# Patient Record
Sex: FEMALE | Race: WHITE | NOT HISPANIC OR LATINO | Employment: FULL TIME | URBAN - METROPOLITAN AREA
[De-identification: names, ages, dates, MRNs, and addresses within clinical notes are randomized per-mention and may not be internally consistent; named-entity substitution may affect disease eponyms.]

---

## 2017-01-30 DIAGNOSIS — F32.A DEPRESSION, UNSPECIFIED DEPRESSION TYPE: ICD-10-CM

## 2017-01-31 RX ORDER — CITALOPRAM 40 MG/1
TABLET, FILM COATED ORAL
Qty: 90 TABLET | Refills: 0 | Status: SHIPPED | OUTPATIENT
Start: 2017-01-31 | End: 2017-03-02 | Stop reason: SDUPTHER

## 2017-02-13 ENCOUNTER — OFFICE VISIT (OUTPATIENT)
Dept: PAIN MEDICINE | Facility: CLINIC | Age: 57
End: 2017-02-13
Payer: COMMERCIAL

## 2017-02-13 VITALS
BODY MASS INDEX: 36.72 KG/M2 | SYSTOLIC BLOOD PRESSURE: 130 MMHG | DIASTOLIC BLOOD PRESSURE: 70 MMHG | TEMPERATURE: 99 F | HEART RATE: 72 BPM | WEIGHT: 220.69 LBS

## 2017-02-13 DIAGNOSIS — M48.061 LUMBAR STENOSIS: ICD-10-CM

## 2017-02-13 DIAGNOSIS — M54.16 LUMBAR RADICULOPATHY: Primary | ICD-10-CM

## 2017-02-13 DIAGNOSIS — M47.816 FACET HYPERTROPHY OF LUMBAR REGION: ICD-10-CM

## 2017-02-13 DIAGNOSIS — M51.36 DDD (DEGENERATIVE DISC DISEASE), LUMBAR: ICD-10-CM

## 2017-02-13 PROCEDURE — 99214 OFFICE O/P EST MOD 30 MIN: CPT | Mod: S$GLB,,, | Performed by: PHYSICIAN ASSISTANT

## 2017-02-13 PROCEDURE — 99999 PR PBB SHADOW E&M-EST. PATIENT-LVL III: CPT | Mod: PBBFAC,,, | Performed by: PHYSICIAN ASSISTANT

## 2017-02-13 RX ORDER — TIZANIDINE 2 MG/1
2 TABLET ORAL NIGHTLY
Qty: 90 TABLET | Refills: 0 | Status: SHIPPED | OUTPATIENT
Start: 2017-02-13 | End: 2017-03-09 | Stop reason: SDUPTHER

## 2017-02-13 RX ORDER — ALPRAZOLAM 0.5 MG/1
1 TABLET, ORALLY DISINTEGRATING ORAL ONCE AS NEEDED
Status: CANCELLED | OUTPATIENT
Start: 2017-03-10 | End: 2017-03-10

## 2017-02-13 RX ORDER — TRAMADOL HYDROCHLORIDE 50 MG/1
50 TABLET ORAL 2 TIMES DAILY PRN
Qty: 60 TABLET | Refills: 2 | Status: SHIPPED | OUTPATIENT
Start: 2017-02-13 | End: 2017-05-14

## 2017-02-13 NOTE — PROGRESS NOTES
CHIEF COMPLAINT/REASON FOR VISIT: low back pain    History of present illness: The patient is a 56 year old woman with a history of migraines, carpal tunnel syndrome and low back pain since her early 20s.  She returns in follow-up today with worsening low back and leg pain.  She states that about a month and half ago, her pain became worse.  It is located across low back radiating to the right greater than left buttocks, posterior thighs and calves.  The pain is worse at night, with standing and also with walking.  She is sitting and medications.  She complains of numbness in her lateral thighs.  She reports some weakness in her legs in the mornings.  She denies bladder or bowel incontinence.    Pain intervention history: She tried Neurontin for her leg/thigh pain with no success. She only takes Advil with about 30% relief. She is status post TFESI bilaterally to L3 on 12/6/2011 with no relief. Past history of status post L4/5 YARON on 5/25/11 with about 90% relief that lasted 3 weeks. She is status post 2 bilateral L3 transforaminal injections with 3 weeks relief each time. Her current pain medications include Celexa 40 mg once a day, Aleve 220 mg twice a day, Lyrica as previously stated, Zanaflex 5 mg at night. She uses her TENS at home, when she thinks about it.  She is status post L4/5 interlaminar epidural steroid injection on 1/8/14 with 90% relief.  She is status post L5/S1 interlaminar epidural steroid injection on 6/9/14 with moderate relief only on the right low back and right leg lasting 2 weeks.  She is status post bilateral L3/4 and L4/5 facet joint injections on 4/13/15 with initially 100% relief of her back pain and 80% relief of her left lateral hip and lateral thigh pain, now reporting at least 50% relief of both.  She is status post bilateral L2, 3 and 4 medial branch radiofrequency ablation on 5/20/15 with 60% relief.   She is status post bilateral L2, 3 and 4 medial branch radiofrequency ablation  on 6/20/16 with 50-70% relief.    ROS: She reports back pain only.  Balance of review of systems is negative.    Medical, surgical, family and social history reviewed elsewhere in record.     Medications/Allergies: See med card      Vitals:    02/13/17 1006   BP: 130/70   Pulse: 72   Temp: 98.8 °F (37.1 °C)   TempSrc: Oral   Weight: 100.1 kg (220 lb 10.9 oz)   PainSc:   8   PainLoc: Back        PHYSICAL EXAM:  Gen: A and O x3, pleasant, well-groomed  HEENT: PERRLA  CVS: Regular rate and rhythm, normal S1 and S2, no murmurs.  Resp: Clear to auscultation bilaterally, no wheezes or rales.  Musculoskeletal: Able to heel walk, toe walk. No antalgic gait.     Neuro:   Lower extremities: 5/5 strength bilaterally  Reflexes:  Patellar 2+, Achilles 2+.  Sensory:Intact and symmetrical to light touch and pinprick in L2-S1 dermatomes bilaterally.    Lumbar spine:  Lumbar spine: ROM is moderately limited with flexion, extension and oblique extension with increased pain in the right greater than left low back during flexion.  Azam's test negative.   Supine straight leg raise is negative.  Internal and external rotation of the hip causes no increased pain in either side.  Myofascial exam: Mild tenderness to palpation to the lower right lumbar paraspinous muscles.         IMAGING:   MRI L-SPINE 5/10/11   IMPRESSION: AT L3-4, THERE IS CIRCUMFERENTIAL DISC PROTRUSION FOCALLY MORE PROMINENT TO THE LEFT AS WELL AS A SMALL DISC HERNIATION PROTRUDING INFERIORLY BEHIND L4 TO THE LEFT OF MIDLINE. THIS PRODUCES SPINAL CANAL AND BILATERAL NEURAL FORAMINAL STENOSIS, LEFT GREATER THAN RIGHT. AT L4-5, THERE IS CIRCUMFERENTIAL DISC PROTRUSION FOCALLY MORE PROMINENT TO THE RIGHT WITH MILD SPINAL CANAL AND RIGHT NEURAL FORAMINAL STENOSIS. AT L1-2 AND L2-3 ALTHOUGH THERE ARE DISC PROTRUSIONS IDENTIFIED, SIGNIFICANT STENOSIS IS NOT SEEN.     MRI lumbar spine 7/10/14  L1-2:There is chronic relatively advanced disc degeneration with disc space  narrowing, disc dehydration, disc narrowing, endplate osteophytes, and degenerative vertebral endplate marrow change. There is a diffuse 2-mm posterior disc bulge with a superimposed4-mm right posterior disc extrusion causing mild right foraminal stenosis. There is no impingement of the right L1 nerve root and there has been no change.  L2-3: There is severe and chronic disc degeneration with severe disc space narrowing, disc dehydration, degenerative vertebral endplate marrow change, and vertebral endplate osteophytes. There is a diffuse 2-mm posterior disc bulge with a superimposed 4-mm right posterior disc extrusion causing mild right foraminal stenosis. There is no impingement of the right L2 nerve root and there has been no significant change.  L3-4: There is severe disc degeneration which has progressed since the prior study. There is severe disc space narrowing, disc dehydration, degenerative vertebral endplate marrow change and endplate osteophytes. There is also mild posterior subluxation of L3over a distance of 4 mm. There is a broad-based 5-mm posterior disc extrusion. There is degenerative facet arthrosis with ligamentum flavum thickening. The combination of facet arthrosis and disc extrusion results in relatively severe spinal canal stenosis with compression of the thecal sac to an AP diameter of 5 mm, moderate right foraminal stenosis, and severe left foraminal stenosis. The spinal stenosis has also progressed since the prior study.  L4-5:There is a diffuse posterior disc bulge with a superimposed 3-mm left posterior paracentral disc protrusion causing left paracentral thecal sac compression. There is moderate left and mild right foraminal stenosis and there has been no significant change. There is mild degenerative facet arthrosis with ligamentum flavum thickening and small joint effusions.  L5-S1:There is no significant compromise of the spinal canal or foramina and there has been no change.    X-ray  cervical spine 6/8/15  There is loss of normal cervical lordosis which may be positional or related muscular strain. Intervertebral disk height loss is noted at the C5-C6 C6-C7 levels and to a lesser degree C4-C5 and C7-T1 levels. Vertebral body alignment appears otherwise adequate. Vertebral body heights appear well-preserved. The atlas and odontoid appear in good relationship to each other. Osseous neuroforaminal narrowing is noted at the C3-C4 C4-C5 C5-C6 levels on the left and at the C4-C5 C5-C6 and C6-C7 levels on the right       ASSESSMENT:  The patient is a 56 year old woman with a history of migraines, carpal tunnel syndrome and low back pain since her early 20s who returns in follow up.   1. Lumbar radiculopathy     2. Lumbar stenosis     3. Facet hypertrophy of lumbar region     4. DDD (degenerative disc disease), lumbar         Plan:  1.  For her worsening low back and leg pain, we discussed that this is likely due to stenosis and I have scheduled her for an L5/S1 interlaminar epidural steroid injection.  She feels that this is a different type of pain than she has when she has relief with RFA.  If she does not have relief with the injection, we discussed updating his lumbar spine MRI.  2.  Dr. Abdalla provided prescriptions for tramadol 50 mg up to 2 times a day as needed for pain.  I have reviewed the [place] Board of Pharmacy website and there are no abberancies.  I refilled her tizanidine 2 mg nightly as needed and she continues to take Topamax and meloxicam.  We will likely check her CO2 at next visit.  3.  Follow-up in 4 weeks post procedure or sooner as needed.

## 2017-03-02 ENCOUNTER — OFFICE VISIT (OUTPATIENT)
Dept: INTERNAL MEDICINE | Facility: CLINIC | Age: 57
End: 2017-03-02
Payer: COMMERCIAL

## 2017-03-02 VITALS
SYSTOLIC BLOOD PRESSURE: 120 MMHG | BODY MASS INDEX: 36.58 KG/M2 | DIASTOLIC BLOOD PRESSURE: 68 MMHG | HEIGHT: 65 IN | WEIGHT: 219.56 LBS | RESPIRATION RATE: 16 BRPM | OXYGEN SATURATION: 97 % | HEART RATE: 72 BPM

## 2017-03-02 DIAGNOSIS — Z00.00 HEALTH CARE MAINTENANCE: ICD-10-CM

## 2017-03-02 DIAGNOSIS — J30.1 SEASONAL ALLERGIC RHINITIS DUE TO POLLEN: ICD-10-CM

## 2017-03-02 DIAGNOSIS — F32.A DEPRESSION, UNSPECIFIED DEPRESSION TYPE: ICD-10-CM

## 2017-03-02 DIAGNOSIS — G43.909 MIGRAINE WITHOUT STATUS MIGRAINOSUS, NOT INTRACTABLE, UNSPECIFIED MIGRAINE TYPE: ICD-10-CM

## 2017-03-02 DIAGNOSIS — K21.9 GASTROESOPHAGEAL REFLUX DISEASE, ESOPHAGITIS PRESENCE NOT SPECIFIED: Primary | ICD-10-CM

## 2017-03-02 PROCEDURE — 1160F RVW MEDS BY RX/DR IN RCRD: CPT | Mod: S$GLB,,, | Performed by: INTERNAL MEDICINE

## 2017-03-02 PROCEDURE — 99214 OFFICE O/P EST MOD 30 MIN: CPT | Mod: S$GLB,,, | Performed by: INTERNAL MEDICINE

## 2017-03-02 PROCEDURE — 99999 PR PBB SHADOW E&M-EST. PATIENT-LVL III: CPT | Mod: PBBFAC,,, | Performed by: INTERNAL MEDICINE

## 2017-03-02 RX ORDER — PANTOPRAZOLE SODIUM 40 MG/1
40 TABLET, DELAYED RELEASE ORAL DAILY
Qty: 90 TABLET | Refills: 1 | Status: SHIPPED | OUTPATIENT
Start: 2017-03-02 | End: 2017-12-26 | Stop reason: SDUPTHER

## 2017-03-02 RX ORDER — SUMATRIPTAN SUCCINATE 100 MG/1
TABLET ORAL
Qty: 9 TABLET | Refills: 0 | Status: SHIPPED | OUTPATIENT
Start: 2017-03-02 | End: 2017-10-25 | Stop reason: SDUPTHER

## 2017-03-02 RX ORDER — CITALOPRAM 40 MG/1
40 TABLET, FILM COATED ORAL DAILY
Qty: 90 TABLET | Refills: 1 | Status: SHIPPED | OUTPATIENT
Start: 2017-03-02 | End: 2018-01-29 | Stop reason: SDUPTHER

## 2017-03-02 RX ORDER — AZELASTINE 1 MG/ML
1 SPRAY, METERED NASAL 2 TIMES DAILY
Qty: 30 ML | Refills: 2 | Status: SHIPPED | OUTPATIENT
Start: 2017-03-02 | End: 2017-11-16

## 2017-03-02 NOTE — MR AVS SNAPSHOT
[facility]  [address] [facility] [address] [place] [address]  Phone: [phone]  Fax: [phone]                  Elizabeth Giordano   3/2/2017 6:40 PM   Office Visit    Description:  Female : 1960   Provider:  Yoselyn Sharif MD   Department:  [place] - Internal Medicine           Reason for Visit     Medication Refill           Diagnoses this Visit        Comments    Gastroesophageal reflux disease, esophagitis presence not specified    -  Primary     Depression, unspecified depression type         Migraine without status migrainosus, not intractable, unspecified migraine type         Seasonal allergic rhinitis due to pollen         Health care maintenance                To Do List           Future Appointments        Provider Department Dept Phone    3/2/2017 6:40 PM Yoselyn Sharif MD [facility] [phone]    3/3/2017 8:45 AM LAB, [place] [facility] [phone]    3/10/2017 2:30 PM [facility] [id number] [facility] [phone]    4/10/2017 9:00 AM JERED Cruz [facility] [facility] - [facility] [phone]    2017 5:00 PM [facility] [id number] [facility] [phone]      Your Future Surgeries/Procedures     Mar 10, 2017   Surgery with Oswald Abdalla MD   [facility] ([place])    [address] [facility] [place] [address] [phone]              Goals (5 Years of Data)     None       These Medications        Disp Refills Start End    azelastine (ASTELIN) 137 mcg (0.1 %) nasal spray 30 mL 2 3/2/2017     1 spray (137 mcg total) by Nasal route 2 (two) times daily. - Nasal    Pharmacy: [id number] - [place] [address] AT [facility] [address] Ph #: [phone]       citalopram (CELEXA) 40 MG tablet 90 tablet 1 3/2/2017     Take 1 tablet (40 mg total) by mouth once daily. - Oral    Pharmacy: [id number]  - [facility] [id number] [place] [address] AT [facility] [address] Ph #: [phone]       pantoprazole (PROTONIX) 40 MG tablet 90 tablet 1 3/2/2017     Take 1 tablet (40 mg total) by mouth once daily. - Oral    Pharmacy: [facility] [place] [id number] [place] [address] AT [facility] [address] Ph #: [phone]       sumatriptan (IMITREX) 100 MG tablet 9 tablet 0 3/2/2017     May take one tab, then followup with another tab in 2 hours if headache not resolved.    Pharmacy: [facility] [place] [address] AT [facility] [address] Ph #: [phone]         [facility] On Call     [facility] On Call Nurse Care Line - 24/7 Assistance  Registered nurses in the [facility] provide clinical advisement, health education, appointment booking, and other advisory services.  Call for this free service at [phone].             Medications           Message regarding Medications     Verify the changes and/or additions to your medication regime listed below are the same as discussed with your clinician today.  If any of these changes or additions are incorrect, please notify your healthcare provider.        START taking these NEW medications        Refills    sumatriptan (IMITREX) 100 MG tablet 0    Sig: May take one tab, then followup with another tab in 2 hours if headache not resolved.    Class: Normal      CHANGE how you are taking these medications     Start Taking Instead of    citalopram (CELEXA) 40 MG tablet citalopram (CELEXA) 40 MG tablet    Dosage:  Take 1 tablet (40 mg total) by mouth once daily. Dosage:  TAKE 1 TABLET BY MOUTH ONCE DAILY.    Reason for Change:  Reorder     pantoprazole (PROTONIX) 40 MG tablet pantoprazole (PROTONIX) 40 MG tablet    Dosage:  Take 1 tablet (40 mg total) by mouth once daily. Dosage:  TAKE 1 TABLET BY MOUTH EVERY DAY    Reason for Change:  Reorder       STOP taking these medications     zolmitriptan (ZOMIG) 5 MG  "tablet TAKE 1 TABLET BY MOUTH AS NEEDED           Verify that the below list of medications is an accurate representation of the medications you are currently taking.  If none reported, the list may be blank. If incorrect, please contact your healthcare provider. Carry this list with you in case of emergency.           Current Medications     azelastine (ASTELIN) 137 mcg (0.1 %) nasal spray 1 spray (137 mcg total) by Nasal route 2 (two) times daily.    citalopram (CELEXA) 40 MG tablet Take 1 tablet (40 mg total) by mouth once daily.    diclofenac sodium (VOLTAREN) 1 % Gel Apply 2 g topically 3 (three) times daily as needed.    diphenhydrAMINE (BENADRYL) 25 mg capsule Take 25 mg by mouth every 6 (six) hours as needed.    EVAMIST 1.53 mg/spray (1.7%) transdermal spray     meloxicam (MOBIC) 15 MG tablet Take 1 tablet (15 mg total) by mouth once daily.    pantoprazole (PROTONIX) 40 MG tablet Take 1 tablet (40 mg total) by mouth once daily.    tizanidine (ZANAFLEX) 2 MG tablet Take 1 tablet (2 mg total) by mouth every evening.    topiramate (TOPAMAX) 50 MG tablet Take 1 tablet (50 mg total) by mouth 2 (two) times daily.    tramadol (ULTRAM) 50 mg tablet Take 1 tablet (50 mg total) by mouth 2 (two) times daily as needed for Pain.    biotin 1 mg Cap Take by mouth.    sumatriptan (IMITREX) 100 MG tablet May take one tab, then followup with another tab in 2 hours if headache not resolved.           Clinical Reference Information           Your Vitals Were     BP Pulse Resp Height Weight SpO2    120/68 72 16 5' 5" (1.651 m) 99.6 kg (219 lb 9.3 oz) 97%    BMI                36.54 kg/m2          Blood Pressure          Most Recent Value    BP  120/68      Allergies as of 3/2/2017     Hydrocodone    Sulfa (Sulfonamide Antibiotics)    Oxycodone      Immunizations Administered on Date of Encounter - 3/2/2017     None      Orders Placed During Today's Visit     Future Labs/Procedures Expected by Expires    CBC auto differential  " 3/2/2017 5/31/2017    Comprehensive metabolic panel  3/2/2017 5/31/2017    TSH  3/2/2017 3/3/2018    Lipid panel  4/1/2017 5/1/2018    Mammo Digital Screening Bilateral With CAD  4/11/2017 5/2/2018      Language Assistance Services     ATTENTION: Language assistance services are available, free of charge. Please call [phone].      ATENCIÓN: Si habla español, tiene a schmitz disposición servicios gratuitos de asistencia lingüística. Llame al [phone].     CHÚ Ý: N?u b?n nói Ti?ng Vi?t, có các d?ch v? h? tr? ngôn ng? mi?n phí dành cho b?n. G?i s? [phone].         [facility] complies with applicable Federal civil rights laws and does not discriminate on the basis of race, color, national origin, age, disability, or sex.

## 2017-03-03 NOTE — PROGRESS NOTES
HISTORY OF PRESENT ILLNESS:  Pt. is a 56 y.o. female presents for monitoring of her use of celexa for depression, lumbar DJD, migraines, Gerd, has Factor V Leiden. Is no on coumadin.  She is due labs.  She is having problems with her seasonal allergies.  She has upcoming appt. With Dr. Darin Ware.      ROS:  GENERAL: No fever, chills, fatigability or weight loss.  SKIN: No rashes, itching or changes in color or texture of skin.  HEAD: No headaches or recent head trauma.  EARS: Denies ear pain, discharge or vertigo.  NOSE: No loss of smell, no epistaxis; positive postnasal drip.  MOUTH & THROAT: No hoarseness or change in voice. No excessive gum bleeding.  NODES: Denies swollen glands.  CHEST: Denies MEDELLIN, cyanosis, wheezing, cough and sputum production.  CARDIOVASCULAR: Denies chest pain, PND, orthopnea or reduced exercise tolerance.  ABDOMEN: Appetite fine. No weight loss. Denies constipation, diarrhea, abdominal pain, hematemesis or blood in stool.  URINARY: No flank pain, dysuria or hematuria.  PERIPHERAL VASCULAR: No claudication or cyanosis. No edema.  MUSCULOSKELETAL: No joint stiffness or swelling. Denies back pain.  NEUROLOGIC: Denies numbness    PE:   Vitals:   Vitals:    03/02/17 1815   BP: 120/68   Pulse: 72   Resp: 16       GENERAL: no acute distress, A&Ox3, comfortable.  Female with BMI of 36   HEENT: tympanic membranes clear, nasal mucosa pink, no pharyngeal erythema or exudate  NECK: supple, no cervical lymphadenopathy, no thyromegaly; no supraclavicular nodes;   CHEST:  clear to auscultation bilaterally, no crackles or wheeze; no increased work of breathing;  CARDIOVASCULAR: regular rate and rhythm, no rubs, murmurs or gallops.  ABDOMEN: normal bowel sounds, soft non-tender, non-distended; no palpable organomegaly;   EXT: no clubbing, cyanosis or edema.     ASSESSMENT/PLAN:    Gastroesophageal reflux disease, esophagitis presence not specified  -     CBC auto differential; Future; Expected date:  3/2/17  -     pantoprazole (PROTONIX) 40 MG tablet; Take 1 tablet (40 mg total) by mouth once daily.  Dispense: 90 tablet; Refill: 1    Depression, unspecified depression type  -     TSH; Future; Expected date: 3/2/17  -     citalopram (CELEXA) 40 MG tablet; Take 1 tablet (40 mg total) by mouth once daily.  Dispense: 90 tablet; Refill: 1    Migraine without status migrainosus, not intractable, unspecified migraine type  -     sumatriptan (IMITREX) 100 MG tablet; May take one tab, then followup with another tab in 2 hours if headache not resolved.  Dispense: 9 tablet; Refill: 0    Seasonal allergic rhinitis due to pollen  -     azelastine (ASTELIN) 137 mcg (0.1 %) nasal spray; 1 spray (137 mcg total) by Nasal route 2 (two) times daily.  Dispense: 30 mL; Refill: 2    Health care maintenance  -     Comprehensive metabolic panel; Future; Expected date: 3/2/17  -     Lipid panel; Future; Expected date: 4/1/17  -     Mammo Digital Screening Bilateral With CAD; Future; Expected date: 4/11/17      Call if condition changes or worsens.

## 2017-03-09 RX ORDER — TIZANIDINE 2 MG/1
TABLET ORAL
Qty: 90 TABLET | Refills: 0 | Status: SHIPPED | OUTPATIENT
Start: 2017-03-09 | End: 2017-04-10 | Stop reason: SDUPTHER

## 2017-03-10 ENCOUNTER — HOSPITAL ENCOUNTER (OUTPATIENT)
Facility: HOSPITAL | Age: 57
Discharge: HOME | End: 2017-03-10
Attending: ANESTHESIOLOGY | Admitting: ANESTHESIOLOGY
Payer: COMMERCIAL

## 2017-03-10 ENCOUNTER — SURGERY (OUTPATIENT)
Age: 57
End: 2017-03-10

## 2017-03-10 ENCOUNTER — HOSPITAL ENCOUNTER (OUTPATIENT)
Dept: RADIOLOGY | Facility: HOSPITAL | Age: 57
Discharge: HOME | End: 2017-03-10
Attending: ANESTHESIOLOGY | Admitting: ANESTHESIOLOGY
Payer: COMMERCIAL

## 2017-03-10 VITALS
TEMPERATURE: 98 F | WEIGHT: 220 LBS | BODY MASS INDEX: 36.65 KG/M2 | HEIGHT: 65 IN | HEART RATE: 66 BPM | OXYGEN SATURATION: 97 % | SYSTOLIC BLOOD PRESSURE: 134 MMHG | RESPIRATION RATE: 16 BRPM | DIASTOLIC BLOOD PRESSURE: 63 MMHG

## 2017-03-10 DIAGNOSIS — M54.16 LUMBAR RADICULOPATHY: ICD-10-CM

## 2017-03-10 DIAGNOSIS — M51.36 DDD (DEGENERATIVE DISC DISEASE), LUMBAR: ICD-10-CM

## 2017-03-10 PROCEDURE — 25000003 PHARM REV CODE 250: Mod: PO | Performed by: ANESTHESIOLOGY

## 2017-03-10 PROCEDURE — 62322 NJX INTERLAMINAR LMBR/SAC: CPT | Mod: PO | Performed by: ANESTHESIOLOGY

## 2017-03-10 PROCEDURE — 62323 NJX INTERLAMINAR LMBR/SAC: CPT | Mod: ,,, | Performed by: ANESTHESIOLOGY

## 2017-03-10 PROCEDURE — 63600175 PHARM REV CODE 636 W HCPCS: Mod: PO | Performed by: ANESTHESIOLOGY

## 2017-03-10 PROCEDURE — 62323 NJX INTERLAMINAR LMBR/SAC: CPT | Performed by: ANESTHESIOLOGY

## 2017-03-10 PROCEDURE — 25500020 PHARM REV CODE 255: Mod: PO | Performed by: ANESTHESIOLOGY

## 2017-03-10 RX ORDER — LIDOCAINE HYDROCHLORIDE 10 MG/ML
INJECTION INFILTRATION; PERINEURAL
Status: DISCONTINUED | OUTPATIENT
Start: 2017-03-10 | End: 2017-03-10 | Stop reason: HOSPADM

## 2017-03-10 RX ORDER — SODIUM CHLORIDE 9 MG/ML
INJECTION, SOLUTION INTRAMUSCULAR; INTRAVENOUS; SUBCUTANEOUS
Status: DISCONTINUED | OUTPATIENT
Start: 2017-03-10 | End: 2017-03-10 | Stop reason: HOSPADM

## 2017-03-10 RX ORDER — ALPRAZOLAM 0.5 MG/1
1 TABLET, ORALLY DISINTEGRATING ORAL ONCE AS NEEDED
Status: COMPLETED | OUTPATIENT
Start: 2017-03-10 | End: 2017-03-10

## 2017-03-10 RX ORDER — METHYLPREDNISOLONE ACETATE 80 MG/ML
INJECTION, SUSPENSION INTRA-ARTICULAR; INTRALESIONAL; INTRAMUSCULAR; SOFT TISSUE
Status: DISCONTINUED | OUTPATIENT
Start: 2017-03-10 | End: 2017-03-10 | Stop reason: HOSPADM

## 2017-03-10 RX ADMIN — ALPRAZOLAM 1 MG: 0.5 TABLET, ORALLY DISINTEGRATING ORAL at 01:03

## 2017-03-10 RX ADMIN — METHYLPREDNISOLONE ACETATE 80 MG: 80 INJECTION, SUSPENSION INTRA-ARTICULAR; INTRALESIONAL; INTRAMUSCULAR; SOFT TISSUE at 02:03

## 2017-03-10 RX ADMIN — IOHEXOL 3 ML: 300 INJECTION, SOLUTION INTRAVENOUS at 02:03

## 2017-03-10 RX ADMIN — SODIUM CHLORIDE 4 ML: 9 INJECTION INTRAMUSCULAR; INTRAVENOUS; SUBCUTANEOUS at 02:03

## 2017-03-10 RX ADMIN — LIDOCAINE HYDROCHLORIDE 10 ML: 10 INJECTION, SOLUTION INFILTRATION; PERINEURAL at 02:03

## 2017-03-10 NOTE — OP NOTE

## 2017-03-10 NOTE — IP AVS SNAPSHOT
[facility]  [address]  [place] [address]  Phone: [phone]           Patient Discharge Instructions     Our goal is to set you up for success. This packet includes information on your condition, medications, and your home care. It will help you to care for yourself so you don't get sicker and need to go back to the hospital.     Please ask your nurse if you have any questions.        There are many details to remember when preparing to leave the hospital. Here is what you will need to do:    1. Take your medicine. If you are prescribed medications, review your Medication List in the following pages. You may have new medications to  at the pharmacy and others that you'll need to stop taking. Review the instructions for how and when to take your medications. Talk with your doctor or nurses if you are unsure of what to do.     2. Go to your follow-up appointments. Specific follow-up information is listed in the following pages. Your may be contacted by a transition nurse or clinical provider about future appointments. Be sure we have all of the phone numbers to reach you, if needed. Please contact your provider's office if you are unable to make an appointment.     3. Watch for warning signs. Your doctor or nurse will give you detailed warning signs to watch for and when to call for assistance. These instructions may also include educational information about your condition. If you experience any of warning signs to your health, call your doctor.               [facility]  Unless otherwise directed by your provider, please contact [facility], our nurse care line that is available for 24/7 assistance.     [phone] (toll-free)    Registered nurses in the [facility] provide clinical advisement, health education, appointment booking, and other advisory services.                    ** Verify the list of medication(s) below is accurate and up  to date. Carry this with you in case of emergency. If your medications have changed, please notify your healthcare provider.             Medication List      ASK your doctor about these medications        Additional Info                      azelastine 137 mcg (0.1 %) nasal spray   Commonly known as:  ASTELIN   Quantity:  30 mL   Refills:  2   Dose:  1 spray    Instructions:  1 spray (137 mcg total) by Nasal route 2 (two) times daily.     Begin Date    AM    Noon    PM    Bedtime       biotin 1 mg Cap   Refills:  0    Instructions:  Take by mouth.     Begin Date    AM    Noon    PM    Bedtime       citalopram 40 MG tablet   Commonly known as:  CELEXA   Quantity:  90 tablet   Refills:  1   Dose:  40 mg    Instructions:  Take 1 tablet (40 mg total) by mouth once daily.     Begin Date    AM    Noon    PM    Bedtime       diclofenac sodium 1 % Gel   Commonly known as:  VOLTAREN   Quantity:  1 Tube   Refills:  2   Dose:  2 g    Instructions:  Apply 2 g topically 3 (three) times daily as needed.     Begin Date    AM    Noon    PM    Bedtime       diphenhydrAMINE 25 mg capsule   Commonly known as:  BENADRYL   Refills:  0   Dose:  25 mg   Indications:  Allergic Rhinitis    Instructions:  Take 25 mg by mouth every 6 (six) hours as needed.     Begin Date    AM    Noon    PM    Bedtime       EVAMIST 1.53 mg/spray (1.7%) transdermal spray   Refills:  0   Generic drug:  estradiol      Begin Date    AM    Noon    PM    Bedtime       meloxicam 15 MG tablet   Commonly known as:  MOBIC   Quantity:  30 tablet   Refills:  5   Dose:  15 mg    Instructions:  Take 1 tablet (15 mg total) by mouth once daily.     Begin Date    AM    Noon    PM    Bedtime       pantoprazole 40 MG tablet   Commonly known as:  PROTONIX   Quantity:  90 tablet   Refills:  1   Dose:  40 mg    Instructions:  Take 1 tablet (40 mg total) by mouth once daily.     Begin Date    AM    Noon    PM    Bedtime       sumatriptan 100 MG tablet   Commonly known as:  IMITREX    Quantity:  9 tablet   Refills:  0    Instructions:  May take one tab, then followup with another tab in 2 hours if headache not resolved.     Begin Date    AM    Noon    PM    Bedtime       tizanidine 2 MG tablet   Commonly known as:  ZANAFLEX   Quantity:  90 tablet   Refills:  0    Instructions:  TAKE 1 TABLET(2 MG) BY MOUTH EVERY EVENING     Begin Date    AM    Noon    PM    Bedtime       topiramate 50 MG tablet   Commonly known as:  TOPAMAX   Quantity:  60 tablet   Refills:  5   Dose:  50 mg    Instructions:  Take 1 tablet (50 mg total) by mouth 2 (two) times daily.     Begin Date    AM    Noon    PM    Bedtime       tramadol 50 mg tablet   Commonly known as:  ULTRAM   Quantity:  60 tablet   Refills:  2   Dose:  50 mg    Instructions:  Take 1 tablet (50 mg total) by mouth 2 (two) times daily as needed for Pain.     Begin Date    AM    Noon    PM    Bedtime                  Please bring to all follow up appointments:    1. A copy of your discharge instructions.  2. All medicines you are currently taking in their original bottles.  3. Identification and insurance card.    Please arrive 15 minutes ahead of scheduled appointment time.    Please call 24 hours in advance if you must reschedule your appointment and/or time.        Your Scheduled Appointments     Mar 10, 2017  2:30 PM CST   Fl For Pain Management with [facility] [email or web address]   [facility]    [address] [place] [address]   [phone]            Apr 10, 2017  9:00 AM CDT   Established Patient Visit with JER[facility] Cruz - Pain Management ([place])    [address] [facility] [address]   [phone]            Apr 18, 2017  5:00 PM CDT   Mammo Screening with [facility] [email or web address]   [facility] [facility]    [address] [place] [address]   [phone]                  Discharge Instructions       Home care instructions  Apply ice pack to the injection site for 20  "minutes periods for the first 24 hrs for soreness/discomfort at injection site     DO NOT USE HEAT FOR 24 HOURS    Keep site clean and dry for 24 hours, remove bandaid when desired    Do not drive until tomorrow    Take care when walking after a lumbar injection    Avoid strenuous activities for 2 days    Make take 2 weeks to feel the full effects     Resume home medication as prescribed today    Resume Aspirin, Plavix, or Coumadin the day after the procedure unless otherwise instructed.    SEE IMMEDIATE MEDICAL HELP FOR:  Severe increase in your usual pain or appearance of new pain  Prolonged or increasing weakness or numbness in the legs or arms  Drainage, redness, active bleeding, or increased swelling at the injection site  Temperature over 100.0 degrees F.  Headache that increases when your head is upright and decreases when you lie flat    CALL 911 OR GO DIRECTLY TO EMERGENCY DEPARTMENT FOR:  Shortness of breath, chest pain, or problems breathing      Admission Information     Date & Time Provider Department CSN    3/10/2017 12:52 PM Oswald Abdalla MD [facility] [id number]      Care Providers     Provider Role Specialty Primary office phone    Oswald Abdalla MD Attending Provider Pain Medicine [phone]    Oswald Abdalla MD Surgeon  Pain Medicine [phone]      Your Vitals Were     BP Pulse Temp Resp Height Weight    151/69 63 98.1 °F (36.7 °C) (Temporal) 16 5' 4.5" (1.638 m) 99.8 kg (220 lb)    SpO2 BMI             96% 37.18 kg/m2         Recent Lab Values     No lab values to display.      Allergies as of 3/10/2017        Reactions    Hydrocodone Hives, Nausea Only        Sulfa (Sulfonamide Antibiotics) Hives, Rash        Oxycodone Itching, Nausea And Vomiting      Advance Directives     An advance directive is a document which, in the event you are no longer able to make decisions for yourself, tells your healthcare team what kind of treatment you do or do not want to " receive, or who you would like to make those decisions for you.  If you do not currently have an advance directive, [facility] encourages you to create one.  For more information call:  [phone]), [phone] ([phone]),  or log on to [email or web address].        Language Assistance Services     ATTENTION: Language assistance services are available, free of charge. Please call [phone].      ATENCIÓN: Si habla español, tiene a schmitz disposición servicios gratuitos de asistencia lingüística. Llame al [phone].     CHÚ Ý: N?u b?n nói Ti?ng Vi?t, có các d?ch v? h? tr? ngôn ng? mi?n phí dành cho b?n. G?i s? [phone].         [facility] complies with applicable Federal civil rights laws and does not discriminate on the basis of race, color, national origin, age, disability, or sex.

## 2017-03-10 NOTE — PLAN OF CARE
Problem: Patient Care Overview  Goal: Plan of Care Review  Vss, kelvin po fluids, adequate pain control, ambulates easily.  States ready to go home.  Discharged from facility with family.

## 2017-03-10 NOTE — DISCHARGE INSTRUCTIONS
Home care instructions  Apply ice pack to the injection site for 20 minutes periods for the first 24 hrs for soreness/discomfort at injection site     DO NOT USE HEAT FOR 24 HOURS    Keep site clean and dry for 24 hours, remove bandaid when desired    Do not drive until tomorrow    Take care when walking after a lumbar injection    Avoid strenuous activities for 2 days    Make take 2 weeks to feel the full effects     Resume home medication as prescribed today    Resume Aspirin, Plavix, or Coumadin the day after the procedure unless otherwise instructed.    SEE IMMEDIATE MEDICAL HELP FOR:  Severe increase in your usual pain or appearance of new pain  Prolonged or increasing weakness or numbness in the legs or arms  Drainage, redness, active bleeding, or increased swelling at the injection site  Temperature over 100.0 degrees F.  Headache that increases when your head is upright and decreases when you lie flat    CALL 911 OR GO DIRECTLY TO EMERGENCY DEPARTMENT FOR:  Shortness of breath, chest pain, or problems breathing

## 2017-03-22 ENCOUNTER — TELEPHONE (OUTPATIENT)
Dept: FAMILY MEDICINE | Facility: CLINIC | Age: 57
End: 2017-03-22

## 2017-03-22 ENCOUNTER — OFFICE VISIT (OUTPATIENT)
Dept: PRIMARY CARE CLINIC | Facility: CLINIC | Age: 57
End: 2017-03-22
Payer: COMMERCIAL

## 2017-03-22 VITALS
DIASTOLIC BLOOD PRESSURE: 68 MMHG | HEIGHT: 65 IN | OXYGEN SATURATION: 96 % | BODY MASS INDEX: 35.85 KG/M2 | WEIGHT: 215.19 LBS | TEMPERATURE: 99 F | HEART RATE: 67 BPM | SYSTOLIC BLOOD PRESSURE: 124 MMHG

## 2017-03-22 DIAGNOSIS — R11.0 NAUSEA: ICD-10-CM

## 2017-03-22 DIAGNOSIS — G43.009 MIGRAINE WITHOUT AURA AND WITHOUT STATUS MIGRAINOSUS, NOT INTRACTABLE: Primary | ICD-10-CM

## 2017-03-22 PROCEDURE — 96372 THER/PROPH/DIAG INJ SC/IM: CPT | Mod: S$GLB,,, | Performed by: NURSE PRACTITIONER

## 2017-03-22 PROCEDURE — 99999 PR PBB SHADOW E&M-EST. PATIENT-LVL IV: CPT | Mod: PBBFAC,,, | Performed by: NURSE PRACTITIONER

## 2017-03-22 PROCEDURE — 1160F RVW MEDS BY RX/DR IN RCRD: CPT | Mod: S$GLB,,, | Performed by: NURSE PRACTITIONER

## 2017-03-22 PROCEDURE — 99213 OFFICE O/P EST LOW 20 MIN: CPT | Mod: 25,S$GLB,, | Performed by: NURSE PRACTITIONER

## 2017-03-22 RX ORDER — KETOROLAC TROMETHAMINE 30 MG/ML
60 INJECTION, SOLUTION INTRAMUSCULAR; INTRAVENOUS
Status: COMPLETED | OUTPATIENT
Start: 2017-03-22 | End: 2017-03-22

## 2017-03-22 RX ORDER — PROMETHAZINE HYDROCHLORIDE 25 MG/1
25 TABLET ORAL EVERY 4 HOURS
Qty: 20 TABLET | Refills: 0 | Status: ON HOLD | OUTPATIENT
Start: 2017-03-22 | End: 2017-07-17 | Stop reason: CLARIF

## 2017-03-22 RX ADMIN — KETOROLAC TROMETHAMINE 60 MG: 30 INJECTION, SOLUTION INTRAMUSCULAR; INTRAVENOUS at 04:03

## 2017-03-22 NOTE — Clinical Note
Yoselyn Sharif MD,  I saw your patient today in the [facility].  If you have any questions, please do not hesitate to contact me.  Thank you!  MATTIE Baker

## 2017-03-22 NOTE — TELEPHONE ENCOUNTER
----- Message from Sammi Bailon sent at 3/22/2017 11:41 AM CDT -----  Contact: self   [phone]  Patient has a migraine, asking if she can be seen. Patient was offered an appointment to see Np.Patient want to wait for an answer from the nurse. Thanks!

## 2017-03-22 NOTE — PATIENT INSTRUCTIONS
Toradol injection given today.    My advice:  Go home and take the promethazine.                        Put ice packs on                      Tramadol, too.    If recurs tomorrow:  Zomig or Imitrex                                   Repeat in 2 hrs if needed.                                   Stay on ice.    GYN follow up next week.    May consider FMLA.    If you have any questions, please call.  You can reach us at [phone] Monday through Friday (except holidays) 12 nooon to 6 p.m.    Thank you for using the [facility]!    JEFFREY Baker, CNP, FNP-BC  [facility]  [facility]

## 2017-03-22 NOTE — MR AVS SNAPSHOT
[place] [facility]  [address]  [facility] [address]  Phone: [phone]                  Elizabeth Giordano   3/22/2017 3:30 PM   Office Visit    Description:  Female : 1960   Provider:  Cristine Garcia NP   Department:  [place] [facility]           Reason for Visit     Migraine           Diagnoses this Visit        Comments    Migraine without aura and without status migrainosus, not intractable    -  Primary     Nausea                To Do List           Future Appointments        Provider Department Dept Phone    4/10/2017 9:00 AM JERED Cruz [place] - Pain Management [phone]    2017 5:00 PM [facility] [id number] [facility] [phone]      Goals (5 Years of Data)     None       These Medications        Disp Refills Start End    promethazine (PHENERGAN) 25 MG tablet 20 tablet 0 3/22/2017     Take 1 tablet (25 mg total) by mouth every 4 (four) hours. - Oral    Pharmacy: [facility] [address] [place] [address] [address] AT [facility] [address] Ph #: [phone]         [facility]     [facility] Nurse Care Line -  Assistance  Registered nurses in the [facility] provide clinical advisement, health education, appointment booking, and other advisory services.  Call for this free service at [phone].             Medications           Message regarding Medications     Verify the changes and/or additions to your medication regime listed below are the same as discussed with your clinician today.  If any of these changes or additions are incorrect, please notify your healthcare provider.        START taking these NEW medications        Refills    promethazine (PHENERGAN) 25 MG tablet 0    Sig: Take 1 tablet (25 mg total) by mouth every 4 (four) hours.    Class: Normal    Route: Oral      These medications were administered today        Dose Freq    ketorolac injection 60 mg 60  "mg Clinic/HOD 1 time    Sig: Inject 2 mLs (60 mg total) into the muscle one time.    Class: Normal    Route: Intramuscular      STOP taking these medications     biotin 1 mg Cap Take by mouth.    EVAMIST 1.53 mg/spray (1.7%) transdermal spray            Verify that the below list of medications is an accurate representation of the medications you are currently taking.  If none reported, the list may be blank. If incorrect, please contact your healthcare provider. Carry this list with you in case of emergency.           Current Medications     azelastine (ASTELIN) 137 mcg (0.1 %) nasal spray 1 spray (137 mcg total) by Nasal route 2 (two) times daily.    citalopram (CELEXA) 40 MG tablet Take 1 tablet (40 mg total) by mouth once daily.    diclofenac sodium (VOLTAREN) 1 % Gel Apply 2 g topically 3 (three) times daily as needed.    diphenhydrAMINE (BENADRYL) 25 mg capsule Take 25 mg by mouth every 6 (six) hours as needed.    meloxicam (MOBIC) 15 MG tablet Take 1 tablet (15 mg total) by mouth once daily.    pantoprazole (PROTONIX) 40 MG tablet Take 1 tablet (40 mg total) by mouth once daily.    promethazine (PHENERGAN) 25 MG tablet Take 1 tablet (25 mg total) by mouth every 4 (four) hours.    sumatriptan (IMITREX) 100 MG tablet May take one tab, then followup with another tab in 2 hours if headache not resolved.    tizanidine (ZANAFLEX) 2 MG tablet TAKE 1 TABLET(2 MG) BY MOUTH EVERY EVENING    topiramate (TOPAMAX) 50 MG tablet Take 1 tablet (50 mg total) by mouth 2 (two) times daily.    tramadol (ULTRAM) 50 mg tablet Take 1 tablet (50 mg total) by mouth 2 (two) times daily as needed for Pain.           Clinical Reference Information           Your Vitals Were     BP Pulse Temp Height Weight SpO2    124/68 (BP Location: Right arm, Patient Position: Sitting, BP Method: Manual) 67 98.5 °F (36.9 °C) (Oral) 5' 4.5" (1.638 m) 97.6 kg (215 lb 2.7 oz) 96%    BMI                36.36 kg/m2          Blood Pressure          Most " Recent Value    BP  124/68      Allergies as of 3/22/2017     Hydrocodone    Sulfa (Sulfonamide Antibiotics)    Oxycodone      Immunizations Administered on Date of Encounter - 3/22/2017     None      Instructions    Toradol injection given today.    My advice:  Go home and take the promethazine.                        Put ice packs on                      Tramadol, too.    If recurs tomorrow:  Zomig or Imitrex                                   Repeat in 2 hrs if needed.                                   Stay on ice.    GYN follow up next week.    May consider FMLA.    If you have any questions, please call.  You can reach us at [phone] Monday through Friday (except holidays) 12 nooon to 6 p.m.    Thank you for using the [facility]!    Alayna Jose, APRN, CNP, FNP-BC  [facility]  [facility]           Language Assistance Services     ATTENTION: Language assistance services are available, free of charge. Please call [phone].      ATENCIÓN: Si habla español, tiene a schmitz disposición servicios gratuitos de asistencia lingüística. Llame al [phone].     CHÚ Ý: N?u b?n nói Ti?ng Vi?t, có các d?ch v? h? tr? ngôn ng? mi?n phí dành cho b?n. G?i s? [phone].         [facility] complies with applicable Federal civil rights laws and does not discriminate on the basis of race, color, national origin, age, disability, or sex.

## 2017-03-22 NOTE — PROGRESS NOTES
"Elizabeth Giordano is a 56 y.o. female patient of Dr. Yoselyn Sharif MD who presents to the clinic today for   Chief Complaint   Patient presents with    Migraine     since Thursday   .    HPI    Patient, who is not known to me, reports she's had a headache since 3/16/17.  Took  Zomig--relief.  -Imitrex.  Monday--headache, took Excedrin migraine, which didn't help.  Yesterday-Zomig.  Today took Imitrex.  Daily headache for 7 days.  Can't take the Zomig or Imitrex for headaches when she goes to work.       + migraine, no aura.  Pain is in the eye.    These symptoms began 7 days ago and status is recurrent daily..     Pt denies the following symptoms:  n/v    Aggravating factors include nothing precipitates.  Triggers are usually "smell", strong odors .    Relieving factors include Imitrex and Zomig but it recurred .    OTC Medications tried are Excedrin Migraine.    Prescription medications taken for symptoms are Zomig and Imitrex..  + h/o migraine.    Pertinent medical history:  Hasn't had a string of headaches like this for > 2 yrs.  Was easier to deal with the headaches when she was working at home.  Takes Topamax and Zanaflex for headaches and celexa, as well.  These are helping.  Triptans work for headache .  No other medication has worked for that.    Stopped taking hormones about a month ago.  Has f/u appt 3/30/17.  Is now getting hot flashes again.  Menopause at age 40.    ROS    Constitutional:  No fever, no unusual fatigue.    Head:   + headache, some dizziness.  no report of head injury.  Ears:   No drainage from the ear(s).  No difficulty hearing.  Eyes:  No change in vision.  No lesions or d/c present.  Nose:   No runny nose or bleeding from the nose.  Throat:  No ST pain, exudate, difficulty swallowing.    Heart/Lung:  No new sxs    GI: No stomach ache, very mild nausea, no vomiting.    Urinary:  No changes.    MS:  No new bone, joint or muscle problems.    NEURO:  See Chief " Complaint.    Skin:  No rashes, itching..      PAST MEDICAL HISTORY:  Past Medical History:   Diagnosis Date    Allergy     Arthritis of spine 2011    Chronic low back pain     DDD (degenerative disc disease), lumbar     Diverticulitis     Diverticulosis     DJD (degenerative joint disease)     Encounter for blood transfusion     Factor V Leiden     Former smoker     GERD (gastroesophageal reflux disease)     Hiatal hernia     Migraines     PONV (postoperative nausea and vomiting)     geta       PAST SURGICAL HISTORY:  Past Surgical History:   Procedure Laterality Date    APPENDECTOMY      APPENDECTOMY  1981    CARPAL TUNNEL RELEASE  2011    right    COLONOSCOPY  2014    reduntant colon, otherwise normal findings repeat in 8-10 years for surveillance    Epidural Steroid Injection      Pain Management    ESOPHAGOGASTRODUODENOSCOPY      Facet injection      Pain Management    HYSTERECTOMY      ovaries removed    KIDNEY SURGERY Right 1967    to correct urinary reflux into kidney    OVARIAN CYST REMOVAL Right 1981    SHOULDER SURGERY  2010    rotator cuff, right       SOCIAL HISTORY:  Social History     Social History    Marital status:      Spouse name: N/A    Number of children: 1    Years of education: N/A     Occupational History     [employer]     Social History Main Topics    Smoking status: Former Smoker     Packs/day: 1.00     Years: 5.00     Start date: 6/4/1992     Quit date: 1/5/1997    Smokeless tobacco: Never Used    Alcohol use No      Comment: occasionally    Drug use: No    Sexual activity: Yes     Partners: Male     Other Topics Concern    Not on file     Social History Narrative    ** Merged History Encounter **            FAMILY HISTORY:  Family History   Problem Relation Age of Onset    Heart attack Mother     Heart disease Mother     COPD Mother     Hypertension Mother     Hepatitis Sister     COPD Father     No Known Problems Daughter      Allergic rhinitis Neg Hx     Angioedema Neg Hx     Asthma Neg Hx     Atopy Neg Hx     Eczema Neg Hx     Immunodeficiency Neg Hx     Urticaria Neg Hx     Colon cancer Neg Hx     Colon polyps Neg Hx        ALLERGIES AND MEDICATIONS: updated and reviewed.  Review of patient's allergies indicates:   Allergen Reactions    Hydrocodone Hives and Nausea Only           Sulfa (sulfonamide antibiotics) Hives and Rash           Oxycodone Itching and Nausea And Vomiting     Current Outpatient Prescriptions   Medication Sig Dispense Refill    azelastine (ASTELIN) 137 mcg (0.1 %) nasal spray 1 spray (137 mcg total) by Nasal route 2 (two) times daily. 30 mL 2    citalopram (CELEXA) 40 MG tablet Take 1 tablet (40 mg total) by mouth once daily. 90 tablet 1    diclofenac sodium (VOLTAREN) 1 % Gel Apply 2 g topically 3 (three) times daily as needed. 1 Tube 2    diphenhydrAMINE (BENADRYL) 25 mg capsule Take 25 mg by mouth every 6 (six) hours as needed.      meloxicam (MOBIC) 15 MG tablet Take 1 tablet (15 mg total) by mouth once daily. 30 tablet 5    pantoprazole (PROTONIX) 40 MG tablet Take 1 tablet (40 mg total) by mouth once daily. 90 tablet 1    sumatriptan (IMITREX) 100 MG tablet May take one tab, then followup with another tab in 2 hours if headache not resolved. 9 tablet 0    tizanidine (ZANAFLEX) 2 MG tablet TAKE 1 TABLET(2 MG) BY MOUTH EVERY EVENING 90 tablet 0    topiramate (TOPAMAX) 50 MG tablet Take 1 tablet (50 mg total) by mouth 2 (two) times daily. 60 tablet 5    tramadol (ULTRAM) 50 mg tablet Take 1 tablet (50 mg total) by mouth 2 (two) times daily as needed for Pain. 60 tablet 2     No current facility-administered medications for this visit.          PHYSICAL EXAM    Alert, coop 56 y.o. female patient in no acute distress.    Vitals:    03/22/17 1538   BP: 124/68   Pulse: 67   Temp: 98.5 °F (36.9 °C)     VS reviewed.  VSS.  CC, nursing note, medications & PMH all reviewed today.    Head:   Normocephalic, atraumatic.    EENT:  Ext nose/ears normal.               Eye lids normal, no discharge present.  PERRLA bilat.               No d/c from nares.    Resp:  Respirations even, unlabored.   Lungs CTA bilat.     Heart:  RRR, no MRG.  CV:  Cap RF brisk, post tib pulses 2+ bilat, radial pulses 3+ bilat.      MS:  Good ROM of extremities bilat, symmetrical movement.          Abduction and adduction of the UEs is 5/5, strength symmetrical .          Flexion and extension of the LEs is 5/5, strength symmetrical .    NEURO:  Alert and oriented x 4.  Responds appropriately during interaction.                  Gait steady, balance good.   Moves all extremities evenly, symmetrically.                  CN II-XII intact bilat.  No focal findings.                  Sensation to light touch intact over all extremities .                  Able to do point-to-point.    Skin:  Warm, dry, color good.    Psych:  Responds appropriately throughout the visit.               Relaxed.  Well-groomed    Migraine without aura and without status migrainosus, not intractable  -     ketorolac injection 60 mg; Inject 2 mLs (60 mg total) into the muscle one time.    Nausea  -     promethazine (PHENERGAN) 25 MG tablet; Take 1 tablet (25 mg total) by mouth every 4 (four) hours.  Dispense: 20 tablet; Refill: 0      Pt today presents with 7 days of migraine in a row.  Her pain is consistent with her usual migraines.  She has photophobia and nausea.When she takes the triptan, it resolves; but it comes back the next day.  She stopped her hormones about a month ago and is having a lot of hot flashes..    This is a new problem to me.  No work up is planned.          Pt advised to perform comfort measures recommended on patient instruction sheet .    If not better in 2 days, the patient is advised to f/u with PCP.  If worse or concerns, the patient is advised to call us.  Explained exam findings, diagnosis and treatment plan to patient.  Questions  answered and patient states understanding.

## 2017-04-10 ENCOUNTER — TELEPHONE (OUTPATIENT)
Dept: PAIN MEDICINE | Facility: CLINIC | Age: 57
End: 2017-04-10

## 2017-04-10 ENCOUNTER — OFFICE VISIT (OUTPATIENT)
Dept: PAIN MEDICINE | Facility: CLINIC | Age: 57
End: 2017-04-10
Payer: COMMERCIAL

## 2017-04-10 VITALS
SYSTOLIC BLOOD PRESSURE: 120 MMHG | TEMPERATURE: 99 F | WEIGHT: 219.38 LBS | BODY MASS INDEX: 36.5 KG/M2 | HEART RATE: 74 BPM | DIASTOLIC BLOOD PRESSURE: 70 MMHG | RESPIRATION RATE: 18 BRPM

## 2017-04-10 DIAGNOSIS — G43.909 MIGRAINE SYNDROME: ICD-10-CM

## 2017-04-10 DIAGNOSIS — M51.36 DDD (DEGENERATIVE DISC DISEASE), LUMBAR: ICD-10-CM

## 2017-04-10 DIAGNOSIS — M54.16 LUMBAR RADICULOPATHY: ICD-10-CM

## 2017-04-10 DIAGNOSIS — M48.061 LUMBAR STENOSIS: Primary | ICD-10-CM

## 2017-04-10 DIAGNOSIS — M47.816 FACET HYPERTROPHY OF LUMBAR REGION: ICD-10-CM

## 2017-04-10 PROCEDURE — 99213 OFFICE O/P EST LOW 20 MIN: CPT | Mod: S$GLB,,, | Performed by: PHYSICIAN ASSISTANT

## 2017-04-10 PROCEDURE — 1160F RVW MEDS BY RX/DR IN RCRD: CPT | Mod: S$GLB,,, | Performed by: PHYSICIAN ASSISTANT

## 2017-04-10 PROCEDURE — 99999 PR PBB SHADOW E&M-EST. PATIENT-LVL III: CPT | Mod: PBBFAC,,, | Performed by: PHYSICIAN ASSISTANT

## 2017-04-10 RX ORDER — TIZANIDINE 2 MG/1
TABLET ORAL
Qty: 90 TABLET | Refills: 0 | Status: SHIPPED | OUTPATIENT
Start: 2017-04-10 | End: 2017-07-24 | Stop reason: SDUPTHER

## 2017-04-10 RX ORDER — TOPIRAMATE 50 MG/1
50 TABLET, FILM COATED ORAL 2 TIMES DAILY
Qty: 60 TABLET | Refills: 5 | Status: SHIPPED | OUTPATIENT
Start: 2017-04-10 | End: 2017-05-26 | Stop reason: SDUPTHER

## 2017-04-10 RX ORDER — MELOXICAM 15 MG/1
15 TABLET ORAL DAILY
Qty: 30 TABLET | Refills: 5 | Status: SHIPPED | OUTPATIENT
Start: 2017-04-10 | End: 2017-11-28 | Stop reason: SDUPTHER

## 2017-04-11 NOTE — TELEPHONE ENCOUNTER
Spoke with patient. Appointment scheduled with Dr. Fernandez. Pt verbalized an understanding.     Directions given.

## 2017-04-11 NOTE — PROGRESS NOTES
CHIEF COMPLAINT/REASON FOR VISIT: low back pain    History of present illness: The patient is a 56 year old woman with a history of migraines, carpal tunnel syndrome and low back pain since her early 20s.  He is status post L5/S1 interlaminar epidural steroid injection on 3/10/17 with 50% relief.  She complains of bilateral low back pain radiating into her legs.  This stays mainly in her thighs at this point.  She states that  this is typically worse with walking and improved with sitting.  She has intermittent numbness in her lateral thighs if she lays flat on her back.  She denies weakness, bladder or bowel incontinence.  She complains of headaches and would like to see a neurologist.    Pain intervention history: She tried Neurontin for her leg/thigh pain with no success. She only takes Advil with about 30% relief. She is status post TFESI bilaterally to L3 on 12/6/2011 with no relief. Past history of status post L4/5 YARON on 5/25/11 with about 90% relief that lasted 3 weeks. She is status post 2 bilateral L3 transforaminal injections with 3 weeks relief each time. Her current pain medications include Celexa 40 mg once a day, Aleve 220 mg twice a day, Lyrica as previously stated, Zanaflex 5 mg at night. She uses her TENS at home, when she thinks about it.  She is status post L4/5 interlaminar epidural steroid injection on 1/8/14 with 90% relief.  She is status post L5/S1 interlaminar epidural steroid injection on 6/9/14 with moderate relief only on the right low back and right leg lasting 2 weeks.  She is status post bilateral L3/4 and L4/5 facet joint injections on 4/13/15 with initially 100% relief of her back pain and 80% relief of her left lateral hip and lateral thigh pain, now reporting at least 50% relief of both.  She is status post bilateral L2, 3 and 4 medial branch radiofrequency ablation on 5/20/15 with 60% relief.   She is status post bilateral L2, 3 and 4 medial branch radiofrequency ablation on  6/20/16 with 50-70% relief.  He is status post L5/S1 interlaminar epidural steroid injection on 3/10/17 with 50% relief.     ROS: She reports back pain only.  Balance of review of systems is negative.    Medical, surgical, family and social history reviewed elsewhere in record.     Medications/Allergies: See med card      Vitals:    04/10/17 0859   BP: 120/70   Pulse: 74   Resp: 18   Temp: 98.5 °F (36.9 °C)   TempSrc: Oral   Weight: 99.5 kg (219 lb 5.7 oz)   PainSc:   4   PainLoc: Back        PHYSICAL EXAM:  Gen: A and O x3, pleasant, well-groomed  HEENT: PERRLA  CVS: Regular rate and rhythm, normal S1 and S2, no murmurs.  Resp: Clear to auscultation bilaterally, no wheezes or rales.  Musculoskeletal: Able to heel walk, toe walk. No antalgic gait.     Neuro:   Lower extremities: 5/5 strength bilaterally  Reflexes:  Patellar 2+, Achilles 2+.  Sensory:Intact and symmetrical to light touch and pinprick in L2-S1 dermatomes bilaterally.    Lumbar spine:  Lumbar spine: ROM is moderately limited with flexion, extension and oblique extension with mild increased low back pain during flexion.  Azam's test negative.   Supine straight leg raise is negative bilaterally.  Internal and external rotation of the hip causes no increased pain in either side.  Myofascial exam: No tenderness to palpation to the lumbar paraspinous muscles.         IMAGING:   MRI L-SPINE 5/10/11   IMPRESSION: AT L3-4, THERE IS CIRCUMFERENTIAL DISC PROTRUSION FOCALLY MORE PROMINENT TO THE LEFT AS WELL AS A SMALL DISC HERNIATION PROTRUDING INFERIORLY BEHIND L4 TO THE LEFT OF MIDLINE. THIS PRODUCES SPINAL CANAL AND BILATERAL NEURAL FORAMINAL STENOSIS, LEFT GREATER THAN RIGHT. AT L4-5, THERE IS CIRCUMFERENTIAL DISC PROTRUSION FOCALLY MORE PROMINENT TO THE RIGHT WITH MILD SPINAL CANAL AND RIGHT NEURAL FORAMINAL STENOSIS. AT L1-2 AND L2-3 ALTHOUGH THERE ARE DISC PROTRUSIONS IDENTIFIED, SIGNIFICANT STENOSIS IS NOT SEEN.     MRI lumbar spine  7/10/14  L1-2:There is chronic relatively advanced disc degeneration with disc space narrowing, disc dehydration, disc narrowing, endplate osteophytes, and degenerative vertebral endplate marrow change. There is a diffuse 2-mm posterior disc bulge with a superimposed4-mm right posterior disc extrusion causing mild right foraminal stenosis. There is no impingement of the right L1 nerve root and there has been no change.  L2-3: There is severe and chronic disc degeneration with severe disc space narrowing, disc dehydration, degenerative vertebral endplate marrow change, and vertebral endplate osteophytes. There is a diffuse 2-mm posterior disc bulge with a superimposed 4-mm right posterior disc extrusion causing mild right foraminal stenosis. There is no impingement of the right L2 nerve root and there has been no significant change.  L3-4: There is severe disc degeneration which has progressed since the prior study. There is severe disc space narrowing, disc dehydration, degenerative vertebral endplate marrow change and endplate osteophytes. There is also mild posterior subluxation of L3over a distance of 4 mm. There is a broad-based 5-mm posterior disc extrusion. There is degenerative facet arthrosis with ligamentum flavum thickening. The combination of facet arthrosis and disc extrusion results in relatively severe spinal canal stenosis with compression of the thecal sac to an AP diameter of 5 mm, moderate right foraminal stenosis, and severe left foraminal stenosis. The spinal stenosis has also progressed since the prior study.  L4-5:There is a diffuse posterior disc bulge with a superimposed 3-mm left posterior paracentral disc protrusion causing left paracentral thecal sac compression. There is moderate left and mild right foraminal stenosis and there has been no significant change. There is mild degenerative facet arthrosis with ligamentum flavum thickening and small joint effusions.  L5-S1:There is no  significant compromise of the spinal canal or foramina and there has been no change.    X-ray cervical spine 6/8/15  There is loss of normal cervical lordosis which may be positional or related muscular strain. Intervertebral disk height loss is noted at the C5-C6 C6-C7 levels and to a lesser degree C4-C5 and C7-T1 levels. Vertebral body alignment appears otherwise adequate. Vertebral body heights appear well-preserved. The atlas and odontoid appear in good relationship to each other. Osseous neuroforaminal narrowing is noted at the C3-C4 C4-C5 C5-C6 levels on the left and at the C4-C5 C5-C6 and C6-C7 levels on the right       ASSESSMENT:  The patient is a 56 year old woman with a history of migraines, carpal tunnel syndrome and low back pain since her early 20s who returns in follow up.   1. Lumbar stenosis     2. Lumbar radiculopathy     3. Facet hypertrophy of lumbar region     4. DDD (degenerative disc disease), lumbar     5. Migraine syndrome  Ambulatory consult to Neurology       Plan:  1.  She did well following the L5/S1 interlaminar YARON.  This can be repeated in the future if necessary.  We also discussed repeating her RFA if necessary.  She feels that she can distinguish between the 2 types of pain.  2.  She will continue to take tizanidine, Topamax, tramadol and meloxicam.  Her CO2 level one week ago was within normal limits.  3.  I placed a referral to Dr. Fernandez for headaches.  4.  Follow-up in 3 months or sooner as needed.

## 2017-04-18 ENCOUNTER — HOSPITAL ENCOUNTER (OUTPATIENT)
Dept: RADIOLOGY | Facility: HOSPITAL | Age: 57
Discharge: HOME | End: 2017-04-18
Attending: INTERNAL MEDICINE
Payer: COMMERCIAL

## 2017-04-18 DIAGNOSIS — Z00.00 HEALTH CARE MAINTENANCE: ICD-10-CM

## 2017-04-18 DIAGNOSIS — Z12.31 VISIT FOR SCREENING MAMMOGRAM: ICD-10-CM

## 2017-04-18 PROCEDURE — 77063 BREAST TOMOSYNTHESIS BI: CPT | Mod: 26,,, | Performed by: RADIOLOGY

## 2017-04-18 PROCEDURE — 77067 SCR MAMMO BI INCL CAD: CPT | Mod: 26,,, | Performed by: RADIOLOGY

## 2017-04-18 PROCEDURE — 77067 SCR MAMMO BI INCL CAD: CPT | Mod: TC

## 2017-05-29 RX ORDER — TOPIRAMATE 50 MG/1
TABLET, FILM COATED ORAL
Qty: 60 TABLET | Refills: 0 | Status: SHIPPED | OUTPATIENT
Start: 2017-05-29 | End: 2017-06-26 | Stop reason: SDUPTHER

## 2017-06-06 ENCOUNTER — PATIENT MESSAGE (OUTPATIENT)
Dept: PAIN MEDICINE | Facility: CLINIC | Age: 57
End: 2017-06-06

## 2017-06-06 DIAGNOSIS — M47.816 FACET HYPERTROPHY OF LUMBAR REGION: Primary | ICD-10-CM

## 2017-06-06 RX ORDER — MIDAZOLAM HYDROCHLORIDE 5 MG/ML
4 INJECTION INTRAMUSCULAR; INTRAVENOUS ONCE
Status: CANCELLED | OUTPATIENT
Start: 2017-07-17

## 2017-06-06 RX ORDER — SODIUM CHLORIDE, SODIUM LACTATE, POTASSIUM CHLORIDE, CALCIUM CHLORIDE 600; 310; 30; 20 MG/100ML; MG/100ML; MG/100ML; MG/100ML
INJECTION, SOLUTION INTRAVENOUS CONTINUOUS
Status: CANCELLED | OUTPATIENT
Start: 2017-07-17

## 2017-06-26 RX ORDER — TOPIRAMATE 50 MG/1
TABLET, FILM COATED ORAL
Qty: 60 TABLET | Refills: 0 | Status: SHIPPED | OUTPATIENT
Start: 2017-06-26 | End: 2017-07-24 | Stop reason: SDUPTHER

## 2017-06-27 RX ORDER — TRAMADOL HYDROCHLORIDE 50 MG/1
TABLET ORAL
Qty: 60 TABLET | Refills: 2 | Status: SHIPPED | OUTPATIENT
Start: 2017-06-27 | End: 2017-09-30 | Stop reason: SDUPTHER

## 2017-07-17 ENCOUNTER — HOSPITAL ENCOUNTER (OUTPATIENT)
Dept: RADIOLOGY | Facility: HOSPITAL | Age: 57
Discharge: HOME | End: 2017-07-17
Attending: ANESTHESIOLOGY | Admitting: ANESTHESIOLOGY
Payer: COMMERCIAL

## 2017-07-17 ENCOUNTER — HOSPITAL ENCOUNTER (OUTPATIENT)
Facility: HOSPITAL | Age: 57
Discharge: HOME | End: 2017-07-17
Attending: ANESTHESIOLOGY | Admitting: ANESTHESIOLOGY
Payer: COMMERCIAL

## 2017-07-17 ENCOUNTER — SURGERY (OUTPATIENT)
Age: 57
End: 2017-07-17

## 2017-07-17 VITALS
BODY MASS INDEX: 35.65 KG/M2 | TEMPERATURE: 98 F | DIASTOLIC BLOOD PRESSURE: 60 MMHG | SYSTOLIC BLOOD PRESSURE: 124 MMHG | WEIGHT: 214 LBS | HEIGHT: 65 IN | RESPIRATION RATE: 18 BRPM | OXYGEN SATURATION: 100 % | HEART RATE: 63 BPM

## 2017-07-17 DIAGNOSIS — M51.36 DDD (DEGENERATIVE DISC DISEASE), LUMBAR: ICD-10-CM

## 2017-07-17 DIAGNOSIS — M47.816 FACET HYPERTROPHY OF LUMBAR REGION: Primary | ICD-10-CM

## 2017-07-17 PROCEDURE — 64635 DESTROY LUMB/SAC FACET JNT: CPT | Mod: 50,PO | Performed by: ANESTHESIOLOGY

## 2017-07-17 PROCEDURE — 64636 DESTROY L/S FACET JNT ADDL: CPT | Mod: 50,PO | Performed by: ANESTHESIOLOGY

## 2017-07-17 PROCEDURE — 25000003 PHARM REV CODE 250: Mod: PO | Performed by: ANESTHESIOLOGY

## 2017-07-17 PROCEDURE — 76000 FLUOROSCOPY <1 HR PHYS/QHP: CPT | Mod: TC,PO

## 2017-07-17 PROCEDURE — 64635 DESTROY LUMB/SAC FACET JNT: CPT | Mod: 50,,, | Performed by: ANESTHESIOLOGY

## 2017-07-17 PROCEDURE — 63600175 PHARM REV CODE 636 W HCPCS: Mod: PO | Performed by: ANESTHESIOLOGY

## 2017-07-17 PROCEDURE — 64636 DESTROY L/S FACET JNT ADDL: CPT | Mod: 50,,, | Performed by: ANESTHESIOLOGY

## 2017-07-17 PROCEDURE — 99152 MOD SED SAME PHYS/QHP 5/>YRS: CPT | Mod: ,,, | Performed by: ANESTHESIOLOGY

## 2017-07-17 RX ORDER — FENTANYL CITRATE 50 UG/ML
INJECTION, SOLUTION INTRAMUSCULAR; INTRAVENOUS
Status: DISCONTINUED | OUTPATIENT
Start: 2017-07-17 | End: 2017-07-17 | Stop reason: HOSPADM

## 2017-07-17 RX ORDER — LIDOCAINE HYDROCHLORIDE 10 MG/ML
INJECTION, SOLUTION EPIDURAL; INFILTRATION; INTRACAUDAL; PERINEURAL
Status: DISCONTINUED | OUTPATIENT
Start: 2017-07-17 | End: 2017-07-17 | Stop reason: HOSPADM

## 2017-07-17 RX ORDER — METHYLPREDNISOLONE ACETATE 40 MG/ML
INJECTION, SUSPENSION INTRA-ARTICULAR; INTRALESIONAL; INTRAMUSCULAR; SOFT TISSUE
Status: DISCONTINUED | OUTPATIENT
Start: 2017-07-17 | End: 2017-07-17 | Stop reason: HOSPADM

## 2017-07-17 RX ORDER — SODIUM CHLORIDE 9 MG/ML
INJECTION, SOLUTION INTRAMUSCULAR; INTRAVENOUS; SUBCUTANEOUS
Status: DISCONTINUED | OUTPATIENT
Start: 2017-07-17 | End: 2017-07-17 | Stop reason: HOSPADM

## 2017-07-17 RX ORDER — MIDAZOLAM HYDROCHLORIDE 5 MG/ML
4 INJECTION INTRAMUSCULAR; INTRAVENOUS ONCE
Status: COMPLETED | OUTPATIENT
Start: 2017-07-17 | End: 2017-07-17

## 2017-07-17 RX ORDER — LIDOCAINE HYDROCHLORIDE 20 MG/ML
INJECTION, SOLUTION EPIDURAL; INFILTRATION; INTRACAUDAL; PERINEURAL
Status: DISCONTINUED | OUTPATIENT
Start: 2017-07-17 | End: 2017-07-17 | Stop reason: HOSPADM

## 2017-07-17 RX ORDER — LIDOCAINE HYDROCHLORIDE 10 MG/ML
1 INJECTION INFILTRATION; PERINEURAL ONCE
Status: COMPLETED | OUTPATIENT
Start: 2017-07-17 | End: 2017-07-17

## 2017-07-17 RX ORDER — SODIUM CHLORIDE, SODIUM LACTATE, POTASSIUM CHLORIDE, CALCIUM CHLORIDE 600; 310; 30; 20 MG/100ML; MG/100ML; MG/100ML; MG/100ML
INJECTION, SOLUTION INTRAVENOUS CONTINUOUS
Status: DISCONTINUED | OUTPATIENT
Start: 2017-07-17 | End: 2017-07-17 | Stop reason: HOSPADM

## 2017-07-17 RX ADMIN — FENTANYL CITRATE 25 MCG: 50 INJECTION, SOLUTION INTRAMUSCULAR; INTRAVENOUS at 02:07

## 2017-07-17 RX ADMIN — LIDOCAINE HYDROCHLORIDE 10 ML: 10 INJECTION, SOLUTION EPIDURAL; INFILTRATION; INTRACAUDAL; PERINEURAL at 02:07

## 2017-07-17 RX ADMIN — METHYLPREDNISOLONE ACETATE 40 MG: 40 INJECTION, SUSPENSION INTRA-ARTICULAR; INTRALESIONAL; INTRAMUSCULAR; SOFT TISSUE at 02:07

## 2017-07-17 RX ADMIN — MIDAZOLAM HYDROCHLORIDE 4 MG: 5 INJECTION, SOLUTION INTRAMUSCULAR; INTRAVENOUS at 02:07

## 2017-07-17 RX ADMIN — LIDOCAINE HYDROCHLORIDE 4 ML: 20 INJECTION, SOLUTION EPIDURAL; INFILTRATION; INTRACAUDAL; PERINEURAL at 02:07

## 2017-07-17 RX ADMIN — SODIUM CHLORIDE, SODIUM LACTATE, POTASSIUM CHLORIDE, AND CALCIUM CHLORIDE: .6; .31; .03; .02 INJECTION, SOLUTION INTRAVENOUS at 02:07

## 2017-07-17 RX ADMIN — LIDOCAINE HYDROCHLORIDE: 10 INJECTION, SOLUTION EPIDURAL; INFILTRATION; INTRACAUDAL; PERINEURAL at 01:07

## 2017-07-17 RX ADMIN — SODIUM CHLORIDE 3 ML: 9 INJECTION INTRAMUSCULAR; INTRAVENOUS; SUBCUTANEOUS at 02:07

## 2017-07-17 NOTE — H&P
CC: Back pain    HPI: The patient is a 55yo woman with a history of lumbar facet arthropathy here for bilateral L2,3,4 RFA. There are no major changes in history and physical from 4/10/17.    Past Medical History:   Diagnosis Date    Allergy     Arthritis of spine 2011    Chronic low back pain     DDD (degenerative disc disease), lumbar     Diverticulitis     Diverticulosis     DJD (degenerative joint disease)     Encounter for blood transfusion     Factor V Leiden     Former smoker     GERD (gastroesophageal reflux disease)     Hiatal hernia     Migraines     PONV (postoperative nausea and vomiting)     geta       Past Surgical History:   Procedure Laterality Date    APPENDECTOMY      APPENDECTOMY  1981    CARPAL TUNNEL RELEASE  2011    right    COLONOSCOPY  2014    reduntant colon, otherwise normal findings repeat in 8-10 years for surveillance    Epidural Steroid Injection      Pain Management    ESOPHAGOGASTRODUODENOSCOPY      Facet injection      Pain Management    HYSTERECTOMY      ovaries removed    KIDNEY SURGERY Right 1967    to correct urinary reflux into kidney    OVARIAN CYST REMOVAL Right 1981    SHOULDER SURGERY  2010    rotator cuff, right       Family History   Problem Relation Age of Onset    Heart attack Mother     Heart disease Mother     COPD Mother     Hypertension Mother     Hepatitis Sister     COPD Father     No Known Problems Daughter     Allergic rhinitis Neg Hx     Angioedema Neg Hx     Asthma Neg Hx     Atopy Neg Hx     Eczema Neg Hx     Immunodeficiency Neg Hx     Urticaria Neg Hx     Colon cancer Neg Hx     Colon polyps Neg Hx        Social History     Social History    Marital status:      Spouse name: N/A    Number of children: 1    Years of education: N/A     Occupational History     [employer]     Social History Main Topics    Smoking status: Former Smoker     Packs/day: 1.00     Years: 5.00     Start date: 6/4/1992      "Quit date: 1/5/1997    Smokeless tobacco: Never Used    Alcohol use No      Comment: occasionally    Drug use: No    Sexual activity: Yes     Partners: Male     Other Topics Concern    None     Social History Narrative    ** Merged History Encounter **            No current facility-administered medications for this encounter.        Review of patient's allergies indicates:   Allergen Reactions    Hydrocodone Hives and Nausea Only           Sulfa (sulfonamide antibiotics) Hives and Rash           Oxycodone Itching and Nausea And Vomiting       Vitals:    07/17/17 1337 07/17/17 1346   BP:  (!) 142/76   Pulse:  63   Resp:  18   Temp:  98.1 °F (36.7 °C)   TempSrc:  Skin   SpO2:  98%   Weight: 97.1 kg (214 lb)    Height: 5' 5" (1.651 m)        REVIEW OF SYSTEMS:     GENERAL: No weight loss, malaise or fevers.  HEENT:  No recent changes in vision or hearing  NECK: Negative for lumps, no difficulty with swallowing.  RESPIRATORY: Negative for cough, wheezing or shortness of breath, patient denies any recent URI.  CARDIOVASCULAR: Negative for chest pain, leg swelling or palpitations.  GI: Negative for abdominal discomfort, blood in stools or black stools or change in bowel habits.  MUSCULOSKELETAL: See HPI.  SKIN: Negative for lesions, rash, and itching.  PSYCH: No suicidal or homicidal ideations, no current mood disturbances.  HEMATOLOGY/LYMPHOLOGY: Negative for prolonged bleeding, bruising easily or swollen nodes. Patient is not currently taking any anti-coagulants  ENDO: No history of diabetes or thyroid dysfunction  NEURO: No history of syncope, paralysis, seizures or tremors.All other reviewed and negative other than HPI.    Physical exam:  Gen: A and O x3, pleasant, well-groomed  Skin: No rashes or obvious lesions  HEENT: PERRLA, no obvious deformities on ears or in canals. No thyroid masses, trachea midline, no palpable lymph nodes in neck, axilla.  CVS: Regular rate and rhythm, normal S1 and S2, no " murmurs.  Resp: Clear to auscultation bilaterally.  Abdomen: Soft, NT/ND, normal bowel sounds present.  Musculoskeletal/Neuro: Moving all extremities    Assessment:  Facet hypertrophy of lumbar region  -     Case Request Operating Room: RADIOFREQUENCY THERMOCOAGULATION (RFTC)-NERVE-MEDIAN BRANCH-LUMBAR L2,3,4  -     Activity as tolerated; Standing  -     Place in Outpatient; Standing  -     Diet NPO; Standing  -     lactated ringers infusion; Inject into the vein continuous.  -     midazolam (PF) injection 4 mg; Inject 0.8 mLs (4 mg total) into the vein once.  -     Notify physician ; Standing  -     Notify physician ; Standing  -     Notify physician (specify); Standing  -     Place 18-22 gauage peripheral IV ; Standing  -     Verify informed consent; Standing  -     Vital signs; Standing    Other orders  -     Low Risk of VTE; Standing  -     lidocaine HCL 10 mg/ml (1%) injection 1 mL; 1 mL by Other route once.

## 2017-07-17 NOTE — OP NOTE
PROCEDURE DATE: 7/17/2017    PROCEDURE:  Radiofrequency ablation of the bilateral L2,3,4 medial branch nerves on the bilateral-side utilizing fluoroscopy    DIAGNOSIS:  Lumbar facet arthopathy    Post op Diagnosis: Same    PHYSICIAN: Oswald Abdalla MD    MEDICATIONS INJECTED:  From a mixture of 4ml of 2% lidocaine and 40mg of methylprednisone, 1ml of this solution was injected at each level.    LOCAL ANESTHETIC USED: Lidocaine 1%, 4 ml given at each site.    SEDATION MEDICATIONS: 4mg versed, 25mcg fentanyl    ESTIMATED BLOOD LOSS:  none    COMPLICATIONS:  none    TECHNIQUE:  A time out was taken to identify patient and procedure side prior to starting the procedure. Laying in a prone position, the patient was prepped and draped in the usual sterile fashion using ChloraPrep and sterile towels.  The levels were determined under fluoroscopic guidance and then marked.  Local anesthetic was given by raising a wheal at the skin over each site and then infiltrated approximately 2cm deeper.  A 20-gauge  100 mm Microstim RF needle was introduced to the anatomic location of the right and then left L2,3,4 medial branch nerves.  Motor stimulation up to 2 Volts at each level confirmed no motor nerve involvement.  Impedance was less than 800 ohms at each level. The above noted medication was then injected slowly.  Ablation was performed per level utilizing Shaniqua radiofrequency generator 80°C for 90 seconds. The patient tolerated the procedure well.     The patient was monitored after the procedure.  Patient was given post procedure and discharge instructions to follow at home.  The patient was discharged in a stable condition

## 2017-07-17 NOTE — DISCHARGE SUMMARY
[facility]  Discharge Note  Short Stay    Admit Date: 7/17/2017    Discharge Date: 7/17/2017    Attending Physician: Oswald Abdalla MD     Discharge Provider: Oswald Abdalla    Diagnoses:  Active Hospital Problems    Diagnosis  POA    *Facet hypertrophy of lumbar region [M47.896]  Yes      Resolved Hospital Problems    Diagnosis Date Resolved POA   No resolved problems to display.       Discharged Condition: good    Final Diagnoses: Facet hypertrophy of lumbar region [M47.896]    Disposition: Home or Self Care    Hospital Course: no complications, uneventful    Outcome of Hospitalization, Treatment, Procedure, or Surgery:  Patient was admitted for outpatient procedure. The patient underwent procedure without complications and are discharged home    Follow up/Patient Instructions:  Follow up as scheduled/Patient has received instructions and follow up date    Medications:  Continue previous medications      Discharge Procedure Orders  Diet general     Activity as tolerated     Call MD for:  temperature >100.4     Call MD for:  severe uncontrolled pain     Call MD for:  redness, tenderness, or signs of infection (pain, swelling, redness, odor or green/yellow discharge around incision site)     Call MD for:  severe persistent headache     No dressing needed           Discharge Procedure Orders (must include Diet, Follow-up, Activity):    Discharge Procedure Orders (must include Diet, Follow-up, Activity)  Diet general     Activity as tolerated     Call MD for:  temperature >100.4     Call MD for:  severe uncontrolled pain     Call MD for:  redness, tenderness, or signs of infection (pain, swelling, redness, odor or green/yellow discharge around incision site)     Call MD for:  severe persistent headache     No dressing needed

## 2017-07-20 ENCOUNTER — TELEPHONE (OUTPATIENT)
Dept: NEUROLOGY | Facility: CLINIC | Age: 57
End: 2017-07-20

## 2017-07-20 NOTE — TELEPHONE ENCOUNTER
The pt reports she is unable to access [facility]. Will you please call the pt and help with resetting the password? Thank you.

## 2017-07-31 RX ORDER — TOPIRAMATE 50 MG/1
TABLET, FILM COATED ORAL
Qty: 60 TABLET | Refills: 0 | Status: SHIPPED | OUTPATIENT
Start: 2017-07-31 | End: 2017-08-09 | Stop reason: SDUPTHER

## 2017-07-31 RX ORDER — TIZANIDINE 2 MG/1
TABLET ORAL
Qty: 90 TABLET | Refills: 0 | Status: SHIPPED | OUTPATIENT
Start: 2017-07-31 | End: 2017-11-28 | Stop reason: SDUPTHER

## 2017-08-09 ENCOUNTER — OFFICE VISIT (OUTPATIENT)
Dept: NEUROLOGY | Facility: CLINIC | Age: 57
End: 2017-08-09
Payer: COMMERCIAL

## 2017-08-09 VITALS
BODY MASS INDEX: 37.06 KG/M2 | HEART RATE: 78 BPM | WEIGHT: 222.44 LBS | RESPIRATION RATE: 18 BRPM | DIASTOLIC BLOOD PRESSURE: 71 MMHG | HEIGHT: 65 IN | SYSTOLIC BLOOD PRESSURE: 110 MMHG

## 2017-08-09 DIAGNOSIS — G44.51 HEMICRANIA CONTINUA: ICD-10-CM

## 2017-08-09 DIAGNOSIS — G43.719 CHRONIC MIGRAINE WITHOUT AURA, WITH INTRACTABLE MIGRAINE, SO STATED, WITHOUT MENTION OF STATUS MIGRAINOSUS: Primary | ICD-10-CM

## 2017-08-09 PROCEDURE — 3008F BODY MASS INDEX DOCD: CPT | Mod: S$GLB,,, | Performed by: PSYCHIATRY & NEUROLOGY

## 2017-08-09 PROCEDURE — 99999 PR PBB SHADOW E&M-EST. PATIENT-LVL III: CPT | Mod: PBBFAC,,, | Performed by: PSYCHIATRY & NEUROLOGY

## 2017-08-09 PROCEDURE — 99204 OFFICE O/P NEW MOD 45 MIN: CPT | Mod: S$GLB,,, | Performed by: PSYCHIATRY & NEUROLOGY

## 2017-08-09 RX ORDER — NYSTATIN 100000 U/G
CREAM TOPICAL
COMMUNITY
Start: 2017-06-12 | End: 2017-11-16

## 2017-08-09 RX ORDER — TOPIRAMATE 100 MG/1
100 TABLET, FILM COATED ORAL 2 TIMES DAILY
Qty: 60 TABLET | Refills: 3 | Status: SHIPPED | OUTPATIENT
Start: 2017-08-09 | End: 2017-10-25 | Stop reason: SDUPTHER

## 2017-08-09 RX ORDER — ESTRADIOL 1.53 MG/1
SPRAY TRANSDERMAL
COMMUNITY
Start: 2017-07-17 | End: 2018-10-23

## 2017-08-09 RX ORDER — NARATRIPTAN 2.5 MG/1
TABLET ORAL
Qty: 10 TABLET | Refills: 3 | Status: SHIPPED | OUTPATIENT
Start: 2017-08-09 | End: 2018-03-28 | Stop reason: SDUPTHER

## 2017-08-09 NOTE — LETTER
August 9, 2017      Oswald Abdalla MD  [address] [facility] [address] [place] [address]           [facility]  [address] [facility] [address] [place] [address]  Phone: [phone]  Fax: [phone]          Patient: Elizabeth Giordano   MR Number: [id number]   YOB: 1960   Date of Visit: 8/9/2017       Dear Dr. Oswald Abdalla:    Thank you for referring Elizabeth Giordano to me for evaluation. Attached you will find relevant portions of my assessment and plan of care.    If you have questions, please do not hesitate to call me. I look forward to following Elizabeth Giordano along with you.    Sincerely,    Neva Cortes MD    Enclosure  CC:  No Recipients    If you would like to receive this communication electronically, please contact [email or web address] or [phone] to request more information on [employer] Link access.    For providers and/or their staff who would like to refer a patient to [facility], please contact us through our one-stop-shop provider referral line, [facility], at [phone].    If you feel you have received this communication in error or would no longer like to receive these types of communications, please e-mail [email or web address]

## 2017-08-09 NOTE — PATIENT INSTRUCTIONS
WORKUP:  -- none     PREVENTION (use daily regardless of headache):  -- raiseing topiramate to 100mg twice a day, continue this for 6 week trial, if not helping by itself, next step will be to change celexa to another antidepressant like Effexor     ACUTE TREATMENT:  -- at onset of big flare, start naratriptan 1 tab twice a day for 5 days. If flare only lasts 3 days, take only 3 days.  -- NO IMITREX IN SAME DAY AS NARATRIPTAN. Must be 24 hours apart.

## 2017-08-09 NOTE — ASSESSMENT & PLAN NOTE
headcahes are side locked and have one autonomic feature (tearing) but no others. Consider HC as diagnosis if these headaches become refractory to the above measures. Discussed indomethacin trial at a later date. Defer for now.

## 2017-08-09 NOTE — ASSESSMENT & PLAN NOTE
Migraines of many years, triggered by menopause. Normal neuro exam. Some initial response to topiramate but currently not maxed out at dosing so will trial doubling dose for 2 months. If this alone is insufficient to control headaches then will plan to change Citalopram to Effexor or TCA for norepinephrine reuptake which is needed for pain control. We discussed that Celexa does not have the properties needed to be a migraine or pain medication. She does not take it for any reason other than migraines.     Since migraine flares occur every other month for 5 days at a time, respond well to triptans, will prophylactically start naratriptan BID bridge during this time for less headache recurrence.

## 2017-08-09 NOTE — PROGRESS NOTES
Date of service: 8/9/2017  Referring provider: Dr. Oswald Abdalla    Subjective:      Chief complaint: Headache and Migraine       Patient ID: Elizabeth Giordano is a 56 y.o. lady with carpal tunnel syndrome, low back pain secondary to lumbar spondylosis and DDD, lumbar radiculopathy followed by Dr. Abdalla, factor V leiden mutation, GERD and migraines here for an evaluation of migraines. She is a new patient to me.     History of Present Illness    Headache History:  Age at onset and course over time: migraines began in her 30s, she reports going through menopause at age 40 (unclear why) and she thinks this may have started it. One day just became more sensitive to sensory stimuli especially smells. Had severe migraines at least one time per week. Started Celexa for this purpose which helped her sensitivity to smells. Overall thinks her headache number has stayed the same over the years but pain has become more severe and she is yet again more sensitive to smells, thinks medications aren't working as well.   Location: right side behind eye (baseline headache and migraine)   Quality: throbbing   Severity: current 2/10, at worst 10/10   Duration: exacerbations last 5-6 days   Frequency: baseline headache about 12-15 days per month; every other month will have a flare lasting 5-6 days   Alleviated by: sleep, ice, medication   Exacerbated by: light, noise, smells, coughing   Associated with: photo/noise, phobia, osmophobia. No aura. + right eye tearing. No ptosis, no redness. No eye irritation. No ear fullness.   Sleep habits:  Caffeine intake: 1     She is on daily HRT for menopause.     Current acute treatment:  -- meloxicam 15mg for  Arthritis in right thumb daily   -- sumatriptan 100mg at onset of headache, gets recurrent headache next day, (zomig works better but not covered by her insurance)  -- tizanidine 2mg nightly - for migraines, daily     Current prevention:  -- topiramate 50mg BID - 2 years;  helped more in the beginning. Never tried going higher. Tingling in toes.   (celexa 40mg)     Previously tried/failed acute treatment:  -- hydrocodone: allergy  -- oxycodone: allergy   -- zomig   -- relpax   -- naproxen  -- tramadol   -- fiorinal with codeine   -- Goody's   -- Excedrin   -- tylenol  -- ASA    Previously tried/failed preventative treatment:  -- amitriptyline (for back pain, but migraines were no better)  -- gabapentin - for back pain, didn't help   -- lyrica - memory problems     Hx multiple lumbar ESIs and LMB RFAs for low back pain/radiculopathy       Review of patient's allergies indicates:   Allergen Reactions    Hydrocodone Hives and Nausea Only           Sulfa (sulfonamide antibiotics) Hives and Rash           Oxycodone Itching and Nausea And Vomiting     Current Outpatient Prescriptions   Medication Sig Dispense Refill    azelastine (ASTELIN) 137 mcg (0.1 %) nasal spray 1 spray (137 mcg total) by Nasal route 2 (two) times daily. 30 mL 2    citalopram (CELEXA) 40 MG tablet Take 1 tablet (40 mg total) by mouth once daily. 90 tablet 1    diphenhydrAMINE (BENADRYL) 25 mg capsule Take 25 mg by mouth every 6 (six) hours as needed.      EVAMIST 1.53 mg/spray (1.7%) transdermal spray       meloxicam (MOBIC) 15 MG tablet Take 1 tablet (15 mg total) by mouth once daily. 30 tablet 5    nystatin (MYCOSTATIN) cream       pantoprazole (PROTONIX) 40 MG tablet Take 1 tablet (40 mg total) by mouth once daily. 90 tablet 1    sumatriptan (IMITREX) 100 MG tablet May take one tab, then followup with another tab in 2 hours if headache not resolved. 9 tablet 0    tizanidine (ZANAFLEX) 2 MG tablet TAKE ONE TABLET BY MOUTH IN THE EVENING 90 tablet 0    topiramate (TOPAMAX) 100 MG tablet Take 1 tablet (100 mg total) by mouth 2 (two) times daily. 60 tablet 3    naratriptan (AMERGE) 2.5 MG tablet 2.5 mg PO BID x 5 days at onset of migraine flare 10 tablet 3     No current facility-administered medications  for this visit.        Past Medical History  Past Medical History:   Diagnosis Date    Allergy     Arthritis of spine 2011    Chronic low back pain     DDD (degenerative disc disease), lumbar     Diverticulitis     Diverticulosis     DJD (degenerative joint disease)     Encounter for blood transfusion     Factor V Leiden     Former smoker     GERD (gastroesophageal reflux disease)     Hiatal hernia     Migraines     PONV (postoperative nausea and vomiting)     geta       Past Surgical History  Past Surgical History:   Procedure Laterality Date    APPENDECTOMY      APPENDECTOMY  1981    CARPAL TUNNEL RELEASE  2011    right    COLONOSCOPY  2014    reduntant colon, otherwise normal findings repeat in 8-10 years for surveillance    Epidural Steroid Injection      Pain Management    ESOPHAGOGASTRODUODENOSCOPY      Facet injection      Pain Management    HYSTERECTOMY      ovaries removed    KIDNEY SURGERY Right 1967    to correct urinary reflux into kidney    OVARIAN CYST REMOVAL Right 1981    SHOULDER SURGERY  2010    rotator cuff, right       Family History  Family History   Problem Relation Age of Onset    Heart attack Mother     Heart disease Mother     COPD Mother     Hypertension Mother     Hepatitis Sister     COPD Father     No Known Problems Daughter     Allergic rhinitis Neg Hx     Angioedema Neg Hx     Asthma Neg Hx     Atopy Neg Hx     Eczema Neg Hx     Immunodeficiency Neg Hx     Urticaria Neg Hx     Colon cancer Neg Hx     Colon polyps Neg Hx        Social History  Social History     Social History    Marital status:      Spouse name: N/A    Number of children: 1    Years of education: N/A     Occupational History     [employer]     Social History Main Topics    Smoking status: Former Smoker     Packs/day: 1.00     Years: 5.00     Start date: 6/4/1992     Quit date: 1/5/1997    Smokeless tobacco: Never Used    Alcohol use No      Comment:  occasionally    Drug use: No    Sexual activity: Yes     Partners: Male     Other Topics Concern    Not on file     Social History Narrative    ** Merged History Encounter **             Review of Systems  Constitutional: no fever, no chills  Eyes: no change to vision, no redness, no tearing  Ears, nose, mouth, throat: no hearing loss, no tinnitus, no rhinorrhea, no difficulty swallowing   Cardiovascular: no palpitations  Respiratory: no shortness of breath  Gastrointestinal: no diarrhea, no constipation  Musculoskeletal: + joint pain  Skin: no rashes  Neurologic: + numbness, + weakness, no change to speech, loss of coordination   Endocrine: no heat or cold intolerance     Objective:        Vitals:    08/09/17 0808   BP: 110/71   Pulse: 78   Resp: 18     Body mass index is 37.02 kg/m².    Constitutional: appears in no acute distress, well-developed, well-nourished     Eyes: normal conjunctiva, PERRLA     Ears, nose, mouth, throat: external appearance of ears and nose normal, hearing intact to finger rub     Cardiovascular: regular rate and rhythm, no murmurs appreciated, no carotid bruits     Respiratory: unlabored respirations, breath sounds normal bilaterally    Gastrointestinal: no abdominal masses, no tenderness, no visible hernia    Musculoskeletal: normal tone in all four extremities. No atrophy. No abnormal movements. Strength in all muscles groups of the upper and lower extremities is 5/5. Normal gait and station. Digits and nails normal.      Spine: cervical spine with normal ROM, no muscle spasm, no tenderness, no facet loading     Psychiatric: normal judgment and insight. Oriented to person, place, and time.     Neurologic:   Cortical functions: recent and remote memory intact, normal attention span and concentration, speech fluent, adequate fund of knowledge   Cranial nerves: visual fields full, PERRLA, EOMI, facial sensation intact in V1-V3, symmetric facial strength, hearing intact to finger rub,  palate elevates symmetrically, shoulder shrug 5/5, tongue protrudes midline   Reflexes: 2+ in the upper and lower extremities, ]no Rangel  Sensation: intact to light touch and temperature   Coordination: normal finger to noise    Data Review:     No results found for this or any previous visit.    Lab Results   Component Value Date     04/02/2017    K 3.9 04/02/2017     04/02/2017    CO2 25 04/02/2017    BUN 22 (H) 04/02/2017    CREATININE 1.26 04/02/2017    GLU 93 04/02/2017    AST 26 04/02/2017    ALT 36 04/02/2017    ALBUMIN 4.3 04/02/2017    PROT 7.0 04/02/2017    BILITOT 0.6 04/02/2017    CHOL 211 (H) 03/10/2017    HDL 51 03/10/2017    LDLCALC 137.0 03/10/2017    TRIG 115 03/10/2017       Lab Results   Component Value Date    WBC 6.69 04/02/2017    HGB 14.8 04/02/2017    HCT 42.3 04/02/2017    MCV 93 04/02/2017     04/02/2017       Lab Results   Component Value Date    TSH 0.567 03/10/2017       Assessment & Plan:       Problem List Items Addressed This Visit     Chronic migraine without aura, with intractable migraine, so stated, without mention of status migrainosus - Primary    Current Assessment & Plan     Migraines of many years, triggered by menopause. Normal neuro exam. Some initial response to topiramate but currently not maxed out at dosing so will trial doubling dose for 2 months. If this alone is insufficient to control headaches then will plan to change Citalopram to Effexor or TCA for norepinephrine reuptake which is needed for pain control. We discussed that Celexa does not have the properties needed to be a migraine or pain medication. She does not take it for any reason other than migraines.     Since migraine flares occur every other month for 5 days at a time, respond well to triptans, will prophylactically start naratriptan BID bridge during this time for less headache recurrence.          Relevant Medications    naratriptan (AMERGE) 2.5 MG tablet    Hemicrania continua     Current Assessment & Plan     headcahes are side locked and have one autonomic feature (tearing) but no others. Consider HC as diagnosis if these headaches become refractory to the above measures. Discussed indomethacin trial at a later date. Defer for now.            Other Visit Diagnoses    None.             WORKUP:  -- none     PREVENTION (use daily regardless of headache):  -- raiseing topiramate to 100mg twice a day, continue this for 6 week trial, if not helping by itself, next step will be to change celexa to another antidepressant like Effexor     ACUTE TREATMENT:  -- at onset of big flare, start naratriptan 1 tab twice a day for 5 days. If flare only lasts 3 days, take only 3 days.  -- NO IMITREX IN SAME DAY AS NARATRIPTAN. Must be 24 hours apart.       Return in about 2 months (around 10/9/2017).    Neva Cortes MD

## 2017-08-17 ENCOUNTER — OFFICE VISIT (OUTPATIENT)
Dept: INTERNAL MEDICINE | Facility: CLINIC | Age: 57
End: 2017-08-17
Payer: COMMERCIAL

## 2017-08-17 VITALS
HEART RATE: 90 BPM | DIASTOLIC BLOOD PRESSURE: 70 MMHG | SYSTOLIC BLOOD PRESSURE: 116 MMHG | WEIGHT: 223.13 LBS | RESPIRATION RATE: 18 BRPM | OXYGEN SATURATION: 97 % | BODY MASS INDEX: 37.13 KG/M2

## 2017-08-17 DIAGNOSIS — L98.9 SKIN LESION: ICD-10-CM

## 2017-08-17 DIAGNOSIS — F32.A DEPRESSION, UNSPECIFIED DEPRESSION TYPE: ICD-10-CM

## 2017-08-17 DIAGNOSIS — Z00.00 HEALTH CARE MAINTENANCE: ICD-10-CM

## 2017-08-17 DIAGNOSIS — K21.9 GASTROESOPHAGEAL REFLUX DISEASE, ESOPHAGITIS PRESENCE NOT SPECIFIED: ICD-10-CM

## 2017-08-17 DIAGNOSIS — G43.009 NONINTRACTABLE MIGRAINE, UNSPECIFIED MIGRAINE TYPE: ICD-10-CM

## 2017-08-17 DIAGNOSIS — M51.36 DDD (DEGENERATIVE DISC DISEASE), LUMBAR: ICD-10-CM

## 2017-08-17 PROCEDURE — 99214 OFFICE O/P EST MOD 30 MIN: CPT | Mod: S$GLB,,, | Performed by: INTERNAL MEDICINE

## 2017-08-17 PROCEDURE — 3008F BODY MASS INDEX DOCD: CPT | Mod: S$GLB,,, | Performed by: INTERNAL MEDICINE

## 2017-08-17 PROCEDURE — 87070 CULTURE OTHR SPECIMN AEROBIC: CPT

## 2017-08-17 PROCEDURE — 99999 PR PBB SHADOW E&M-EST. PATIENT-LVL III: CPT | Mod: PBBFAC,,, | Performed by: INTERNAL MEDICINE

## 2017-08-17 RX ORDER — MUPIROCIN 20 MG/G
OINTMENT TOPICAL 3 TIMES DAILY
Qty: 22 G | Refills: 0 | Status: SHIPPED | OUTPATIENT
Start: 2017-08-17 | End: 2017-08-27

## 2017-08-17 RX ORDER — CEFDINIR 300 MG/1
600 CAPSULE ORAL DAILY
Qty: 14 CAPSULE | Refills: 0 | Status: SHIPPED | OUTPATIENT
Start: 2017-08-17 | End: 2017-08-22

## 2017-08-19 LAB — BACTERIA SPEC AEROBE CULT: NORMAL

## 2017-08-21 ENCOUNTER — PATIENT MESSAGE (OUTPATIENT)
Dept: INTERNAL MEDICINE | Facility: CLINIC | Age: 57
End: 2017-08-21

## 2017-08-22 RX ORDER — DOXYCYCLINE 100 MG/1
100 CAPSULE ORAL 2 TIMES DAILY
Qty: 14 CAPSULE | Refills: 0 | Status: SHIPPED | OUTPATIENT
Start: 2017-08-22 | End: 2017-08-29

## 2017-08-23 ENCOUNTER — OFFICE VISIT (OUTPATIENT)
Dept: PAIN MEDICINE | Facility: CLINIC | Age: 57
End: 2017-08-23
Payer: COMMERCIAL

## 2017-08-23 VITALS
BODY MASS INDEX: 36.98 KG/M2 | RESPIRATION RATE: 18 BRPM | TEMPERATURE: 98 F | WEIGHT: 222.25 LBS | HEART RATE: 82 BPM | SYSTOLIC BLOOD PRESSURE: 130 MMHG | DIASTOLIC BLOOD PRESSURE: 70 MMHG

## 2017-08-23 DIAGNOSIS — M48.061 LUMBAR STENOSIS: ICD-10-CM

## 2017-08-23 DIAGNOSIS — M47.816 FACET HYPERTROPHY OF LUMBAR REGION: Primary | ICD-10-CM

## 2017-08-23 DIAGNOSIS — M51.36 DDD (DEGENERATIVE DISC DISEASE), LUMBAR: ICD-10-CM

## 2017-08-23 PROCEDURE — 99213 OFFICE O/P EST LOW 20 MIN: CPT | Mod: S$GLB,,, | Performed by: PHYSICIAN ASSISTANT

## 2017-08-23 PROCEDURE — 3008F BODY MASS INDEX DOCD: CPT | Mod: S$GLB,,, | Performed by: PHYSICIAN ASSISTANT

## 2017-08-23 PROCEDURE — 99999 PR PBB SHADOW E&M-EST. PATIENT-LVL III: CPT | Mod: PBBFAC,,, | Performed by: PHYSICIAN ASSISTANT

## 2017-08-23 NOTE — PROGRESS NOTES
CHIEF COMPLAINT/REASON FOR VISIT: low back pain    History of present illness: The patient is a 56 year old woman with a history of migraines, carpal tunnel syndrome and low back pain since her early 20s.  She is status post bilateral L2, 3, 4 medial branch radiofrequency ablation on 7/17/17 with about 75% relief.  She complains of low back pain without radiation to her legs.  She feels that this is currently tolerable with her medication.  She is also seeing one of our headache specialist and reports some improvement in her headaches.  She denies weakness, numbness, bladder or bowel incontinence.    Pain intervention history: She tried Neurontin for her leg/thigh pain with no success. She only takes Advil with about 30% relief. She is status post TFESI bilaterally to L3 on 12/6/2011 with no relief. Past history of status post L4/5 YARON on 5/25/11 with about 90% relief that lasted 3 weeks. She is status post 2 bilateral L3 transforaminal injections with 3 weeks relief each time. Her current pain medications include Celexa 40 mg once a day, Aleve 220 mg twice a day, Lyrica as previously stated, Zanaflex 5 mg at night. She uses her TENS at home, when she thinks about it.  She is status post L4/5 interlaminar epidural steroid injection on 1/8/14 with 90% relief.  She is status post L5/S1 interlaminar epidural steroid injection on 6/9/14 with moderate relief only on the right low back and right leg lasting 2 weeks.  She is status post bilateral L3/4 and L4/5 facet joint injections on 4/13/15 with initially 100% relief of her back pain and 80% relief of her left lateral hip and lateral thigh pain, now reporting at least 50% relief of both.  She is status post bilateral L2, 3 and 4 medial branch radiofrequency ablation on 5/20/15 with 60% relief.   She is status post bilateral L2, 3 and 4 medial branch radiofrequency ablation on 6/20/16 with 50-70% relief.  She is status post L5/S1 interlaminar epidural steroid injection  on 3/10/17 with 50% relief.  She is status post bilateral L2, 3, 4 medial branch radiofrequency ablation on 7/17/17 with about 75% relief.    ROS: She reports back pain only.  Balance of review of systems is negative.    Medical, surgical, family and social history reviewed elsewhere in record.     Medications/Allergies: See med card      Vitals:    08/23/17 0902   BP: 130/70   Pulse: 82   Resp: 18   Temp: 98.4 °F (36.9 °C)   TempSrc: Oral   Weight: 100.8 kg (222 lb 3.6 oz)   PainSc:   4   PainLoc: Back        PHYSICAL EXAM:  Gen: A and O x3, pleasant, well-groomed  HEENT: PERRLA  CVS: Regular rate and rhythm, normal S1 and S2, no murmurs.  Resp: Clear to auscultation bilaterally, no wheezes or rales.  Musculoskeletal: Able to heel walk, toe walk. No antalgic gait.     Neuro:   Lower extremities: 5/5 strength bilaterally  Reflexes:  Patellar 2+, Achilles 2+.  Sensory:Intact and symmetrical to light touch and pinprick in L2-S1 dermatomes bilaterally.    Lumbar spine:  Lumbar spine: ROM is moderately limited with flexion, extension and oblique extension with mild increased low back pain during flexion.  Azam's test negative.   Supine straight leg raise is negative bilaterally.  Internal and external rotation of the hip causes no increased pain in either side.  Myofascial exam: No tenderness to palpation to the lumbar paraspinous muscles.         IMAGING:   MRI L-SPINE 5/10/11   IMPRESSION: AT L3-4, THERE IS CIRCUMFERENTIAL DISC PROTRUSION FOCALLY MORE PROMINENT TO THE LEFT AS WELL AS A SMALL DISC HERNIATION PROTRUDING INFERIORLY BEHIND L4 TO THE LEFT OF MIDLINE. THIS PRODUCES SPINAL CANAL AND BILATERAL NEURAL FORAMINAL STENOSIS, LEFT GREATER THAN RIGHT. AT L4-5, THERE IS CIRCUMFERENTIAL DISC PROTRUSION FOCALLY MORE PROMINENT TO THE RIGHT WITH MILD SPINAL CANAL AND RIGHT NEURAL FORAMINAL STENOSIS. AT L1-2 AND L2-3 ALTHOUGH THERE ARE DISC PROTRUSIONS IDENTIFIED, SIGNIFICANT STENOSIS IS NOT SEEN.     MRI lumbar spine  7/10/14  L1-2:There is chronic relatively advanced disc degeneration with disc space narrowing, disc dehydration, disc narrowing, endplate osteophytes, and degenerative vertebral endplate marrow change. There is a diffuse 2-mm posterior disc bulge with a superimposed4-mm right posterior disc extrusion causing mild right foraminal stenosis. There is no impingement of the right L1 nerve root and there has been no change.  L2-3: There is severe and chronic disc degeneration with severe disc space narrowing, disc dehydration, degenerative vertebral endplate marrow change, and vertebral endplate osteophytes. There is a diffuse 2-mm posterior disc bulge with a superimposed 4-mm right posterior disc extrusion causing mild right foraminal stenosis. There is no impingement of the right L2 nerve root and there has been no significant change.  L3-4: There is severe disc degeneration which has progressed since the prior study. There is severe disc space narrowing, disc dehydration, degenerative vertebral endplate marrow change and endplate osteophytes. There is also mild posterior subluxation of L3over a distance of 4 mm. There is a broad-based 5-mm posterior disc extrusion. There is degenerative facet arthrosis with ligamentum flavum thickening. The combination of facet arthrosis and disc extrusion results in relatively severe spinal canal stenosis with compression of the thecal sac to an AP diameter of 5 mm, moderate right foraminal stenosis, and severe left foraminal stenosis. The spinal stenosis has also progressed since the prior study.  L4-5:There is a diffuse posterior disc bulge with a superimposed 3-mm left posterior paracentral disc protrusion causing left paracentral thecal sac compression. There is moderate left and mild right foraminal stenosis and there has been no significant change. There is mild degenerative facet arthrosis with ligamentum flavum thickening and small joint effusions.  L5-S1:There is no  significant compromise of the spinal canal or foramina and there has been no change.    X-ray cervical spine 6/8/15  There is loss of normal cervical lordosis which may be positional or related muscular strain. Intervertebral disk height loss is noted at the C5-C6 C6-C7 levels and to a lesser degree C4-C5 and C7-T1 levels. Vertebral body alignment appears otherwise adequate. Vertebral body heights appear well-preserved. The atlas and odontoid appear in good relationship to each other. Osseous neuroforaminal narrowing is noted at the C3-C4 C4-C5 C5-C6 levels on the left and at the C4-C5 C5-C6 and C6-C7 levels on the right       ASSESSMENT:  The patient is a 56 year old woman with a history of migraines, carpal tunnel syndrome and low back pain since her early 20s who returns in follow up.   1. Facet hypertrophy of lumbar region     2. Lumbar stenosis     3. DDD (degenerative disc disease), lumbar         Plan:  1.  The patient did very well following the bilateral L2, 3 and 4 medial branch RFA.  This can be repeated in the future if necessary.  2.  If she has some increased back pain in the near future, we would repeat an L5/S1 interlaminar YARON.  3.  She continues tramadol, meloxicam, tizanidine and Topamax.  She states she currently does not need refills.  We will check her CO2 level at her next visit.  4.  Follow-up in 3 months or sooner as needed.    Greater than 50% of this 15 minute visit was spent on counseling the patient.

## 2017-09-06 ENCOUNTER — OFFICE VISIT (OUTPATIENT)
Dept: PRIMARY CARE CLINIC | Facility: CLINIC | Age: 57
End: 2017-09-06
Payer: COMMERCIAL

## 2017-09-06 VITALS
SYSTOLIC BLOOD PRESSURE: 142 MMHG | DIASTOLIC BLOOD PRESSURE: 80 MMHG | OXYGEN SATURATION: 97 % | BODY MASS INDEX: 37.18 KG/M2 | WEIGHT: 223.13 LBS | TEMPERATURE: 98 F | HEIGHT: 65 IN | HEART RATE: 75 BPM | RESPIRATION RATE: 16 BRPM

## 2017-09-06 DIAGNOSIS — T78.40XA RASH DUE TO ALLERGY: Primary | ICD-10-CM

## 2017-09-06 DIAGNOSIS — R21 RASH DUE TO ALLERGY: Primary | ICD-10-CM

## 2017-09-06 PROCEDURE — 96372 THER/PROPH/DIAG INJ SC/IM: CPT | Mod: S$GLB,,, | Performed by: NURSE PRACTITIONER

## 2017-09-06 PROCEDURE — 99999 PR PBB SHADOW E&M-EST. PATIENT-LVL IV: CPT | Mod: PBBFAC,,, | Performed by: NURSE PRACTITIONER

## 2017-09-06 PROCEDURE — 99213 OFFICE O/P EST LOW 20 MIN: CPT | Mod: 25,S$GLB,, | Performed by: NURSE PRACTITIONER

## 2017-09-06 PROCEDURE — 3008F BODY MASS INDEX DOCD: CPT | Mod: S$GLB,,, | Performed by: NURSE PRACTITIONER

## 2017-09-06 RX ORDER — PREDNISONE 10 MG/1
TABLET ORAL
Qty: 12 TABLET | Refills: 0 | Status: SHIPPED | OUTPATIENT
Start: 2017-09-06 | End: 2017-11-16

## 2017-09-06 RX ORDER — TRAMADOL HYDROCHLORIDE 50 MG/1
TABLET ORAL
COMMUNITY
Start: 2017-08-24 | End: 2017-11-28

## 2017-09-06 NOTE — PROGRESS NOTES
"Elizabeth Giordano is a 56 y.o. female patient of Yoselyn Sharif MD who presents to the clinic today for   Chief Complaint   Patient presents with    Rash     L arm- finished doxycycline 1 1/2 weeks ago   .    HPI    Pt, who is  known to me, reports a new problem to me:  rash on the left arm, very itchy.  2 weeks ago treated for impetigo.  Rx for doxycycline given.  Started getting "little bumps" scatterd on legs and abd, single lesions, while on it, toward the end of the treatment.  Then got ones on the left arm that spread.  In the past 2 days she's gotten worse rash despite completing the AB.    These symptoms began 1.5 weeks  ago and status is worse over the past 2 days.     Pt denies the following symptoms:  New soaps, lotions, clothing, sheets    Aggravating factors include nothing.  Thinks r/t doxycycline. .    Relieving factors include nothing .    OTC Medications tried are bacitracin, calamine, hydrocortisone.    Prescription medications taken for symptoms are nothing for the rash.    Pertinent medical history:  Recent treatment with doxycycline for impetigo.  Normally not a person who gets a lot of rashes.  Has not been outside among plants or trees.    ROS    Constitutional:  No fever, no fatigue.    Skin:  See chief complaint/HPI.    HEENT:  No complaints.    Heart/Lungs:  No complaints    GI/:  No sxs.    MS:  No new problems in bones, joints or muscles.      PAST MEDICAL HISTORY:  Past Medical History:   Diagnosis Date    Allergy     Arthritis of spine 2011    Chronic low back pain     DDD (degenerative disc disease), lumbar     Diverticulitis     Diverticulosis     DJD (degenerative joint disease)     Encounter for blood transfusion     Factor V Leiden     Former smoker     GERD (gastroesophageal reflux disease)     Hiatal hernia     Migraines     PONV (postoperative nausea and vomiting)     geta       PAST SURGICAL HISTORY:  Past Surgical History:   Procedure Laterality " Date    APPENDECTOMY      APPENDECTOMY  1981    CARPAL TUNNEL RELEASE  2011    right    COLONOSCOPY  2014    reduntant colon, otherwise normal findings repeat in 8-10 years for surveillance    Epidural Steroid Injection      Pain Management    ESOPHAGOGASTRODUODENOSCOPY      Facet injection      Pain Management    HYSTERECTOMY      ovaries removed    KIDNEY SURGERY Right 1967    to correct urinary reflux into kidney    OVARIAN CYST REMOVAL Right 1981    SHOULDER SURGERY  2010    rotator cuff, right       SOCIAL HISTORY:  Social History     Social History    Marital status:      Spouse name: N/A    Number of children: 1    Years of education: N/A     Occupational History     [employer]     Social History Main Topics    Smoking status: Former Smoker     Packs/day: 1.00     Years: 5.00     Start date: 6/4/1992     Quit date: 1/5/1997    Smokeless tobacco: Never Used    Alcohol use No      Comment: occasionally    Drug use: No    Sexual activity: Yes     Partners: Male     Other Topics Concern    Not on file     Social History Narrative    ** Merged History Encounter **            FAMILY HISTORY:  Family History   Problem Relation Age of Onset    Heart attack Mother     Heart disease Mother     COPD Mother     Hypertension Mother     Hepatitis Sister     COPD Father     No Known Problems Daughter     Allergic rhinitis Neg Hx     Angioedema Neg Hx     Asthma Neg Hx     Atopy Neg Hx     Eczema Neg Hx     Immunodeficiency Neg Hx     Urticaria Neg Hx     Colon cancer Neg Hx     Colon polyps Neg Hx        ALLERGIES AND MEDICATIONS: updated and reviewed.  Review of patient's allergies indicates:   Allergen Reactions    Hydrocodone Hives and Nausea Only           Sulfa (sulfonamide antibiotics) Hives and Rash           Oxycodone Itching and Nausea And Vomiting     Current Outpatient Prescriptions   Medication Sig Dispense Refill    citalopram (CELEXA) 40 MG tablet Take  1 tablet (40 mg total) by mouth once daily. 90 tablet 1    diphenhydrAMINE (BENADRYL) 25 mg capsule Take 25 mg by mouth every 6 (six) hours as needed.      EVAMIST 1.53 mg/spray (1.7%) transdermal spray       meloxicam (MOBIC) 15 MG tablet Take 1 tablet (15 mg total) by mouth once daily. 30 tablet 5    naratriptan (AMERGE) 2.5 MG tablet 2.5 mg PO BID x 5 days at onset of migraine flare 10 tablet 3    nystatin (MYCOSTATIN) cream       pantoprazole (PROTONIX) 40 MG tablet Take 1 tablet (40 mg total) by mouth once daily. 90 tablet 1    sumatriptan (IMITREX) 100 MG tablet May take one tab, then followup with another tab in 2 hours if headache not resolved. 9 tablet 0    tizanidine (ZANAFLEX) 2 MG tablet TAKE ONE TABLET BY MOUTH IN THE EVENING 90 tablet 0    topiramate (TOPAMAX) 100 MG tablet Take 1 tablet (100 mg total) by mouth 2 (two) times daily. 60 tablet 3    tramadol (ULTRAM) 50 mg tablet       azelastine (ASTELIN) 137 mcg (0.1 %) nasal spray 1 spray (137 mcg total) by Nasal route 2 (two) times daily. 30 mL 2     No current facility-administered medications for this visit.          PHYSICAL EXAM    Alert, coop 56 y.o. female patient in no acute distress, is not ill-appearing.    Vitals:    09/06/17 1449   BP: (!) 142/80   Pulse: 75   Resp: 16   Temp: 98 °F (36.7 °C)     VS reviewed.  VS stable (SBP mildly elevated).  CC, nursing note, medications & PMH all reviewed today.    Head:  Normocephalic, atraumatic.    EENT:  Ext nose/ears normal.               Eyes with normal lids, not injected.     Resp:  Respirations even, unlabored    Heart:  Regular rate.  CV:  Cap RF brisk    MS:  Ambulates normally.     NEURO:  Alert and oriented x 4.  Responds appropriately during interaction.    Skin:  Warm, dry, color good.            A/an left arm with some densely distributed small wheals prox forearm with scattered wheals lower on the forearm, pruritic.           Several scabbed, single, papular lesions noted on  left knee, lower abd.            No pustules or vesicles noted.    Psych:  Responds appropriately throughout the visit.               Relaxed.  Well-groomed    Rash due to allergy  Comments:  was on doxycycline  Orders:  -     methylPREDNISolone sod suc(PF) injection 125 mg; Inject 125 mg into the muscle one time.  -     predniSONE (DELTASONE) 10 MG tablet; 3 daily for 2 days, 2 daily for 2 days then 1 daily for 2 days  Dispense: 12 tablet; Refill: 0      Pt today presents with very itchy rash that has not resolved since stopping the doxycycline, which was the only new exposure she had had that could explain the rash.  However, she has not had a generalized rash, no wheezing or dyspnea..    This is a new problem to me.  No work up is planned.        Pt advised to perform comfort measures recommended on patient instruction sheet .    If not better in 2 days, the patient is advised to call us.  If worse or concerns, the patient is advised to call us.  Explained exam findings, diagnosis and treatment plan to patient.  Questions answered and patient states understanding.

## 2017-09-06 NOTE — PATIENT INSTRUCTIONS
Your symptoms and exam today are consistent with itchy rash, probably from doxycycline allergy.    Topical hydrocortisone ointment 4x daily    Zyrtec mornings and benadryl at night for itching per instructions.    Steroid injection today, start prednisone tabs tomorrow.    Cool compresses through out the day until improved.    If you are not better in 2-3 days, if worse or you have concerns or questions, please do not hesitate to call.  You can reach us at [phone] Monday through Friday (except holidays) 12 nooon to 6 p.m.    Thank you for using the [facility]!    JEFFRYE Baker, CNP, FNP-BC  [facility]  [facility]

## 2017-09-06 NOTE — Clinical Note
Yoselyn Sharif MD,  I saw your patient today in the [facility].  If you have any questions, please do not hesitate to contact me.  Thank you!  MATTIE Baker

## 2017-09-18 ENCOUNTER — TELEPHONE (OUTPATIENT)
Dept: NEUROLOGY | Facility: CLINIC | Age: 57
End: 2017-09-18

## 2017-09-18 NOTE — TELEPHONE ENCOUNTER
Tried to call the patient. No answer. Left message for patient to call back about in regards to her appointment.

## 2017-09-27 ENCOUNTER — OFFICE VISIT (OUTPATIENT)
Dept: PODIATRY | Facility: CLINIC | Age: 57
End: 2017-09-27
Payer: COMMERCIAL

## 2017-09-27 VITALS — WEIGHT: 223.13 LBS | BODY MASS INDEX: 37.18 KG/M2 | HEIGHT: 65 IN

## 2017-09-27 DIAGNOSIS — D36.10 NEUROMA: Primary | ICD-10-CM

## 2017-09-27 PROCEDURE — 99999 PR PBB SHADOW E&M-EST. PATIENT-LVL III: CPT | Mod: PBBFAC,,,

## 2017-09-27 PROCEDURE — 64455 NJX AA&/STRD PLTR COM DG NRV: CPT | Mod: RT,S$GLB,,

## 2017-09-27 PROCEDURE — 3008F BODY MASS INDEX DOCD: CPT | Mod: S$GLB,,,

## 2017-09-27 PROCEDURE — 99213 OFFICE O/P EST LOW 20 MIN: CPT | Mod: 25,S$GLB,,

## 2017-09-27 RX ORDER — PANTOPRAZOLE SODIUM 40 MG/1
TABLET, DELAYED RELEASE ORAL
Qty: 90 TABLET | Refills: 0 | Status: SHIPPED | OUTPATIENT
Start: 2017-09-27 | End: 2017-11-16 | Stop reason: SDUPTHER

## 2017-09-27 RX ORDER — DEXAMETHASONE SODIUM PHOSPHATE 4 MG/ML
4 INJECTION, SOLUTION INTRA-ARTICULAR; INTRALESIONAL; INTRAMUSCULAR; INTRAVENOUS; SOFT TISSUE
Status: COMPLETED | OUTPATIENT
Start: 2017-09-27 | End: 2017-09-27

## 2017-09-27 RX ADMIN — DEXAMETHASONE SODIUM PHOSPHATE 4 MG: 4 INJECTION, SOLUTION INTRA-ARTICULAR; INTRALESIONAL; INTRAMUSCULAR; INTRAVENOUS; SOFT TISSUE at 08:09

## 2017-09-27 NOTE — PROGRESS NOTES
Chief Complaint   Patient presents with    Foot Pain     Rt foot pain and swelling        History of present illness: Patient returns for f/u right third IS neuroma.  She states the injection do help last encounter but relapsed when she wore high heels.    Review of Systems:  Vascular : negative rest pain, claudication, bruising  Respiratory: negative for cough or shortness of breath  Musculoskeletal: + for pain in right third interspace   Skin: negative for rashes, open wounds and lesions  Neurological: negative for burning, tingling and numbness  All others negative.    Past Medical History, Family History, Social History Reviewed.    Constitutional:  Patient is oriented to person, place, and time. Vital signs are normal.  Appears well-developed and well-nourished.      Vascular:  Dorsalis pedis pulses are 2+ on the right side, and 2+ on the left side.   Posterior tibial pulses are 2+ on the right side, and 2+ on the left side.   + digital hair growth, capillary fill time to all toes <3 seconds, no swelling    Skin/Dermatological:  Skin is warm, shiny and atrophic.  No cyanosis or clubbing. No rashes noted.  No open wounds.       Musculoskeletal:      Normal range of motion of bilateral pedal joints and bilateral ankle with knee flexed and extended, no deformity, tenderness or crepitus. No assymmetries noted. Muscle strength to tibialis anterior, extensor hallucis longus, extensor digitorum longus, peroneal muscles, flexor hallucis/digotorum longus, posterior tibial and gastrosoleal complex is 5/5.  Positive Yojana sign right third interspace with splaying of the digits.    Neurological:  No deficits to sharp/dull, light touch or vibratory sensation.   Normal strength lower extremity muscles, normal tone without assymmetry   Reflex Scores:       Patellar reflexes are 2+ on the right side and 2+ on the left side.       Achilles reflexes are 2+ on the right side and 2+ on the left side.         Assessment/Plan:    #1Right third interspace neuroma: With patient permission, 12 mg of Kenalog, 4 mg of dexamethasone and 2 cc of 50-50 mix 0.5 %Marcaine plain and 1% Lidocaine plain was injected to the aforementioned interspace.  Patient tolerated well.  We discussed wide shoes and patient was dispensed  metatarsal pads. Appt made with Dr. Al regarding possibility of removing neuroma.

## 2017-10-01 RX ORDER — TRAMADOL HYDROCHLORIDE 50 MG/1
TABLET ORAL
Qty: 60 TABLET | Refills: 2 | Status: SHIPPED | OUTPATIENT
Start: 2017-10-01 | End: 2017-11-28 | Stop reason: SDUPTHER

## 2017-10-10 ENCOUNTER — OFFICE VISIT (OUTPATIENT)
Dept: PODIATRY | Facility: CLINIC | Age: 57
End: 2017-10-10
Payer: COMMERCIAL

## 2017-10-10 VITALS — BODY MASS INDEX: 36.8 KG/M2 | WEIGHT: 220.88 LBS | HEIGHT: 65 IN

## 2017-10-10 DIAGNOSIS — M79.671 FOOT PAIN, RIGHT: Primary | ICD-10-CM

## 2017-10-10 DIAGNOSIS — M21.6X9 EQUINUS DEFORMITY OF FOOT: ICD-10-CM

## 2017-10-10 DIAGNOSIS — M77.41 METATARSALGIA OF RIGHT FOOT: ICD-10-CM

## 2017-10-10 DIAGNOSIS — D36.10 NEUROMA: ICD-10-CM

## 2017-10-10 PROCEDURE — 99213 OFFICE O/P EST LOW 20 MIN: CPT | Mod: S$GLB,,, | Performed by: PODIATRIST

## 2017-10-10 PROCEDURE — 99999 PR PBB SHADOW E&M-EST. PATIENT-LVL III: CPT | Mod: PBBFAC,,, | Performed by: PODIATRIST

## 2017-10-10 NOTE — PROGRESS NOTES
"Subjective:      Patient ID: Elizabeth Giordano is a 56 y.o. female.    Chief Complaint: Foot Pain (right discuss surgery)  Patient presents to clinic for a follow up for Rt. Forefoot pain.  Relates being treated by Dr. Headley for a neuroma involving the Rt. 3rd webspace.  States that symptoms consist of burning pain to the "inside" of toes 3-4 of the involved foot.  Symptoms are usually exacerbated with weight bearing only, specifically in high heels.  Symptoms are usually alleviated with rest.  Has received a series of steroid injections (x3) in the past with improvement in symptoms.  Relates minimal pain from the extremity with today's exam.  Inquires as to potential surgical options.  Denies any additional pedal complaints.      Past Medical History:   Diagnosis Date    Allergy     Arthritis of spine 2011    Chronic low back pain     DDD (degenerative disc disease), lumbar     Diverticulitis     Diverticulosis     DJD (degenerative joint disease)     Encounter for blood transfusion     Factor V Leiden     Former smoker     GERD (gastroesophageal reflux disease)     Hiatal hernia     Migraines     PONV (postoperative nausea and vomiting)     geta       Past Surgical History:   Procedure Laterality Date    APPENDECTOMY      APPENDECTOMY  1981    CARPAL TUNNEL RELEASE  2011    right    COLONOSCOPY  2014    reduntant colon, otherwise normal findings repeat in 8-10 years for surveillance    Epidural Steroid Injection      Pain Management    ESOPHAGOGASTRODUODENOSCOPY      Facet injection      Pain Management    HYSTERECTOMY      ovaries removed    KIDNEY SURGERY Right 1967    to correct urinary reflux into kidney    OVARIAN CYST REMOVAL Right 1981    SHOULDER SURGERY  2010    rotator cuff, right       Family History   Problem Relation Age of Onset    Heart attack Mother     Heart disease Mother     COPD Mother     Hypertension Mother     Hepatitis Sister     COPD Father     No " Known Problems Daughter     Allergic rhinitis Neg Hx     Angioedema Neg Hx     Asthma Neg Hx     Atopy Neg Hx     Eczema Neg Hx     Immunodeficiency Neg Hx     Urticaria Neg Hx     Colon cancer Neg Hx     Colon polyps Neg Hx        Social History     Social History    Marital status:      Spouse name: N/A    Number of children: 1    Years of education: N/A     Occupational History     [employer]     Social History Main Topics    Smoking status: Former Smoker     Packs/day: 1.00     Years: 5.00     Start date: 6/4/1992     Quit date: 1/5/1997    Smokeless tobacco: Never Used    Alcohol use No      Comment: occasionally    Drug use: No    Sexual activity: Yes     Partners: Male     Other Topics Concern    None     Social History Narrative    ** Merged History Encounter **            Current Outpatient Prescriptions   Medication Sig Dispense Refill    citalopram (CELEXA) 40 MG tablet Take 1 tablet (40 mg total) by mouth once daily. 90 tablet 1    diphenhydrAMINE (BENADRYL) 25 mg capsule Take 25 mg by mouth every 6 (six) hours as needed.      EVAMIST 1.53 mg/spray (1.7%) transdermal spray       meloxicam (MOBIC) 15 MG tablet Take 1 tablet (15 mg total) by mouth once daily. 30 tablet 5    nystatin (MYCOSTATIN) cream       pantoprazole (PROTONIX) 40 MG tablet Take 1 tablet (40 mg total) by mouth once daily. 90 tablet 1    sumatriptan (IMITREX) 100 MG tablet May take one tab, then followup with another tab in 2 hours if headache not resolved. 9 tablet 0    tizanidine (ZANAFLEX) 2 MG tablet TAKE ONE TABLET BY MOUTH IN THE EVENING 90 tablet 0    topiramate (TOPAMAX) 100 MG tablet Take 1 tablet (100 mg total) by mouth 2 (two) times daily. 60 tablet 3    tramadol (ULTRAM) 50 mg tablet       azelastine (ASTELIN) 137 mcg (0.1 %) nasal spray 1 spray (137 mcg total) by Nasal route 2 (two) times daily. 30 mL 2    naratriptan (AMERGE) 2.5 MG tablet 2.5 mg PO BID x 5 days at onset of  migraine flare 10 tablet 3    pantoprazole (PROTONIX) 40 MG tablet TAKE 1 TABLET BY MOUTH EVERY DAY 90 tablet 0    predniSONE (DELTASONE) 10 MG tablet 3 daily for 2 days, 2 daily for 2 days then 1 daily for 2 days 12 tablet 0    tramadol (ULTRAM) 50 mg tablet TAKE ONE TABLET BY MOUTH TWICE DAILY AS NEEDED FOR PAIN 60 tablet 2     No current facility-administered medications for this visit.        Review of patient's allergies indicates:   Allergen Reactions    Hydrocodone Hives and Nausea Only           Sulfa (sulfonamide antibiotics) Hives and Rash           Doxycycline Rash     Itchy rash and some scattered itchy lesions    Oxycodone Itching and Nausea And Vomiting         Review of Systems   Constitution: Negative for chills and fever.   Cardiovascular: Negative for claudication and leg swelling.   Skin: Negative for color change and nail changes.   Musculoskeletal: Positive for joint pain. Negative for myalgias.        Arthralgia of Rt. Foot.   Neurological: Positive for paresthesias.   Psychiatric/Behavioral: Negative for altered mental status.           Objective:      Physical Exam   Constitutional: She is oriented to person, place, and time. She appears well-developed and well-nourished. No distress.   Cardiovascular:   Pulses:       Dorsalis pedis pulses are 2+ on the right side, and 2+ on the left side.        Posterior tibial pulses are 2+ on the right side, and 2+ on the left side.   CFT <3 seconds bilateral.  Pedal hair growth present bilateral.   No varicosities noted bilateral.  No bilateral lower extremity edema.   Musculoskeletal: She exhibits tenderness. She exhibits no edema.   Muscle strength 5/5 in all muscle groups bilateral.  No tenderness nor crepitation with ROM of foot/ankle joints bilateral.  Bilateral pes cavus foot type.  Bilateral gastrocnemius equinus.  Pain with palpation of the Rt. Sub 2nd and 3rd metatarsal heads and with palpation of the Rt. 3rd intermetatarsal space.   (-)  Yojana sign on exam.   Neurological: She is alert and oriented to person, place, and time. She has normal strength. No sensory deficit.   Light touch intact bilateral.    (-) tinel sign bilateral.    Skin: Skin is warm, dry and intact. Capillary refill takes less than 2 seconds. No abrasion, no bruising, no burn, no laceration, no lesion, no petechiae and no rash noted. She is not diaphoretic. No erythema. No pallor.   Pedal skin has normal turgor, temperature, and texture bilateral.  Toenails x 10 appear normotrophic. Examination of the skin reveals no evidence of significant maceration, rashes, open lesions, suspicious appearing nevi or other concerning lesions.              Assessment:       Encounter Diagnoses   Name Primary?    Foot pain, right Yes    Equinus deformity of foot     Metatarsalgia of right foot     Neuroma          Plan:       Elizabeth was seen today for foot pain.    Diagnoses and all orders for this visit:    Foot pain, right    Equinus deformity of foot    Metatarsalgia of right foot    Neuroma      I counseled the patient on her conditions, their implications and medical management.    With today's exam, patient appears to be more symptomatic with palpation to the met heads vs the intermetatarsal space.    Will treat for both a neuroma and metatarsalgia by utilizing metatarsal pads to offload the forefoot and with beginning a consistent stretching routine to address the equinus as well.    Discussed avoidance of heels, flats, and any other unsupportive shoe gear, as this will further exacerbate her symptoms.      Briefly discussed surgical removal of Rt. Foot neuroma should conservative measures fail.    If symptoms remain unimproved in 1 month, patient to call and notify staff.  I will then order an MRI for surgical planning for neuroma excision.      RTC prn.    Patricio Al DPM

## 2017-10-25 ENCOUNTER — OFFICE VISIT (OUTPATIENT)
Dept: NEUROLOGY | Facility: CLINIC | Age: 57
End: 2017-10-25
Payer: COMMERCIAL

## 2017-10-25 VITALS
WEIGHT: 221.81 LBS | RESPIRATION RATE: 20 BRPM | BODY MASS INDEX: 36.96 KG/M2 | HEART RATE: 74 BPM | DIASTOLIC BLOOD PRESSURE: 78 MMHG | HEIGHT: 65 IN | SYSTOLIC BLOOD PRESSURE: 133 MMHG

## 2017-10-25 DIAGNOSIS — G43.909 MIGRAINE WITHOUT STATUS MIGRAINOSUS, NOT INTRACTABLE, UNSPECIFIED MIGRAINE TYPE: ICD-10-CM

## 2017-10-25 PROCEDURE — 99213 OFFICE O/P EST LOW 20 MIN: CPT | Mod: S$GLB,,, | Performed by: PSYCHIATRY & NEUROLOGY

## 2017-10-25 PROCEDURE — 99999 PR PBB SHADOW E&M-EST. PATIENT-LVL III: CPT | Mod: PBBFAC,,, | Performed by: PSYCHIATRY & NEUROLOGY

## 2017-10-25 RX ORDER — TOPIRAMATE 100 MG/1
100 TABLET, FILM COATED ORAL 2 TIMES DAILY
Qty: 60 TABLET | Refills: 3 | Status: SHIPPED | OUTPATIENT
Start: 2017-10-25 | End: 2018-01-30 | Stop reason: ALTCHOICE

## 2017-10-25 RX ORDER — SUMATRIPTAN SUCCINATE 100 MG/1
TABLET ORAL
Qty: 9 TABLET | Refills: 3 | Status: SHIPPED | OUTPATIENT
Start: 2017-10-25 | End: 2018-06-17 | Stop reason: SDUPTHER

## 2017-10-25 NOTE — PROGRESS NOTES
Date of service: 10/25/2017  Referring provider: No ref. provider found    Subjective:      Chief complaint: Follow-up (Migraine)       Patient ID: Elizabeth Giordano is a 56 y.o. lady with carpal tunnel syndrome, low back pain secondary to lumbar spondylosis and DDD, lumbar radiculopathy followed by Dr. Abdalla, factor V leiden mutation, GERD and migraines here for follow up of migraines.   History of Present Illness    INTERVAL HISTORY    Last seen 2 months ago at which point I raised topiramate and started naratriptan bridges for long migraines. Today she reports she is about the same. However, last week she had a good week and had no headaches.The headaches have the same characteristics are before. Her current pain score is 5. Her range is 0-10. She can not predict when she will have severe migraines so naratriptan bridge was taken at terminal end (day 4 of flare), not at the start, and did not have the intended effect. She has tingling in all toes (topamax) but worse on right and gets injections in her foot for this.    HEADACHE HISTORY - 8/9/17   Age at onset and course over time: migraines began in her 30s, she reports going through menopause at age 40 (unclear why) and she thinks this may have started it. One day just became more sensitive to sensory stimuli especially smells. Had severe migraines at least one time per week. Started Celexa for this purpose which helped her sensitivity to smells. Overall thinks her headache number has stayed the same over the years but pain has become more severe and she is yet again more sensitive to smells, thinks medications aren't working as well.   Location: right side behind eye (baseline headache and migraine)   Quality: throbbing   Severity: current 2/10, at worst 10/10   Duration: exacerbations last 5-6 days   Frequency: baseline headache about 12-15 days per month; every other month will have a flare lasting 5-6 days   Alleviated by: sleep, ice, medication    Exacerbated by: light, noise, smells, coughing   Associated with: photo/noise, phobia, osmophobia. No aura. + right eye tearing. No ptosis, no redness. No eye irritation. No ear fullness.   Sleep habits:  Caffeine intake: 1     She is on daily HRT for menopause.     Current acute treatment - migraines treated 4 times per month   -- meloxicam 15mg for  Arthritis in right thumb daily   -- sumatriptan 100mg at onset of headache, gets recurrent headache next day, (zomig works better but not covered by her insurance)  -- amerge - works OK but sumatriptan is cheaper   -- tizanidine 2mg nightly - for migraines, daily     Current prevention:  -- topiramate 100mg TID Tingling in toes.   (celexa 40mg)     Previously tried/failed acute treatment:  -- hydrocodone: allergy  -- oxycodone: allergy   -- zomig   -- relpax   -- naproxen  -- tramadol   -- fiorinal with codeine   -- Goody's   -- Excedrin   -- tylenol  -- ASA    Previously tried/failed preventative treatment:  -- amitriptyline (for back pain, but migraines were no better)  -- gabapentin - for back pain, didn't help   -- lyrica - memory problems     Hx multiple lumbar ESIs and LMB RFAs for low back pain/radiculopathy       Review of patient's allergies indicates:   Allergen Reactions    Hydrocodone Hives and Nausea Only           Sulfa (sulfonamide antibiotics) Hives and Rash           Oxycodone Itching and Nausea And Vomiting     Current Outpatient Prescriptions   Medication Sig Dispense Refill    azelastine (ASTELIN) 137 mcg (0.1 %) nasal spray 1 spray (137 mcg total) by Nasal route 2 (two) times daily. 30 mL 2    citalopram (CELEXA) 40 MG tablet Take 1 tablet (40 mg total) by mouth once daily. 90 tablet 1    diphenhydrAMINE (BENADRYL) 25 mg capsule Take 25 mg by mouth every 6 (six) hours as needed.      EVAMIST 1.53 mg/spray (1.7%) transdermal spray       meloxicam (MOBIC) 15 MG tablet Take 1 tablet (15 mg total) by mouth once daily. 30 tablet 5    nystatin  (MYCOSTATIN) cream       pantoprazole (PROTONIX) 40 MG tablet Take 1 tablet (40 mg total) by mouth once daily. 90 tablet 1    pantoprazole (PROTONIX) 40 MG tablet TAKE 1 TABLET BY MOUTH EVERY DAY 90 tablet 0    predniSONE (DELTASONE) 10 MG tablet 3 daily for 2 days, 2 daily for 2 days then 1 daily for 2 days 12 tablet 0    sumatriptan (IMITREX) 100 MG tablet May take one tab, then followup with another tab in 2 hours if headache not resolved. 9 tablet 3    tizanidine (ZANAFLEX) 2 MG tablet TAKE ONE TABLET BY MOUTH IN THE EVENING 90 tablet 0    topiramate (TOPAMAX) 100 MG tablet Take 1 tablet (100 mg total) by mouth 2 (two) times daily. 60 tablet 3    tramadol (ULTRAM) 50 mg tablet       tramadol (ULTRAM) 50 mg tablet TAKE ONE TABLET BY MOUTH TWICE DAILY AS NEEDED FOR PAIN 60 tablet 2    naratriptan (AMERGE) 2.5 MG tablet 2.5 mg PO BID x 5 days at onset of migraine flare 10 tablet 3     No current facility-administered medications for this visit.        Past Medical History  Past Medical History:   Diagnosis Date    Allergy     Arthritis of spine 2011    Chronic low back pain     DDD (degenerative disc disease), lumbar     Diverticulitis     Diverticulosis     DJD (degenerative joint disease)     Encounter for blood transfusion     Factor V Leiden     Former smoker     GERD (gastroesophageal reflux disease)     Hiatal hernia     Migraines     PONV (postoperative nausea and vomiting)     geta       Past Surgical History  Past Surgical History:   Procedure Laterality Date    APPENDECTOMY      APPENDECTOMY  1981    CARPAL TUNNEL RELEASE  2011    right    COLONOSCOPY  2014    reduntant colon, otherwise normal findings repeat in 8-10 years for surveillance    Epidural Steroid Injection      Pain Management    ESOPHAGOGASTRODUODENOSCOPY      Facet injection      Pain Management    HYSTERECTOMY      ovaries removed    KIDNEY SURGERY Right 1967    to correct urinary reflux into kidney     OVARIAN CYST REMOVAL Right 1981    SHOULDER SURGERY  2010    rotator cuff, right       Family History  Family History   Problem Relation Age of Onset    Heart attack Mother     Heart disease Mother     COPD Mother     Hypertension Mother     Hepatitis Sister     COPD Father     No Known Problems Daughter     Allergic rhinitis Neg Hx     Angioedema Neg Hx     Asthma Neg Hx     Atopy Neg Hx     Eczema Neg Hx     Immunodeficiency Neg Hx     Urticaria Neg Hx     Colon cancer Neg Hx     Colon polyps Neg Hx        Social History  Social History     Social History    Marital status:      Spouse name: N/A    Number of children: 1    Years of education: N/A     Occupational History     [employer]     Social History Main Topics    Smoking status: Former Smoker     Packs/day: 1.00     Years: 5.00     Start date: 6/4/1992     Quit date: 1/5/1997    Smokeless tobacco: Never Used    Alcohol use No      Comment: occasionally    Drug use: No    Sexual activity: Yes     Partners: Male     Other Topics Concern    Not on file     Social History Narrative    ** Merged History Encounter **             Review of Systems  Constitutional: no fever, no chills  Eyes: no change to vision, no redness, no tearing  Ears, nose, mouth, throat: no hearing loss, no tinnitus, no rhinorrhea, no difficulty swallowing   Cardiovascular: no palpitations  Respiratory: no shortness of breath  Gastrointestinal: no diarrhea, no constipation  Musculoskeletal: + joint pain  Skin: no rashes  Neurologic: + numbness, + weakness, no change to speech, loss of coordination   Endocrine: no heat or cold intolerance     Objective:        Vitals:    10/25/17 1135   BP: 133/78   Pulse: 74   Resp: 20     Body mass index is 36.91 kg/m².    General: Well developed, well nourished.  No acute distress.  HEENT: Atraumatic, normocephalic.  Neck: Trachea midline  Cardiovascular: Vitals reviewed. Normal peripheral perfusion.    Pulmonary: No increased work of breathing.  Abdomen/GI: No guarding  Musculoskeletal: No obvious joint deformities, moves all extremities symmetrically and well.     Neurological exam:  Mental status: Awake and alert. Oriented to situation.  Speech fluent and appropriate. Recent and remote memory appear to be intact.  Fund of knowledge normal.  Cranial nerves: Pupils equal round, extraocular movements intact, facial strength intact bilaterally, hearing grossly intact bilaterally.  Gait: Normal       Data Review:     No results found for this or any previous visit.    Lab Results   Component Value Date     04/02/2017    K 3.9 04/02/2017     04/02/2017    CO2 25 04/02/2017    BUN 22 (H) 04/02/2017    CREATININE 1.26 04/02/2017    GLU 93 04/02/2017    AST 26 04/02/2017    ALT 36 04/02/2017    ALBUMIN 4.3 04/02/2017    PROT 7.0 04/02/2017    BILITOT 0.6 04/02/2017    CHOL 211 (H) 03/10/2017    HDL 51 03/10/2017    LDLCALC 137.0 03/10/2017    TRIG 115 03/10/2017       Lab Results   Component Value Date    WBC 6.69 04/02/2017    HGB 14.8 04/02/2017    HCT 42.3 04/02/2017    MCV 93 04/02/2017     04/02/2017       Lab Results   Component Value Date    TSH 0.567 03/10/2017       Assessment & Plan:       Problem List Items Addressed This Visit        Neuro    Nonintractable migraine    Relevant Medications    sumatriptan (IMITREX) 100 MG tablet    topiramate (TOPAMAX) 100 MG tablet              I think Ms. Giordano actually is starting to see a response from the higher topiramate so prior to changing the regimen as below, I would like her to give it 1 month month and call me. If no change, then will likely stop topiramate and start effexor after also weaning celexa. She can not predict when her regular headache is going to be a 5-6 day long headache until she is almost through with it, thus, we can not use the naratriptan bridge as intended. She can go back to just imitrex as that is cheaper.      WORKUP:  -- none     PREVENTION (use daily regardless of headache):  -- continue topiramate to 100mg twice a day, if not helping after 1 month month, next step will be to change celexa to another antidepressant like Effexor   -- call me in 4 weeks and update me on your headaches, please keep diary until then     ACUTE TREATMENT:  -- continue using Imitrex as needed, may repeat once in 2 hours - no more than 10 days per month       Return in about 3 months (around 1/25/2018).    Neva Cortes MD

## 2017-10-25 NOTE — PATIENT INSTRUCTIONS
WORKUP:  -- none     PREVENTION (use daily regardless of headache):  -- continue topiramate to 100mg twice a day, if not helping after 1 month month, next step will be to change celexa to another antidepressant like Effexor   -- call me in 4 weeks and update me on your headaches, please keep diary until then     ACUTE TREATMENT:  -- continue using Imitrex as needed, may repeat once in 2 hours - no more than 10 days per month

## 2017-11-06 DIAGNOSIS — M25.519 SHOULDER PAIN, UNSPECIFIED CHRONICITY, UNSPECIFIED LATERALITY: Primary | ICD-10-CM

## 2017-11-07 ENCOUNTER — HOSPITAL ENCOUNTER (OUTPATIENT)
Dept: RADIOLOGY | Facility: HOSPITAL | Age: 57
Discharge: HOME | End: 2017-11-07
Attending: ORTHOPAEDIC SURGERY
Payer: COMMERCIAL

## 2017-11-07 ENCOUNTER — OFFICE VISIT (OUTPATIENT)
Dept: ORTHOPEDICS | Facility: CLINIC | Age: 57
End: 2017-11-07
Payer: COMMERCIAL

## 2017-11-07 VITALS
WEIGHT: 221.81 LBS | SYSTOLIC BLOOD PRESSURE: 124 MMHG | HEIGHT: 65 IN | DIASTOLIC BLOOD PRESSURE: 81 MMHG | HEART RATE: 71 BPM | BODY MASS INDEX: 36.96 KG/M2

## 2017-11-07 DIAGNOSIS — M75.52 SUBACROMIAL BURSITIS OF LEFT SHOULDER JOINT: Primary | ICD-10-CM

## 2017-11-07 DIAGNOSIS — M75.42 SUBACROMIAL IMPINGEMENT OF LEFT SHOULDER: ICD-10-CM

## 2017-11-07 DIAGNOSIS — M25.512 CHRONIC LEFT SHOULDER PAIN: ICD-10-CM

## 2017-11-07 DIAGNOSIS — M25.519 SHOULDER PAIN, UNSPECIFIED CHRONICITY, UNSPECIFIED LATERALITY: ICD-10-CM

## 2017-11-07 DIAGNOSIS — G89.29 CHRONIC LEFT SHOULDER PAIN: ICD-10-CM

## 2017-11-07 PROCEDURE — 73030 X-RAY EXAM OF SHOULDER: CPT | Mod: 26,LT,, | Performed by: RADIOLOGY

## 2017-11-07 PROCEDURE — 20610 DRAIN/INJ JOINT/BURSA W/O US: CPT | Mod: LT,S$GLB,, | Performed by: ORTHOPAEDIC SURGERY

## 2017-11-07 PROCEDURE — 99999 PR PBB SHADOW E&M-EST. PATIENT-LVL III: CPT | Mod: PBBFAC,,, | Performed by: ORTHOPAEDIC SURGERY

## 2017-11-07 PROCEDURE — 73030 X-RAY EXAM OF SHOULDER: CPT | Mod: TC,PO,LT

## 2017-11-07 PROCEDURE — 99213 OFFICE O/P EST LOW 20 MIN: CPT | Mod: 25,S$GLB,, | Performed by: ORTHOPAEDIC SURGERY

## 2017-11-07 RX ADMIN — TRIAMCINOLONE ACETONIDE 80 MG: 40 INJECTION, SUSPENSION INTRA-ARTICULAR; INTRAMUSCULAR at 04:11

## 2017-11-07 NOTE — PROGRESS NOTES
Subjective:          Chief Complaint: Elizabeth Giordano is a 57 y.o. female who had concerns including Pain of the Left Shoulder.    Mrs. Giordano has been having left shoulder pain for some time now. She has pain with reaching overhead, behind her back and with lying on the left shoulder. She does not have any necessary weakness on exam.    Pain: 8/10          Past Medical History:   Diagnosis Date    Allergy     Arthritis of spine 2011    Chronic low back pain     DDD (degenerative disc disease), lumbar     Diverticulitis     Diverticulosis     DJD (degenerative joint disease)     Encounter for blood transfusion     Factor V Leiden     Former smoker     GERD (gastroesophageal reflux disease)     Hiatal hernia     Migraines     PONV (postoperative nausea and vomiting)     geta       Past Surgical History:   Procedure Laterality Date    APPENDECTOMY      APPENDECTOMY  1981    CARPAL TUNNEL RELEASE  2011    right    COLONOSCOPY  2014    reduntant colon, otherwise normal findings repeat in 8-10 years for surveillance    Epidural Steroid Injection      Pain Management    ESOPHAGOGASTRODUODENOSCOPY      Facet injection      Pain Management    HYSTERECTOMY      ovaries removed    KIDNEY SURGERY Right 1967    to correct urinary reflux into kidney    OVARIAN CYST REMOVAL Right 1981    SHOULDER SURGERY  2010    rotator cuff, right       Family History   Problem Relation Age of Onset    Heart attack Mother     Heart disease Mother     COPD Mother     Hypertension Mother     Hepatitis Sister     COPD Father     No Known Problems Daughter     Allergic rhinitis Neg Hx     Angioedema Neg Hx     Asthma Neg Hx     Atopy Neg Hx     Eczema Neg Hx     Immunodeficiency Neg Hx     Urticaria Neg Hx     Colon cancer Neg Hx     Colon polyps Neg Hx          Current Outpatient Prescriptions:     azelastine (ASTELIN) 137 mcg (0.1 %) nasal spray, 1 spray (137 mcg total) by Nasal route 2 (two)  times daily., Disp: 30 mL, Rfl: 2    citalopram (CELEXA) 40 MG tablet, Take 1 tablet (40 mg total) by mouth once daily., Disp: 90 tablet, Rfl: 1    diphenhydrAMINE (BENADRYL) 25 mg capsule, Take 25 mg by mouth every 6 (six) hours as needed., Disp: , Rfl:     EVAMIST 1.53 mg/spray (1.7%) transdermal spray, , Disp: , Rfl:     meloxicam (MOBIC) 15 MG tablet, Take 1 tablet (15 mg total) by mouth once daily., Disp: 30 tablet, Rfl: 5    nystatin (MYCOSTATIN) cream, , Disp: , Rfl:     pantoprazole (PROTONIX) 40 MG tablet, Take 1 tablet (40 mg total) by mouth once daily., Disp: 90 tablet, Rfl: 1    pantoprazole (PROTONIX) 40 MG tablet, TAKE 1 TABLET BY MOUTH EVERY DAY, Disp: 90 tablet, Rfl: 0    predniSONE (DELTASONE) 10 MG tablet, 3 daily for 2 days, 2 daily for 2 days then 1 daily for 2 days, Disp: 12 tablet, Rfl: 0    sumatriptan (IMITREX) 100 MG tablet, May take one tab, then followup with another tab in 2 hours if headache not resolved., Disp: 9 tablet, Rfl: 3    tizanidine (ZANAFLEX) 2 MG tablet, TAKE ONE TABLET BY MOUTH IN THE EVENING, Disp: 90 tablet, Rfl: 0    topiramate (TOPAMAX) 100 MG tablet, Take 1 tablet (100 mg total) by mouth 2 (two) times daily., Disp: 60 tablet, Rfl: 3    tramadol (ULTRAM) 50 mg tablet, , Disp: , Rfl:     naratriptan (AMERGE) 2.5 MG tablet, 2.5 mg PO BID x 5 days at onset of migraine flare, Disp: 10 tablet, Rfl: 3    Review of patient's allergies indicates:   Allergen Reactions    Hydrocodone Hives and Nausea Only           Sulfa (sulfonamide antibiotics) Hives and Rash           Doxycycline Rash     Itchy rash and some scattered itchy lesions    Oxycodone Itching and Nausea And Vomiting       Vitals:    11/07/17 1342   BP: 124/81   Pulse: 71       Review of Systems   Constitution: Negative for chills and fever.   Musculoskeletal: Positive for joint pain and myalgias.   All other systems reviewed and are negative.                  Objective:        General: Elizabeth is  well-developed, well-nourished, appears stated age, in no acute distress, alert and oriented to time, place and person.     General    Vitals reviewed.  Constitutional: She is oriented to person, place, and time. She appears well-developed and well-nourished. No distress.   HENT:   Head: Normocephalic and atraumatic.   Nose: Nose normal.   Eyes: Pupils are equal, round, and reactive to light.   Cardiovascular: Normal rate.    Pulmonary/Chest: Effort normal.   Neurological: She is alert and oriented to person, place, and time.   Psychiatric: She has a normal mood and affect. Her behavior is normal. Judgment and thought content normal.         Right Shoulder Exam   Right shoulder exam is normal.    Range of Motion   External Rotation 0 degrees:  60 normal   External Rotation 90 degrees: 90 normal  Internal Rotation 0 degrees:  T10 normal   Internal Rotation 90 degrees:  40 normal     Other   Sensation: normal    Left Shoulder Exam     Inspection/Observation   Swelling: absent  Bruising: absent  Scars: absent  Deformity: absent  Scapular Winging: absent  Scapular Dyskinesia: positive  Atrophy: absent    Tenderness   The patient is tender to palpation of the biceps tendon and supraspinatus.    Range of Motion   Active Abduction: 170   Passive Abduction: 170   Forward Flexion: 180   Forward Elevation: 180  External Rotation 0 degrees:  50 abnormal   External Rotation 90 degrees: 70 abnormal  Internal Rotation 0 degrees:  T11 abnormal   Internal Rotation 90 degrees:  30 abnormal     Tests & Signs   Cross Arm: negative  Drop Arm: negative  Hawkin's test: positive  Impingement: positive  Rotator Cuff Painful Arc/Range: moderate  Belly Press: negative  Active Compression test (Telfair's Sign): negative  Yergasons's Test: negative  Speed's Test: negative    Other   Sensation: normal       Muscle Strength   Right Upper Extremity   Shoulder Abduction: 5/5   Shoulder Internal Rotation: 5/5   Shoulder External Rotation: 5/5    Supraspinatus: 5/5/5   Subscapularis: 5/5/5   Biceps: 5/5/5   Left Upper Extremity  Shoulder Abduction: 5/5   Shoulder Internal Rotation: 5/5   Shoulder External Rotation: 5/5   Supraspinatus: 5/5/5   Subscapularis: 5/5/5   Biceps: 5/5/5     Vascular Exam     Right Pulses      Radial:                    2+      Left Pulses      Radial:                    2+      Capillary Refill  Right Hand: normal capillary refill  Left Hand: normal capillary refill        Current and previous radiographic studies and results were reviewed with the patient:   4 views left shoulder demonstrate mild acromioclavicular degenerative hypertrophy.      Assessment:       Encounter Diagnoses   Name Primary?    Chronic left shoulder pain Yes    Subacromial bursitis of left shoulder joint     Subacromial impingement of left shoulder           Plan:         Left shoulder injection  Continue with Meloxicam  F/U in 6 weeks or sooner if needed

## 2017-11-08 RX ORDER — TRIAMCINOLONE ACETONIDE 40 MG/ML
80 INJECTION, SUSPENSION INTRA-ARTICULAR; INTRAMUSCULAR
Status: DISCONTINUED | OUTPATIENT
Start: 2017-11-07 | End: 2017-11-08 | Stop reason: HOSPADM

## 2017-11-08 NOTE — PROCEDURES
Large Joint Aspiration/Injection  Date/Time: 11/7/2017 4:47 PM  Performed by: PREET TAPIA  Authorized by: PREET TAPIA     Consent Done?:  Yes (Verbal)  Indications:  Pain  Procedure site marked: Yes    Timeout: Prior to procedure the correct patient, procedure, and site was verified      Location:  Shoulder  Site:  L subacromial bursa  Prep: Patient was prepped and draped in usual sterile fashion    Ultrasonic Guidance for needle placement: No  Needle size:  22 G  Approach:  Posterior  Medications:  80 mg triamcinolone acetonide 40 mg/mL  Patient tolerance:  Patient tolerated the procedure well with no immediate complications

## 2017-11-16 ENCOUNTER — OFFICE VISIT (OUTPATIENT)
Dept: FAMILY MEDICINE | Facility: CLINIC | Age: 57
End: 2017-11-16
Payer: COMMERCIAL

## 2017-11-16 VITALS
SYSTOLIC BLOOD PRESSURE: 136 MMHG | HEART RATE: 73 BPM | BODY MASS INDEX: 36.14 KG/M2 | OXYGEN SATURATION: 98 % | HEIGHT: 65 IN | WEIGHT: 216.94 LBS | RESPIRATION RATE: 16 BRPM | TEMPERATURE: 99 F | DIASTOLIC BLOOD PRESSURE: 86 MMHG

## 2017-11-16 DIAGNOSIS — R05.9 COUGH: Primary | ICD-10-CM

## 2017-11-16 DIAGNOSIS — Z91.09 ENVIRONMENTAL ALLERGIES: ICD-10-CM

## 2017-11-16 DIAGNOSIS — R09.82 PND (POST-NASAL DRIP): ICD-10-CM

## 2017-11-16 PROCEDURE — 96372 THER/PROPH/DIAG INJ SC/IM: CPT | Mod: S$GLB,,, | Performed by: FAMILY MEDICINE

## 2017-11-16 PROCEDURE — 99999 PR PBB SHADOW E&M-EST. PATIENT-LVL V: CPT | Mod: PBBFAC,,, | Performed by: NURSE PRACTITIONER

## 2017-11-16 PROCEDURE — 99213 OFFICE O/P EST LOW 20 MIN: CPT | Mod: 25,S$GLB,, | Performed by: NURSE PRACTITIONER

## 2017-11-16 RX ORDER — BETAMETHASONE SODIUM PHOSPHATE AND BETAMETHASONE ACETATE 3; 3 MG/ML; MG/ML
6 INJECTION, SUSPENSION INTRA-ARTICULAR; INTRALESIONAL; INTRAMUSCULAR; SOFT TISSUE
Status: COMPLETED | OUTPATIENT
Start: 2017-11-16 | End: 2017-11-16

## 2017-11-16 RX ORDER — AZELASTINE 1 MG/ML
1 SPRAY, METERED NASAL 2 TIMES DAILY
Qty: 30 ML | Refills: 3 | Status: SHIPPED | OUTPATIENT
Start: 2017-11-16 | End: 2019-03-29 | Stop reason: SDUPTHER

## 2017-11-16 RX ORDER — PROMETHAZINE HYDROCHLORIDE AND CODEINE PHOSPHATE 6.25; 1 MG/5ML; MG/5ML
5 SOLUTION ORAL EVERY 4 HOURS PRN
Qty: 180 ML | Refills: 0 | Status: SHIPPED | OUTPATIENT
Start: 2017-11-16 | End: 2017-11-26

## 2017-11-16 RX ORDER — BENZONATATE 200 MG/1
200 CAPSULE ORAL 3 TIMES DAILY PRN
Qty: 30 CAPSULE | Refills: 0 | Status: SHIPPED | OUTPATIENT
Start: 2017-11-16 | End: 2017-11-26

## 2017-11-16 RX ADMIN — BETAMETHASONE SODIUM PHOSPHATE AND BETAMETHASONE ACETATE 6 MG: 3; 3 INJECTION, SUSPENSION INTRA-ARTICULAR; INTRALESIONAL; INTRAMUSCULAR; SOFT TISSUE at 11:11

## 2017-11-16 NOTE — PROGRESS NOTES
Subjective:       Patient ID: Elizabeth Giordano is a 57 y.o. female.    Chief Complaint: Cough; Nasal Congestion; Ear Fullness (bilateral); and Sore Throat    Cough   This is a new problem. The current episode started in the past 7 days. The problem has been gradually worsening. Associated symptoms include nasal congestion, postnasal drip and a sore throat. Pertinent negatives include no chest pain, chills, ear congestion, ear pain, fever, headaches, heartburn, hemoptysis, myalgias, rash, rhinorrhea, shortness of breath, sweats, weight loss or wheezing. Treatments tried: sudafed, benadryl  The treatment provided no relief. Her past medical history is significant for environmental allergies. There is no history of asthma, bronchiectasis, bronchitis, COPD, emphysema or pneumonia.   Ear Fullness    Associated symptoms include coughing and a sore throat. Pertinent negatives include no abdominal pain, diarrhea, ear discharge, headaches, neck pain, rash, rhinorrhea or vomiting.   Sore Throat    This is a new problem. The current episode started in the past 7 days. The problem has been gradually worsening. Neither side of throat is experiencing more pain than the other. The pain is at a severity of 4/10. The pain is mild. Associated symptoms include congestion, coughing, a hoarse voice, a plugged ear sensation and trouble swallowing. Pertinent negatives include no abdominal pain, diarrhea, drooling, ear discharge, ear pain, headaches, neck pain, shortness of breath, stridor, swollen glands or vomiting. She has had no exposure to strep or mono. She has tried acetaminophen for the symptoms. The treatment provided no relief.     Vitals:    11/16/17 1058   BP: 136/86   Pulse: 73   Resp: 16   Temp: 98.6 °F (37 °C)     Review of Systems   Constitutional: Negative.  Negative for chills, diaphoresis, fatigue, fever and weight loss.   HENT: Positive for congestion, hoarse voice, postnasal drip, sore throat and trouble  swallowing. Negative for drooling, ear discharge, ear pain and rhinorrhea.    Eyes: Negative.    Respiratory: Positive for cough. Negative for hemoptysis, shortness of breath, wheezing and stridor.    Cardiovascular: Negative.  Negative for chest pain.   Gastrointestinal: Negative.  Negative for abdominal pain, diarrhea, heartburn, nausea and vomiting.   Endocrine: Negative.    Genitourinary: Negative.  Negative for dysuria and hematuria.   Musculoskeletal: Negative.  Negative for myalgias and neck pain.   Skin: Negative for color change and rash.   Allergic/Immunologic: Positive for environmental allergies.   Neurological: Negative.  Negative for speech difficulty, numbness and headaches.   Hematological: Negative.    Psychiatric/Behavioral: Negative.        Past Medical History:   Diagnosis Date    Allergy     Arthritis of spine 2011    Chronic low back pain     DDD (degenerative disc disease), lumbar     Diverticulitis     Diverticulosis     DJD (degenerative joint disease)     Encounter for blood transfusion     Factor V Leiden     Former smoker     GERD (gastroesophageal reflux disease)     Hiatal hernia     Migraines     PONV (postoperative nausea and vomiting)     geta     Objective:      Physical Exam   Constitutional: She is oriented to person, place, and time. She appears well-developed and well-nourished.   HENT:   Head: Normocephalic and atraumatic.   Right Ear: Hearing and ear canal normal. A middle ear effusion is present.   Left Ear: Hearing and ear canal normal. A middle ear effusion is present.   Nose: Mucosal edema and rhinorrhea present. Right sinus exhibits no maxillary sinus tenderness and no frontal sinus tenderness. Left sinus exhibits no maxillary sinus tenderness and no frontal sinus tenderness.   Mouth/Throat: Uvula is midline. Posterior oropharyngeal erythema present.   Eyes: Conjunctivae and EOM are normal. Pupils are equal, round, and reactive to light.   Neck: Neck supple.    Cardiovascular: Normal rate, regular rhythm, normal heart sounds and intact distal pulses.  Exam reveals no friction rub.    No murmur heard.  Pulmonary/Chest: Effort normal and breath sounds normal. No respiratory distress. She has no wheezes.   Abdominal: Soft. Bowel sounds are normal.   Musculoskeletal: Normal range of motion.   Neurological: She is alert and oriented to person, place, and time.   Skin: Skin is warm and dry.   Psychiatric: She has a normal mood and affect. Her behavior is normal.   Nursing note and vitals reviewed.      Assessment:       1. Cough    2. PND (post-nasal drip)    3. Environmental allergies        Plan:       Cough  -     benzonatate (TESSALON) 200 MG capsule; Take 1 capsule (200 mg total) by mouth 3 (three) times daily as needed.  Dispense: 30 capsule; Refill: 0  -     promethazine-codeine 6.25-10 mg/5 ml (PHENERGAN WITH CODEINE) 6.25-10 mg/5 mL syrup; Take 5 mLs by mouth every 4 (four) hours as needed for Cough.  Dispense: 180 mL; Refill: 0    PND (post-nasal drip)    Environmental allergies  -     betamethasone acetate-betamethasone sodium phosphate injection 6 mg; Inject 1 mL (6 mg total) into the muscle one time.  -     azelastine (ASTELIN) 137 mcg (0.1 %) nasal spray; 1 spray (137 mcg total) by Nasal route 2 (two) times daily.  Dispense: 30 mL; Refill: 3    Discussed add allergy meds   Call if not better

## 2017-11-28 ENCOUNTER — OFFICE VISIT (OUTPATIENT)
Dept: PAIN MEDICINE | Facility: CLINIC | Age: 57
End: 2017-11-28
Payer: COMMERCIAL

## 2017-11-28 ENCOUNTER — LAB VISIT (OUTPATIENT)
Dept: LAB | Facility: HOSPITAL | Age: 57
End: 2017-11-28
Attending: PHYSICIAN ASSISTANT
Payer: COMMERCIAL

## 2017-11-28 VITALS
BODY MASS INDEX: 36.43 KG/M2 | SYSTOLIC BLOOD PRESSURE: 130 MMHG | RESPIRATION RATE: 20 BRPM | HEART RATE: 74 BPM | WEIGHT: 218.94 LBS | DIASTOLIC BLOOD PRESSURE: 70 MMHG | TEMPERATURE: 98 F

## 2017-11-28 DIAGNOSIS — Z00.00 ROUTINE ADULT HEALTH MAINTENANCE: ICD-10-CM

## 2017-11-28 DIAGNOSIS — M47.816 LUMBAR SPONDYLOSIS: Primary | ICD-10-CM

## 2017-11-28 DIAGNOSIS — M51.36 DDD (DEGENERATIVE DISC DISEASE), LUMBAR: ICD-10-CM

## 2017-11-28 DIAGNOSIS — M48.061 SPINAL STENOSIS OF LUMBAR REGION WITHOUT NEUROGENIC CLAUDICATION: ICD-10-CM

## 2017-11-28 LAB
ANION GAP SERPL CALC-SCNC: 6 MMOL/L
BUN SERPL-MCNC: 30 MG/DL
CALCIUM SERPL-MCNC: 9.2 MG/DL
CHLORIDE SERPL-SCNC: 111 MMOL/L
CO2 SERPL-SCNC: 25 MMOL/L
CREAT SERPL-MCNC: 1.1 MG/DL
EST. GFR  (AFRICAN AMERICAN): >60 ML/MIN/1.73 M^2
EST. GFR  (NON AFRICAN AMERICAN): 56.3 ML/MIN/1.73 M^2
GLUCOSE SERPL-MCNC: 81 MG/DL
POTASSIUM SERPL-SCNC: 4.1 MMOL/L
SODIUM SERPL-SCNC: 142 MMOL/L

## 2017-11-28 PROCEDURE — 99999 PR PBB SHADOW E&M-EST. PATIENT-LVL III: CPT | Mod: PBBFAC,,, | Performed by: PHYSICIAN ASSISTANT

## 2017-11-28 PROCEDURE — 36415 COLL VENOUS BLD VENIPUNCTURE: CPT | Mod: PO

## 2017-11-28 PROCEDURE — 99213 OFFICE O/P EST LOW 20 MIN: CPT | Mod: S$GLB,,, | Performed by: PHYSICIAN ASSISTANT

## 2017-11-28 PROCEDURE — 80048 BASIC METABOLIC PNL TOTAL CA: CPT

## 2017-11-28 RX ORDER — TIZANIDINE 2 MG/1
2 TABLET ORAL NIGHTLY
Qty: 90 TABLET | Refills: 3 | Status: SHIPPED | OUTPATIENT
Start: 2017-11-28 | End: 2018-11-29 | Stop reason: SDUPTHER

## 2017-11-28 RX ORDER — MELOXICAM 15 MG/1
15 TABLET ORAL DAILY
Qty: 90 TABLET | Refills: 3 | Status: SHIPPED | OUTPATIENT
Start: 2017-11-28 | End: 2018-02-26

## 2017-11-28 RX ORDER — TRAMADOL HYDROCHLORIDE 50 MG/1
50 TABLET ORAL 2 TIMES DAILY PRN
Qty: 60 TABLET | Refills: 2 | Status: SHIPPED | OUTPATIENT
Start: 2017-11-28 | End: 2018-02-19 | Stop reason: SDUPTHER

## 2017-11-29 ENCOUNTER — TELEPHONE (OUTPATIENT)
Dept: PAIN MEDICINE | Facility: CLINIC | Age: 57
End: 2017-11-29

## 2017-11-29 NOTE — PROGRESS NOTES
CHIEF COMPLAINT/REASON FOR VISIT: low back pain    History of present illness: The patient is a 57 year old woman with a history of migraines, carpal tunnel syndrome and low back pain since her early 20s. She returns in follow-up today with back pain.  She has mild low back pain and some increased right buttock pain from sleeping in her camper.  Otherwise, she has been doing well since the RFA earlier this year.  She denies any radiation to her legs.  She recently had her Topamax increased by neurology for her headaches.  She denies weakness, numbness, bladder or bowel incontinence.    Pain intervention history: She tried Neurontin for her leg/thigh pain with no success. She only takes Advil with about 30% relief. She is status post TFESI bilaterally to L3 on 12/6/2011 with no relief. Past history of status post L4/5 YARON on 5/25/11 with about 90% relief that lasted 3 weeks. She is status post 2 bilateral L3 transforaminal injections with 3 weeks relief each time. Her current pain medications include Celexa 40 mg once a day, Aleve 220 mg twice a day, Lyrica as previously stated, Zanaflex 5 mg at night. She uses her TENS at home, when she thinks about it.  She is status post L4/5 interlaminar epidural steroid injection on 1/8/14 with 90% relief.  She is status post L5/S1 interlaminar epidural steroid injection on 6/9/14 with moderate relief only on the right low back and right leg lasting 2 weeks.  She is status post bilateral L3/4 and L4/5 facet joint injections on 4/13/15 with initially 100% relief of her back pain and 80% relief of her left lateral hip and lateral thigh pain, now reporting at least 50% relief of both.  She is status post bilateral L2, 3 and 4 medial branch radiofrequency ablation on 5/20/15 with 60% relief.   She is status post bilateral L2, 3 and 4 medial branch radiofrequency ablation on 6/20/16 with 50-70% relief.  She is status post L5/S1 interlaminar epidural steroid injection on 3/10/17 with  50% relief.  She is status post bilateral L2, 3, 4 medial branch radiofrequency ablation on 7/17/17 with about 75% relief.    ROS: She reports back pain only.  Balance of review of systems is negative.    Medical, surgical, family and social history reviewed elsewhere in record.     Medications/Allergies: See med card      Vitals:    11/28/17 0914   BP: 130/70   Pulse: 74   Resp: 20   Temp: 98.2 °F (36.8 °C)   TempSrc: Oral   Weight: 99.3 kg (218 lb 14.7 oz)   PainSc:   4   PainLoc: Back        PHYSICAL EXAM:  Gen: A and O x3, pleasant, well-groomed  HEENT: PERRLA  CVS: Regular rate and rhythm, normal S1 and S2, no murmurs.  Resp: Clear to auscultation bilaterally, no wheezes or rales.  Musculoskeletal: Able to heel walk, toe walk. No antalgic gait.     Neuro:   Lower extremities: 5/5 strength bilaterally  Reflexes:  Patellar 2+, Achilles 2+.  Sensory:Intact and symmetrical to light touch and pinprick in L2-S1 dermatomes bilaterally.    Lumbar spine:  Lumbar spine: ROM is moderately limited with flexion, extension and oblique extension with mild increased right buttock pain during flexion.  Azam's test negative.   Supine straight leg raise is negative bilaterally.  Internal and external rotation of the hip causes no increased pain in either side.  Myofascial exam: No tenderness to palpation to the lumbar paraspinous muscles.         IMAGING:   MRI L-SPINE 5/10/11   IMPRESSION: AT L3-4, THERE IS CIRCUMFERENTIAL DISC PROTRUSION FOCALLY MORE PROMINENT TO THE LEFT AS WELL AS A SMALL DISC HERNIATION PROTRUDING INFERIORLY BEHIND L4 TO THE LEFT OF MIDLINE. THIS PRODUCES SPINAL CANAL AND BILATERAL NEURAL FORAMINAL STENOSIS, LEFT GREATER THAN RIGHT. AT L4-5, THERE IS CIRCUMFERENTIAL DISC PROTRUSION FOCALLY MORE PROMINENT TO THE RIGHT WITH MILD SPINAL CANAL AND RIGHT NEURAL FORAMINAL STENOSIS. AT L1-2 AND L2-3 ALTHOUGH THERE ARE DISC PROTRUSIONS IDENTIFIED, SIGNIFICANT STENOSIS IS NOT SEEN.     MRI lumbar spine  7/10/14  L1-2:There is chronic relatively advanced disc degeneration with disc space narrowing, disc dehydration, disc narrowing, endplate osteophytes, and degenerative vertebral endplate marrow change. There is a diffuse 2-mm posterior disc bulge with a superimposed4-mm right posterior disc extrusion causing mild right foraminal stenosis. There is no impingement of the right L1 nerve root and there has been no change.  L2-3: There is severe and chronic disc degeneration with severe disc space narrowing, disc dehydration, degenerative vertebral endplate marrow change, and vertebral endplate osteophytes. There is a diffuse 2-mm posterior disc bulge with a superimposed 4-mm right posterior disc extrusion causing mild right foraminal stenosis. There is no impingement of the right L2 nerve root and there has been no significant change.  L3-4: There is severe disc degeneration which has progressed since the prior study. There is severe disc space narrowing, disc dehydration, degenerative vertebral endplate marrow change and endplate osteophytes. There is also mild posterior subluxation of L3over a distance of 4 mm. There is a broad-based 5-mm posterior disc extrusion. There is degenerative facet arthrosis with ligamentum flavum thickening. The combination of facet arthrosis and disc extrusion results in relatively severe spinal canal stenosis with compression of the thecal sac to an AP diameter of 5 mm, moderate right foraminal stenosis, and severe left foraminal stenosis. The spinal stenosis has also progressed since the prior study.  L4-5:There is a diffuse posterior disc bulge with a superimposed 3-mm left posterior paracentral disc protrusion causing left paracentral thecal sac compression. There is moderate left and mild right foraminal stenosis and there has been no significant change. There is mild degenerative facet arthrosis with ligamentum flavum thickening and small joint effusions.  L5-S1:There is no  significant compromise of the spinal canal or foramina and there has been no change.    X-ray cervical spine 6/8/15  There is loss of normal cervical lordosis which may be positional or related muscular strain. Intervertebral disk height loss is noted at the C5-C6 C6-C7 levels and to a lesser degree C4-C5 and C7-T1 levels. Vertebral body alignment appears otherwise adequate. Vertebral body heights appear well-preserved. The atlas and odontoid appear in good relationship to each other. Osseous neuroforaminal narrowing is noted at the C3-C4 C4-C5 C5-C6 levels on the left and at the C4-C5 C5-C6 and C6-C7 levels on the right       ASSESSMENT:  The patient is a 57 year old woman with a history of migraines, carpal tunnel syndrome and low back pain since her early 20s who returns in follow up.   1. Lumbar spondylosis     2. Spinal stenosis of lumbar region without neurogenic claudication     3. DDD (degenerative disc disease), lumbar     4. Routine adult health maintenance  Basic metabolic panel       Plan:  1.  Overall the patient is doing well.  Dr. Abdalla provided prescriptions for tramadol and she will continue to take meloxicam, tizanidine and Topamax.  I have ordered a BMP to check her CO2 level since she is on Topamax.  We will call her with these results.  2.  Follow-up in 3 months or sooner as needed.    Greater than 50% of this 15 minute visit was spent on counseling the patient.

## 2017-11-29 NOTE — TELEPHONE ENCOUNTER
Please let the patient know that I reviewed her lab results and her CO2 level was within normal limits.

## 2017-12-11 RX ORDER — MELOXICAM 15 MG/1
TABLET ORAL
Qty: 90 TABLET | Refills: 0 | Status: SHIPPED | OUTPATIENT
Start: 2017-12-11 | End: 2018-02-26

## 2017-12-11 RX ORDER — MELOXICAM 15 MG/1
TABLET ORAL
Qty: 30 TABLET | Refills: 0 | Status: SHIPPED | OUTPATIENT
Start: 2017-12-11 | End: 2017-12-11 | Stop reason: SDUPTHER

## 2017-12-26 RX ORDER — PANTOPRAZOLE SODIUM 40 MG/1
TABLET, DELAYED RELEASE ORAL
Qty: 90 TABLET | Refills: 0 | Status: SHIPPED | OUTPATIENT
Start: 2017-12-26 | End: 2018-03-27 | Stop reason: SDUPTHER

## 2018-01-27 DIAGNOSIS — F32.A DEPRESSION, UNSPECIFIED DEPRESSION TYPE: ICD-10-CM

## 2018-01-29 DIAGNOSIS — F32.A DEPRESSION, UNSPECIFIED DEPRESSION TYPE: ICD-10-CM

## 2018-01-29 RX ORDER — CITALOPRAM 40 MG/1
TABLET, FILM COATED ORAL
Qty: 30 TABLET | Refills: 0 | Status: SHIPPED | OUTPATIENT
Start: 2018-01-29 | End: 2018-02-22 | Stop reason: SDUPTHER

## 2018-01-30 ENCOUNTER — OFFICE VISIT (OUTPATIENT)
Dept: NEUROLOGY | Facility: CLINIC | Age: 58
End: 2018-01-30
Payer: COMMERCIAL

## 2018-01-30 ENCOUNTER — TELEPHONE (OUTPATIENT)
Dept: FAMILY MEDICINE | Facility: CLINIC | Age: 58
End: 2018-01-30

## 2018-01-30 VITALS
SYSTOLIC BLOOD PRESSURE: 126 MMHG | DIASTOLIC BLOOD PRESSURE: 73 MMHG | HEIGHT: 65 IN | WEIGHT: 221.56 LBS | RESPIRATION RATE: 16 BRPM | HEART RATE: 71 BPM | BODY MASS INDEX: 36.91 KG/M2

## 2018-01-30 DIAGNOSIS — G43.719 INTRACTABLE CHRONIC MIGRAINE WITHOUT AURA AND WITHOUT STATUS MIGRAINOSUS: Primary | ICD-10-CM

## 2018-01-30 PROBLEM — G44.51 HEMICRANIA CONTINUA: Status: RESOLVED | Noted: 2017-08-09 | Resolved: 2018-01-30

## 2018-01-30 PROCEDURE — 99999 PR PBB SHADOW E&M-EST. PATIENT-LVL III: CPT | Mod: PBBFAC,,, | Performed by: PSYCHIATRY & NEUROLOGY

## 2018-01-30 PROCEDURE — 99214 OFFICE O/P EST MOD 30 MIN: CPT | Mod: S$GLB,,, | Performed by: PSYCHIATRY & NEUROLOGY

## 2018-01-30 RX ORDER — ZONISAMIDE 100 MG/1
300 CAPSULE ORAL NIGHTLY
Qty: 90 CAPSULE | Refills: 11 | Status: ON HOLD | OUTPATIENT
Start: 2018-01-30 | End: 2018-07-17 | Stop reason: CLARIF

## 2018-01-30 NOTE — PROGRESS NOTES
Date of service: 1/30/2018  Referring provider: No ref. provider found    Subjective:      Chief complaint: Migraine       Patient ID: Elizabeth Giordano is a 57 y.o. lady with carpal tunnel syndrome, low back pain secondary to lumbar spondylosis and DDD, lumbar radiculopathy followed by Dr. Abdalla, factor V leiden mutation, GERD and migraines here for follow up of migraines.     History of Present Illness    INTERVAL HISTORY - 1/30/18     Last visit 3 months ago we planned on continuing topiramate titration and sumatriptan for acute.    Today she reports the current regimen seems to be working - her regular baseline headache went down from 12-15 per month to about half that, and had one long flare since last seen - used naratriptan once came back, used sumatriptan once, came back . Her current pain is 0, with a range of 0 to 10. The pain is unchanged in location.    She has been having significant memory problems since being on topiramate.     INTERVAL HISTORY - 10/25/17     Last seen 2 months ago at which point I raised topiramate and started naratriptan bridges for long migraines. Today she reports she is about the same. However, last week she had a good week and had no headaches.The headaches have the same characteristics are before. Her current pain score is 5. Her range is 0-10. She can not predict when she will have severe migraines so naratriptan bridge was taken at terminal end (day 4 of flare), not at the start, and did not have the intended effect. She has tingling in all toes (topamax) but worse on right and gets injections in her foot for this.    HEADACHE HISTORY - 8/9/17   Age at onset and course over time: migraines began in her 30s, she reports going through menopause at age 40 (unclear why) and she thinks this may have started it. One day just became more sensitive to sensory stimuli especially smells. Had severe migraines at least one time per week. Started Celexa for this purpose which  helped her sensitivity to smells. Overall thinks her headache number has stayed the same over the years but pain has become more severe and she is yet again more sensitive to smells, thinks medications aren't working as well.   Location: right side behind eye (baseline headache and migraine)   Quality: throbbing   Severity: current 2/10, at worst 10/10   Duration: exacerbations last 5-6 days   Frequency: baseline headache about 12-15 days per month; every other month will have a flare lasting 5-6 days   Alleviated by: sleep, ice, medication   Exacerbated by: light, noise, smells, coughing   Associated with: photo/noise, phobia, osmophobia. No aura. + right eye tearing. No ptosis, no redness. No eye irritation. No ear fullness.   Sleep habits:  Caffeine intake: 1     She is on daily HRT for menopause.     Current acute treatment - migraines treated 4 times per month   -- meloxicam 15mg for  Arthritis in right thumb daily   -- sumatriptan 100mg at onset of headache, gets recurrent headache next day, (zomig works better but not covered by her insurance)  -- amerge - works OK but sumatriptan is cheaper   -- tizanidine 2mg nightly - for migraines, daily     Current prevention:  -- topiramate 100mg BID Tingling in toes - helping with migraines but having significant memory problems   (celexa 40mg)     Previously tried/failed acute treatment:  -- hydrocodone: allergy  -- oxycodone: allergy   -- zomig   -- relpax   -- naproxen  -- tramadol   -- fiorinal with codeine   -- Goody's   -- Excedrin   -- tylenol  -- ASA    Previously tried/failed preventative treatment:  -- amitriptyline (for back pain, but migraines were no better)  -- gabapentin - for back pain, didn't help   -- lyrica - memory problems     Hx multiple lumbar ESIs and LMB RFAs for low back pain/radiculopathy       Review of patient's allergies indicates:   Allergen Reactions    Hydrocodone Hives and Nausea Only           Sulfa (sulfonamide antibiotics) Hives  and Rash           Oxycodone Itching and Nausea And Vomiting     Current Outpatient Prescriptions   Medication Sig Dispense Refill    azelastine (ASTELIN) 137 mcg (0.1 %) nasal spray 1 spray (137 mcg total) by Nasal route 2 (two) times daily. 30 mL 3    citalopram (CELEXA) 40 MG tablet TAKE 1 TABLET BY MOUTH EVERY DAY 30 tablet 0    diphenhydrAMINE (BENADRYL) 25 mg capsule Take 25 mg by mouth every 6 (six) hours as needed.      EVAMIST 1.53 mg/spray (1.7%) transdermal spray       meloxicam (MOBIC) 15 MG tablet Take 1 tablet (15 mg total) by mouth once daily. 90 tablet 3    meloxicam (MOBIC) 15 MG tablet TAKE 1 TABLET(15 MG) BY MOUTH EVERY DAY 90 tablet 0    pantoprazole (PROTONIX) 40 MG tablet TAKE 1 TABLET BY MOUTH EVERY DAY 90 tablet 0    sumatriptan (IMITREX) 100 MG tablet May take one tab, then followup with another tab in 2 hours if headache not resolved. 9 tablet 3    tiZANidine (ZANAFLEX) 2 MG tablet Take 1 tablet (2 mg total) by mouth every evening. 90 tablet 3    traMADol (ULTRAM) 50 mg tablet Take 1 tablet (50 mg total) by mouth 2 (two) times daily as needed. for pain 60 tablet 2    naratriptan (AMERGE) 2.5 MG tablet 2.5 mg PO BID x 5 days at onset of migraine flare 10 tablet 3    zonisamide (ZONEGRAN) 100 MG Cap Take 3 capsules (300 mg total) by mouth every evening. 90 capsule 11     No current facility-administered medications for this visit.        Past Medical History  Past Medical History:   Diagnosis Date    Allergy     Arthritis of spine 2011    Chronic low back pain     DDD (degenerative disc disease), lumbar     Diverticulitis     Diverticulosis     DJD (degenerative joint disease)     Encounter for blood transfusion     Factor V Leiden     Former smoker     GERD (gastroesophageal reflux disease)     Hiatal hernia     Migraines     PONV (postoperative nausea and vomiting)     geta       Past Surgical History  Past Surgical History:   Procedure Laterality Date     APPENDECTOMY      APPENDECTOMY  1981    CARPAL TUNNEL RELEASE  2011    right    COLONOSCOPY  2014    reduntant colon, otherwise normal findings repeat in 8-10 years for surveillance    Epidural Steroid Injection      Pain Management    ESOPHAGOGASTRODUODENOSCOPY      Facet injection      Pain Management    HYSTERECTOMY      ovaries removed    KIDNEY SURGERY Right 1967    to correct urinary reflux into kidney    OVARIAN CYST REMOVAL Right 1981    SHOULDER SURGERY  2010    rotator cuff, right       Family History  Family History   Problem Relation Age of Onset    Heart attack Mother     Heart disease Mother     COPD Mother     Hypertension Mother     Hepatitis Sister     COPD Father     No Known Problems Daughter     Allergic rhinitis Neg Hx     Angioedema Neg Hx     Asthma Neg Hx     Atopy Neg Hx     Eczema Neg Hx     Immunodeficiency Neg Hx     Urticaria Neg Hx     Colon cancer Neg Hx     Colon polyps Neg Hx        Social History  Social History     Social History    Marital status:      Spouse name: N/A    Number of children: 1    Years of education: N/A     Occupational History     [employer]     Social History Main Topics    Smoking status: Former Smoker     Packs/day: 1.00     Years: 5.00     Start date: 6/4/1992     Quit date: 1/5/1997    Smokeless tobacco: Never Used    Alcohol use No      Comment: occasionally    Drug use: No    Sexual activity: Yes     Partners: Male     Other Topics Concern    Not on file     Social History Narrative    ** Merged History Encounter **             Review of Systems  Constitutional: no fever, no chills  Eyes: no change to vision, no redness, no tearing  Ears, nose, mouth, throat: no hearing loss, no tinnitus, no rhinorrhea, no difficulty swallowing   Cardiovascular: no palpitations  Respiratory: no shortness of breath  Gastrointestinal: no diarrhea, no constipation  Musculoskeletal: + joint pain  Skin: no  rashes  Neurologic: + numbness, + weakness, no change to speech, loss of coordination   Endocrine: no heat or cold intolerance     Objective:        Vitals:    01/30/18 1108   BP: 126/73   Pulse: 71   Resp: 16     Body mass index is 36.87 kg/m².    General: Well developed, well nourished.  No acute distress.  HEENT: Atraumatic, normocephalic.  Neck: Trachea midline  Cardiovascular: Vitals reviewed. Normal peripheral perfusion.   Pulmonary: No increased work of breathing.  Abdomen/GI: No guarding  Musculoskeletal: No obvious joint deformities, moves all extremities symmetrically and well.     Neurological exam:  Mental status: Awake and alert. Oriented to situation.  Speech fluent and appropriate. Recent and remote memory appear to be intact.  Fund of knowledge normal.  Cranial nerves: Pupils equal round, extraocular movements intact, facial strength intact bilaterally, hearing grossly intact bilaterally.  Gait: Normal       Data Review:     No results found for this or any previous visit.    Lab Results   Component Value Date     11/28/2017    K 4.1 11/28/2017     (H) 11/28/2017    CO2 25 11/28/2017    BUN 30 (H) 11/28/2017    CREATININE 1.1 11/28/2017    GLU 81 11/28/2017    AST 26 04/02/2017    ALT 36 04/02/2017    ALBUMIN 4.3 04/02/2017    PROT 7.0 04/02/2017    BILITOT 0.6 04/02/2017    CHOL 211 (H) 03/10/2017    HDL 51 03/10/2017    LDLCALC 137.0 03/10/2017    TRIG 115 03/10/2017       Lab Results   Component Value Date    WBC 6.69 04/02/2017    HGB 14.8 04/02/2017    HCT 42.3 04/02/2017    MCV 93 04/02/2017     04/02/2017       Lab Results   Component Value Date    TSH 0.567 03/10/2017       Assessment & Plan:       Problem List Items Addressed This Visit        Neuro    Intractable chronic migraine without aura - Primary    Overview     Migraines of many years, triggered by menopause. Normal neuro exam. Excellent response to full dose topiramate but not tolerating due to memory impairment.  Will change to zonisamde to hopefully capture therapeutic benefit while minimizing side effects.     If this alone is insufficient to control headaches then will plan to change Citalopram to Effexor or TCA for norepinephrine reuptake which is needed for pain control. We discussed that Celexa does not have the properties needed to be a migraine or pain medication. She does not take it for any reason other than migraines.      Since migraine flares occur every other month for 5 days at a time, respond well to triptans, will prophylactically start naratriptan BID bridge during this time for less headache recurrence - we will give this another try.          Relevant Medications    zonisamide (ZONEGRAN) 100 MG Cap                WORKUP:  -- none     PREVENTION (use daily regardless of headache):  -- STOP topiramate  -- START zonisamide 100mg at night x 7 days, then 200mg at night x 7 days, then 300mg at night   -- keep headache diary     ACUTE TREATMENT:  -- may continue to use imitrex for shorter headaches  -- for those long flares, as soon as you know what is happening, take the amerge (naratriptan) twice per day x 4 days whether you have pain or not to try to get ahead of it   -- can't use imitrex and naratriptan same day       Follow-up in about 3 months (around 4/30/2018).    Neva Cortes MD

## 2018-01-30 NOTE — TELEPHONE ENCOUNTER
----- Message from Osvaldo Young sent at 1/30/2018  1:07 PM CST -----  Contact: patient  Place call to pod,Patient returning call.please call back at [phone].thanks,

## 2018-01-30 NOTE — PATIENT INSTRUCTIONS
WORKUP:  -- none     PREVENTION (use daily regardless of headache):  -- STOP topiramate  -- START zonisamide 100mg at night x 7 days, then 200mg at night x 7 days, then 300mg at night   -- keep headache diary     ACUTE TREATMENT:  -- may continue to use imitrex for shorter headaches  -- for those long flares, as soon as you know what is happening, take the amerge (naratriptan) twice per day x 4-5 days whether you have pain or not to try to get ahead of it   -- can't use imitrex and naratriptan same day

## 2018-01-30 NOTE — TELEPHONE ENCOUNTER
Spoke to pt and notified her that you could not give 90 day supply. Pt verbalized understanding. Pt states she should be fine until upcoming appt.

## 2018-01-30 NOTE — TELEPHONE ENCOUNTER
----- Message from Osvaldo Young sent at 1/30/2018  1:07 PM CST -----  Contact: patient  Place call to pod,Patient returning call.please call back at [phone].thanks,

## 2018-01-31 RX ORDER — CITALOPRAM 40 MG/1
TABLET, FILM COATED ORAL
Qty: 90 TABLET | Refills: 0 | OUTPATIENT
Start: 2018-01-31

## 2018-02-15 ENCOUNTER — PATIENT MESSAGE (OUTPATIENT)
Dept: NEUROLOGY | Facility: CLINIC | Age: 58
End: 2018-02-15

## 2018-02-19 RX ORDER — TRAMADOL HYDROCHLORIDE 50 MG/1
TABLET ORAL
Qty: 60 TABLET | Refills: 2 | Status: SHIPPED | OUTPATIENT
Start: 2018-02-26 | End: 2018-05-17 | Stop reason: SDUPTHER

## 2018-02-22 ENCOUNTER — OFFICE VISIT (OUTPATIENT)
Dept: INTERNAL MEDICINE | Facility: CLINIC | Age: 58
End: 2018-02-22
Payer: COMMERCIAL

## 2018-02-22 ENCOUNTER — TELEPHONE (OUTPATIENT)
Dept: INTERNAL MEDICINE | Facility: CLINIC | Age: 58
End: 2018-02-22

## 2018-02-22 VITALS
WEIGHT: 226.19 LBS | RESPIRATION RATE: 18 BRPM | BODY MASS INDEX: 37.69 KG/M2 | DIASTOLIC BLOOD PRESSURE: 70 MMHG | OXYGEN SATURATION: 98 % | TEMPERATURE: 98 F | SYSTOLIC BLOOD PRESSURE: 124 MMHG | HEART RATE: 75 BPM | HEIGHT: 65 IN

## 2018-02-22 DIAGNOSIS — Z00.00 HEALTH CARE MAINTENANCE: ICD-10-CM

## 2018-02-22 DIAGNOSIS — G43.719 INTRACTABLE CHRONIC MIGRAINE WITHOUT AURA AND WITHOUT STATUS MIGRAINOSUS: ICD-10-CM

## 2018-02-22 DIAGNOSIS — F32.A DEPRESSION, UNSPECIFIED DEPRESSION TYPE: ICD-10-CM

## 2018-02-22 DIAGNOSIS — K21.9 GASTROESOPHAGEAL REFLUX DISEASE, ESOPHAGITIS PRESENCE NOT SPECIFIED: ICD-10-CM

## 2018-02-22 PROCEDURE — 90471 IMMUNIZATION ADMIN: CPT | Mod: S$GLB,,, | Performed by: INTERNAL MEDICINE

## 2018-02-22 PROCEDURE — 99214 OFFICE O/P EST MOD 30 MIN: CPT | Mod: 25,S$GLB,, | Performed by: INTERNAL MEDICINE

## 2018-02-22 PROCEDURE — 90686 IIV4 VACC NO PRSV 0.5 ML IM: CPT | Mod: S$GLB,,, | Performed by: INTERNAL MEDICINE

## 2018-02-22 PROCEDURE — 3008F BODY MASS INDEX DOCD: CPT | Mod: S$GLB,,, | Performed by: INTERNAL MEDICINE

## 2018-02-22 PROCEDURE — 99999 PR PBB SHADOW E&M-EST. PATIENT-LVL III: CPT | Mod: PBBFAC,,, | Performed by: INTERNAL MEDICINE

## 2018-02-22 RX ORDER — CITALOPRAM 40 MG/1
40 TABLET, FILM COATED ORAL DAILY
Qty: 30 TABLET | Refills: 0 | Status: SHIPPED | OUTPATIENT
Start: 2018-02-22 | End: 2018-02-23 | Stop reason: ALTCHOICE

## 2018-02-22 NOTE — PROGRESS NOTES
HISTORY OF PRESENT ILLNESS:  Pt. is a 57 y.o. female presents for monitoring of her use of celexa for depression/states it is going well, lumbar DJD/seeing pain mgt here, migraines/seeing Dr. Cortes, Gerd/taking pantoprazole, some breakthrough, has Factor V Leiden. Is not on coumadin.  Mammogram done here, will be seeing her GYN, Dr. Ware 3/18.  She states she is breaking out in a pruritic rash possibly from zonegran/she will let Dr. Cortes know.    Health Maintenance Topics with due status: Not Due       Topic Last Completion Date    TETANUS VACCINE 08/01/2013    Colonoscopy 01/16/2014    Lipid Panel 03/10/2017    Mammogram 04/20/2017     Health Maintenance Due   Topic Date Due    Influenza Vaccine  08/01/2017/today;       Lab Results   Component Value Date    WBC 6.69 04/02/2017    HGB 14.8 04/02/2017    HCT 42.3 04/02/2017     04/02/2017    CHOL 211 (H) 03/10/2017    TRIG 115 03/10/2017    HDL 51 03/10/2017    LDLCALC 137.0 03/10/2017    ALT 36 04/02/2017    AST 26 04/02/2017     11/28/2017    K 4.1 11/28/2017     (H) 11/28/2017    CREATININE 1.1 11/28/2017    BUN 30 (H) 11/28/2017    CO2 25 11/28/2017    ALBUMIN 4.3 04/02/2017    TSH 0.567 03/10/2017       Past Medical History:   Diagnosis Date    Allergy     Arthritis of spine 2011    Chronic low back pain     DDD (degenerative disc disease), lumbar     Diverticulitis     Diverticulosis     DJD (degenerative joint disease)     Encounter for blood transfusion     Factor V Leiden     Former smoker     GERD (gastroesophageal reflux disease)     Hiatal hernia     Migraines     PONV (postoperative nausea and vomiting)     geta       Past Surgical History:   Procedure Laterality Date    APPENDECTOMY      APPENDECTOMY  1981    CARPAL TUNNEL RELEASE  2011    right    COLONOSCOPY  2014    reduntant colon, otherwise normal findings repeat in 8-10 years for surveillance    Epidural Steroid Injection      Pain Management     ESOPHAGOGASTRODUODENOSCOPY      Facet injection      Pain Management    HYSTERECTOMY      ovaries removed    KIDNEY SURGERY Right 1967    to correct urinary reflux into kidney    OVARIAN CYST REMOVAL Right 1981    SHOULDER SURGERY  2010    rotator cuff, right       Social History     Social History    Marital status:      Spouse name: N/A    Number of children: 1    Years of education: N/A     Occupational History     [employer]     Social History Main Topics    Smoking status: Former Smoker     Packs/day: 1.00     Years: 5.00     Start date: 6/4/1992     Quit date: 1/5/1997    Smokeless tobacco: Never Used    Alcohol use No      Comment: occasionally    Drug use: No    Sexual activity: Yes     Partners: Male     Other Topics Concern    None     Social History Narrative    ** Merged History Encounter **            ROS:  GENERAL: No fever, chills, positive fatigability; no weight loss/gained 10 lbs in the last year;  SKIN: No rashes, itching or changes in color or texture of skin.  HEAD: Positive headaches; no recent head trauma.  EARS: Denies ear pain, discharge or vertigo.  NOSE: No loss of smell, no epistaxis or postnasal drip.  MOUTH & THROAT: No hoarseness or change in voice. No excessive gum bleeding.  NODES: Denies swollen glands.  CHEST: Positive MEDELLIN/states due to weight, no cyanosis, wheezing, cough and sputum production.  CARDIOVASCULAR: Denies chest pain, PND, orthopnea or reduced exercise tolerance.  ABDOMEN: Appetite fine. No weight loss. Denies constipation, diarrhea, abdominal pain, hematemesis or blood in stool.  URINARY: No flank pain, dysuria or hematuria.  PERIPHERAL VASCULAR: No claudication or cyanosis. No edema.  MUSCULOSKELETAL: No joint stiffness or swelling. Positive back pain/sees Dr. Abdalla;  NEUROLOGIC: Positive numbness in legs due to back    PE:   Vitals:   Vitals:    02/22/18 1636   BP: 124/70   Pulse: 75   Resp: 18   Temp: 98.1 °F (36.7 °C)      GENERAL: no acute distress, A&Ox3, comfortable.  Female with BMI of 37   HEENT: tympanic membranes clear, nasal mucosa pink, no pharyngeal erythema or exudate  NECK: supple, no cervical lymphadenopathy, no thyromegaly; no supraclavicular nodes;   CHEST:  clear to auscultation bilaterally, no crackles or wheeze; no increased work of breathing;  CARDIOVASCULAR: regular rate and rhythm, no rubs, murmurs or gallops.  ABDOMEN: normal bowel sounds, soft non-tender, non-distended; no palpable organomegaly;   EXT: no clubbing, cyanosis or edema.     ASSESSMENT/PLAN:    Migraines: have messaged Dr. Cortes concerning the rash, possibly due to zonegran;    Depression, unspecified depression type  -     TSH; Future; Expected date: 02/22/2018    Gastroesophageal reflux disease, esophagitis presence not specified  -     CBC auto differential; Future; Expected date: 02/22/2018    Health care maintenance  -     Comprehensive metabolic panel; Future; Expected date: 02/22/2018  -     Lipid panel; Future; Expected date: 02/22/2018        Medication List with Changes/Refills   Current Medications    AZELASTINE (ASTELIN) 137 MCG (0.1 %) NASAL SPRAY    1 spray (137 mcg total) by Nasal route 2 (two) times daily.    CITALOPRAM (CELEXA) 40 MG TABLET    TAKE 1 TABLET BY MOUTH EVERY DAY    DIPHENHYDRAMINE (BENADRYL) 25 MG CAPSULE    Take 25 mg by mouth every 6 (six) hours as needed.    EVAMIST 1.53 MG/SPRAY (1.7%) TRANSDERMAL SPRAY        MELOXICAM (MOBIC) 15 MG TABLET    Take 1 tablet (15 mg total) by mouth once daily.    MELOXICAM (MOBIC) 15 MG TABLET    TAKE 1 TABLET(15 MG) BY MOUTH EVERY DAY    NARATRIPTAN (AMERGE) 2.5 MG TABLET    2.5 mg PO BID x 5 days at onset of migraine flare    PANTOPRAZOLE (PROTONIX) 40 MG TABLET    TAKE 1 TABLET BY MOUTH EVERY DAY    SUMATRIPTAN (IMITREX) 100 MG TABLET    May take one tab, then followup with another tab in 2 hours if headache not resolved.    TIZANIDINE (ZANAFLEX) 2 MG TABLET    Take 1 tablet  (2 mg total) by mouth every evening.    TRAMADOL (ULTRAM) 50 MG TABLET    TAKE 1 TABLET BY MOUTH TWICE DAILY AS NEEDED FOR PAIN    ZONISAMIDE (ZONEGRAN) 100 MG CAP    Take 3 capsules (300 mg total) by mouth every evening.     Call if condition changes or worsens.

## 2018-02-23 RX ORDER — VENLAFAXINE HYDROCHLORIDE 150 MG/1
150 CAPSULE, EXTENDED RELEASE ORAL DAILY
Qty: 30 CAPSULE | Refills: 3 | Status: SHIPPED | OUTPATIENT
Start: 2018-02-23 | End: 2018-07-17 | Stop reason: DRUGHIGH

## 2018-02-23 RX ORDER — VENLAFAXINE HYDROCHLORIDE 37.5 MG/1
CAPSULE, EXTENDED RELEASE ORAL
Qty: 21 CAPSULE | Refills: 0 | Status: ON HOLD | OUTPATIENT
Start: 2018-02-23 | End: 2018-07-17 | Stop reason: CLARIF

## 2018-02-23 NOTE — TELEPHONE ENCOUNTER
"Called and spoke with patient. Informed her per Dr. Cortes, " Due to rash, we will need her to stop zonegran. In place of this we will either need to change her Celexa to another antidepressant for migraines, or add a blood pressure medicine". Patient stated she does not want to add a blood pressure medicine and wants to change the Celexa. Please advise.         If we need to call the patient back. Patient requested to call her back on her work phone number: [phone]  "

## 2018-02-23 NOTE — TELEPHONE ENCOUNTER
"Called and spoke with patient. Informed patient that Dr. Cortes called in Venlafaxine for her to take. Informed her per Dr. Cortes, "Week 1: venlafaxine XR 37.5mg in Am, continue celexa. Week 2: stop celexa, increase venlafaxine to two 37.5mg tablets in AM . Week 3: change to venlafaxine 150mg tablet in Am. Would be normal for her to feel nauseous in the beginning of this, this will go away after a couple weeks. Takes 8 full weeks to trial this medication to know if it is working". Patient verbalized understanding.  "

## 2018-02-26 ENCOUNTER — OFFICE VISIT (OUTPATIENT)
Dept: PAIN MEDICINE | Facility: CLINIC | Age: 58
End: 2018-02-26
Payer: COMMERCIAL

## 2018-02-26 ENCOUNTER — LAB VISIT (OUTPATIENT)
Dept: LAB | Facility: HOSPITAL | Age: 58
End: 2018-02-26
Attending: INTERNAL MEDICINE
Payer: COMMERCIAL

## 2018-02-26 VITALS
SYSTOLIC BLOOD PRESSURE: 130 MMHG | DIASTOLIC BLOOD PRESSURE: 70 MMHG | WEIGHT: 223.44 LBS | RESPIRATION RATE: 18 BRPM | BODY MASS INDEX: 37.18 KG/M2 | HEART RATE: 74 BPM | TEMPERATURE: 99 F

## 2018-02-26 DIAGNOSIS — F32.A DEPRESSION, UNSPECIFIED DEPRESSION TYPE: ICD-10-CM

## 2018-02-26 DIAGNOSIS — Z00.00 HEALTH CARE MAINTENANCE: ICD-10-CM

## 2018-02-26 DIAGNOSIS — M48.061 SPINAL STENOSIS OF LUMBAR REGION WITHOUT NEUROGENIC CLAUDICATION: ICD-10-CM

## 2018-02-26 DIAGNOSIS — M51.36 DDD (DEGENERATIVE DISC DISEASE), LUMBAR: ICD-10-CM

## 2018-02-26 DIAGNOSIS — M54.16 LUMBAR RADICULOPATHY: Primary | ICD-10-CM

## 2018-02-26 DIAGNOSIS — M47.816 LUMBAR SPONDYLOSIS: ICD-10-CM

## 2018-02-26 DIAGNOSIS — K21.9 GASTROESOPHAGEAL REFLUX DISEASE, ESOPHAGITIS PRESENCE NOT SPECIFIED: ICD-10-CM

## 2018-02-26 LAB
ALBUMIN SERPL BCP-MCNC: 3.7 G/DL
ALP SERPL-CCNC: 58 U/L
ALT SERPL W/O P-5'-P-CCNC: 20 U/L
ANION GAP SERPL CALC-SCNC: 9 MMOL/L
AST SERPL-CCNC: 24 U/L
BASOPHILS # BLD AUTO: 0.05 K/UL
BASOPHILS NFR BLD: 1.2 %
BILIRUB SERPL-MCNC: 0.6 MG/DL
BUN SERPL-MCNC: 17 MG/DL
CALCIUM SERPL-MCNC: 9.3 MG/DL
CHLORIDE SERPL-SCNC: 108 MMOL/L
CHOLEST SERPL-MCNC: 207 MG/DL
CHOLEST/HDLC SERPL: 3.7 {RATIO}
CO2 SERPL-SCNC: 26 MMOL/L
CREAT SERPL-MCNC: 1 MG/DL
DIFFERENTIAL METHOD: ABNORMAL
EOSINOPHIL # BLD AUTO: 0.1 K/UL
EOSINOPHIL NFR BLD: 2.8 %
ERYTHROCYTE [DISTWIDTH] IN BLOOD BY AUTOMATED COUNT: 12.1 %
EST. GFR  (AFRICAN AMERICAN): >60 ML/MIN/1.73 M^2
EST. GFR  (NON AFRICAN AMERICAN): >60 ML/MIN/1.73 M^2
GLUCOSE SERPL-MCNC: 94 MG/DL
HCT VFR BLD AUTO: 40.7 %
HDLC SERPL-MCNC: 56 MG/DL
HDLC SERPL: 27.1 %
HGB BLD-MCNC: 13.9 G/DL
IMM GRANULOCYTES # BLD AUTO: 0.01 K/UL
IMM GRANULOCYTES NFR BLD AUTO: 0.2 %
LDLC SERPL CALC-MCNC: 133.2 MG/DL
LYMPHOCYTES # BLD AUTO: 1.6 K/UL
LYMPHOCYTES NFR BLD: 37.6 %
MCH RBC QN AUTO: 32 PG
MCHC RBC AUTO-ENTMCNC: 34.2 G/DL
MCV RBC AUTO: 94 FL
MONOCYTES # BLD AUTO: 0.5 K/UL
MONOCYTES NFR BLD: 12.4 %
NEUTROPHILS # BLD AUTO: 2 K/UL
NEUTROPHILS NFR BLD: 45.8 %
NONHDLC SERPL-MCNC: 151 MG/DL
NRBC BLD-RTO: 0 /100 WBC
PLATELET # BLD AUTO: 184 K/UL
PMV BLD AUTO: 9.9 FL
POTASSIUM SERPL-SCNC: 4.1 MMOL/L
PROT SERPL-MCNC: 6.5 G/DL
RBC # BLD AUTO: 4.34 M/UL
SODIUM SERPL-SCNC: 143 MMOL/L
TRIGL SERPL-MCNC: 89 MG/DL
TSH SERPL DL<=0.005 MIU/L-ACNC: 0.83 UIU/ML
WBC # BLD AUTO: 4.28 K/UL

## 2018-02-26 PROCEDURE — 84443 ASSAY THYROID STIM HORMONE: CPT

## 2018-02-26 PROCEDURE — 3008F BODY MASS INDEX DOCD: CPT | Mod: S$GLB,,, | Performed by: PHYSICIAN ASSISTANT

## 2018-02-26 PROCEDURE — 85025 COMPLETE CBC W/AUTO DIFF WBC: CPT

## 2018-02-26 PROCEDURE — 36415 COLL VENOUS BLD VENIPUNCTURE: CPT | Mod: PO

## 2018-02-26 PROCEDURE — 80053 COMPREHEN METABOLIC PANEL: CPT

## 2018-02-26 PROCEDURE — 99999 PR PBB SHADOW E&M-EST. PATIENT-LVL IV: CPT | Mod: PBBFAC,,, | Performed by: PHYSICIAN ASSISTANT

## 2018-02-26 PROCEDURE — 80061 LIPID PANEL: CPT

## 2018-02-26 PROCEDURE — 99213 OFFICE O/P EST LOW 20 MIN: CPT | Mod: S$GLB,,, | Performed by: PHYSICIAN ASSISTANT

## 2018-02-26 RX ORDER — IBUPROFEN 800 MG/1
800 TABLET ORAL 2 TIMES DAILY PRN
Qty: 60 TABLET | Refills: 2 | Status: SHIPPED | OUTPATIENT
Start: 2018-02-26 | End: 2018-05-16 | Stop reason: SDUPTHER

## 2018-02-26 RX ORDER — ALPRAZOLAM 0.5 MG/1
1 TABLET, ORALLY DISINTEGRATING ORAL ONCE AS NEEDED
Status: CANCELLED | OUTPATIENT
Start: 2018-03-20 | End: 2018-03-20

## 2018-02-26 NOTE — PROGRESS NOTES
CHIEF COMPLAINT/REASON FOR VISIT: low back pain    History of present illness: The patient is a 57 year old woman with a history of migraines, carpal tunnel syndrome and low back pain since her early 20s. She returns in follow-up today with increasing low back pain over the past several weeks.  She complains of bilateral low back pain radiating to the bilateral hips and right anterior and lateral thigh.  The pain is worse with sitting and sleeping, improved with movement and medication.  She stopped taking Topamax due to memory problems.  She reports that she took some ibuprofen instead of meloxicam and feels that this helped her more.  She continues to take tramadol with relief.  She is not doing any exercising and has not been in physical therapy for a long time.  She complains of intermittent numbness in her lateral thighs.  She denies weakness, bladder or bowel incontinence.    Pain intervention history: She tried Neurontin for her leg/thigh pain with no success. She only takes Advil with about 30% relief. She is status post TFESI bilaterally to L3 on 12/6/2011 with no relief. Past history of status post L4/5 YARON on 5/25/11 with about 90% relief that lasted 3 weeks. She is status post 2 bilateral L3 transforaminal injections with 3 weeks relief each time. Her current pain medications include Celexa 40 mg once a day, Aleve 220 mg twice a day, Lyrica as previously stated, Zanaflex 5 mg at night. She uses her TENS at home, when she thinks about it.  She is status post L4/5 interlaminar epidural steroid injection on 1/8/14 with 90% relief.  She is status post L5/S1 interlaminar epidural steroid injection on 6/9/14 with moderate relief only on the right low back and right leg lasting 2 weeks.  She is status post bilateral L3/4 and L4/5 facet joint injections on 4/13/15 with initially 100% relief of her back pain and 80% relief of her left lateral hip and lateral thigh pain, now reporting at least 50% relief of both.   She is status post bilateral L2, 3 and 4 medial branch radiofrequency ablation on 5/20/15 with 60% relief.   She is status post bilateral L2, 3 and 4 medial branch radiofrequency ablation on 6/20/16 with 50-70% relief.  She is status post L5/S1 interlaminar epidural steroid injection on 3/10/17 with 50% relief.  She is status post bilateral L2, 3, 4 medial branch radiofrequency ablation on 7/17/17 with about 75% relief.    ROS: She reports back pain only.  Balance of review of systems is negative.    Medical, surgical, family and social history reviewed elsewhere in record.     Medications/Allergies: See med card      Vitals:    02/26/18 0908   BP: 130/70   Pulse: 74   Resp: 18   Temp: 98.6 °F (37 °C)   TempSrc: Oral   Weight: 101.4 kg (223 lb 7 oz)   PainSc:   4   PainLoc: Back        PHYSICAL EXAM:  Gen: A and O x3, pleasant, well-groomed  HEENT: PERRLA  CVS: Regular rate and rhythm, normal S1 and S2, no murmurs.  Resp: Clear to auscultation bilaterally, no wheezes or rales.  Musculoskeletal: Able to heel walk, toe walk. No antalgic gait.     Neuro:   Lower extremities: 5/5 strength bilaterally  Reflexes:  Patellar 2+, Achilles 2+.  Sensory:Intact and symmetrical to light touch and pinprick in L2-S1 dermatomes bilaterally.    Lumbar spine:  Lumbar spine: ROM is moderately limited with flexion, extension and oblique extension with pain radiating to the buttocks and posterior thighs during extension.  Azam's test negative.   Supine straight leg raise is negative bilaterally.  Internal and external rotation of the hip causes no increased pain in either side.  Myofascial exam: Mild tenderness to palpation to the lumbar paraspinous muscles.         IMAGING:   MRI L-SPINE 5/10/11   IMPRESSION: AT L3-4, THERE IS CIRCUMFERENTIAL DISC PROTRUSION FOCALLY MORE PROMINENT TO THE LEFT AS WELL AS A SMALL DISC HERNIATION PROTRUDING INFERIORLY BEHIND L4 TO THE LEFT OF MIDLINE. THIS PRODUCES SPINAL CANAL AND BILATERAL NEURAL  FORAMINAL STENOSIS, LEFT GREATER THAN RIGHT. AT L4-5, THERE IS CIRCUMFERENTIAL DISC PROTRUSION FOCALLY MORE PROMINENT TO THE RIGHT WITH MILD SPINAL CANAL AND RIGHT NEURAL FORAMINAL STENOSIS. AT L1-2 AND L2-3 ALTHOUGH THERE ARE DISC PROTRUSIONS IDENTIFIED, SIGNIFICANT STENOSIS IS NOT SEEN.     MRI lumbar spine 7/10/14  L1-2:There is chronic relatively advanced disc degeneration with disc space narrowing, disc dehydration, disc narrowing, endplate osteophytes, and degenerative vertebral endplate marrow change. There is a diffuse 2-mm posterior disc bulge with a superimposed4-mm right posterior disc extrusion causing mild right foraminal stenosis. There is no impingement of the right L1 nerve root and there has been no change.  L2-3: There is severe and chronic disc degeneration with severe disc space narrowing, disc dehydration, degenerative vertebral endplate marrow change, and vertebral endplate osteophytes. There is a diffuse 2-mm posterior disc bulge with a superimposed 4-mm right posterior disc extrusion causing mild right foraminal stenosis. There is no impingement of the right L2 nerve root and there has been no significant change.  L3-4: There is severe disc degeneration which has progressed since the prior study. There is severe disc space narrowing, disc dehydration, degenerative vertebral endplate marrow change and endplate osteophytes. There is also mild posterior subluxation of L3over a distance of 4 mm. There is a broad-based 5-mm posterior disc extrusion. There is degenerative facet arthrosis with ligamentum flavum thickening. The combination of facet arthrosis and disc extrusion results in relatively severe spinal canal stenosis with compression of the thecal sac to an AP diameter of 5 mm, moderate right foraminal stenosis, and severe left foraminal stenosis. The spinal stenosis has also progressed since the prior study.  L4-5:There is a diffuse posterior disc bulge with a superimposed 3-mm left  posterior paracentral disc protrusion causing left paracentral thecal sac compression. There is moderate left and mild right foraminal stenosis and there has been no significant change. There is mild degenerative facet arthrosis with ligamentum flavum thickening and small joint effusions.  L5-S1:There is no significant compromise of the spinal canal or foramina and there has been no change.    X-ray cervical spine 6/8/15  There is loss of normal cervical lordosis which may be positional or related muscular strain. Intervertebral disk height loss is noted at the C5-C6 C6-C7 levels and to a lesser degree C4-C5 and C7-T1 levels. Vertebral body alignment appears otherwise adequate. Vertebral body heights appear well-preserved. The atlas and odontoid appear in good relationship to each other. Osseous neuroforaminal narrowing is noted at the C3-C4 C4-C5 C5-C6 levels on the left and at the C4-C5 C5-C6 and C6-C7 levels on the right       ASSESSMENT:  The patient is a 57 year old woman with a history of migraines, carpal tunnel syndrome and low back pain since her early 20s who returns in follow up.   1. Lumbar radiculopathy     2. Spinal stenosis of lumbar region without neurogenic claudication  Ambulatory Referral to Physical/Occupational Therapy   3. Lumbar spondylosis  Ambulatory Referral to Physical/Occupational Therapy   4. DDD (degenerative disc disease), lumbar  Ambulatory Referral to Physical/Occupational Therapy       Plan:  1.  I will schedule patient for an L5/S1 interlaminar YARON.  She has done well with this in the past.  2.  I completed orders for physical therapy at [facility].  3.  If she is not having relief with conservative treatment, I will update her lumbar spine MRI and likely have her see neurosurgery for further evaluation.  4.  Dr. Abdalla recently refilled her tramadol and I have discontinued meloxicam and provided a prescription for ibuprofen.  I discussed the risks of this medication with her.  5.   Follow-up in 4 weeks post procedure or sooner as needed.

## 2018-02-27 ENCOUNTER — PATIENT MESSAGE (OUTPATIENT)
Dept: NEUROLOGY | Facility: CLINIC | Age: 58
End: 2018-02-27

## 2018-02-27 RX ORDER — VENLAFAXINE HYDROCHLORIDE 37.5 MG/1
CAPSULE, EXTENDED RELEASE ORAL
Qty: 21 CAPSULE | Refills: 0 | Status: SHIPPED | OUTPATIENT
Start: 2018-02-27 | End: 2018-03-02 | Stop reason: SDUPTHER

## 2018-03-02 ENCOUNTER — PATIENT MESSAGE (OUTPATIENT)
Dept: NEUROLOGY | Facility: CLINIC | Age: 58
End: 2018-03-02

## 2018-03-02 RX ORDER — VENLAFAXINE HYDROCHLORIDE 37.5 MG/1
CAPSULE, EXTENDED RELEASE ORAL
Qty: 90 CAPSULE | Refills: 0 | OUTPATIENT
Start: 2018-03-02

## 2018-03-02 RX ORDER — VENLAFAXINE HYDROCHLORIDE 37.5 MG/1
CAPSULE, EXTENDED RELEASE ORAL
Qty: 30 CAPSULE | Refills: 0 | Status: ON HOLD | OUTPATIENT
Start: 2018-03-02 | End: 2018-07-17 | Stop reason: CLARIF

## 2018-03-19 DIAGNOSIS — M51.36 DDD (DEGENERATIVE DISC DISEASE), LUMBAR: Primary | ICD-10-CM

## 2018-03-20 ENCOUNTER — TELEPHONE (OUTPATIENT)
Dept: SURGERY | Facility: HOSPITAL | Age: 58
End: 2018-03-20

## 2018-03-20 ENCOUNTER — HOSPITAL ENCOUNTER (OUTPATIENT)
Dept: RADIOLOGY | Facility: HOSPITAL | Age: 58
Discharge: HOME | End: 2018-03-20
Attending: ANESTHESIOLOGY | Admitting: ANESTHESIOLOGY
Payer: COMMERCIAL

## 2018-03-20 DIAGNOSIS — M51.36 DDD (DEGENERATIVE DISC DISEASE), LUMBAR: ICD-10-CM

## 2018-03-20 NOTE — TELEPHONE ENCOUNTER
Patient called this morning to say she wasn't feeling well and running low grade fever. She was instructed to call the office when she was feeling better to reschedule procedure.

## 2018-03-27 RX ORDER — PANTOPRAZOLE SODIUM 40 MG/1
TABLET, DELAYED RELEASE ORAL
Qty: 90 TABLET | Refills: 0 | Status: SHIPPED | OUTPATIENT
Start: 2018-03-27 | End: 2018-06-27 | Stop reason: SDUPTHER

## 2018-03-28 ENCOUNTER — TELEPHONE (OUTPATIENT)
Dept: NEUROLOGY | Facility: CLINIC | Age: 58
End: 2018-03-28

## 2018-03-28 DIAGNOSIS — G43.711 INTRACTABLE CHRONIC MIGRAINE WITHOUT AURA AND WITH STATUS MIGRAINOSUS: ICD-10-CM

## 2018-03-28 RX ORDER — NARATRIPTAN 2.5 MG/1
TABLET ORAL
Qty: 10 TABLET | Refills: 11 | Status: SHIPPED | OUTPATIENT
Start: 2018-03-28 | End: 2019-06-21 | Stop reason: SDUPTHER

## 2018-03-28 NOTE — TELEPHONE ENCOUNTER
----- Message from Luis Felipe Haynes sent at 3/28/2018 11:21 AM CDT -----  Contact: Ana Perez called regarding a refill request for rx naratriptan (AMERGE) 2.5 MG tablet (). Please call back at [phone]    [facility] [address] [place] [address] [id number] [place] [address] AT [facility] [address]  [address] [place] [id number]  Phone: [phone] Fax: [phone]

## 2018-04-11 ENCOUNTER — PATIENT MESSAGE (OUTPATIENT)
Dept: INTERNAL MEDICINE | Facility: CLINIC | Age: 58
End: 2018-04-11

## 2018-05-08 ENCOUNTER — OFFICE VISIT (OUTPATIENT)
Dept: NEUROLOGY | Facility: CLINIC | Age: 58
End: 2018-05-08
Payer: COMMERCIAL

## 2018-05-08 VITALS
BODY MASS INDEX: 37.98 KG/M2 | RESPIRATION RATE: 18 BRPM | SYSTOLIC BLOOD PRESSURE: 145 MMHG | DIASTOLIC BLOOD PRESSURE: 83 MMHG | HEART RATE: 98 BPM | WEIGHT: 227.94 LBS | HEIGHT: 65 IN

## 2018-05-08 DIAGNOSIS — G43.719 INTRACTABLE CHRONIC MIGRAINE WITHOUT AURA AND WITHOUT STATUS MIGRAINOSUS: Primary | ICD-10-CM

## 2018-05-08 PROCEDURE — 99214 OFFICE O/P EST MOD 30 MIN: CPT | Mod: S$GLB,,, | Performed by: PSYCHIATRY & NEUROLOGY

## 2018-05-08 PROCEDURE — 99999 PR PBB SHADOW E&M-EST. PATIENT-LVL IV: CPT | Mod: PBBFAC,,, | Performed by: PSYCHIATRY & NEUROLOGY

## 2018-05-08 PROCEDURE — 3008F BODY MASS INDEX DOCD: CPT | Mod: CPTII,S$GLB,, | Performed by: PSYCHIATRY & NEUROLOGY

## 2018-05-08 NOTE — PATIENT INSTRUCTIONS
WORKUP:  -- none     PREVENTION (use daily regardless of headache):  -- continue Effexor XR 150mg daily for 4 more weeks, if truly no effect positively then we will quickly wean off   -- seek authorization to begin Botox for chronic migraine - return in ~2 weeks to have that done   -- keep headache diary     ACUTE TREATMENT:  -- may continue to use imitrex for shorter headaches  -- for those long flares, as soon as you know what is happening, take the amerge (naratriptan) twice per day x 4 days whether you have pain or not to try to get ahead of it   -- can't use imitrex and naratriptan same day

## 2018-05-08 NOTE — PROGRESS NOTES
Date of service: 5/8/2018  Referring provider: No ref. provider found    Subjective:      Chief complaint: Migraine       Patient ID: Elizabeth Giordano is a 57 y.o. lady with carpal tunnel syndrome, low back pain secondary to lumbar spondylosis and DDD, lumbar radiculopathy followed by Dr. Abdalla, factor V leiden mutation, GERD and migraines here for follow up of migraines.     History of Present Illness    INTERVAL HISTORY - 5/8/18     At last visit we covered topiramate, which was working to reduce headaches by 50%, to zonisamide due to significant memory problems.The memory problems DID improve. The zonisamide caused itchiness so 6 weeks ago we converted to Effexor. She is now having MORE headaches - 3-4 per week. The as needed medications are working well.     INTERVAL HISTORY - 1/30/18     Last visit 3 months ago we planned on continuing topiramate titration and sumatriptan for acute.    Today she reports the current regimen seems to be working - her regular baseline headache went down from 12-15 per month to about half that, and had one long flare since last seen - used naratriptan once came back, used sumatriptan once, came back . Her current pain is 0, with a range of 0 to 10. The pain is unchanged in location.    She has been having significant memory problems since being on topiramate.     INTERVAL HISTORY - 10/25/17     Last seen 2 months ago at which point I raised topiramate and started naratriptan bridges for long migraines. Today she reports she is about the same. However, last week she had a good week and had no headaches.The headaches have the same characteristics are before. Her current pain score is 5. Her range is 0-10. She can not predict when she will have severe migraines so naratriptan bridge was taken at terminal end (day 4 of flare), not at the start, and did not have the intended effect. She has tingling in all toes (topamax) but worse on right and gets injections in her foot for  this.    HEADACHE HISTORY - 8/9/17   Age at onset and course over time: migraines began in her 30s, she reports going through menopause at age 40 (unclear why) and she thinks this may have started it. One day just became more sensitive to sensory stimuli especially smells. Had severe migraines at least one time per week. Started Celexa for this purpose which helped her sensitivity to smells. Overall thinks her headache number has stayed the same over the years but pain has become more severe and she is yet again more sensitive to smells, thinks medications aren't working as well.   Location: right side behind eye (baseline headache and migraine)   Quality: throbbing   Severity: current 2/10, at worst 10/10   Duration: exacerbations last 5-6 days   Frequency: baseline headache about 12-15 days per month; every other month will have a flare lasting 5-6 days   Alleviated by: sleep, ice, medication   Exacerbated by: light, noise, smells, coughing   Associated with: photo/noise, phobia, osmophobia. No aura. + right eye tearing. No ptosis, no redness. No eye irritation. No ear fullness.   Sleep habits:  Caffeine intake: 1     She is on daily HRT for menopause.     Current acute treatment - migraines treated 4 times per month   -- meloxicam 15mg for  Arthritis in right thumb daily   -- sumatriptan 100mg at onset of headache, gets recurrent headache next day, (zomig works better but not covered by her insurance)  -- amerge - works OK but sumatriptan is cheaper   -- tizanidine 2mg nightly - for migraines, daily   -- tramadol 50mg     Current prevention:  -- Effexor XR 150mg in the morning - started 2/27/18 - headaches are worse     Previously tried/failed acute treatment:  -- hydrocodone: allergy  -- oxycodone: allergy   -- zomig   -- relpax   -- naproxen  -- tramadol   -- fiorinal with codeine   -- Goody's   -- Excedrin   -- tylenol  -- ASA    Previously tried/failed preventative treatment:  -- amitriptyline (for back pain,  but migraines were no better)  -- gabapentin - for back pain, didn't help   -- lyrica - memory problems   -- topiramate 100mg BID Tingling in toes - helping with migraines but having significant memory problems   -- zonisamide - itchy   (celexa 40mg)     Hx multiple lumbar ESIs and LMB RFAs for low back pain/radiculopathy       Review of patient's allergies indicates:   Allergen Reactions    Hydrocodone Hives and Nausea Only           Sulfa (sulfonamide antibiotics) Hives and Rash           Oxycodone Itching and Nausea And Vomiting     Current Outpatient Prescriptions   Medication Sig Dispense Refill    azelastine (ASTELIN) 137 mcg (0.1 %) nasal spray 1 spray (137 mcg total) by Nasal route 2 (two) times daily. 30 mL 3    diphenhydrAMINE (BENADRYL) 25 mg capsule Take 25 mg by mouth every 6 (six) hours as needed.      EVAMIST 1.53 mg/spray (1.7%) transdermal spray       ibuprofen (ADVIL,MOTRIN) 800 MG tablet Take 1 tablet (800 mg total) by mouth 2 (two) times daily as needed for Pain. 60 tablet 2    naratriptan (AMERGE) 2.5 MG tablet 2.5 mg PO BID x 5 days at onset of migraine flare 10 tablet 11    pantoprazole (PROTONIX) 40 MG tablet TAKE 1 TABLET BY MOUTH EVERY DAY 90 tablet 0    sumatriptan (IMITREX) 100 MG tablet May take one tab, then followup with another tab in 2 hours if headache not resolved. 9 tablet 3    tiZANidine (ZANAFLEX) 2 MG tablet Take 1 tablet (2 mg total) by mouth every evening. 90 tablet 3    traMADol (ULTRAM) 50 mg tablet TAKE 1 TABLET BY MOUTH TWICE DAILY AS NEEDED FOR PAIN 60 tablet 2    venlafaxine (EFFEXOR-XR) 150 MG Cp24 Take 1 capsule (150 mg total) by mouth once daily. 30 capsule 3    venlafaxine (EFFEXOR-XR) 37.5 MG 24 hr capsule 1 tab PO in AM x 1 week, then 2 tab PO in AM x 1 week 21 capsule 0    venlafaxine (EFFEXOR-XR) 37.5 MG 24 hr capsule 1 tab PO daily x 30 days then change to 150mg strength 30 capsule 0    zonisamide (ZONEGRAN) 100 MG Cap Take 3 capsules (300 mg  total) by mouth every evening. 90 capsule 11     No current facility-administered medications for this visit.        Past Medical History  Past Medical History:   Diagnosis Date    Allergy     Arthritis of spine 2011    Chronic low back pain     DDD (degenerative disc disease), lumbar     Diverticulitis     Diverticulosis     DJD (degenerative joint disease)     Encounter for blood transfusion     Factor V Leiden     Former smoker     GERD (gastroesophageal reflux disease)     Hiatal hernia     Migraines     PONV (postoperative nausea and vomiting)     geta       Past Surgical History  Past Surgical History:   Procedure Laterality Date    APPENDECTOMY      APPENDECTOMY  1981    CARPAL TUNNEL RELEASE  2011    right    COLONOSCOPY  2014    reduntant colon, otherwise normal findings repeat in 8-10 years for surveillance    Epidural Steroid Injection      Pain Management    ESOPHAGOGASTRODUODENOSCOPY      Facet injection      Pain Management    HYSTERECTOMY      ovaries removed    KIDNEY SURGERY Right 1967    to correct urinary reflux into kidney    OVARIAN CYST REMOVAL Right 1981    SHOULDER SURGERY  2010    rotator cuff, right       Family History  Family History   Problem Relation Age of Onset    Heart attack Mother     Heart disease Mother     COPD Mother     Hypertension Mother     Hepatitis Sister     COPD Father     No Known Problems Daughter     Allergic rhinitis Neg Hx     Angioedema Neg Hx     Asthma Neg Hx     Atopy Neg Hx     Eczema Neg Hx     Immunodeficiency Neg Hx     Urticaria Neg Hx     Colon cancer Neg Hx     Colon polyps Neg Hx        Social History  Social History     Social History    Marital status:      Spouse name: N/A    Number of children: 1    Years of education: N/A     Occupational History     [employer]     Social History Main Topics    Smoking status: Former Smoker     Packs/day: 1.00     Years: 5.00     Start date: 6/4/1992      Quit date: 1/5/1997    Smokeless tobacco: Never Used    Alcohol use No      Comment: occasionally    Drug use: No    Sexual activity: Yes     Partners: Male     Other Topics Concern    Not on file     Social History Narrative    ** Merged History Encounter **             Review of Systems  Constitutional: no fever, no chills  Eyes: no change to vision, no redness, no tearing  Ears, nose, mouth, throat: no hearing loss, no tinnitus, no rhinorrhea, no difficulty swallowing   Cardiovascular: no palpitations  Respiratory: no shortness of breath  Gastrointestinal: no diarrhea, no constipation  Musculoskeletal: + joint pain  Skin: no rashes  Neurologic: + numbness, + weakness, no change to speech, loss of coordination   Endocrine: no heat or cold intolerance     Objective:        Vitals:    05/08/18 1453   BP: (!) 145/83   Pulse: 98   Resp: 18     Body mass index is 37.93 kg/m².    General: Well developed, well nourished.  No acute distress.  HEENT: Atraumatic, normocephalic.  Neck: Trachea midline  Cardiovascular: Vitals reviewed. Normal peripheral perfusion.   Pulmonary: No increased work of breathing.  Abdomen/GI: No guarding  Musculoskeletal: No obvious joint deformities, moves all extremities symmetrically and well.     Neurological exam:  Mental status: Awake and alert. Oriented to situation.  Speech fluent and appropriate. Recent and remote memory appear to be intact.  Fund of knowledge normal.  Cranial nerves: Pupils equal round, extraocular movements intact, facial strength intact bilaterally, hearing grossly intact bilaterally.  Gait: Normal       Data Review:     No results found for this or any previous visit.    Lab Results   Component Value Date     02/26/2018    K 4.1 02/26/2018     02/26/2018    CO2 26 02/26/2018    BUN 17 02/26/2018    CREATININE 1.0 02/26/2018    GLU 94 02/26/2018    AST 24 02/26/2018    ALT 20 02/26/2018    ALBUMIN 3.7 02/26/2018    PROT 6.5 02/26/2018    BILITOT  0.6 02/26/2018    CHOL 207 (H) 02/26/2018    HDL 56 02/26/2018    LDLCALC 133.2 02/26/2018    TRIG 89 02/26/2018       Lab Results   Component Value Date    WBC 4.28 02/26/2018    HGB 13.9 02/26/2018    HCT 40.7 02/26/2018    MCV 94 02/26/2018     02/26/2018       Lab Results   Component Value Date    TSH 0.827 02/26/2018       Assessment & Plan:       Problem List Items Addressed This Visit        Neuro    Intractable chronic migraine without aura - Primary    Overview     Migraines of many years, triggered by menopause. Normal neuro exam. Excellent response to full dose topiramate but not tolerating due to memory impairment. Converted over to zonisamide to improve tolerance but developed rash to this. Therefore, converted citalpram to Effexor but as of 6 weeks on full dose headaches are only worse. Will give Effexor another 4 week trial and prepare to begin Botox.     The patient has chronic migraines (G43.719) and suffers from headaches more than 15 days a month lasting more than 4 hours a day with no relief of symptoms despite trying multiple medications including but not limited to anti-epileptics (topiramate, gabapentin, lyrica, zonisamide), and antidepressants (amitriptyline, venflaxine). Botox treatment was approved for chronic migraines in October 2010. The patient will be an ideal candidate for Botox. We are planning for 3 treatments 3 months apart and aiming for at least 50% improvement in the symptoms. If we see no improvement after 3 treatments, we will discontinue the injections.           Relevant Orders    Prior Authorization Order                WORKUP:  -- none     PREVENTION (use daily regardless of headache):  -- continue Effexor XR 150mg daily for 4 more weeks, if truly no effect positively then we will quickly wean off   -- seek authorization to begin Botox for chronic migraine - return in ~2 weeks to have that done   -- keep headache diary     ACUTE TREATMENT:  -- may continue to use  imitrex for shorter headaches  -- for those long flares, as soon as you know what is happening, take the amerge (naratriptan) twice per day x 4 days whether you have pain or not to try to get ahead of it   -- can't use imitrex and naratriptan same day       Follow-up in about 2 weeks (around 5/22/2018), or Botox #1 .    Neva Cortes MD

## 2018-05-16 RX ORDER — IBUPROFEN 800 MG/1
TABLET ORAL
Qty: 60 TABLET | Refills: 0 | Status: SHIPPED | OUTPATIENT
Start: 2018-05-16 | End: 2018-06-12 | Stop reason: SDUPTHER

## 2018-05-18 RX ORDER — TRAMADOL HYDROCHLORIDE 50 MG/1
TABLET ORAL
Qty: 60 TABLET | Refills: 0 | Status: SHIPPED | OUTPATIENT
Start: 2018-05-18 | End: 2018-06-04 | Stop reason: SDUPTHER

## 2018-05-24 ENCOUNTER — PROCEDURE VISIT (OUTPATIENT)
Dept: NEUROLOGY | Facility: CLINIC | Age: 58
End: 2018-05-24
Payer: COMMERCIAL

## 2018-05-24 VITALS
DIASTOLIC BLOOD PRESSURE: 87 MMHG | SYSTOLIC BLOOD PRESSURE: 154 MMHG | HEIGHT: 65 IN | BODY MASS INDEX: 38.38 KG/M2 | RESPIRATION RATE: 16 BRPM | WEIGHT: 230.38 LBS

## 2018-05-24 DIAGNOSIS — G43.719 INTRACTABLE CHRONIC MIGRAINE WITHOUT AURA AND WITHOUT STATUS MIGRAINOSUS: Primary | ICD-10-CM

## 2018-05-24 PROCEDURE — 64615 CHEMODENERV MUSC MIGRAINE: CPT | Mod: S$GLB,,, | Performed by: PSYCHIATRY & NEUROLOGY

## 2018-05-24 NOTE — PROCEDURES
Procedures     PROCEDURE PERFORMED: Botulinum toxin injection (92914)    CLINICAL INDICATION: G43.719    A time out was conducted just before the start of the procedure to verify the correct patient and procedure, procedure location, and all relevant critical information.     Conventional methods of treatment such as multiple medications, both on and   off label have been tried including:    anti-epileptics (topiramate, gabapentin, lyrica, zonisamide), and antidepressants (amitriptyline, venflaxine)    The patient has been unresponsive and refractory.The patient meets criteria for chronic headaches according to the ICHD-II, the patient has more than 15 headaches a month which last for more than 4 hours a day.    This is the first Botox injections and I am aiming for at least 50%  improvement in the patient's symptoms. Frequency of treatment is every 3 months unless no response to the treatments, at which time we will discontinue the injections.     DESCRIPTION OF PROCEDURE: After obtaining informed consent and under   aseptic technique, a total of 155 units of botulinum toxin type A were   injected in the following muscles: Procerus 5 units,  5 units   bilaterally, frontalis 20 units, temporalis 20 units bilaterally,   occipitalis 15 units, upper cervical paraspinals 10 units bilaterally and trapezius 15 units bilaterally. The patient was given a total of 155 units in 31 sites.The patient tolerated the procedure well. There were no complications. The patient was given a prescription for repeat treatment in 3 months.     Unavoidable waste 45 units    Neva Cortes MD

## 2018-06-04 ENCOUNTER — OFFICE VISIT (OUTPATIENT)
Dept: PAIN MEDICINE | Facility: CLINIC | Age: 58
End: 2018-06-04
Payer: COMMERCIAL

## 2018-06-04 VITALS
BODY MASS INDEX: 37.88 KG/M2 | OXYGEN SATURATION: 96 % | RESPIRATION RATE: 20 BRPM | TEMPERATURE: 98 F | DIASTOLIC BLOOD PRESSURE: 87 MMHG | WEIGHT: 227.63 LBS | SYSTOLIC BLOOD PRESSURE: 152 MMHG | HEART RATE: 90 BPM

## 2018-06-04 DIAGNOSIS — M48.061 SPINAL STENOSIS OF LUMBAR REGION WITHOUT NEUROGENIC CLAUDICATION: Primary | ICD-10-CM

## 2018-06-04 DIAGNOSIS — M51.36 DDD (DEGENERATIVE DISC DISEASE), LUMBAR: ICD-10-CM

## 2018-06-04 DIAGNOSIS — M47.816 LUMBAR SPONDYLOSIS: ICD-10-CM

## 2018-06-04 PROCEDURE — 3008F BODY MASS INDEX DOCD: CPT | Mod: CPTII,S$GLB,, | Performed by: PHYSICIAN ASSISTANT

## 2018-06-04 PROCEDURE — 99999 PR PBB SHADOW E&M-EST. PATIENT-LVL V: CPT | Mod: PBBFAC,,, | Performed by: PHYSICIAN ASSISTANT

## 2018-06-04 PROCEDURE — 99213 OFFICE O/P EST LOW 20 MIN: CPT | Mod: S$GLB,,, | Performed by: PHYSICIAN ASSISTANT

## 2018-06-04 RX ORDER — TRAMADOL HYDROCHLORIDE 50 MG/1
50 TABLET ORAL 2 TIMES DAILY PRN
Qty: 60 TABLET | Refills: 2 | Status: SHIPPED | OUTPATIENT
Start: 2018-06-17 | End: 2018-09-08 | Stop reason: SDUPTHER

## 2018-06-04 RX ORDER — SODIUM CHLORIDE, SODIUM LACTATE, POTASSIUM CHLORIDE, CALCIUM CHLORIDE 600; 310; 30; 20 MG/100ML; MG/100ML; MG/100ML; MG/100ML
INJECTION, SOLUTION INTRAVENOUS CONTINUOUS
Status: CANCELLED | OUTPATIENT
Start: 2018-07-17

## 2018-06-04 NOTE — PROGRESS NOTES
CHIEF COMPLAINT/REASON FOR VISIT: low back pain    History of present illness: The patient is a 57 year old woman with a history of migraines, carpal tunnel syndrome and low back pain since her early 20s. She returns in follow-up today with worsening low back pain.  She describes aching bilateral low back pain radiating to the lateral hips.  This is much worse at night.  She has improvement with NSAIDs, tramadol and tizanidine.  She canceled her injection because she was feeling ill the morning of and she also did not go to physical therapy.  She reports numbness in her lateral thighs and intermittent weakness in her legs.  She denies bladder or bowel incontinence.    Pain intervention history: She tried Neurontin for her leg/thigh pain with no success. She only takes Advil with about 30% relief. She is status post TFESI bilaterally to L3 on 12/6/2011 with no relief. Past history of status post L4/5 YARON on 5/25/11 with about 90% relief that lasted 3 weeks. She is status post 2 bilateral L3 transforaminal injections with 3 weeks relief each time. Her current pain medications include Celexa 40 mg once a day, Aleve 220 mg twice a day, Lyrica as previously stated, Zanaflex 5 mg at night. She uses her TENS at home, when she thinks about it.  She is status post L4/5 interlaminar epidural steroid injection on 1/8/14 with 90% relief.  She is status post L5/S1 interlaminar epidural steroid injection on 6/9/14 with moderate relief only on the right low back and right leg lasting 2 weeks.  She is status post bilateral L3/4 and L4/5 facet joint injections on 4/13/15 with initially 100% relief of her back pain and 80% relief of her left lateral hip and lateral thigh pain, now reporting at least 50% relief of both.  She is status post bilateral L2, 3 and 4 medial branch radiofrequency ablation on 5/20/15 with 60% relief.   She is status post bilateral L2, 3 and 4 medial branch radiofrequency ablation on 6/20/16 with 50-70%  relief.  She is status post L5/S1 interlaminar epidural steroid injection on 3/10/17 with 50% relief.  She is status post bilateral L2, 3, 4 medial branch radiofrequency ablation on 7/17/17 with about 75% relief.    ROS: She reports back pain only.  Balance of review of systems is negative.    Medical, surgical, family and social history reviewed elsewhere in record.     Medications/Allergies: See med card      Vitals:    06/04/18 0831   BP: (!) 152/87   Pulse: 90   Resp: 20   Temp: 97.6 °F (36.4 °C)   TempSrc: Oral   SpO2: 96%   Weight: 103.3 kg (227 lb 10 oz)   PainSc:   2   PainLoc: Back        PHYSICAL EXAM:  Gen: A and O x3, pleasant, well-groomed  HEENT: PERRLA  CVS: Regular rate and rhythm, normal S1 and S2, no murmurs.  Resp: Clear to auscultation bilaterally, no wheezes or rales.  Musculoskeletal: Able to heel walk, toe walk. No antalgic gait.     Neuro:   Lower extremities: 5/5 strength bilaterally  Reflexes:  Patellar 2+, Achilles 2+.  Sensory:Intact and symmetrical to light touch and pinprick in L2-S1 dermatomes bilaterally.    Lumbar spine:  Lumbar spine: ROM is moderately limited with flexion, extension and oblique extension with increased low back pain during extension and relief of her pain during flexion.  Azam's test negative.   Supine straight leg raise is negative bilaterally.  Internal and external rotation of the hip causes no increased pain in either side.  Myofascial exam: Mild tenderness to palpation to the lumbar paraspinous muscles.         IMAGING:   MRI L-SPINE 5/10/11   IMPRESSION: AT L3-4, THERE IS CIRCUMFERENTIAL DISC PROTRUSION FOCALLY MORE PROMINENT TO THE LEFT AS WELL AS A SMALL DISC HERNIATION PROTRUDING INFERIORLY BEHIND L4 TO THE LEFT OF MIDLINE. THIS PRODUCES SPINAL CANAL AND BILATERAL NEURAL FORAMINAL STENOSIS, LEFT GREATER THAN RIGHT. AT L4-5, THERE IS CIRCUMFERENTIAL DISC PROTRUSION FOCALLY MORE PROMINENT TO THE RIGHT WITH MILD SPINAL CANAL AND RIGHT NEURAL FORAMINAL  STENOSIS. AT L1-2 AND L2-3 ALTHOUGH THERE ARE DISC PROTRUSIONS IDENTIFIED, SIGNIFICANT STENOSIS IS NOT SEEN.     MRI lumbar spine 7/10/14  L1-2:There is chronic relatively advanced disc degeneration with disc space narrowing, disc dehydration, disc narrowing, endplate osteophytes, and degenerative vertebral endplate marrow change. There is a diffuse 2-mm posterior disc bulge with a superimposed4-mm right posterior disc extrusion causing mild right foraminal stenosis. There is no impingement of the right L1 nerve root and there has been no change.  L2-3: There is severe and chronic disc degeneration with severe disc space narrowing, disc dehydration, degenerative vertebral endplate marrow change, and vertebral endplate osteophytes. There is a diffuse 2-mm posterior disc bulge with a superimposed 4-mm right posterior disc extrusion causing mild right foraminal stenosis. There is no impingement of the right L2 nerve root and there has been no significant change.  L3-4: There is severe disc degeneration which has progressed since the prior study. There is severe disc space narrowing, disc dehydration, degenerative vertebral endplate marrow change and endplate osteophytes. There is also mild posterior subluxation of L3over a distance of 4 mm. There is a broad-based 5-mm posterior disc extrusion. There is degenerative facet arthrosis with ligamentum flavum thickening. The combination of facet arthrosis and disc extrusion results in relatively severe spinal canal stenosis with compression of the thecal sac to an AP diameter of 5 mm, moderate right foraminal stenosis, and severe left foraminal stenosis. The spinal stenosis has also progressed since the prior study.  L4-5:There is a diffuse posterior disc bulge with a superimposed 3-mm left posterior paracentral disc protrusion causing left paracentral thecal sac compression. There is moderate left and mild right foraminal stenosis and there has been no significant change.  There is mild degenerative facet arthrosis with ligamentum flavum thickening and small joint effusions.  L5-S1:There is no significant compromise of the spinal canal or foramina and there has been no change.    X-ray cervical spine 6/8/15  There is loss of normal cervical lordosis which may be positional or related muscular strain. Intervertebral disk height loss is noted at the C5-C6 C6-C7 levels and to a lesser degree C4-C5 and C7-T1 levels. Vertebral body alignment appears otherwise adequate. Vertebral body heights appear well-preserved. The atlas and odontoid appear in good relationship to each other. Osseous neuroforaminal narrowing is noted at the C3-C4 C4-C5 C5-C6 levels on the left and at the C4-C5 C5-C6 and C6-C7 levels on the right       ASSESSMENT:  The patient is a 57 year old woman with a history of migraines, carpal tunnel syndrome and low back pain since her early 20s who returns in follow up.   1. Spinal stenosis of lumbar region without neurogenic claudication  Ambulatory Referral to Physical/Occupational Therapy   2. Lumbar spondylosis  Ambulatory Referral to Physical/Occupational Therapy   3. DDD (degenerative disc disease), lumbar  Ambulatory Referral to Physical/Occupational Therapy       Plan:  1.  I will set the patient up to repeat bilateral L2, 3, 4 medial branch radio frequency ablation.  She has done well with this in the past.  2.  I completed orders for physical therapy at [facility].  3.  Dr. Abdalla provided prescriptions for tramadol 50 mg twice a day as needed for pain.  She continues to take tizanidine nightly.  She has been taking meloxicam on some days and ibuprofen on other days.  We discussed the risks of these medications and not to mix the 2 NSAIDs.  4.  Follow-up in 4 weeks post procedure or sooner as needed.

## 2018-06-17 DIAGNOSIS — G43.909 MIGRAINE WITHOUT STATUS MIGRAINOSUS, NOT INTRACTABLE, UNSPECIFIED MIGRAINE TYPE: ICD-10-CM

## 2018-06-18 RX ORDER — SUMATRIPTAN SUCCINATE 100 MG/1
TABLET ORAL
Qty: 9 TABLET | Refills: 11 | Status: SHIPPED | OUTPATIENT
Start: 2018-06-18 | End: 2020-03-04

## 2018-06-19 RX ORDER — IBUPROFEN 800 MG/1
TABLET ORAL
Qty: 60 TABLET | Refills: 0 | Status: ON HOLD | OUTPATIENT
Start: 2018-06-19 | End: 2018-07-17 | Stop reason: SDUPTHER

## 2018-06-27 RX ORDER — PANTOPRAZOLE SODIUM 40 MG/1
40 TABLET, DELAYED RELEASE ORAL DAILY
Qty: 90 TABLET | Refills: 1 | Status: SHIPPED | OUTPATIENT
Start: 2018-06-27 | End: 2018-08-21 | Stop reason: SDUPTHER

## 2018-07-02 ENCOUNTER — OFFICE VISIT (OUTPATIENT)
Dept: ORTHOPEDICS | Facility: CLINIC | Age: 58
End: 2018-07-02
Payer: COMMERCIAL

## 2018-07-02 VITALS — HEIGHT: 65 IN | BODY MASS INDEX: 37.82 KG/M2 | WEIGHT: 227 LBS

## 2018-07-02 DIAGNOSIS — M75.52 SUBACROMIAL BURSITIS OF LEFT SHOULDER JOINT: Primary | ICD-10-CM

## 2018-07-02 PROCEDURE — 20605 DRAIN/INJ JOINT/BURSA W/O US: CPT | Mod: LT,S$GLB,, | Performed by: NURSE PRACTITIONER

## 2018-07-02 PROCEDURE — 99213 OFFICE O/P EST LOW 20 MIN: CPT | Mod: 25,S$GLB,, | Performed by: NURSE PRACTITIONER

## 2018-07-02 PROCEDURE — 99999 PR PBB SHADOW E&M-EST. PATIENT-LVL II: CPT | Mod: PBBFAC,,, | Performed by: NURSE PRACTITIONER

## 2018-07-02 RX ORDER — TRIAMCINOLONE ACETONIDE 40 MG/ML
40 INJECTION, SUSPENSION INTRA-ARTICULAR; INTRAMUSCULAR
Status: DISCONTINUED | OUTPATIENT
Start: 2018-07-02 | End: 2018-07-02 | Stop reason: HOSPADM

## 2018-07-02 RX ADMIN — TRIAMCINOLONE ACETONIDE 40 MG: 40 INJECTION, SUSPENSION INTRA-ARTICULAR; INTRAMUSCULAR at 01:07

## 2018-07-02 NOTE — PROGRESS NOTES
DATE: 7/2/2018  PATIENT: Elizabeth Giordano  REFERRING MD:   CHIEF COMPLAINT:   Chief Complaint   Patient presents with    Left Shoulder - Pain       HISTORY:  Elizabeth Giordano is a 57 y.o. female  who presents for initial evaluation of her left shoulder pain, she is right hand dominant.  She is a new patient to me but has been treated in the past by Dr Skaggs.  She is diagnosed with right shoulder subacromial impingement and bursitis.  She reports she last had an injection in November and the relief lasted about 4-5 months.  She presents today complaining of pain 8/10 and is requesting a repeat injection.  She complains the pain is worse when she is sleeping.  She takes meloxicam for her arthritic hands but has no relief in her shoulder.    PAST MEDICAL/SURGICAL HISTORY:  Past Medical History:   Diagnosis Date    Allergy     Arthritis of spine 2011    Chronic low back pain     DDD (degenerative disc disease), lumbar     Diverticulitis     Diverticulosis     DJD (degenerative joint disease)     Encounter for blood transfusion     Factor V Leiden     Former smoker     GERD (gastroesophageal reflux disease)     Hiatal hernia     Migraines     PONV (postoperative nausea and vomiting)     geta     Past Surgical History:   Procedure Laterality Date    APPENDECTOMY      APPENDECTOMY  1981    CARPAL TUNNEL RELEASE  2011    right    COLONOSCOPY  2014    reduntant colon, otherwise normal findings repeat in 8-10 years for surveillance    Epidural Steroid Injection      Pain Management    ESOPHAGOGASTRODUODENOSCOPY      Facet injection      Pain Management    HYSTERECTOMY      ovaries removed    KIDNEY SURGERY Right 1967    to correct urinary reflux into kidney    OVARIAN CYST REMOVAL Right 1981    SHOULDER SURGERY  2010    rotator cuff, right       Current Medications:   Current Outpatient Prescriptions:     azelastine (ASTELIN) 137 mcg (0.1 %) nasal spray, 1 spray (137 mcg total)  by Nasal route 2 (two) times daily., Disp: 30 mL, Rfl: 3    diphenhydrAMINE (BENADRYL) 25 mg capsule, Take 25 mg by mouth every 6 (six) hours as needed., Disp: , Rfl:     EVAMIST 1.53 mg/spray (1.7%) transdermal spray, , Disp: , Rfl:     ibuprofen (ADVIL,MOTRIN) 800 MG tablet, TAKE 1 TABLET(800 MG) BY MOUTH TWICE DAILY AS NEEDED FOR PAIN, Disp: 60 tablet, Rfl: 0    naratriptan (AMERGE) 2.5 MG tablet, 2.5 mg PO BID x 5 days at onset of migraine flare, Disp: 10 tablet, Rfl: 11    pantoprazole (PROTONIX) 40 MG tablet, Take 1 tablet (40 mg total) by mouth once daily., Disp: 90 tablet, Rfl: 1    sumatriptan (IMITREX) 100 MG tablet, MAY TAKE 1 TABLET THEN FOLLOWUP WITH ANOTHER TABLET IN 2 HOURS IF HEADACHE NOT RESOLVED, Disp: 9 tablet, Rfl: 11    tiZANidine (ZANAFLEX) 2 MG tablet, Take 1 tablet (2 mg total) by mouth every evening., Disp: 90 tablet, Rfl: 3    traMADol (ULTRAM) 50 mg tablet, Take 1 tablet (50 mg total) by mouth 2 (two) times daily as needed., Disp: 60 tablet, Rfl: 2    venlafaxine (EFFEXOR-XR) 150 MG Cp24, Take 1 capsule (150 mg total) by mouth once daily., Disp: 30 capsule, Rfl: 3    venlafaxine (EFFEXOR-XR) 37.5 MG 24 hr capsule, 1 tab PO in AM x 1 week, then 2 tab PO in AM x 1 week, Disp: 21 capsule, Rfl: 0    venlafaxine (EFFEXOR-XR) 37.5 MG 24 hr capsule, 1 tab PO daily x 30 days then change to 150mg strength, Disp: 30 capsule, Rfl: 0    zonisamide (ZONEGRAN) 100 MG Cap, Take 3 capsules (300 mg total) by mouth every evening., Disp: 90 capsule, Rfl: 11    Current Facility-Administered Medications:     onabotulinumtoxina injection 200 Units, 200 Units, Intramuscular, Q90 Days, Neva Cortes MD, 200 Units at 05/24/18 1539    Family History: family history was reviewed and is noncontributory  Social History:   Social History     Social History    Marital status:      Spouse name: N/A    Number of children: 1    Years of education: N/A     Occupational History     [employer]     Social History Main Topics    Smoking status: Former Smoker     Packs/day: 1.00     Years: 5.00     Start date: 6/4/1992     Quit date: 1/5/1997    Smokeless tobacco: Never Used    Alcohol use No      Comment: occasionally    Drug use: No    Sexual activity: Yes     Partners: Male     Other Topics Concern    Not on file     Social History Narrative    ** Merged History Encounter **            ROS:  Constitution: Negative for chills, fever, and sweats. Negative for unexplained weight loss.  HENT: Negative for headaches and blurry vision.   Cardiovascular: Negative for chest pain, irregular heartbeat, leg swelling and palpitations.   Respiratory: Negative for cough and shortness of breath.   Gastrointestinal: Negative for abdominal pain, heartburn, nausea and vomiting.   Genitourinary: Negative for bladder incontinence and dysuria.   Musculoskeletal: Negative for systemic arthritis, joint swelling, muscle weakness and myalgias.   Neurological: Negative for numbness.   Psychiatric/Behavioral: Negative for depression.   Endocrine: Negative for polyuria.   Hematologic/Lymphatic: Negative for bleeding disorders.  Skin: Negative for poor wound healing.       PHYSICAL EXAM:  Ht 5' 5" (1.651 m)   Wt 103 kg (227 lb)   BMI 37.77 kg/m²   Elizabeth Giordano is a well developed, well nourished female in no acute distress. Physical examination of the left shoulder evaluated the following:    Inspection, palpation and ROM of the cervical spine  Disc compression testing bilaterally  Inspection for swelling, ecchymosis, erythema, deformity and atrophy  Tenderness to palpation of the soft tissue and bony structures  Active and passive range of motion  Sensation of the shoulder and upper extremity  Motor strength in the deltoid, supraspinatus, internal rotators and external rotators  Impingement, apprehension, relocation and Speed's tests  Upper extremity vascular exam (skin temp,color, capillary " refill)  Inspection for pseudomotor signs    Remarkable findings included:  No edema or deformity  nontender to palpation of bony prominences  ROM full with pain  Strength 5/5 with resistance forward flexion and external/internal rotation  Sensation intact  Skin warm, dry, intact    IMAGING:   X-ray obtained Left shoulder performed 11/07/17 personally reviewed with patient. Radiologist report as follows:    4 views left shoulder demonstrate mild acromioclavicular degenerative hypertrophy.    ASSESSMENT:   Left shoulder pain, subacromial impingement, acromioclavicular osteoarthritis    PLAN:  The nature of the diagnosis, using models and diagrams when appropriate, was explained to the patient in detail.  Treatment option discussed included non-operative measures of rest, modification of activities, application of ice, over the counter pain/antiinflammatory relief, physical therapy, cortisone injection, or medrol dose pack.  More aggressive treatment options include MRI and arthroscopy.  All questions answered and the patient wishes to proceed with a cortisone injection today (see procedure documentation) as she is going camping this week and would like relief of pain.  She will follow up as needed.

## 2018-07-03 NOTE — PROCEDURES
Intermediate Joint Aspiration/Injection  Date/Time: 7/2/2018 1:30 PM  Performed by: NAT KELLY  Authorized by: NAT KELLY     Consent Done?:  Yes (Verbal)  Indications:  Pain  Timeout: Prior to procedure the correct patient, procedure, and site was verified      Location:  Shoulder  Site:  L acromioclavicular  Prep: Patient was prepped and draped in usual sterile fashion    Needle size:  25 G  Approach:  Posterior  Medications:  40 mg triamcinolone acetonide 40 mg/mL  Patient tolerance:  Patient tolerated the procedure well with no immediate complications

## 2018-07-13 DIAGNOSIS — M51.36 DDD (DEGENERATIVE DISC DISEASE), LUMBAR: Primary | ICD-10-CM

## 2018-07-17 ENCOUNTER — HOSPITAL ENCOUNTER (OUTPATIENT)
Facility: HOSPITAL | Age: 58
Discharge: HOME | End: 2018-07-17
Attending: ANESTHESIOLOGY | Admitting: ANESTHESIOLOGY
Payer: COMMERCIAL

## 2018-07-17 ENCOUNTER — HOSPITAL ENCOUNTER (OUTPATIENT)
Dept: RADIOLOGY | Facility: HOSPITAL | Age: 58
Discharge: HOME | End: 2018-07-17
Attending: ANESTHESIOLOGY | Admitting: ANESTHESIOLOGY
Payer: COMMERCIAL

## 2018-07-17 ENCOUNTER — OFFICE VISIT (OUTPATIENT)
Dept: NEUROLOGY | Facility: CLINIC | Age: 58
End: 2018-07-17
Payer: COMMERCIAL

## 2018-07-17 ENCOUNTER — SURGERY (OUTPATIENT)
Age: 58
End: 2018-07-17

## 2018-07-17 VITALS — BODY MASS INDEX: 38.86 KG/M2 | WEIGHT: 227.63 LBS | RESPIRATION RATE: 16 BRPM | HEIGHT: 64 IN

## 2018-07-17 VITALS
HEIGHT: 64 IN | RESPIRATION RATE: 18 BRPM | SYSTOLIC BLOOD PRESSURE: 148 MMHG | WEIGHT: 225 LBS | TEMPERATURE: 98 F | OXYGEN SATURATION: 99 % | DIASTOLIC BLOOD PRESSURE: 74 MMHG | HEART RATE: 73 BPM | BODY MASS INDEX: 38.41 KG/M2

## 2018-07-17 DIAGNOSIS — M48.061 SPINAL STENOSIS OF LUMBAR REGION WITHOUT NEUROGENIC CLAUDICATION: ICD-10-CM

## 2018-07-17 DIAGNOSIS — M47.816 LUMBAR SPONDYLOSIS: Primary | ICD-10-CM

## 2018-07-17 DIAGNOSIS — G43.719 INTRACTABLE CHRONIC MIGRAINE WITHOUT AURA AND WITHOUT STATUS MIGRAINOSUS: Primary | ICD-10-CM

## 2018-07-17 DIAGNOSIS — M51.36 DDD (DEGENERATIVE DISC DISEASE), LUMBAR: ICD-10-CM

## 2018-07-17 PROCEDURE — 76000 FLUOROSCOPY <1 HR PHYS/QHP: CPT | Mod: TC,PO

## 2018-07-17 PROCEDURE — 99213 OFFICE O/P EST LOW 20 MIN: CPT | Mod: S$GLB,,, | Performed by: PSYCHIATRY & NEUROLOGY

## 2018-07-17 PROCEDURE — 64635 DESTROY LUMB/SAC FACET JNT: CPT | Mod: 50,PO | Performed by: ANESTHESIOLOGY

## 2018-07-17 PROCEDURE — 64636 DESTROY L/S FACET JNT ADDL: CPT | Mod: 50,,, | Performed by: ANESTHESIOLOGY

## 2018-07-17 PROCEDURE — 64635 DESTROY LUMB/SAC FACET JNT: CPT | Mod: 50,,, | Performed by: ANESTHESIOLOGY

## 2018-07-17 PROCEDURE — 25000003 PHARM REV CODE 250: Mod: PO | Performed by: ANESTHESIOLOGY

## 2018-07-17 PROCEDURE — 99999 PR PBB SHADOW E&M-EST. PATIENT-LVL III: CPT | Mod: PBBFAC,,, | Performed by: PSYCHIATRY & NEUROLOGY

## 2018-07-17 PROCEDURE — 3008F BODY MASS INDEX DOCD: CPT | Mod: CPTII,S$GLB,, | Performed by: PSYCHIATRY & NEUROLOGY

## 2018-07-17 PROCEDURE — 63600175 PHARM REV CODE 636 W HCPCS: Mod: PO | Performed by: ANESTHESIOLOGY

## 2018-07-17 PROCEDURE — 64636 DESTROY L/S FACET JNT ADDL: CPT | Mod: 50,PO | Performed by: ANESTHESIOLOGY

## 2018-07-17 PROCEDURE — A4216 STERILE WATER/SALINE, 10 ML: HCPCS | Mod: PO | Performed by: ANESTHESIOLOGY

## 2018-07-17 PROCEDURE — 99152 MOD SED SAME PHYS/QHP 5/>YRS: CPT | Mod: ,,, | Performed by: ANESTHESIOLOGY

## 2018-07-17 RX ORDER — SODIUM CHLORIDE 9 MG/ML
INJECTION, SOLUTION INTRAMUSCULAR; INTRAVENOUS; SUBCUTANEOUS
Status: DISCONTINUED | OUTPATIENT
Start: 2018-07-17 | End: 2018-07-17 | Stop reason: HOSPADM

## 2018-07-17 RX ORDER — MIDAZOLAM HYDROCHLORIDE 1 MG/ML
INJECTION INTRAMUSCULAR; INTRAVENOUS
Status: DISCONTINUED | OUTPATIENT
Start: 2018-07-17 | End: 2018-07-17 | Stop reason: HOSPADM

## 2018-07-17 RX ORDER — LIDOCAINE HYDROCHLORIDE 10 MG/ML
1 INJECTION, SOLUTION EPIDURAL; INFILTRATION; INTRACAUDAL; PERINEURAL ONCE
Status: COMPLETED | OUTPATIENT
Start: 2018-07-17 | End: 2018-07-17

## 2018-07-17 RX ORDER — SODIUM CHLORIDE, SODIUM LACTATE, POTASSIUM CHLORIDE, CALCIUM CHLORIDE 600; 310; 30; 20 MG/100ML; MG/100ML; MG/100ML; MG/100ML
INJECTION, SOLUTION INTRAVENOUS CONTINUOUS
Status: DISCONTINUED | OUTPATIENT
Start: 2018-07-17 | End: 2018-07-17 | Stop reason: HOSPADM

## 2018-07-17 RX ORDER — FENTANYL CITRATE 50 UG/ML
INJECTION, SOLUTION INTRAMUSCULAR; INTRAVENOUS
Status: DISCONTINUED | OUTPATIENT
Start: 2018-07-17 | End: 2018-07-17 | Stop reason: HOSPADM

## 2018-07-17 RX ORDER — IBUPROFEN 800 MG/1
TABLET ORAL
Qty: 60 TABLET | Refills: 0 | Status: SHIPPED | OUTPATIENT
Start: 2018-07-17 | End: 2018-08-26 | Stop reason: SDUPTHER

## 2018-07-17 RX ORDER — VENLAFAXINE HYDROCHLORIDE 37.5 MG/1
CAPSULE, EXTENDED RELEASE ORAL
Qty: 84 CAPSULE | Refills: 0 | Status: SHIPPED | OUTPATIENT
Start: 2018-07-17 | End: 2018-07-23 | Stop reason: SDUPTHER

## 2018-07-17 RX ORDER — METHYLPREDNISOLONE ACETATE 40 MG/ML
INJECTION, SUSPENSION INTRA-ARTICULAR; INTRALESIONAL; INTRAMUSCULAR; SOFT TISSUE
Status: DISCONTINUED | OUTPATIENT
Start: 2018-07-17 | End: 2018-07-17 | Stop reason: HOSPADM

## 2018-07-17 RX ORDER — LIDOCAINE HYDROCHLORIDE 20 MG/ML
INJECTION, SOLUTION EPIDURAL; INFILTRATION; INTRACAUDAL; PERINEURAL
Status: DISCONTINUED | OUTPATIENT
Start: 2018-07-17 | End: 2018-07-17 | Stop reason: HOSPADM

## 2018-07-17 RX ORDER — LIDOCAINE HYDROCHLORIDE 10 MG/ML
INJECTION, SOLUTION EPIDURAL; INFILTRATION; INTRACAUDAL; PERINEURAL
Status: DISCONTINUED | OUTPATIENT
Start: 2018-07-17 | End: 2018-07-17 | Stop reason: HOSPADM

## 2018-07-17 RX ADMIN — SODIUM CHLORIDE, SODIUM LACTATE, POTASSIUM CHLORIDE, AND CALCIUM CHLORIDE: .6; .31; .03; .02 INJECTION, SOLUTION INTRAVENOUS at 11:07

## 2018-07-17 RX ADMIN — SODIUM CHLORIDE 3 ML: 9 INJECTION INTRAMUSCULAR; INTRAVENOUS; SUBCUTANEOUS at 12:07

## 2018-07-17 RX ADMIN — MIDAZOLAM HYDROCHLORIDE 2 MG: 1 INJECTION, SOLUTION INTRAMUSCULAR; INTRAVENOUS at 12:07

## 2018-07-17 RX ADMIN — LIDOCAINE HYDROCHLORIDE 10 ML: 10 INJECTION, SOLUTION EPIDURAL; INFILTRATION; INTRACAUDAL; PERINEURAL at 12:07

## 2018-07-17 RX ADMIN — METHYLPREDNISOLONE ACETATE 40 MG: 40 INJECTION, SUSPENSION INTRA-ARTICULAR; INTRALESIONAL; INTRAMUSCULAR; SOFT TISSUE at 12:07

## 2018-07-17 RX ADMIN — LIDOCAINE HYDROCHLORIDE: 10 INJECTION, SOLUTION EPIDURAL; INFILTRATION; INTRACAUDAL; PERINEURAL at 11:07

## 2018-07-17 RX ADMIN — FENTANYL CITRATE 25 MCG: 50 INJECTION, SOLUTION INTRAMUSCULAR; INTRAVENOUS at 12:07

## 2018-07-17 RX ADMIN — LIDOCAINE HYDROCHLORIDE 4 ML: 20 INJECTION, SOLUTION EPIDURAL; INFILTRATION; INTRACAUDAL; PERINEURAL at 12:07

## 2018-07-17 NOTE — PROGRESS NOTES
Date of service: 7/17/2018  Referring provider: No ref. provider found    Subjective:      Chief complaint: Migraine       Patient ID: Elizabeth Giordano is a 57 y.o. lady with carpal tunnel syndrome, low back pain secondary to lumbar spondylosis and DDD, lumbar radiculopathy followed by Dr. Abdalla, factor V leiden mutation, GERD and migraines here for follow up of migraines.     History of Present Illness    INTERVAL HISTORY - 7/17/18     She is status post first botox session 5/24/18. Today she reports she is a little better. She had no migraines for the first 1.5 weeks and now has returned to 3 headache days per week. Her current headaches are right side of head and eye. Her current pain is 0 with a range of 0 to 10. She remains on Effexor and naratriptan. The effexor still has not been helpful.     INTERVAL HISTORY - 5/8/18     At last visit we covered topiramate, which was working to reduce headaches by 50%, to zonisamide due to significant memory problems.The memory problems DID improve. The zonisamide caused itchiness so 6 weeks ago we converted to Effexor. She is now having MORE headaches - 3-4 per week. The as needed medications are working well.     INTERVAL HISTORY - 1/30/18     Last visit 3 months ago we planned on continuing topiramate titration and sumatriptan for acute.    Today she reports the current regimen seems to be working - her regular baseline headache went down from 12-15 per month to about half that, and had one long flare since last seen - used naratriptan once came back, used sumatriptan once, came back . Her current pain is 0, with a range of 0 to 10. The pain is unchanged in location.    She has been having significant memory problems since being on topiramate.     INTERVAL HISTORY - 10/25/17     Last seen 2 months ago at which point I raised topiramate and started naratriptan bridges for long migraines. Today she reports she is about the same. However, last week she had a good  week and had no headaches.The headaches have the same characteristics are before. Her current pain score is 5. Her range is 0-10. She can not predict when she will have severe migraines so naratriptan bridge was taken at terminal end (day 4 of flare), not at the start, and did not have the intended effect. She has tingling in all toes (topamax) but worse on right and gets injections in her foot for this.    HEADACHE HISTORY - 8/9/17   Age at onset and course over time: migraines began in her 30s, she reports going through menopause at age 40 (unclear why) and she thinks this may have started it. One day just became more sensitive to sensory stimuli especially smells. Had severe migraines at least one time per week. Started Celexa for this purpose which helped her sensitivity to smells. Overall thinks her headache number has stayed the same over the years but pain has become more severe and she is yet again more sensitive to smells, thinks medications aren't working as well.   Location: right side behind eye (baseline headache and migraine)   Quality: throbbing   Severity: current 2/10, at worst 10/10   Duration: exacerbations last 5-6 days   Frequency: baseline headache about 12-15 days per month; every other month will have a flare lasting 5-6 days   Alleviated by: sleep, ice, medication   Exacerbated by: light, noise, smells, coughing   Associated with: photo/noise, phobia, osmophobia. No aura. + right eye tearing. No ptosis, no redness. No eye irritation. No ear fullness.   Sleep habits:  Caffeine intake: 1     She is on daily HRT for menopause.     Current acute treatment - migraines treated 4 times per month   -- meloxicam 15mg for  Arthritis in right thumb daily   -- sumatriptan 100mg at onset of headache, gets recurrent headache next day, (zomig works better but not covered by her insurance)  -- amerge - works OK but sumatriptan is cheaper   -- tizanidine 2mg nightly - for migraines, daily   -- tramadol 50mg      Current prevention:  -- Effexor XR 150mg in the morning - started 2/27/18 - headaches are worse     Previously tried/failed acute treatment:  -- hydrocodone: allergy  -- oxycodone: allergy   -- zomig   -- relpax   -- naproxen  -- tramadol   -- fiorinal with codeine   -- Goody's   -- Excedrin   -- tylenol  -- ASA    Previously tried/failed preventative treatment:  -- amitriptyline (for back pain, but migraines were no better)  -- gabapentin - for back pain, didn't help   -- lyrica - memory problems   -- topiramate 100mg BID Tingling in toes - helping with migraines but having significant memory problems   -- zonisamide - itchy   (celexa 40mg)     Hx multiple lumbar ESIs and LMB RFAs for low back pain/radiculopathy       Review of patient's allergies indicates:   Allergen Reactions    Hydrocodone Hives and Nausea Only           Sulfa (sulfonamide antibiotics) Hives and Rash           Oxycodone Itching and Nausea And Vomiting     Current Outpatient Prescriptions   Medication Sig Dispense Refill    azelastine (ASTELIN) 137 mcg (0.1 %) nasal spray 1 spray (137 mcg total) by Nasal route 2 (two) times daily. 30 mL 3    diphenhydrAMINE (BENADRYL) 25 mg capsule Take 25 mg by mouth every 6 (six) hours as needed.      EVAMIST 1.53 mg/spray (1.7%) transdermal spray       ibuprofen (ADVIL,MOTRIN) 800 MG tablet TAKE 1 TABLET(800 MG) BY MOUTH TWICE DAILY AS NEEDED FOR PAIN 60 tablet 0    naratriptan (AMERGE) 2.5 MG tablet 2.5 mg PO BID x 5 days at onset of migraine flare 10 tablet 11    pantoprazole (PROTONIX) 40 MG tablet Take 1 tablet (40 mg total) by mouth once daily. 90 tablet 1    sumatriptan (IMITREX) 100 MG tablet MAY TAKE 1 TABLET THEN FOLLOWUP WITH ANOTHER TABLET IN 2 HOURS IF HEADACHE NOT RESOLVED 9 tablet 11    tiZANidine (ZANAFLEX) 2 MG tablet Take 1 tablet (2 mg total) by mouth every evening. 90 tablet 3    traMADol (ULTRAM) 50 mg tablet Take 1 tablet (50 mg total) by mouth 2 (two) times daily as  needed. 60 tablet 2    venlafaxine (EFFEXOR-XR) 37.5 MG 24 hr capsule 3 caps PO in AM x 2 weeks, then 2 caps PO in AM x 2 weeks, then 1 cap PO x 2 weeks then stop. GENERIC. 84 capsule 0     No current facility-administered medications for this visit.        Past Medical History  Past Medical History:   Diagnosis Date    Allergy     Arthritis of spine 2011    Chronic low back pain     DDD (degenerative disc disease), lumbar     Diverticulitis     Diverticulosis     DJD (degenerative joint disease)     Encounter for blood transfusion     Factor V Leiden     GERD (gastroesophageal reflux disease)     Hiatal hernia     Migraines     PONV (postoperative nausea and vomiting)     geta       Past Surgical History  Past Surgical History:   Procedure Laterality Date    APPENDECTOMY  1981    CARPAL TUNNEL RELEASE  2011    right    COLONOSCOPY  2014    reduntant colon, otherwise normal findings repeat in 8-10 years for surveillance    Epidural Steroid Injection      Pain Management    ESOPHAGOGASTRODUODENOSCOPY      Facet injection      Pain Management    HYSTERECTOMY      ovaries removed    KIDNEY SURGERY Right 1967    to correct urinary reflux into kidney    OVARIAN CYST REMOVAL Right 1981    SHOULDER SURGERY  2010    rotator cuff, right       Family History  Family History   Problem Relation Age of Onset    Heart attack Mother     Heart disease Mother     COPD Mother     Hypertension Mother     Hepatitis Sister     COPD Father     No Known Problems Daughter     Allergic rhinitis Neg Hx     Angioedema Neg Hx     Asthma Neg Hx     Atopy Neg Hx     Eczema Neg Hx     Immunodeficiency Neg Hx     Urticaria Neg Hx     Colon cancer Neg Hx     Colon polyps Neg Hx        Social History  Social History     Social History    Marital status:      Spouse name: N/A    Number of children: 1    Years of education: N/A     Occupational History     [employer]     Social History Main  Topics    Smoking status: Former Smoker     Packs/day: 1.00     Years: 5.00     Start date: 6/4/1992     Quit date: 1/5/1997    Smokeless tobacco: Never Used    Alcohol use No      Comment: occasionally    Drug use: No    Sexual activity: Yes     Partners: Male     Other Topics Concern    Not on file     Social History Narrative    ** Merged History Encounter **             Review of Systems  14-point review of systems as follows:   No check yoel indicates NEGATIVE response   Constitutional: [] weight loss, [] change to appetite   Eyes: [] change in vision, [] double vision   Ears, nose, mouth, throat: [] frequent nose bleeds, [] ringing in the ears   Respiratory: [] cough, [] wheezing   Cardiovascular: [] chest pain, [] palpitations   Gastrointestinal: [] jaundice, [] nausea/vomiting   Genitourinary: [] incontinence, [] burning with urination   Hematologic/lymphatic: [] easy bruising/bleeding, [x] night sweats   Neurological: [] numbness, [] weakness   Endocrine: [] fatigue, [] heat/cold intolerance   Allergy/Immunologic: [] fevers, [] chills   Musculoskeletal: [] muscle pain, [x] joint pain   Psychiatric: [] thoughts of harming self/others, [] depression   Integumentary: [] rashes, [] sores that do not heal       Objective:        Vitals:    07/17/18 1551   Resp: 16     Body mass index is 39.07 kg/m².    General: Well developed, well nourished.  No acute distress.  HEENT: Atraumatic, normocephalic.  Neck: Trachea midline  Cardiovascular: Vitals reviewed. Normal peripheral perfusion.   Pulmonary: No increased work of breathing.  Abdomen/GI: No guarding  Musculoskeletal: No obvious joint deformities, moves all extremities symmetrically and well.     Neurological exam:  Mental status: Awake and alert. Oriented to situation.  Speech fluent and appropriate. Recent and remote memory appear to be intact.  Fund of knowledge normal.  Cranial nerves: Pupils equal round, extraocular movements intact, facial strength  intact bilaterally, hearing grossly intact bilaterally.  Gait: Normal       Data Review:     No results found for this or any previous visit.    Lab Results   Component Value Date     02/26/2018    K 4.1 02/26/2018     02/26/2018    CO2 26 02/26/2018    BUN 17 02/26/2018    CREATININE 1.0 02/26/2018    GLU 94 02/26/2018    AST 24 02/26/2018    ALT 20 02/26/2018    ALBUMIN 3.7 02/26/2018    PROT 6.5 02/26/2018    BILITOT 0.6 02/26/2018    CHOL 207 (H) 02/26/2018    HDL 56 02/26/2018    LDLCALC 133.2 02/26/2018    TRIG 89 02/26/2018       Lab Results   Component Value Date    WBC 4.28 02/26/2018    HGB 13.9 02/26/2018    HCT 40.7 02/26/2018    MCV 94 02/26/2018     02/26/2018       Lab Results   Component Value Date    TSH 0.827 02/26/2018       Assessment & Plan:       Problem List Items Addressed This Visit        Neuro    Intractable chronic migraine without aura - Primary    Overview     Migraines of many years, triggered by menopause. Normal neuro exam. Excellent response to full dose topiramate but not tolerating due to memory impairment. Converted over to zonisamide to improve tolerance but developed rash to this. Therefore, converted citalpram to Effexor but as of 6 weeks on full dose headaches are only worse. Will give Effexor another 4 week trial and prepare to begin Botox.     The patient has chronic migraines (G43.719) and suffers from headaches more than 15 days a month lasting more than 4 hours a day with no relief of symptoms despite trying multiple medications including but not limited to anti-epileptics (topiramate, gabapentin, lyrica, zonisamide), and antidepressants (amitriptyline, venflaxine). Botox treatment was approved for chronic migraines in October 2010. The patient will be an ideal candidate for Botox. We are planning for 3 treatments 3 months apart and aiming for at least 50% improvement in the symptoms. If we see no improvement after 3 treatments, we will discontinue the  injections.                       WORKUP:  -- none     PREVENTION (use daily regardless of headache):  -- WEAN Effexor XR 150mg daily to three 37.5mg tablets x 2 weeks, then 2 tablets x 2 weeks, then 1 tablet x 2 weeks then stop   -- continue with Botox #2   -- continue keep headache diary     ACUTE TREATMENT:  -- may continue to use imitrex for shorter headaches  -- for those long flares, as soon as you know what is happening, take the amerge (naratriptan) twice per day x 4 days whether you have pain or not to try to get ahead of it   -- can't use imitrex and naratriptan same day       No Follow-up on file.    Neva Cortes MD

## 2018-07-17 NOTE — H&P
CC: Back pain    HPI: The patient is a 56yo woman with a history of lumbar spondylosis here for L2,3,4 RFA. There are no major changes in history and physical from 6/4/18.    Past Medical History:   Diagnosis Date    Allergy     Arthritis of spine 2011    Chronic low back pain     DDD (degenerative disc disease), lumbar     Diverticulitis     Diverticulosis     DJD (degenerative joint disease)     Encounter for blood transfusion     Factor V Leiden     GERD (gastroesophageal reflux disease)     Hiatal hernia     Migraines     PONV (postoperative nausea and vomiting)     geta       Past Surgical History:   Procedure Laterality Date    APPENDECTOMY  1981    CARPAL TUNNEL RELEASE  2011    right    COLONOSCOPY  2014    reduntant colon, otherwise normal findings repeat in 8-10 years for surveillance    Epidural Steroid Injection      Pain Management    ESOPHAGOGASTRODUODENOSCOPY      Facet injection      Pain Management    HYSTERECTOMY      ovaries removed    KIDNEY SURGERY Right 1967    to correct urinary reflux into kidney    OVARIAN CYST REMOVAL Right 1981    SHOULDER SURGERY  2010    rotator cuff, right       Family History   Problem Relation Age of Onset    Heart attack Mother     Heart disease Mother     COPD Mother     Hypertension Mother     Hepatitis Sister     COPD Father     No Known Problems Daughter     Allergic rhinitis Neg Hx     Angioedema Neg Hx     Asthma Neg Hx     Atopy Neg Hx     Eczema Neg Hx     Immunodeficiency Neg Hx     Urticaria Neg Hx     Colon cancer Neg Hx     Colon polyps Neg Hx        Social History     Social History    Marital status:      Spouse name: N/A    Number of children: 1    Years of education: N/A     Occupational History     [employer]     Social History Main Topics    Smoking status: Former Smoker     Packs/day: 1.00     Years: 5.00     Start date: 6/4/1992     Quit date: 1/5/1997    Smokeless tobacco: Never Used  "   Alcohol use No      Comment: occasionally    Drug use: No    Sexual activity: Yes     Partners: Male     Other Topics Concern    None     Social History Narrative    ** Merged History Encounter **            Current Facility-Administered Medications   Medication Dose Route Frequency Provider Last Rate Last Dose    lactated ringers infusion   Intravenous Continuous Oswald Abdalla MD 25 mL/hr at 07/17/18 1146         Review of patient's allergies indicates:   Allergen Reactions    Hydrocodone Hives and Nausea Only           Sulfa (sulfonamide antibiotics) Hives and Rash           Doxycycline Rash     Itchy rash and some scattered itchy lesions    Oxycodone Itching and Nausea And Vomiting       Vitals:    07/17/18 1139   BP: (!) 144/74   Pulse: 80   Resp: 18   Temp: 97.7 °F (36.5 °C)   TempSrc: Skin   SpO2: 99%   Weight: 102.1 kg (225 lb)   Height: 5' 4" (1.626 m)       REVIEW OF SYSTEMS:     GENERAL: No weight loss, malaise or fevers.  HEENT:  No recent changes in vision or hearing  NECK: Negative for lumps, no difficulty with swallowing.  RESPIRATORY: Negative for cough, wheezing or shortness of breath, patient denies any recent URI.  CARDIOVASCULAR: Negative for chest pain, leg swelling or palpitations.  GI: Negative for abdominal discomfort, blood in stools or black stools or change in bowel habits.  MUSCULOSKELETAL: See HPI.  SKIN: Negative for lesions, rash, and itching.  PSYCH: No suicidal or homicidal ideations, no current mood disturbances.  HEMATOLOGY/LYMPHOLOGY: Negative for prolonged bleeding, bruising easily or swollen nodes. Patient is not currently taking any anti-coagulants  ENDO: No history of diabetes or thyroid dysfunction  NEURO: No history of syncope, paralysis, seizures or tremors.All other reviewed and negative other than HPI.    Physical exam:  Gen: A and O x3, pleasant, well-groomed  Skin: No rashes or obvious lesions  HEENT: PERRLA, no obvious deformities on ears or in canals. " No thyroid masses, trachea midline, no palpable lymph nodes in neck, axilla.  CVS: Regular rate and rhythm, normal S1 and S2, no murmurs.  Resp: Clear to auscultation bilaterally.  Abdomen: Soft, NT/ND, normal bowel sounds present.  Musculoskeletal/Neuro: Moving all extremities    Assessment:  Lumbar spondylosis  -     Case Request Operating Room: RADIOFREQUENCY ABLATION, NERVE, MEDIAL BRANCH, LUMBAR, L2,3,4  -     Activity as tolerated; Standing  -     Place in Outpatient; Standing  -     Diet NPO; Standing  -     lactated ringers infusion; Inject into the vein continuous.  -     Notify physician ; Standing  -     Notify physician ; Standing  -     Notify physician (specify); Standing  -     Place 18-22 gauage peripheral IV ; Standing  -     Verify informed consent; Standing  -     Vital signs; Standing    Spinal stenosis of lumbar region without neurogenic claudication    Other orders  -     lidocaine (PF) 10 mg/ml (1%) injection 10 mg; 1 mL (10 mg total) by Other route once.

## 2018-07-17 NOTE — PATIENT INSTRUCTIONS
WORKUP:  -- none     PREVENTION (use daily regardless of headache):  -- WEAN Effexor XR 150mg daily to three 37.5mg tablets x 2 weeks, then 2 tablets x 2 weeks, then 1 tablet x 2 weeks then stop   -- continue with Botox #2   -- continue keep headache diary     ACUTE TREATMENT:  -- may continue to use imitrex for shorter headaches  -- for those long flares, as soon as you know what is happening, take the amerge (naratriptan) twice per day x 4 days whether you have pain or not to try to get ahead of it   -- can't use imitrex and naratriptan same day

## 2018-07-17 NOTE — DISCHARGE INSTRUCTIONS
Home care instructions  Apply ice pack to the injection site for 20 minutes periods for the first 24 hrs for soreness/discomfort at injection site DO NOT USE HEAT FOR 24 HOURS  Keep site clean and dry for 24 hours, remove bandaid when desired  Do not drive until tomorrow  Take care when walking after a lumbar injection  Avoid strenuous activities for 2 days  Make take 2 weeks to feel the full effects   Resume home medication as prescribed today  Resume Aspirin, Plavix, or Coumadin the day after the procedure unless otherwise instructed.    SEE IMMEDIATE MEDICAL HELP FOR:  Severe increase in your usual pain or appearance of new pain  Prolonged or increasing weakness or numbness in the legs or arms  Drainage, redness, active bleeding, or increased swelling at the injection site  Temperature over 100.0 degrees F.  Headache that increases when your head is upright and decreases when you lie flat    CALL 911 OR GO DIRECTLY TO EMERGENCY DEPARTMENT FOR:  Shortness of breath, chest pain, or problems breathing      Recovery After Procedural Sedation (Adult)  You have been given medicine by vein to make you sleep during your surgery. This may have included both a pain medicine and sleeping medicine. Most of the effects have worn off. But you may still have some drowsiness for the next 6 to 8 hours.  Home care  Follow these guidelines when you get home:  · For the next 8 hours, you should be watched by a responsible adult. This person should make sure your condition is not getting worse.  · Don't drink any alcohol for the next 24 hours.  · Don't drive, operate dangerous machinery, or make important business or personal decisions during the next 24 hours.  Note: Your healthcare provider may tell you not to take any medicine by mouth for pain or sleep in the next 4 hours. These medicines may react with the medicines you were given in the hospital. This could cause a much stronger response than usual.  Follow-up care  Follow up  with your healthcare provider if you are not alert and back to your usual level of activity within 12 hours.  When to seek medical advice  Call your healthcare provider right away if any of these occur:  · Drowsiness gets worse  · Weakness or dizziness gets worse  · Repeated vomiting  · You can't be awakened   Date Last Reviewed: 10/18/2016  © 0088-2901 [employer] [address] [place] [address], [place] [address]. All rights reserved. This information is not intended as a substitute for professional medical care. Always follow your healthcare professional's instructions.

## 2018-07-23 DIAGNOSIS — G43.719 INTRACTABLE CHRONIC MIGRAINE WITHOUT AURA AND WITHOUT STATUS MIGRAINOSUS: Primary | ICD-10-CM

## 2018-07-23 RX ORDER — VENLAFAXINE HYDROCHLORIDE 37.5 MG/1
37.5 CAPSULE, EXTENDED RELEASE ORAL DAILY
Qty: 90 CAPSULE | Refills: 0 | Status: SHIPPED | OUTPATIENT
Start: 2018-07-23 | End: 2018-08-16 | Stop reason: ALTCHOICE

## 2018-07-23 RX ORDER — VENLAFAXINE HYDROCHLORIDE 75 MG/1
CAPSULE, EXTENDED RELEASE ORAL
Qty: 180 CAPSULE | Refills: 0 | Status: SHIPPED | OUTPATIENT
Start: 2018-07-23 | End: 2018-08-16 | Stop reason: ALTCHOICE

## 2018-07-23 RX ORDER — VENLAFAXINE HYDROCHLORIDE 37.5 MG/1
37.5 CAPSULE, EXTENDED RELEASE ORAL DAILY
Qty: 30 CAPSULE | Refills: 0 | OUTPATIENT
Start: 2018-07-23

## 2018-07-23 NOTE — TELEPHONE ENCOUNTER
----- Message from Elaina Napier sent at 7/23/2018  1:52 PM CDT -----  Contact: Self  Patient needs to speak to the nurse about medication venlafaxine (EFFEXOR-XR) 37.5 MG 24 hr capsule  Patient states the insurance will only pay for 30 for 30 days     Please resend to [facility] [address] [place] [address] [id number] [place] [address] AT [facility] [address]  [id number] [place] [id number]  Phone: [phone] Fax: [phone]

## 2018-07-23 NOTE — TELEPHONE ENCOUNTER
Called and spoke with pharmacist, Kaylee. Rx changed per Dr. Cortes.     Called and spoke with patient. Informed her to take it as Dr. Cortes said and not what is on the bottle. Patient verbalized understanding.

## 2018-08-16 ENCOUNTER — PROCEDURE VISIT (OUTPATIENT)
Dept: NEUROLOGY | Facility: CLINIC | Age: 58
End: 2018-08-16
Payer: COMMERCIAL

## 2018-08-16 ENCOUNTER — OFFICE VISIT (OUTPATIENT)
Dept: PAIN MEDICINE | Facility: CLINIC | Age: 58
End: 2018-08-16
Payer: COMMERCIAL

## 2018-08-16 VITALS
DIASTOLIC BLOOD PRESSURE: 72 MMHG | OXYGEN SATURATION: 98 % | HEART RATE: 83 BPM | BODY MASS INDEX: 39.17 KG/M2 | HEART RATE: 91 BPM | TEMPERATURE: 98 F | SYSTOLIC BLOOD PRESSURE: 139 MMHG | WEIGHT: 228.19 LBS | WEIGHT: 228 LBS | HEIGHT: 64 IN | RESPIRATION RATE: 18 BRPM | DIASTOLIC BLOOD PRESSURE: 84 MMHG | RESPIRATION RATE: 16 BRPM | BODY MASS INDEX: 38.93 KG/M2 | SYSTOLIC BLOOD PRESSURE: 135 MMHG

## 2018-08-16 DIAGNOSIS — M50.30 DDD (DEGENERATIVE DISC DISEASE), CERVICAL: ICD-10-CM

## 2018-08-16 DIAGNOSIS — M54.12 CERVICAL RADICULOPATHY: ICD-10-CM

## 2018-08-16 DIAGNOSIS — M51.36 DDD (DEGENERATIVE DISC DISEASE), LUMBAR: ICD-10-CM

## 2018-08-16 DIAGNOSIS — M54.16 LUMBAR RADICULOPATHY: ICD-10-CM

## 2018-08-16 DIAGNOSIS — M48.061 SPINAL STENOSIS OF LUMBAR REGION WITHOUT NEUROGENIC CLAUDICATION: Primary | ICD-10-CM

## 2018-08-16 DIAGNOSIS — M47.816 LUMBAR SPONDYLOSIS: ICD-10-CM

## 2018-08-16 DIAGNOSIS — G43.719 INTRACTABLE CHRONIC MIGRAINE WITHOUT AURA AND WITHOUT STATUS MIGRAINOSUS: Primary | ICD-10-CM

## 2018-08-16 PROCEDURE — 99214 OFFICE O/P EST MOD 30 MIN: CPT | Mod: S$GLB,,, | Performed by: PHYSICIAN ASSISTANT

## 2018-08-16 PROCEDURE — 99999 PR PBB SHADOW E&M-EST. PATIENT-LVL IV: CPT | Mod: PBBFAC,,, | Performed by: PHYSICIAN ASSISTANT

## 2018-08-16 PROCEDURE — 3008F BODY MASS INDEX DOCD: CPT | Mod: CPTII,S$GLB,, | Performed by: PHYSICIAN ASSISTANT

## 2018-08-16 PROCEDURE — 64615 CHEMODENERV MUSC MIGRAINE: CPT | Mod: S$GLB,,, | Performed by: PSYCHIATRY & NEUROLOGY

## 2018-08-16 NOTE — PROCEDURES
Procedures     PROCEDURE PERFORMED: Botulinum toxin injection (08914)    CLINICAL INDICATION: G43.719    A time out was conducted just before the start of the procedure to verify the correct patient and procedure, procedure location, and all relevant critical information.     Conventional methods of treatment such as multiple medications, both on and   off label have been tried including:    anti-epileptics (topiramate, gabapentin, lyrica, zonisamide), and antidepressants (amitriptyline, venflaxine)    The patient has been unresponsive and refractory.The patient meets criteria for chronic headaches according to the ICHD-II, the patient has more than 15 headaches a month which last for more than 4 hours a day.    This is the 2nd Botox injections and I am aiming for at least 50%  improvement in the patient's symptoms.    Previous injection was 5/24/18 and resulted in a mild improvement.     Frequency of treatment is every 3 months unless no response to the treatments, at which time we will discontinue the injections.     DESCRIPTION OF PROCEDURE: After obtaining informed consent and under   aseptic technique, a total of 155 units of botulinum toxin type A were   injected in the following muscles: Procerus 5 units,  5 units   bilaterally, frontalis 20 units, temporalis 20 units bilaterally,   occipitalis 15 units, upper cervical paraspinals 10 units bilaterally and trapezius 15 units bilaterally. The patient was given a total of 155 units in 31 sites.The patient tolerated the procedure well. There were no complications. The patient was given a prescription for repeat treatment in 3 months.     Unavoidable waste 45 units    Neva Cortes MD
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
## 2018-08-21 ENCOUNTER — LAB VISIT (OUTPATIENT)
Dept: LAB | Facility: HOSPITAL | Age: 58
End: 2018-08-21
Attending: FAMILY MEDICINE
Payer: COMMERCIAL

## 2018-08-21 ENCOUNTER — OFFICE VISIT (OUTPATIENT)
Dept: FAMILY MEDICINE | Facility: CLINIC | Age: 58
End: 2018-08-21
Payer: COMMERCIAL

## 2018-08-21 VITALS
BODY MASS INDEX: 38.99 KG/M2 | HEART RATE: 72 BPM | TEMPERATURE: 99 F | SYSTOLIC BLOOD PRESSURE: 130 MMHG | DIASTOLIC BLOOD PRESSURE: 84 MMHG | HEIGHT: 64 IN | WEIGHT: 228.38 LBS

## 2018-08-21 DIAGNOSIS — K21.9 GASTROESOPHAGEAL REFLUX DISEASE WITHOUT ESOPHAGITIS: ICD-10-CM

## 2018-08-21 DIAGNOSIS — M51.37 DEGENERATION OF LUMBAR OR LUMBOSACRAL INTERVERTEBRAL DISC: ICD-10-CM

## 2018-08-21 DIAGNOSIS — R63.5 WEIGHT GAIN, ABNORMAL: Primary | ICD-10-CM

## 2018-08-21 DIAGNOSIS — R63.5 WEIGHT GAIN, ABNORMAL: ICD-10-CM

## 2018-08-21 LAB
ALBUMIN SERPL BCP-MCNC: 4 G/DL
ALP SERPL-CCNC: 66 U/L
ALT SERPL W/O P-5'-P-CCNC: 32 U/L
ANION GAP SERPL CALC-SCNC: 6 MMOL/L
AST SERPL-CCNC: 25 U/L
BASOPHILS # BLD AUTO: 0 K/UL
BASOPHILS NFR BLD: 0 %
BILIRUB SERPL-MCNC: 0.3 MG/DL
BUN SERPL-MCNC: 26 MG/DL
CALCIUM SERPL-MCNC: 9.7 MG/DL
CHLORIDE SERPL-SCNC: 104 MMOL/L
CO2 SERPL-SCNC: 31 MMOL/L
CREAT SERPL-MCNC: 1 MG/DL
DIFFERENTIAL METHOD: ABNORMAL
EOSINOPHIL # BLD AUTO: 0 K/UL
EOSINOPHIL NFR BLD: 0.2 %
ERYTHROCYTE [DISTWIDTH] IN BLOOD BY AUTOMATED COUNT: 12.3 %
EST. GFR  (AFRICAN AMERICAN): >60 ML/MIN/1.73 M^2
EST. GFR  (NON AFRICAN AMERICAN): >60 ML/MIN/1.73 M^2
GLUCOSE SERPL-MCNC: 87 MG/DL
HCT VFR BLD AUTO: 42.1 %
HGB BLD-MCNC: 14.1 G/DL
IMM GRANULOCYTES # BLD AUTO: 0.01 K/UL
IMM GRANULOCYTES NFR BLD AUTO: 0.2 %
LYMPHOCYTES # BLD AUTO: 2 K/UL
LYMPHOCYTES NFR BLD: 33 %
MCH RBC QN AUTO: 31.6 PG
MCHC RBC AUTO-ENTMCNC: 33.5 G/DL
MCV RBC AUTO: 94 FL
MONOCYTES # BLD AUTO: 0.7 K/UL
MONOCYTES NFR BLD: 11.4 %
NEUTROPHILS # BLD AUTO: 3.4 K/UL
NEUTROPHILS NFR BLD: 55.2 %
NRBC BLD-RTO: 0 /100 WBC
PLATELET # BLD AUTO: 200 K/UL
PMV BLD AUTO: 9.8 FL
POTASSIUM SERPL-SCNC: 4.2 MMOL/L
PROT SERPL-MCNC: 7 G/DL
RBC # BLD AUTO: 4.46 M/UL
SODIUM SERPL-SCNC: 141 MMOL/L
TSH SERPL DL<=0.005 MIU/L-ACNC: 0.59 UIU/ML
WBC # BLD AUTO: 6.12 K/UL

## 2018-08-21 PROCEDURE — 3008F BODY MASS INDEX DOCD: CPT | Mod: CPTII,S$GLB,, | Performed by: FAMILY MEDICINE

## 2018-08-21 PROCEDURE — 84443 ASSAY THYROID STIM HORMONE: CPT

## 2018-08-21 PROCEDURE — 99214 OFFICE O/P EST MOD 30 MIN: CPT | Mod: S$GLB,,, | Performed by: FAMILY MEDICINE

## 2018-08-21 PROCEDURE — 99999 PR PBB SHADOW E&M-EST. PATIENT-LVL III: CPT | Mod: PBBFAC,,, | Performed by: FAMILY MEDICINE

## 2018-08-21 PROCEDURE — 80053 COMPREHEN METABOLIC PANEL: CPT

## 2018-08-21 PROCEDURE — 85025 COMPLETE CBC W/AUTO DIFF WBC: CPT

## 2018-08-21 PROCEDURE — 36415 COLL VENOUS BLD VENIPUNCTURE: CPT | Mod: PO

## 2018-08-21 RX ORDER — PANTOPRAZOLE SODIUM 40 MG/1
40 TABLET, DELAYED RELEASE ORAL DAILY
Qty: 90 TABLET | Refills: 1 | Status: SHIPPED | OUTPATIENT
Start: 2018-08-21 | End: 2019-06-09 | Stop reason: SDUPTHER

## 2018-08-21 NOTE — PROGRESS NOTES
"Subjective:       Patient ID: Elizabeth Giordano is a 57 y.o. female.    Chief Complaint: Follow-up    This pt is new to me.  Pt is here for followup of chronic medical issues.  Pt is followed for migraines, chronic neck/back pain (sees Dr. Abdalla), GERD.  Pt is concerned because her sweat smells like ammonia.  This has occurred daily x 4 months. She has not decreased the carbs in her diet not increased her protein.  Her BM's have not changed.      Review of Systems   Constitutional: Positive for unexpected weight change (has gained 15 pounds in the last few weeks). Negative for activity change.   HENT: Negative for hearing loss, rhinorrhea and trouble swallowing.    Eyes: Negative for discharge and visual disturbance.   Respiratory: Negative for chest tightness and wheezing.    Cardiovascular: Negative for chest pain, palpitations and leg swelling.   Gastrointestinal: Positive for constipation (occasionally). Negative for blood in stool, diarrhea and vomiting.   Endocrine: Negative for polydipsia and polyuria.   Genitourinary: Negative for difficulty urinating, dysuria, hematuria and menstrual problem.   Musculoskeletal: Positive for arthralgias and neck pain. Negative for joint swelling.   Neurological: Positive for headaches. Negative for weakness.   Psychiatric/Behavioral: Negative for confusion, dysphoric mood and sleep disturbance.       Objective:       Vitals:    08/21/18 1618 08/21/18 1651   BP: (!) 140/80 130/84   BP Location: Right arm    Patient Position: Sitting    BP Method: Medium (Manual)    Pulse: 72    Temp: 98.6 °F (37 °C)    TempSrc: Oral    Weight: 103.6 kg (228 lb 6.3 oz)    Height: 5' 4" (1.626 m)      Physical Exam   Constitutional: She is oriented to person, place, and time. She appears well-developed and well-nourished.   Pt is obese   HENT:   Head: Normocephalic.   Eyes: Conjunctivae and EOM are normal. Pupils are equal, round, and reactive to light.   Neck: Normal range of " motion. Neck supple. No thyromegaly present.   Cardiovascular: Normal rate, regular rhythm and normal heart sounds.   Pulmonary/Chest: Effort normal and breath sounds normal.   Abdominal: Soft. Bowel sounds are normal. There is no tenderness.   Musculoskeletal: Normal range of motion. She exhibits no tenderness or deformity.   Lymphadenopathy:     She has no cervical adenopathy.   Neurological: She is alert and oriented to person, place, and time. She displays normal reflexes. No cranial nerve deficit. She exhibits normal muscle tone. Coordination normal.   Skin: Skin is warm and dry.   Psychiatric: She has a normal mood and affect. Her behavior is normal.       Assessment:       1. Weight gain, abnormal    2. Gastroesophageal reflux disease without esophagitis    3. Degeneration of lumbar or lumbosacral intervertebral disc        Plan:       Elizabeth was seen today for follow-up.    Diagnoses and all orders for this visit:    Weight gain, abnormal  -     Comprehensive metabolic panel; Future  -     TSH; Future  -     CBC auto differential; Future    Gastroesophageal reflux disease without esophagitis  -     pantoprazole (PROTONIX) 40 MG tablet; Take 1 tablet (40 mg total) by mouth once daily.    Degeneration of lumbar or lumbosacral intervertebral disc      During this visit, I reviewed the pt's history, medications, allergies, and problem list.

## 2018-08-27 RX ORDER — IBUPROFEN 800 MG/1
TABLET ORAL
Qty: 60 TABLET | Refills: 0 | Status: SHIPPED | OUTPATIENT
Start: 2018-08-27 | End: 2019-01-24

## 2018-09-10 RX ORDER — TRAMADOL HYDROCHLORIDE 50 MG/1
TABLET ORAL
Qty: 60 TABLET | Refills: 2 | Status: SHIPPED | OUTPATIENT
Start: 2018-09-10 | End: 2018-11-29 | Stop reason: SDUPTHER

## 2018-09-14 ENCOUNTER — PATIENT MESSAGE (OUTPATIENT)
Dept: NEUROLOGY | Facility: CLINIC | Age: 58
End: 2018-09-14

## 2018-09-14 RX ORDER — METOCLOPRAMIDE 10 MG/1
10 TABLET ORAL EVERY 6 HOURS PRN
Qty: 60 TABLET | Refills: 11 | Status: SHIPPED | OUTPATIENT
Start: 2018-09-14 | End: 2018-10-23

## 2018-09-14 RX ORDER — ONDANSETRON 4 MG/1
4 TABLET, FILM COATED ORAL EVERY 6 HOURS PRN
Qty: 30 TABLET | Refills: 11 | Status: SHIPPED | OUTPATIENT
Start: 2018-09-14 | End: 2020-07-09

## 2018-09-21 ENCOUNTER — PATIENT MESSAGE (OUTPATIENT)
Dept: PAIN MEDICINE | Facility: CLINIC | Age: 58
End: 2018-09-21

## 2018-09-21 DIAGNOSIS — M54.12 CERVICAL RADICULOPATHY: Primary | ICD-10-CM

## 2018-09-21 RX ORDER — SODIUM CHLORIDE, SODIUM LACTATE, POTASSIUM CHLORIDE, CALCIUM CHLORIDE 600; 310; 30; 20 MG/100ML; MG/100ML; MG/100ML; MG/100ML
INJECTION, SOLUTION INTRAVENOUS CONTINUOUS
Status: CANCELLED | OUTPATIENT
Start: 2018-09-25

## 2018-09-22 ENCOUNTER — OFFICE VISIT (OUTPATIENT)
Dept: URGENT CARE | Facility: CLINIC | Age: 58
End: 2018-09-22
Payer: COMMERCIAL

## 2018-09-22 ENCOUNTER — NURSE TRIAGE (OUTPATIENT)
Dept: ADMINISTRATIVE | Facility: CLINIC | Age: 58
End: 2018-09-22

## 2018-09-22 VITALS
WEIGHT: 228 LBS | TEMPERATURE: 97 F | HEART RATE: 88 BPM | BODY MASS INDEX: 38.93 KG/M2 | DIASTOLIC BLOOD PRESSURE: 97 MMHG | OXYGEN SATURATION: 99 % | SYSTOLIC BLOOD PRESSURE: 152 MMHG | RESPIRATION RATE: 18 BRPM | HEIGHT: 64 IN

## 2018-09-22 DIAGNOSIS — K59.00 CONSTIPATION, UNSPECIFIED CONSTIPATION TYPE: Primary | ICD-10-CM

## 2018-09-22 PROCEDURE — 3008F BODY MASS INDEX DOCD: CPT | Mod: CPTII,S$GLB,, | Performed by: PHYSICIAN ASSISTANT

## 2018-09-22 PROCEDURE — 99214 OFFICE O/P EST MOD 30 MIN: CPT | Mod: S$GLB,,, | Performed by: PHYSICIAN ASSISTANT

## 2018-09-22 NOTE — TELEPHONE ENCOUNTER
"    Reason for Disposition   Abdomen is more swollen than usual    Answer Assessment - Initial Assessment Questions  1. STOOL PATTERN OR FREQUENCY: "How often do you pass bowel movements (BMs)?"  (Normal range: tid to q 3 days)  "When was the last BM passed?"        Every other day usually.  Unsure of last bm  2. STRAINING: "Do you have to strain to have a BM?"       no  3. RECTAL PAIN: "Does your rectum hurt when the stool comes out?" If so, ask: "Do you have hemorrhoids? How bad is the pain?"  (Scale 1-10; or mild, moderate, severe)      No, no, no pain  4. STOOL COMPOSITION: "Are the stools hard?"       Hard and dry, small amount passed  5. BLOOD ON STOOLS: "Has there been any blood on the toilet tissue or on the surface of the BM?" If so, ask: "When was the last time?"       no  6. CHRONIC CONSTIPATION: "Is this a new problem for you?"  If no, ask: How long have you had this problem?" (days, weeks, months)       She used to have a problem, but within the last year she has been pretty regular.  7. CHANGES IN DIET: "Have there been any recent changes in your diet?"       no  8. MEDICATIONS: "Have you been taking any new medications?"      Weaned off of effexor.  started metoclopramide and zofran.  9. LAXATIVES: "Have you been using any laxatives or enemas?"  If yes, ask "What, how often, and when was the last time?"      Yes, dulcolax suppository on Thursday night, and 1/2 bottle of mag citrate last night.  Only produced a small amount of stool with each  10. CAUSE: "What do you think is causing the constipation?"         unsure  11. OTHER SYMPTOMS: "Do you have any other symptoms?" (e.g., abdominal pain, fever, vomiting)        Vomiting, abdominal pain  12. PREGNANCY: "Is there any chance you are pregnant?" "When was your last menstrual period?"        No, menopausal    Protocols used: [facility]      "

## 2018-09-22 NOTE — PROGRESS NOTES
"Subjective:       Patient ID: Elizabeth Giordano is a 57 y.o. female.    Vitals:  height is 5' 4" (1.626 m) and weight is 103.4 kg (228 lb). Her oral temperature is 97 °F (36.1 °C). Her blood pressure is 152/97 (abnormal) and her pulse is 88. Her respiration is 18 and oxygen saturation is 99%.     Chief Complaint: Abdominal Pain    Pt states this past Thursday nite had stomach pains and used a enema. Next day passing gas and also states drank Magn citrate, today had first BM, states "hard balls came out". Pt says stomach pains still there. Today around 10am did another enema      Abdominal Pain   This is a new problem. The current episode started in the past 7 days. The onset quality is gradual. The problem occurs constantly. The problem has been gradually worsening. The pain is located in the epigastric region. The pain is at a severity of 6/10. The pain is mild. The quality of the pain is sharp. The abdominal pain does not radiate. Pertinent negatives include no constipation, diarrhea, dysuria, fever, hematochezia, melena, nausea or vomiting. Nothing aggravates the pain. Relieved by: heating pad. The treatment provided no relief.     Review of Systems   Constitution: Negative for chills and fever.   Cardiovascular: Negative for chest pain.   Respiratory: Negative for shortness of breath.    Musculoskeletal: Negative for back pain.   Gastrointestinal: Positive for abdominal pain. Negative for constipation, diarrhea, hematochezia, melena, nausea and vomiting.   Genitourinary: Negative for dysuria.       Objective:      Physical Exam   Constitutional: She is oriented to person, place, and time. She appears well-developed and well-nourished.   HENT:   Head: Normocephalic and atraumatic.   Right Ear: External ear normal.   Left Ear: External ear normal.   Nose: Nose normal.   Mouth/Throat: Mucous membranes are normal.   Eyes: Conjunctivae and lids are normal.   Neck: Trachea normal and full passive range of " motion without pain. Neck supple.   Cardiovascular: Normal rate, regular rhythm and normal heart sounds.   Pulmonary/Chest: Effort normal and breath sounds normal. No respiratory distress.   Abdominal: Soft. Normal appearance and bowel sounds are normal. She exhibits no distension, no abdominal bruit, no pulsatile midline mass and no mass. There is no tenderness.   Musculoskeletal: Normal range of motion. She exhibits no edema.   Neurological: She is alert and oriented to person, place, and time. She has normal strength.   Skin: Skin is warm, dry and intact. She is not diaphoretic. No pallor.   Psychiatric: She has a normal mood and affect. Her speech is normal and behavior is normal. Judgment and thought content normal. Cognition and memory are normal.   Nursing note and vitals reviewed.      Assessment:       1. Constipation, unspecified constipation type        Plan:         Constipation, unspecified constipation type  -     X-Ray Abdomen AP 1 View; Future; Expected date: 09/22/2018  As interpreted by myself:  Moderate amount of stool in the colon.  No dilation of bowel noted.     Patient was concerned that she might have a possible bowel obstruction and wanted imaging.  She was also concerned that her medications may have caused the constipation.  I told her that both Zofran and Reglan had been know to slow transit the colon.  Patient was a sedentary lifestyle.  I encouraged regular exercise.  She does have 4 dogs.  I explained that this was a good way for her to get her exercise if she took them on walks 3 times a day.  We discussed using MiraLax and Dulcolax regularly over the next week.  If her symptoms do not improve in 1 week she should follow up with GI physician.  If symptoms worsen she will go to the ER.

## 2018-09-24 ENCOUNTER — TELEPHONE (OUTPATIENT)
Dept: ADMINISTRATIVE | Facility: OTHER | Age: 58
End: 2018-09-24

## 2018-09-24 ENCOUNTER — TELEPHONE (OUTPATIENT)
Dept: PAIN MEDICINE | Facility: CLINIC | Age: 58
End: 2018-09-24

## 2018-09-24 DIAGNOSIS — M50.30 DDD (DEGENERATIVE DISC DISEASE), CERVICAL: Primary | ICD-10-CM

## 2018-09-24 NOTE — TELEPHONE ENCOUNTER
----- Message from Concha Herrera sent at 9/24/2018  3:03 PM CDT -----  Contact: Patient   Patient is calling to find out her instructions for tomorrow, like when she needs to stop eating and also the time that she needs to arrive.  Call Back#[phone]  Thanks

## 2018-09-24 NOTE — TELEPHONE ENCOUNTER
----- Message from Ursula Helms sent at 9/24/2018  4:48 PM CDT -----  Contact: self  Patient need to speak nurse regarding appointment for tomorrow 9/25     Patient need time       Please call to advice [phone] (home) [phone] (work)    Call placed to POD

## 2018-09-24 NOTE — TELEPHONE ENCOUNTER
Left message for patient explaining that she would receive a call from pre-op to explain what she needs to do tomorrow.

## 2018-09-25 ENCOUNTER — TELEPHONE (OUTPATIENT)
Dept: URGENT CARE | Facility: CLINIC | Age: 58
End: 2018-09-25

## 2018-09-25 ENCOUNTER — HOSPITAL ENCOUNTER (OUTPATIENT)
Dept: RADIOLOGY | Facility: HOSPITAL | Age: 58
Discharge: HOME | End: 2018-09-25
Attending: ANESTHESIOLOGY | Admitting: ANESTHESIOLOGY
Payer: COMMERCIAL

## 2018-09-25 ENCOUNTER — HOSPITAL ENCOUNTER (OUTPATIENT)
Facility: HOSPITAL | Age: 58
Discharge: HOME | End: 2018-09-25
Attending: ANESTHESIOLOGY | Admitting: ANESTHESIOLOGY
Payer: COMMERCIAL

## 2018-09-25 VITALS
TEMPERATURE: 98 F | DIASTOLIC BLOOD PRESSURE: 73 MMHG | HEART RATE: 74 BPM | OXYGEN SATURATION: 98 % | SYSTOLIC BLOOD PRESSURE: 154 MMHG | RESPIRATION RATE: 16 BRPM

## 2018-09-25 DIAGNOSIS — M54.12 CERVICAL RADICULOPATHY: Primary | ICD-10-CM

## 2018-09-25 DIAGNOSIS — M50.30 DDD (DEGENERATIVE DISC DISEASE), CERVICAL: ICD-10-CM

## 2018-09-25 PROCEDURE — 63600175 PHARM REV CODE 636 W HCPCS: Mod: PO | Performed by: ANESTHESIOLOGY

## 2018-09-25 PROCEDURE — 62321 NJX INTERLAMINAR CRV/THRC: CPT | Mod: PO | Performed by: ANESTHESIOLOGY

## 2018-09-25 PROCEDURE — 76000 FLUOROSCOPY <1 HR PHYS/QHP: CPT | Mod: TC,PO

## 2018-09-25 PROCEDURE — 25000003 PHARM REV CODE 250: Mod: PO | Performed by: ANESTHESIOLOGY

## 2018-09-25 PROCEDURE — 62321 NJX INTERLAMINAR CRV/THRC: CPT | Mod: ,,, | Performed by: ANESTHESIOLOGY

## 2018-09-25 PROCEDURE — 25500020 PHARM REV CODE 255: Mod: PO | Performed by: ANESTHESIOLOGY

## 2018-09-25 RX ORDER — LIDOCAINE HYDROCHLORIDE 10 MG/ML
INJECTION, SOLUTION EPIDURAL; INFILTRATION; INTRACAUDAL; PERINEURAL
Status: DISCONTINUED | OUTPATIENT
Start: 2018-09-25 | End: 2018-09-25 | Stop reason: HOSPADM

## 2018-09-25 RX ORDER — SODIUM CHLORIDE, SODIUM LACTATE, POTASSIUM CHLORIDE, CALCIUM CHLORIDE 600; 310; 30; 20 MG/100ML; MG/100ML; MG/100ML; MG/100ML
INJECTION, SOLUTION INTRAVENOUS CONTINUOUS
Status: DISCONTINUED | OUTPATIENT
Start: 2018-09-25 | End: 2018-09-25 | Stop reason: HOSPADM

## 2018-09-25 RX ORDER — METHYLPREDNISOLONE ACETATE 80 MG/ML
INJECTION, SUSPENSION INTRA-ARTICULAR; INTRALESIONAL; INTRAMUSCULAR; SOFT TISSUE
Status: DISCONTINUED | OUTPATIENT
Start: 2018-09-25 | End: 2018-09-25 | Stop reason: HOSPADM

## 2018-09-25 RX ORDER — MIDAZOLAM HYDROCHLORIDE 1 MG/ML
INJECTION INTRAMUSCULAR; INTRAVENOUS
Status: DISCONTINUED | OUTPATIENT
Start: 2018-09-25 | End: 2018-09-25 | Stop reason: HOSPADM

## 2018-09-25 RX ORDER — LIDOCAINE HYDROCHLORIDE 10 MG/ML
1 INJECTION INFILTRATION; PERINEURAL ONCE
Status: COMPLETED | OUTPATIENT
Start: 2018-09-25 | End: 2018-09-25

## 2018-09-25 RX ADMIN — LIDOCAINE HYDROCHLORIDE: 10 INJECTION, SOLUTION EPIDURAL; INFILTRATION; INTRACAUDAL at 01:09

## 2018-09-25 RX ADMIN — SODIUM CHLORIDE, SODIUM LACTATE, POTASSIUM CHLORIDE, AND CALCIUM CHLORIDE: .6; .31; .03; .02 INJECTION, SOLUTION INTRAVENOUS at 01:09

## 2018-09-25 NOTE — OP NOTE
PROCEDURE DATE: 9/25/2018    Procedure: C7-T1 cervical interlaminar epidural steroid injection under utilizing fluoroscopy.    Diagnosis: Cervical Radiculopathy    POSTOP DIAGNOSIS: SAME    Physician: Oswald Abdalla MD    Medications injected:  Methylprednisone 80mg followed by a slow injection of 4 mL sterile, preservative-free normal saline.    Local anesthetic used: Lidocaine 1%, 4 ml.    Sedation Medications: 2mg versed    Complications:  none    Estimated blood loss: none    Technique:  A time-out was taken to identify patient and procedure prior to starting the procedure.  With the patient laying in a prone position with the neck in a mid-flexed forward position, the area was prepped and draped in the usual sterile fashion using ChloraPrep and a fenestrated drape.  The area was determined under AP fluoroscopic guidance.  Local anesthetic was given using a 25-gauge 1.5 inch needle by raising a wheal and then infiltrating ventrally.  A 3.5 inch 20-gauge Touhy needle was introduced under fluoroscopic guidance to meet the lamina of C7.  The needle was then hinged under the lamina then advanced using loss of resistance technique.  Once the tip of the needle was in the desired position, the contrast dye Omnipaque was injected to determine placement and no uptake.  The steroid was then injected slowly followed by a slow injection of 4 mL of the sterile preservative-free normal saline.  The patient tolerated the procedure well.    The patient was monitored after the procedure and was given post-procedure and discharge instructions to follow at home. The patient was discharged in a stable condition.

## 2018-09-25 NOTE — DISCHARGE INSTRUCTIONS
Home care instructions  Apply ice pack to the injection site for 20 minutes periods for the first 24 hrs for soreness/discomfort at injection site DO NOT USE HEAT FOR 24 HOURS  Keep site clean and dry for 24 hours, remove bandaid when desired  Do not drive until tomorrow  Take care when walking after a lumbar injection  Avoid strenuous activities for 2 days  Make take 2 weeks to feel the full effects   Resume home medication as prescribed today  Resume Aspirin, Plavix, or Coumadin the day after the procedure unless otherwise instructed.    SEE IMMEDIATE MEDICAL HELP FOR:  Severe increase in your usual pain or appearance of new pain  Prolonged or increasing weakness or numbness in the legs or arms  Drainage, redness, active bleeding, or increased swelling at the injection site  Temperature over 100.0 degrees F.  Headache that increases when your head is upright and decreases when you lie flat    CALL 911 OR GO DIRECTLY TO EMERGENCY DEPARTMENT FOR:  Shortness of breath, chest pain, or problems breathing      Recovery After Procedural Sedation (Adult)  You have been given medicine by vein to make you sleep during your surgery. This may have included both a pain medicine and sleeping medicine. Most of the effects have worn off. But you may still have some drowsiness for the next 6 to 8 hours.  Home care  Follow these guidelines when you get home:  · For the next 8 hours, you should be watched by a responsible adult. This person should make sure your condition is not getting worse.  · Don't drink any alcohol for the next 24 hours.  · Don't drive, operate dangerous machinery, or make important business or personal decisions during the next 24 hours.  Note: Your healthcare provider may tell you not to take any medicine by mouth for pain or sleep in the next 4 hours. These medicines may react with the medicines you were given in the hospital. This could cause a much stronger response than usual.  Follow-up care  Follow up  with your healthcare provider if you are not alert and back to your usual level of activity within 12 hours.  When to seek medical advice  Call your healthcare provider right away if any of these occur:  · Drowsiness gets worse  · Weakness or dizziness gets worse  · Repeated vomiting  · You can't be awakened   Date Last Reviewed: 10/18/2016  © 5043-9687 [employer]. [address] [place] [address], [place] [address]. All rights reserved. This information is not intended as a substitute for professional medical care. Always follow your healthcare professional's instructions.

## 2018-09-25 NOTE — TELEPHONE ENCOUNTER
I left a message asking how the patient was feeling and told them to call back or return for another visit if needed.

## 2018-09-25 NOTE — H&P
CC:Neck pain     HPI: The patient is a 58yo woman with a history of cervical radiculopathy here for cervical YARON. There are no major changes in history and physical from 8/16/18.    Past Medical History:   Diagnosis Date    Allergy     Arthritis of spine 2011    Chronic low back pain     DDD (degenerative disc disease), lumbar     Diverticulitis     Diverticulosis     DJD (degenerative joint disease)     Encounter for blood transfusion     Factor V Leiden     GERD (gastroesophageal reflux disease)     Hiatal hernia     Migraines     PONV (postoperative nausea and vomiting)     geta       Past Surgical History:   Procedure Laterality Date    APPENDECTOMY  1981    CARPAL TUNNEL RELEASE  2011    right    COLONOSCOPY  2014    reduntant colon, otherwise normal findings repeat in 8-10 years for surveillance    COLONOSCOPY N/A 1/16/2014    Performed by Mark Palomares Jr., [facility] at [facility] ENDO    Epidural Steroid Injection      Pain Management    ESOPHAGOGASTRODUODENOSCOPY      ESOPHAGOGASTRODUODENOSCOPY (EGD) N/A 5/17/2016    Performed by Marty Arreguin MD at [facility] ENDO    Facet injection      Pain Management    HYSTERECTOMY      ovaries removed    INJECTION, STEROID, SPINE, LUMBAR, EPIDURAL N/A 1/8/2014    [facility] at [facility] OR    INJECTION-FACET L3/4 and L4/5 Bilateral 4/13/2015    [facility] at [facility] OR    INJECTION-STEROID-EPIDURAL-LUMBAR N/A 3/10/2017    [facility] at [facility]    INJECTION-STEROID-EPIDURAL-LUMBAR N/A 6/9/2014    [facility] at [facility] OR    KIDNEY SURGERY Right 1967    to correct urinary reflux into kidney    OVARIAN CYST REMOVAL Right 1981    RADIOFREQUENCY ABLATION OF LUMBAR MEDIAL BRANCH NERVE AT SINGLE LEVEL Bilateral 7/17/2018    Procedure: RADIOFREQUENCY ABLATION, NERVE, MEDIAL BRANCH, LUMBAR, L2,3,4;  Surgeon: Oswald Abdalla MD;  Location: [facility] OR;  Service: Pain  Management;  Laterality: Bilateral;    RADIOFREQUENCY ABLATION, NERVE, MEDIAL BRANCH, LUMBAR, L2,3,4 Bilateral 2018    Per[facility] at [facility]    RADIOFREQUENCY THERMOCOAGULATION (RFTC)-NERVE-MEDIAN BRANCH-LUMBAR L2,3,4 Bilateral 2017    Performed by Oswald Abdalla MD at [facility] OR    RADIOFREQUENCY THERMOCOAGULATION (RFTC)-NERVE-MEDIAN BRANCH-LUMBAR L2,3,4 Bilateral 2016    Performed by Oswald [facility]alla MD at [facility] OR    RADIOFREQUENCY THERMOCOAGULATION (RFTC)-NERVE-MEDIAN BRANCH-LUMBAR L2,3,4 Bilateral 2015    Performed by [facility] at [facility] OR    SHOULDER SURGERY  2010    rotator cuff, right       Family History   Problem Relation Age of Onset    Heart attack Mother     Heart disease Mother     COPD Mother     Hypertension Mother     Hepatitis Sister     COPD Father     No Known Problems Daughter     Allergic rhinitis Neg Hx     Angioedema Neg Hx     Asthma Neg Hx     Atopy Neg Hx     Eczema Neg Hx     Immunodeficiency Neg Hx     Urticaria Neg Hx     Colon cancer Neg Hx     Colon polyps Neg Hx        Social History     Socioeconomic History    Marital status:      Spouse name: None    Number of children: 1    Years of education: None    Highest education level: None   Social Needs    Financial resource strain: None    Food insecurity - worry: None    Food insecurity - inability: None    Transportation needs - medical: None    Transportation needs - non-medical: None   Occupational History     Employer: [employer]   Tobacco Use    Smoking status: Former Smoker     Packs/day: 1.00     Years: 5.00     Pack years: 5.00     Start date: 1992     Last attempt to quit: 1997     Years since quittin.7    Smokeless tobacco: Never Used   Substance and Sexual Activity    Alcohol use: No     Alcohol/week: 0.0 oz     Comment: occasionally    Drug use: No    Sexual activity: Yes     Partners: Male   Other  Topics Concern    None   Social History Narrative    ** Merged History Encounter **            No current facility-administered medications for this encounter.        Review of patient's allergies indicates:   Allergen Reactions    Hydrocodone Hives and Nausea Only           Sulfa (sulfonamide antibiotics) Hives and Rash           Doxycycline Rash     Itchy rash and some scattered itchy lesions    Oxycodone Itching and Nausea And Vomiting       Vitals:    09/25/18 1305   BP: (!) 141/89   Pulse: 77   Resp: 18   Temp: 97.7 °F (36.5 °C)   TempSrc: Skin   SpO2: 97%       REVIEW OF SYSTEMS:     GENERAL: No weight loss, malaise or fevers.  HEENT:  No recent changes in vision or hearing  NECK: Negative for lumps, no difficulty with swallowing.  RESPIRATORY: Negative for cough, wheezing or shortness of breath, patient denies any recent URI.  CARDIOVASCULAR: Negative for chest pain, leg swelling or palpitations.  GI: Negative for abdominal discomfort, blood in stools or black stools or change in bowel habits.  MUSCULOSKELETAL: See HPI.  SKIN: Negative for lesions, rash, and itching.  PSYCH: No suicidal or homicidal ideations, no current mood disturbances.  HEMATOLOGY/LYMPHOLOGY: Negative for prolonged bleeding, bruising easily or swollen nodes. Patient is not currently taking any anti-coagulants  ENDO: No history of diabetes or thyroid dysfunction  NEURO: No history of syncope, paralysis, seizures or tremors.All other reviewed and negative other than HPI.    Physical exam:  Gen: A and O x3, pleasant, well-groomed  Skin: No rashes or obvious lesions  HEENT: PERRLA, no obvious deformities on ears or in canals. No thyroid masses, trachea midline, no palpable lymph nodes in neck, axilla.  CVS: Regular rate and rhythm, normal S1 and S2, no murmurs.  Resp: Clear to auscultation bilaterally.  Abdomen: Soft, NT/ND, normal bowel sounds present.  Musculoskeletal/Neuro: Moving all extremities    Assessment:  Cervical  radiculopathy  -     Case Request Operating Room: Injection-steroid-epidural-cervical  -     Place in Outpatient; Standing  -     Diet NPO; Standing  -     lactated ringers infusion; Inject into the vein continuous.  -     Notify physician ; Standing  -     Notify physician ; Standing  -     Notify physician (specify); Standing  -     Place 18-22 gauage peripheral IV ; Standing  -     Verify informed consent; Standing  -     Vital signs; Standing

## 2018-09-25 NOTE — DISCHARGE SUMMARY
[facility]  Discharge Note  Short Stay    Admit Date: 9/25/2018    Discharge Date: 9/25/2018    Attending Physician: Oswald Abdalla MD     Discharge Provider: Oswald Abdalla    Diagnoses:  Active Hospital Problems    Diagnosis  POA    *Cervical radiculopathy [M54.12]  Yes      Resolved Hospital Problems   No resolved problems to display.       Discharged Condition: good    Final Diagnoses: Cervical radiculopathy [M54.12]    Disposition: Home or Self Care    Hospital Course: no complications, uneventful    Outcome of Hospitalization, Treatment, Procedure, or Surgery:  Patient was admitted for outpatient procedure. The patient underwent procedure without complications and are discharged home    Follow up/Patient Instructions:  Follow up as scheduled in Pain Management clinic in 3-4 weeks/Patient has received instructions and follow up date and time    Medications:  Continue previous medications    Discharge Procedure Orders   Call MD for:  temperature >100.4     Call MD for:  severe uncontrolled pain     Call MD for:  redness, tenderness, or signs of infection (pain, swelling, redness, odor or green/yellow discharge around incision site)     Call MD for:  severe persistent headache     No dressing needed         Discharge Procedure Orders (must include Diet, Follow-up, Activity):   Discharge Procedure Orders (must include Diet, Follow-up, Activity)   Call MD for:  temperature >100.4     Call MD for:  severe uncontrolled pain     Call MD for:  redness, tenderness, or signs of infection (pain, swelling, redness, odor or green/yellow discharge around incision site)     Call MD for:  severe persistent headache     No dressing needed

## 2018-09-25 NOTE — PLAN OF CARE
Patient tolerating oral liquids without difficulty. No apparent s&s of distress noted at this time. Pt states pain 7/10 to right side of neck.. Injection site free from redness and drainage.  Discharge instructions reviewed with patient/family/friend with good verbal feedback received. Patient ready for discharge

## 2018-10-17 ENCOUNTER — PATIENT MESSAGE (OUTPATIENT)
Dept: PAIN MEDICINE | Facility: CLINIC | Age: 58
End: 2018-10-17

## 2018-10-23 ENCOUNTER — IMMUNIZATION (OUTPATIENT)
Dept: PHARMACY | Facility: CLINIC | Age: 58
End: 2018-10-23
Payer: COMMERCIAL

## 2018-10-23 ENCOUNTER — OFFICE VISIT (OUTPATIENT)
Dept: PRIMARY CARE CLINIC | Facility: CLINIC | Age: 58
End: 2018-10-23
Payer: COMMERCIAL

## 2018-10-23 VITALS
TEMPERATURE: 98 F | BODY MASS INDEX: 39.63 KG/M2 | SYSTOLIC BLOOD PRESSURE: 142 MMHG | WEIGHT: 232.13 LBS | DIASTOLIC BLOOD PRESSURE: 78 MMHG | HEART RATE: 76 BPM | OXYGEN SATURATION: 98 % | HEIGHT: 64 IN

## 2018-10-23 DIAGNOSIS — R10.13 ABDOMINAL PAIN, ACUTE, EPIGASTRIC: Primary | ICD-10-CM

## 2018-10-23 DIAGNOSIS — K59.00 CONSTIPATION, UNSPECIFIED CONSTIPATION TYPE: ICD-10-CM

## 2018-10-23 PROCEDURE — 3008F BODY MASS INDEX DOCD: CPT | Mod: CPTII,S$GLB,, | Performed by: NURSE PRACTITIONER

## 2018-10-23 PROCEDURE — 99999 PR PBB SHADOW E&M-EST. PATIENT-LVL V: CPT | Mod: PBBFAC,,, | Performed by: NURSE PRACTITIONER

## 2018-10-23 PROCEDURE — 99214 OFFICE O/P EST MOD 30 MIN: CPT | Mod: S$GLB,,, | Performed by: NURSE PRACTITIONER

## 2018-10-23 RX ORDER — ESTRADIOL 1 MG/G
1 GEL TOPICAL DAILY
Refills: 3 | COMMUNITY
Start: 2018-10-01 | End: 2019-05-17

## 2018-10-23 RX ORDER — POLYETHYLENE GLYCOL 3350 17 G/17G
POWDER, FOR SOLUTION ORAL
Qty: 100 EACH | Refills: 2 | Status: SHIPPED | OUTPATIENT
Start: 2018-10-23 | End: 2019-09-03

## 2018-10-23 RX ORDER — MELOXICAM 15 MG/1
15 TABLET ORAL DAILY
Refills: 3 | COMMUNITY
Start: 2018-10-21 | End: 2019-01-24 | Stop reason: SDUPTHER

## 2018-10-23 RX ORDER — NYSTATIN 100000 U/G
CREAM TOPICAL DAILY PRN
Refills: 1 | Status: ON HOLD | COMMUNITY
Start: 2018-08-26 | End: 2018-12-27 | Stop reason: CLARIF

## 2018-10-23 NOTE — PROGRESS NOTES
Elizabeth Giordano is a 57 y.o. female patient of Dr. JEANCARLOS Espinosa MD who presents to the clinic today for   Chief Complaint   Patient presents with    Nausea    Generalized Body Aches   .    HPI    Patient, who is known to me, reports a new problem to me: stopped the Effexor by weaning off of it (under Dr. Cortes's care) and had some nausea.  Last dose >1 month.  Nausea never resolved.    Took Zofran for this but it or reglan caused constipation.  Was feeling very nauseated earlier today and took Zofran.  Not sure if the body aches are related or if it's r/t allergies--has some nasal congestion, drip, ear congestion, feeling achey.  No fever.    These symptoms began 3 days ago and status is worse.  No vomiting but has had dry heaves.     Pt denies the following symptoms:  fever    Aggravating factors include eating .    Relieving factors include Zofran .    OTC Medications tried are Contact--helped her allergy sxs.    Prescription medications taken for symptoms are Zofran.    Pertinent medical history:  H/o allergies, migraine.      ROS    Constitutional:  No fever, ++ fatigue.  Doesn't sleep well--congestion, achey.  Worse since 3 days ago but is a recurrent problem for her    Head:    + headache (not migraine)  Ears:    No pain but are congested  Eyes:    No discharge, no irritation, no change in vision  Nose:    No sinus pain, + congestion, + runny nose.  Throat:  + mild ST pain.    Heart:    No palpitations, no chest pain.    Lungs:   No difficulty breathing, pain in the back with breathing, occ coughing, no sputum production--maybe irritation from the drip.    GI:  No stomach ache, + nausea, no vomiting, no diarrhea, + constipation       Tends toward constipation.  Eats prunes, sometimes helps.  Also taking probiotics.  Gets cramping and rumbling in the stomach.    Urinary:  No change in urination.    MS:  No change in bones, joints or muscles except achiness.    Skin:  No rashes, no  itching.      PAST MEDICAL HISTORY:  Past Medical History:   Diagnosis Date    Allergy     Arthritis of spine 2011    Chronic low back pain     DDD (degenerative disc disease), lumbar     Diverticulitis     Diverticulosis     DJD (degenerative joint disease)     Encounter for blood transfusion     Factor V Leiden     GERD (gastroesophageal reflux disease)     Hiatal hernia     Migraines     PONV (postoperative nausea and vomiting)     geta       PAST SURGICAL HISTORY:  Past Surgical History:   Procedure Laterality Date    APPENDECTOMY  1981    CARPAL TUNNEL RELEASE  2011    right    COLONOSCOPY  2014    reduntant colon, otherwise normal findings repeat in 8-10 years for surveillance    COLONOSCOPY N/A 1/16/2014    Performed by Mark Palomares Jr., MD at [facility] ENDO    Epidural Steroid Injection      Pain Management    EPIDURAL STEROID INJECTION INTO CERVICAL SPINE N/A 9/25/2018    Procedure: Injection-steroid-epidural-cervical;  Surgeon: Oswald Abdalla MD;  Location: [facility] OR;  Service: Pain Management;  Laterality: N/A;    ESOPHAGOGASTRODUODENOSCOPY      ESOPHAGOGASTRODUODENOSCOPY (EGD) N/A 5/17/2016    Performed by Marty Arreguin MD at [facility] ENDO    Facet injection      Pain Management    HYSTERECTOMY      ovaries removed    INJECTION, STEROID, SPINE, LUMBAR, EPIDURAL N/A 1/8/2014    Performed by [facility]alla [facility] at [facility] OR    INJECTION-FACET L3/4 and L4/5 Bilateral 4/13/2015    [facility] at [facility] OR    Injection-steroid-epidural-cervical N/A 9/25/2018    [facility] at [facility] OR    INJECTION-STEROID-EPIDURAL-LUMBAR N/A 3/10/2017    [facility] by [facility]alla [facility] at [facility] OR    INJECTION-STEROID-EPIDURAL-LUMBAR N/A 6/9/2014    [facility] at [facility] OR    KIDNEY SURGERY Right 1967    to correct urinary reflux into kidney    OVARIAN CYST REMOVAL Right 1981    RADIOFREQUENCY ABLATION OF LUMBAR MEDIAL  BRANCH NERVE AT SINGLE LEVEL Bilateral 2018    Procedure: RADIOFREQUENCY ABLATION, NERVE, MEDIAL BRANCH, LUMBAR, L2,3,4;  Surgeon: Oswald Abdalla MD;  Location: [facility] OR;  Service: Pain Management;  Laterality: Bilateral;    RADIOFREQUENCY ABLATION, NERVE, MEDIAL BRANCH, LUMBAR, L2,3,4 Bilateral 2018    Performed by Oswald Abdalla MD at [facility] OR    RADIOFREQUENCY THERMOCOAGULATION ([facility])-NERVE-MEDIAN BRANCH-LUMBAR L2,3,4 Bilateral 2017    Performed by [facility] Abdalla MD at [facility] OR    RADIOFREQUENCY THERMOCOAGULATION ([facility])-NERVE-MEDIAN BRANCH-LUMBAR L2,3,4 Bilateral 2016    Performed by [facility]alla [facility] at [facility] OR    RADIOFREQUENCY THERMOCOAGULATION ([facility])-NERVE-MEDIAN BRANCH-LUMBAR L2,3,4 Bilateral 2015    Performed by [facility]alla [facility] at [facility] OR    SHOULDER SURGERY  2010    rotator cuff, right       SOCIAL HISTORY:  Social History     Socioeconomic History    Marital status:      Spouse name: Not on file    Number of children: 1    Years of education: Not on file    Highest education level: Not on file   Social Needs    Financial resource strain: Not on file    Food insecurity - worry: Not on file    Food insecurity - inability: Not on file    Transportation needs - medical: Not on file    Transportation needs - non-medical: Not on file   Occupational History     Employer: [employer]   Tobacco Use    Smoking status: Former Smoker     Packs/day: 1.00     Years: 5.00     Pack years: 5.00     Start date: 1992     Last attempt to quit: 1997     Years since quittin.8    Smokeless tobacco: Never Used   Substance and Sexual Activity    Alcohol use: No     Alcohol/week: 0.0 oz     Comment: occasionally    Drug use: No    Sexual activity: Yes     Partners: Male   Other Topics Concern    Not on file   Social History Narrative    ** Merged History Encounter **            FAMILY HISTORY:  Family History   Problem Relation Age  of Onset    Heart attack Mother     Heart disease Mother     COPD Mother     Hypertension Mother     Hepatitis Sister     COPD Father     No Known Problems Daughter     Allergic rhinitis Neg Hx     Angioedema Neg Hx     Asthma Neg Hx     Atopy Neg Hx     Eczema Neg Hx     Immunodeficiency Neg Hx     Urticaria Neg Hx     Colon cancer Neg Hx     Colon polyps Neg Hx        ALLERGIES AND MEDICATIONS: updated and reviewed.  Review of patient's allergies indicates:   Allergen Reactions    Hydrocodone Hives and Nausea Only           Sulfa (sulfonamide antibiotics) Hives and Rash           Doxycycline Rash     Itchy rash and some scattered itchy lesions    Oxycodone Itching and Nausea And Vomiting     Current Outpatient Medications   Medication Sig Dispense Refill    azelastine (ASTELIN) 137 mcg (0.1 %) nasal spray 1 spray (137 mcg total) by Nasal route 2 (two) times daily. 30 mL 3    diphenhydrAMINE (BENADRYL) 25 mg capsule Take 25 mg by mouth every 6 (six) hours as needed.      DIVIGEL 1 mg/gram (0.1 %) topical gel Apply 1 packet topically once daily.  3    ibuprofen (ADVIL,MOTRIN) 800 MG tablet TAKE 1 TABLET(800 MG) BY MOUTH TWICE DAILY AS NEEDED FOR PAIN 60 tablet 0    meloxicam (MOBIC) 15 MG tablet Take 15 mg by mouth once daily.  3    naratriptan (AMERGE) 2.5 MG tablet 2.5 mg PO BID x 5 days at onset of migraine flare 10 tablet 11    nystatin (MYCOSTATIN) cream daily as needed.  1    ondansetron (ZOFRAN) 4 MG tablet Take 1 tablet (4 mg total) by mouth every 6 (six) hours as needed for Nausea. 30 tablet 11    pantoprazole (PROTONIX) 40 MG tablet Take 1 tablet (40 mg total) by mouth once daily. 90 tablet 1    sumatriptan (IMITREX) 100 MG tablet MAY TAKE 1 TABLET THEN FOLLOWUP WITH ANOTHER TABLET IN 2 HOURS IF HEADACHE NOT RESOLVED 9 tablet 11    tiZANidine (ZANAFLEX) 2 MG tablet Take 1 tablet (2 mg total) by mouth every evening. 90 tablet 3    traMADol (ULTRAM) 50 mg tablet TAKE 1 TABLET  BY MOUTH TWICE DAILY AS NEEDED 60 tablet 2     Current Facility-Administered Medications   Medication Dose Route Frequency Provider Last Rate Last Dose    onabotulinumtoxina injection 200 Units  200 Units Intramuscular Q90 Days Neva Cortes MD   200 Units at 08/16/18 1625         PHYSICAL EXAM    Alert, coop 57 y.o. female patient in no acute distress, not ill appearing.    Vitals:    10/23/18 1522   BP: (!) 142/78   Pulse: 76   Temp: 98.3 °F (36.8 °C)     VS reviewed.  VS SBP mildly elevated.  CC, nursing note, medications & PMH all reviewed today.    Head:  Normocephalic, atraumatic.    EENT:  EACs patent.  TMs dull light reflex, no erythema, no effusions.               Eye lids normal, no discharge present.  Conjunctiva not injected.               Nasal turbinates mildly edematous.     No sinuses are tender to palpation.    Pharynx not injected.               Tonsils not injected, not enlarged, no exudate present.    No anterior, no posterior cervical lymph nodes palpable.    No submental, submandibular or supraclavicular lymph nodes palp.             Resp:  Respirations even, unlabored   Lungs CTA bilat.     Heart:  RRR, no MRG.    ABD:  Soft, round, NT to palp.  Normal BS in all 4 quadrants.  No rebound or organomegaly.              No peritoneal signs.  No CVAT.    MS:   Ambulates normally .    NEURO:  Alert and oriented x 4.  Responds appropriately during interaction.                  Gait steady.       Skin:  Warm, dry, color good.    Psych:  Responds appropriately throughout the visit.               Relaxed.  Well-groomed.    Answers for HPI/ROS submitted by the patient on 10/22/2018   activity change: No  unexpected weight change: No  neck pain: Yes  hearing loss: No  rhinorrhea: Yes  trouble swallowing: No  eye discharge: No  visual disturbance: No  chest tightness: No  wheezing: No  chest pain: No  palpitations: No  blood in stool: No  constipation: No  vomiting: No  diarrhea: No  polydipsia:  No  polyuria: No  difficulty urinating: No  hematuria: No  menstrual problem: No  dysuria: No  joint swelling: No  arthralgias: Yes  headaches: No  weakness: No  confusion: No  dysphoric mood: No    Abdominal pain, acute, epigastric    Constipation, unspecified constipation type  -     polyethylene glycol (GLYCOLAX) 17 gram PwPk; 1/2 T or 1 capful in 8 oz water daily until daily bowel movement then every other day to maintain regularity.  Dispense: 100 each; Refill: 2    Pt today presents with body aches, fatigue, headaches, mild nasal congestion and sore throat, nausea and some constipation for 3 days.  Thought it may be r/t weaning off the Effexor because she started with nausea a month ago when she stopped it and the nausea never resolved.  Last does was > 1 month ago so unlikely r/t this.  Then thought the constipation may be r/t to the zofran or the reglan..  Our reference states diarrhea is more likely a problem with these so likely not these either.    This is a new problem to me.  No work up is planned.        I explained to the pt there is no resp infection and to use comfort measures for the nasal/throat sxs.  The cause of the abd pain/nausea is something that may take several steps to work out.  The first step is to relieve the constipation.  I also advised her of the s/s indicating that a serious problem may be developing and prompt or emergent evaluation is warranted.  Pt advised to perform comfort measures recommended on patient instruction sheet .    F/u with PCP team scheduled 10/31/18.  Explained exam findings, diagnosis and treatment plan to patient.  Questions answered and patient states understanding.

## 2018-10-23 NOTE — PATIENT INSTRUCTIONS
Nausea may be due to the constipation, so let's clear that up and see if you feel better.  If not, follow up with Dr. Espinosa or Glenna Ricks, [facility].  The zofran is not suspected to be contributing to the nausea.      CONSTIPATION    Constipation is a condition where you begin to have bowel movements much less often than your normal pattern.  Not everyone has a daily bowel movement and some have more than one a day.  When you have fewer, the stool may become hard, hard to pass, you may get a stomach ache and your appetite may decrease.    The usual causes of constipation include:     diets high in dairy products or complex carbohydrates (junk food);   diets low in fiber  insufficient fluid intake  not getting enough exercise, and   not going to the bathroom, when you have the urge to go.    Some medications can cause constipation, as well.    When you have constipation or to prevent it:    1.  Increase intake of liquids (apple juice, pineapple juice, water, etc).    2.  Eat a high fiber diet (fruits, vegetable, bran).  You may benefit from adding a fiber product like Metamucil, Konsyl or Benefiber or other fiber capsules to the daily routine.      3. Stool softeners (like docusate) may also help to prevent constipation..    *4. Miralax 1 capful in 8 oz water can be used when constipated or every other day/daily for prevention of constipation.    5.  Increase your activity.    4.  Set aside time to go to the bathroom, like after eating, and when you feel the urge to go (avoid delaying).    5.  Enemas, laxatives (milk of magnesia, dulcolax, magnesium citrate), or suppositories are sometimes needed.  (Caution:  Overuse can cause a chronic constipation problem.)    Try one or all of recommendations 1-4 and only use #5 if you cannot resolve the constipation.    Discuss this with your doctor at your next appointment.    Call us or see your doctor for an appointment if you have symptoms like stomach ache or if the problem  significantly worsens.    You can reach us at [phone] Monday through Thursday (except holidays) 10 a.m. to 6 p.m.    Go to emergency room if you begin to have nausea or vomiting while constipated.    Thank you for using the [facility]!    JEFFREY Baker, CNP, FNP-BC  [facility]

## 2018-10-26 ENCOUNTER — PATIENT MESSAGE (OUTPATIENT)
Dept: PRIMARY CARE CLINIC | Facility: CLINIC | Age: 58
End: 2018-10-26

## 2018-10-26 NOTE — TELEPHONE ENCOUNTER
No appointments this afternoon in clinic per Dr. Espinosa.  Patient states today has been one week since last BM.   Taking Glycolax as prescribed with increased fluids. Nausea still present. Advised ER for eval. Patient verbalized understanding.

## 2018-10-26 NOTE — TELEPHONE ENCOUNTER
Pt has not had a bowel and it's been a week, She is still nauseous.  She has been taking the Polyeth Glycol every day and last night  took 2 Dulcalax and still nothing. She is in some pain and don't know what to do next.   Please advise, ER?

## 2018-10-29 NOTE — TELEPHONE ENCOUNTER
Spoke with patient to see if she went to the hospital, patient states she was still at work and was going to wait to go to her GI.

## 2018-10-30 ENCOUNTER — OFFICE VISIT (OUTPATIENT)
Dept: PAIN MEDICINE | Facility: CLINIC | Age: 58
End: 2018-10-30
Payer: COMMERCIAL

## 2018-10-30 ENCOUNTER — HOSPITAL ENCOUNTER (OUTPATIENT)
Dept: RADIOLOGY | Facility: HOSPITAL | Age: 58
Discharge: HOME | End: 2018-10-30
Attending: NURSE PRACTITIONER
Payer: COMMERCIAL

## 2018-10-30 ENCOUNTER — OFFICE VISIT (OUTPATIENT)
Dept: FAMILY MEDICINE | Facility: CLINIC | Age: 58
End: 2018-10-30
Payer: COMMERCIAL

## 2018-10-30 VITALS
TEMPERATURE: 98 F | OXYGEN SATURATION: 98 % | DIASTOLIC BLOOD PRESSURE: 88 MMHG | RESPIRATION RATE: 16 BRPM | HEIGHT: 64 IN | WEIGHT: 231.94 LBS | SYSTOLIC BLOOD PRESSURE: 126 MMHG | HEART RATE: 77 BPM | BODY MASS INDEX: 39.6 KG/M2

## 2018-10-30 VITALS
OXYGEN SATURATION: 99 % | DIASTOLIC BLOOD PRESSURE: 70 MMHG | BODY MASS INDEX: 39.87 KG/M2 | SYSTOLIC BLOOD PRESSURE: 140 MMHG | TEMPERATURE: 97 F | RESPIRATION RATE: 18 BRPM | HEART RATE: 78 BPM | WEIGHT: 232.25 LBS

## 2018-10-30 DIAGNOSIS — R07.81 RIB PAIN ON LEFT SIDE: Primary | ICD-10-CM

## 2018-10-30 DIAGNOSIS — M48.061 SPINAL STENOSIS OF LUMBAR REGION WITHOUT NEUROGENIC CLAUDICATION: ICD-10-CM

## 2018-10-30 DIAGNOSIS — K59.00 CONSTIPATION, UNSPECIFIED CONSTIPATION TYPE: ICD-10-CM

## 2018-10-30 DIAGNOSIS — M50.30 DDD (DEGENERATIVE DISC DISEASE), CERVICAL: ICD-10-CM

## 2018-10-30 DIAGNOSIS — M54.12 CERVICAL RADICULOPATHY: ICD-10-CM

## 2018-10-30 DIAGNOSIS — E66.01 CLASS 3 SEVERE OBESITY DUE TO EXCESS CALORIES IN ADULT, UNSPECIFIED BMI, UNSPECIFIED WHETHER SERIOUS COMORBIDITY PRESENT: ICD-10-CM

## 2018-10-30 DIAGNOSIS — R07.81 RIB PAIN ON LEFT SIDE: ICD-10-CM

## 2018-10-30 DIAGNOSIS — M51.36 DDD (DEGENERATIVE DISC DISEASE), LUMBAR: Primary | ICD-10-CM

## 2018-10-30 PROCEDURE — 71046 X-RAY EXAM CHEST 2 VIEWS: CPT | Mod: 26,,, | Performed by: RADIOLOGY

## 2018-10-30 PROCEDURE — 99999 PR PBB SHADOW E&M-EST. PATIENT-LVL IV: CPT | Mod: PBBFAC,,, | Performed by: PHYSICIAN ASSISTANT

## 2018-10-30 PROCEDURE — 99214 OFFICE O/P EST MOD 30 MIN: CPT | Mod: S$GLB,,, | Performed by: PHYSICIAN ASSISTANT

## 2018-10-30 PROCEDURE — 99999 PR PBB SHADOW E&M-EST. PATIENT-LVL V: CPT | Mod: PBBFAC,,, | Performed by: NURSE PRACTITIONER

## 2018-10-30 PROCEDURE — 3008F BODY MASS INDEX DOCD: CPT | Mod: CPTII,S$GLB,, | Performed by: NURSE PRACTITIONER

## 2018-10-30 PROCEDURE — 3008F BODY MASS INDEX DOCD: CPT | Mod: CPTII,S$GLB,, | Performed by: PHYSICIAN ASSISTANT

## 2018-10-30 PROCEDURE — 99214 OFFICE O/P EST MOD 30 MIN: CPT | Mod: S$GLB,,, | Performed by: NURSE PRACTITIONER

## 2018-10-30 PROCEDURE — 71046 X-RAY EXAM CHEST 2 VIEWS: CPT | Mod: TC,FY,PO

## 2018-10-30 RX ORDER — DIAZEPAM 5 MG/1
5 TABLET ORAL
Qty: 1 TABLET | Refills: 0 | Status: ON HOLD | OUTPATIENT
Start: 2018-10-30 | End: 2018-12-27 | Stop reason: CLARIF

## 2018-10-30 NOTE — PROGRESS NOTES
Subjective:       Patient ID: Elizabeth Giordano is a 58 y.o. female.    Chief Complaint: Follow-up and Abdominal Pain (LUQ - improving )    Patient who is new to me presents with constipation.       Constipation   This is a recurrent problem. The current episode started more than 1 year ago. The problem has been waxing and waning since onset. Her stool frequency is 2 to 3 times per week. The stool is described as firm and formed. The patient is on a high fiber diet (prunes, fiber muffins, salads). She does not exercise regularly (unable to due to back). There has not been adequate water intake. Associated symptoms include abdominal pain, back pain and nausea. Pertinent negatives include no anorexia, bloating, diarrhea, difficulty urinating, fecal incontinence, fever, flatus, hematochezia, hemorrhoids or vomiting. Risk factors include obesity and immobility. Treatments tried: glycolax, probiotics. The treatment provided mild relief. There is no history of irritable bowel syndrome.   Abdominal Pain   This is a new problem. The current episode started 1 to 4 weeks ago (2 weeks). The onset quality is gradual. The problem occurs daily. The problem has been unchanged. The pain is located in the epigastric region. The pain is at a severity of 5/10. The pain is moderate. The quality of the pain is aching. The abdominal pain does not radiate. Associated symptoms include constipation and nausea. Pertinent negatives include no anorexia, arthralgias, diarrhea, dysuria, fever, flatus, frequency, headaches, hematochezia, myalgias or vomiting. The pain is aggravated by movement. The pain is relieved by movement. She has tried nothing for the symptoms. The treatment provided mild relief. There is no history of irritable bowel syndrome.     Review of Systems   Constitutional: Negative for chills, fatigue and fever.   HENT: Negative for congestion, sinus pressure, sinus pain, sneezing and sore throat.    Respiratory: Negative  for cough, chest tightness, shortness of breath and wheezing.    Cardiovascular: Negative for chest pain, palpitations and leg swelling.   Gastrointestinal: Positive for abdominal pain, constipation and nausea. Negative for abdominal distention, anorexia, bloating, diarrhea, flatus, hematochezia, hemorrhoids and vomiting.   Genitourinary: Negative for decreased urine volume, difficulty urinating, dysuria, frequency and urgency.   Musculoskeletal: Positive for back pain. Negative for arthralgias, gait problem, joint swelling and myalgias.   Skin: Negative for rash and wound.   Neurological: Negative for dizziness, light-headedness, numbness and headaches.       Objective:      Physical Exam   Constitutional: She is oriented to person, place, and time. She appears well-developed and well-nourished.   HENT:   Head: Normocephalic and atraumatic.   Right Ear: External ear normal.   Left Ear: External ear normal.   Nose: Nose normal.   Mouth/Throat: Oropharynx is clear and moist.   Eyes: Pupils are equal, round, and reactive to light.   Neck: Normal range of motion.   Cardiovascular: Normal rate, regular rhythm, normal heart sounds and intact distal pulses.   Pulmonary/Chest: Effort normal and breath sounds normal.       Abdominal: Soft. Bowel sounds are normal.   Musculoskeletal: Normal range of motion.   Neurological: She is alert and oriented to person, place, and time.   Skin: Skin is warm and dry.   Nursing note and vitals reviewed.      Assessment:       1. Rib pain on left side    2. Constipation, unspecified constipation type    3. Class 3 severe obesity due to excess calories in adult, unspecified BMI, unspecified whether serious comorbidity present        Plan:       Rib pain on left side  -     X-Ray Chest PA And Lateral; Future; Expected date: 10/30/2018    Constipation, unspecified constipation type  Improved along with abdominal pain. Advised to continue glycolax. Discussed increase fluids, exercise, and  high fiber diet.   Class 3 severe obesity due to excess calories in adult, unspecified BMI, unspecified whether serious comorbidity present  Counseled on diet and exercise.     Patient tender to rib on palpation. Abdomen soft and no tenderness noted. Counseled on lifestyle changes for constipation. Last colonoscopy 3 years ago was normal. Patient to follow up with any new or concerning symptoms

## 2018-10-30 NOTE — PATIENT INSTRUCTIONS
Rib Contusion     A rib contusion is a bruise to one or more rib bones. It may cause pain, tenderness, swelling and a purplish discoloration. There may be a sharp pain while breathing.  You will be assessed for other injuries. You will likely be given pain medicine. Rib contusions heal on their own, without further treatment. However, pain may take weeks to months to go away.   Note that a small crack (fracture) in the rib may cause the same symptoms as a rib contusion. The small crack may not be seen on a chest X-ray. However, the conditions are managed in the same way.  Home care  · Rest. Avoid heavy lifting, strenuous exertion, or any activity that causes pain.  · Ice the area to reduce pain and swelling. Put ice cubes in a plastic bag or use a cold pack. (Wrap the cold source in a thin towel. Do not place it directly on your skin.) Ice the injured area for 20 minutes every 1 to 2 hours the first day. Continue with ice packs 3 to 4 times a day for the next 2 days, then as needed for the relief of pain and swelling.  · Take any prescribed pain medicine as directed by your healthcare provider. If none was prescribed, take acetaminophen, ibuprofen, or naproxen to control pain.  · If you have a significant injury, you may be given a device called an incentive spirometer to keep your lungs healthy. Use as directed.  Follow-up care  Follow up with your healthcare provider during the next week or as directed.  When to seek medical advice  Call your healthcare provider for any of the following:  · Shortness of breath or trouble breathing  · Increasing chest pain with breathing  · Coughing  · Dizziness, weakness, or fainting  · New or worsening pain  · Fever of 100.4°F (38ºC) or higher, or as directed by your healthcare provider  Date Last Reviewed: 2/1/2017  © 1562-4529 [employer]. [address] [place] [address], [place] [address]. All rights reserved. This information is not intended as a substitute for  professional medical care. Always follow your healthcare professional's instructions.        Constipation (Adult)  Constipation means that you have bowel movements that are less frequent than usual. Stools often become very hard and difficult to pass.  Constipation is very common. At some point in life it affects almost everyone. Since everyone's bowel habits are different, what is constipation to one person may not be to another. Your healthcare provider may do tests to diagnose constipation. It depends on what he or she finds when evaluating you.    Symptoms of constipation include:  · Abdominal pain  · Bloating  · Vomiting  · Painful bowel movements  · Itching, swelling, bleeding, or pain around the anus  Causes  Constipation can have many causes. These include:  · Diet low in fiber  · Too much dairy  · Not drinking enough liquids  · Lack of exercise or physical activity. This is especially true for older adults.  · Changes in lifestyle or daily routine, including pregnancy, aging, work, and travel  · Frequent use or misuse of laxatives  · Ignoring the urge to have a bowel movement or delaying it until later  · Medicines, such as certain prescription pain medicines, iron supplements, antacids, certain antidepressants, and calcium supplements  · Diseases like irritable bowel syndrome, bowel obstructions, stroke, diabetes, thyroid disease, Parkinson disease, hemorrhoids, and colon cancer  Complications  Potential complications of constipation can include:  · Hemorrhoids  · Rectal bleeding from hemorrhoids or anal fissures (skin tears)  · Hernias  · Dependency on laxatives  · Chronic constipation  · Fecal impaction  · Bowel obstruction or perforation  Home care  All treatment should be done after talking with your healthcare provider. This is especially true if you have another medical problems, are taking prescription medicines, or are an older adult. Treatment most often involves lifestyle changes. You may also  need medicines. Your healthcare provider will tell you which will work best for you. Follow the advice below to help avoid this problem in the future.  Lifestyle changes  These lifestyle changes can help prevent constipation:  · Diet. Eat a high-fiber diet, with fresh fruit and vegetables, and reduce dairy intake, meats, and processed foods  · Fluids. It's important to get enough fluids each day. Drink plenty of water when you eat more fiber. If you are on diet that limits the amount of fluid you can have, talk about this with your healthcare provider.  · Regular exercise. Check with your healthcare provider first.  Medications  Take any medicines as directed. Some laxatives are safe to use only every now and then. Others can be taken on a regular basis. Talk with your doctor or pharmacist if you have questions.  Prescription pain medicines can cause constipation. If you are taking this kind of medicine, ask your healthcare provider if you should also take a stool softener.  Medicines you may take to treat constipation include:  · Fiber supplements  · Stool softeners  · Laxatives  · Enemas  · Rectal suppositories  Follow-up care  Follow up with your healthcare provider if symptoms don't get better in the next few days. You may need to have more tests or see a specialist.  Call 911  Call 911 if any of these occur:  · Trouble breathing  · Stiff, rigid abdomen that is severely painful to touch  · Confusion  · Fainting or loss of consciousness  · Rapid heart rate  · Chest pain  When to seek medical advice  Call your healthcare provider right away if any of these occur:  · Fever over 100.4°F (38°C)  · Failure to resume normal bowel movements  · Pain in your abdomen or back gets worse  · Nausea or vomiting  · Swelling in your abdomen  · Blood in the stool  · Black, tarry stool  · Involuntary weight loss  · Weakness  Date Last Reviewed: 12/30/2015  © 5340-5973 [employer]. [address] [place] [address], [place]  [address]. All rights reserved. This information is not intended as a substitute for professional medical care. Always follow your healthcare professional's instructions.

## 2018-10-30 NOTE — PROGRESS NOTES
Please call the patient and let her know that her chest xray is normal. She should try some muscle rub or tylenol/ibuprofen for the pain. If symptoms change or do not improve she should follow up. Thanks.

## 2018-10-31 NOTE — PROGRESS NOTES
CHIEF COMPLAINT/REASON FOR VISIT: low back pain, neck pain    History of present illness: The patient is a 58 year old woman with a history of migraines, carpal tunnel syndrome and low back pain since her early 20s.  She is status post C7-T1 cervical interlaminar epidural steroid injection on 09/25/2018 with 80% relief.  She reports minimal neck pain at this time.  However, she reports worsening a aching bilateral low back pain without significant radiation to her legs.  The pain is worse with sitting too long, improved with medication.  She tried stopping tizanidine but realize that this was significantly helping her.  She denies weakness, numbness, bladder or bowel incontinence.    Pain intervention history: She tried Neurontin for her leg/thigh pain with no success. She only takes Advil with about 30% relief. She is status post TFESI bilaterally to L3 on 12/6/2011 with no relief. Past history of status post L4/5 YARON on 5/25/11 with about 90% relief that lasted 3 weeks. She is status post 2 bilateral L3 transforaminal injections with 3 weeks relief each time. Her current pain medications include Celexa 40 mg once a day, Aleve 220 mg twice a day, Lyrica as previously stated, Zanaflex 5 mg at night. She uses her TENS at home, when she thinks about it.  She is status post L4/5 interlaminar epidural steroid injection on 1/8/14 with 90% relief.  She is status post L5/S1 interlaminar epidural steroid injection on 6/9/14 with moderate relief only on the right low back and right leg lasting 2 weeks.  She is status post bilateral L3/4 and L4/5 facet joint injections on 4/13/15 with initially 100% relief of her back pain and 80% relief of her left lateral hip and lateral thigh pain, now reporting at least 50% relief of both.  She is status post bilateral L2, 3 and 4 medial branch radiofrequency ablation on 5/20/15 with 60% relief.   She is status post bilateral L2, 3 and 4 medial branch radiofrequency ablation on 6/20/16  with 50-70% relief.  She is status post L5/S1 interlaminar epidural steroid injection on 3/10/17 with 50% relief.  She is status post bilateral L2, 3, 4 medial branch radiofrequency ablation on 7/17/17 with about 75% relief.  She is status post bilateral L2, 3, 4 medial branch radiofrequency ablation on 07/17/2018 with 100% relief of her leg pain and 70-75% relief of her back pain.  She is status post C7-T1 cervical interlaminar epidural steroid injection on 09/25/2018 with 80% relief.     ROS: She reports back pain only.  Balance of review of systems is negative.    Medical, surgical, family and social history reviewed elsewhere in record.     Medications/Allergies: See med card      Vitals:    10/30/18 1535   BP: (!) 140/70   Pulse: 78   Resp: 18   Temp: 96.8 °F (36 °C)   TempSrc: Oral   SpO2: 99%   Weight: 105.3 kg (232 lb 4.1 oz)   PainSc:   4   PainLoc: Back        PHYSICAL EXAM:  Gen: A and O x3, pleasant, well-groomed  HEENT: PERRLA  CVS: Regular rate and rhythm, normal S1 and S2, no murmurs.  Resp: Clear to auscultation bilaterally, no wheezes or rales.  Musculoskeletal: Able to heel walk, toe walk. No antalgic gait.     Neuro:  Upper extremities: 5/5 strength bilaterally   Lower extremities: 5/5 strength bilaterally  Reflexes: Brachioradialis 2+, Bicep 2+, Tricep 2+. Patellar 2+, Achilles 2+.  Sensory: Intact and symmetrical to light touch and pinprick in C2-T1 dermatomes bilaterally. Intact and symmetrical to light touch and pinprick in L2-S1 dermatomes bilaterally.    Cervical Spine:  Cervical spine: ROM is mildly limited with flexion, extension and lateral rotation without increased pain.  Spurling's maneuver causes no neck pain to either side.  Myofascial exam:  Minimal tenderness to palpation to the right cervical paraspinous muscles and right trapezius muscle.    Lumbar spine:  Lumbar spine: ROM is moderately limited with flexion and extension with increased bilateral low back pain during each  maneuver.  Azam's test negative.   Supine straight leg raise is negative bilaterally.  Internal and external rotation of the hip causes no increased pain in either side.  Myofascial exam: No tenderness to palpation to the lumbar paraspinous muscles.         IMAGING:   MRI L-SPINE 5/10/11   IMPRESSION: AT L3-4, THERE IS CIRCUMFERENTIAL DISC PROTRUSION FOCALLY MORE PROMINENT TO THE LEFT AS WELL AS A SMALL DISC HERNIATION PROTRUDING INFERIORLY BEHIND L4 TO THE LEFT OF MIDLINE. THIS PRODUCES SPINAL CANAL AND BILATERAL NEURAL FORAMINAL STENOSIS, LEFT GREATER THAN RIGHT. AT L4-5, THERE IS CIRCUMFERENTIAL DISC PROTRUSION FOCALLY MORE PROMINENT TO THE RIGHT WITH MILD SPINAL CANAL AND RIGHT NEURAL FORAMINAL STENOSIS. AT L1-2 AND L2-3 ALTHOUGH THERE ARE DISC PROTRUSIONS IDENTIFIED, SIGNIFICANT STENOSIS IS NOT SEEN.     MRI lumbar spine 7/10/14  L1-2:There is chronic relatively advanced disc degeneration with disc space narrowing, disc dehydration, disc narrowing, endplate osteophytes, and degenerative vertebral endplate marrow change. There is a diffuse 2-mm posterior disc bulge with a superimposed4-mm right posterior disc extrusion causing mild right foraminal stenosis. There is no impingement of the right L1 nerve root and there has been no change.  L2-3: There is severe and chronic disc degeneration with severe disc space narrowing, disc dehydration, degenerative vertebral endplate marrow change, and vertebral endplate osteophytes. There is a diffuse 2-mm posterior disc bulge with a superimposed 4-mm right posterior disc extrusion causing mild right foraminal stenosis. There is no impingement of the right L2 nerve root and there has been no significant change.  L3-4: There is severe disc degeneration which has progressed since the prior study. There is severe disc space narrowing, disc dehydration, degenerative vertebral endplate marrow change and endplate osteophytes. There is also mild posterior subluxation of L3over  a distance of 4 mm. There is a broad-based 5-mm posterior disc extrusion. There is degenerative facet arthrosis with ligamentum flavum thickening. The combination of facet arthrosis and disc extrusion results in relatively severe spinal canal stenosis with compression of the thecal sac to an AP diameter of 5 mm, moderate right foraminal stenosis, and severe left foraminal stenosis. The spinal stenosis has also progressed since the prior study.  L4-5:There is a diffuse posterior disc bulge with a superimposed 3-mm left posterior paracentral disc protrusion causing left paracentral thecal sac compression. There is moderate left and mild right foraminal stenosis and there has been no significant change. There is mild degenerative facet arthrosis with ligamentum flavum thickening and small joint effusions.  L5-S1:There is no significant compromise of the spinal canal or foramina and there has been no change.    X-ray cervical spine 6/8/15  There is loss of normal cervical lordosis which may be positional or related muscular strain. Intervertebral disk height loss is noted at the C5-C6 C6-C7 levels and to a lesser degree C4-C5 and C7-T1 levels. Vertebral body alignment appears otherwise adequate. Vertebral body heights appear well-preserved. The atlas and odontoid appear in good relationship to each other. Osseous neuroforaminal narrowing is noted at the C3-C4 C4-C5 C5-C6 levels on the left and at the C4-C5 C5-C6 and C6-C7 levels on the right     07/10/2018 MRI cervical spine [facility] report  C2-3 broad-based signal asymmetry at the left subarticular and foraminal zone reflecting implant spondylosis and disc bulge complex, mild to moderate left asymmetric foraminal narrowing, ectasia of the left vertebral artery, contralateral right asymmetric facet hypertrophy, right foramen widely narrowed, disc partially desiccated without collapse  C3-4 moderate to severe left greater than right foraminal narrowing secondary  to uncinate joint hypertrophic signal alteration, disc partially desiccated  C4-5 endplate spondylosis moderate diffuse disc bulge noted, broad-based right paracentral disc herniation, asymmetric flattening of the ventral cord surface at the right paracentral zone, high-grade if lateral right foraminal narrowing, AP diameter of canal 9.9 mm, contralaterally, high grade left foraminal narrowing secondary to facet greater than uncinate joint hypertrophic signal alteration, disc desiccated and mildly narrowed  C5-6 disc space narrowing evident with generalized in Nigel spondylosis and concentric inter pose disc bulge complex, high-grade bilateral foraminal narrowing, flattening of the cord surface, AP diameter 7.9 mm, symmetric facet arthrosis, disc diffusely desiccated and narrowed  C6-7 moderate endplate spondylosis and concentric disc bulge complex, high-grade bilateral foraminal compromise, flattening of the anterior cord surface, AP diameter 8.8 mm, facet arthrosis symmetric, disc desiccated and narrowed  C7-T1 less than 2 mm depth disc bulge      ASSESSMENT:  The patient is a 58 year old woman with a history of migraines, carpal tunnel syndrome and low back pain since her early 20s who returns in follow up.   1. DDD (degenerative disc disease), lumbar  MRI Lumbar Spine Without Contrast   2. Spinal stenosis of lumbar region without neurogenic claudication     3. Cervical radiculopathy     4. DDD (degenerative disc disease), cervical         Plan:  1.  She had excellent results following the cervical YARON.  This can be repeated in the future if necessary.  2.  We discussed her back pain and she had relief a few months ago with lumbar medial branch RFA, however she has will likely experiencing pain from canal stenosis.  We reviewed her prior MRI results from 2014 and discuss that she has severe canal stenosis at L3-4 and I will update her lumbar spine MRI for further evaluation.  She will follow-up to review these  images.

## 2018-11-01 ENCOUNTER — PATIENT MESSAGE (OUTPATIENT)
Dept: PAIN MEDICINE | Facility: CLINIC | Age: 58
End: 2018-11-01

## 2018-11-05 ENCOUNTER — PATIENT MESSAGE (OUTPATIENT)
Dept: PAIN MEDICINE | Facility: CLINIC | Age: 58
End: 2018-11-05

## 2018-11-06 ENCOUNTER — PROCEDURE VISIT (OUTPATIENT)
Dept: NEUROLOGY | Facility: CLINIC | Age: 58
End: 2018-11-06
Payer: COMMERCIAL

## 2018-11-06 ENCOUNTER — HOSPITAL ENCOUNTER (OUTPATIENT)
Dept: RADIOLOGY | Facility: HOSPITAL | Age: 58
Discharge: HOME | End: 2018-11-06
Attending: PHYSICIAN ASSISTANT
Payer: COMMERCIAL

## 2018-11-06 VITALS
SYSTOLIC BLOOD PRESSURE: 145 MMHG | HEIGHT: 64 IN | DIASTOLIC BLOOD PRESSURE: 79 MMHG | WEIGHT: 231 LBS | HEART RATE: 84 BPM | BODY MASS INDEX: 39.44 KG/M2 | RESPIRATION RATE: 16 BRPM

## 2018-11-06 DIAGNOSIS — G43.719 INTRACTABLE CHRONIC MIGRAINE WITHOUT AURA AND WITHOUT STATUS MIGRAINOSUS: Primary | ICD-10-CM

## 2018-11-06 DIAGNOSIS — M51.36 DDD (DEGENERATIVE DISC DISEASE), LUMBAR: ICD-10-CM

## 2018-11-06 PROCEDURE — 72148 MRI LUMBAR SPINE W/O DYE: CPT | Mod: TC,PO

## 2018-11-06 PROCEDURE — 64615 CHEMODENERV MUSC MIGRAINE: CPT | Mod: S$GLB,,, | Performed by: PSYCHIATRY & NEUROLOGY

## 2018-11-06 PROCEDURE — 72148 MRI LUMBAR SPINE W/O DYE: CPT | Mod: 26,,, | Performed by: RADIOLOGY

## 2018-11-06 NOTE — PROCEDURES
PROCEDURE PERFORMED: Botulinum toxin injection (94802)     CLINICAL INDICATION: G43.719     A time out was conducted just before the start of the procedure to verify the correct patient and procedure, procedure location, and all relevant critical information.      Conventional methods of treatment such as multiple medications, both on and   off label have been tried including:     anti-epileptics (topiramate, gabapentin, lyrica, zonisamide), and antidepressants (amitriptyline, venflaxine)     The patient has been unresponsive and refractory.The patient meets criteria for chronic headaches according to the ICHD-II, the patient has more than 15 headaches a month which last for more than 4 hours a day.     This is the 2nd Botox injections and I am aiming for at least 50%  improvement in the patient's symptoms.     Previous injection was 5/24/18 and resulted in a mild improvement.      Frequency of treatment is every 3 months unless no response to the treatments, at which time we will discontinue the injections.      DESCRIPTION OF PROCEDURE: After obtaining informed consent and under aseptic technique, a total of 155 units of botulinum toxin type A were   injected in the following muscles: Procerus 5 units,  5 units bilaterally, frontalis 20 units, temporalis 20 units bilaterally,   occipitalis 15 units, upper cervical paraspinals 10 units bilaterally and trapezius 15 units bilaterally. The patient was given a total of 155 units in 31 sites.The patient tolerated the procedure well. There were no complications. The patient was given a prescription for repeat treatment in 3 months.      Unavoidable waste 45 units       Nazia Fernandez M.D  Medical Director, Headache and Facial Pain  [facility]

## 2018-11-29 ENCOUNTER — OFFICE VISIT (OUTPATIENT)
Dept: PAIN MEDICINE | Facility: CLINIC | Age: 58
End: 2018-11-29
Payer: COMMERCIAL

## 2018-11-29 VITALS
DIASTOLIC BLOOD PRESSURE: 70 MMHG | BODY MASS INDEX: 39.36 KG/M2 | HEART RATE: 90 BPM | SYSTOLIC BLOOD PRESSURE: 151 MMHG | OXYGEN SATURATION: 95 % | TEMPERATURE: 98 F | RESPIRATION RATE: 18 BRPM | WEIGHT: 229.25 LBS

## 2018-11-29 DIAGNOSIS — M54.12 CERVICAL RADICULOPATHY: ICD-10-CM

## 2018-11-29 DIAGNOSIS — M51.36 DDD (DEGENERATIVE DISC DISEASE), LUMBAR: ICD-10-CM

## 2018-11-29 DIAGNOSIS — M54.16 LUMBAR RADICULOPATHY: Primary | ICD-10-CM

## 2018-11-29 PROCEDURE — 99999 PR PBB SHADOW E&M-EST. PATIENT-LVL V: CPT | Mod: PBBFAC,,, | Performed by: PHYSICIAN ASSISTANT

## 2018-11-29 PROCEDURE — 3008F BODY MASS INDEX DOCD: CPT | Mod: CPTII,S$GLB,, | Performed by: PHYSICIAN ASSISTANT

## 2018-11-29 PROCEDURE — 99214 OFFICE O/P EST MOD 30 MIN: CPT | Mod: S$GLB,,, | Performed by: PHYSICIAN ASSISTANT

## 2018-11-29 RX ORDER — ALPRAZOLAM 0.5 MG/1
1 TABLET, ORALLY DISINTEGRATING ORAL ONCE AS NEEDED
Status: CANCELLED | OUTPATIENT
Start: 2018-11-29 | End: 2018-11-29

## 2018-11-29 RX ORDER — TIZANIDINE 2 MG/1
TABLET ORAL
Qty: 90 TABLET | Refills: 0 | Status: SHIPPED | OUTPATIENT
Start: 2018-11-29 | End: 2019-01-24 | Stop reason: SDUPTHER

## 2018-11-29 RX ORDER — TRAMADOL HYDROCHLORIDE 50 MG/1
TABLET ORAL
Qty: 60 TABLET | Refills: 0 | Status: SHIPPED | OUTPATIENT
Start: 2018-11-29 | End: 2018-12-23 | Stop reason: SDUPTHER

## 2018-11-30 NOTE — PROGRESS NOTES
CHIEF COMPLAINT/REASON FOR VISIT: low back pain, neck pain    History of present illness: The patient is a 58 year old woman with a history of migraines, carpal tunnel syndrome and low back pain since her early 20s.  She returns in follow-up today to review her lumbar spine MRI results.  She complains of constant right low back pain radiating to the right buttock and right lateral thigh.  This is worse with standing and walking, improved with sitting.  She reports constant numbness in the left thigh.  She denies weakness, bladderbowel incontinence.    Pain intervention history: She tried Neurontin for her leg/thigh pain with no success. She only takes Advil with about 30% relief. She is status post TFESI bilaterally to L3 on 12/6/2011 with no relief. Past history of status post L4/5 YARON on 5/25/11 with about 90% relief that lasted 3 weeks. She is status post 2 bilateral L3 transforaminal injections with 3 weeks relief each time. Her current pain medications include Celexa 40 mg once a day, Aleve 220 mg twice a day, Lyrica as previously stated, Zanaflex 5 mg at night. She uses her TENS at home, when she thinks about it.  She is status post L4/5 interlaminar epidural steroid injection on 1/8/14 with 90% relief.  She is status post L5/S1 interlaminar epidural steroid injection on 6/9/14 with moderate relief only on the right low back and right leg lasting 2 weeks.  She is status post bilateral L3/4 and L4/5 facet joint injections on 4/13/15 with initially 100% relief of her back pain and 80% relief of her left lateral hip and lateral thigh pain, now reporting at least 50% relief of both.  She is status post bilateral L2, 3 and 4 medial branch radiofrequency ablation on 5/20/15 with 60% relief.   She is status post bilateral L2, 3 and 4 medial branch radiofrequency ablation on 6/20/16 with 50-70% relief.  She is status post L5/S1 interlaminar epidural steroid injection on 3/10/17 with 50% relief.  She is status post  bilateral L2, 3, 4 medial branch radiofrequency ablation on 7/17/17 with about 75% relief.  She is status post bilateral L2, 3, 4 medial branch radiofrequency ablation on 07/17/2018 with 100% relief of her leg pain and 70-75% relief of her back pain.  She is status post C7-T1 cervical interlaminar epidural steroid injection on 09/25/2018 with 80% relief.     ROS: She reports back pain only.  Balance of review of systems is negative.    Medical, surgical, family and social history reviewed elsewhere in record.     Medications/Allergies: See med card      Vitals:    11/29/18 0731   BP: (!) 151/70   Pulse: 90   Resp: 18   Temp: 97.6 °F (36.4 °C)   TempSrc: Oral   SpO2: 95%   Weight: 104 kg (229 lb 4.5 oz)   PainSc:   4   PainLoc: Back        PHYSICAL EXAM:  Gen: A and O x3, pleasant, well-groomed  HEENT: PERRLA  CVS: Regular rate and rhythm, normal S1 and S2, no murmurs.  Resp: Clear to auscultation bilaterally, no wheezes or rales.  Musculoskeletal: Able to heel walk, toe walk. No antalgic gait.     Neuro:  Upper extremities: 5/5 strength bilaterally   Lower extremities: 5/5 strength bilaterally  Reflexes: Brachioradialis 2+, Bicep 2+, Tricep 2+. Patellar 2+, Achilles 2+.  Sensory: Intact and symmetrical to light touch and pinprick in C2-T1 dermatomes bilaterally. Intact and symmetrical to light touch and pinprick in L2-S1 dermatomes bilaterally.    Cervical Spine:  Cervical spine: ROM is mildly limited with flexion, extension and lateral rotation without increased pain.  Spurling's maneuver causes no neck pain to either side.  Myofascial exam:  Minimal tenderness to palpation to the right cervical paraspinous muscles and right trapezius muscle.    Lumbar spine:  Lumbar spine: ROM is moderately limited with flexion and extension with increased bilateral low back pain during each maneuver.  Azam's test negative.   Supine straight leg raise is negative bilaterally.  Internal and external rotation of the hip causes no  increased pain in either side.  Myofascial exam: No tenderness to palpation to the lumbar paraspinous muscles.         IMAGING:   MRI L-SPINE 5/10/11   IMPRESSION: AT L3-4, THERE IS CIRCUMFERENTIAL DISC PROTRUSION FOCALLY MORE PROMINENT TO THE LEFT AS WELL AS A SMALL DISC HERNIATION PROTRUDING INFERIORLY BEHIND L4 TO THE LEFT OF MIDLINE. THIS PRODUCES SPINAL CANAL AND BILATERAL NEURAL FORAMINAL STENOSIS, LEFT GREATER THAN RIGHT. AT L4-5, THERE IS CIRCUMFERENTIAL DISC PROTRUSION FOCALLY MORE PROMINENT TO THE RIGHT WITH MILD SPINAL CANAL AND RIGHT NEURAL FORAMINAL STENOSIS. AT L1-2 AND L2-3 ALTHOUGH THERE ARE DISC PROTRUSIONS IDENTIFIED, SIGNIFICANT STENOSIS IS NOT SEEN.     MRI lumbar spine 7/10/14  L1-2:There is chronic relatively advanced disc degeneration with disc space narrowing, disc dehydration, disc narrowing, endplate osteophytes, and degenerative vertebral endplate marrow change. There is a diffuse 2-mm posterior disc bulge with a superimposed4-mm right posterior disc extrusion causing mild right foraminal stenosis. There is no impingement of the right L1 nerve root and there has been no change.  L2-3: There is severe and chronic disc degeneration with severe disc space narrowing, disc dehydration, degenerative vertebral endplate marrow change, and vertebral endplate osteophytes. There is a diffuse 2-mm posterior disc bulge with a superimposed 4-mm right posterior disc extrusion causing mild right foraminal stenosis. There is no impingement of the right L2 nerve root and there has been no significant change.  L3-4: There is severe disc degeneration which has progressed since the prior study. There is severe disc space narrowing, disc dehydration, degenerative vertebral endplate marrow change and endplate osteophytes. There is also mild posterior subluxation of L3over a distance of 4 mm. There is a broad-based 5-mm posterior disc extrusion. There is degenerative facet arthrosis with ligamentum flavum  thickening. The combination of facet arthrosis and disc extrusion results in relatively severe spinal canal stenosis with compression of the thecal sac to an AP diameter of 5 mm, moderate right foraminal stenosis, and severe left foraminal stenosis. The spinal stenosis has also progressed since the prior study.  L4-5:There is a diffuse posterior disc bulge with a superimposed 3-mm left posterior paracentral disc protrusion causing left paracentral thecal sac compression. There is moderate left and mild right foraminal stenosis and there has been no significant change. There is mild degenerative facet arthrosis with ligamentum flavum thickening and small joint effusions.  L5-S1:There is no significant compromise of the spinal canal or foramina and there has been no change.    X-ray cervical spine 6/8/15  There is loss of normal cervical lordosis which may be positional or related muscular strain. Intervertebral disk height loss is noted at the C5-C6 C6-C7 levels and to a lesser degree C4-C5 and C7-T1 levels. Vertebral body alignment appears otherwise adequate. Vertebral body heights appear well-preserved. The atlas and odontoid appear in good relationship to each other. Osseous neuroforaminal narrowing is noted at the C3-C4 C4-C5 C5-C6 levels on the left and at the C4-C5 C5-C6 and C6-C7 levels on the right     07/10/2018 MRI cervical spine [facility] report  C2-3 broad-based signal asymmetry at the left subarticular and foraminal zone reflecting implant spondylosis and disc bulge complex, mild to moderate left asymmetric foraminal narrowing, ectasia of the left vertebral artery, contralateral right asymmetric facet hypertrophy, right foramen widely narrowed, disc partially desiccated without collapse  C3-4 moderate to severe left greater than right foraminal narrowing secondary to uncinate joint hypertrophic signal alteration, disc partially desiccated  C4-5 endplate spondylosis moderate diffuse disc bulge  noted, broad-based right paracentral disc herniation, asymmetric flattening of the ventral cord surface at the right paracentral zone, high-grade if lateral right foraminal narrowing, AP diameter of canal 9.9 mm, contralaterally, high grade left foraminal narrowing secondary to facet greater than uncinate joint hypertrophic signal alteration, disc desiccated and mildly narrowed  C5-6 disc space narrowing evident with generalized in Nigel spondylosis and concentric inter pose disc bulge complex, high-grade bilateral foraminal narrowing, flattening of the cord surface, AP diameter 7.9 mm, symmetric facet arthrosis, disc diffusely desiccated and narrowed  C6-7 moderate endplate spondylosis and concentric disc bulge complex, high-grade bilateral foraminal compromise, flattening of the anterior cord surface, AP diameter 8.8 mm, facet arthrosis symmetric, disc desiccated and narrowed  C7-T1 less than 2 mm depth disc bulge    11/06/2018 MRI lumbar spine  T12/L1: There is no evidence of disc protrusion, canal or foraminal stenosis.  L1/L2: Right posterolateral disc protrusion is evident and there is mild distortion of the right anterolateral canal.  Degenerative facet changes are noted bilaterally.  The left foramen is intact.  L2/L3: There is annular disc bulging with a superimposed right posterolateral disc extrusion.  This is slightly more pronounced on the previous examination and there is mild effacement of the anterior thecal sac and moderate narrowing the right foramen.  L3/L4: There is annular disc bulge and osteophyte formation with a superimposed small left posterolateral disc extrusion.  There is moderate narrowing the central canal and left foramen.  This is little changed relative the previous examination.  Degenerative facet changes are noted.  L5/S1: There is a small central disc extrusion superimposed upon annular disc bulging.  This is slightly less pronounced than was seen previously.  Degenerative facet  changes are noted.  L5/S1: Moderate degenerative facet changes are noted and there is mild effacement of the anterior thecal sac.  There is ligamentous hypertrophy particularly to the left in the posterior canal and there is left greater than right foraminal narrowing.  This is mildly worsened relative prior exam.      ASSESSMENT:  The patient is a 58 year old woman with a history of migraines, carpal tunnel syndrome and low back pain since her early 20s who returns in follow up.   1. Lumbar radiculopathy  Vital signs    Verify informed consent    Notify physician     Notify physician     Notify physician (specify)    Diet NPO    Case Request Operating Room: Injection-steroid-epidural-lumbar L5/S1    Place in Outpatient    alprazolam ODT dissolvable tablet 1 mg   2. DDD (degenerative disc disease), lumbar     3. Cervical radiculopathy         Plan:  1.  We reviewed her new lumbar spine MRI results and discuss that her pain is likely due to canal stenosis.  I will schedule her for an L5/S1 interlaminar YARON.  If she is not have significant relief, I will have her see Neurosurgery for further evaluation.  2.  Follow-up in 4 weeks postprocedure or sooner as needed.

## 2018-11-30 NOTE — H&P (VIEW-ONLY)
CHIEF COMPLAINT/REASON FOR VISIT: low back pain, neck pain    History of present illness: The patient is a 58 year old woman with a history of migraines, carpal tunnel syndrome and low back pain since her early 20s.  She returns in follow-up today to review her lumbar spine MRI results.  She complains of constant right low back pain radiating to the right buttock and right lateral thigh.  This is worse with standing and walking, improved with sitting.  She reports constant numbness in the left thigh.  She denies weakness, bladderbowel incontinence.    Pain intervention history: She tried Neurontin for her leg/thigh pain with no success. She only takes Advil with about 30% relief. She is status post TFESI bilaterally to L3 on 12/6/2011 with no relief. Past history of status post L4/5 YARON on 5/25/11 with about 90% relief that lasted 3 weeks. She is status post 2 bilateral L3 transforaminal injections with 3 weeks relief each time. Her current pain medications include Celexa 40 mg once a day, Aleve 220 mg twice a day, Lyrica as previously stated, Zanaflex 5 mg at night. She uses her TENS at home, when she thinks about it.  She is status post L4/5 interlaminar epidural steroid injection on 1/8/14 with 90% relief.  She is status post L5/S1 interlaminar epidural steroid injection on 6/9/14 with moderate relief only on the right low back and right leg lasting 2 weeks.  She is status post bilateral L3/4 and L4/5 facet joint injections on 4/13/15 with initially 100% relief of her back pain and 80% relief of her left lateral hip and lateral thigh pain, now reporting at least 50% relief of both.  She is status post bilateral L2, 3 and 4 medial branch radiofrequency ablation on 5/20/15 with 60% relief.   She is status post bilateral L2, 3 and 4 medial branch radiofrequency ablation on 6/20/16 with 50-70% relief.  She is status post L5/S1 interlaminar epidural steroid injection on 3/10/17 with 50% relief.  She is status post  bilateral L2, 3, 4 medial branch radiofrequency ablation on 7/17/17 with about 75% relief.  She is status post bilateral L2, 3, 4 medial branch radiofrequency ablation on 07/17/2018 with 100% relief of her leg pain and 70-75% relief of her back pain.  She is status post C7-T1 cervical interlaminar epidural steroid injection on 09/25/2018 with 80% relief.     ROS: She reports back pain only.  Balance of review of systems is negative.    Medical, surgical, family and social history reviewed elsewhere in record.     Medications/Allergies: See med card      Vitals:    11/29/18 0731   BP: (!) 151/70   Pulse: 90   Resp: 18   Temp: 97.6 °F (36.4 °C)   TempSrc: Oral   SpO2: 95%   Weight: 104 kg (229 lb 4.5 oz)   PainSc:   4   PainLoc: Back        PHYSICAL EXAM:  Gen: A and O x3, pleasant, well-groomed  HEENT: PERRLA  CVS: Regular rate and rhythm, normal S1 and S2, no murmurs.  Resp: Clear to auscultation bilaterally, no wheezes or rales.  Musculoskeletal: Able to heel walk, toe walk. No antalgic gait.     Neuro:  Upper extremities: 5/5 strength bilaterally   Lower extremities: 5/5 strength bilaterally  Reflexes: Brachioradialis 2+, Bicep 2+, Tricep 2+. Patellar 2+, Achilles 2+.  Sensory: Intact and symmetrical to light touch and pinprick in C2-T1 dermatomes bilaterally. Intact and symmetrical to light touch and pinprick in L2-S1 dermatomes bilaterally.    Cervical Spine:  Cervical spine: ROM is mildly limited with flexion, extension and lateral rotation without increased pain.  Spurling's maneuver causes no neck pain to either side.  Myofascial exam:  Minimal tenderness to palpation to the right cervical paraspinous muscles and right trapezius muscle.    Lumbar spine:  Lumbar spine: ROM is moderately limited with flexion and extension with increased bilateral low back pain during each maneuver.  Azam's test negative.   Supine straight leg raise is negative bilaterally.  Internal and external rotation of the hip causes no  increased pain in either side.  Myofascial exam: No tenderness to palpation to the lumbar paraspinous muscles.         IMAGING:   MRI L-SPINE 5/10/11   IMPRESSION: AT L3-4, THERE IS CIRCUMFERENTIAL DISC PROTRUSION FOCALLY MORE PROMINENT TO THE LEFT AS WELL AS A SMALL DISC HERNIATION PROTRUDING INFERIORLY BEHIND L4 TO THE LEFT OF MIDLINE. THIS PRODUCES SPINAL CANAL AND BILATERAL NEURAL FORAMINAL STENOSIS, LEFT GREATER THAN RIGHT. AT L4-5, THERE IS CIRCUMFERENTIAL DISC PROTRUSION FOCALLY MORE PROMINENT TO THE RIGHT WITH MILD SPINAL CANAL AND RIGHT NEURAL FORAMINAL STENOSIS. AT L1-2 AND L2-3 ALTHOUGH THERE ARE DISC PROTRUSIONS IDENTIFIED, SIGNIFICANT STENOSIS IS NOT SEEN.     MRI lumbar spine 7/10/14  L1-2:There is chronic relatively advanced disc degeneration with disc space narrowing, disc dehydration, disc narrowing, endplate osteophytes, and degenerative vertebral endplate marrow change. There is a diffuse 2-mm posterior disc bulge with a superimposed4-mm right posterior disc extrusion causing mild right foraminal stenosis. There is no impingement of the right L1 nerve root and there has been no change.  L2-3: There is severe and chronic disc degeneration with severe disc space narrowing, disc dehydration, degenerative vertebral endplate marrow change, and vertebral endplate osteophytes. There is a diffuse 2-mm posterior disc bulge with a superimposed 4-mm right posterior disc extrusion causing mild right foraminal stenosis. There is no impingement of the right L2 nerve root and there has been no significant change.  L3-4: There is severe disc degeneration which has progressed since the prior study. There is severe disc space narrowing, disc dehydration, degenerative vertebral endplate marrow change and endplate osteophytes. There is also mild posterior subluxation of L3over a distance of 4 mm. There is a broad-based 5-mm posterior disc extrusion. There is degenerative facet arthrosis with ligamentum flavum  thickening. The combination of facet arthrosis and disc extrusion results in relatively severe spinal canal stenosis with compression of the thecal sac to an AP diameter of 5 mm, moderate right foraminal stenosis, and severe left foraminal stenosis. The spinal stenosis has also progressed since the prior study.  L4-5:There is a diffuse posterior disc bulge with a superimposed 3-mm left posterior paracentral disc protrusion causing left paracentral thecal sac compression. There is moderate left and mild right foraminal stenosis and there has been no significant change. There is mild degenerative facet arthrosis with ligamentum flavum thickening and small joint effusions.  L5-S1:There is no significant compromise of the spinal canal or foramina and there has been no change.    X-ray cervical spine 6/8/15  There is loss of normal cervical lordosis which may be positional or related muscular strain. Intervertebral disk height loss is noted at the C5-C6 C6-C7 levels and to a lesser degree C4-C5 and C7-T1 levels. Vertebral body alignment appears otherwise adequate. Vertebral body heights appear well-preserved. The atlas and odontoid appear in good relationship to each other. Osseous neuroforaminal narrowing is noted at the C3-C4 C4-C5 C5-C6 levels on the left and at the C4-C5 C5-C6 and C6-C7 levels on the right     07/10/2018 MRI cervical spine [facility] report  C2-3 broad-based signal asymmetry at the left subarticular and foraminal zone reflecting implant spondylosis and disc bulge complex, mild to moderate left asymmetric foraminal narrowing, ectasia of the left vertebral artery, contralateral right asymmetric facet hypertrophy, right foramen widely narrowed, disc partially desiccated without collapse  C3-4 moderate to severe left greater than right foraminal narrowing secondary to uncinate joint hypertrophic signal alteration, disc partially desiccated  C4-5 endplate spondylosis moderate diffuse disc bulge  noted, broad-based right paracentral disc herniation, asymmetric flattening of the ventral cord surface at the right paracentral zone, high-grade if lateral right foraminal narrowing, AP diameter of canal 9.9 mm, contralaterally, high grade left foraminal narrowing secondary to facet greater than uncinate joint hypertrophic signal alteration, disc desiccated and mildly narrowed  C5-6 disc space narrowing evident with generalized in Nigel spondylosis and concentric inter pose disc bulge complex, high-grade bilateral foraminal narrowing, flattening of the cord surface, AP diameter 7.9 mm, symmetric facet arthrosis, disc diffusely desiccated and narrowed  C6-7 moderate endplate spondylosis and concentric disc bulge complex, high-grade bilateral foraminal compromise, flattening of the anterior cord surface, AP diameter 8.8 mm, facet arthrosis symmetric, disc desiccated and narrowed  C7-T1 less than 2 mm depth disc bulge    11/06/2018 MRI lumbar spine  T12/L1: There is no evidence of disc protrusion, canal or foraminal stenosis.  L1/L2: Right posterolateral disc protrusion is evident and there is mild distortion of the right anterolateral canal.  Degenerative facet changes are noted bilaterally.  The left foramen is intact.  L2/L3: There is annular disc bulging with a superimposed right posterolateral disc extrusion.  This is slightly more pronounced on the previous examination and there is mild effacement of the anterior thecal sac and moderate narrowing the right foramen.  L3/L4: There is annular disc bulge and osteophyte formation with a superimposed small left posterolateral disc extrusion.  There is moderate narrowing the central canal and left foramen.  This is little changed relative the previous examination.  Degenerative facet changes are noted.  L5/S1: There is a small central disc extrusion superimposed upon annular disc bulging.  This is slightly less pronounced than was seen previously.  Degenerative facet  changes are noted.  L5/S1: Moderate degenerative facet changes are noted and there is mild effacement of the anterior thecal sac.  There is ligamentous hypertrophy particularly to the left in the posterior canal and there is left greater than right foraminal narrowing.  This is mildly worsened relative prior exam.      ASSESSMENT:  The patient is a 58 year old woman with a history of migraines, carpal tunnel syndrome and low back pain since her early 20s who returns in follow up.   1. Lumbar radiculopathy  Vital signs    Verify informed consent    Notify physician     Notify physician     Notify physician (specify)    Diet NPO    Case Request Operating Room: Injection-steroid-epidural-lumbar L5/S1    Place in Outpatient    alprazolam ODT dissolvable tablet 1 mg   2. DDD (degenerative disc disease), lumbar     3. Cervical radiculopathy         Plan:  1.  We reviewed her new lumbar spine MRI results and discuss that her pain is likely due to canal stenosis.  I will schedule her for an L5/S1 interlaminar YARON.  If she is not have significant relief, I will have her see Neurosurgery for further evaluation.  2.  Follow-up in 4 weeks postprocedure or sooner as needed.

## 2018-12-03 ENCOUNTER — OFFICE VISIT (OUTPATIENT)
Dept: PODIATRY | Facility: CLINIC | Age: 58
End: 2018-12-03
Payer: COMMERCIAL

## 2018-12-03 VITALS — RESPIRATION RATE: 20 BRPM | WEIGHT: 228.81 LBS | BODY MASS INDEX: 39.06 KG/M2 | HEIGHT: 64 IN

## 2018-12-03 DIAGNOSIS — M79.671 FOOT PAIN, RIGHT: Primary | ICD-10-CM

## 2018-12-03 DIAGNOSIS — M77.41 METATARSALGIA OF RIGHT FOOT: ICD-10-CM

## 2018-12-03 DIAGNOSIS — M21.6X9 EQUINUS DEFORMITY OF FOOT: ICD-10-CM

## 2018-12-03 PROCEDURE — 99213 OFFICE O/P EST LOW 20 MIN: CPT | Mod: S$GLB,,, | Performed by: PODIATRIST

## 2018-12-03 PROCEDURE — 3008F BODY MASS INDEX DOCD: CPT | Mod: CPTII,S$GLB,, | Performed by: PODIATRIST

## 2018-12-03 PROCEDURE — 99999 PR PBB SHADOW E&M-EST. PATIENT-LVL IV: CPT | Mod: PBBFAC,,, | Performed by: PODIATRIST

## 2018-12-03 NOTE — PROGRESS NOTES
Subjective:      Patient ID: Elizabeth Giordano is a 58 y.o. female.    Chief Complaint: Foot Pain (right foot pain seen Dr. Al 10/10/17 for same thing)  Patient presents to clinic with the chief complaint of continued pain in the Rt. Forefoot, that has lessened but failed to fully resolve since our last exam.  Describes pain as aching and rates currently as a 5/10.  Symptoms are localized to the base of the 4th toe.  Symptoms continue to be aggravated with prolonged weight bearing and alleviated with rest.  She has also noted relief with use of prior metatarsal pads.  Denies recent trauma to the affected extremity.  Denies any additional pedal complaints.      Past Medical History:   Diagnosis Date    Allergy     Arthritis of spine 2011    Chronic low back pain     DDD (degenerative disc disease), lumbar     Diverticulitis     Diverticulosis     DJD (degenerative joint disease)     Encounter for blood transfusion     Factor V Leiden     GERD (gastroesophageal reflux disease)     Hiatal hernia     Migraines     PONV (postoperative nausea and vomiting)     geta       Past Surgical History:   Procedure Laterality Date    APPENDECTOMY  1981    CARPAL TUNNEL RELEASE  2011    right    COLONOSCOPY  2014    reduntant colon, otherwise normal findings repeat in 8-10 years for surveillance    COLONOSCOPY N/A 1/16/2014    Performed by Mark Palomares Jr., MD at [facility] ENDO    Epidural Steroid Injection      Pain Management    EPIDURAL STEROID INJECTION INTO CERVICAL SPINE N/A 9/25/2018    Procedure: Injection-steroid-epidural-cervical;  Surgeon: Oswald Abdalla MD;  Location: [facility] OR;  Service: Pain Management;  Laterality: N/A;    ESOPHAGOGASTRODUODENOSCOPY      ESOPHAGOGASTRODUODENOSCOPY (EGD) N/A 5/17/2016    Performed by Marty Arreguin MD at [facility] END[facility]    Facet injection      Pain Management    HYSTERECTOMY      ovaries removed    INJECTION, STEROID, SPINE, LUMBAR, EPIDURAL N/A  1/8/2014    [facility] at [facility] OR    INJECTION-FACET L3/4 and L4/5 Bilateral 4/13/2015    Performed by [facility] at [facility] OR    Injection-steroid-epidural-cervical N/A 9/25/2018    Per[facility] at [facility] OR    INJECTION-STEROID-EPIDURAL-LUMBAR N/A 3/10/2017    Performed by [facility] at [facility] OR    INJECTION-STEROID-EPIDURAL-LUMBAR N/A 6/9/2014    Per[facility] at [facility] OR    KIDNEY SURGERY Right 1967    to correct urinary reflux into kidney    OVARIAN CYST REMOVAL Right 1981    RADIOFREQUENCY ABLATION OF LUMBAR MEDIAL BRANCH NERVE AT SINGLE LEVEL Bilateral 7/17/2018    Procedure: RADIOFREQUENCY ABLATION, NERVE, MEDIAL BRANCH, LUMBAR, L2,3,4;  Surgeon: Oswald Abdalla MD;  Location: [facility] OR;  Service: Pain Management;  Laterality: Bilateral;    RADIOFREQUENCY ABLATION, NERVE, MEDIAL BRANCH, LUMBAR, L2,3,4 Bilateral 7/17/2018    Performed by [facility]alla [facility] at [facility] OR    RADIOFREQUENCY THERMOCOAGULATION (RFTC)-NERVE-MEDIAN BRANCH-LUMBAR L2,3,4 Bilateral 7/17/2017    Performed by Oswald [facility]alla [facility] at [facility]    RADIOFREQUENCY THERMOCOAGULATION (RFTC)-NERVE-MEDIAN BRANCH-LUMBAR L2,3,4 Bilateral 6/20/2016    Performed by [facility]alla [facility] at [facility]    RADIOFREQUENCY THERMOCOAGULATION (RFTC)-NERVE-MEDIAN BRANCH-LUMBAR L2,3,4 Bilateral 5/20/2015    Performed by [facility] at [facility] OR    SHOULDER SURGERY  2010    rotator cuff, right       Family History   Problem Relation Age of Onset    Heart attack Mother     Heart disease Mother     COPD Mother     Hypertension Mother     Hepatitis Sister     COPD Father     No Known Problems Daughter     Allergic rhinitis Neg Hx     Angioedema Neg Hx     Asthma Neg Hx     Atopy Neg Hx     Eczema Neg Hx     Immunodeficiency Neg Hx     Urticaria Neg Hx     Colon cancer Neg Hx     Colon polyps Neg Hx        Social History     Socioeconomic  History    Marital status:      Spouse name: None    Number of children: 1    Years of education: None    Highest education level: None   Social Needs    Financial resource strain: None    Food insecurity - worry: None    Food insecurity - inability: None    Transportation needs - medical: None    Transportation needs - non-medical: None   Occupational History     Employer: [email or web address]   Tobacco Use    Smoking status: Former Smoker     Packs/day: 1.00     Years: 5.00     Pack years: 5.00     Start date: 1992     Last attempt to quit: 1997     Years since quittin.9    Smokeless tobacco: Never Used   Substance and Sexual Activity    Alcohol use: No     Alcohol/week: 0.0 oz     Comment: occasionally    Drug use: No    Sexual activity: Yes     Partners: Male   Other Topics Concern    None   Social History Narrative    ** Merged History Encounter **            Current Outpatient Medications   Medication Sig Dispense Refill    diphenhydrAMINE (BENADRYL) 25 mg capsule Take 25 mg by mouth every 6 (six) hours as needed.      DIVIGEL 1 mg/gram (0.1 %) topical gel Apply 1 packet topically once daily.  3    ibuprofen (ADVIL,MOTRIN) 800 MG tablet TAKE 1 TABLET(800 MG) BY MOUTH TWICE DAILY AS NEEDED FOR PAIN 60 tablet 0    meloxicam (MOBIC) 15 MG tablet Take 15 mg by mouth once daily.  3    nystatin (MYCOSTATIN) cream daily as needed.  1    ondansetron (ZOFRAN) 4 MG tablet Take 1 tablet (4 mg total) by mouth every 6 (six) hours as needed for Nausea. 30 tablet 11    pantoprazole (PROTONIX) 40 MG tablet Take 1 tablet (40 mg total) by mouth once daily. 90 tablet 1    polyethylene glycol (GLYCOLAX) 17 gram PwPk 1/2 T or 1 capful in 8 oz water daily until daily bowel movement then every other day to maintain regularity. 100 each 2    sumatriptan (IMITREX) 100 MG tablet MAY TAKE 1 TABLET THEN FOLLOWUP WITH ANOTHER TABLET IN 2 HOURS IF HEADACHE NOT RESOLVED 9 tablet 11    tiZANidine  (ZANAFLEX) 2 MG tablet TAKE 1 TABLET(2 MG) BY MOUTH EVERY EVENING 90 tablet 0    traMADol (ULTRAM) 50 mg tablet TAKE 1 TABLET BY MOUTH TWICE DAILY AS NEEDED 60 tablet 0    azelastine (ASTELIN) 137 mcg (0.1 %) nasal spray 1 spray (137 mcg total) by Nasal route 2 (two) times daily. 30 mL 3    diazePAM (VALIUM) 5 MG tablet Take 1 tablet (5 mg total) by mouth On call Procedure (30 minutes prior). 1 tablet 0    naratriptan (AMERGE) 2.5 MG tablet 2.5 mg PO BID x 5 days at onset of migraine flare (Patient taking differently: Take by mouth as needed. 2.5 mg PO BID x 5 days at onset of migraine flare) 10 tablet 11     Current Facility-Administered Medications   Medication Dose Route Frequency Provider Last Rate Last Dose    onabotulinumtoxina injection 200 Units  200 Units Intramuscular Q90 Days Neva Cortes MD   200 Units at 08/16/18 1625    onabotulinumtoxina injection 200 Units  200 Units Intramuscular Q90 Days Petty Fernandez MD   200 Units at 11/06/18 1333       Review of patient's allergies indicates:   Allergen Reactions    Hydrocodone Hives and Nausea Only           Sulfa (sulfonamide antibiotics) Hives and Rash           Doxycycline Rash     Itchy rash and some scattered itchy lesions    Oxycodone Itching and Nausea And Vomiting         Review of Systems   Constitution: Negative for chills and fever.   Cardiovascular: Negative for claudication and leg swelling.   Skin: Negative for color change and nail changes.   Musculoskeletal: Positive for joint pain. Negative for muscle cramps, muscle weakness and myalgias.   Neurological: Negative for numbness and paresthesias.   Psychiatric/Behavioral: Negative for altered mental status.           Objective:      Physical Exam   Constitutional: She is oriented to person, place, and time. She appears well-developed and well-nourished. No distress.   Cardiovascular:   Pulses:       Dorsalis pedis pulses are 2+ on the right side, and 2+ on the left side.         Posterior tibial pulses are 2+ on the right side, and 2+ on the left side.   CFT <3 seconds bilateral.  Pedal hair growth present bilateral.   No varicosities noted bilateral.  No bilateral lower extremity edema.   Musculoskeletal: She exhibits tenderness. She exhibits no edema.   Muscle strength 5/5 in all muscle groups bilateral.  No tenderness nor crepitation with ROM of foot/ankle joints bilateral.  Bilateral pes cavus foot type.  Bilateral gastrocnemius equinus.  Pain with palpation of the Rt. Sub 4th met head.   (-) Yojana sign on exam bilateral.  (-) Lachman sign on exam bilateral.   Neurological: She is alert and oriented to person, place, and time. She has normal strength. No sensory deficit.   Light touch intact bilateral.    (-) tinel sign bilateral.    Skin: Skin is warm, dry and intact. Capillary refill takes less than 2 seconds. No abrasion, no bruising, no burn, no laceration, no lesion, no petechiae and no rash noted. She is not diaphoretic. No erythema. No pallor.   Pedal skin has normal turgor, temperature, and texture bilateral.  Toenails x 10 appear normotrophic. Examination of the skin reveals no evidence of significant maceration, rashes, open lesions, suspicious appearing nevi or other concerning lesions.              Assessment:       Encounter Diagnoses   Name Primary?    Foot pain, right Yes    Equinus deformity of foot     Metatarsalgia of right foot          Plan:       Elizabeth was seen today for foot pain.    Diagnoses and all orders for this visit:    Foot pain, right    Equinus deformity of foot    Metatarsalgia of right foot  -     ORTHOTIC DEVICE (DME)      I counseled the patient on her conditions, their implications and medical management.    - On exam, there is no evidence of a neuroma.  Symptoms are simply consistent with metatarsalgia.      - Discussed performing stretching exercises to address bilateral equinus.     - Recommend wearing supportive shoes only.  Discussed  avoidance of barefoot walking, flip flops, and Crocs, as this will exacerbate current symptoms.       - Prescription written for custom orthotics with built in metatarsal pads to offload the forefoot.    - Recommend icing the affected area a minimum of 20 minutes daily.     - Recommend taking a nsaid to address pain/inflammation.    - Discussed avoidance of high impact activities such as squatting, stooping, and running as these activities will exacerbate symptoms.      - Advised to begin using a night splint to reduce bilateral equinus.     - May consider applying a topical analgesic (Aspercream, Biofreeze, or Salonpas) to help with pain symptoms.       - RTC prn or sooner if symptoms fail to resolve.     Patricio Al DPM

## 2018-12-05 ENCOUNTER — PATIENT MESSAGE (OUTPATIENT)
Dept: PAIN MEDICINE | Facility: CLINIC | Age: 58
End: 2018-12-05

## 2018-12-20 ENCOUNTER — TELEPHONE (OUTPATIENT)
Dept: NEUROLOGY | Facility: CLINIC | Age: 58
End: 2018-12-20

## 2018-12-20 DIAGNOSIS — G43.719 INTRACTABLE CHRONIC MIGRAINE WITHOUT AURA AND WITHOUT STATUS MIGRAINOSUS: Primary | ICD-10-CM

## 2018-12-21 DIAGNOSIS — M51.36 DDD (DEGENERATIVE DISC DISEASE), LUMBAR: Primary | ICD-10-CM

## 2018-12-23 RX ORDER — TIZANIDINE 2 MG/1
TABLET ORAL
Qty: 90 TABLET | Refills: 0 | Status: CANCELLED | OUTPATIENT
Start: 2018-12-23

## 2018-12-24 RX ORDER — TRAMADOL HYDROCHLORIDE 50 MG/1
TABLET ORAL
Qty: 60 TABLET | Refills: 0 | Status: SHIPPED | OUTPATIENT
Start: 2018-12-24 | End: 2019-01-24 | Stop reason: SDUPTHER

## 2018-12-26 RX ORDER — TIZANIDINE 2 MG/1
2 TABLET ORAL EVERY 6 HOURS PRN
Qty: 30 TABLET | Refills: 0 | Status: SHIPPED | OUTPATIENT
Start: 2018-12-26 | End: 2019-01-05

## 2018-12-26 RX ORDER — TIZANIDINE 2 MG/1
TABLET ORAL
Qty: 90 TABLET | Refills: 0 | Status: CANCELLED | OUTPATIENT
Start: 2018-12-26

## 2018-12-26 RX ORDER — TIZANIDINE 2 MG/1
TABLET ORAL
Qty: 385 TABLET | Refills: 0 | OUTPATIENT
Start: 2018-12-26

## 2018-12-27 ENCOUNTER — HOSPITAL ENCOUNTER (OUTPATIENT)
Facility: HOSPITAL | Age: 58
Discharge: HOME | End: 2018-12-27
Attending: ANESTHESIOLOGY | Admitting: ANESTHESIOLOGY
Payer: COMMERCIAL

## 2018-12-27 ENCOUNTER — HOSPITAL ENCOUNTER (OUTPATIENT)
Dept: RADIOLOGY | Facility: HOSPITAL | Age: 58
Discharge: HOME | End: 2018-12-27
Attending: ANESTHESIOLOGY | Admitting: ANESTHESIOLOGY
Payer: COMMERCIAL

## 2018-12-27 VITALS
HEART RATE: 62 BPM | BODY MASS INDEX: 37.56 KG/M2 | TEMPERATURE: 98 F | HEIGHT: 64 IN | WEIGHT: 220 LBS | RESPIRATION RATE: 16 BRPM | OXYGEN SATURATION: 97 % | SYSTOLIC BLOOD PRESSURE: 180 MMHG | DIASTOLIC BLOOD PRESSURE: 86 MMHG

## 2018-12-27 DIAGNOSIS — M54.16 LUMBAR RADICULOPATHY: Primary | ICD-10-CM

## 2018-12-27 DIAGNOSIS — M51.36 DDD (DEGENERATIVE DISC DISEASE), LUMBAR: ICD-10-CM

## 2018-12-27 PROCEDURE — 62323 NJX INTERLAMINAR LMBR/SAC: CPT | Mod: ,,, | Performed by: ANESTHESIOLOGY

## 2018-12-27 PROCEDURE — A4216 STERILE WATER/SALINE, 10 ML: HCPCS | Mod: PO | Performed by: ANESTHESIOLOGY

## 2018-12-27 PROCEDURE — 62323 NJX INTERLAMINAR LMBR/SAC: CPT | Mod: PO | Performed by: ANESTHESIOLOGY

## 2018-12-27 PROCEDURE — 63600175 PHARM REV CODE 636 W HCPCS: Mod: PO | Performed by: ANESTHESIOLOGY

## 2018-12-27 PROCEDURE — 25000003 PHARM REV CODE 250: Mod: PO | Performed by: ANESTHESIOLOGY

## 2018-12-27 PROCEDURE — 25500020 PHARM REV CODE 255: Mod: PO | Performed by: ANESTHESIOLOGY

## 2018-12-27 PROCEDURE — 76000 FLUOROSCOPY <1 HR PHYS/QHP: CPT | Mod: TC,PO

## 2018-12-27 RX ORDER — LIDOCAINE HYDROCHLORIDE 10 MG/ML
INJECTION, SOLUTION EPIDURAL; INFILTRATION; INTRACAUDAL; PERINEURAL
Status: DISCONTINUED | OUTPATIENT
Start: 2018-12-27 | End: 2018-12-27 | Stop reason: HOSPADM

## 2018-12-27 RX ORDER — ALPRAZOLAM 0.5 MG/1
1 TABLET, ORALLY DISINTEGRATING ORAL ONCE AS NEEDED
Status: DISCONTINUED | OUTPATIENT
Start: 2018-12-27 | End: 2018-12-27 | Stop reason: HOSPADM

## 2018-12-27 RX ORDER — SODIUM CHLORIDE 9 MG/ML
INJECTION, SOLUTION INTRAMUSCULAR; INTRAVENOUS; SUBCUTANEOUS
Status: DISCONTINUED | OUTPATIENT
Start: 2018-12-27 | End: 2018-12-27 | Stop reason: HOSPADM

## 2018-12-27 RX ORDER — METHYLPREDNISOLONE ACETATE 80 MG/ML
INJECTION, SUSPENSION INTRA-ARTICULAR; INTRALESIONAL; INTRAMUSCULAR; SOFT TISSUE
Status: DISCONTINUED | OUTPATIENT
Start: 2018-12-27 | End: 2018-12-27 | Stop reason: HOSPADM

## 2018-12-27 NOTE — DISCHARGE SUMMARY
[facility]  Discharge Note  Short Stay    Admit Date: 12/27/2018    Discharge Date: 12/27/2018    Attending Physician: Oswald Abdalla MD     Discharge Provider: Oswald Abdalla    Diagnoses:  Active Hospital Problems    Diagnosis  POA    *Lumbar radiculopathy [M54.16]  Yes      Resolved Hospital Problems   No resolved problems to display.       Discharged Condition: good    Final Diagnoses: Lumbar radiculopathy [M54.16]    Disposition: Home or Self Care    Hospital Course: no complications, uneventful    Outcome of Hospitalization, Treatment, Procedure, or Surgery:  Patient was admitted for outpatient procedure. The patient underwent procedure without complications and are discharged home    Follow up/Patient Instructions:  Follow up as scheduled in Pain Management clinic in 3-4 weeks/Patient has received instructions and follow up date and time    Medications:  Continue previous medications    Discharge Procedure Orders   Call MD for:  temperature >100.4     Call MD for:  severe uncontrolled pain     Call MD for:  redness, tenderness, or signs of infection (pain, swelling, redness, odor or green/yellow discharge around incision site)     Call MD for:  severe persistent headache     No dressing needed         Discharge Procedure Orders (must include Diet, Follow-up, Activity):   Discharge Procedure Orders (must include Diet, Follow-up, Activity)   Call MD for:  temperature >100.4     Call MD for:  severe uncontrolled pain     Call MD for:  redness, tenderness, or signs of infection (pain, swelling, redness, odor or green/yellow discharge around incision site)     Call MD for:  severe persistent headache     No dressing needed

## 2018-12-27 NOTE — OP NOTE

## 2019-01-11 ENCOUNTER — OFFICE VISIT (OUTPATIENT)
Dept: FAMILY MEDICINE | Facility: CLINIC | Age: 59
End: 2019-01-11
Payer: COMMERCIAL

## 2019-01-11 VITALS
HEIGHT: 64 IN | WEIGHT: 232.56 LBS | SYSTOLIC BLOOD PRESSURE: 148 MMHG | TEMPERATURE: 99 F | OXYGEN SATURATION: 96 % | DIASTOLIC BLOOD PRESSURE: 88 MMHG | BODY MASS INDEX: 39.7 KG/M2 | HEART RATE: 88 BPM

## 2019-01-11 DIAGNOSIS — I10 ESSENTIAL HYPERTENSION: Primary | ICD-10-CM

## 2019-01-11 DIAGNOSIS — G44.229 CHRONIC TENSION-TYPE HEADACHE, NOT INTRACTABLE: ICD-10-CM

## 2019-01-11 DIAGNOSIS — E66.9 CLASS 2 OBESITY WITH BODY MASS INDEX (BMI) OF 39.0 TO 39.9 IN ADULT, UNSPECIFIED OBESITY TYPE, UNSPECIFIED WHETHER SERIOUS COMORBIDITY PRESENT: ICD-10-CM

## 2019-01-11 PROBLEM — E66.812 CLASS 2 OBESITY WITH BODY MASS INDEX (BMI) OF 39.0 TO 39.9 IN ADULT: Status: ACTIVE | Noted: 2019-01-11

## 2019-01-11 PROCEDURE — 3079F PR MOST RECENT DIASTOLIC BLOOD PRESSURE 80-89 MM HG: ICD-10-PCS | Mod: CPTII,S$GLB,, | Performed by: FAMILY MEDICINE

## 2019-01-11 PROCEDURE — 3077F PR MOST RECENT SYSTOLIC BLOOD PRESSURE >= 140 MM HG: ICD-10-PCS | Mod: CPTII,S$GLB,, | Performed by: FAMILY MEDICINE

## 2019-01-11 PROCEDURE — 99214 PR OFFICE/OUTPT VISIT, EST, LEVL IV, 30-39 MIN: ICD-10-PCS | Mod: 25,S$GLB,, | Performed by: FAMILY MEDICINE

## 2019-01-11 PROCEDURE — 96372 THER/PROPH/DIAG INJ SC/IM: CPT | Mod: 59,S$GLB,, | Performed by: FAMILY MEDICINE

## 2019-01-11 PROCEDURE — 3079F DIAST BP 80-89 MM HG: CPT | Mod: CPTII,S$GLB,, | Performed by: FAMILY MEDICINE

## 2019-01-11 PROCEDURE — 96372 PR INJECTION,THERAP/PROPH/DIAG2ST, IM OR SUBCUT: ICD-10-PCS | Mod: 59,S$GLB,, | Performed by: FAMILY MEDICINE

## 2019-01-11 PROCEDURE — 3008F PR BODY MASS INDEX (BMI) DOCUMENTED: ICD-10-PCS | Mod: CPTII,S$GLB,, | Performed by: FAMILY MEDICINE

## 2019-01-11 PROCEDURE — 99999 PR PBB SHADOW E&M-EST. PATIENT-LVL III: CPT | Mod: PBBFAC,,, | Performed by: FAMILY MEDICINE

## 2019-01-11 PROCEDURE — 99999 PR PBB SHADOW E&M-EST. PATIENT-LVL III: ICD-10-PCS | Mod: PBBFAC,,, | Performed by: FAMILY MEDICINE

## 2019-01-11 PROCEDURE — 3077F SYST BP >= 140 MM HG: CPT | Mod: CPTII,S$GLB,, | Performed by: FAMILY MEDICINE

## 2019-01-11 PROCEDURE — 99214 OFFICE O/P EST MOD 30 MIN: CPT | Mod: 25,S$GLB,, | Performed by: FAMILY MEDICINE

## 2019-01-11 PROCEDURE — 3008F BODY MASS INDEX DOCD: CPT | Mod: CPTII,S$GLB,, | Performed by: FAMILY MEDICINE

## 2019-01-11 RX ORDER — HYDROCHLOROTHIAZIDE 25 MG/1
25 TABLET ORAL DAILY
Qty: 90 TABLET | Refills: 1 | Status: SHIPPED | OUTPATIENT
Start: 2019-01-11 | End: 2019-07-01 | Stop reason: SDUPTHER

## 2019-01-11 RX ORDER — KETOROLAC TROMETHAMINE 30 MG/ML
30 INJECTION, SOLUTION INTRAMUSCULAR; INTRAVENOUS ONCE
Status: COMPLETED | OUTPATIENT
Start: 2019-01-11 | End: 2019-01-11

## 2019-01-11 RX ADMIN — KETOROLAC TROMETHAMINE 30 MG: 30 INJECTION, SOLUTION INTRAMUSCULAR; INTRAVENOUS at 04:01

## 2019-01-11 NOTE — PROGRESS NOTES
Subjective:       Patient ID: Elizabeth Giordano is a 58 y.o. female.    Chief Complaint: Hypertension (been elevated for about 2 months )    Pt is known to me.  The pt reports elevated BP for about 2 months--has had a mild headache.  No chest pain, nausea, vision change. Her epidurals and ablation have helped her back/neck pain.  She has a strong family history of high blood pressure.  Her weight is up 4 pounds.      Hypertension   This is a new problem. The problem has been rapidly worsening since onset. The problem is uncontrolled. Associated symptoms include headaches, palpitations and PND. Pertinent negatives include no chest pain or shortness of breath. Agents associated with hypertension include estrogens. Risk factors for coronary artery disease include family history, obesity, post-menopausal state and sedentary lifestyle. Compliance problems include diet and exercise.      Review of Systems   Eyes: Negative for visual disturbance.   Respiratory: Negative for shortness of breath.    Cardiovascular: Positive for palpitations and PND. Negative for chest pain.   Gastrointestinal: Negative for nausea.   Neurological: Positive for headaches. Negative for numbness.       Objective:      Physical Exam   Constitutional: She is oriented to person, place, and time. She appears well-developed and well-nourished.   Pt is obese   HENT:   Head: Normocephalic.   Eyes: Conjunctivae and EOM are normal. Pupils are equal, round, and reactive to light.   Neck: Normal range of motion. Neck supple. No thyromegaly present.   Cardiovascular: Normal rate, regular rhythm and normal heart sounds.   Pulmonary/Chest: Effort normal and breath sounds normal.   Abdominal: Soft. Bowel sounds are normal. There is no tenderness.   Musculoskeletal: Normal range of motion. She exhibits no tenderness or deformity.   Lymphadenopathy:     She has no cervical adenopathy.   Neurological: She is alert and oriented to person, place, and time.  She displays normal reflexes. No cranial nerve deficit. She exhibits normal muscle tone. Coordination normal.   Skin: Skin is warm and dry.   Psychiatric: She has a normal mood and affect. Her behavior is normal.       Assessment:       1. Essential hypertension    2. Class 2 obesity with body mass index (BMI) of 39.0 to 39.9 in adult, unspecified obesity type, unspecified whether serious comorbidity present    3. Chronic tension-type headache, not intractable        Plan:       Elizabeth was seen today for hypertension.    Diagnoses and all orders for this visit:    Essential hypertension  -     Basic metabolic panel; Future    Class 2 obesity with body mass index (BMI) of 39.0 to 39.9 in adult, unspecified obesity type, unspecified whether serious comorbidity present    Chronic tension-type headache, not intractable    Other orders  -     hydroCHLOROthiazide (HYDRODIURIL) 25 MG tablet; Take 1 tablet (25 mg total) by mouth once daily.  -     ketorolac injection 30 mg      During this visit, I reviewed the pt's history, medications, allergies, and problem list.   Nurse BP visit 4 weeks

## 2019-01-14 RX ORDER — MELOXICAM 15 MG/1
TABLET ORAL
Qty: 90 TABLET | Refills: 0 | Status: SHIPPED | OUTPATIENT
Start: 2019-01-14 | End: 2019-01-24

## 2019-01-24 ENCOUNTER — OFFICE VISIT (OUTPATIENT)
Dept: PAIN MEDICINE | Facility: CLINIC | Age: 59
End: 2019-01-24
Payer: COMMERCIAL

## 2019-01-24 VITALS
DIASTOLIC BLOOD PRESSURE: 68 MMHG | WEIGHT: 229.75 LBS | SYSTOLIC BLOOD PRESSURE: 149 MMHG | HEIGHT: 63 IN | HEART RATE: 81 BPM | BODY MASS INDEX: 40.71 KG/M2

## 2019-01-24 DIAGNOSIS — M48.062 SPINAL STENOSIS OF LUMBAR REGION WITH NEUROGENIC CLAUDICATION: ICD-10-CM

## 2019-01-24 DIAGNOSIS — M51.36 DDD (DEGENERATIVE DISC DISEASE), LUMBAR: ICD-10-CM

## 2019-01-24 DIAGNOSIS — M54.16 LUMBAR RADICULOPATHY: Primary | ICD-10-CM

## 2019-01-24 PROCEDURE — 99999 PR PBB SHADOW E&M-EST. PATIENT-LVL III: CPT | Mod: PBBFAC,,, | Performed by: PHYSICIAN ASSISTANT

## 2019-01-24 PROCEDURE — 3077F PR MOST RECENT SYSTOLIC BLOOD PRESSURE >= 140 MM HG: ICD-10-PCS | Mod: CPTII,S$GLB,, | Performed by: PHYSICIAN ASSISTANT

## 2019-01-24 PROCEDURE — 3008F PR BODY MASS INDEX (BMI) DOCUMENTED: ICD-10-PCS | Mod: CPTII,S$GLB,, | Performed by: PHYSICIAN ASSISTANT

## 2019-01-24 PROCEDURE — 99213 PR OFFICE/OUTPT VISIT, EST, LEVL III, 20-29 MIN: ICD-10-PCS | Mod: S$GLB,,, | Performed by: PHYSICIAN ASSISTANT

## 2019-01-24 PROCEDURE — 3077F SYST BP >= 140 MM HG: CPT | Mod: CPTII,S$GLB,, | Performed by: PHYSICIAN ASSISTANT

## 2019-01-24 PROCEDURE — 3008F BODY MASS INDEX DOCD: CPT | Mod: CPTII,S$GLB,, | Performed by: PHYSICIAN ASSISTANT

## 2019-01-24 PROCEDURE — 99213 OFFICE O/P EST LOW 20 MIN: CPT | Mod: S$GLB,,, | Performed by: PHYSICIAN ASSISTANT

## 2019-01-24 PROCEDURE — 3078F PR MOST RECENT DIASTOLIC BLOOD PRESSURE < 80 MM HG: ICD-10-PCS | Mod: CPTII,S$GLB,, | Performed by: PHYSICIAN ASSISTANT

## 2019-01-24 PROCEDURE — 3078F DIAST BP <80 MM HG: CPT | Mod: CPTII,S$GLB,, | Performed by: PHYSICIAN ASSISTANT

## 2019-01-24 PROCEDURE — 99999 PR PBB SHADOW E&M-EST. PATIENT-LVL III: ICD-10-PCS | Mod: PBBFAC,,, | Performed by: PHYSICIAN ASSISTANT

## 2019-01-24 RX ORDER — MELOXICAM 15 MG/1
15 TABLET ORAL DAILY
Qty: 90 TABLET | Refills: 1 | Status: SHIPPED | OUTPATIENT
Start: 2019-01-24 | End: 2019-10-25

## 2019-01-24 RX ORDER — TIZANIDINE 2 MG/1
2 TABLET ORAL NIGHTLY PRN
Qty: 90 TABLET | Refills: 1 | Status: SHIPPED | OUTPATIENT
Start: 2019-01-24 | End: 2019-07-30 | Stop reason: SDUPTHER

## 2019-01-24 RX ORDER — TRAMADOL HYDROCHLORIDE 50 MG/1
50 TABLET ORAL 2 TIMES DAILY PRN
Qty: 60 TABLET | Refills: 2 | Status: SHIPPED | OUTPATIENT
Start: 2019-01-24 | End: 2019-04-15

## 2019-01-24 RX ORDER — LIDOCAINE 50 MG/G
1 PATCH TOPICAL DAILY PRN
Qty: 30 PATCH | Refills: 5 | Status: SHIPPED | OUTPATIENT
Start: 2019-01-24 | End: 2020-07-09

## 2019-01-24 NOTE — PROGRESS NOTES
CHIEF COMPLAINT/REASON FOR VISIT: low back pain, neck pain    History of present illness: The patient is a 58 year old woman with a history of migraines, carpal tunnel syndrome and low back pain since her early 20s.  She is status post L5/S1 interlaminar epidural steroid injection on 12/27/2018 with 60% relief.  She no longer has pain traveling into her right leg but does report some remaining intermittent low back pain that is worse with certain movements.  She has relief with her medication.  She complains of intermittent left leg numbness with prolonged standing.  She denies weakness, bladder or bowel incontinence.    Pain intervention history: She tried Neurontin for her leg/thigh pain with no success. She only takes Advil with about 30% relief. She is status post TFESI bilaterally to L3 on 12/6/2011 with no relief. Past history of status post L4/5 YARON on 5/25/11 with about 90% relief that lasted 3 weeks. She is status post 2 bilateral L3 transforaminal injections with 3 weeks relief each time. Her current pain medications include Celexa 40 mg once a day, Aleve 220 mg twice a day, Lyrica as previously stated, Zanaflex 5 mg at night. She uses her TENS at home, when she thinks about it.  She is status post L4/5 interlaminar epidural steroid injection on 1/8/14 with 90% relief.  She is status post L5/S1 interlaminar epidural steroid injection on 6/9/14 with moderate relief only on the right low back and right leg lasting 2 weeks.  She is status post bilateral L3/4 and L4/5 facet joint injections on 4/13/15 with initially 100% relief of her back pain and 80% relief of her left lateral hip and lateral thigh pain, now reporting at least 50% relief of both.  She is status post bilateral L2, 3 and 4 medial branch radiofrequency ablation on 5/20/15 with 60% relief.   She is status post bilateral L2, 3 and 4 medial branch radiofrequency ablation on 6/20/16 with 50-70% relief.  She is status post L5/S1 interlaminar  "epidural steroid injection on 3/10/17 with 50% relief.  She is status post bilateral L2, 3, 4 medial branch radiofrequency ablation on 7/17/17 with about 75% relief.  She is status post bilateral L2, 3, 4 medial branch radiofrequency ablation on 07/17/2018 with 100% relief of her leg pain and 70-75% relief of her back pain.  She is status post C7-T1 cervical interlaminar epidural steroid injection on 09/25/2018 with 80% relief.   She is status post L5/S1 interlaminar epidural steroid injection on 12/27/2018 with 60% relief.      ROS: She reports back pain only.  Balance of review of systems is negative.    Medical, surgical, family and social history reviewed elsewhere in record.     Medications/Allergies: See med card      Vitals:    01/24/19 1421   BP: (!) 149/68   Pulse: 81   Weight: 104.2 kg (229 lb 11.5 oz)   Height: 5' 3" (1.6 m)   PainSc:   5   PainLoc: Head        PHYSICAL EXAM:  Gen: A and O x3, pleasant, well-groomed  HEENT: PERRLA  CVS: Regular rate and rhythm, normal S1 and S2, no murmurs.  Resp: Clear to auscultation bilaterally, no wheezes or rales.  Musculoskeletal: Able to heel walk, toe walk. No antalgic gait.     Neuro:  Upper extremities: 5/5 strength bilaterally   Lower extremities: 5/5 strength bilaterally  Reflexes: Brachioradialis 2+, Bicep 2+, Tricep 2+. Patellar 2+, Achilles 2+.  Sensory: Intact and symmetrical to light touch and pinprick in C2-T1 dermatomes bilaterally. Intact and symmetrical to light touch and pinprick in L2-S1 dermatomes bilaterally.    Cervical Spine:  Cervical spine: ROM is mildly limited with flexion, extension and lateral rotation without increased pain.  Spurling's maneuver causes no neck pain to either side.  Myofascial exam:  Minimal tenderness to palpation to the right cervical paraspinous muscles and right trapezius muscle.    Lumbar spine:  Lumbar spine: ROM is moderately limited with flexion and extension without increased pain.  Supine straight leg raise is " negative bilaterally.  Internal and external rotation of the hip causes no increased pain in either side.  Myofascial exam: No tenderness to palpation to the lumbar paraspinous muscles.         IMAGING:   MRI L-SPINE 5/10/11   IMPRESSION: AT L3-4, THERE IS CIRCUMFERENTIAL DISC PROTRUSION FOCALLY MORE PROMINENT TO THE LEFT AS WELL AS A SMALL DISC HERNIATION PROTRUDING INFERIORLY BEHIND L4 TO THE LEFT OF MIDLINE. THIS PRODUCES SPINAL CANAL AND BILATERAL NEURAL FORAMINAL STENOSIS, LEFT GREATER THAN RIGHT. AT L4-5, THERE IS CIRCUMFERENTIAL DISC PROTRUSION FOCALLY MORE PROMINENT TO THE RIGHT WITH MILD SPINAL CANAL AND RIGHT NEURAL FORAMINAL STENOSIS. AT L1-2 AND L2-3 ALTHOUGH THERE ARE DISC PROTRUSIONS IDENTIFIED, SIGNIFICANT STENOSIS IS NOT SEEN.     MRI lumbar spine 7/10/14  L1-2:There is chronic relatively advanced disc degeneration with disc space narrowing, disc dehydration, disc narrowing, endplate osteophytes, and degenerative vertebral endplate marrow change. There is a diffuse 2-mm posterior disc bulge with a superimposed4-mm right posterior disc extrusion causing mild right foraminal stenosis. There is no impingement of the right L1 nerve root and there has been no change.  L2-3: There is severe and chronic disc degeneration with severe disc space narrowing, disc dehydration, degenerative vertebral endplate marrow change, and vertebral endplate osteophytes. There is a diffuse 2-mm posterior disc bulge with a superimposed 4-mm right posterior disc extrusion causing mild right foraminal stenosis. There is no impingement of the right L2 nerve root and there has been no significant change.  L3-4: There is severe disc degeneration which has progressed since the prior study. There is severe disc space narrowing, disc dehydration, degenerative vertebral endplate marrow change and endplate osteophytes. There is also mild posterior subluxation of L3over a distance of 4 mm. There is a broad-based 5-mm posterior disc  extrusion. There is degenerative facet arthrosis with ligamentum flavum thickening. The combination of facet arthrosis and disc extrusion results in relatively severe spinal canal stenosis with compression of the thecal sac to an AP diameter of 5 mm, moderate right foraminal stenosis, and severe left foraminal stenosis. The spinal stenosis has also progressed since the prior study.  L4-5:There is a diffuse posterior disc bulge with a superimposed 3-mm left posterior paracentral disc protrusion causing left paracentral thecal sac compression. There is moderate left and mild right foraminal stenosis and there has been no significant change. There is mild degenerative facet arthrosis with ligamentum flavum thickening and small joint effusions.  L5-S1:There is no significant compromise of the spinal canal or foramina and there has been no change.    X-ray cervical spine 6/8/15  There is loss of normal cervical lordosis which may be positional or related muscular strain. Intervertebral disk height loss is noted at the C5-C6 C6-C7 levels and to a lesser degree C4-C5 and C7-T1 levels. Vertebral body alignment appears otherwise adequate. Vertebral body heights appear well-preserved. The atlas and odontoid appear in good relationship to each other. Osseous neuroforaminal narrowing is noted at the C3-C4 C4-C5 C5-C6 levels on the left and at the C4-C5 C5-C6 and C6-C7 levels on the right     07/10/2018 MRI cervical spine [facility] report  C2-3 broad-based signal asymmetry at the left subarticular and foraminal zone reflecting implant spondylosis and disc bulge complex, mild to moderate left asymmetric foraminal narrowing, ectasia of the left vertebral artery, contralateral right asymmetric facet hypertrophy, right foramen widely narrowed, disc partially desiccated without collapse  C3-4 moderate to severe left greater than right foraminal narrowing secondary to uncinate joint hypertrophic signal alteration, disc partially  desiccated  C4-5 endplate spondylosis moderate diffuse disc bulge noted, broad-based right paracentral disc herniation, asymmetric flattening of the ventral cord surface at the right paracentral zone, high-grade if lateral right foraminal narrowing, AP diameter of canal 9.9 mm, contralaterally, high grade left foraminal narrowing secondary to facet greater than uncinate joint hypertrophic signal alteration, disc desiccated and mildly narrowed  C5-6 disc space narrowing evident with generalized in Nigel spondylosis and concentric inter pose disc bulge complex, high-grade bilateral foraminal narrowing, flattening of the cord surface, AP diameter 7.9 mm, symmetric facet arthrosis, disc diffusely desiccated and narrowed  C6-7 moderate endplate spondylosis and concentric disc bulge complex, high-grade bilateral foraminal compromise, flattening of the anterior cord surface, AP diameter 8.8 mm, facet arthrosis symmetric, disc desiccated and narrowed  C7-T1 less than 2 mm depth disc bulge    11/06/2018 MRI lumbar spine  T12/L1: There is no evidence of disc protrusion, canal or foraminal stenosis.  L1/L2: Right posterolateral disc protrusion is evident and there is mild distortion of the right anterolateral canal.  Degenerative facet changes are noted bilaterally.  The left foramen is intact.  L2/L3: There is annular disc bulging with a superimposed right posterolateral disc extrusion.  This is slightly more pronounced on the previous examination and there is mild effacement of the anterior thecal sac and moderate narrowing the right foramen.  L3/L4: There is annular disc bulge and osteophyte formation with a superimposed small left posterolateral disc extrusion.  There is moderate narrowing the central canal and left foramen.  This is little changed relative the previous examination.  Degenerative facet changes are noted.  L5/S1: There is a small central disc extrusion superimposed upon annular disc bulging.  This is  slightly less pronounced than was seen previously.  Degenerative facet changes are noted.  L5/S1: Moderate degenerative facet changes are noted and there is mild effacement of the anterior thecal sac.  There is ligamentous hypertrophy particularly to the left in the posterior canal and there is left greater than right foraminal narrowing.  This is mildly worsened relative prior exam.      ASSESSMENT:  The patient is a 58 year old woman with a history of migraines, carpal tunnel syndrome and low back pain since her early 20s who returns in follow up.   1. Lumbar radiculopathy     2. DDD (degenerative disc disease), lumbar     3. Spinal stenosis of lumbar region with neurogenic claudication         Plan:  1.  The patient did well following the L5/S1 interlaminar YARON.  This can be repeated in the future if necessary.  2.  Dr. Abdalla refilled her tramadol and I refilled meloxicam and tizanidine.  She requested Lidoderm patches and states that she checked with her insurance company and this is now covered so I provided a prescription.  3.  Follow-up in 3 months or sooner as needed.

## 2019-01-25 RX ORDER — TRAMADOL HYDROCHLORIDE 50 MG/1
TABLET ORAL
Qty: 60 TABLET | Refills: 0 | Status: SHIPPED | OUTPATIENT
Start: 2019-01-25 | End: 2019-04-15

## 2019-01-30 ENCOUNTER — TELEPHONE (OUTPATIENT)
Dept: PAIN MEDICINE | Facility: CLINIC | Age: 59
End: 2019-01-30

## 2019-01-30 ENCOUNTER — PROCEDURE VISIT (OUTPATIENT)
Dept: NEUROLOGY | Facility: CLINIC | Age: 59
End: 2019-01-30
Payer: COMMERCIAL

## 2019-01-30 VITALS
BODY MASS INDEX: 40.71 KG/M2 | HEIGHT: 63 IN | HEART RATE: 78 BPM | DIASTOLIC BLOOD PRESSURE: 81 MMHG | RESPIRATION RATE: 17 BRPM | SYSTOLIC BLOOD PRESSURE: 154 MMHG | WEIGHT: 229.75 LBS

## 2019-01-30 DIAGNOSIS — G43.719 INTRACTABLE CHRONIC MIGRAINE WITHOUT AURA AND WITHOUT STATUS MIGRAINOSUS: Primary | ICD-10-CM

## 2019-01-30 PROCEDURE — 64615 CHEMODENERV MUSC MIGRAINE: CPT | Mod: S$GLB,,, | Performed by: PSYCHIATRY & NEUROLOGY

## 2019-01-30 PROCEDURE — 64615 PR CHEMODENERVATION OF MUSCLE FOR CHRONIC MIGRAINE: ICD-10-PCS | Mod: S$GLB,,, | Performed by: PSYCHIATRY & NEUROLOGY

## 2019-01-30 NOTE — PROCEDURES
Procedures     PROCEDURE PERFORMED: Botulinum toxin injection (90394)    CLINICAL INDICATION: G43.719    A time out was conducted just before the start of the procedure to verify the correct patient and procedure, procedure location, and all relevant critical information.     Conventional methods of treatment such as multiple medications, both on and   off label have been tried including:    anti-epileptics (topiramate, gabapentin, lyrica, zonisamide), and antidepressants (amitriptyline, venflaxine)    The patient has been unresponsive and refractory.The patient meets criteria for chronic headaches according to the ICHD-II, the patient has more than 15 headaches a month which last for more than 4 hours a day.    This is the 4th Botox injections and I am aiming for at least 50%  improvement in the patient's symptoms.    Previous injection was 11/6/18 and resulted in about 505 improvement     Frequency of treatment is every 3 months unless no response to the treatments, at which time we will discontinue the injections.     DESCRIPTION OF PROCEDURE: After obtaining informed consent and under   aseptic technique, a total of 155 units of botulinum toxin type A were   injected in the following muscles: Procerus 5 units,  5 units   bilaterally, frontalis 20 units, temporalis 20 units bilaterally,   occipitalis 15 units, upper cervical paraspinals 10 units bilaterally and trapezius 15 units bilaterally. The patient was given a total of 155 units in 31 sites.The patient tolerated the procedure well. There were no complications. The patient was given a prescription for repeat treatment in 3 months.     Unavoidable waste 45 units    Neva Cortes MD

## 2019-01-30 NOTE — TELEPHONE ENCOUNTER
Left patient a voicemail stating the lidocaine patches were not approved through her insurance because it was not an approved diagnosis.

## 2019-02-04 ENCOUNTER — PATIENT MESSAGE (OUTPATIENT)
Dept: FAMILY MEDICINE | Facility: CLINIC | Age: 59
End: 2019-02-04

## 2019-02-04 DIAGNOSIS — R05.9 COUGH: ICD-10-CM

## 2019-02-04 DIAGNOSIS — I10 ESSENTIAL HYPERTENSION: Primary | ICD-10-CM

## 2019-02-04 RX ORDER — OLMESARTAN MEDOXOMIL 20 MG/1
20 TABLET ORAL DAILY
Qty: 90 TABLET | Refills: 3 | Status: SHIPPED | OUTPATIENT
Start: 2019-02-04 | End: 2019-05-06 | Stop reason: RX

## 2019-02-04 RX ORDER — BENZONATATE 200 MG/1
200 CAPSULE ORAL 3 TIMES DAILY PRN
Qty: 45 CAPSULE | Refills: 1 | Status: SHIPPED | OUTPATIENT
Start: 2019-02-04 | End: 2019-02-14

## 2019-02-08 ENCOUNTER — LAB VISIT (OUTPATIENT)
Dept: LAB | Facility: HOSPITAL | Age: 59
End: 2019-02-08
Attending: FAMILY MEDICINE
Payer: COMMERCIAL

## 2019-02-08 ENCOUNTER — CLINICAL SUPPORT (OUTPATIENT)
Dept: FAMILY MEDICINE | Facility: CLINIC | Age: 59
End: 2019-02-08
Payer: COMMERCIAL

## 2019-02-08 VITALS — DIASTOLIC BLOOD PRESSURE: 86 MMHG | SYSTOLIC BLOOD PRESSURE: 130 MMHG | HEART RATE: 86 BPM

## 2019-02-08 DIAGNOSIS — I10 HYPERTENSION, UNSPECIFIED TYPE: Primary | ICD-10-CM

## 2019-02-08 DIAGNOSIS — I10 ESSENTIAL HYPERTENSION: ICD-10-CM

## 2019-02-08 LAB
ANION GAP SERPL CALC-SCNC: 4 MMOL/L
BUN SERPL-MCNC: 23 MG/DL
CALCIUM SERPL-MCNC: 9.2 MG/DL
CHLORIDE SERPL-SCNC: 107 MMOL/L
CO2 SERPL-SCNC: 27 MMOL/L
CREAT SERPL-MCNC: 0.9 MG/DL
EST. GFR  (AFRICAN AMERICAN): >60 ML/MIN/1.73 M^2
EST. GFR  (NON AFRICAN AMERICAN): >60 ML/MIN/1.73 M^2
GLUCOSE SERPL-MCNC: 88 MG/DL
POTASSIUM SERPL-SCNC: 4.2 MMOL/L
SODIUM SERPL-SCNC: 138 MMOL/L

## 2019-02-08 PROCEDURE — 36415 COLL VENOUS BLD VENIPUNCTURE: CPT | Mod: PO

## 2019-02-08 PROCEDURE — 80048 BASIC METABOLIC PNL TOTAL CA: CPT

## 2019-02-08 PROCEDURE — 99999 PR PBB SHADOW E&M-EST. PATIENT-LVL I: CPT | Mod: PBBFAC,,,

## 2019-02-08 PROCEDURE — 99999 PR PBB SHADOW E&M-EST. PATIENT-LVL I: ICD-10-PCS | Mod: PBBFAC,,,

## 2019-02-08 NOTE — PROGRESS NOTES
Elizabeth Giordano 58 y.o. female is here today for Blood Pressure check.   History of HTN                     Review of patient's allergies indicates:   Allergen Reactions    Hydrocodone Hives and Nausea Only           Sulfa (sulfonamide antibiotics) Hives and Rash           Doxycycline Rash     Itchy rash and some scattered itchy lesions    Oxycodone Itching and Nausea And Vomiting     Creatinine   Date Value Ref Range Status   08/21/2018 1.0 0.5 - 1.4 mg/dL Final     Sodium   Date Value Ref Range Status   08/21/2018 141 136 - 145 mmol/L Final     Potassium   Date Value Ref Range Status   08/21/2018 4.2 3.5 - 5.1 mmol/L Final                               Patient verifies taking blood pressure medications on a regular basis at the same time of the day.     Current Outpatient Medications:     azelastine (ASTELIN) 137 mcg (0.1 %) nasal spray, 1 spray (137 mcg total) by Nasal route 2 (two) times daily., Disp: 30 mL, Rfl: 3    benzonatate (TESSALON) 200 MG capsule, Take 1 capsule (200 mg total) by mouth 3 (three) times daily as needed for Cough., Disp: 45 capsule, Rfl: 1    diphenhydrAMINE (BENADRYL) 25 mg capsule, Take 25 mg by mouth every 6 (six) hours as needed., Disp: , Rfl:     DIVIGEL 1 mg/gram (0.1 %) topical gel, Apply 1 packet topically once daily., Disp: , Rfl: 3    hydroCHLOROthiazide (HYDRODIURIL) 25 MG tablet, Take 1 tablet (25 mg total) by mouth once daily., Disp: 90 tablet, Rfl: 1    lidocaine (LIDODERM) 5 %, Place 1 patch onto the skin daily as needed. Remove & Discard patch within 12 hours or as directed by MD, Disp: 30 patch, Rfl: 5    meloxicam (MOBIC) 15 MG tablet, Take 1 tablet (15 mg total) by mouth once daily., Disp: 90 tablet, Rfl: 1    naratriptan (AMERGE) 2.5 MG tablet, 2.5 mg PO BID x 5 days at onset of migraine flare (Patient taking differently: Take by mouth as needed. 2.5 mg PO BID x 5 days at onset of migraine flare), Disp: 10 tablet, Rfl: 11    olmesartan (BENICAR)  20 MG tablet, Take 1 tablet (20 mg total) by mouth once daily., Disp: 90 tablet, Rfl: 3    ondansetron (ZOFRAN) 4 MG tablet, Take 1 tablet (4 mg total) by mouth every 6 (six) hours as needed for Nausea., Disp: 30 tablet, Rfl: 11    pantoprazole (PROTONIX) 40 MG tablet, Take 1 tablet (40 mg total) by mouth once daily., Disp: 90 tablet, Rfl: 1    polyethylene glycol (GLYCOLAX) 17 gram PwPk, 1/2 T or 1 capful in 8 oz water daily until daily bowel movement then every other day to maintain regularity., Disp: 100 each, Rfl: 2    sumatriptan (IMITREX) 100 MG tablet, MAY TAKE 1 TABLET THEN FOLLOWUP WITH ANOTHER TABLET IN 2 HOURS IF HEADACHE NOT RESOLVED, Disp: 9 tablet, Rfl: 11    tiZANidine (ZANAFLEX) 2 MG tablet, Take 1 tablet (2 mg total) by mouth nightly as needed., Disp: 90 tablet, Rfl: 1    traMADol (ULTRAM) 50 mg tablet, Take 1 tablet (50 mg total) by mouth 2 (two) times daily as needed., Disp: 60 tablet, Rfl: 2    traMADol (ULTRAM) 50 mg tablet, TAKE 1 TABLET BY MOUTH TWICE DAILY AS NEEDED, Disp: 60 tablet, Rfl: 0    Current Facility-Administered Medications:     onabotulinumtoxina injection 200 Units, 200 Units, Intramuscular, Q90 Days, Neva Cortes MD, 200 Units at 08/16/18 1625    onabotulinumtoxina injection 200 Units, 200 Units, Intramuscular, Q90 Days, Petty Fernandez MD, 200 Units at 01/30/19 1359  Does patient have record of home blood pressure readings No.    Last dose of blood pressure medication was taken at 8 am.  Patient is asymptomatic.   No complaints.    BP: 130/86 , Pulse: 86     Blood pressure reading after 15 minutes was 130/86, Pulse 84.  Dr. Espinosa notified.

## 2019-02-11 ENCOUNTER — PATIENT MESSAGE (OUTPATIENT)
Dept: FAMILY MEDICINE | Facility: CLINIC | Age: 59
End: 2019-02-11

## 2019-02-12 ENCOUNTER — OFFICE VISIT (OUTPATIENT)
Dept: FAMILY MEDICINE | Facility: CLINIC | Age: 59
End: 2019-02-12
Payer: COMMERCIAL

## 2019-02-12 ENCOUNTER — HOSPITAL ENCOUNTER (OUTPATIENT)
Dept: RADIOLOGY | Facility: HOSPITAL | Age: 59
Discharge: HOME | End: 2019-02-12
Attending: NURSE PRACTITIONER
Payer: COMMERCIAL

## 2019-02-12 VITALS
OXYGEN SATURATION: 98 % | BODY MASS INDEX: 41.41 KG/M2 | SYSTOLIC BLOOD PRESSURE: 138 MMHG | HEART RATE: 95 BPM | DIASTOLIC BLOOD PRESSURE: 82 MMHG | HEIGHT: 63 IN | TEMPERATURE: 98 F | RESPIRATION RATE: 18 BRPM | WEIGHT: 233.69 LBS

## 2019-02-12 DIAGNOSIS — I10 ESSENTIAL HYPERTENSION: ICD-10-CM

## 2019-02-12 DIAGNOSIS — J18.9 PNEUMONIA OF RIGHT LOWER LOBE DUE TO INFECTIOUS ORGANISM: Primary | ICD-10-CM

## 2019-02-12 DIAGNOSIS — E66.9 CLASS 2 OBESITY WITH BODY MASS INDEX (BMI) OF 39.0 TO 39.9 IN ADULT, UNSPECIFIED OBESITY TYPE, UNSPECIFIED WHETHER SERIOUS COMORBIDITY PRESENT: ICD-10-CM

## 2019-02-12 DIAGNOSIS — J18.9 PNEUMONIA OF RIGHT LOWER LOBE DUE TO INFECTIOUS ORGANISM: ICD-10-CM

## 2019-02-12 DIAGNOSIS — R09.89 LUNG CRACKLES: ICD-10-CM

## 2019-02-12 DIAGNOSIS — R05.9 COUGH: ICD-10-CM

## 2019-02-12 DIAGNOSIS — M51.36 DDD (DEGENERATIVE DISC DISEASE), LUMBAR: ICD-10-CM

## 2019-02-12 PROCEDURE — 99999 PR PBB SHADOW E&M-EST. PATIENT-LVL V: CPT | Mod: PBBFAC,,, | Performed by: NURSE PRACTITIONER

## 2019-02-12 PROCEDURE — 3008F BODY MASS INDEX DOCD: CPT | Mod: CPTII,S$GLB,, | Performed by: NURSE PRACTITIONER

## 2019-02-12 PROCEDURE — 3008F PR BODY MASS INDEX (BMI) DOCUMENTED: ICD-10-PCS | Mod: CPTII,S$GLB,, | Performed by: NURSE PRACTITIONER

## 2019-02-12 PROCEDURE — 99214 OFFICE O/P EST MOD 30 MIN: CPT | Mod: S$GLB,,, | Performed by: NURSE PRACTITIONER

## 2019-02-12 PROCEDURE — 71046 X-RAY EXAM CHEST 2 VIEWS: CPT | Mod: TC,FY,PO

## 2019-02-12 PROCEDURE — 3079F DIAST BP 80-89 MM HG: CPT | Mod: CPTII,S$GLB,, | Performed by: NURSE PRACTITIONER

## 2019-02-12 PROCEDURE — 3075F PR MOST RECENT SYSTOLIC BLOOD PRESS GE 130-139MM HG: ICD-10-PCS | Mod: CPTII,S$GLB,, | Performed by: NURSE PRACTITIONER

## 2019-02-12 PROCEDURE — 3079F PR MOST RECENT DIASTOLIC BLOOD PRESSURE 80-89 MM HG: ICD-10-PCS | Mod: CPTII,S$GLB,, | Performed by: NURSE PRACTITIONER

## 2019-02-12 PROCEDURE — 99214 PR OFFICE/OUTPT VISIT, EST, LEVL IV, 30-39 MIN: ICD-10-PCS | Mod: S$GLB,,, | Performed by: NURSE PRACTITIONER

## 2019-02-12 PROCEDURE — 71046 XR CHEST PA AND LATERAL: ICD-10-PCS | Mod: 26,,, | Performed by: RADIOLOGY

## 2019-02-12 PROCEDURE — 99999 PR PBB SHADOW E&M-EST. PATIENT-LVL V: ICD-10-PCS | Mod: PBBFAC,,, | Performed by: NURSE PRACTITIONER

## 2019-02-12 PROCEDURE — 71046 X-RAY EXAM CHEST 2 VIEWS: CPT | Mod: 26,,, | Performed by: RADIOLOGY

## 2019-02-12 PROCEDURE — 3075F SYST BP GE 130 - 139MM HG: CPT | Mod: CPTII,S$GLB,, | Performed by: NURSE PRACTITIONER

## 2019-02-12 RX ORDER — PROMETHAZINE HYDROCHLORIDE AND DEXTROMETHORPHAN HYDROBROMIDE 6.25; 15 MG/5ML; MG/5ML
5 SYRUP ORAL EVERY 6 HOURS PRN
Qty: 150 ML | Refills: 0 | Status: SHIPPED | OUTPATIENT
Start: 2019-02-12 | End: 2019-02-22

## 2019-02-12 RX ORDER — CEFUROXIME AXETIL 500 MG/1
500 TABLET ORAL EVERY 12 HOURS
Qty: 14 TABLET | Refills: 0 | Status: SHIPPED | OUTPATIENT
Start: 2019-02-12 | End: 2019-03-04

## 2019-02-12 NOTE — PROGRESS NOTES
Subjective:       Patient ID: Elizabeth Giordano is a 58 y.o. female.    Chief Complaint: Sinus Problem    Cough   This is a new problem. The current episode started in the past 7 days. The problem has been gradually worsening. The cough is non-productive. Associated symptoms include postnasal drip, rhinorrhea, a sore throat, shortness of breath and wheezing. Pertinent negatives include no chest pain, chills, ear congestion, ear pain, fever, headaches, heartburn, hemoptysis, myalgias, nasal congestion, rash, sweats or weight loss. Treatments tried: tessalon, benadryl, astelin.     Vitals:    02/12/19 1439   BP: 138/82   Pulse: 95   Resp: 18   Temp: 97.9 °F (36.6 °C)     Review of Systems   Constitutional: Negative.  Negative for chills, diaphoresis, fatigue, fever and weight loss.   HENT: Positive for congestion, postnasal drip, rhinorrhea, sinus pressure and sore throat. Negative for ear pain.    Eyes: Negative.    Respiratory: Positive for cough, shortness of breath and wheezing. Negative for hemoptysis.    Cardiovascular: Negative.  Negative for chest pain.   Gastrointestinal: Negative.  Negative for abdominal pain, diarrhea, heartburn and nausea.   Endocrine: Negative.    Genitourinary: Negative.  Negative for dysuria and hematuria.   Musculoskeletal: Negative.  Negative for myalgias.   Skin: Negative for color change and rash.   Allergic/Immunologic: Negative.    Neurological: Negative.  Negative for speech difficulty, numbness and headaches.   Hematological: Negative.    Psychiatric/Behavioral: Negative.        Past Medical History:   Diagnosis Date    Allergy     Arthritis of spine 2011    Chronic low back pain     DDD (degenerative disc disease), lumbar     Diverticulitis     Diverticulosis     DJD (degenerative joint disease)     Encounter for blood transfusion     Factor V Leiden     GERD (gastroesophageal reflux disease)     Hiatal hernia     Migraines     PONV (postoperative nausea  and vomiting)     geta     Objective:      Physical Exam   Constitutional: She is oriented to person, place, and time. She appears well-developed and well-nourished.   HENT:   Head: Normocephalic and atraumatic.   Right Ear: Tympanic membrane and ear canal normal.   Left Ear: Tympanic membrane and ear canal normal.   Nose: Mucosal edema and rhinorrhea present.   Mouth/Throat: Oropharynx is clear and moist.   Eyes: Conjunctivae and EOM are normal. Pupils are equal, round, and reactive to light.   Neck: Neck supple.   Cardiovascular: Normal rate, regular rhythm, normal heart sounds and intact distal pulses. Exam reveals no friction rub.   No murmur heard.  Pulmonary/Chest: Effort normal. No respiratory distress. She has rales in the right lower field.   Abdominal: Soft. Bowel sounds are normal.   Musculoskeletal: Normal range of motion.   Neurological: She is alert and oriented to person, place, and time.   Skin: Skin is warm and dry.   Psychiatric: She has a normal mood and affect. Her behavior is normal.   Nursing note and vitals reviewed.      Assessment:       1. Pneumonia of right lower lobe due to infectious organism    2. Cough    3. Lung crackles    4. Essential hypertension    5. DDD (degenerative disc disease), lumbar    6. Class 2 obesity with body mass index (BMI) of 39.0 to 39.9 in adult, unspecified obesity type, unspecified whether serious comorbidity present        Plan:       Pneumonia of right lower lobe due to infectious organism  -     cefUROXime (CEFTIN) 500 MG tablet; Take 1 tablet (500 mg total) by mouth every 12 (twelve) hours.  Dispense: 14 tablet; Refill: 0  -     X-Ray Chest PA And Lateral; Future; Expected date: 02/12/2019    Cough  -     X-Ray Chest PA And Lateral; Future; Expected date: 02/12/2019  -     promethazine-dextromethorphan (PROMETHAZINE-DM) 6.25-15 mg/5 mL Syrp; Take 5 mLs by mouth every 6 (six) hours as needed.  Dispense: 150 mL; Refill: 0    Lung crackles  -     cefUROXime  (CEFTIN) 500 MG tablet; Take 1 tablet (500 mg total) by mouth every 12 (twelve) hours.  Dispense: 14 tablet; Refill: 0  -     X-Ray Chest PA And Lateral; Future; Expected date: 02/12/2019    Essential hypertension  Stable on benicar and Hctz    DDD (degenerative disc disease), lumbar  On pain meds - follows with pain mgt     Class 2 obesity with body mass index (BMI) of 39.0 to 39.9 in adult, unspecified obesity type, unspecified whether serious comorbidity present  The patient's BMI has been recorded in the chart. The patient has been provided educational materials regarding the benefits of attaining and maintaining a normal weight. We will continue to address and follow this issue during follow up visits.        Fu if not better

## 2019-02-17 ENCOUNTER — OFFICE VISIT (OUTPATIENT)
Dept: URGENT CARE | Facility: CLINIC | Age: 59
End: 2019-02-17
Payer: COMMERCIAL

## 2019-02-17 VITALS
OXYGEN SATURATION: 98 % | HEIGHT: 63 IN | SYSTOLIC BLOOD PRESSURE: 143 MMHG | DIASTOLIC BLOOD PRESSURE: 82 MMHG | RESPIRATION RATE: 18 BRPM | TEMPERATURE: 98 F | BODY MASS INDEX: 41.29 KG/M2 | HEART RATE: 109 BPM | WEIGHT: 233 LBS

## 2019-02-17 DIAGNOSIS — R00.0 TACHYCARDIA: Primary | ICD-10-CM

## 2019-02-17 PROCEDURE — 3079F DIAST BP 80-89 MM HG: CPT | Mod: CPTII,S$GLB,, | Performed by: FAMILY MEDICINE

## 2019-02-17 PROCEDURE — 93000 ELECTROCARDIOGRAM COMPLETE: CPT | Mod: S$GLB,,, | Performed by: INTERNAL MEDICINE

## 2019-02-17 PROCEDURE — 3008F PR BODY MASS INDEX (BMI) DOCUMENTED: ICD-10-PCS | Mod: CPTII,S$GLB,, | Performed by: FAMILY MEDICINE

## 2019-02-17 PROCEDURE — 3077F SYST BP >= 140 MM HG: CPT | Mod: CPTII,S$GLB,, | Performed by: FAMILY MEDICINE

## 2019-02-17 PROCEDURE — 99214 PR OFFICE/OUTPT VISIT, EST, LEVL IV, 30-39 MIN: ICD-10-PCS | Mod: S$GLB,,, | Performed by: FAMILY MEDICINE

## 2019-02-17 PROCEDURE — 3008F BODY MASS INDEX DOCD: CPT | Mod: CPTII,S$GLB,, | Performed by: FAMILY MEDICINE

## 2019-02-17 PROCEDURE — 3079F PR MOST RECENT DIASTOLIC BLOOD PRESSURE 80-89 MM HG: ICD-10-PCS | Mod: CPTII,S$GLB,, | Performed by: FAMILY MEDICINE

## 2019-02-17 PROCEDURE — 99214 OFFICE O/P EST MOD 30 MIN: CPT | Mod: S$GLB,,, | Performed by: FAMILY MEDICINE

## 2019-02-17 PROCEDURE — 3077F PR MOST RECENT SYSTOLIC BLOOD PRESSURE >= 140 MM HG: ICD-10-PCS | Mod: CPTII,S$GLB,, | Performed by: FAMILY MEDICINE

## 2019-02-17 PROCEDURE — 93000 EKG 12-LEAD: ICD-10-PCS | Mod: S$GLB,,, | Performed by: INTERNAL MEDICINE

## 2019-02-17 NOTE — PATIENT INSTRUCTIONS
There is no evidence of dangerous problems associated with your heart or lungs today.  Your blood pressure is good and her oxygen levels are normal.  Your EKG does not show any evidence of stress to your heart or recent damage    .  You should continue to follow up with your primary care doctor for blood pressure medicine monitoring and adjustment as needed for any persistence side effects.

## 2019-02-17 NOTE — PROGRESS NOTES
"Subjective:       Patient ID: Elizabeth Giordano is a 58 y.o. female.    Vitals:  height is 5' 3" (1.6 m) and weight is 105.7 kg (233 lb). Her temperature is 97.7 °F (36.5 °C). Her blood pressure is 143/82 (abnormal) and her pulse is 109. Her respiration is 18 and oxygen saturation is 98%.     Chief Complaint: Irregular Heart Beat    Started a new blood pressure meds 2 weeks, has had a fast heart rate for the past week. Patient was seen at her PCP for this issue as well this past week and a chest xray was done. Feels heart beat high in her chest and it makes her cough, is currently on an antibiotic for a cough.       Cough   This is a new problem. The current episode started in the past 7 days. The problem has been unchanged. The problem occurs constantly. The cough is non-productive. Associated symptoms include shortness of breath. Pertinent negatives include no chest pain, chills, fever, headaches, myalgias, rash or sore throat.       Constitution: Negative for chills, fatigue and fever.   HENT: Negative for congestion and sore throat.    Neck: Negative for painful lymph nodes.   Cardiovascular: Positive for palpitations. Negative for chest pain and leg swelling.   Eyes: Negative for double vision and blurred vision.   Respiratory: Positive for cough and shortness of breath.    Gastrointestinal: Negative for nausea, vomiting and diarrhea.   Genitourinary: Negative for dysuria, frequency, urgency and history of kidney stones.   Musculoskeletal: Negative for joint pain, joint swelling, muscle cramps and muscle ache.   Skin: Negative for color change, pale, rash and bruising.   Allergic/Immunologic: Negative for seasonal allergies.   Neurological: Negative for dizziness, history of vertigo, light-headedness, passing out and headaches.   Hematologic/Lymphatic: Negative for swollen lymph nodes.   Psychiatric/Behavioral: Negative for nervous/anxious, sleep disturbance and depression. The patient is not " nervous/anxious.        Objective:      Physical Exam   Constitutional: She is oriented to person, place, and time. She appears well-developed and well-nourished. She is cooperative.  Non-toxic appearance. She does not appear ill. No distress.   HENT:   Head: Normocephalic and atraumatic.   Right Ear: Hearing, tympanic membrane, external ear and ear canal normal.   Left Ear: Hearing, tympanic membrane, external ear and ear canal normal.   Nose: Nose normal. No mucosal edema, rhinorrhea or nasal deformity. No epistaxis. Right sinus exhibits no maxillary sinus tenderness and no frontal sinus tenderness. Left sinus exhibits no maxillary sinus tenderness and no frontal sinus tenderness.   Mouth/Throat: Uvula is midline, oropharynx is clear and moist and mucous membranes are normal. No trismus in the jaw. Normal dentition. No uvula swelling. No posterior oropharyngeal erythema.   Eyes: Conjunctivae and lids are normal. No scleral icterus.   Sclera clear bilat   Neck: Trachea normal, full passive range of motion without pain and phonation normal. Neck supple.   Cardiovascular: Normal rate, regular rhythm, normal heart sounds, intact distal pulses and normal pulses.   Pulmonary/Chest: Effort normal and breath sounds normal. No respiratory distress.   Abdominal: Soft. Normal appearance and bowel sounds are normal. She exhibits no distension. There is no tenderness.   Musculoskeletal: Normal range of motion. She exhibits no edema or deformity.   Neurological: She is alert and oriented to person, place, and time. She exhibits normal muscle tone. Coordination normal.   Skin: Skin is warm, dry and intact. She is not diaphoretic. No pallor.   Psychiatric: She has a normal mood and affect. Her speech is normal and behavior is normal. Judgment and thought content normal. Cognition and memory are normal.   Nursing note and vitals reviewed.      Assessment:       1. Tachycardia        Plan:         Tachycardia  -     IN OFFICE EKG  12-LEAD (to Muse)     EKG is normal sinus rhythm no STEMI no other acute findings noted. Patient has a sinus tachycardia which is minimal normal heart and lung exam and normal pulse oximetry no evidence of acute respiratory disease or acute cardiac disease. I suspect the tachycardia may be side effect new any hypertensive therapy but does not a.  To be a danger to her I have encouraged her to continue her current medicines and to follow up with her PCP for ongoing blood pressure monitoring and risk factor modification consider cardiology evaluation if tachycardic symptoms persist.

## 2019-02-20 ENCOUNTER — TELEPHONE (OUTPATIENT)
Dept: URGENT CARE | Facility: CLINIC | Age: 59
End: 2019-02-20

## 2019-02-26 ENCOUNTER — PATIENT MESSAGE (OUTPATIENT)
Dept: FAMILY MEDICINE | Facility: CLINIC | Age: 59
End: 2019-02-26

## 2019-03-04 ENCOUNTER — OFFICE VISIT (OUTPATIENT)
Dept: FAMILY MEDICINE | Facility: CLINIC | Age: 59
End: 2019-03-04
Payer: COMMERCIAL

## 2019-03-04 VITALS
DIASTOLIC BLOOD PRESSURE: 64 MMHG | SYSTOLIC BLOOD PRESSURE: 112 MMHG | HEIGHT: 63 IN | BODY MASS INDEX: 41.45 KG/M2 | HEART RATE: 93 BPM | OXYGEN SATURATION: 97 % | WEIGHT: 233.94 LBS

## 2019-03-04 DIAGNOSIS — R06.09 DYSPNEA ON EXERTION: ICD-10-CM

## 2019-03-04 DIAGNOSIS — R00.0 TACHYCARDIA: Primary | ICD-10-CM

## 2019-03-04 DIAGNOSIS — E66.01 CLASS 3 SEVERE OBESITY DUE TO EXCESS CALORIES WITHOUT SERIOUS COMORBIDITY WITH BODY MASS INDEX (BMI) OF 40.0 TO 44.9 IN ADULT: ICD-10-CM

## 2019-03-04 DIAGNOSIS — Z12.39 BREAST CANCER SCREENING: ICD-10-CM

## 2019-03-04 PROBLEM — E66.9 CLASS 2 OBESITY WITH BODY MASS INDEX (BMI) OF 39.0 TO 39.9 IN ADULT: Status: RESOLVED | Noted: 2019-01-11 | Resolved: 2019-03-04

## 2019-03-04 PROBLEM — E66.812 CLASS 2 OBESITY WITH BODY MASS INDEX (BMI) OF 39.0 TO 39.9 IN ADULT: Status: RESOLVED | Noted: 2019-01-11 | Resolved: 2019-03-04

## 2019-03-04 PROCEDURE — 99999 PR PBB SHADOW E&M-EST. PATIENT-LVL III: CPT | Mod: PBBFAC,,, | Performed by: FAMILY MEDICINE

## 2019-03-04 PROCEDURE — 99214 OFFICE O/P EST MOD 30 MIN: CPT | Mod: S$GLB,,, | Performed by: FAMILY MEDICINE

## 2019-03-04 PROCEDURE — 3078F DIAST BP <80 MM HG: CPT | Mod: CPTII,S$GLB,, | Performed by: FAMILY MEDICINE

## 2019-03-04 PROCEDURE — 3078F PR MOST RECENT DIASTOLIC BLOOD PRESSURE < 80 MM HG: ICD-10-PCS | Mod: CPTII,S$GLB,, | Performed by: FAMILY MEDICINE

## 2019-03-04 PROCEDURE — 3008F BODY MASS INDEX DOCD: CPT | Mod: CPTII,S$GLB,, | Performed by: FAMILY MEDICINE

## 2019-03-04 PROCEDURE — 99999 PR PBB SHADOW E&M-EST. PATIENT-LVL III: ICD-10-PCS | Mod: PBBFAC,,, | Performed by: FAMILY MEDICINE

## 2019-03-04 PROCEDURE — 3074F PR MOST RECENT SYSTOLIC BLOOD PRESSURE < 130 MM HG: ICD-10-PCS | Mod: CPTII,S$GLB,, | Performed by: FAMILY MEDICINE

## 2019-03-04 PROCEDURE — 3008F PR BODY MASS INDEX (BMI) DOCUMENTED: ICD-10-PCS | Mod: CPTII,S$GLB,, | Performed by: FAMILY MEDICINE

## 2019-03-04 PROCEDURE — 3074F SYST BP LT 130 MM HG: CPT | Mod: CPTII,S$GLB,, | Performed by: FAMILY MEDICINE

## 2019-03-04 PROCEDURE — 99214 PR OFFICE/OUTPT VISIT, EST, LEVL IV, 30-39 MIN: ICD-10-PCS | Mod: S$GLB,,, | Performed by: FAMILY MEDICINE

## 2019-03-04 RX ORDER — NARATRIPTAN 2.5 MG/1
TABLET ORAL
Refills: 11 | COMMUNITY
Start: 2019-02-23 | End: 2019-10-25

## 2019-03-04 RX ORDER — ESTRADIOL 1 MG/1
1 TABLET ORAL DAILY
Refills: 3 | Status: ON HOLD | COMMUNITY
Start: 2019-02-19 | End: 2021-02-06 | Stop reason: SDUPTHER

## 2019-03-04 NOTE — PROGRESS NOTES
"Subjective:       Patient ID: Elizabeth Giordano is a 58 y.o. female.    Chief Complaint: Hypertension (follow up)    Pt is known to me.  The pt reports that her heart rate gets fast when she moves around. This started about 6 weeks ago.  Her smart watch measured 116 earlier today when she was active.  She gets SOB with exertion.  She has no chest pain.  She does not feel any skipped beats.  She recently had a normal EKG and CXR.  The pt does not get any exercise.  Her mother had CAD.      Review of Systems   Constitutional: Negative for activity change, appetite change, fatigue and unexpected weight change.   Eyes: Negative for visual disturbance.   Respiratory: Positive for shortness of breath (with exertion). Negative for cough and chest tightness.    Cardiovascular: Negative for chest pain, palpitations and leg swelling.   Gastrointestinal: Negative for abdominal pain, constipation, diarrhea, nausea and vomiting.   Endocrine: Negative for cold intolerance, heat intolerance and polyuria.   Genitourinary: Negative for decreased urine volume and dysuria.   Musculoskeletal: Negative for arthralgias and back pain.   Skin: Negative for rash.   Neurological: Negative for numbness and headaches.       Objective:       Vitals:    03/04/19 1143   BP: 112/64   BP Location: Left arm   Patient Position: Sitting   Pulse: 93   SpO2: 97%   Weight: 106.1 kg (233 lb 14.5 oz)   Height: 5' 3" (1.6 m)     Physical Exam   Constitutional: She is oriented to person, place, and time. She appears well-developed and well-nourished.   Pt is morbidly obese   HENT:   Head: Normocephalic.   Eyes: Conjunctivae and EOM are normal. Pupils are equal, round, and reactive to light.   Neck: Normal range of motion. Neck supple. No thyromegaly present.   Cardiovascular: Normal rate, regular rhythm and normal heart sounds.   Pulmonary/Chest: Effort normal and breath sounds normal.   Abdominal: Soft. Bowel sounds are normal. There is no " tenderness.   Musculoskeletal: Normal range of motion. She exhibits no tenderness or deformity.   Lymphadenopathy:     She has no cervical adenopathy.   Neurological: She is alert and oriented to person, place, and time. She displays normal reflexes. No cranial nerve deficit. She exhibits normal muscle tone. Coordination normal.   Skin: Skin is warm and dry.   Psychiatric: She has a normal mood and affect. Her behavior is normal.       Assessment:       1. Tachycardia    2. Class 3 severe obesity due to excess calories without serious comorbidity with body mass index (BMI) of 40.0 to 44.9 in adult    3. Dyspnea on exertion    4. Breast cancer screening        Plan:       Elizabeth was seen today for hypertension.    Diagnoses and all orders for this visit:    Tachycardia  -     Echocardiogram stress test ([facility] Only); Future    Class 3 severe obesity due to excess calories without serious comorbidity with body mass index (BMI) of 40.0 to 44.9 in adult    Dyspnea on exertion  -     Echocardiogram stress test (C[facility] Only); Future    Breast cancer screening  -     Mammo Digital Screening Bilateral With CAD; Future      During this visit, I reviewed the pt's history, medications, allergies, and problem list.

## 2019-03-14 ENCOUNTER — CLINICAL SUPPORT (OUTPATIENT)
Dept: CARDIOLOGY | Facility: CLINIC | Age: 59
End: 2019-03-14
Attending: FAMILY MEDICINE
Payer: COMMERCIAL

## 2019-03-14 VITALS — HEIGHT: 63 IN | BODY MASS INDEX: 41.29 KG/M2 | WEIGHT: 233 LBS

## 2019-03-14 DIAGNOSIS — R00.0 TACHYCARDIA: ICD-10-CM

## 2019-03-14 DIAGNOSIS — R06.09 DYSPNEA ON EXERTION: ICD-10-CM

## 2019-03-14 PROCEDURE — 93351 ECHOCARDIOGRAM STRESS TEST (CUPID ONLY): ICD-10-PCS | Mod: S$GLB,,, | Performed by: INTERNAL MEDICINE

## 2019-03-14 PROCEDURE — 99999 PR PBB SHADOW E&M-EST. PATIENT-LVL I: CPT | Mod: PBBFAC,,,

## 2019-03-14 PROCEDURE — 99999 PR PBB SHADOW E&M-EST. PATIENT-LVL I: ICD-10-PCS | Mod: PBBFAC,,,

## 2019-03-14 PROCEDURE — 93351 STRESS TTE COMPLETE: CPT | Mod: S$GLB,,, | Performed by: INTERNAL MEDICINE

## 2019-03-15 LAB
ASCENDING AORTA: 2.31 CM
BSA FOR ECHO PROCEDURE: 2.17 M2
CV ECHO LV RWT: 0.5 CM
CV STRESS BASE HR: 89 BPM
DIASTOLIC BLOOD PRESSURE: 58 MMHG
DOP CALC LVOT AREA: 3.11 CM2
DOP CALC LVOT DIAMETER: 1.99 CM
DOP CALC LVOT PEAK VEL: 1.23 M/S
DOP CALC LVOT STROKE VOLUME: 84.96 CM3
DOP CALCLVOT PEAK VEL VTI: 27.33 CM
E WAVE DECELERATION TIME: 188.64 MSEC
E/A RATIO: 0.73
E/E' RATIO: 4.4
ECHO LV POSTERIOR WALL: 1.07 CM (ref 0.6–1.1)
FRACTIONAL SHORTENING: 43 % (ref 28–44)
INTERVENTRICULAR SEPTUM: 0.93 CM (ref 0.6–1.1)
IVRT: 0.07 MSEC
LA MAJOR: 4.53 CM
LA MINOR: 4.23 CM
LA WIDTH: 3.26 CM
LEFT ATRIUM SIZE: 3.31 CM
LEFT ATRIUM VOLUME INDEX: 19.4 ML/M2
LEFT ATRIUM VOLUME: 40.13 CM3
LEFT INTERNAL DIMENSION IN SYSTOLE: 2.45 CM (ref 2.1–4)
LEFT VENTRICLE DIASTOLIC VOLUME INDEX: 40.05 ML/M2
LEFT VENTRICLE DIASTOLIC VOLUME: 82.65 ML
LEFT VENTRICLE MASS INDEX: 68.8 G/M2
LEFT VENTRICLE SYSTOLIC VOLUME INDEX: 10.3 ML/M2
LEFT VENTRICLE SYSTOLIC VOLUME: 21.17 ML
LEFT VENTRICULAR INTERNAL DIMENSION IN DIASTOLE: 4.29 CM (ref 3.5–6)
LEFT VENTRICULAR MASS: 141.96 G
LV LATERAL E/E' RATIO: 3.67
LV SEPTAL E/E' RATIO: 5.5
MV PEAK A VEL: 0.9 M/S
MV PEAK E VEL: 0.66 M/S
OHS CV CPX 1 MINUTE RECOVERY HEART RATE: 118 BPM
OHS CV CPX 85 PERCENT MAX PREDICTED HEART RATE MALE: 132
OHS CV CPX ESTIMATED METS: 7
OHS CV CPX MAX PREDICTED HEART RATE: 156
OHS CV CPX PATIENT IS FEMALE: 1
OHS CV CPX PATIENT IS MALE: 0
OHS CV CPX PEAK DIASTOLIC BLOOD PRESSURE: 70 MMHG
OHS CV CPX PEAK HEAR RATE: 142 BPM
OHS CV CPX PEAK RATE PRESSURE PRODUCT: NORMAL
OHS CV CPX PEAK SYSTOLIC BLOOD PRESSURE: 168 MMHG
OHS CV CPX PERCENT MAX PREDICTED HEART RATE ACHIEVED: 91
OHS CV CPX PERCENT TARGET HEART RATE ACHIEVED: 107.58
OHS CV CPX RATE PRESSURE PRODUCT PRESENTING: NORMAL
OHS CV CPX TARGET HEART RATE: 132
PULM VEIN S/D RATIO: 1.77
PV PEAK D VEL: 0.44 M/S
PV PEAK S VEL: 0.78 M/S
RA MAJOR: 4.53 CM
RA WIDTH: 2.69 CM
SINUS: 2.61 CM
STJ: 2.47 CM
STRESS ECHO POST EXERCISE DUR MIN: 4 MIN
STRESS ECHO POST EXERCISE DUR SEC: 11
SYSTOLIC BLOOD PRESSURE: 122 MMHG
TDI LATERAL: 0.18
TDI SEPTAL: 0.12
TDI: 0.15

## 2019-03-18 ENCOUNTER — PATIENT MESSAGE (OUTPATIENT)
Dept: FAMILY MEDICINE | Facility: CLINIC | Age: 59
End: 2019-03-18

## 2019-03-27 ENCOUNTER — PATIENT MESSAGE (OUTPATIENT)
Dept: CARDIOLOGY | Facility: CLINIC | Age: 59
End: 2019-03-27

## 2019-03-27 DIAGNOSIS — Z91.89 AT RISK FOR CORONARY ARTERY DISEASE: ICD-10-CM

## 2019-03-29 DIAGNOSIS — Z91.09 ENVIRONMENTAL ALLERGIES: ICD-10-CM

## 2019-03-31 RX ORDER — AZELASTINE 1 MG/ML
SPRAY, METERED NASAL
Qty: 30 ML | Refills: 0 | Status: SHIPPED | OUTPATIENT
Start: 2019-03-31 | End: 2020-12-23 | Stop reason: SDUPTHER

## 2019-04-08 ENCOUNTER — PATIENT MESSAGE (OUTPATIENT)
Dept: FAMILY MEDICINE | Facility: CLINIC | Age: 59
End: 2019-04-08

## 2019-04-15 ENCOUNTER — OFFICE VISIT (OUTPATIENT)
Dept: PAIN MEDICINE | Facility: CLINIC | Age: 59
End: 2019-04-15
Payer: COMMERCIAL

## 2019-04-15 VITALS
SYSTOLIC BLOOD PRESSURE: 133 MMHG | OXYGEN SATURATION: 98 % | WEIGHT: 233.38 LBS | RESPIRATION RATE: 18 BRPM | TEMPERATURE: 98 F | HEART RATE: 94 BPM | DIASTOLIC BLOOD PRESSURE: 54 MMHG | BODY MASS INDEX: 41.34 KG/M2

## 2019-04-15 DIAGNOSIS — M48.061 SPINAL STENOSIS OF LUMBAR REGION WITHOUT NEUROGENIC CLAUDICATION: ICD-10-CM

## 2019-04-15 DIAGNOSIS — M54.16 LUMBAR RADICULOPATHY: Primary | ICD-10-CM

## 2019-04-15 DIAGNOSIS — M51.36 DDD (DEGENERATIVE DISC DISEASE), LUMBAR: ICD-10-CM

## 2019-04-15 PROCEDURE — 99999 PR PBB SHADOW E&M-EST. PATIENT-LVL IV: CPT | Mod: PBBFAC,,, | Performed by: PHYSICIAN ASSISTANT

## 2019-04-15 PROCEDURE — 3078F DIAST BP <80 MM HG: CPT | Mod: CPTII,S$GLB,, | Performed by: PHYSICIAN ASSISTANT

## 2019-04-15 PROCEDURE — 99213 OFFICE O/P EST LOW 20 MIN: CPT | Mod: S$GLB,,, | Performed by: PHYSICIAN ASSISTANT

## 2019-04-15 PROCEDURE — 3075F PR MOST RECENT SYSTOLIC BLOOD PRESS GE 130-139MM HG: ICD-10-PCS | Mod: CPTII,S$GLB,, | Performed by: PHYSICIAN ASSISTANT

## 2019-04-15 PROCEDURE — 3008F BODY MASS INDEX DOCD: CPT | Mod: CPTII,S$GLB,, | Performed by: PHYSICIAN ASSISTANT

## 2019-04-15 PROCEDURE — 99213 PR OFFICE/OUTPT VISIT, EST, LEVL III, 20-29 MIN: ICD-10-PCS | Mod: S$GLB,,, | Performed by: PHYSICIAN ASSISTANT

## 2019-04-15 PROCEDURE — 3008F PR BODY MASS INDEX (BMI) DOCUMENTED: ICD-10-PCS | Mod: CPTII,S$GLB,, | Performed by: PHYSICIAN ASSISTANT

## 2019-04-15 PROCEDURE — 99999 PR PBB SHADOW E&M-EST. PATIENT-LVL IV: ICD-10-PCS | Mod: PBBFAC,,, | Performed by: PHYSICIAN ASSISTANT

## 2019-04-15 PROCEDURE — 3075F SYST BP GE 130 - 139MM HG: CPT | Mod: CPTII,S$GLB,, | Performed by: PHYSICIAN ASSISTANT

## 2019-04-15 PROCEDURE — 3078F PR MOST RECENT DIASTOLIC BLOOD PRESSURE < 80 MM HG: ICD-10-PCS | Mod: CPTII,S$GLB,, | Performed by: PHYSICIAN ASSISTANT

## 2019-04-15 RX ORDER — DICLOFENAC SODIUM 10 MG/G
2 GEL TOPICAL 3 TIMES DAILY PRN
Qty: 1 TUBE | Refills: 5 | Status: SHIPPED | OUTPATIENT
Start: 2019-04-15 | End: 2019-07-05 | Stop reason: HOSPADM

## 2019-04-15 RX ORDER — TRAMADOL HYDROCHLORIDE 50 MG/1
50 TABLET ORAL 2 TIMES DAILY PRN
Qty: 60 TABLET | Refills: 2 | Status: SHIPPED | OUTPATIENT
Start: 2019-04-24 | End: 2019-07-15 | Stop reason: SDUPTHER

## 2019-04-15 RX ORDER — ALPRAZOLAM 0.5 MG/1
1 TABLET, ORALLY DISINTEGRATING ORAL ONCE AS NEEDED
Status: CANCELLED | OUTPATIENT
Start: 2019-05-01 | End: 2030-09-26

## 2019-04-15 NOTE — PROGRESS NOTES
CHIEF COMPLAINT/REASON FOR VISIT: low back pain, neck pain    History of present illness: The patient is a 58 year old woman with a history of migraines, carpal tunnel syndrome and low back pain since her early 20s.  She returns in follow-up today with worsening back pain with radiation into her right leg.  She reports sharp pain across her low back and burning pain in her right lateral hip, lateral thigh and to the right lower anterior thigh above her knee.  This is worse with sitting too long, prolonged walking and sleeping on her back.  She reports improvement with medication.  She denies weakness, bladder or bowel incontinence.  She reports numbness in both thighs.    Pain intervention history: She tried Neurontin for her leg/thigh pain with no success. She only takes Advil with about 30% relief. She is status post TFESI bilaterally to L3 on 12/6/2011 with no relief. Past history of status post L4/5 YARON on 5/25/11 with about 90% relief that lasted 3 weeks. She is status post 2 bilateral L3 transforaminal injections with 3 weeks relief each time. Her current pain medications include Celexa 40 mg once a day, Aleve 220 mg twice a day, Lyrica as previously stated, Zanaflex 5 mg at night. She uses her TENS at home, when she thinks about it.  She is status post L4/5 interlaminar epidural steroid injection on 1/8/14 with 90% relief.  She is status post L5/S1 interlaminar epidural steroid injection on 6/9/14 with moderate relief only on the right low back and right leg lasting 2 weeks.  She is status post bilateral L3/4 and L4/5 facet joint injections on 4/13/15 with initially 100% relief of her back pain and 80% relief of her left lateral hip and lateral thigh pain, now reporting at least 50% relief of both.  She is status post bilateral L2, 3 and 4 medial branch radiofrequency ablation on 5/20/15 with 60% relief.   She is status post bilateral L2, 3 and 4 medial branch radiofrequency ablation on 6/20/16 with 50-70%  relief.  She is status post L5/S1 interlaminar epidural steroid injection on 3/10/17 with 50% relief.  She is status post bilateral L2, 3, 4 medial branch radiofrequency ablation on 7/17/17 with about 75% relief.  She is status post bilateral L2, 3, 4 medial branch radiofrequency ablation on 07/17/2018 with 100% relief of her leg pain and 70-75% relief of her back pain.  She is status post C7-T1 cervical interlaminar epidural steroid injection on 09/25/2018 with 80% relief.   She is status post L5/S1 interlaminar epidural steroid injection on 12/27/2018 with 60% relief.      ROS: She reports back pain only.  Balance of review of systems is negative.    Medical, surgical, family and social history reviewed elsewhere in record.     Medications/Allergies: See med card      Vitals:    04/15/19 0837   BP: (!) 133/54   Pulse: 94   Resp: 18   Temp: 97.8 °F (36.6 °C)   TempSrc: Oral   SpO2: 98%   Weight: 105.8 kg (233 lb 5.7 oz)   PainSc:   4   PainLoc: Back        PHYSICAL EXAM:  Gen: A and O x3, pleasant, well-groomed  HEENT: PERRLA  CVS: Regular rate and rhythm, normal S1 and S2, no murmurs.  Resp: Clear to auscultation bilaterally, no wheezes or rales.  Musculoskeletal: Able to heel walk, toe walk. No antalgic gait.     Neuro:  Upper extremities: 5/5 strength bilaterally   Lower extremities: 5/5 strength bilaterally  Reflexes: Brachioradialis 2+, Bicep 2+, Tricep 2+. Patellar 2+, Achilles 2+.  Sensory: Intact and symmetrical to light touch and pinprick in C2-T1 dermatomes bilaterally. Intact and symmetrical to light touch and pinprick in L2-S1 dermatomes bilaterally.    Lumbar spine:  Lumbar spine: ROM is moderately limited with flexion and extension with increased bilateral low back pain during extension and oblique extension.  Supine straight leg raise is negative bilaterally.  Internal and external rotation of the hip causes no increased pain in either side.  Myofascial exam:  Mild tenderness to palpation to the  right greater than left lumbar paraspinous muscles.         IMAGING:   MRI L-SPINE 5/10/11   IMPRESSION: AT L3-4, THERE IS CIRCUMFERENTIAL DISC PROTRUSION FOCALLY MORE PROMINENT TO THE LEFT AS WELL AS A SMALL DISC HERNIATION PROTRUDING INFERIORLY BEHIND L4 TO THE LEFT OF MIDLINE. THIS PRODUCES SPINAL CANAL AND BILATERAL NEURAL FORAMINAL STENOSIS, LEFT GREATER THAN RIGHT. AT L4-5, THERE IS CIRCUMFERENTIAL DISC PROTRUSION FOCALLY MORE PROMINENT TO THE RIGHT WITH MILD SPINAL CANAL AND RIGHT NEURAL FORAMINAL STENOSIS. AT L1-2 AND L2-3 ALTHOUGH THERE ARE DISC PROTRUSIONS IDENTIFIED, SIGNIFICANT STENOSIS IS NOT SEEN.     MRI lumbar spine 7/10/14  L1-2:There is chronic relatively advanced disc degeneration with disc space narrowing, disc dehydration, disc narrowing, endplate osteophytes, and degenerative vertebral endplate marrow change. There is a diffuse 2-mm posterior disc bulge with a superimposed4-mm right posterior disc extrusion causing mild right foraminal stenosis. There is no impingement of the right L1 nerve root and there has been no change.  L2-3: There is severe and chronic disc degeneration with severe disc space narrowing, disc dehydration, degenerative vertebral endplate marrow change, and vertebral endplate osteophytes. There is a diffuse 2-mm posterior disc bulge with a superimposed 4-mm right posterior disc extrusion causing mild right foraminal stenosis. There is no impingement of the right L2 nerve root and there has been no significant change.  L3-4: There is severe disc degeneration which has progressed since the prior study. There is severe disc space narrowing, disc dehydration, degenerative vertebral endplate marrow change and endplate osteophytes. There is also mild posterior subluxation of L3over a distance of 4 mm. There is a broad-based 5-mm posterior disc extrusion. There is degenerative facet arthrosis with ligamentum flavum thickening. The combination of facet arthrosis and disc extrusion  results in relatively severe spinal canal stenosis with compression of the thecal sac to an AP diameter of 5 mm, moderate right foraminal stenosis, and severe left foraminal stenosis. The spinal stenosis has also progressed since the prior study.  L4-5:There is a diffuse posterior disc bulge with a superimposed 3-mm left posterior paracentral disc protrusion causing left paracentral thecal sac compression. There is moderate left and mild right foraminal stenosis and there has been no significant change. There is mild degenerative facet arthrosis with ligamentum flavum thickening and small joint effusions.  L5-S1:There is no significant compromise of the spinal canal or foramina and there has been no change.    X-ray cervical spine 6/8/15  There is loss of normal cervical lordosis which may be positional or related muscular strain. Intervertebral disk height loss is noted at the C5-C6 C6-C7 levels and to a lesser degree C4-C5 and C7-T1 levels. Vertebral body alignment appears otherwise adequate. Vertebral body heights appear well-preserved. The atlas and odontoid appear in good relationship to each other. Osseous neuroforaminal narrowing is noted at the C3-C4 C4-C5 C5-C6 levels on the left and at the C4-C5 C5-C6 and C6-C7 levels on the right     07/10/2018 MRI cervical spine [facility] MRI report  C2-3 broad-based signal asymmetry at the left subarticular and foraminal zone reflecting implant spondylosis and disc bulge complex, mild to moderate left asymmetric foraminal narrowing, ectasia of the left vertebral artery, contralateral right asymmetric facet hypertrophy, right foramen widely narrowed, disc partially desiccated without collapse  C3-4 moderate to severe left greater than right foraminal narrowing secondary to uncinate joint hypertrophic signal alteration, disc partially desiccated  C4-5 endplate spondylosis moderate diffuse disc bulge noted, broad-based right paracentral disc herniation, asymmetric  flattening of the ventral cord surface at the right paracentral zone, high-grade if lateral right foraminal narrowing, AP diameter of canal 9.9 mm, contralaterally, high grade left foraminal narrowing secondary to facet greater than uncinate joint hypertrophic signal alteration, disc desiccated and mildly narrowed  C5-6 disc space narrowing evident with generalized in Nigel spondylosis and concentric inter pose disc bulge complex, high-grade bilateral foraminal narrowing, flattening of the cord surface, AP diameter 7.9 mm, symmetric facet arthrosis, disc diffusely desiccated and narrowed  C6-7 moderate endplate spondylosis and concentric disc bulge complex, high-grade bilateral foraminal compromise, flattening of the anterior cord surface, AP diameter 8.8 mm, facet arthrosis symmetric, disc desiccated and narrowed  C7-T1 less than 2 mm depth disc bulge    11/06/2018 MRI lumbar spine  T12/L1: There is no evidence of disc protrusion, canal or foraminal stenosis.  L1/L2: Right posterolateral disc protrusion is evident and there is mild distortion of the right anterolateral canal.  Degenerative facet changes are noted bilaterally.  The left foramen is intact.  L2/L3: There is annular disc bulging with a superimposed right posterolateral disc extrusion.  This is slightly more pronounced on the previous examination and there is mild effacement of the anterior thecal sac and moderate narrowing the right foramen.  L3/L4: There is annular disc bulge and osteophyte formation with a superimposed small left posterolateral disc extrusion.  There is moderate narrowing the central canal and left foramen.  This is little changed relative the previous examination.  Degenerative facet changes are noted.  L5/S1: There is a small central disc extrusion superimposed upon annular disc bulging.  This is slightly less pronounced than was seen previously.  Degenerative facet changes are noted.  L5/S1: Moderate degenerative facet changes are  noted and there is mild effacement of the anterior thecal sac.  There is ligamentous hypertrophy particularly to the left in the posterior canal and there is left greater than right foraminal narrowing.  This is mildly worsened relative prior exam.      ASSESSMENT:  The patient is a 58 year old woman with a history of migraines, carpal tunnel syndrome and low back pain since her early 20s who returns in follow up.   1. Lumbar radiculopathy     2. DDD (degenerative disc disease), lumbar     3. Spinal stenosis of lumbar region without neurogenic claudication         Plan:  1.  I will schedule the patient for an L5/S1 interlaminar YARON to the right.  She has done well with this in the past.  2.  Dr. Abdalla refilled her tramadol and I provided a prescription for Voltaren gel.  She continues to use Lidoderm patches, meloxicam and tizanidine.  She asked about Cymbalta and we may consider this in the future.  She did not tolerate Lyrica in the past due to forgetfulness.  3.  Follow-up in 4 weeks postprocedure sooner as needed.

## 2019-04-24 ENCOUNTER — TELEPHONE (OUTPATIENT)
Dept: PAIN MEDICINE | Facility: CLINIC | Age: 59
End: 2019-04-24

## 2019-04-24 NOTE — TELEPHONE ENCOUNTER
----- Message from Luanne Stark sent at 4/24/2019  1:24 PM CDT -----  Type:    Appointment Request    Caller is requesting an appointment.     Name of Caller:  self  When is the first available appointment?  Reschedule procedure / 05/01   Symptoms:     Best Call Back Number:  [phone]  Additional Information:

## 2019-04-25 ENCOUNTER — PATIENT MESSAGE (OUTPATIENT)
Dept: PAIN MEDICINE | Facility: CLINIC | Age: 59
End: 2019-04-25

## 2019-04-26 ENCOUNTER — PROCEDURE VISIT (OUTPATIENT)
Dept: NEUROLOGY | Facility: CLINIC | Age: 59
End: 2019-04-26
Payer: COMMERCIAL

## 2019-04-26 VITALS
RESPIRATION RATE: 16 BRPM | HEIGHT: 63 IN | DIASTOLIC BLOOD PRESSURE: 70 MMHG | WEIGHT: 233.13 LBS | BODY MASS INDEX: 41.31 KG/M2 | SYSTOLIC BLOOD PRESSURE: 105 MMHG | HEART RATE: 87 BPM

## 2019-04-26 DIAGNOSIS — G43.719 INTRACTABLE CHRONIC MIGRAINE WITHOUT AURA AND WITHOUT STATUS MIGRAINOSUS: Primary | ICD-10-CM

## 2019-04-26 PROCEDURE — 64615 CHEMODENERV MUSC MIGRAINE: CPT | Mod: S$GLB,,, | Performed by: PSYCHIATRY & NEUROLOGY

## 2019-04-26 PROCEDURE — 64615 PR CHEMODENERVATION OF MUSCLE FOR CHRONIC MIGRAINE: ICD-10-PCS | Mod: S$GLB,,, | Performed by: PSYCHIATRY & NEUROLOGY

## 2019-04-26 NOTE — PROCEDURES
Procedures       PROCEDURE PERFORMED: Botulinum toxin injection (44561)    CLINICAL INDICATION: G43.719    A time out was conducted just before the start of the procedure to verify the correct patient and procedure, procedure location, and all relevant critical information.     Conventional methods of treatment such as multiple medications, both on and   off label have been tried including:    anti-epileptics (topiramate, gabapentin, lyrica, zonisamide), and antidepressants (amitriptyline, venflaxine)    The patient has been unresponsive and refractory.The patient meets criteria for chronic headaches according to the ICHD-II, the patient has more than 15 headaches a month which last for more than 4 hours a day.    Injection session number: 5  Percentage relief since last session: 100% other than week 11 during Botox wear off    Frequency of treatment is every 3 months unless no response to the treatments, at which time we will discontinue the injections.     DESCRIPTION OF PROCEDURE: After obtaining informed consent and under   aseptic technique, a total of 155 units of botulinum toxin type A were   injected in the following muscles: Procerus 5 units,  5 units   bilaterally, frontalis 20 units, temporalis 20 units bilaterally,   occipitalis 15 units, upper cervical paraspinals 10 units bilaterally and trapezius 15 units bilaterally. The patient was given a total of 155 units in 31 sites.The patient tolerated the procedure well. There were no complications. The patient was given a prescription for repeat treatment in 3 months.     Unavoidable waste 45 units    Neva Cortes MD

## 2019-05-04 ENCOUNTER — HOSPITAL ENCOUNTER (OUTPATIENT)
Dept: RADIOLOGY | Facility: HOSPITAL | Age: 59
Discharge: HOME | End: 2019-05-04
Attending: FAMILY MEDICINE
Payer: COMMERCIAL

## 2019-05-04 DIAGNOSIS — Z12.39 BREAST CANCER SCREENING: ICD-10-CM

## 2019-05-04 PROCEDURE — 77063 BREAST TOMOSYNTHESIS BI: CPT | Mod: 26,,, | Performed by: RADIOLOGY

## 2019-05-04 PROCEDURE — 77063 MAMMO DIGITAL SCREENING BILAT WITH TOMOSYNTHESIS_CAD: ICD-10-PCS | Mod: 26,,, | Performed by: RADIOLOGY

## 2019-05-04 PROCEDURE — 77067 MAMMO DIGITAL SCREENING BILAT WITH TOMOSYNTHESIS_CAD: ICD-10-PCS | Mod: 26,,, | Performed by: RADIOLOGY

## 2019-05-04 PROCEDURE — 77067 SCR MAMMO BI INCL CAD: CPT | Mod: 26,,, | Performed by: RADIOLOGY

## 2019-05-04 PROCEDURE — 77067 SCR MAMMO BI INCL CAD: CPT | Mod: TC,PO

## 2019-05-06 ENCOUNTER — TELEPHONE (OUTPATIENT)
Dept: FAMILY MEDICINE | Facility: CLINIC | Age: 59
End: 2019-05-06

## 2019-05-06 DIAGNOSIS — I10 ESSENTIAL HYPERTENSION: Primary | ICD-10-CM

## 2019-05-06 RX ORDER — TELMISARTAN 40 MG/1
40 TABLET ORAL DAILY
Qty: 90 TABLET | Refills: 3 | Status: SHIPPED | OUTPATIENT
Start: 2019-05-06 | End: 2019-05-13 | Stop reason: RX

## 2019-05-09 ENCOUNTER — HOSPITAL ENCOUNTER (OUTPATIENT)
Dept: RADIOLOGY | Facility: HOSPITAL | Age: 59
Discharge: HOME | End: 2019-05-09
Attending: FAMILY MEDICINE
Payer: COMMERCIAL

## 2019-05-09 DIAGNOSIS — Z91.89 AT RISK FOR CORONARY ARTERY DISEASE: ICD-10-CM

## 2019-05-09 PROCEDURE — 75571 CT HRT W/O DYE W/CA TEST: CPT | Mod: 26,,, | Performed by: RADIOLOGY

## 2019-05-09 PROCEDURE — 75571 CT CALCIUM SCORING CARDIAC: ICD-10-PCS | Mod: 26,,, | Performed by: RADIOLOGY

## 2019-05-09 PROCEDURE — 75571 CT HRT W/O DYE W/CA TEST: CPT | Mod: TC,PO

## 2019-05-13 ENCOUNTER — PATIENT MESSAGE (OUTPATIENT)
Dept: FAMILY MEDICINE | Facility: CLINIC | Age: 59
End: 2019-05-13

## 2019-05-13 DIAGNOSIS — I10 ESSENTIAL HYPERTENSION: Primary | ICD-10-CM

## 2019-05-13 DIAGNOSIS — R93.1 HIGH CORONARY ARTERY CALCIUM SCORE: ICD-10-CM

## 2019-05-13 RX ORDER — LOSARTAN POTASSIUM 100 MG/1
100 TABLET ORAL DAILY
Qty: 90 TABLET | Refills: 3 | Status: SHIPPED | OUTPATIENT
Start: 2019-05-13 | End: 2020-05-14

## 2019-05-13 NOTE — PROGRESS NOTES
Contacted patient, verified patient information. Provided patient with results and recommendations, per Dr. Ann. Patient verbally agreed. Patient would like her Angiogram scheduled early morning on a Tuesday or Thursday. Please contact her at her work number when scheduled [phone]

## 2019-05-14 DIAGNOSIS — M51.36 DDD (DEGENERATIVE DISC DISEASE), LUMBAR: Primary | ICD-10-CM

## 2019-05-15 ENCOUNTER — TELEPHONE (OUTPATIENT)
Dept: CARDIOLOGY | Facility: CLINIC | Age: 59
End: 2019-05-15

## 2019-05-15 ENCOUNTER — HOSPITAL ENCOUNTER (OUTPATIENT)
Dept: RADIOLOGY | Facility: HOSPITAL | Age: 59
Discharge: HOME | End: 2019-05-15
Attending: ANESTHESIOLOGY | Admitting: ANESTHESIOLOGY
Payer: COMMERCIAL

## 2019-05-15 ENCOUNTER — PATIENT MESSAGE (OUTPATIENT)
Dept: FAMILY MEDICINE | Facility: CLINIC | Age: 59
End: 2019-05-15

## 2019-05-15 DIAGNOSIS — M51.36 DDD (DEGENERATIVE DISC DISEASE), LUMBAR: ICD-10-CM

## 2019-05-15 NOTE — TELEPHONE ENCOUNTER
I attempted to call pt in reference to an angiogram. However i'm not sure who called her. She has not see Dr Ann.

## 2019-05-15 NOTE — TELEPHONE ENCOUNTER
----- Message from Suzanne Armstrong sent at 5/15/2019  3:17 PM CDT -----  Type:  Patient Returning Call    Who Called:  Patient  Who Left Message for Patient:  Meena  Does the patient know what this is regarding?:  Scheduling a procedure  Best Call Back Number:  [phone] (home) [phone] (work)

## 2019-05-15 NOTE — TELEPHONE ENCOUNTER
Please contact patient concerning angiogram that she says she was contacted about on Monday. I see no orders.

## 2019-05-16 ENCOUNTER — TELEPHONE (OUTPATIENT)
Dept: CARDIOLOGY | Facility: CLINIC | Age: 59
End: 2019-05-16

## 2019-05-16 DIAGNOSIS — R94.39 ABNORMAL STRESS TEST: Primary | ICD-10-CM

## 2019-05-16 NOTE — TELEPHONE ENCOUNTER
Spoke with patient she has been scheduled for her angiogram. She has been given date time and instructions regarding her procedure. She verbalized understanding.

## 2019-05-16 NOTE — TELEPHONE ENCOUNTER
Angiogram    Arrive for procedure at: [facility] on 5/22/ 7am    You will receive a phone call from [facility] Pre-Op Department with further instructions prior to your scheduled procedure.    Notify the nurse if you are ALLERGIC TO IODINE.    FASTING: You MAY NOT have anything to eat or drink AFTER MIDNIGHT the day before your procedure. If your procedure is scheduled in the afternoon, you may have a LIGHT BREAKFAST 6-8 hours prior to your procedure.  For example: Two slices of toast; black coffee or black tea.    MEDICATIONS: You may take your regular morning medications with water. If there are any medications that you should not take, you will be instructed to hold them for that morning.    ? CARDIOLOGY PRE-PROCEDURE MEDICATION ORDERS:  WHAT TO EXPECT:    How long will the procedure take?  The procedure will take an average of 1 - 2 hours to perform.  After the procedure, you will need to lay flat for around 4 - 6 hours to minimize bleeding from the puncture site. If the wrist is accessed you will need to keep your arm still as instructed by the nurse.    When can I go home?  You may be able to be discharged home that same afternoon if there were no complications.  If you have one of the following: balloon; stent; pacemaker or defibrillator procedures, you may spend one night for observation.  Your doctor will determine your discharge based upon your progress.  The results of your procedure will be discussed with you before you are discharged.  Any further testing or procedures will be scheduled for you either before you leave or you will be instructed to call for a future appointment.      TRANSPORTATION:  PLEASE ARRANGE TO HAVE SOMEONE DRIVE YOU HOME FOLLOWING YOUR PROCEDURE, YOU WILL NOT BE ALLOWED TO DRIVE.

## 2019-05-16 NOTE — TELEPHONE ENCOUNTER
Good morning,   This patient has a CT scoring test and with the results it showed moderate disease with possible significant blockage. Please see test on 3/27 under results.

## 2019-05-16 NOTE — TELEPHONE ENCOUNTER
----- Message from Meena Reis sent at 5/16/2019 10:04 AM CDT -----  Good Morning,     Has or will the patient be seeing Dr. Perdue or a cardiologist before the Heart Cath Procedure on 5/22. I am not seeing Progress Notes in the patients chart.  I need to fax Progress Notes from a recent visit to [facility], so we can get this authorized.  Thank you.

## 2019-05-22 PROBLEM — R94.39 ABNORMAL STRESS TEST: Status: ACTIVE | Noted: 2019-05-22

## 2019-06-09 DIAGNOSIS — K21.9 GASTROESOPHAGEAL REFLUX DISEASE WITHOUT ESOPHAGITIS: ICD-10-CM

## 2019-06-10 RX ORDER — PANTOPRAZOLE SODIUM 40 MG/1
40 TABLET, DELAYED RELEASE ORAL NIGHTLY
Qty: 90 TABLET | Refills: 1 | Status: SHIPPED | OUTPATIENT
Start: 2019-06-10 | End: 2019-12-23

## 2019-06-21 ENCOUNTER — PATIENT MESSAGE (OUTPATIENT)
Dept: PAIN MEDICINE | Facility: CLINIC | Age: 59
End: 2019-06-21

## 2019-06-21 DIAGNOSIS — G43.711 INTRACTABLE CHRONIC MIGRAINE WITHOUT AURA AND WITH STATUS MIGRAINOSUS: ICD-10-CM

## 2019-06-21 RX ORDER — MELOXICAM 15 MG/1
TABLET ORAL
Qty: 90 TABLET | Refills: 0 | Status: SHIPPED | OUTPATIENT
Start: 2019-06-21 | End: 2019-10-22 | Stop reason: SDUPTHER

## 2019-06-21 RX ORDER — NARATRIPTAN 2.5 MG/1
TABLET ORAL
Qty: 9 TABLET | Refills: 11 | Status: SHIPPED | OUTPATIENT
Start: 2019-06-21 | End: 2020-03-04

## 2019-07-01 ENCOUNTER — OFFICE VISIT (OUTPATIENT)
Dept: PAIN MEDICINE | Facility: CLINIC | Age: 59
End: 2019-07-01
Payer: COMMERCIAL

## 2019-07-01 VITALS
WEIGHT: 227.5 LBS | HEART RATE: 82 BPM | SYSTOLIC BLOOD PRESSURE: 135 MMHG | BODY MASS INDEX: 38.84 KG/M2 | HEIGHT: 64 IN | DIASTOLIC BLOOD PRESSURE: 64 MMHG

## 2019-07-01 DIAGNOSIS — M47.816 LUMBAR SPONDYLOSIS: Primary | ICD-10-CM

## 2019-07-01 DIAGNOSIS — M51.36 DDD (DEGENERATIVE DISC DISEASE), LUMBAR: ICD-10-CM

## 2019-07-01 DIAGNOSIS — M48.061 SPINAL STENOSIS OF LUMBAR REGION WITHOUT NEUROGENIC CLAUDICATION: ICD-10-CM

## 2019-07-01 PROCEDURE — 3008F BODY MASS INDEX DOCD: CPT | Mod: CPTII,S$GLB,, | Performed by: PHYSICIAN ASSISTANT

## 2019-07-01 PROCEDURE — 3008F PR BODY MASS INDEX (BMI) DOCUMENTED: ICD-10-PCS | Mod: CPTII,S$GLB,, | Performed by: PHYSICIAN ASSISTANT

## 2019-07-01 PROCEDURE — 3075F SYST BP GE 130 - 139MM HG: CPT | Mod: CPTII,S$GLB,, | Performed by: PHYSICIAN ASSISTANT

## 2019-07-01 PROCEDURE — 3078F PR MOST RECENT DIASTOLIC BLOOD PRESSURE < 80 MM HG: ICD-10-PCS | Mod: CPTII,S$GLB,, | Performed by: PHYSICIAN ASSISTANT

## 2019-07-01 PROCEDURE — 99213 OFFICE O/P EST LOW 20 MIN: CPT | Mod: S$GLB,,, | Performed by: PHYSICIAN ASSISTANT

## 2019-07-01 PROCEDURE — 3078F DIAST BP <80 MM HG: CPT | Mod: CPTII,S$GLB,, | Performed by: PHYSICIAN ASSISTANT

## 2019-07-01 PROCEDURE — 99213 PR OFFICE/OUTPT VISIT, EST, LEVL III, 20-29 MIN: ICD-10-PCS | Mod: S$GLB,,, | Performed by: PHYSICIAN ASSISTANT

## 2019-07-01 PROCEDURE — 99999 PR PBB SHADOW E&M-EST. PATIENT-LVL IV: ICD-10-PCS | Mod: PBBFAC,,, | Performed by: PHYSICIAN ASSISTANT

## 2019-07-01 PROCEDURE — 99999 PR PBB SHADOW E&M-EST. PATIENT-LVL IV: CPT | Mod: PBBFAC,,, | Performed by: PHYSICIAN ASSISTANT

## 2019-07-01 PROCEDURE — 3075F PR MOST RECENT SYSTOLIC BLOOD PRESS GE 130-139MM HG: ICD-10-PCS | Mod: CPTII,S$GLB,, | Performed by: PHYSICIAN ASSISTANT

## 2019-07-01 RX ORDER — SODIUM CHLORIDE, SODIUM LACTATE, POTASSIUM CHLORIDE, CALCIUM CHLORIDE 600; 310; 30; 20 MG/100ML; MG/100ML; MG/100ML; MG/100ML
INJECTION, SOLUTION INTRAVENOUS CONTINUOUS
Status: CANCELLED | OUTPATIENT
Start: 2019-07-29

## 2019-07-01 RX ORDER — TRIAMCINOLONE ACETONIDE 1 MG/G
CREAM TOPICAL
Refills: 0 | COMMUNITY
Start: 2019-06-10 | End: 2019-09-03

## 2019-07-01 RX ORDER — OLMESARTAN MEDOXOMIL 20 MG/1
TABLET ORAL
Status: ON HOLD | COMMUNITY
Start: 2019-05-13 | End: 2019-09-04

## 2019-07-01 NOTE — PROGRESS NOTES
CHIEF COMPLAINT/REASON FOR VISIT: low back pain, neck pain    History of present illness: The patient is a 58 year old woman with a history of migraines, carpal tunnel syndrome and low back pain since her early 20s.  She returns in follow-up today with worsening back pain.  During last visit I had set her up with a lumbar YARON but she took aspirin a couple days before the procedure and could not have this done.  However she now returns saying this is the same pain she has prior to her yearly radiofrequency ablations.  She describes the pain as aching, sharp, located across her low back with intermittent radiation to her lateral hips and lateral thighs.  Pain is worse with prolonged sitting, prolonged walking and mildly improved with medication.  She reports intermittent numbness in her left lateral thigh.  She denies weakness or incontinence.    Pain intervention history: She tried Neurontin for her leg/thigh pain with no success. She only takes Advil with about 30% relief. She is status post TFESI bilaterally to L3 on 12/6/2011 with no relief. Past history of status post L4/5 YARON on 5/25/11 with about 90% relief that lasted 3 weeks. She is status post 2 bilateral L3 transforaminal injections with 3 weeks relief each time. Her current pain medications include Celexa 40 mg once a day, Aleve 220 mg twice a day, Lyrica as previously stated, Zanaflex 5 mg at night. She uses her TENS at home, when she thinks about it.  She is status post L4/5 interlaminar epidural steroid injection on 1/8/14 with 90% relief.  She is status post L5/S1 interlaminar epidural steroid injection on 6/9/14 with moderate relief only on the right low back and right leg lasting 2 weeks.  She is status post bilateral L3/4 and L4/5 facet joint injections on 4/13/15 with initially 100% relief of her back pain and 80% relief of her left lateral hip and lateral thigh pain, now reporting at least 50% relief of both.  She is status post bilateral L2, 3  "and 4 medial branch radiofrequency ablation on 5/20/15 with 60% relief.   She is status post bilateral L2, 3 and 4 medial branch radiofrequency ablation on 6/20/16 with 50-70% relief.  She is status post L5/S1 interlaminar epidural steroid injection on 3/10/17 with 50% relief.  She is status post bilateral L2, 3, 4 medial branch radiofrequency ablation on 7/17/17 with about 75% relief.  She is status post bilateral L2, 3, 4 medial branch radiofrequency ablation on 07/17/2018 with 100% relief of her leg pain and 70-75% relief of her back pain.  She is status post C7-T1 cervical interlaminar epidural steroid injection on 09/25/2018 with 80% relief.   She is status post L5/S1 interlaminar epidural steroid injection on 12/27/2018 with 60% relief.      ROS: She reports back pain only.  Balance of review of systems is negative.    Medical, surgical, family and social history reviewed elsewhere in record.     Medications/Allergies: See med card      Vitals:    07/01/19 1024   BP: 135/64   Pulse: 82   Weight: 103.2 kg (227 lb 8.2 oz)   Height: 5' 4" (1.626 m)   PainSc:   7   PainLoc: Back        PHYSICAL EXAM:  Gen: A and O x3, pleasant, well-groomed  HEENT: PERRLA  CVS: Regular rate and rhythm, normal S1 and S2, no murmurs.  Resp: Clear to auscultation bilaterally, no wheezes or rales.  Musculoskeletal: Able to heel walk, toe walk. No antalgic gait.     Neuro:  Upper extremities: 5/5 strength bilaterally   Lower extremities: 5/5 strength bilaterally  Reflexes: Brachioradialis 2+, Bicep 2+, Tricep 2+. Patellar 2+, Achilles 2+.  Sensory: Intact and symmetrical to light touch and pinprick in C2-T1 dermatomes bilaterally. Intact and symmetrical to light touch and pinprick in L2-S1 dermatomes bilaterally.    Lumbar spine:  Lumbar spine: ROM is moderately limited with flexion and extension with increased bilateral low back pain during extension and oblique extension.  Supine straight leg raise is negative bilaterally.  Internal " and external rotation of the hip causes no increased pain in either side.  Myofascial exam:  Mild tenderness to palpation to the right greater than left lumbar paraspinous muscles.         IMAGING:   MRI L-SPINE 5/10/11   IMPRESSION: AT L3-4, THERE IS CIRCUMFERENTIAL DISC PROTRUSION FOCALLY MORE PROMINENT TO THE LEFT AS WELL AS A SMALL DISC HERNIATION PROTRUDING INFERIORLY BEHIND L4 TO THE LEFT OF MIDLINE. THIS PRODUCES SPINAL CANAL AND BILATERAL NEURAL FORAMINAL STENOSIS, LEFT GREATER THAN RIGHT. AT L4-5, THERE IS CIRCUMFERENTIAL DISC PROTRUSION FOCALLY MORE PROMINENT TO THE RIGHT WITH MILD SPINAL CANAL AND RIGHT NEURAL FORAMINAL STENOSIS. AT L1-2 AND L2-3 ALTHOUGH THERE ARE DISC PROTRUSIONS IDENTIFIED, SIGNIFICANT STENOSIS IS NOT SEEN.     MRI lumbar spine 7/10/14  L1-2:There is chronic relatively advanced disc degeneration with disc space narrowing, disc dehydration, disc narrowing, endplate osteophytes, and degenerative vertebral endplate marrow change. There is a diffuse 2-mm posterior disc bulge with a superimposed4-mm right posterior disc extrusion causing mild right foraminal stenosis. There is no impingement of the right L1 nerve root and there has been no change.  L2-3: There is severe and chronic disc degeneration with severe disc space narrowing, disc dehydration, degenerative vertebral endplate marrow change, and vertebral endplate osteophytes. There is a diffuse 2-mm posterior disc bulge with a superimposed 4-mm right posterior disc extrusion causing mild right foraminal stenosis. There is no impingement of the right L2 nerve root and there has been no significant change.  L3-4: There is severe disc degeneration which has progressed since the prior study. There is severe disc space narrowing, disc dehydration, degenerative vertebral endplate marrow change and endplate osteophytes. There is also mild posterior subluxation of L3over a distance of 4 mm. There is a broad-based 5-mm posterior disc  extrusion. There is degenerative facet arthrosis with ligamentum flavum thickening. The combination of facet arthrosis and disc extrusion results in relatively severe spinal canal stenosis with compression of the thecal sac to an AP diameter of 5 mm, moderate right foraminal stenosis, and severe left foraminal stenosis. The spinal stenosis has also progressed since the prior study.  L4-5:There is a diffuse posterior disc bulge with a superimposed 3-mm left posterior paracentral disc protrusion causing left paracentral thecal sac compression. There is moderate left and mild right foraminal stenosis and there has been no significant change. There is mild degenerative facet arthrosis with ligamentum flavum thickening and small joint effusions.  L5-S1:There is no significant compromise of the spinal canal or foramina and there has been no change.    X-ray cervical spine 6/8/15  There is loss of normal cervical lordosis which may be positional or related muscular strain. Intervertebral disk height loss is noted at the C5-C6 C6-C7 levels and to a lesser degree C4-C5 and C7-T1 levels. Vertebral body alignment appears otherwise adequate. Vertebral body heights appear well-preserved. The atlas and odontoid appear in good relationship to each other. Osseous neuroforaminal narrowing is noted at the C3-C4 C4-C5 C5-C6 levels on the left and at the C4-C5 C5-C6 and C6-C7 levels on the right     07/10/2018 MRI cervical spine [facility] report  C2-3 broad-based signal asymmetry at the left subarticular and foraminal zone reflecting implant spondylosis and disc bulge complex, mild to moderate left asymmetric foraminal narrowing, ectasia of the left vertebral artery, contralateral right asymmetric facet hypertrophy, right foramen widely narrowed, disc partially desiccated without collapse  C3-4 moderate to severe left greater than right foraminal narrowing secondary to uncinate joint hypertrophic signal alteration, disc partially  desiccated  C4-5 endplate spondylosis moderate diffuse disc bulge noted, broad-based right paracentral disc herniation, asymmetric flattening of the ventral cord surface at the right paracentral zone, high-grade if lateral right foraminal narrowing, AP diameter of canal 9.9 mm, contralaterally, high grade left foraminal narrowing secondary to facet greater than uncinate joint hypertrophic signal alteration, disc desiccated and mildly narrowed  C5-6 disc space narrowing evident with generalized in Nigel spondylosis and concentric inter pose disc bulge complex, high-grade bilateral foraminal narrowing, flattening of the cord surface, AP diameter 7.9 mm, symmetric facet arthrosis, disc diffusely desiccated and narrowed  C6-7 moderate endplate spondylosis and concentric disc bulge complex, high-grade bilateral foraminal compromise, flattening of the anterior cord surface, AP diameter 8.8 mm, facet arthrosis symmetric, disc desiccated and narrowed  C7-T1 less than 2 mm depth disc bulge    11/06/2018 MRI lumbar spine  T12/L1: There is no evidence of disc protrusion, canal or foraminal stenosis.  L1/L2: Right posterolateral disc protrusion is evident and there is mild distortion of the right anterolateral canal.  Degenerative facet changes are noted bilaterally.  The left foramen is intact.  L2/L3: There is annular disc bulging with a superimposed right posterolateral disc extrusion.  This is slightly more pronounced on the previous examination and there is mild effacement of the anterior thecal sac and moderate narrowing the right foramen.  L3/L4: There is annular disc bulge and osteophyte formation with a superimposed small left posterolateral disc extrusion.  There is moderate narrowing the central canal and left foramen.  This is little changed relative the previous examination.  Degenerative facet changes are noted.  L5/S1: There is a small central disc extrusion superimposed upon annular disc bulging.  This is  slightly less pronounced than was seen previously.  Degenerative facet changes are noted.  L5/S1: Moderate degenerative facet changes are noted and there is mild effacement of the anterior thecal sac.  There is ligamentous hypertrophy particularly to the left in the posterior canal and there is left greater than right foraminal narrowing.  This is mildly worsened relative prior exam.      ASSESSMENT:  The patient is a 58 year old woman with a history of migraines, carpal tunnel syndrome and low back pain since her early 20s who returns in follow up.   1. Lumbar spondylosis     2. DDD (degenerative disc disease), lumbar     3. Spinal stenosis of lumbar region without neurogenic claudication         Plan:  1.  I will set the patient up to repeat bilateral L2, 3 and 4 medial branch radiofrequency ablation.  She has done well with this in the past.  If she does not have sufficient relief she may benefit from another lumbar YARON.  2.  She continues to take tramadol, tizanidine, meloxicam and Lidoderm patches.  She states she is not sure if she needs refills of anything at this time.  3.  Follow-up in 4 weeks postprocedure sooner as needed.

## 2019-07-01 NOTE — H&P (VIEW-ONLY)
CHIEF COMPLAINT/REASON FOR VISIT: low back pain, neck pain    History of present illness: The patient is a 58 year old woman with a history of migraines, carpal tunnel syndrome and low back pain since her early 20s.  She returns in follow-up today with worsening back pain.  During last visit I had set her up with a lumbar YARON but she took aspirin a couple days before the procedure and could not have this done.  However she now returns saying this is the same pain she has prior to her yearly radiofrequency ablations.  She describes the pain as aching, sharp, located across her low back with intermittent radiation to her lateral hips and lateral thighs.  Pain is worse with prolonged sitting, prolonged walking and mildly improved with medication.  She reports intermittent numbness in her left lateral thigh.  She denies weakness or incontinence.    Pain intervention history: She tried Neurontin for her leg/thigh pain with no success. She only takes Advil with about 30% relief. She is status post TFESI bilaterally to L3 on 12/6/2011 with no relief. Past history of status post L4/5 YARON on 5/25/11 with about 90% relief that lasted 3 weeks. She is status post 2 bilateral L3 transforaminal injections with 3 weeks relief each time. Her current pain medications include Celexa 40 mg once a day, Aleve 220 mg twice a day, Lyrica as previously stated, Zanaflex 5 mg at night. She uses her TENS at home, when she thinks about it.  She is status post L4/5 interlaminar epidural steroid injection on 1/8/14 with 90% relief.  She is status post L5/S1 interlaminar epidural steroid injection on 6/9/14 with moderate relief only on the right low back and right leg lasting 2 weeks.  She is status post bilateral L3/4 and L4/5 facet joint injections on 4/13/15 with initially 100% relief of her back pain and 80% relief of her left lateral hip and lateral thigh pain, now reporting at least 50% relief of both.  She is status post bilateral L2, 3  "and 4 medial branch radiofrequency ablation on 5/20/15 with 60% relief.   She is status post bilateral L2, 3 and 4 medial branch radiofrequency ablation on 6/20/16 with 50-70% relief.  She is status post L5/S1 interlaminar epidural steroid injection on 3/10/17 with 50% relief.  She is status post bilateral L2, 3, 4 medial branch radiofrequency ablation on 7/17/17 with about 75% relief.  She is status post bilateral L2, 3, 4 medial branch radiofrequency ablation on 07/17/2018 with 100% relief of her leg pain and 70-75% relief of her back pain.  She is status post C7-T1 cervical interlaminar epidural steroid injection on 09/25/2018 with 80% relief.   She is status post L5/S1 interlaminar epidural steroid injection on 12/27/2018 with 60% relief.      ROS: She reports back pain only.  Balance of review of systems is negative.    Medical, surgical, family and social history reviewed elsewhere in record.     Medications/Allergies: See med card      Vitals:    07/01/19 1024   BP: 135/64   Pulse: 82   Weight: 103.2 kg (227 lb 8.2 oz)   Height: 5' 4" (1.626 m)   PainSc:   7   PainLoc: Back        PHYSICAL EXAM:  Gen: A and O x3, pleasant, well-groomed  HEENT: PERRLA  CVS: Regular rate and rhythm, normal S1 and S2, no murmurs.  Resp: Clear to auscultation bilaterally, no wheezes or rales.  Musculoskeletal: Able to heel walk, toe walk. No antalgic gait.     Neuro:  Upper extremities: 5/5 strength bilaterally   Lower extremities: 5/5 strength bilaterally  Reflexes: Brachioradialis 2+, Bicep 2+, Tricep 2+. Patellar 2+, Achilles 2+.  Sensory: Intact and symmetrical to light touch and pinprick in C2-T1 dermatomes bilaterally. Intact and symmetrical to light touch and pinprick in L2-S1 dermatomes bilaterally.    Lumbar spine:  Lumbar spine: ROM is moderately limited with flexion and extension with increased bilateral low back pain during extension and oblique extension.  Supine straight leg raise is negative bilaterally.  Internal " and external rotation of the hip causes no increased pain in either side.  Myofascial exam:  Mild tenderness to palpation to the right greater than left lumbar paraspinous muscles.         IMAGING:   MRI L-SPINE 5/10/11   IMPRESSION: AT L3-4, THERE IS CIRCUMFERENTIAL DISC PROTRUSION FOCALLY MORE PROMINENT TO THE LEFT AS WELL AS A SMALL DISC HERNIATION PROTRUDING INFERIORLY BEHIND L4 TO THE LEFT OF MIDLINE. THIS PRODUCES SPINAL CANAL AND BILATERAL NEURAL FORAMINAL STENOSIS, LEFT GREATER THAN RIGHT. AT L4-5, THERE IS CIRCUMFERENTIAL DISC PROTRUSION FOCALLY MORE PROMINENT TO THE RIGHT WITH MILD SPINAL CANAL AND RIGHT NEURAL FORAMINAL STENOSIS. AT L1-2 AND L2-3 ALTHOUGH THERE ARE DISC PROTRUSIONS IDENTIFIED, SIGNIFICANT STENOSIS IS NOT SEEN.     MRI lumbar spine 7/10/14  L1-2:There is chronic relatively advanced disc degeneration with disc space narrowing, disc dehydration, disc narrowing, endplate osteophytes, and degenerative vertebral endplate marrow change. There is a diffuse 2-mm posterior disc bulge with a superimposed4-mm right posterior disc extrusion causing mild right foraminal stenosis. There is no impingement of the right L1 nerve root and there has been no change.  L2-3: There is severe and chronic disc degeneration with severe disc space narrowing, disc dehydration, degenerative vertebral endplate marrow change, and vertebral endplate osteophytes. There is a diffuse 2-mm posterior disc bulge with a superimposed 4-mm right posterior disc extrusion causing mild right foraminal stenosis. There is no impingement of the right L2 nerve root and there has been no significant change.  L3-4: There is severe disc degeneration which has progressed since the prior study. There is severe disc space narrowing, disc dehydration, degenerative vertebral endplate marrow change and endplate osteophytes. There is also mild posterior subluxation of L3over a distance of 4 mm. There is a broad-based 5-mm posterior disc  extrusion. There is degenerative facet arthrosis with ligamentum flavum thickening. The combination of facet arthrosis and disc extrusion results in relatively severe spinal canal stenosis with compression of the thecal sac to an AP diameter of 5 mm, moderate right foraminal stenosis, and severe left foraminal stenosis. The spinal stenosis has also progressed since the prior study.  L4-5:There is a diffuse posterior disc bulge with a superimposed 3-mm left posterior paracentral disc protrusion causing left paracentral thecal sac compression. There is moderate left and mild right foraminal stenosis and there has been no significant change. There is mild degenerative facet arthrosis with ligamentum flavum thickening and small joint effusions.  L5-S1:There is no significant compromise of the spinal canal or foramina and there has been no change.    X-ray cervical spine 6/8/15  There is loss of normal cervical lordosis which may be positional or related muscular strain. Intervertebral disk height loss is noted at the C5-C6 C6-C7 levels and to a lesser degree C4-C5 and C7-T1 levels. Vertebral body alignment appears otherwise adequate. Vertebral body heights appear well-preserved. The atlas and odontoid appear in good relationship to each other. Osseous neuroforaminal narrowing is noted at the C3-C4 C4-C5 C5-C6 levels on the left and at the C4-C5 C5-C6 and C6-C7 levels on the right     07/10/2018 MRI cervical spine [facility] report  C2-3 broad-based signal asymmetry at the left subarticular and foraminal zone reflecting implant spondylosis and disc bulge complex, mild to moderate left asymmetric foraminal narrowing, ectasia of the left vertebral artery, contralateral right asymmetric facet hypertrophy, right foramen widely narrowed, disc partially desiccated without collapse  C3-4 moderate to severe left greater than right foraminal narrowing secondary to uncinate joint hypertrophic signal alteration, disc partially  desiccated  C4-5 endplate spondylosis moderate diffuse disc bulge noted, broad-based right paracentral disc herniation, asymmetric flattening of the ventral cord surface at the right paracentral zone, high-grade if lateral right foraminal narrowing, AP diameter of canal 9.9 mm, contralaterally, high grade left foraminal narrowing secondary to facet greater than uncinate joint hypertrophic signal alteration, disc desiccated and mildly narrowed  C5-6 disc space narrowing evident with generalized in Nigel spondylosis and concentric inter pose disc bulge complex, high-grade bilateral foraminal narrowing, flattening of the cord surface, AP diameter 7.9 mm, symmetric facet arthrosis, disc diffusely desiccated and narrowed  C6-7 moderate endplate spondylosis and concentric disc bulge complex, high-grade bilateral foraminal compromise, flattening of the anterior cord surface, AP diameter 8.8 mm, facet arthrosis symmetric, disc desiccated and narrowed  C7-T1 less than 2 mm depth disc bulge    11/06/2018 MRI lumbar spine  T12/L1: There is no evidence of disc protrusion, canal or foraminal stenosis.  L1/L2: Right posterolateral disc protrusion is evident and there is mild distortion of the right anterolateral canal.  Degenerative facet changes are noted bilaterally.  The left foramen is intact.  L2/L3: There is annular disc bulging with a superimposed right posterolateral disc extrusion.  This is slightly more pronounced on the previous examination and there is mild effacement of the anterior thecal sac and moderate narrowing the right foramen.  L3/L4: There is annular disc bulge and osteophyte formation with a superimposed small left posterolateral disc extrusion.  There is moderate narrowing the central canal and left foramen.  This is little changed relative the previous examination.  Degenerative facet changes are noted.  L5/S1: There is a small central disc extrusion superimposed upon annular disc bulging.  This is  slightly less pronounced than was seen previously.  Degenerative facet changes are noted.  L5/S1: Moderate degenerative facet changes are noted and there is mild effacement of the anterior thecal sac.  There is ligamentous hypertrophy particularly to the left in the posterior canal and there is left greater than right foraminal narrowing.  This is mildly worsened relative prior exam.      ASSESSMENT:  The patient is a 58 year old woman with a history of migraines, carpal tunnel syndrome and low back pain since her early 20s who returns in follow up.   1. Lumbar spondylosis     2. DDD (degenerative disc disease), lumbar     3. Spinal stenosis of lumbar region without neurogenic claudication         Plan:  1.  I will set the patient up to repeat bilateral L2, 3 and 4 medial branch radiofrequency ablation.  She has done well with this in the past.  If she does not have sufficient relief she may benefit from another lumbar YARON.  2.  She continues to take tramadol, tizanidine, meloxicam and Lidoderm patches.  She states she is not sure if she needs refills of anything at this time.  3.  Follow-up in 4 weeks postprocedure sooner as needed.

## 2019-07-02 RX ORDER — HYDROCHLOROTHIAZIDE 25 MG/1
TABLET ORAL
Qty: 90 TABLET | Refills: 0 | Status: SHIPPED | OUTPATIENT
Start: 2019-07-02 | End: 2019-09-29 | Stop reason: SDUPTHER

## 2019-07-05 ENCOUNTER — OFFICE VISIT (OUTPATIENT)
Dept: CARDIOLOGY | Facility: CLINIC | Age: 59
End: 2019-07-05
Payer: COMMERCIAL

## 2019-07-05 VITALS
SYSTOLIC BLOOD PRESSURE: 125 MMHG | DIASTOLIC BLOOD PRESSURE: 74 MMHG | HEIGHT: 64 IN | HEART RATE: 74 BPM | BODY MASS INDEX: 38.54 KG/M2 | WEIGHT: 225.75 LBS

## 2019-07-05 DIAGNOSIS — I10 ESSENTIAL HYPERTENSION: Primary | ICD-10-CM

## 2019-07-05 DIAGNOSIS — R94.39 ABNORMAL STRESS TEST: ICD-10-CM

## 2019-07-05 DIAGNOSIS — I25.10 MILD CAD: ICD-10-CM

## 2019-07-05 PROCEDURE — 3078F PR MOST RECENT DIASTOLIC BLOOD PRESSURE < 80 MM HG: ICD-10-PCS | Mod: CPTII,S$GLB,, | Performed by: INTERNAL MEDICINE

## 2019-07-05 PROCEDURE — 3074F PR MOST RECENT SYSTOLIC BLOOD PRESSURE < 130 MM HG: ICD-10-PCS | Mod: CPTII,S$GLB,, | Performed by: INTERNAL MEDICINE

## 2019-07-05 PROCEDURE — 3008F PR BODY MASS INDEX (BMI) DOCUMENTED: ICD-10-PCS | Mod: CPTII,S$GLB,, | Performed by: INTERNAL MEDICINE

## 2019-07-05 PROCEDURE — 99214 OFFICE O/P EST MOD 30 MIN: CPT | Mod: S$GLB,,, | Performed by: INTERNAL MEDICINE

## 2019-07-05 PROCEDURE — 99214 PR OFFICE/OUTPT VISIT, EST, LEVL IV, 30-39 MIN: ICD-10-PCS | Mod: S$GLB,,, | Performed by: INTERNAL MEDICINE

## 2019-07-05 PROCEDURE — 3078F DIAST BP <80 MM HG: CPT | Mod: CPTII,S$GLB,, | Performed by: INTERNAL MEDICINE

## 2019-07-05 PROCEDURE — 3074F SYST BP LT 130 MM HG: CPT | Mod: CPTII,S$GLB,, | Performed by: INTERNAL MEDICINE

## 2019-07-05 PROCEDURE — 3008F BODY MASS INDEX DOCD: CPT | Mod: CPTII,S$GLB,, | Performed by: INTERNAL MEDICINE

## 2019-07-05 PROCEDURE — 99999 PR PBB SHADOW E&M-EST. PATIENT-LVL III: ICD-10-PCS | Mod: PBBFAC,,, | Performed by: INTERNAL MEDICINE

## 2019-07-05 PROCEDURE — 99999 PR PBB SHADOW E&M-EST. PATIENT-LVL III: CPT | Mod: PBBFAC,,, | Performed by: INTERNAL MEDICINE

## 2019-07-05 NOTE — PROGRESS NOTES
Subjective:    Patient ID:  Elizabeth Giordano is a 58 y.o. female who presents for follow-up of cad    HPI  Had angiogram last may showing non-obstructive CAD  She comes with no complaints, no chest pain, no shortness of breath      Review of Systems   Constitution: Negative for decreased appetite, malaise/fatigue, weight gain and weight loss.   Cardiovascular: Negative for chest pain, dyspnea on exertion, leg swelling, palpitations and syncope.   Respiratory: Negative for cough and shortness of breath.    Gastrointestinal: Negative.    Neurological: Negative for weakness.   All other systems reviewed and are negative.       Objective:      Physical Exam   Constitutional: She is oriented to person, place, and time. She appears well-developed and well-nourished.   HENT:   Head: Normocephalic.   Eyes: Pupils are equal, round, and reactive to light.   Neck: Normal range of motion. Neck supple. No JVD present. Carotid bruit is not present. No thyromegaly present.   Cardiovascular: Normal rate, regular rhythm, normal heart sounds, intact distal pulses and normal pulses. PMI is not displaced. Exam reveals no gallop.   No murmur heard.  Pulmonary/Chest: Effort normal and breath sounds normal.   Abdominal: Soft. Normal appearance. She exhibits no mass. There is no hepatosplenomegaly. There is no tenderness.   Musculoskeletal: Normal range of motion. She exhibits no edema.   Neurological: She is alert and oriented to person, place, and time. She has normal strength and normal reflexes. No sensory deficit.   Skin: Skin is warm and intact.   Psychiatric: She has a normal mood and affect.   Nursing note and vitals reviewed.        Assessment:       1. Essential hypertension    2. Abnormal stress test    3. Mild CAD         Plan:     Continue all cardiac medications  Regular exercise program  Weight loss  9 m f/u with labs  Consider statin if not a goal next visit

## 2019-07-11 ENCOUNTER — OFFICE VISIT (OUTPATIENT)
Dept: FAMILY MEDICINE | Facility: CLINIC | Age: 59
End: 2019-07-11
Payer: COMMERCIAL

## 2019-07-11 ENCOUNTER — LAB VISIT (OUTPATIENT)
Dept: LAB | Facility: HOSPITAL | Age: 59
End: 2019-07-11
Attending: FAMILY MEDICINE
Payer: COMMERCIAL

## 2019-07-11 VITALS
HEART RATE: 76 BPM | BODY MASS INDEX: 38.91 KG/M2 | WEIGHT: 227.94 LBS | DIASTOLIC BLOOD PRESSURE: 70 MMHG | HEIGHT: 64 IN | SYSTOLIC BLOOD PRESSURE: 120 MMHG

## 2019-07-11 DIAGNOSIS — K21.9 GASTROESOPHAGEAL REFLUX DISEASE WITHOUT ESOPHAGITIS: Primary | ICD-10-CM

## 2019-07-11 DIAGNOSIS — I10 ESSENTIAL HYPERTENSION: ICD-10-CM

## 2019-07-11 DIAGNOSIS — I25.10 MILD CAD: ICD-10-CM

## 2019-07-11 PROCEDURE — 3074F SYST BP LT 130 MM HG: CPT | Mod: CPTII,S$GLB,, | Performed by: FAMILY MEDICINE

## 2019-07-11 PROCEDURE — 3078F PR MOST RECENT DIASTOLIC BLOOD PRESSURE < 80 MM HG: ICD-10-PCS | Mod: CPTII,S$GLB,, | Performed by: FAMILY MEDICINE

## 2019-07-11 PROCEDURE — 3008F BODY MASS INDEX DOCD: CPT | Mod: CPTII,S$GLB,, | Performed by: FAMILY MEDICINE

## 2019-07-11 PROCEDURE — 36415 COLL VENOUS BLD VENIPUNCTURE: CPT | Mod: PO

## 2019-07-11 PROCEDURE — 80048 BASIC METABOLIC PNL TOTAL CA: CPT

## 2019-07-11 PROCEDURE — 99999 PR PBB SHADOW E&M-EST. PATIENT-LVL III: CPT | Mod: PBBFAC,,, | Performed by: FAMILY MEDICINE

## 2019-07-11 PROCEDURE — 80061 LIPID PANEL: CPT

## 2019-07-11 PROCEDURE — 99999 PR PBB SHADOW E&M-EST. PATIENT-LVL III: ICD-10-PCS | Mod: PBBFAC,,, | Performed by: FAMILY MEDICINE

## 2019-07-11 PROCEDURE — 99214 OFFICE O/P EST MOD 30 MIN: CPT | Mod: S$GLB,,, | Performed by: FAMILY MEDICINE

## 2019-07-11 PROCEDURE — 3074F PR MOST RECENT SYSTOLIC BLOOD PRESSURE < 130 MM HG: ICD-10-PCS | Mod: CPTII,S$GLB,, | Performed by: FAMILY MEDICINE

## 2019-07-11 PROCEDURE — 3078F DIAST BP <80 MM HG: CPT | Mod: CPTII,S$GLB,, | Performed by: FAMILY MEDICINE

## 2019-07-11 PROCEDURE — 99214 PR OFFICE/OUTPT VISIT, EST, LEVL IV, 30-39 MIN: ICD-10-PCS | Mod: S$GLB,,, | Performed by: FAMILY MEDICINE

## 2019-07-11 PROCEDURE — 3008F PR BODY MASS INDEX (BMI) DOCUMENTED: ICD-10-PCS | Mod: CPTII,S$GLB,, | Performed by: FAMILY MEDICINE

## 2019-07-11 NOTE — PROGRESS NOTES
"Subjective:       Patient ID: Elizabeth Giordano is a 58 y.o. female.    Chief Complaint: Hypertension (6 month follow up )    Pt is known to me.  Pt is here for followup of chronic medical issues.  The pt had an angiogram per Dr. Ann recently--there was no need for intervention.  She had an elevated creatinine (1.54) but she had been fasting for the procedure.  Will recheck.  The pt had 1 episode about 6 weeks ago--she awoke suddenly and could not breathe.  She thinks it may have been reflux--since then she has stopped eating late at night and tries not sleep on her back.  She has no hypersomnia and generally feels well rested.  She still feels GERD even on pantoprazole.  She does snore.    Review of Systems   Constitutional: Negative for activity change and unexpected weight change.   HENT: Negative for hearing loss, rhinorrhea and trouble swallowing.    Eyes: Negative for discharge and visual disturbance.   Respiratory: Negative for chest tightness and wheezing.    Cardiovascular: Negative for chest pain and palpitations.   Gastrointestinal: Negative for blood in stool, constipation, diarrhea and vomiting.        Reflux   Endocrine: Negative for polydipsia and polyuria.   Genitourinary: Negative for difficulty urinating, dysuria, hematuria and menstrual problem.   Musculoskeletal: Positive for arthralgias. Negative for joint swelling and neck pain.   Neurological: Negative for weakness and headaches.   Psychiatric/Behavioral: Negative for confusion and dysphoric mood.       Objective:       Vitals:    07/11/19 1544   BP: 120/70   BP Location: Left arm   Patient Position: Sitting   BP Method: Large (Manual)   Pulse: 76   Weight: 103.4 kg (227 lb 15.3 oz)   Height: 5' 4" (1.626 m)     Physical Exam   Constitutional: She is oriented to person, place, and time. She appears well-developed and well-nourished.   Pt is obese   HENT:   Head: Normocephalic.   Eyes: Pupils are equal, round, and reactive to light. " Conjunctivae and EOM are normal.   Neck: Normal range of motion. Neck supple. No thyromegaly present.   Cardiovascular: Normal rate, regular rhythm and normal heart sounds.   Pulmonary/Chest: Effort normal and breath sounds normal.   Abdominal: Soft. Bowel sounds are normal. There is no tenderness.   Musculoskeletal: Normal range of motion. She exhibits no tenderness or deformity.   Lymphadenopathy:     She has no cervical adenopathy.   Neurological: She is alert and oriented to person, place, and time. She displays normal reflexes. No cranial nerve deficit. She exhibits normal muscle tone. Coordination normal.   Skin: Skin is warm and dry.   Psychiatric: She has a normal mood and affect. Her behavior is normal.       Assessment:       1. Gastroesophageal reflux disease without esophagitis    2. Essential hypertension    3. Mild CAD        Plan:       Elizabeth was seen today for hypertension.    Diagnoses and all orders for this visit:    Gastroesophageal reflux disease without esophagitis  -     ranitidine (ZANTAC) 300 MG capsule; Take 1 capsule (300 mg total) by mouth 2 (two) times daily.    Essential hypertension  -     Basic metabolic panel; Future  -     Lipid panel; Future    Mild CAD      During this visit, I reviewed the pt's history, medications, allergies, and problem list.

## 2019-07-12 LAB
ANION GAP SERPL CALC-SCNC: 10 MMOL/L (ref 8–16)
BUN SERPL-MCNC: 26 MG/DL (ref 6–20)
CALCIUM SERPL-MCNC: 9.6 MG/DL (ref 8.7–10.5)
CHLORIDE SERPL-SCNC: 105 MMOL/L (ref 95–110)
CHOLEST SERPL-MCNC: 202 MG/DL (ref 120–199)
CHOLEST/HDLC SERPL: 3.7 {RATIO} (ref 2–5)
CO2 SERPL-SCNC: 26 MMOL/L (ref 23–29)
CREAT SERPL-MCNC: 1.3 MG/DL (ref 0.5–1.4)
EST. GFR  (AFRICAN AMERICAN): 52.6 ML/MIN/1.73 M^2
EST. GFR  (NON AFRICAN AMERICAN): 45.6 ML/MIN/1.73 M^2
GLUCOSE SERPL-MCNC: 94 MG/DL (ref 70–110)
HDLC SERPL-MCNC: 54 MG/DL (ref 40–75)
HDLC SERPL: 26.7 % (ref 20–50)
LDLC SERPL CALC-MCNC: 105.2 MG/DL (ref 63–159)
NONHDLC SERPL-MCNC: 148 MG/DL
POTASSIUM SERPL-SCNC: 4.2 MMOL/L (ref 3.5–5.1)
SODIUM SERPL-SCNC: 141 MMOL/L (ref 136–145)
TRIGL SERPL-MCNC: 214 MG/DL (ref 30–150)

## 2019-07-14 ENCOUNTER — PATIENT MESSAGE (OUTPATIENT)
Dept: FAMILY MEDICINE | Facility: CLINIC | Age: 59
End: 2019-07-14

## 2019-07-14 DIAGNOSIS — K21.9 GASTROESOPHAGEAL REFLUX DISEASE WITHOUT ESOPHAGITIS: ICD-10-CM

## 2019-07-15 RX ORDER — TRAMADOL HYDROCHLORIDE 50 MG/1
TABLET ORAL
Qty: 60 TABLET | Refills: 2 | Status: SHIPPED | OUTPATIENT
Start: 2019-07-15 | End: 2019-10-12 | Stop reason: SDUPTHER

## 2019-07-15 NOTE — TELEPHONE ENCOUNTER
Patient is asking could her prescription for ranitdine be switched from capsules to tablets for insurance purposes. Please refer to [email or web address] message and advise.

## 2019-07-23 ENCOUNTER — PROCEDURE VISIT (OUTPATIENT)
Dept: NEUROLOGY | Facility: CLINIC | Age: 59
End: 2019-07-23
Payer: COMMERCIAL

## 2019-07-23 VITALS
DIASTOLIC BLOOD PRESSURE: 72 MMHG | RESPIRATION RATE: 16 BRPM | WEIGHT: 227 LBS | SYSTOLIC BLOOD PRESSURE: 135 MMHG | HEART RATE: 92 BPM | BODY MASS INDEX: 38.76 KG/M2 | HEIGHT: 64 IN

## 2019-07-23 DIAGNOSIS — G43.719 INTRACTABLE CHRONIC MIGRAINE WITHOUT AURA AND WITHOUT STATUS MIGRAINOSUS: Primary | ICD-10-CM

## 2019-07-23 PROCEDURE — 64615 PR CHEMODENERVATION OF MUSCLE FOR CHRONIC MIGRAINE: ICD-10-PCS | Mod: S$GLB,,, | Performed by: PSYCHIATRY & NEUROLOGY

## 2019-07-23 PROCEDURE — 64615 CHEMODENERV MUSC MIGRAINE: CPT | Mod: S$GLB,,, | Performed by: PSYCHIATRY & NEUROLOGY

## 2019-07-23 NOTE — PROCEDURES
Procedures       PROCEDURE PERFORMED: Botulinum toxin injection (77949)    CLINICAL INDICATION: G43.719    A time out was conducted just before the start of the procedure to verify the correct patient and procedure, procedure location, and all relevant critical information.     Conventional methods of treatment such as multiple medications, both on and   off label have been tried including:    anti-epileptics (topiramate, gabapentin, lyrica, zonisamide), and antidepressants (amitriptyline, venflaxine)    The patient has been unresponsive and refractory.The patient meets criteria for chronic headaches according to the ICHD-II, the patient has more than 15 headaches a month which last for more than 4 hours a day.    Injection session number: 6  Percentage relief since last session: 90% relief     Frequency of treatment is every 3 months unless no response to the treatments, at which time we will discontinue the injections.     DESCRIPTION OF PROCEDURE: After obtaining informed consent and under   aseptic technique, a total of 155 units of botulinum toxin type A were   injected in the following muscles:     -- Procerus 5 units  --  5 units bilaterally  -- Frontalis 20 units  -- Temporalis 20 units bilaterally  -- Occipitalis 15 units bilaterally  -- Upper cervical paraspinals 10 units bilaterally  -- Trapezius 15 units bilaterally.     The patient tolerated the procedure well. There were no complications. The patient was given a prescription for repeat treatment in 12 weeks       Unavoidable waste 45 units    Neva Cortes MD

## 2019-07-23 NOTE — PATIENT INSTRUCTIONS
-- no rubbing forehead or sleeping on forehead for 24 hours  -- return to clinic in 6 weeks for office visit   -- schedule next Botox in 12 weeks

## 2019-07-26 DIAGNOSIS — M51.36 DDD (DEGENERATIVE DISC DISEASE), LUMBAR: Primary | ICD-10-CM

## 2019-07-29 ENCOUNTER — HOSPITAL ENCOUNTER (OUTPATIENT)
Facility: HOSPITAL | Age: 59
Discharge: HOME | End: 2019-07-29
Attending: ANESTHESIOLOGY | Admitting: ANESTHESIOLOGY
Payer: COMMERCIAL

## 2019-07-29 ENCOUNTER — HOSPITAL ENCOUNTER (OUTPATIENT)
Dept: RADIOLOGY | Facility: HOSPITAL | Age: 59
Discharge: HOME | End: 2019-07-29
Attending: ANESTHESIOLOGY | Admitting: ANESTHESIOLOGY
Payer: COMMERCIAL

## 2019-07-29 VITALS
OXYGEN SATURATION: 98 % | SYSTOLIC BLOOD PRESSURE: 120 MMHG | RESPIRATION RATE: 16 BRPM | TEMPERATURE: 98 F | HEART RATE: 71 BPM | BODY MASS INDEX: 38.41 KG/M2 | WEIGHT: 225 LBS | HEIGHT: 64 IN | DIASTOLIC BLOOD PRESSURE: 76 MMHG

## 2019-07-29 DIAGNOSIS — M51.36 DDD (DEGENERATIVE DISC DISEASE), LUMBAR: ICD-10-CM

## 2019-07-29 DIAGNOSIS — M47.816 LUMBAR SPONDYLOSIS: Primary | ICD-10-CM

## 2019-07-29 PROCEDURE — 64636 DESTROY L/S FACET JNT ADDL: CPT | Mod: 50,PO | Performed by: ANESTHESIOLOGY

## 2019-07-29 PROCEDURE — 64635 DESTROY LUMB/SAC FACET JNT: CPT | Mod: 50,PO | Performed by: ANESTHESIOLOGY

## 2019-07-29 PROCEDURE — 63600175 PHARM REV CODE 636 W HCPCS: Mod: PO | Performed by: ANESTHESIOLOGY

## 2019-07-29 PROCEDURE — 64636 PR DESTROY L/S FACET JNT ADDL: ICD-10-PCS | Mod: 50,,, | Performed by: ANESTHESIOLOGY

## 2019-07-29 PROCEDURE — 99152 PR MOD CONSCIOUS SEDATION, SAME PHYS, 5+ YRS, FIRST 15 MIN: ICD-10-PCS | Mod: ,,, | Performed by: ANESTHESIOLOGY

## 2019-07-29 PROCEDURE — 25000003 PHARM REV CODE 250: Mod: PO | Performed by: ANESTHESIOLOGY

## 2019-07-29 PROCEDURE — 64636 DESTROY L/S FACET JNT ADDL: CPT | Mod: 50,,, | Performed by: ANESTHESIOLOGY

## 2019-07-29 PROCEDURE — 76000 FLUOROSCOPY <1 HR PHYS/QHP: CPT | Mod: TC,PO

## 2019-07-29 PROCEDURE — 64635 PR DESTROY LUMB/SAC FACET JNT: ICD-10-PCS | Mod: 50,,, | Performed by: ANESTHESIOLOGY

## 2019-07-29 PROCEDURE — 99152 MOD SED SAME PHYS/QHP 5/>YRS: CPT | Mod: ,,, | Performed by: ANESTHESIOLOGY

## 2019-07-29 PROCEDURE — 64635 DESTROY LUMB/SAC FACET JNT: CPT | Mod: 50,,, | Performed by: ANESTHESIOLOGY

## 2019-07-29 RX ORDER — LIDOCAINE HYDROCHLORIDE 10 MG/ML
1 INJECTION INFILTRATION; PERINEURAL ONCE
Status: COMPLETED | OUTPATIENT
Start: 2019-07-29 | End: 2019-07-29

## 2019-07-29 RX ORDER — MIDAZOLAM HYDROCHLORIDE 2 MG/2ML
INJECTION, SOLUTION INTRAMUSCULAR; INTRAVENOUS
Status: DISCONTINUED | OUTPATIENT
Start: 2019-07-29 | End: 2019-07-29 | Stop reason: HOSPADM

## 2019-07-29 RX ORDER — FENTANYL CITRATE 50 UG/ML
INJECTION, SOLUTION INTRAMUSCULAR; INTRAVENOUS
Status: DISCONTINUED | OUTPATIENT
Start: 2019-07-29 | End: 2019-07-29 | Stop reason: HOSPADM

## 2019-07-29 RX ORDER — METHYLPREDNISOLONE ACETATE 40 MG/ML
INJECTION, SUSPENSION INTRA-ARTICULAR; INTRALESIONAL; INTRAMUSCULAR; SOFT TISSUE
Status: DISCONTINUED | OUTPATIENT
Start: 2019-07-29 | End: 2019-07-29 | Stop reason: HOSPADM

## 2019-07-29 RX ORDER — SODIUM CHLORIDE, SODIUM LACTATE, POTASSIUM CHLORIDE, CALCIUM CHLORIDE 600; 310; 30; 20 MG/100ML; MG/100ML; MG/100ML; MG/100ML
INJECTION, SOLUTION INTRAVENOUS CONTINUOUS
Status: DISCONTINUED | OUTPATIENT
Start: 2019-07-29 | End: 2019-07-29 | Stop reason: HOSPADM

## 2019-07-29 RX ORDER — LIDOCAINE HYDROCHLORIDE 20 MG/ML
INJECTION, SOLUTION EPIDURAL; INFILTRATION; INTRACAUDAL; PERINEURAL
Status: DISCONTINUED | OUTPATIENT
Start: 2019-07-29 | End: 2019-07-29 | Stop reason: HOSPADM

## 2019-07-29 RX ORDER — LIDOCAINE HYDROCHLORIDE 10 MG/ML
INJECTION, SOLUTION EPIDURAL; INFILTRATION; INTRACAUDAL; PERINEURAL
Status: DISCONTINUED | OUTPATIENT
Start: 2019-07-29 | End: 2019-07-29 | Stop reason: HOSPADM

## 2019-07-29 RX ADMIN — LIDOCAINE HYDROCHLORIDE 1 ML: 10 INJECTION, SOLUTION INFILTRATION; PERINEURAL at 02:07

## 2019-07-29 RX ADMIN — SODIUM CHLORIDE, SODIUM LACTATE, POTASSIUM CHLORIDE, AND CALCIUM CHLORIDE: .6; .31; .03; .02 INJECTION, SOLUTION INTRAVENOUS at 01:07

## 2019-07-29 NOTE — DISCHARGE SUMMARY
[facility]  Discharge Note  Short Stay    Admit Date: 7/29/2019    Discharge Date: 7/29/2019    Attending Physician: Oswald Abdalla MD     Discharge Provider: Oswald Abdalla    Diagnoses:  Active Hospital Problems    Diagnosis  POA    *Lumbar spondylosis [M47.816]  Yes      Resolved Hospital Problems   No resolved problems to display.       Discharged Condition: good    Final Diagnoses: Lumbar spondylosis [M47.816]    Disposition: Home or Self Care    Hospital Course: no complications, uneventful    Outcome of Hospitalization, Treatment, Procedure, or Surgery:  Patient was admitted for outpatient procedure. The patient underwent procedure without complications and are discharged home    Follow up/Patient Instructions:  Follow up as scheduled in Pain Management clinic in 3-4 weeks/Patient has received instructions and follow up date and time    Medications:  Continue previous medications    Discharge Procedure Orders   Call MD for:  temperature >100.4     Call MD for:  severe uncontrolled pain     Call MD for:  redness, tenderness, or signs of infection (pain, swelling, redness, odor or green/yellow discharge around incision site)     Call MD for:  severe persistent headache     No dressing needed         Discharge Procedure Orders (must include Diet, Follow-up, Activity):   Discharge Procedure Orders (must include Diet, Follow-up, Activity)   Call MD for:  temperature >100.4     Call MD for:  severe uncontrolled pain     Call MD for:  redness, tenderness, or signs of infection (pain, swelling, redness, odor or green/yellow discharge around incision site)     Call MD for:  severe persistent headache     No dressing needed

## 2019-07-29 NOTE — PLAN OF CARE
Pt met discharge criteria.  Tolerating PO liquids. PIV removed.  Pressure dressing applied.  Discharge instructions reviewed with patient.  All questions answered.  D/C via wheelchair to personal vehicle.

## 2019-07-29 NOTE — DISCHARGE INSTRUCTIONS
Home care instructions   Apply ice pack to the injection site for 20 minutes periods for the first 24 hrs for soreness/discomfort at injection site DO NOT USE HEAT FOR 24 HOURS  Keep site clean and dry for 24 hours, remove bandaid when desired  Do not drive until tomorrow  Take care when walking after a lumbar injection  STEROIDS or RADIOFREQUENCY   May take 10-14 days for full affects.  Avoid strenuous exercises for 2 days  BLOCKS  Resume regular activities today  Pain office will call in next 2 days.  Resume home medication as prescribed today  Resume Aspirin, Plavix, or Coumadin the day after the procedure unless otherwise instructed.    SEE IMMEDIATE MEDICAL HELP FOR:  Severe increase in your usual pain or appearance of new pain  Prolonged or increasing weakness or numbness in the legs or arms  Drainage, redness, active bleeding, or increased swelling at the injection site  Temperature over 100.0 degrees F.  Headache that increases when your head is upright and decreases when you lie flat    CALL 911 OR GO DIRECTLY TO EMERGENCY DEPARTMENT FOR:  Shortness of breath, chest pain, or problems breathing      Recovery After Procedural Sedation (Adult)  You have been given medicine by vein to make you sleep during your surgery. This may have included both a pain medicine and sleeping medicine. Most of the effects have worn off. But you may still have some drowsiness for the next 6 to 8 hours.  Home care  Follow these guidelines when you get home:  · For the next 8 hours, you should be watched by a responsible adult. This person should make sure your condition is not getting worse.  · Don't drink any alcohol for the next 24 hours.  · Don't drive, operate dangerous machinery, or make important business or personal decisions during the next 24 hours.  Note: Your healthcare provider may tell you not to take any medicine by mouth for pain or sleep in the next 4 hours. These medicines may react with the medicines you were  given in the hospital. This could cause a much stronger response than usual.  Follow-up care  Follow up with your healthcare provider if you are not alert and back to your usual level of activity within 12 hours.  When to seek medical advice  Call your healthcare provider right away if any of these occur:  · Drowsiness gets worse  · Weakness or dizziness gets worse  · Repeated vomiting  · You can't be awakened   Date Last Reviewed: 10/18/2016  © 7645-8003 [employer] [address] [place] [address], [place] [address]. All rights reserved. This information is not intended as a substitute for professional medical care. Always follow your healthcare professional's instructions.

## 2019-07-29 NOTE — OP NOTE
PROCEDURE DATE: 7/29/2019    PROCEDURE:  Radiofrequency ablation of the bilateral L2,3,4 medial branch nerves on the bilateral-side utilizing fluoroscopy    DIAGNOSIS:  Lumbar spondylosis    Post op Diagnosis: Same    PHYSICIAN: Oswald Abdalla MD    MEDICATIONS INJECTED:  From a mixture of 4ml of 2% lidocaine and 40mg of methylprednisone, 1ml of this solution was injected at each level.    LOCAL ANESTHETIC USED: Lidocaine 1%, 4 ml given at each site.    SEDATION MEDICATIONS: 4mg versed, 50mcg fentanyl    ESTIMATED BLOOD LOSS:  none    COMPLICATIONS:  none    TECHNIQUE:  A time out was taken to identify patient and procedure side prior to starting the procedure. Laying in a prone position, the patient was prepped and draped in the usual sterile fashion using ChloraPrep and sterile towels.  The levels were determined under fluoroscopic guidance and then marked.  Local anesthetic was given by raising a wheal at the skin over each site and then infiltrated approximately 2cm deeper.  A 20-gauge  100 mm Mobivery RF needle was introduced to the anatomic location of the right and then left L2,3,4 medial branch nerves.  Motor stimulation up to 2 Volts at each level confirmed no motor nerve involvement.  Impedance was less than 800 ohms at each level. The above noted medication was then injected slowly.  Ablation was performed per level utilizing Shaniqua radiofrequency generator 80°C for 90 seconds. The patient tolerated the procedure well.     The patient was monitored after the procedure.  Patient was given post procedure and discharge instructions to follow at home.  The patient was discharged in a stable condition

## 2019-07-30 RX ORDER — TIZANIDINE 2 MG/1
TABLET ORAL
Qty: 90 TABLET | Refills: 0 | Status: SHIPPED | OUTPATIENT
Start: 2019-07-30 | End: 2019-09-09 | Stop reason: SDUPTHER

## 2019-08-05 RX ORDER — IBUPROFEN 800 MG/1
TABLET ORAL
Qty: 60 TABLET | Refills: 0 | Status: SHIPPED | OUTPATIENT
Start: 2019-08-05 | End: 2019-11-25 | Stop reason: SDUPTHER

## 2019-08-26 ENCOUNTER — TELEPHONE (OUTPATIENT)
Dept: PAIN MEDICINE | Facility: CLINIC | Age: 59
End: 2019-08-26

## 2019-08-26 ENCOUNTER — OFFICE VISIT (OUTPATIENT)
Dept: PAIN MEDICINE | Facility: CLINIC | Age: 59
End: 2019-08-26
Payer: COMMERCIAL

## 2019-08-26 VITALS
TEMPERATURE: 98 F | DIASTOLIC BLOOD PRESSURE: 62 MMHG | HEART RATE: 80 BPM | RESPIRATION RATE: 18 BRPM | WEIGHT: 226.5 LBS | BODY MASS INDEX: 38.88 KG/M2 | SYSTOLIC BLOOD PRESSURE: 134 MMHG | OXYGEN SATURATION: 95 %

## 2019-08-26 DIAGNOSIS — M48.061 SPINAL STENOSIS OF LUMBAR REGION WITHOUT NEUROGENIC CLAUDICATION: ICD-10-CM

## 2019-08-26 DIAGNOSIS — M54.16 LUMBAR RADICULOPATHY: Primary | ICD-10-CM

## 2019-08-26 DIAGNOSIS — M51.36 DDD (DEGENERATIVE DISC DISEASE), LUMBAR: ICD-10-CM

## 2019-08-26 DIAGNOSIS — M47.816 LUMBAR SPONDYLOSIS: ICD-10-CM

## 2019-08-26 PROCEDURE — 3008F BODY MASS INDEX DOCD: CPT | Mod: CPTII,S$GLB,, | Performed by: PHYSICIAN ASSISTANT

## 2019-08-26 PROCEDURE — 3008F PR BODY MASS INDEX (BMI) DOCUMENTED: ICD-10-PCS | Mod: CPTII,S$GLB,, | Performed by: PHYSICIAN ASSISTANT

## 2019-08-26 PROCEDURE — 3078F PR MOST RECENT DIASTOLIC BLOOD PRESSURE < 80 MM HG: ICD-10-PCS | Mod: CPTII,S$GLB,, | Performed by: PHYSICIAN ASSISTANT

## 2019-08-26 PROCEDURE — 3075F SYST BP GE 130 - 139MM HG: CPT | Mod: CPTII,S$GLB,, | Performed by: PHYSICIAN ASSISTANT

## 2019-08-26 PROCEDURE — 99213 OFFICE O/P EST LOW 20 MIN: CPT | Mod: S$GLB,,, | Performed by: PHYSICIAN ASSISTANT

## 2019-08-26 PROCEDURE — 99999 PR PBB SHADOW E&M-EST. PATIENT-LVL V: ICD-10-PCS | Mod: PBBFAC,,, | Performed by: PHYSICIAN ASSISTANT

## 2019-08-26 PROCEDURE — 99999 PR PBB SHADOW E&M-EST. PATIENT-LVL V: CPT | Mod: PBBFAC,,, | Performed by: PHYSICIAN ASSISTANT

## 2019-08-26 PROCEDURE — 3075F PR MOST RECENT SYSTOLIC BLOOD PRESS GE 130-139MM HG: ICD-10-PCS | Mod: CPTII,S$GLB,, | Performed by: PHYSICIAN ASSISTANT

## 2019-08-26 PROCEDURE — 3078F DIAST BP <80 MM HG: CPT | Mod: CPTII,S$GLB,, | Performed by: PHYSICIAN ASSISTANT

## 2019-08-26 PROCEDURE — 99213 PR OFFICE/OUTPT VISIT, EST, LEVL III, 20-29 MIN: ICD-10-PCS | Mod: S$GLB,,, | Performed by: PHYSICIAN ASSISTANT

## 2019-08-26 RX ORDER — ALPRAZOLAM 0.5 MG/1
1 TABLET, ORALLY DISINTEGRATING ORAL ONCE AS NEEDED
Status: CANCELLED | OUTPATIENT
Start: 2019-09-04 | End: 2031-01-30

## 2019-08-26 NOTE — H&P (VIEW-ONLY)
CHIEF COMPLAINT/REASON FOR VISIT: low back pain, neck pain    History of present illness: The patient is a 58 year old woman with a history of migraines, carpal tunnel syndrome and low back pain since her early 20s.  She is status post bilateral L2, 3 and 4 medial branch radiofrequency ablation on 07/29/2019 with 40% relief.  She continues to have bilateral low back pain with radiation into her right buttock and posterior, lateral thigh to her knee.  She describes this as sharp, worse with walking and improved with sitting.  However, her pain is severe upon standing.  She reports numbness and weakness in her right greater than left leg with walking.  She denies having any bladder or bowel incontinence.    Pain intervention history: She tried Neurontin for her leg/thigh pain with no success. She only takes Advil with about 30% relief. She is status post TFESI bilaterally to L3 on 12/6/2011 with no relief. Past history of status post L4/5 YARON on 5/25/11 with about 90% relief that lasted 3 weeks. She is status post 2 bilateral L3 transforaminal injections with 3 weeks relief each time. Her current pain medications include Celexa 40 mg once a day, Aleve 220 mg twice a day, Lyrica as previously stated, Zanaflex 5 mg at night. She uses her TENS at home, when she thinks about it.  She is status post L4/5 interlaminar epidural steroid injection on 1/8/14 with 90% relief.  She is status post L5/S1 interlaminar epidural steroid injection on 6/9/14 with moderate relief only on the right low back and right leg lasting 2 weeks.  She is status post bilateral L3/4 and L4/5 facet joint injections on 4/13/15 with initially 100% relief of her back pain and 80% relief of her left lateral hip and lateral thigh pain, now reporting at least 50% relief of both.  She is status post bilateral L2, 3 and 4 medial branch radiofrequency ablation on 5/20/15 with 60% relief.   She is status post bilateral L2, 3 and 4 medial branch radiofrequency  ablation on 6/20/16 with 50-70% relief.  She is status post L5/S1 interlaminar epidural steroid injection on 3/10/17 with 50% relief.  She is status post bilateral L2, 3, 4 medial branch radiofrequency ablation on 7/17/17 with about 75% relief.  She is status post bilateral L2, 3, 4 medial branch radiofrequency ablation on 07/17/2018 with 100% relief of her leg pain and 70-75% relief of her back pain.  She is status post C7-T1 cervical interlaminar epidural steroid injection on 09/25/2018 with 80% relief.   She is status post L5/S1 interlaminar epidural steroid injection on 12/27/2018 with 60% relief.  She is status post bilateral L2, 3 and 4 medial branch radiofrequency ablation on 07/29/2019 with 40% relief.     ROS: She reports back pain only.  Balance of review of systems is negative.    Medical, surgical, family and social history reviewed elsewhere in record.     Medications/Allergies: See med card      Vitals:    08/26/19 0831   BP: 134/62   Pulse: 80   Resp: 18   Temp: 97.7 °F (36.5 °C)   TempSrc: Oral   SpO2: 95%   Weight: 102.7 kg (226 lb 8.4 oz)   PainSc:   6   PainLoc: Back        PHYSICAL EXAM:  Gen: A and O x3, pleasant, well-groomed  HEENT: PERRLA  CVS: Regular rate and rhythm, normal S1 and S2, no murmurs.  Resp: Clear to auscultation bilaterally, no wheezes or rales.  Musculoskeletal: Able to heel walk, toe walk. No antalgic gait.     Neuro:  Upper extremities: 5/5 strength bilaterally   Lower extremities: 5/5 strength bilaterally  Reflexes: Brachioradialis 2+, Bicep 2+, Tricep 2+. Patellar 2+, Achilles 2+.  Sensory: Intact and symmetrical to light touch and pinprick in C2-T1 dermatomes bilaterally. Intact and symmetrical to light touch and pinprick in L2-S1 dermatomes bilaterally.    Lumbar spine:  Lumbar spine: ROM is moderately limited with flexion and extension with increased bilateral low back pain during extension and oblique extension.  Supine straight leg raise causes increased right lateral  thigh pain.  Internal and external rotation of the hip causes no increased pain in either side.  Myofascial exam:  Mild tenderness to palpation to the right greater than left lumbar paraspinous muscles.         IMAGING:   MRI L-SPINE 5/10/11   IMPRESSION: AT L3-4, THERE IS CIRCUMFERENTIAL DISC PROTRUSION FOCALLY MORE PROMINENT TO THE LEFT AS WELL AS A SMALL DISC HERNIATION PROTRUDING INFERIORLY BEHIND L4 TO THE LEFT OF MIDLINE. THIS PRODUCES SPINAL CANAL AND BILATERAL NEURAL FORAMINAL STENOSIS, LEFT GREATER THAN RIGHT. AT L4-5, THERE IS CIRCUMFERENTIAL DISC PROTRUSION FOCALLY MORE PROMINENT TO THE RIGHT WITH MILD SPINAL CANAL AND RIGHT NEURAL FORAMINAL STENOSIS. AT L1-2 AND L2-3 ALTHOUGH THERE ARE DISC PROTRUSIONS IDENTIFIED, SIGNIFICANT STENOSIS IS NOT SEEN.     MRI lumbar spine 7/10/14  L1-2:There is chronic relatively advanced disc degeneration with disc space narrowing, disc dehydration, disc narrowing, endplate osteophytes, and degenerative vertebral endplate marrow change. There is a diffuse 2-mm posterior disc bulge with a superimposed4-mm right posterior disc extrusion causing mild right foraminal stenosis. There is no impingement of the right L1 nerve root and there has been no change.  L2-3: There is severe and chronic disc degeneration with severe disc space narrowing, disc dehydration, degenerative vertebral endplate marrow change, and vertebral endplate osteophytes. There is a diffuse 2-mm posterior disc bulge with a superimposed 4-mm right posterior disc extrusion causing mild right foraminal stenosis. There is no impingement of the right L2 nerve root and there has been no significant change.  L3-4: There is severe disc degeneration which has progressed since the prior study. There is severe disc space narrowing, disc dehydration, degenerative vertebral endplate marrow change and endplate osteophytes. There is also mild posterior subluxation of L3over a distance of 4 mm. There is a broad-based 5-mm  posterior disc extrusion. There is degenerative facet arthrosis with ligamentum flavum thickening. The combination of facet arthrosis and disc extrusion results in relatively severe spinal canal stenosis with compression of the thecal sac to an AP diameter of 5 mm, moderate right foraminal stenosis, and severe left foraminal stenosis. The spinal stenosis has also progressed since the prior study.  L4-5:There is a diffuse posterior disc bulge with a superimposed 3-mm left posterior paracentral disc protrusion causing left paracentral thecal sac compression. There is moderate left and mild right foraminal stenosis and there has been no significant change. There is mild degenerative facet arthrosis with ligamentum flavum thickening and small joint effusions.  L5-S1:There is no significant compromise of the spinal canal or foramina and there has been no change.    X-ray cervical spine 6/8/15  There is loss of normal cervical lordosis which may be positional or related muscular strain. Intervertebral disk height loss is noted at the C5-C6 C6-C7 levels and to a lesser degree C4-C5 and C7-T1 levels. Vertebral body alignment appears otherwise adequate. Vertebral body heights appear well-preserved. The atlas and odontoid appear in good relationship to each other. Osseous neuroforaminal narrowing is noted at the C3-C4 C4-C5 C5-C6 levels on the left and at the C4-C5 C5-C6 and C6-C7 levels on the right     07/10/2018 MRI cervical spine [facility] report  C2-3 broad-based signal asymmetry at the left subarticular and foraminal zone reflecting implant spondylosis and disc bulge complex, mild to moderate left asymmetric foraminal narrowing, ectasia of the left vertebral artery, contralateral right asymmetric facet hypertrophy, right foramen widely narrowed, disc partially desiccated without collapse  C3-4 moderate to severe left greater than right foraminal narrowing secondary to uncinate joint hypertrophic signal alteration,  disc partially desiccated  C4-5 endplate spondylosis moderate diffuse disc bulge noted, broad-based right paracentral disc herniation, asymmetric flattening of the ventral cord surface at the right paracentral zone, high-grade if lateral right foraminal narrowing, AP diameter of canal 9.9 mm, contralaterally, high grade left foraminal narrowing secondary to facet greater than uncinate joint hypertrophic signal alteration, disc desiccated and mildly narrowed  C5-6 disc space narrowing evident with generalized in Nigel spondylosis and concentric inter pose disc bulge complex, high-grade bilateral foraminal narrowing, flattening of the cord surface, AP diameter 7.9 mm, symmetric facet arthrosis, disc diffusely desiccated and narrowed  C6-7 moderate endplate spondylosis and concentric disc bulge complex, high-grade bilateral foraminal compromise, flattening of the anterior cord surface, AP diameter 8.8 mm, facet arthrosis symmetric, disc desiccated and narrowed  C7-T1 less than 2 mm depth disc bulge    11/06/2018 MRI lumbar spine  T12/L1: There is no evidence of disc protrusion, canal or foraminal stenosis.  L1/L2: Right posterolateral disc protrusion is evident and there is mild distortion of the right anterolateral canal.  Degenerative facet changes are noted bilaterally.  The left foramen is intact.  L2/L3: There is annular disc bulging with a superimposed right posterolateral disc extrusion.  This is slightly more pronounced on the previous examination and there is mild effacement of the anterior thecal sac and moderate narrowing the right foramen.  L3/L4: There is annular disc bulge and osteophyte formation with a superimposed small left posterolateral disc extrusion.  There is moderate narrowing the central canal and left foramen.  This is little changed relative the previous examination.  Degenerative facet changes are noted.  L5/S1: There is a small central disc extrusion superimposed upon annular disc bulging.   This is slightly less pronounced than was seen previously.  Degenerative facet changes are noted.  L5/S1: Moderate degenerative facet changes are noted and there is mild effacement of the anterior thecal sac.  There is ligamentous hypertrophy particularly to the left in the posterior canal and there is left greater than right foraminal narrowing.  This is mildly worsened relative prior exam.      ASSESSMENT:  The patient is a 58 year old woman with a history of migraines, carpal tunnel syndrome and low back pain since her early 20s who returns in follow up.   1. Lumbar radiculopathy     2. DDD (degenerative disc disease), lumbar     3. Spinal stenosis of lumbar region without neurogenic claudication     4. Lumbar spondylosis         Plan:  1.  She had some relief following the bilateral L2, 3 and 4 medial branch RFA but is still in severe pain.  We discussed that her remaining pain is likely due to stenosis and I will schedule her for an L5/S1 interlaminar YARON to the right.  We discussed possibly repeating this soon after if necessary prior to considering an appointment with Neurosurgery.  We also discussed possibly trying Cymbalta in the future.   2.  Follow-up in 4 weeks postprocedure sooner as needed.

## 2019-08-26 NOTE — TELEPHONE ENCOUNTER
Patient is scheduled for a lumbar steroid injection with Dr. Abdalla on 9/4 and will need to stop the aspirin 7 days before. Please advise if this is okay.

## 2019-08-26 NOTE — PROGRESS NOTES
CHIEF COMPLAINT/REASON FOR VISIT: low back pain, neck pain    History of present illness: The patient is a 58 year old woman with a history of migraines, carpal tunnel syndrome and low back pain since her early 20s.  She is status post bilateral L2, 3 and 4 medial branch radiofrequency ablation on 07/29/2019 with 40% relief.  She continues to have bilateral low back pain with radiation into her right buttock and posterior, lateral thigh to her knee.  She describes this as sharp, worse with walking and improved with sitting.  However, her pain is severe upon standing.  She reports numbness and weakness in her right greater than left leg with walking.  She denies having any bladder or bowel incontinence.    Pain intervention history: She tried Neurontin for her leg/thigh pain with no success. She only takes Advil with about 30% relief. She is status post TFESI bilaterally to L3 on 12/6/2011 with no relief. Past history of status post L4/5 YARON on 5/25/11 with about 90% relief that lasted 3 weeks. She is status post 2 bilateral L3 transforaminal injections with 3 weeks relief each time. Her current pain medications include Celexa 40 mg once a day, Aleve 220 mg twice a day, Lyrica as previously stated, Zanaflex 5 mg at night. She uses her TENS at home, when she thinks about it.  She is status post L4/5 interlaminar epidural steroid injection on 1/8/14 with 90% relief.  She is status post L5/S1 interlaminar epidural steroid injection on 6/9/14 with moderate relief only on the right low back and right leg lasting 2 weeks.  She is status post bilateral L3/4 and L4/5 facet joint injections on 4/13/15 with initially 100% relief of her back pain and 80% relief of her left lateral hip and lateral thigh pain, now reporting at least 50% relief of both.  She is status post bilateral L2, 3 and 4 medial branch radiofrequency ablation on 5/20/15 with 60% relief.   She is status post bilateral L2, 3 and 4 medial branch radiofrequency  ablation on 6/20/16 with 50-70% relief.  She is status post L5/S1 interlaminar epidural steroid injection on 3/10/17 with 50% relief.  She is status post bilateral L2, 3, 4 medial branch radiofrequency ablation on 7/17/17 with about 75% relief.  She is status post bilateral L2, 3, 4 medial branch radiofrequency ablation on 07/17/2018 with 100% relief of her leg pain and 70-75% relief of her back pain.  She is status post C7-T1 cervical interlaminar epidural steroid injection on 09/25/2018 with 80% relief.   She is status post L5/S1 interlaminar epidural steroid injection on 12/27/2018 with 60% relief.  She is status post bilateral L2, 3 and 4 medial branch radiofrequency ablation on 07/29/2019 with 40% relief.     ROS: She reports back pain only.  Balance of review of systems is negative.    Medical, surgical, family and social history reviewed elsewhere in record.     Medications/Allergies: See med card      Vitals:    08/26/19 0831   BP: 134/62   Pulse: 80   Resp: 18   Temp: 97.7 °F (36.5 °C)   TempSrc: Oral   SpO2: 95%   Weight: 102.7 kg (226 lb 8.4 oz)   PainSc:   6   PainLoc: Back        PHYSICAL EXAM:  Gen: A and O x3, pleasant, well-groomed  HEENT: PERRLA  CVS: Regular rate and rhythm, normal S1 and S2, no murmurs.  Resp: Clear to auscultation bilaterally, no wheezes or rales.  Musculoskeletal: Able to heel walk, toe walk. No antalgic gait.     Neuro:  Upper extremities: 5/5 strength bilaterally   Lower extremities: 5/5 strength bilaterally  Reflexes: Brachioradialis 2+, Bicep 2+, Tricep 2+. Patellar 2+, Achilles 2+.  Sensory: Intact and symmetrical to light touch and pinprick in C2-T1 dermatomes bilaterally. Intact and symmetrical to light touch and pinprick in L2-S1 dermatomes bilaterally.    Lumbar spine:  Lumbar spine: ROM is moderately limited with flexion and extension with increased bilateral low back pain during extension and oblique extension.  Supine straight leg raise causes increased right lateral  thigh pain.  Internal and external rotation of the hip causes no increased pain in either side.  Myofascial exam:  Mild tenderness to palpation to the right greater than left lumbar paraspinous muscles.         IMAGING:   MRI L-SPINE 5/10/11   IMPRESSION: AT L3-4, THERE IS CIRCUMFERENTIAL DISC PROTRUSION FOCALLY MORE PROMINENT TO THE LEFT AS WELL AS A SMALL DISC HERNIATION PROTRUDING INFERIORLY BEHIND L4 TO THE LEFT OF MIDLINE. THIS PRODUCES SPINAL CANAL AND BILATERAL NEURAL FORAMINAL STENOSIS, LEFT GREATER THAN RIGHT. AT L4-5, THERE IS CIRCUMFERENTIAL DISC PROTRUSION FOCALLY MORE PROMINENT TO THE RIGHT WITH MILD SPINAL CANAL AND RIGHT NEURAL FORAMINAL STENOSIS. AT L1-2 AND L2-3 ALTHOUGH THERE ARE DISC PROTRUSIONS IDENTIFIED, SIGNIFICANT STENOSIS IS NOT SEEN.     MRI lumbar spine 7/10/14  L1-2:There is chronic relatively advanced disc degeneration with disc space narrowing, disc dehydration, disc narrowing, endplate osteophytes, and degenerative vertebral endplate marrow change. There is a diffuse 2-mm posterior disc bulge with a superimposed4-mm right posterior disc extrusion causing mild right foraminal stenosis. There is no impingement of the right L1 nerve root and there has been no change.  L2-3: There is severe and chronic disc degeneration with severe disc space narrowing, disc dehydration, degenerative vertebral endplate marrow change, and vertebral endplate osteophytes. There is a diffuse 2-mm posterior disc bulge with a superimposed 4-mm right posterior disc extrusion causing mild right foraminal stenosis. There is no impingement of the right L2 nerve root and there has been no significant change.  L3-4: There is severe disc degeneration which has progressed since the prior study. There is severe disc space narrowing, disc dehydration, degenerative vertebral endplate marrow change and endplate osteophytes. There is also mild posterior subluxation of L3over a distance of 4 mm. There is a broad-based 5-mm  posterior disc extrusion. There is degenerative facet arthrosis with ligamentum flavum thickening. The combination of facet arthrosis and disc extrusion results in relatively severe spinal canal stenosis with compression of the thecal sac to an AP diameter of 5 mm, moderate right foraminal stenosis, and severe left foraminal stenosis. The spinal stenosis has also progressed since the prior study.  L4-5:There is a diffuse posterior disc bulge with a superimposed 3-mm left posterior paracentral disc protrusion causing left paracentral thecal sac compression. There is moderate left and mild right foraminal stenosis and there has been no significant change. There is mild degenerative facet arthrosis with ligamentum flavum thickening and small joint effusions.  L5-S1:There is no significant compromise of the spinal canal or foramina and there has been no change.    X-ray cervical spine 6/8/15  There is loss of normal cervical lordosis which may be positional or related muscular strain. Intervertebral disk height loss is noted at the C5-C6 C6-C7 levels and to a lesser degree C4-C5 and C7-T1 levels. Vertebral body alignment appears otherwise adequate. Vertebral body heights appear well-preserved. The atlas and odontoid appear in good relationship to each other. Osseous neuroforaminal narrowing is noted at the C3-C4 C4-C5 C5-C6 levels on the left and at the C4-C5 C5-C6 and C6-C7 levels on the right     07/10/2018 MRI cervical spine [facility] report  C2-3 broad-based signal asymmetry at the left subarticular and foraminal zone reflecting implant spondylosis and disc bulge complex, mild to moderate left asymmetric foraminal narrowing, ectasia of the left vertebral artery, contralateral right asymmetric facet hypertrophy, right foramen widely narrowed, disc partially desiccated without collapse  C3-4 moderate to severe left greater than right foraminal narrowing secondary to uncinate joint hypertrophic signal alteration,  disc partially desiccated  C4-5 endplate spondylosis moderate diffuse disc bulge noted, broad-based right paracentral disc herniation, asymmetric flattening of the ventral cord surface at the right paracentral zone, high-grade if lateral right foraminal narrowing, AP diameter of canal 9.9 mm, contralaterally, high grade left foraminal narrowing secondary to facet greater than uncinate joint hypertrophic signal alteration, disc desiccated and mildly narrowed  C5-6 disc space narrowing evident with generalized in Nigel spondylosis and concentric inter pose disc bulge complex, high-grade bilateral foraminal narrowing, flattening of the cord surface, AP diameter 7.9 mm, symmetric facet arthrosis, disc diffusely desiccated and narrowed  C6-7 moderate endplate spondylosis and concentric disc bulge complex, high-grade bilateral foraminal compromise, flattening of the anterior cord surface, AP diameter 8.8 mm, facet arthrosis symmetric, disc desiccated and narrowed  C7-T1 less than 2 mm depth disc bulge    11/06/2018 MRI lumbar spine  T12/L1: There is no evidence of disc protrusion, canal or foraminal stenosis.  L1/L2: Right posterolateral disc protrusion is evident and there is mild distortion of the right anterolateral canal.  Degenerative facet changes are noted bilaterally.  The left foramen is intact.  L2/L3: There is annular disc bulging with a superimposed right posterolateral disc extrusion.  This is slightly more pronounced on the previous examination and there is mild effacement of the anterior thecal sac and moderate narrowing the right foramen.  L3/L4: There is annular disc bulge and osteophyte formation with a superimposed small left posterolateral disc extrusion.  There is moderate narrowing the central canal and left foramen.  This is little changed relative the previous examination.  Degenerative facet changes are noted.  L5/S1: There is a small central disc extrusion superimposed upon annular disc bulging.   This is slightly less pronounced than was seen previously.  Degenerative facet changes are noted.  L5/S1: Moderate degenerative facet changes are noted and there is mild effacement of the anterior thecal sac.  There is ligamentous hypertrophy particularly to the left in the posterior canal and there is left greater than right foraminal narrowing.  This is mildly worsened relative prior exam.      ASSESSMENT:  The patient is a 58 year old woman with a history of migraines, carpal tunnel syndrome and low back pain since her early 20s who returns in follow up.   1. Lumbar radiculopathy     2. DDD (degenerative disc disease), lumbar     3. Spinal stenosis of lumbar region without neurogenic claudication     4. Lumbar spondylosis         Plan:  1.  She had some relief following the bilateral L2, 3 and 4 medial branch RFA but is still in severe pain.  We discussed that her remaining pain is likely due to stenosis and I will schedule her for an L5/S1 interlaminar YARON to the right.  We discussed possibly repeating this soon after if necessary prior to considering an appointment with Neurosurgery.  We also discussed possibly trying Cymbalta in the future.   2.  Follow-up in 4 weeks postprocedure sooner as needed.

## 2019-09-01 ENCOUNTER — PATIENT MESSAGE (OUTPATIENT)
Dept: PHYSICAL MEDICINE AND REHAB | Facility: CLINIC | Age: 59
End: 2019-09-01

## 2019-09-03 DIAGNOSIS — M51.36 DDD (DEGENERATIVE DISC DISEASE), LUMBAR: Primary | ICD-10-CM

## 2019-09-04 ENCOUNTER — HOSPITAL ENCOUNTER (OUTPATIENT)
Facility: HOSPITAL | Age: 59
Discharge: HOME | End: 2019-09-04
Attending: ANESTHESIOLOGY | Admitting: ANESTHESIOLOGY
Payer: COMMERCIAL

## 2019-09-04 ENCOUNTER — HOSPITAL ENCOUNTER (OUTPATIENT)
Dept: RADIOLOGY | Facility: HOSPITAL | Age: 59
Discharge: HOME | End: 2019-09-04
Attending: ANESTHESIOLOGY
Payer: COMMERCIAL

## 2019-09-04 VITALS
OXYGEN SATURATION: 100 % | HEART RATE: 67 BPM | TEMPERATURE: 98 F | DIASTOLIC BLOOD PRESSURE: 57 MMHG | SYSTOLIC BLOOD PRESSURE: 128 MMHG | RESPIRATION RATE: 16 BRPM

## 2019-09-04 DIAGNOSIS — M54.16 LUMBAR RADICULOPATHY: Primary | ICD-10-CM

## 2019-09-04 DIAGNOSIS — M51.36 DDD (DEGENERATIVE DISC DISEASE), LUMBAR: ICD-10-CM

## 2019-09-04 PROCEDURE — 62323 NJX INTERLAMINAR LMBR/SAC: CPT | Mod: PO | Performed by: ANESTHESIOLOGY

## 2019-09-04 PROCEDURE — 25000003 PHARM REV CODE 250: Mod: PO | Performed by: ANESTHESIOLOGY

## 2019-09-04 PROCEDURE — A4216 STERILE WATER/SALINE, 10 ML: HCPCS | Mod: PO | Performed by: ANESTHESIOLOGY

## 2019-09-04 PROCEDURE — 63600175 PHARM REV CODE 636 W HCPCS: Mod: PO | Performed by: ANESTHESIOLOGY

## 2019-09-04 PROCEDURE — 62323 NJX INTERLAMINAR LMBR/SAC: CPT | Mod: ,,, | Performed by: ANESTHESIOLOGY

## 2019-09-04 PROCEDURE — 25500020 PHARM REV CODE 255: Mod: PO | Performed by: ANESTHESIOLOGY

## 2019-09-04 PROCEDURE — 62323 PR INJ LUMBAR/SACRAL, W/IMAGING GUIDANCE: ICD-10-PCS | Mod: ,,, | Performed by: ANESTHESIOLOGY

## 2019-09-04 PROCEDURE — 76000 FLUOROSCOPY <1 HR PHYS/QHP: CPT | Mod: TC,PO

## 2019-09-04 RX ORDER — LIDOCAINE HYDROCHLORIDE 10 MG/ML
INJECTION, SOLUTION EPIDURAL; INFILTRATION; INTRACAUDAL; PERINEURAL
Status: DISCONTINUED | OUTPATIENT
Start: 2019-09-04 | End: 2019-09-04 | Stop reason: HOSPADM

## 2019-09-04 RX ORDER — ALPRAZOLAM 0.5 MG/1
1 TABLET, ORALLY DISINTEGRATING ORAL ONCE AS NEEDED
Status: COMPLETED | OUTPATIENT
Start: 2019-09-04 | End: 2019-09-04

## 2019-09-04 RX ORDER — SODIUM CHLORIDE 9 MG/ML
INJECTION, SOLUTION INTRAMUSCULAR; INTRAVENOUS; SUBCUTANEOUS
Status: DISCONTINUED | OUTPATIENT
Start: 2019-09-04 | End: 2019-09-04 | Stop reason: HOSPADM

## 2019-09-04 RX ORDER — METHYLPREDNISOLONE ACETATE 80 MG/ML
INJECTION, SUSPENSION INTRA-ARTICULAR; INTRALESIONAL; INTRAMUSCULAR; SOFT TISSUE
Status: DISCONTINUED | OUTPATIENT
Start: 2019-09-04 | End: 2019-09-04 | Stop reason: HOSPADM

## 2019-09-04 RX ADMIN — ALPRAZOLAM 1 MG: 0.5 TABLET, ORALLY DISINTEGRATING ORAL at 03:09

## 2019-09-04 NOTE — DISCHARGE INSTRUCTIONS
Home care instructions   Apply ice pack to the injection site for 20 minutes periods for the first 24 hrs for soreness/discomfort at injection site DO NOT USE HEAT FOR 24 HOURS  Keep site clean and dry for 24 hours, remove bandaid when desired  Do not drive until tomorrow  Take care when walking after a lumbar injection  STEROIDS or RADIOFREQUENCY   May take 10-14 days for full affects.  Avoid strenuous exercises for 2 days    Resume home medication as prescribed today  Resume Aspirin, Plavix, or Coumadin the day after the procedure unless otherwise instructed.    SEE IMMEDIATE MEDICAL HELP FOR:  Severe increase in your usual pain or appearance of new pain  Prolonged or increasing weakness or numbness in the legs or arms  Drainage, redness, active bleeding, or increased swelling at the injection site  Temperature over 100.0 degrees F.  Headache that increases when your head is upright and decreases when you lie flat    CALL 911 OR GO DIRECTLY TO EMERGENCY DEPARTMENT FOR:  Shortness of breath, chest pain, or problems breathing      Recovery After Procedural Sedation (Adult)  You have been given medicine by vein to make you sleep during your surgery. This may have included both a pain medicine and sleeping medicine. Most of the effects have worn off. But you may still have some drowsiness for the next 6 to 8 hours.  Home care  Follow these guidelines when you get home:  · For the next 8 hours, you should be watched by a responsible adult. This person should make sure your condition is not getting worse.  · Don't drink any alcohol for the next 24 hours.  · Don't drive, operate dangerous machinery, or make important business or personal decisions during the next 24 hours.  Note: Your healthcare provider may tell you not to take any medicine by mouth for pain or sleep in the next 4 hours. These medicines may react with the medicines you were given in the hospital. This could cause a much stronger response than  usual.  Follow-up care  Follow up with your healthcare provider if you are not alert and back to your usual level of activity within 12 hours.  When to seek medical advice  Call your healthcare provider right away if any of these occur:  · Drowsiness gets worse  · Weakness or dizziness gets worse  · Repeated vomiting  · You can't be awakened   Date Last Reviewed: 10/18/2016  © 0114-2596 [employer]. [address] [place] [address], [place] [address]. All rights reserved. This information is not intended as a substitute for professional medical care. Always follow your healthcare professional's instructions.

## 2019-09-04 NOTE — PLAN OF CARE
Patient refuses food/drink at this time. No distress/discomfort noted. Vital signs stable. Discharge instructions reviewed with patient/family. Verbalized understanding. Patient ready for discharge.

## 2019-09-04 NOTE — DISCHARGE SUMMARY
[facility]  Discharge Note  Short Stay    Admit Date: 9/4/2019    Discharge Date: 9/4/2019    Attending Physician: Oswald Abdalla MD     Discharge Provider: Oswald Abdalla    Diagnoses:  Active Hospital Problems    Diagnosis  POA    *Lumbar radiculopathy [M54.16]  Yes      Resolved Hospital Problems   No resolved problems to display.       Discharged Condition: good    Final Diagnoses: Lumbar radiculopathy [M54.16]    Disposition: Home or Self Care    Hospital Course: no complications, uneventful    Outcome of Hospitalization, Treatment, Procedure, or Surgery:  Patient was admitted for outpatient procedure. The patient underwent procedure without complications and are discharged home    Follow up/Patient Instructions:  Follow up as scheduled in Pain Management clinic in 3-4 weeks/Patient has received instructions and follow up date and time    Medications:  Continue previous medications    Discharge Procedure Orders   Call MD for:  temperature >100.4     Call MD for:  severe uncontrolled pain     Call MD for:  redness, tenderness, or signs of infection (pain, swelling, redness, odor or green/yellow discharge around incision site)     Call MD for:  severe persistent headache     No dressing needed         Discharge Procedure Orders (must include Diet, Follow-up, Activity):   Discharge Procedure Orders (must include Diet, Follow-up, Activity)   Call MD for:  temperature >100.4     Call MD for:  severe uncontrolled pain     Call MD for:  redness, tenderness, or signs of infection (pain, swelling, redness, odor or green/yellow discharge around incision site)     Call MD for:  severe persistent headache     No dressing needed

## 2019-09-04 NOTE — OP NOTE
PROCEDURE DATE: 9/4/2019    Lumbar Interlaminar Epidural Steroid Injection under Fluoroscopic Guidance, AP Approach.    Procedure:   Interlaminar epidural steroid injection at L5/S1 under fluoroscopic guidance.    Diagnosis: lUMBAR Radiculopathy    pOSTOP DIAGNOSIS: sAME    Physician: Oswald Abdalla M.D.    Medications injected:80 mg methylprednisone with 4 ml of preservative free NaCl    Local anesthetic injected:    Lidocaine 1% 4 ml total    Sedation Medications: none    Estimated blood loss:  none    Complications:  none    Technique:  Time-out taken to identify patient and procedure prior to starting the procedure.  With the patient laying in a prone position, the area was prepped and draped in the usual sterile fashion using ChloraPrep and a fenestrated drape.  After determining the target level with an AP fluoroscopic view, local anesthetic was given using a 25-gauge 1.5 inch needle by raising a wheal and then infiltrating toward the interlaminar entry space.  A 3.5inch 20-gauge Touhy needle was introduced under AP fluoroscopic guidance to the interlaminar space of L5/S1. Once the trajectory was established, the needle was visualized in the lateral view and advanced using loss of resistance technique. Once in the desired position, omnipaque contrast was injected to confirm placement and there was no vascular uptake nor intrathecal spread.  The medication was then injected slowly. The patient tolerated the procedure well.      The patient was monitored after the procedure.   They were given post-procedure and discharge instructions to follow at home.  The patient was discharged in a stable condition.

## 2019-09-09 RX ORDER — TIZANIDINE 2 MG/1
TABLET ORAL
Qty: 90 TABLET | Refills: 0 | Status: SHIPPED | OUTPATIENT
Start: 2019-09-09 | End: 2020-01-20

## 2019-09-18 ENCOUNTER — TELEPHONE (OUTPATIENT)
Dept: ORTHOPEDICS | Facility: CLINIC | Age: 59
End: 2019-09-18

## 2019-09-19 ENCOUNTER — OFFICE VISIT (OUTPATIENT)
Dept: ORTHOPEDICS | Facility: CLINIC | Age: 59
End: 2019-09-19
Payer: COMMERCIAL

## 2019-09-19 ENCOUNTER — HOSPITAL ENCOUNTER (OUTPATIENT)
Dept: RADIOLOGY | Facility: HOSPITAL | Age: 59
Discharge: HOME | End: 2019-09-19
Attending: NURSE PRACTITIONER
Payer: COMMERCIAL

## 2019-09-19 ENCOUNTER — PATIENT MESSAGE (OUTPATIENT)
Dept: ORTHOPEDICS | Facility: CLINIC | Age: 59
End: 2019-09-19

## 2019-09-19 VITALS
WEIGHT: 226.44 LBS | SYSTOLIC BLOOD PRESSURE: 147 MMHG | DIASTOLIC BLOOD PRESSURE: 78 MMHG | HEART RATE: 105 BPM | HEIGHT: 64 IN | BODY MASS INDEX: 38.66 KG/M2

## 2019-09-19 DIAGNOSIS — M25.561 RIGHT KNEE PAIN, UNSPECIFIED CHRONICITY: ICD-10-CM

## 2019-09-19 DIAGNOSIS — M25.561 RIGHT KNEE PAIN, UNSPECIFIED CHRONICITY: Primary | ICD-10-CM

## 2019-09-19 DIAGNOSIS — M17.0 BILATERAL PRIMARY OSTEOARTHRITIS OF KNEE: Primary | ICD-10-CM

## 2019-09-19 PROCEDURE — 99214 PR OFFICE/OUTPT VISIT, EST, LEVL IV, 30-39 MIN: ICD-10-PCS | Mod: 25,S$GLB,, | Performed by: NURSE PRACTITIONER

## 2019-09-19 PROCEDURE — 20610 LARGE JOINT ASPIRATION/INJECTION: ICD-10-PCS | Mod: RT,S$GLB,, | Performed by: NURSE PRACTITIONER

## 2019-09-19 PROCEDURE — 20610 DRAIN/INJ JOINT/BURSA W/O US: CPT | Mod: RT,S$GLB,, | Performed by: NURSE PRACTITIONER

## 2019-09-19 PROCEDURE — 99999 PR PBB SHADOW E&M-EST. PATIENT-LVL III: ICD-10-PCS | Mod: PBBFAC,,, | Performed by: NURSE PRACTITIONER

## 2019-09-19 PROCEDURE — 99999 PR PBB SHADOW E&M-EST. PATIENT-LVL III: CPT | Mod: PBBFAC,,, | Performed by: NURSE PRACTITIONER

## 2019-09-19 PROCEDURE — 73562 XR KNEE ORTHO RIGHT WITH FLEXION: ICD-10-PCS | Mod: 26,59,LT, | Performed by: RADIOLOGY

## 2019-09-19 PROCEDURE — 99214 OFFICE O/P EST MOD 30 MIN: CPT | Mod: 25,S$GLB,, | Performed by: NURSE PRACTITIONER

## 2019-09-19 PROCEDURE — 73562 X-RAY EXAM OF KNEE 3: CPT | Mod: 26,59,LT, | Performed by: RADIOLOGY

## 2019-09-19 PROCEDURE — 73564 X-RAY EXAM KNEE 4 OR MORE: CPT | Mod: 26,RT,, | Performed by: RADIOLOGY

## 2019-09-19 PROCEDURE — 73564 XR KNEE ORTHO RIGHT WITH FLEXION: ICD-10-PCS | Mod: 26,RT,, | Performed by: RADIOLOGY

## 2019-09-19 PROCEDURE — 73562 X-RAY EXAM OF KNEE 3: CPT | Mod: TC,PO,LT

## 2019-09-19 RX ADMIN — KETOROLAC TROMETHAMINE 30 MG: 30 INJECTION, SOLUTION INTRAMUSCULAR; INTRAVENOUS at 05:09

## 2019-09-19 NOTE — PROGRESS NOTES
DATE: 9/19/2019  PATIENT: Elizabeth Giordano  REFERRING MD:   CHIEF COMPLAINT:   Chief Complaint   Patient presents with    Right Knee - Pain       HISTORY:  Elizabeth Giordano is a 58 y.o. female  who presents for initial evaluation of her right knee pain.  Her pain rating today is 4/10.  She has had bilateral knee pain for years now that has responded to ibuprofen and rest.  She reports she did some moving on Tuesday and her pain increased to 8/10.  The pain has been progressing with increased activity.  She presents today requesting cortisone injection to the more symptomatic right knee.  She is also interested in bilateral viscosupplementation.    PAST MEDICAL/SURGICAL HISTORY:  Past Medical History:   Diagnosis Date    Allergy     Arthritis of spine 2011    Chronic low back pain     Class 2 obesity with body mass index (BMI) of 39.0 to 39.9 in adult 1/11/2019    Coronary artery disease     DDD (degenerative disc disease), lumbar     Diverticulitis     Diverticulosis     DJD (degenerative joint disease)     Encounter for blood transfusion     Factor V Leiden     GERD (gastroesophageal reflux disease)     Hiatal hernia     Migraines     PONV (postoperative nausea and vomiting)     geta     Past Surgical History:   Procedure Laterality Date    APPENDECTOMY  1981    CARPAL TUNNEL RELEASE  2011    right    COLONOSCOPY  2014    reduntant colon, otherwise normal findings repeat in 8-10 years for surveillance    COLONOSCOPY N/A 1/16/2014    Performed by [facility]res Jr., [facility] ENDO    Epidural Steroid Injection      Pain Management    ESOPHAGOGASTRODUODENOSCOPY      ESOPHAGOGASTRODUODENOSCOPY (EGD) N/A 5/17/2016    Performed by Marty Arreguin MD at [facility] ENDO    Facet injection      Pain Management    HYSTERECTOMY      ovaries removed    INJECTION, STEROID, SPINE, LUMBAR, EPIDURAL N/A 1/8/2014    [facility] OR    INJECTION-FACET L3/4  and L4/5 Bilateral 4/13/2015    [facility]    Injection-steroid-epidural-cervical N/A 9/25/2018    Per[facility]    Injection-steroid-epidural-lumbar N/A 9/4/2019    Performed [facility]    INJECTION-STEROID-EPIDURAL-LUMBAR N/A 3/10/2017    Per[facility]    INJECTION-STEROID-EPIDURAL-LUMBAR N/A 6/9/2014    Per[facility]    Injection-steroid-epidural-lumbar L5/S1 N/A 12/27/2018    Per[facility]    KIDNEY SURGERY Right 1967    to correct urinary reflux into kidney    Left heart cath Left 5/22/2019    [facility] by Casa Perdue [facility] at [facility] CATH    OVARIAN CYST REMOVAL Right 1981    RADIOFREQUENCY ABLATION, NERVE, MEDIAL BRANCH, LUMBAR, L2,3,4 Bilateral 7/17/2018    Per[facility]    Radiofrequency Ablation, Nerve, Spinal, Lumbar, Medial Branch, L2,3,4 Bilateral 7/29/2019    Performed by Osw[facility]alla [facility]    RADIOFREQUENCY THERMOCOAGULATION (RFTC)-NERVE-MEDIAN BRANCH-LUMBAR L2,3,4 Bilateral 7/17/2017    Performed by Osw[facility]alla [facility] at [facility]    RADIOFREQUENCY THERMOCOAGULATION (RFTC)-NERVE-MEDIAN BRANCH-LUMBAR L2,3,4 Bilateral 6/20/2016    Performed [facility] Osw[facility]alla [facility] at [facility]    RADIOFREQUENCY THERMOCOAGULATION (RFTC)-NERVE-MEDIAN BRANCH-LUMBAR L2,3,4 Bilateral 5/20/2015    Per[facility] at [facility]    SHOULDER SURGERY  2010    rotator cuff, right       Current Medications:   Current Outpatient Medications:     aspirin (ECOTRIN) 81 MG EC tablet, Take 81 mg by mouth once daily., Disp: , Rfl:     azelastine (ASTELIN) 137 mcg (0.1 %) nasal spray, USE 1 SPRAY(137 MCG) IN EACH NOSTRIL TWICE DAILY, Disp: 30 mL, Rfl: 0    diphenhydrAMINE (BENADRYL) 25 mg capsule, Take 25 mg by mouth every 6 (six) hours as needed., Disp: , Rfl:      estradiol (ESTRACE) 1 MG tablet, TK 1 T PO QD;; pt uses HS, Disp: , Rfl: 3    hydroCHLOROthiazide (HYDRODIURIL) 25 MG tablet, TAKE 1 TABLET(25 MG) BY MOUTH EVERY DAY, Disp: 90 tablet, Rfl: 0    ibuprofen (ADVIL,MOTRIN) 800 MG tablet, TAKE 1 TABLET(800 MG) BY MOUTH TWICE DAILY AS NEEDED FOR PAIN, Disp: 60 tablet, Rfl: 0    lidocaine (LIDODERM) 5 %, Place 1 patch onto the skin daily as needed. Remove & Discard patch within 12 hours or as directed by MD (Patient taking differently: Place 1 patch onto the skin nightly as needed. Remove & Discard patch within 12 hours or as directed by MD; pt uses on back), Disp: 30 patch, Rfl: 5    loratadine (CLARITIN) 10 mg tablet, Take 10 mg by mouth once daily., Disp: , Rfl:     losartan (COZAAR) 100 MG tablet, Take 1 tablet (100 mg total) by mouth once daily., Disp: 90 tablet, Rfl: 3    meloxicam (MOBIC) 15 MG tablet, Take 1 tablet (15 mg total) by mouth once daily., Disp: 90 tablet, Rfl: 1    meloxicam (MOBIC) 15 MG tablet, TAKE 1 TABLET(15 MG) BY MOUTH EVERY DAY, Disp: 90 tablet, Rfl: 0    naratriptan (AMERGE) 2.5 MG tablet, TK 1 T PO BID FOR 5 DAYS AT THE ONSET OF MIGRAINE FLARE, Disp: , Rfl: 11    naratriptan (AMERGE) 2.5 MG tablet, 2.5 mg PO BID x 5 days at onset of migraine flare, Disp: 9 tablet, Rfl: 11    ondansetron (ZOFRAN) 4 MG tablet, Take 1 tablet (4 mg total) by mouth every 6 (six) hours as needed for Nausea., Disp: 30 tablet, Rfl: 11    pantoprazole (PROTONIX) 40 MG tablet, Take 1 tablet (40 mg total) by mouth every evening., Disp: 90 tablet, Rfl: 1    ranitidine (ZANTAC) 300 MG tablet, Take 1 tablet (300 mg total) by mouth 2 (two) times daily., Disp: 180 tablet, Rfl: 1    sumatriptan (IMITREX) 100 MG tablet, MAY TAKE 1 TABLET THEN FOLLOWUP WITH ANOTHER TABLET IN 2 HOURS IF HEADACHE NOT RESOLVED, Disp: 9 tablet, Rfl: 11    tiZANidine (ZANAFLEX) 2 MG tablet, TAKE 1 TABLET(2 MG) BY MOUTH EVERY NIGHT AS NEEDED, Disp: 90 tablet, Rfl: 0    traMADol  (ULTRAM) 50 mg tablet, TAKE 1 TABLET BY MOUTH TWICE DAILY AS NEEDED, Disp: 60 tablet, Rfl: 2    Current Facility-Administered Medications:     onabotulinumtoxina injection 200 Units, 200 Units, Intramuscular, Q90 Days, Neva Cortes MD, 200 Units at 18 1625    onabotulinumtoxina injection 200 Units, 200 Units, Intramuscular, Q90 Days, Petty Fernandez MD, 200 Units at 19 1359    onabotulinumtoxina injection 200 Units, 200 Units, Intramuscular, Q90 Days, Neva Cortes MD, 200 Units at 19 1448    Family History: family history was reviewed and is noncontributory  Social History:   Social History     Socioeconomic History    Marital status:      Spouse name: Not on file    Number of children: 1    Years of education: Not on file    Highest education level: Not on file   Occupational History     Employer: [employer]   Social Needs    Financial resource strain: Not on file    Food insecurity:     Worry: Not on file     Inability: Not on file    Transportation needs:     Medical: Not on file     Non-medical: Not on file   Tobacco Use    Smoking status: Former Smoker     Packs/day: 1.00     Years: 5.00     Pack years: 5.00     Start date: 1992     Last attempt to quit: 1997     Years since quittin.7    Smokeless tobacco: Never Used   Substance and Sexual Activity    Alcohol use: Yes     Comment: 3x per year    Drug use: No    Sexual activity: Yes     Partners: Male   Lifestyle    Physical activity:     Days per week: Not on file     Minutes per session: Not on file    Stress: Not on file   Relationships    Social connections:     Talks on phone: Not on file     Gets together: Not on file     Attends Church service: Not on file     Active member of club or organization: Not on file     Attends meetings of clubs or organizations: Not on file     Relationship status: Not on file   Other Topics Concern    Not on file   Social History Narrative    **  "Merged History Encounter **            ROS:  Constitution: Negative for chills, fever, and sweats. Negative for unexplained weight loss.  Eyes: no redness, no discharge  Ears: no ear pain or tinnitus  Cardiovascular: Negative for chest pain, irregular heartbeat, leg swelling and palpitations.   Respiratory: Negative for cough and shortness of breath.   Gastrointestinal: Negative for abdominal pain, nausea and vomiting.   Genitourinary: Negative for bladder incontinence and dysuria.   Neurological: Negative for numbness.   Psychiatric/Behavioral: Negative for behavior changes.   Endocrine: Negative for palpitations.   Hematologic/Lymphatic: Negative for bleeding disorders.  Skin: Negative for pruritis or rash.     PHYSICAL EXAM:  Right Knee Exam     Tenderness   The patient is experiencing tenderness in the lateral joint line and medial joint line.    Range of Motion   Extension: normal   Flexion: normal     Tests   Varus: negative Valgus: negative  Patellar apprehension: negative    Other   Erythema: absent  Sensation: normal  Pulse: present  Swelling: none  Effusion: no effusion present      Left Knee Exam     Tenderness   The patient is experiencing tenderness in the lateral joint line and medial joint line.    Range of Motion   Extension: normal   Flexion: normal     Tests   Varus: negative Valgus: negative  Patellar apprehension: negative    Other   Erythema: absent  Sensation: normal  Pulse: present  Swelling: none  Effusion: no effusion present           Constitutional:  Elizabeth Giordano is a well developed, well nourished female in no acute distress.   Vitals:    09/19/19 1608   BP: (!) 147/78   Pulse: 105   Weight: 102.7 kg (226 lb 6.6 oz)   Height: 5' 4" (1.626 m)   PainSc:   4   PainLoc: Knee     Psychiatric: pleasant,normal mood and affect, behavior is normal  Neurological:  Sensation intact to light touch, normal reflexes. Coordination normal.   Skin: warm, dry, intact  Cardiovascular: capillary " refill less than 3 seconds, pulses 2+      IMAGING:   X-ray obtained and personally reviewed with patient. Radiologist report as follows:  X-ray Knee Ortho Right with Flexion  Narrative: EXAMINATION:  XR KNEE ORTHO RIGHT WITH FLEXION    CLINICAL HISTORY:  Pain in right knee    TECHNIQUE:  AP standing as well as PA flexion standing and Merchant views of both knees were performed.  A lateral view of the right knee is also performed.    COMPARISON:  None.    FINDINGS:  Medial compartment narrowing is noted bilaterally.  The patella appear relatively well positioned within the intercondylar notches.  No acute fracture or dislocation is convincingly noted.  A small suprapatellar joint effusion on the right is noted.  Impression: Medial compartment narrowing bilaterally.    Electronically signed by: Jerzy Diaz MD  Date:    09/19/2019  Time:    16:45           ASSESSMENT:   1. Bilateral primary osteoarthritis of knee  Medication Pre-Authorization    Large Joint Aspiration/Injection   2. BMI 38.0-38.9,adult           PLAN:  The nature of the diagnosis, using models and diagrams when appropriate, was explained to the patient in detail. Treatment option discussed included non-operative measures of custom orthotic, weight loss, rest,  modification of activities, application of ice, elevation of extremity, compression, over the counter pain/antiinflammatory relief, physica/occupational therapy, cortisone injection and viscosupplementation.  More aggressive treatment options include referal to orthopedic surgeon.  All questions answered and the patient wishes to proceed today with right knee cortisone injection.  Right knee cortisone injection performed today (see procedure documentation).  I have instructed to monitor injection site for signs and symptoms of inflammation/infection.  I have instructed to elevate and apply ice to knees this evening.  She will return next week for Euflexxa injection #1 pending insurance  authorization.

## 2019-09-19 NOTE — PROCEDURES
Large Joint Aspiration/Injection  Date/Time: 9/19/2019 5:07 PM  Performed by: JEFFREY Tellez  Authorized by: JEFFREY Tellez     Consent Done?:  Yes (Verbal)  Indications:  Pain  Timeout: Prior to procedure the correct patient, procedure, and site was verified    Anesthesia    Anesthetic: topical anesthetic    Location:  Knee  Prep: Patient was prepped and draped in usual sterile fashion    Needle size:  22 G  Medications:  30 mg ketorolac 30 mg/mL (1 mL)  Patient tolerance:  Patient tolerated the procedure well with no immediate complications

## 2019-09-20 RX ORDER — KETOROLAC TROMETHAMINE 30 MG/ML
30 INJECTION, SOLUTION INTRAMUSCULAR; INTRAVENOUS
Status: DISCONTINUED | OUTPATIENT
Start: 2019-09-19 | End: 2019-09-20 | Stop reason: HOSPADM

## 2019-09-26 ENCOUNTER — OFFICE VISIT (OUTPATIENT)
Dept: ORTHOPEDICS | Facility: CLINIC | Age: 59
End: 2019-09-26
Payer: COMMERCIAL

## 2019-09-26 VITALS
BODY MASS INDEX: 38.66 KG/M2 | HEART RATE: 93 BPM | WEIGHT: 226.44 LBS | DIASTOLIC BLOOD PRESSURE: 76 MMHG | SYSTOLIC BLOOD PRESSURE: 120 MMHG | HEIGHT: 64 IN

## 2019-09-26 DIAGNOSIS — M17.0 BILATERAL PRIMARY OSTEOARTHRITIS OF KNEE: Primary | ICD-10-CM

## 2019-09-26 PROCEDURE — 99499 UNLISTED E&M SERVICE: CPT | Mod: S$GLB,,, | Performed by: NURSE PRACTITIONER

## 2019-09-26 PROCEDURE — 20610 DRAIN/INJ JOINT/BURSA W/O US: CPT | Mod: 50,S$GLB,, | Performed by: NURSE PRACTITIONER

## 2019-09-26 PROCEDURE — 20610 LARGE JOINT ASPIRATION/INJECTION: R KNEE, L KNEE: ICD-10-PCS | Mod: 50,S$GLB,, | Performed by: NURSE PRACTITIONER

## 2019-09-26 PROCEDURE — 99499 NO LOS: ICD-10-PCS | Mod: S$GLB,,, | Performed by: NURSE PRACTITIONER

## 2019-09-26 PROCEDURE — 99999 PR PBB SHADOW E&M-EST. PATIENT-LVL III: CPT | Mod: PBBFAC,,, | Performed by: NURSE PRACTITIONER

## 2019-09-26 PROCEDURE — 99999 PR PBB SHADOW E&M-EST. PATIENT-LVL III: ICD-10-PCS | Mod: PBBFAC,,, | Performed by: NURSE PRACTITIONER

## 2019-09-26 NOTE — PROGRESS NOTES
DATE: 9/26/2019  PATIENT: Elizabeth Giordano  REFERRING MD:   CHIEF COMPLAINT:   Chief Complaint   Patient presents with    Right Knee - Injections    Left Knee - Injections       HISTORY:  Elizabeth Giordano is a 58 y.o. female  who presents for initial evaluation of her right knee pain.  Her pain rating today is 4/10. She is diagnosed with osteoarthritis. She underwent toradol injection last week without complication. She does note improvement in pain since receiving an injection last week.  She presents today for Euflexxa injection # 1.  Her pain rating today is 4/10.    PAST MEDICAL/SURGICAL HISTORY:  Past Medical History:   Diagnosis Date    Allergy     Arthritis of spine 2011    Chronic low back pain     Class 2 obesity with body mass index (BMI) of 39.0 to 39.9 in adult 1/11/2019    Coronary artery disease     DDD (degenerative disc disease), lumbar     Diverticulitis     Diverticulosis     DJD (degenerative joint disease)     Encounter for blood transfusion     Factor V Leiden     GERD (gastroesophageal reflux disease)     Hiatal hernia     Migraines     PONV (postoperative nausea and vomiting)     geta     Past Surgical History:   Procedure Laterality Date    APPENDECTOMY  1981    CARPAL TUNNEL RELEASE  2011    right    COLONOSCOPY  2014    reduntant colon, otherwise normal findings repeat in 8-10 years for surveillance    Epidural Steroid Injection      Pain Management    EPIDURAL STEROID INJECTION INTO CERVICAL SPINE N/A 9/25/2018    Procedure: Injection-steroid-epidural-cervical;  Surgeon: Oswald Abdalla MD;  Location: [facility] OR;  Service: Pain Management;  Laterality: N/A;    EPIDURAL STEROID INJECTION INTO LUMBAR SPINE N/A 12/27/2018    Procedure: Injection-steroid-epidural-lumbar L5/S1;  Surgeon: Oswald Abdalla MD;  Location: [facility] OR;  Service: Pain Management;  Laterality: N/A;    EPIDURAL STEROID INJECTION INTO LUMBAR SPINE N/A 9/4/2019    Procedure:  Injection-steroid-epidural-lumbar;  Surgeon: Oswald Abdalla MD;  Location: [facility] OR;  Service: Pain Management;  Laterality: N/A;  L5/S1 to right    ESOPHAGOGASTRODUODENOSCOPY      Facet injection      Pain Management    HYSTERECTOMY      ovaries removed    KIDNEY SURGERY Right 1967    to correct urinary reflux into kidney    LEFT HEART CATHETERIZATION Left 5/22/2019    Procedure: Left heart cath;  Surgeon: Casa Perdue MD;  [facility];  Service: Cardiology;  Laterality: Left;    OVARIAN CYST REMOVAL Right 1981    RADIOFREQUENCY ABLATION OF LUMBAR MEDIAL BRANCH NERVE AT SINGLE LEVEL Bilateral 7/17/2018    Procedure: RADIOFREQUENCY ABLATION, NERVE, MEDIAL BRANCH, LUMBAR, L2,3,4;  Surgeon: Oswald Abdalla MD;  Location: [facility] OR;  Service: Pain Management;  Laterality: Bilateral;    RADIOFREQUENCY ABLATION OF LUMBAR MEDIAL BRANCH NERVE AT SINGLE LEVEL Bilateral 7/29/2019    Procedure: Radiofrequency Ablation, Nerve, Spinal, Lumbar, Medial Branch, L2,3,4;  Surgeon: Oswald Abdalla MD;  Location: [facility] OR;  Service: Pain Management;  Laterality: Bilateral;    SHOULDER SURGERY  2010    rotator cuff, right       Current Medications:   Current Outpatient Medications:     aspirin (ECOTRIN) 81 MG EC tablet, Take 81 mg by mouth once daily., Disp: , Rfl:     azelastine (ASTELIN) 137 mcg (0.1 %) nasal spray, USE 1 SPRAY(137 MCG) IN EACH NOSTRIL TWICE DAILY, Disp: 30 mL, Rfl: 0    diphenhydrAMINE (BENADRYL) 25 mg capsule, Take 25 mg by mouth every 6 (six) hours as needed., Disp: , Rfl:     estradiol (ESTRACE) 1 MG tablet, TK 1 T PO QD;; pt uses HS, Disp: , Rfl: 3    hydroCHLOROthiazide (HYDRODIURIL) 25 MG tablet, TAKE 1 TABLET(25 MG) BY MOUTH EVERY DAY, Disp: 90 tablet, Rfl: 0    ibuprofen (ADVIL,MOTRIN) 800 MG tablet, TAKE 1 TABLET(800 MG) BY MOUTH TWICE DAILY AS NEEDED FOR PAIN, Disp: 60 tablet, Rfl: 0    lidocaine (LIDODERM) 5 %, Place 1 patch onto the skin daily as needed. Remove  & Discard patch within 12 hours or as directed by MD (Patient taking differently: Place 1 patch onto the skin nightly as needed. Remove & Discard patch within 12 hours or as directed by MD; pt uses on back), Disp: 30 patch, Rfl: 5    loratadine (CLARITIN) 10 mg tablet, Take 10 mg by mouth once daily., Disp: , Rfl:     losartan (COZAAR) 100 MG tablet, Take 1 tablet (100 mg total) by mouth once daily., Disp: 90 tablet, Rfl: 3    meloxicam (MOBIC) 15 MG tablet, Take 1 tablet (15 mg total) by mouth once daily., Disp: 90 tablet, Rfl: 1    meloxicam (MOBIC) 15 MG tablet, TAKE 1 TABLET(15 MG) BY MOUTH EVERY DAY, Disp: 90 tablet, Rfl: 0    naratriptan (AMERGE) 2.5 MG tablet, TK 1 T PO BID FOR 5 DAYS AT THE ONSET OF MIGRAINE FLARE, Disp: , Rfl: 11    naratriptan (AMERGE) 2.5 MG tablet, 2.5 mg PO BID x 5 days at onset of migraine flare, Disp: 9 tablet, Rfl: 11    ondansetron (ZOFRAN) 4 MG tablet, Take 1 tablet (4 mg total) by mouth every 6 (six) hours as needed for Nausea., Disp: 30 tablet, Rfl: 11    pantoprazole (PROTONIX) 40 MG tablet, Take 1 tablet (40 mg total) by mouth every evening., Disp: 90 tablet, Rfl: 1    ranitidine (ZANTAC) 300 MG tablet, Take 1 tablet (300 mg total) by mouth 2 (two) times daily., Disp: 180 tablet, Rfl: 1    sumatriptan (IMITREX) 100 MG tablet, MAY TAKE 1 TABLET THEN FOLLOWUP WITH ANOTHER TABLET IN 2 HOURS IF HEADACHE NOT RESOLVED, Disp: 9 tablet, Rfl: 11    tiZANidine (ZANAFLEX) 2 MG tablet, TAKE 1 TABLET(2 MG) BY MOUTH EVERY NIGHT AS NEEDED, Disp: 90 tablet, Rfl: 0    traMADol (ULTRAM) 50 mg tablet, TAKE 1 TABLET BY MOUTH TWICE DAILY AS NEEDED, Disp: 60 tablet, Rfl: 2    Current Facility-Administered Medications:     onabotulinumtoxina injection 200 Units, 200 Units, Intramuscular, Q90 Days, Neva Cortes MD, 200 Units at 08/16/18 1625    onabotulinumtoxina injection 200 Units, 200 Units, Intramuscular, Q90 Days, Petty Fernandez MD, 200 Units at 01/30/19 0408     onabotulinumtoxina injection 200 Units, 200 Units, Intramuscular, Q90 Days, Neva Cortes MD, 200 Units at 19 1448    Family History: family history was reviewed and is noncontributory  Social History:   Social History     Socioeconomic History    Marital status:      Spouse name: Not on file    Number of children: 1    Years of education: Not on file    Highest education level: Not on file   Occupational History     Employer: [employer]   Social Needs    Financial resource strain: Not on file    Food insecurity:     Worry: Not on file     Inability: Not on file    Transportation needs:     Medical: Not on file     Non-medical: Not on file   Tobacco Use    Smoking status: Former Smoker     Packs/day: 1.00     Years: 5.00     Pack years: 5.00     Start date: 1992     Last attempt to quit: 1997     Years since quittin.7    Smokeless tobacco: Never Used   Substance and Sexual Activity    Alcohol use: Yes     Comment: 3x per year    Drug use: No    Sexual activity: Yes     Partners: Male   Lifestyle    Physical activity:     Days per week: Not on file     Minutes per session: Not on file    Stress: Not on file   Relationships    Social connections:     Talks on phone: Not on file     Gets together: Not on file     Attends [employer] service: Not on file     Active member of club or organization: Not on file     Attends meetings of clubs or organizations: Not on file     Relationship status: Not on file   Other Topics Concern    Not on file   Social History Narrative    ** Merged History Encounter **            ROS:  Constitution: Negative for chills, fever, and sweats. Negative for unexplained weight loss.  Eyes: no redness, no discharge  Ears: no ear pain or tinnitus  Cardiovascular: Negative for chest pain, irregular heartbeat, leg swelling and palpitations.   Respiratory: Negative for cough and shortness of breath.   Gastrointestinal: Negative for abdominal pain,  "nausea and vomiting.   Genitourinary: Negative for bladder incontinence and dysuria.   Neurological: Negative for numbness.   Psychiatric/Behavioral: Negative for behavior changes.   Endocrine: Negative for palpitations.   Hematologic/Lymphatic: Negative for bleeding disorders.  Skin: Negative for pruritis or rash.     PHYSICAL EXAM:  Right Knee Exam     Tenderness   The patient is experiencing tenderness in the lateral joint line and medial joint line.    Range of Motion   Extension: normal   Flexion: normal     Tests   Varus: negative Valgus: negative  Patellar apprehension: negative    Other   Erythema: absent  Sensation: normal  Pulse: present  Swelling: none  Effusion: no effusion present      Left Knee Exam     Tenderness   The patient is experiencing tenderness in the lateral joint line and medial joint line.    Range of Motion   Extension: normal   Flexion: normal     Tests   Varus: negative Valgus: negative  Patellar apprehension: negative    Other   Erythema: absent  Sensation: normal  Pulse: present  Swelling: none  Effusion: no effusion present           Constitutional:  Elizabeth Giordano is a well developed, well nourished female in no acute distress.   Vitals:    09/26/19 1605   BP: 120/76   Pulse: 93   Weight: 102.7 kg (226 lb 6.6 oz)   Height: 5' 4" (1.626 m)   PainSc:   4   PainLoc: Knee     Psychiatric: pleasant,normal mood and affect, behavior is normal  Neurological:  Sensation intact to light touch, normal reflexes. Coordination normal.   Skin: warm, dry, intact  Cardiovascular: capillary refill less than 3 seconds, pulses 2+      IMAGING:   X-ray obtained and personally reviewed with patient. Radiologist report as follows:  X-ray Knee Ortho Right with Flexion  Narrative: EXAMINATION:  XR KNEE ORTHO RIGHT WITH FLEXION    CLINICAL HISTORY:  Pain in right knee    TECHNIQUE:  AP standing as well as PA flexion standing and Merchant views of both knees were performed.  A lateral view of the " right knee is also performed.    COMPARISON:  None.    FINDINGS:  Medial compartment narrowing is noted bilaterally.  The patella appear relatively well positioned within the intercondylar notches.  No acute fracture or dislocation is convincingly noted.  A small suprapatellar joint effusion on the right is noted.  Impression: Medial compartment narrowing bilaterally.    Electronically signed by: Jerzy Diaz MD  Date:    09/19/2019  Time:    16:45           ASSESSMENT:   1. Bilateral primary osteoarthritis of knee  Large Joint Aspiration/Injection: R knee, L knee         PLAN:  The nature of the diagnosis, using models and diagrams when appropriate, was explained to the patient in detail. Euflexxa injection #1 performed Bilateral knee today (see procedure documentation).  She will monitor for signs and symptoms of infection/inflammation.  Instructed to elevate legs and apply ice tonight.  She will return next week for Euflexxa injection #2 or for follow up evaluation as needed.

## 2019-09-26 NOTE — PROCEDURES
Large Joint Aspiration/Injection: R knee, L knee  Date/Time: 9/26/2019 4:00 PM  Performed by: JEFFREY Tellez  Authorized by: JEFFREY Tellez     Consent Done?:  Yes (Verbal)  Indications:  Pain  Timeout: Prior to procedure the correct patient, procedure, and site was verified      Location:  Knee  Site:  R knee and L knee  Prep: Patient was prepped and draped in usual sterile fashion    Needle size:  22 G  Ultrasonic Guidance for needle placement: No  Approach:  Anterolateral  Medications:  20 mg sodium hyaluronate (EUFLEXXA) 10 mg/mL(mw 2.4 -3.6 million); 20 mg sodium hyaluronate (EUFLEXXA) 10 mg/mL(mw 2.4 -3.6 million)  Patient tolerance:  Patient tolerated the procedure well with no immediate complications

## 2019-09-30 RX ORDER — HYDROCHLOROTHIAZIDE 25 MG/1
TABLET ORAL
Qty: 90 TABLET | Refills: 0 | Status: SHIPPED | OUTPATIENT
Start: 2019-09-30 | End: 2020-01-15

## 2019-10-02 ENCOUNTER — OFFICE VISIT (OUTPATIENT)
Dept: PAIN MEDICINE | Facility: CLINIC | Age: 59
End: 2019-10-02
Payer: COMMERCIAL

## 2019-10-02 ENCOUNTER — TELEPHONE (OUTPATIENT)
Dept: NEUROLOGY | Facility: CLINIC | Age: 59
End: 2019-10-02

## 2019-10-02 VITALS
HEART RATE: 84 BPM | RESPIRATION RATE: 18 BRPM | TEMPERATURE: 98 F | BODY MASS INDEX: 38.77 KG/M2 | DIASTOLIC BLOOD PRESSURE: 60 MMHG | SYSTOLIC BLOOD PRESSURE: 134 MMHG | WEIGHT: 225.88 LBS | OXYGEN SATURATION: 97 %

## 2019-10-02 DIAGNOSIS — M50.30 DDD (DEGENERATIVE DISC DISEASE), CERVICAL: ICD-10-CM

## 2019-10-02 DIAGNOSIS — M51.36 DDD (DEGENERATIVE DISC DISEASE), LUMBAR: ICD-10-CM

## 2019-10-02 DIAGNOSIS — M54.12 CERVICAL RADICULOPATHY: Primary | ICD-10-CM

## 2019-10-02 PROCEDURE — 3078F PR MOST RECENT DIASTOLIC BLOOD PRESSURE < 80 MM HG: ICD-10-PCS | Mod: CPTII,S$GLB,, | Performed by: PHYSICIAN ASSISTANT

## 2019-10-02 PROCEDURE — 99214 OFFICE O/P EST MOD 30 MIN: CPT | Mod: S$GLB,,, | Performed by: PHYSICIAN ASSISTANT

## 2019-10-02 PROCEDURE — 3075F PR MOST RECENT SYSTOLIC BLOOD PRESS GE 130-139MM HG: ICD-10-PCS | Mod: CPTII,S$GLB,, | Performed by: PHYSICIAN ASSISTANT

## 2019-10-02 PROCEDURE — 3075F SYST BP GE 130 - 139MM HG: CPT | Mod: CPTII,S$GLB,, | Performed by: PHYSICIAN ASSISTANT

## 2019-10-02 PROCEDURE — 99214 PR OFFICE/OUTPT VISIT, EST, LEVL IV, 30-39 MIN: ICD-10-PCS | Mod: S$GLB,,, | Performed by: PHYSICIAN ASSISTANT

## 2019-10-02 PROCEDURE — 3008F PR BODY MASS INDEX (BMI) DOCUMENTED: ICD-10-PCS | Mod: CPTII,S$GLB,, | Performed by: PHYSICIAN ASSISTANT

## 2019-10-02 PROCEDURE — 3078F DIAST BP <80 MM HG: CPT | Mod: CPTII,S$GLB,, | Performed by: PHYSICIAN ASSISTANT

## 2019-10-02 PROCEDURE — 99999 PR PBB SHADOW E&M-EST. PATIENT-LVL V: ICD-10-PCS | Mod: PBBFAC,,, | Performed by: PHYSICIAN ASSISTANT

## 2019-10-02 PROCEDURE — 3008F BODY MASS INDEX DOCD: CPT | Mod: CPTII,S$GLB,, | Performed by: PHYSICIAN ASSISTANT

## 2019-10-02 PROCEDURE — 99999 PR PBB SHADOW E&M-EST. PATIENT-LVL V: CPT | Mod: PBBFAC,,, | Performed by: PHYSICIAN ASSISTANT

## 2019-10-02 RX ORDER — DIAZEPAM 5 MG/1
5 TABLET ORAL
Qty: 1 TABLET | Refills: 0 | Status: SHIPPED | OUTPATIENT
Start: 2019-10-02 | End: 2019-10-22 | Stop reason: CLARIF

## 2019-10-02 RX ORDER — SODIUM CHLORIDE, SODIUM LACTATE, POTASSIUM CHLORIDE, CALCIUM CHLORIDE 600; 310; 30; 20 MG/100ML; MG/100ML; MG/100ML; MG/100ML
INJECTION, SOLUTION INTRAVENOUS CONTINUOUS
Status: CANCELLED | OUTPATIENT
Start: 2019-10-15

## 2019-10-02 NOTE — TELEPHONE ENCOUNTER
Called patient and informed her we have to change the appointment type of her appointment she made through the portal. Informed her botox appointments cant be made online due to insurance reasons. Informed her she could still come for the same date and time but they system might alert her about an appointment change. Patient expressed understanding.

## 2019-10-03 ENCOUNTER — OFFICE VISIT (OUTPATIENT)
Dept: ORTHOPEDICS | Facility: CLINIC | Age: 59
End: 2019-10-03
Payer: COMMERCIAL

## 2019-10-03 ENCOUNTER — OFFICE VISIT (OUTPATIENT)
Dept: FAMILY MEDICINE | Facility: CLINIC | Age: 59
End: 2019-10-03
Payer: COMMERCIAL

## 2019-10-03 VITALS
DIASTOLIC BLOOD PRESSURE: 70 MMHG | HEIGHT: 64 IN | BODY MASS INDEX: 38.24 KG/M2 | WEIGHT: 224 LBS | OXYGEN SATURATION: 97 % | SYSTOLIC BLOOD PRESSURE: 126 MMHG | HEART RATE: 88 BPM

## 2019-10-03 VITALS
HEIGHT: 64 IN | BODY MASS INDEX: 38.24 KG/M2 | DIASTOLIC BLOOD PRESSURE: 70 MMHG | SYSTOLIC BLOOD PRESSURE: 126 MMHG | HEART RATE: 88 BPM | WEIGHT: 224 LBS

## 2019-10-03 DIAGNOSIS — L03.011 PARONYCHIA OF FINGER OF RIGHT HAND: Primary | ICD-10-CM

## 2019-10-03 DIAGNOSIS — F51.01 PRIMARY INSOMNIA: ICD-10-CM

## 2019-10-03 DIAGNOSIS — M17.0 BILATERAL PRIMARY OSTEOARTHRITIS OF KNEE: Primary | ICD-10-CM

## 2019-10-03 DIAGNOSIS — R11.0 NAUSEA: ICD-10-CM

## 2019-10-03 PROCEDURE — 99999 PR PBB SHADOW E&M-EST. PATIENT-LVL III: ICD-10-PCS | Mod: PBBFAC,,, | Performed by: FAMILY MEDICINE

## 2019-10-03 PROCEDURE — 90471 IMMUNIZATION ADMIN: CPT | Mod: 59,S$GLB,, | Performed by: FAMILY MEDICINE

## 2019-10-03 PROCEDURE — 99214 PR OFFICE/OUTPT VISIT, EST, LEVL IV, 30-39 MIN: ICD-10-PCS | Mod: 25,S$GLB,, | Performed by: FAMILY MEDICINE

## 2019-10-03 PROCEDURE — 3008F BODY MASS INDEX DOCD: CPT | Mod: CPTII,S$GLB,, | Performed by: FAMILY MEDICINE

## 2019-10-03 PROCEDURE — 3008F PR BODY MASS INDEX (BMI) DOCUMENTED: ICD-10-PCS | Mod: CPTII,S$GLB,, | Performed by: FAMILY MEDICINE

## 2019-10-03 PROCEDURE — 20610 LARGE JOINT ASPIRATION/INJECTION: R KNEE, L KNEE: ICD-10-PCS | Mod: 50,S$GLB,, | Performed by: NURSE PRACTITIONER

## 2019-10-03 PROCEDURE — 99214 OFFICE O/P EST MOD 30 MIN: CPT | Mod: 25,S$GLB,, | Performed by: FAMILY MEDICINE

## 2019-10-03 PROCEDURE — 96372 THER/PROPH/DIAG INJ SC/IM: CPT | Mod: 59,S$GLB,, | Performed by: FAMILY MEDICINE

## 2019-10-03 PROCEDURE — 99499 NO LOS: ICD-10-PCS | Mod: S$GLB,,, | Performed by: NURSE PRACTITIONER

## 2019-10-03 PROCEDURE — 20610 DRAIN/INJ JOINT/BURSA W/O US: CPT | Mod: 50,S$GLB,, | Performed by: NURSE PRACTITIONER

## 2019-10-03 PROCEDURE — 10160 PNXR ASPIR ABSC HMTMA BULLA: CPT | Mod: S$GLB,,, | Performed by: FAMILY MEDICINE

## 2019-10-03 PROCEDURE — 96372 PR INJECTION,THERAP/PROPH/DIAG2ST, IM OR SUBCUT: ICD-10-PCS | Mod: 59,S$GLB,, | Performed by: FAMILY MEDICINE

## 2019-10-03 PROCEDURE — 3078F DIAST BP <80 MM HG: CPT | Mod: CPTII,S$GLB,, | Performed by: FAMILY MEDICINE

## 2019-10-03 PROCEDURE — 99999 PR PBB SHADOW E&M-EST. PATIENT-LVL III: CPT | Mod: PBBFAC,,, | Performed by: NURSE PRACTITIONER

## 2019-10-03 PROCEDURE — 90471 PR IMMUNIZ ADMIN,1 SINGLE/COMB VAC/TOXOID: ICD-10-PCS | Mod: 59,S$GLB,, | Performed by: FAMILY MEDICINE

## 2019-10-03 PROCEDURE — 3078F PR MOST RECENT DIASTOLIC BLOOD PRESSURE < 80 MM HG: ICD-10-PCS | Mod: CPTII,S$GLB,, | Performed by: FAMILY MEDICINE

## 2019-10-03 PROCEDURE — 10160 PR PUNCTURE DRAINAGE, LESION: ICD-10-PCS | Mod: S$GLB,,, | Performed by: FAMILY MEDICINE

## 2019-10-03 PROCEDURE — 99999 PR PBB SHADOW E&M-EST. PATIENT-LVL III: CPT | Mod: PBBFAC,,, | Performed by: FAMILY MEDICINE

## 2019-10-03 PROCEDURE — 3074F PR MOST RECENT SYSTOLIC BLOOD PRESSURE < 130 MM HG: ICD-10-PCS | Mod: CPTII,S$GLB,, | Performed by: FAMILY MEDICINE

## 2019-10-03 PROCEDURE — 3074F SYST BP LT 130 MM HG: CPT | Mod: CPTII,S$GLB,, | Performed by: FAMILY MEDICINE

## 2019-10-03 PROCEDURE — 99499 UNLISTED E&M SERVICE: CPT | Mod: S$GLB,,, | Performed by: NURSE PRACTITIONER

## 2019-10-03 PROCEDURE — 90686 IIV4 VACC NO PRSV 0.5 ML IM: CPT | Mod: S$GLB,,, | Performed by: FAMILY MEDICINE

## 2019-10-03 PROCEDURE — 90686 FLU VACCINE (QUAD) GREATER THAN OR EQUAL TO 3YO PRESERVATIVE FREE IM: ICD-10-PCS | Mod: S$GLB,,, | Performed by: FAMILY MEDICINE

## 2019-10-03 PROCEDURE — 99999 PR PBB SHADOW E&M-EST. PATIENT-LVL III: ICD-10-PCS | Mod: PBBFAC,,, | Performed by: NURSE PRACTITIONER

## 2019-10-03 RX ORDER — PROMETHAZINE HYDROCHLORIDE 25 MG/ML
25 INJECTION, SOLUTION INTRAMUSCULAR; INTRAVENOUS
Status: COMPLETED | OUTPATIENT
Start: 2019-10-03 | End: 2019-10-03

## 2019-10-03 RX ORDER — ESZOPICLONE 2 MG/1
2 TABLET, FILM COATED ORAL NIGHTLY
Qty: 30 TABLET | Refills: 2 | Status: SHIPPED | OUTPATIENT
Start: 2019-10-03 | End: 2019-11-02

## 2019-10-03 RX ADMIN — PROMETHAZINE HYDROCHLORIDE 25 MG: 25 INJECTION, SOLUTION INTRAMUSCULAR; INTRAVENOUS at 09:10

## 2019-10-03 NOTE — PROGRESS NOTES
CHIEF COMPLAINT/REASON FOR VISIT: low back pain, neck pain    History of present illness: The patient is a 58 year old woman with a history of migraines, carpal tunnel syndrome and low back pain since her early 20s.  She is status post L5/S1 interlaminar epidural steroid injection on 09/04/2019 with almost 100% relief of her right leg pain but 0% relief for back pain.  She continues to have severe pain across the low back that is worse with standing and walking and improved with sitting.  She also reports a return of her right-sided neck pain radiating to the right arm.  She reports 2 episodes of numbness and weakness in her right arm last week but otherwise the symptoms are only pain. She does report having right leg weakness.  She denies bladder or bowel incontinence.    Pain intervention history: She tried Neurontin for her leg/thigh pain with no success. She only takes Advil with about 30% relief. She is status post TFESI bilaterally to L3 on 12/6/2011 with no relief. Past history of status post L4/5 YARON on 5/25/11 with about 90% relief that lasted 3 weeks. She is status post 2 bilateral L3 transforaminal injections with 3 weeks relief each time. Her current pain medications include Celexa 40 mg once a day, Aleve 220 mg twice a day, Lyrica as previously stated, Zanaflex 5 mg at night. She uses her TENS at home, when she thinks about it.  She is status post L4/5 interlaminar epidural steroid injection on 1/8/14 with 90% relief.  She is status post L5/S1 interlaminar epidural steroid injection on 6/9/14 with moderate relief only on the right low back and right leg lasting 2 weeks.  She is status post bilateral L3/4 and L4/5 facet joint injections on 4/13/15 with initially 100% relief of her back pain and 80% relief of her left lateral hip and lateral thigh pain, now reporting at least 50% relief of both.  She is status post bilateral L2, 3 and 4 medial branch radiofrequency ablation on 5/20/15 with 60% relief.    She is status post bilateral L2, 3 and 4 medial branch radiofrequency ablation on 6/20/16 with 50-70% relief.  She is status post L5/S1 interlaminar epidural steroid injection on 3/10/17 with 50% relief.  She is status post bilateral L2, 3, 4 medial branch radiofrequency ablation on 7/17/17 with about 75% relief.  She is status post bilateral L2, 3, 4 medial branch radiofrequency ablation on 07/17/2018 with 100% relief of her leg pain and 70-75% relief of her back pain.  She is status post C7-T1 cervical interlaminar epidural steroid injection on 09/25/2018 with 80% relief.   She is status post L5/S1 interlaminar epidural steroid injection on 12/27/2018 with 60% relief.  She is status post bilateral L2, 3 and 4 medial branch radiofrequency ablation on 07/29/2019 with 40% relief.  She is status post L5/S1 interlaminar epidural steroid injection on 09/04/2019 with almost 100% relief of her right leg pain but 0% relief for back pain.     ROS: She reports back pain only.  Balance of review of systems is negative.    Medical, surgical, family and social history reviewed elsewhere in record.     Medications/Allergies: See med card      Vitals:    10/02/19 0830   BP: 134/60   Pulse: 84   Resp: 18   Temp: 98 °F (36.7 °C)   TempSrc: Oral   SpO2: 97%   Weight: 102.4 kg (225 lb 13.8 oz)   PainSc:   4   PainLoc: Back        PHYSICAL EXAM:  Gen: A and O x3, pleasant, well-groomed  HEENT: PERRLA  CVS: Regular rate and rhythm, normal S1 and S2, no murmurs.  Resp: Clear to auscultation bilaterally, no wheezes or rales.  Musculoskeletal: Able to heel walk, toe walk. No antalgic gait.     Neuro:  Upper extremities: 5/5 strength bilaterally   Lower extremities: 5/5 strength bilaterally  Reflexes: Brachioradialis 2+, Bicep 2+, Tricep 2+. Patellar 2+, Achilles 2+.  Sensory: Intact and symmetrical to light touch and pinprick in C2-T1 dermatomes bilaterally. Intact and symmetrical to light touch and pinprick in L2-S1 dermatomes  bilaterally.    Lumbar spine:  Lumbar spine: ROM is moderately limited with flexion and extension with increased bilateral low back pain during extension and oblique extension.  Supine straight leg raise causes increased right lateral thigh pain.  Internal and external rotation of the hip causes no increased pain in either side.  Myofascial exam:  Mild tenderness to palpation to the right greater than left lumbar paraspinous muscles.         IMAGING:   MRI L-SPINE 5/10/11   IMPRESSION: AT L3-4, THERE IS CIRCUMFERENTIAL DISC PROTRUSION FOCALLY MORE PROMINENT TO THE LEFT AS WELL AS A SMALL DISC HERNIATION PROTRUDING INFERIORLY BEHIND L4 TO THE LEFT OF MIDLINE. THIS PRODUCES SPINAL CANAL AND BILATERAL NEURAL FORAMINAL STENOSIS, LEFT GREATER THAN RIGHT. AT L4-5, THERE IS CIRCUMFERENTIAL DISC PROTRUSION FOCALLY MORE PROMINENT TO THE RIGHT WITH MILD SPINAL CANAL AND RIGHT NEURAL FORAMINAL STENOSIS. AT L1-2 AND L2-3 ALTHOUGH THERE ARE DISC PROTRUSIONS IDENTIFIED, SIGNIFICANT STENOSIS IS NOT SEEN.     MRI lumbar spine 7/10/14  L1-2:There is chronic relatively advanced disc degeneration with disc space narrowing, disc dehydration, disc narrowing, endplate osteophytes, and degenerative vertebral endplate marrow change. There is a diffuse 2-mm posterior disc bulge with a superimposed4-mm right posterior disc extrusion causing mild right foraminal stenosis. There is no impingement of the right L1 nerve root and there has been no change.  L2-3: There is severe and chronic disc degeneration with severe disc space narrowing, disc dehydration, degenerative vertebral endplate marrow change, and vertebral endplate osteophytes. There is a diffuse 2-mm posterior disc bulge with a superimposed 4-mm right posterior disc extrusion causing mild right foraminal stenosis. There is no impingement of the right L2 nerve root and there has been no significant change.  L3-4: There is severe disc degeneration which has progressed since the prior  study. There is severe disc space narrowing, disc dehydration, degenerative vertebral endplate marrow change and endplate osteophytes. There is also mild posterior subluxation of L3over a distance of 4 mm. There is a broad-based 5-mm posterior disc extrusion. There is degenerative facet arthrosis with ligamentum flavum thickening. The combination of facet arthrosis and disc extrusion results in relatively severe spinal canal stenosis with compression of the thecal sac to an AP diameter of 5 mm, moderate right foraminal stenosis, and severe left foraminal stenosis. The spinal stenosis has also progressed since the prior study.  L4-5:There is a diffuse posterior disc bulge with a superimposed 3-mm left posterior paracentral disc protrusion causing left paracentral thecal sac compression. There is moderate left and mild right foraminal stenosis and there has been no significant change. There is mild degenerative facet arthrosis with ligamentum flavum thickening and small joint effusions.  L5-S1:There is no significant compromise of the spinal canal or foramina and there has been no change.    X-ray cervical spine 6/8/15  There is loss of normal cervical lordosis which may be positional or related muscular strain. Intervertebral disk height loss is noted at the C5-C6 C6-C7 levels and to a lesser degree C4-C5 and C7-T1 levels. Vertebral body alignment appears otherwise adequate. Vertebral body heights appear well-preserved. The atlas and odontoid appear in good relationship to each other. Osseous neuroforaminal narrowing is noted at the C3-C4 C4-C5 C5-C6 levels on the left and at the C4-C5 C5-C6 and C6-C7 levels on the right     07/10/2018 MRI cervical spine [facility] report  C2-3 broad-based signal asymmetry at the left subarticular and foraminal zone reflecting implant spondylosis and disc bulge complex, mild to moderate left asymmetric foraminal narrowing, ectasia of the left vertebral artery, contralateral  right asymmetric facet hypertrophy, right foramen widely narrowed, disc partially desiccated without collapse  C3-4 moderate to severe left greater than right foraminal narrowing secondary to uncinate joint hypertrophic signal alteration, disc partially desiccated  C4-5 endplate spondylosis moderate diffuse disc bulge noted, broad-based right paracentral disc herniation, asymmetric flattening of the ventral cord surface at the right paracentral zone, high-grade if lateral right foraminal narrowing, AP diameter of canal 9.9 mm, contralaterally, high grade left foraminal narrowing secondary to facet greater than uncinate joint hypertrophic signal alteration, disc desiccated and mildly narrowed  C5-6 disc space narrowing evident with generalized in Nigel spondylosis and concentric inter pose disc bulge complex, high-grade bilateral foraminal narrowing, flattening of the cord surface, AP diameter 7.9 mm, symmetric facet arthrosis, disc diffusely desiccated and narrowed  C6-7 moderate endplate spondylosis and concentric disc bulge complex, high-grade bilateral foraminal compromise, flattening of the anterior cord surface, AP diameter 8.8 mm, facet arthrosis symmetric, disc desiccated and narrowed  C7-T1 less than 2 mm depth disc bulge    11/06/2018 MRI lumbar spine  T12/L1: There is no evidence of disc protrusion, canal or foraminal stenosis.  L1/L2: Right posterolateral disc protrusion is evident and there is mild distortion of the right anterolateral canal.  Degenerative facet changes are noted bilaterally.  The left foramen is intact.  L2/L3: There is annular disc bulging with a superimposed right posterolateral disc extrusion.  This is slightly more pronounced on the previous examination and there is mild effacement of the anterior thecal sac and moderate narrowing the right foramen.  L3/L4: There is annular disc bulge and osteophyte formation with a superimposed small left posterolateral disc extrusion.  There is  moderate narrowing the central canal and left foramen.  This is little changed relative the previous examination.  Degenerative facet changes are noted.  L5/S1: There is a small central disc extrusion superimposed upon annular disc bulging.  This is slightly less pronounced than was seen previously.  Degenerative facet changes are noted.  L5/S1: Moderate degenerative facet changes are noted and there is mild effacement of the anterior thecal sac.  There is ligamentous hypertrophy particularly to the left in the posterior canal and there is left greater than right foraminal narrowing.  This is mildly worsened relative prior exam.      ASSESSMENT:  The patient is a 58 year old woman with a history of migraines, carpal tunnel syndrome and low back pain since her early 20s who returns in follow up.   1. Cervical radiculopathy  Vital signs    Place 18-22 gauage peripheral IV     Verify informed consent    Notify physician     Notify physician     Notify physician (specify)    Diet NPO    Case Request Operating Room: Injection-steroid-epidural-cervical    Place in Outpatient    lactated ringers infusion   2. DDD (degenerative disc disease), cervical     3. DDD (degenerative disc disease), lumbar  MRI Lumbar Spine Without Contrast    Ambulatory referral to Neurosurgery       Plan:  1.  Since she is still having severe back pain, I am going to update her lumbar spine MRI and have her see Neurosurgery for further evaluation.  2.  I will schedule a C7-T1 interlaminar epidural steroid injection for her neck and right arm pain.  She has done well with this in the past.    Greater than 50% of this 25 min visit was spent counseling the patient.

## 2019-10-03 NOTE — PROGRESS NOTES
DATE: 10/3/2019  PATIENT: Elizabeth Giordano  REFERRING MD:   CHIEF COMPLAINT:   Chief Complaint   Patient presents with    Right Knee - Injections       HISTORY:  Elizabeth Giordano is a 58 y.o. female  who presents for initial evaluation of her right knee pain.  She is diagnosed with osteoarthritis. She underwent toradol injection last week without complication. She does note improvement in pain since receiving an injection last week.  She presents today for Euflexxa injection # 2.  Her pain rating today is 2/10.    PAST MEDICAL/SURGICAL HISTORY:  Past Medical History:   Diagnosis Date    Allergy     Arthritis of spine 2011    Chronic low back pain     Class 2 obesity with body mass index (BMI) of 39.0 to 39.9 in adult 1/11/2019    Coronary artery disease     DDD (degenerative disc disease), lumbar     Diverticulitis     Diverticulosis     DJD (degenerative joint disease)     Encounter for blood transfusion     Factor V Leiden     GERD (gastroesophageal reflux disease)     Hiatal hernia     Migraines     PONV (postoperative nausea and vomiting)     geta     Past Surgical History:   Procedure Laterality Date    APPENDECTOMY  1981    CARPAL TUNNEL RELEASE  2011    right    COLONOSCOPY  2014    reduntant colon, otherwise normal findings repeat in 8-10 years for surveillance    Epidural Steroid Injection      Pain Management    EPIDURAL STEROID INJECTION INTO CERVICAL SPINE N/A 9/25/2018    Procedure: Injection-steroid-epidural-cervical;  Surgeon: Oswald Abdalla MD;  Location: [facility] OR;  Service: Pain Management;  Laterality: N/A;    EPIDURAL STEROID INJECTION INTO LUMBAR SPINE N/A 12/27/2018    Procedure: Injection-steroid-epidural-lumbar L5/S1;  Surgeon: Oswald Abdalla MD;  Location: [facility] OR;  Service: Pain Management;  Laterality: N/A;    EPIDURAL STEROID INJECTION INTO LUMBAR SPINE N/A 9/4/2019    Procedure: Injection-steroid-epidural-lumbar;  Surgeon: Oswald VIEYRA  MD Rm;  Location: [facility] OR;  Service: Pain Management;  Laterality: N/A;  L5/S1 to right    ESOPHAGOGASTRODUODENOSCOPY      Facet injection      Pain Management    HYSTERECTOMY      ovaries removed    KIDNEY SURGERY Right 1967    to correct urinary reflux into kidney    LEFT HEART CATHETERIZATION Left 5/22/2019    Procedure: Left heart cath;  Surgeon: Casa Perdue MD;  Location: [facility] CATH;  Service: Cardiology;  Laterality: Left;    OVARIAN CYST REMOVAL Right 1981    RADIOFREQUENCY ABLATION OF LUMBAR MEDIAL BRANCH NERVE AT SINGLE LEVEL Bilateral 7/17/2018    Procedure: RADIOFREQUENCY ABLATION, NERVE, MEDIAL BRANCH, LUMBAR, L2,3,4;  Surgeon: Oswald Abdalla MD;  Location: [facility] OR;  Service: Pain Management;  Laterality: Bilateral;    RADIOFREQUENCY ABLATION OF LUMBAR MEDIAL BRANCH NERVE AT SINGLE LEVEL Bilateral 7/29/2019    Procedure: Radiofrequency Ablation, Nerve, Spinal, Lumbar, Medial Branch, L2,3,4;  Surgeon: Oswald Abdalla MD;  Location: [facility] OR;  Service: Pain Management;  Laterality: Bilateral;    SHOULDER SURGERY  2010    rotator cuff, right       Current Medications:   Current Outpatient Medications:     aspirin (ECOTRIN) 81 MG EC tablet, Take 81 mg by mouth once daily., Disp: , Rfl:     azelastine (ASTELIN) 137 mcg (0.1 %) nasal spray, USE 1 SPRAY(137 MCG) IN EACH NOSTRIL TWICE DAILY, Disp: 30 mL, Rfl: 0    diazePAM (VALIUM) 5 MG tablet, Take 1 tablet (5 mg total) by mouth On call Procedure for Anxiety. Take 30 minutes prior to mri, Disp: 1 tablet, Rfl: 0    diphenhydrAMINE (BENADRYL) 25 mg capsule, Take 25 mg by mouth every 6 (six) hours as needed., Disp: , Rfl:     estradiol (ESTRACE) 1 MG tablet, TK 1 T PO QD;; pt uses HS, Disp: , Rfl: 3    eszopiclone (LUNESTA) 2 MG Tab, Take 1 tablet (2 mg total) by mouth every evening., Disp: 30 tablet, Rfl: 2    hydroCHLOROthiazide (HYDRODIURIL) 25 MG tablet, TAKE 1 TABLET(25 MG) BY MOUTH EVERY DAY, Disp: 90 tablet, Rfl:  0    ibuprofen (ADVIL,MOTRIN) 800 MG tablet, TAKE 1 TABLET(800 MG) BY MOUTH TWICE DAILY AS NEEDED FOR PAIN, Disp: 60 tablet, Rfl: 0    lidocaine (LIDODERM) 5 %, Place 1 patch onto the skin daily as needed. Remove & Discard patch within 12 hours or as directed by MD (Patient taking differently: Place 1 patch onto the skin nightly as needed. Remove & Discard patch within 12 hours or as directed by MD; pt uses on back), Disp: 30 patch, Rfl: 5    loratadine (CLARITIN) 10 mg tablet, Take 10 mg by mouth once daily., Disp: , Rfl:     losartan (COZAAR) 100 MG tablet, Take 1 tablet (100 mg total) by mouth once daily., Disp: 90 tablet, Rfl: 3    meloxicam (MOBIC) 15 MG tablet, Take 1 tablet (15 mg total) by mouth once daily., Disp: 90 tablet, Rfl: 1    meloxicam (MOBIC) 15 MG tablet, TAKE 1 TABLET(15 MG) BY MOUTH EVERY DAY, Disp: 90 tablet, Rfl: 0    naratriptan (AMERGE) 2.5 MG tablet, TK 1 T PO BID FOR 5 DAYS AT THE ONSET OF MIGRAINE FLARE, Disp: , Rfl: 11    naratriptan (AMERGE) 2.5 MG tablet, 2.5 mg PO BID x 5 days at onset of migraine flare, Disp: 9 tablet, Rfl: 11    ondansetron (ZOFRAN) 4 MG tablet, Take 1 tablet (4 mg total) by mouth every 6 (six) hours as needed for Nausea., Disp: 30 tablet, Rfl: 11    pantoprazole (PROTONIX) 40 MG tablet, Take 1 tablet (40 mg total) by mouth every evening., Disp: 90 tablet, Rfl: 1    ranitidine (ZANTAC) 300 MG tablet, Take 1 tablet (300 mg total) by mouth 2 (two) times daily., Disp: 180 tablet, Rfl: 1    sumatriptan (IMITREX) 100 MG tablet, MAY TAKE 1 TABLET THEN FOLLOWUP WITH ANOTHER TABLET IN 2 HOURS IF HEADACHE NOT RESOLVED, Disp: 9 tablet, Rfl: 11    tiZANidine (ZANAFLEX) 2 MG tablet, TAKE 1 TABLET(2 MG) BY MOUTH EVERY NIGHT AS NEEDED, Disp: 90 tablet, Rfl: 0    traMADol (ULTRAM) 50 mg tablet, TAKE 1 TABLET BY MOUTH TWICE DAILY AS NEEDED, Disp: 60 tablet, Rfl: 2    Current Facility-Administered Medications:     onabotulinumtoxina injection 200 Units, 200 Units,  Intramuscular, Q90 Days, Neva Cortes MD, 200 Units at 18 1625    onabotulinumtoxina injection 200 Units, 200 Units, Intramuscular, Q90 Days, Petty Fernandez MD, 200 Units at 19 1359    onabotulinumtoxina injection 200 Units, 200 Units, Intramuscular, Q90 Days, Neva Cortes MD, 200 Units at 19 1448    Family History: family history was reviewed and is noncontributory  Social History:   Social History     Socioeconomic History    Marital status:      Spouse name: Not on file    Number of children: 1    Years of education: Not on file    Highest education level: Not on file   Occupational History     Employer: [employer]   Social Needs    Financial resource strain: Not on file    Food insecurity:     Worry: Not on file     Inability: Not on file    Transportation needs:     Medical: Not on file     Non-medical: Not on file   Tobacco Use    Smoking status: Former Smoker     Packs/day: 1.00     Years: 5.00     Pack years: 5.00     Start date: 1992     Last attempt to quit: 1997     Years since quittin.7    Smokeless tobacco: Never Used   Substance and Sexual Activity    Alcohol use: Yes     Comment: 3x per year    Drug use: No    Sexual activity: Yes     Partners: Male   Lifestyle    Physical activity:     Days per week: Not on file     Minutes per session: Not on file    Stress: Not on file   Relationships    Social connections:     Talks on phone: Not on file     Gets together: Not on file     Attends Cheondoism service: Not on file     Active member of club or organization: Not on file     Attends meetings of clubs or organizations: Not on file     Relationship status: Not on file   Other Topics Concern    Not on file   Social History Narrative    ** Merged History Encounter **            ROS:  Constitution: Negative for chills, fever, and sweats. Negative for unexplained weight loss.  Eyes: no redness, no discharge  Ears: no ear pain or  "tinnitus  Cardiovascular: Negative for chest pain, irregular heartbeat, leg swelling and palpitations.   Respiratory: Negative for cough and shortness of breath.   Gastrointestinal: Negative for abdominal pain, nausea and vomiting.   Genitourinary: Negative for bladder incontinence and dysuria.   Neurological: Negative for numbness.   Psychiatric/Behavioral: Negative for behavior changes.   Endocrine: Negative for palpitations.   Hematologic/Lymphatic: Negative for bleeding disorders.  Skin: Negative for pruritis or rash.     PHYSICAL EXAM:  Right Knee Exam     Tenderness   The patient is experiencing tenderness in the lateral joint line and medial joint line.    Range of Motion   Extension: normal   Flexion: normal     Tests   Varus: negative Valgus: negative  Patellar apprehension: negative    Other   Erythema: absent  Sensation: normal  Pulse: present  Swelling: none  Effusion: no effusion present      Left Knee Exam     Tenderness   The patient is experiencing tenderness in the lateral joint line and medial joint line.    Range of Motion   Extension: normal   Flexion: normal     Tests   Varus: negative Valgus: negative  Patellar apprehension: negative    Other   Erythema: absent  Sensation: normal  Pulse: present  Swelling: none  Effusion: no effusion present           Constitutional:  Elizabeth Giordano is a well developed, well nourished female in no acute distress.   Vitals:    10/03/19 1049   BP: 126/70   Pulse: 88   Weight: 101.6 kg (223 lb 15.8 oz)   Height: 5' 4" (1.626 m)   PainSc:   2   PainLoc: Knee     Psychiatric: pleasant,normal mood and affect, behavior is normal  Neurological:  Sensation intact to light touch, normal reflexes. Coordination normal.   Skin: warm, dry, intact  Cardiovascular: capillary refill less than 3 seconds, pulses 2+      IMAGING:   X-ray obtained and personally reviewed with patient. Radiologist report as follows:  X-ray Knee Ortho Right with Flexion  Narrative: " EXAMINATION:  XR KNEE ORTHO RIGHT WITH FLEXION    CLINICAL HISTORY:  Pain in right knee    TECHNIQUE:  AP standing as well as PA flexion standing and Merchant views of both knees were performed.  A lateral view of the right knee is also performed.    COMPARISON:  None.    FINDINGS:  Medial compartment narrowing is noted bilaterally.  The patella appear relatively well positioned within the intercondylar notches.  No acute fracture or dislocation is convincingly noted.  A small suprapatellar joint effusion on the right is noted.  Impression: Medial compartment narrowing bilaterally.    Electronically signed by: Jerzy Diaz MD  Date:    09/19/2019  Time:    16:45           ASSESSMENT:   1. Bilateral primary osteoarthritis of knee           PLAN:  The nature of the diagnosis, using models and diagrams when appropriate, was explained to the patient in detail. Euflexxa injection #2 performed Bilateral knee today (see procedure documentation).  She will monitor for signs and symptoms of infection/inflammation.  Instructed to elevate legs and apply ice tonight.  She will return next week for Euflexxa injection #3 or for follow up evaluation as needed.

## 2019-10-03 NOTE — PROCEDURES
Large Joint Aspiration/Injection: R knee, L knee  Date/Time: 10/3/2019 4:00 PM  Performed by: JEFFREY Tellez  Authorized by: JEFFREY Tellez     Consent Done?:  Yes (Verbal)  Indications:  Pain  Timeout: Prior to procedure the correct patient, procedure, and site was verified      Location:  Knee  Site:  R knee and L knee  Prep: Patient was prepped and draped in usual sterile fashion    Needle size:  22 G  Ultrasonic Guidance for needle placement: No  Approach:  Anterolateral  Medications:  20 mg sodium hyaluronate (EUFLEXXA) 10 mg/mL(mw 2.4 -3.6 million); 20 mg sodium hyaluronate (EUFLEXXA) 10 mg/mL(mw 2.4 -3.6 million)  Patient tolerance:  Patient tolerated the procedure well with no immediate complications

## 2019-10-03 NOTE — PROGRESS NOTES
"Subjective:       Patient ID: Elizabeth Giordano is a 58 y.o. female.    Chief Complaint: Follow-up (pt c/o feeling nausea)    Pt is known to me.  The pt reports that she awoke today feeling nauseated.  She took a Zofran with no relief.  She is missing work today.  She also has an infection of her right 4th finger.  The pt is having difficulty sleeping--she tried her 's Lunesta and slept well.    Review of Systems   Constitutional: Negative for activity change, appetite change, fatigue and unexpected weight change.   Eyes: Negative for visual disturbance.   Respiratory: Negative for cough, chest tightness and shortness of breath.    Cardiovascular: Negative for chest pain, palpitations and leg swelling.   Gastrointestinal: Positive for nausea. Negative for abdominal pain, constipation, diarrhea and vomiting.   Endocrine: Negative for cold intolerance, heat intolerance and polyuria.   Genitourinary: Negative for decreased urine volume and dysuria.   Musculoskeletal: Negative for arthralgias and back pain.   Skin: Negative for rash.   Neurological: Negative for dizziness, numbness and headaches.   Psychiatric/Behavioral: Positive for sleep disturbance.       Objective:       Vitals:    10/03/19 0948   BP: 126/70   BP Location: Right arm   Patient Position: Sitting   BP Method: Large (Manual)   Pulse: 88   SpO2: 97%   Weight: 101.6 kg (223 lb 15.8 oz)   Height: 5' 4" (1.626 m)     Physical Exam   Constitutional: She is oriented to person, place, and time. She appears well-developed and well-nourished.   HENT:   Head: Normocephalic.   Moist MM's   Eyes: Pupils are equal, round, and reactive to light. Conjunctivae and EOM are normal.   Neck: Normal range of motion. Neck supple. No thyromegaly present.   Cardiovascular: Normal rate, regular rhythm and normal heart sounds.   Pulmonary/Chest: Effort normal and breath sounds normal.   Abdominal: Soft. Bowel sounds are normal. There is no tenderness. "   Musculoskeletal: Normal range of motion. She exhibits no tenderness or deformity.   Lymphadenopathy:     She has no cervical adenopathy.   Neurological: She is alert and oriented to person, place, and time. She displays normal reflexes. No cranial nerve deficit. She exhibits normal muscle tone. Coordination normal.   Skin: Skin is warm and dry.   Pocket of pus lateral edge of nail on right 4th finger   Psychiatric: She has a normal mood and affect. Her behavior is normal.       Assessment:       1. Paronychia of finger of right hand    2. Nausea    3. Primary insomnia        Plan:       Elizabeth was seen today for follow-up.    Diagnoses and all orders for this visit:    Paronychia of finger of right hand    Nausea  -     promethazine injection 25 mg    Primary insomnia  -     eszopiclone (LUNESTA) 2 MG Tab; Take 1 tablet (2 mg total) by mouth every evening.      During this visit, I reviewed the pt's history, medications, allergies, and problem list.    Using sterile technique, the paronychia was drained using a 22 gauge needle--expulsion of thick green pus--pt tolerated it well and is to do warm water soaks of the finger.

## 2019-10-03 NOTE — H&P (VIEW-ONLY)
CHIEF COMPLAINT/REASON FOR VISIT: low back pain, neck pain    History of present illness: The patient is a 58 year old woman with a history of migraines, carpal tunnel syndrome and low back pain since her early 20s.  She is status post L5/S1 interlaminar epidural steroid injection on 09/04/2019 with almost 100% relief of her right leg pain but 0% relief for back pain.  She continues to have severe pain across the low back that is worse with standing and walking and improved with sitting.  She also reports a return of her right-sided neck pain radiating to the right arm.  She reports 2 episodes of numbness and weakness in her right arm last week but otherwise the symptoms are only pain. She does report having right leg weakness.  She denies bladder or bowel incontinence.    Pain intervention history: She tried Neurontin for her leg/thigh pain with no success. She only takes Advil with about 30% relief. She is status post TFESI bilaterally to L3 on 12/6/2011 with no relief. Past history of status post L4/5 YARON on 5/25/11 with about 90% relief that lasted 3 weeks. She is status post 2 bilateral L3 transforaminal injections with 3 weeks relief each time. Her current pain medications include Celexa 40 mg once a day, Aleve 220 mg twice a day, Lyrica as previously stated, Zanaflex 5 mg at night. She uses her TENS at home, when she thinks about it.  She is status post L4/5 interlaminar epidural steroid injection on 1/8/14 with 90% relief.  She is status post L5/S1 interlaminar epidural steroid injection on 6/9/14 with moderate relief only on the right low back and right leg lasting 2 weeks.  She is status post bilateral L3/4 and L4/5 facet joint injections on 4/13/15 with initially 100% relief of her back pain and 80% relief of her left lateral hip and lateral thigh pain, now reporting at least 50% relief of both.  She is status post bilateral L2, 3 and 4 medial branch radiofrequency ablation on 5/20/15 with 60% relief.    She is status post bilateral L2, 3 and 4 medial branch radiofrequency ablation on 6/20/16 with 50-70% relief.  She is status post L5/S1 interlaminar epidural steroid injection on 3/10/17 with 50% relief.  She is status post bilateral L2, 3, 4 medial branch radiofrequency ablation on 7/17/17 with about 75% relief.  She is status post bilateral L2, 3, 4 medial branch radiofrequency ablation on 07/17/2018 with 100% relief of her leg pain and 70-75% relief of her back pain.  She is status post C7-T1 cervical interlaminar epidural steroid injection on 09/25/2018 with 80% relief.   She is status post L5/S1 interlaminar epidural steroid injection on 12/27/2018 with 60% relief.  She is status post bilateral L2, 3 and 4 medial branch radiofrequency ablation on 07/29/2019 with 40% relief.  She is status post L5/S1 interlaminar epidural steroid injection on 09/04/2019 with almost 100% relief of her right leg pain but 0% relief for back pain.     ROS: She reports back pain only.  Balance of review of systems is negative.    Medical, surgical, family and social history reviewed elsewhere in record.     Medications/Allergies: See med card      Vitals:    10/02/19 0830   BP: 134/60   Pulse: 84   Resp: 18   Temp: 98 °F (36.7 °C)   TempSrc: Oral   SpO2: 97%   Weight: 102.4 kg (225 lb 13.8 oz)   PainSc:   4   PainLoc: Back        PHYSICAL EXAM:  Gen: A and O x3, pleasant, well-groomed  HEENT: PERRLA  CVS: Regular rate and rhythm, normal S1 and S2, no murmurs.  Resp: Clear to auscultation bilaterally, no wheezes or rales.  Musculoskeletal: Able to heel walk, toe walk. No antalgic gait.     Neuro:  Upper extremities: 5/5 strength bilaterally   Lower extremities: 5/5 strength bilaterally  Reflexes: Brachioradialis 2+, Bicep 2+, Tricep 2+. Patellar 2+, Achilles 2+.  Sensory: Intact and symmetrical to light touch and pinprick in C2-T1 dermatomes bilaterally. Intact and symmetrical to light touch and pinprick in L2-S1 dermatomes  bilaterally.    Lumbar spine:  Lumbar spine: ROM is moderately limited with flexion and extension with increased bilateral low back pain during extension and oblique extension.  Supine straight leg raise causes increased right lateral thigh pain.  Internal and external rotation of the hip causes no increased pain in either side.  Myofascial exam:  Mild tenderness to palpation to the right greater than left lumbar paraspinous muscles.         IMAGING:   MRI L-SPINE 5/10/11   IMPRESSION: AT L3-4, THERE IS CIRCUMFERENTIAL DISC PROTRUSION FOCALLY MORE PROMINENT TO THE LEFT AS WELL AS A SMALL DISC HERNIATION PROTRUDING INFERIORLY BEHIND L4 TO THE LEFT OF MIDLINE. THIS PRODUCES SPINAL CANAL AND BILATERAL NEURAL FORAMINAL STENOSIS, LEFT GREATER THAN RIGHT. AT L4-5, THERE IS CIRCUMFERENTIAL DISC PROTRUSION FOCALLY MORE PROMINENT TO THE RIGHT WITH MILD SPINAL CANAL AND RIGHT NEURAL FORAMINAL STENOSIS. AT L1-2 AND L2-3 ALTHOUGH THERE ARE DISC PROTRUSIONS IDENTIFIED, SIGNIFICANT STENOSIS IS NOT SEEN.     MRI lumbar spine 7/10/14  L1-2:There is chronic relatively advanced disc degeneration with disc space narrowing, disc dehydration, disc narrowing, endplate osteophytes, and degenerative vertebral endplate marrow change. There is a diffuse 2-mm posterior disc bulge with a superimposed4-mm right posterior disc extrusion causing mild right foraminal stenosis. There is no impingement of the right L1 nerve root and there has been no change.  L2-3: There is severe and chronic disc degeneration with severe disc space narrowing, disc dehydration, degenerative vertebral endplate marrow change, and vertebral endplate osteophytes. There is a diffuse 2-mm posterior disc bulge with a superimposed 4-mm right posterior disc extrusion causing mild right foraminal stenosis. There is no impingement of the right L2 nerve root and there has been no significant change.  L3-4: There is severe disc degeneration which has progressed since the prior  study. There is severe disc space narrowing, disc dehydration, degenerative vertebral endplate marrow change and endplate osteophytes. There is also mild posterior subluxation of L3over a distance of 4 mm. There is a broad-based 5-mm posterior disc extrusion. There is degenerative facet arthrosis with ligamentum flavum thickening. The combination of facet arthrosis and disc extrusion results in relatively severe spinal canal stenosis with compression of the thecal sac to an AP diameter of 5 mm, moderate right foraminal stenosis, and severe left foraminal stenosis. The spinal stenosis has also progressed since the prior study.  L4-5:There is a diffuse posterior disc bulge with a superimposed 3-mm left posterior paracentral disc protrusion causing left paracentral thecal sac compression. There is moderate left and mild right foraminal stenosis and there has been no significant change. There is mild degenerative facet arthrosis with ligamentum flavum thickening and small joint effusions.  L5-S1:There is no significant compromise of the spinal canal or foramina and there has been no change.    X-ray cervical spine 6/8/15  There is loss of normal cervical lordosis which may be positional or related muscular strain. Intervertebral disk height loss is noted at the C5-C6 C6-C7 levels and to a lesser degree C4-C5 and C7-T1 levels. Vertebral body alignment appears otherwise adequate. Vertebral body heights appear well-preserved. The atlas and odontoid appear in good relationship to each other. Osseous neuroforaminal narrowing is noted at the C3-C4 C4-C5 C5-C6 levels on the left and at the C4-C5 C5-C6 and C6-C7 levels on the right     07/10/2018 MRI cervical spine [place] [facility] report  C2-3 broad-based signal asymmetry at the left subarticular and foraminal zone reflecting implant spondylosis and disc bulge complex, mild to moderate left asymmetric foraminal narrowing, ectasia of the left vertebral artery, contralateral  right asymmetric facet hypertrophy, right foramen widely narrowed, disc partially desiccated without collapse  C3-4 moderate to severe left greater than right foraminal narrowing secondary to uncinate joint hypertrophic signal alteration, disc partially desiccated  C4-5 endplate spondylosis moderate diffuse disc bulge noted, broad-based right paracentral disc herniation, asymmetric flattening of the ventral cord surface at the right paracentral zone, high-grade if lateral right foraminal narrowing, AP diameter of canal 9.9 mm, contralaterally, high grade left foraminal narrowing secondary to facet greater than uncinate joint hypertrophic signal alteration, disc desiccated and mildly narrowed  C5-6 disc space narrowing evident with generalized in Nigel spondylosis and concentric inter pose disc bulge complex, high-grade bilateral foraminal narrowing, flattening of the cord surface, AP diameter 7.9 mm, symmetric facet arthrosis, disc diffusely desiccated and narrowed  C6-7 moderate endplate spondylosis and concentric disc bulge complex, high-grade bilateral foraminal compromise, flattening of the anterior cord surface, AP diameter 8.8 mm, facet arthrosis symmetric, disc desiccated and narrowed  C7-T1 less than 2 mm depth disc bulge    11/06/2018 MRI lumbar spine  T12/L1: There is no evidence of disc protrusion, canal or foraminal stenosis.  L1/L2: Right posterolateral disc protrusion is evident and there is mild distortion of the right anterolateral canal.  Degenerative facet changes are noted bilaterally.  The left foramen is intact.  L2/L3: There is annular disc bulging with a superimposed right posterolateral disc extrusion.  This is slightly more pronounced on the previous examination and there is mild effacement of the anterior thecal sac and moderate narrowing the right foramen.  L3/L4: There is annular disc bulge and osteophyte formation with a superimposed small left posterolateral disc extrusion.  There is  moderate narrowing the central canal and left foramen.  This is little changed relative the previous examination.  Degenerative facet changes are noted.  L5/S1: There is a small central disc extrusion superimposed upon annular disc bulging.  This is slightly less pronounced than was seen previously.  Degenerative facet changes are noted.  L5/S1: Moderate degenerative facet changes are noted and there is mild effacement of the anterior thecal sac.  There is ligamentous hypertrophy particularly to the left in the posterior canal and there is left greater than right foraminal narrowing.  This is mildly worsened relative prior exam.      ASSESSMENT:  The patient is a 58 year old woman with a history of migraines, carpal tunnel syndrome and low back pain since her early 20s who returns in follow up.   1. Cervical radiculopathy  Vital signs    Place 18-22 gauage peripheral IV     Verify informed consent    Notify physician     Notify physician     Notify physician (specify)    Diet NPO    Case Request Operating Room: Injection-steroid-epidural-cervical    Place in Outpatient    lactated ringers infusion   2. DDD (degenerative disc disease), cervical     3. DDD (degenerative disc disease), lumbar  MRI Lumbar Spine Without Contrast    Ambulatory referral to Neurosurgery       Plan:  1.  Since she is still having severe back pain, I am going to update her lumbar spine MRI and have her see Neurosurgery for further evaluation.  2.  I will schedule a C7-T1 interlaminar epidural steroid injection for her neck and right arm pain.  She has done well with this in the past.    Greater than 50% of this 25 min visit was spent counseling the patient.

## 2019-10-10 ENCOUNTER — PATIENT MESSAGE (OUTPATIENT)
Dept: SURGERY | Facility: HOSPITAL | Age: 59
End: 2019-10-10

## 2019-10-11 ENCOUNTER — TELEPHONE (OUTPATIENT)
Dept: NEUROSURGERY | Facility: CLINIC | Age: 59
End: 2019-10-11

## 2019-10-12 ENCOUNTER — HOSPITAL ENCOUNTER (OUTPATIENT)
Dept: RADIOLOGY | Facility: HOSPITAL | Age: 59
Discharge: HOME | End: 2019-10-12
Attending: PHYSICIAN ASSISTANT
Payer: COMMERCIAL

## 2019-10-12 DIAGNOSIS — M51.36 DDD (DEGENERATIVE DISC DISEASE), LUMBAR: ICD-10-CM

## 2019-10-12 PROCEDURE — 72148 MRI LUMBAR SPINE W/O DYE: CPT | Mod: 26,,, | Performed by: RADIOLOGY

## 2019-10-12 PROCEDURE — 72148 MRI LUMBAR SPINE W/O DYE: CPT | Mod: TC,PO

## 2019-10-12 PROCEDURE — 72148 MRI LUMBAR SPINE WITHOUT CONTRAST: ICD-10-PCS | Mod: 26,,, | Performed by: RADIOLOGY

## 2019-10-14 ENCOUNTER — TELEPHONE (OUTPATIENT)
Dept: SURGERY | Facility: HOSPITAL | Age: 59
End: 2019-10-14

## 2019-10-14 RX ORDER — TRAMADOL HYDROCHLORIDE 50 MG/1
TABLET ORAL
Qty: 60 TABLET | Refills: 2 | Status: SHIPPED | OUTPATIENT
Start: 2019-10-14 | End: 2020-01-02

## 2019-10-14 NOTE — TELEPHONE ENCOUNTER
When doing pre op call with patient-she stated that she was supposed to be scheduled for 10/24. Patient informed that a message would be sent to Dr. Abdalla's office. Patient verbalized understanding. Thank you

## 2019-10-17 ENCOUNTER — OFFICE VISIT (OUTPATIENT)
Dept: ORTHOPEDICS | Facility: CLINIC | Age: 59
End: 2019-10-17
Payer: COMMERCIAL

## 2019-10-17 VITALS
BODY MASS INDEX: 38.24 KG/M2 | HEIGHT: 64 IN | SYSTOLIC BLOOD PRESSURE: 103 MMHG | HEART RATE: 74 BPM | DIASTOLIC BLOOD PRESSURE: 69 MMHG | WEIGHT: 224 LBS

## 2019-10-17 DIAGNOSIS — M17.0 BILATERAL PRIMARY OSTEOARTHRITIS OF KNEE: Primary | ICD-10-CM

## 2019-10-17 PROCEDURE — 99499 NO LOS: ICD-10-PCS | Mod: S$GLB,,, | Performed by: NURSE PRACTITIONER

## 2019-10-17 PROCEDURE — 20610 DRAIN/INJ JOINT/BURSA W/O US: CPT | Mod: 50,S$GLB,, | Performed by: NURSE PRACTITIONER

## 2019-10-17 PROCEDURE — 99999 PR PBB SHADOW E&M-EST. PATIENT-LVL III: CPT | Mod: PBBFAC,,, | Performed by: NURSE PRACTITIONER

## 2019-10-17 PROCEDURE — 99499 UNLISTED E&M SERVICE: CPT | Mod: S$GLB,,, | Performed by: NURSE PRACTITIONER

## 2019-10-17 PROCEDURE — 20610 LARGE JOINT ASPIRATION/INJECTION: R KNEE, L KNEE: ICD-10-PCS | Mod: 50,S$GLB,, | Performed by: NURSE PRACTITIONER

## 2019-10-17 PROCEDURE — 99999 PR PBB SHADOW E&M-EST. PATIENT-LVL III: ICD-10-PCS | Mod: PBBFAC,,, | Performed by: NURSE PRACTITIONER

## 2019-10-17 NOTE — PROCEDURES
Large Joint Aspiration/Injection: R knee, L knee  Date/Time: 10/17/2019 4:00 PM  Performed by: JEFFREY Tellez  Authorized by: JEFFREY Tellez     Consent Done?:  Yes (Verbal)  Indications:  Pain  Timeout: Prior to procedure the correct patient, procedure, and site was verified    Anesthesia    Anesthetic: topical anesthetic    Location:  Knee  Site:  R knee and L knee  Prep: Patient was prepped and draped in usual sterile fashion    Needle size:  22 G  Ultrasonic Guidance for needle placement: No  Approach:  Anterolateral  Medications:  20 mg sodium hyaluronate (EUFLEXXA) 10 mg/mL(mw 2.4 -3.6 million); 20 mg sodium hyaluronate (EUFLEXXA) 10 mg/mL(mw 2.4 -3.6 million)  Patient tolerance:  Patient tolerated the procedure well with no immediate complications

## 2019-10-17 NOTE — PROGRESS NOTES
DATE: 10/17/2019  PATIENT: Elizabeth Giordano  REFERRING MD:   CHIEF COMPLAINT:   Chief Complaint   Patient presents with    Left Knee - Pain    Right Knee - Pain       HISTORY:  Elizabeth Giordano is a 58 y.o. female  who presents for initial evaluation of her right knee pain.  She is diagnosed with osteoarthritis. She underwent toradol injection last week without complication. She does note improvement in pain since receiving an injection last week.  She presents today for Euflexxa injection # 3.  Her pain rating today is 4/10.    PAST MEDICAL/SURGICAL HISTORY:  Past Medical History:   Diagnosis Date    Allergy     Arthritis of spine 2011    Chronic low back pain     Class 2 obesity with body mass index (BMI) of 39.0 to 39.9 in adult 1/11/2019    Coronary artery disease     DDD (degenerative disc disease), lumbar     Diverticulitis     Diverticulosis     DJD (degenerative joint disease)     Encounter for blood transfusion     Factor V Leiden     GERD (gastroesophageal reflux disease)     Hiatal hernia     Migraines     PONV (postoperative nausea and vomiting)     geta     Past Surgical History:   Procedure Laterality Date    APPENDECTOMY  1981    CARPAL TUNNEL RELEASE  2011    right    COLONOSCOPY  2014    reduntant colon, otherwise normal findings repeat in 8-10 years for surveillance    Epidural Steroid Injection      Pain Management    EPIDURAL STEROID INJECTION INTO CERVICAL SPINE N/A 9/25/2018    Procedure: Injection-steroid-epidural-cervical;  Surgeon: Oswald Abdalla MD;  Location: [facility] OR;  Service: Pain Management;  Laterality: N/A;    EPIDURAL STEROID INJECTION INTO LUMBAR SPINE N/A 12/27/2018    Procedure: Injection-steroid-epidural-lumbar L5/S1;  Surgeon: Oswald Abdalla MD;  Location: [facility] OR;  Service: Pain Management;  Laterality: N/A;    EPIDURAL STEROID INJECTION INTO LUMBAR SPINE N/A 9/4/2019    Procedure: Injection-steroid-epidural-lumbar;  Surgeon:  Oswald Abdalla MD;  Location: [facility] OR;  Service: Pain Management;  Laterality: N/A;  L5/S1 to right    ESOPHAGOGASTRODUODENOSCOPY      Facet injection      Pain Management    HYSTERECTOMY      ovaries removed    KIDNEY SURGERY Right 1967    to correct urinary reflux into kidney    LEFT HEART CATHETERIZATION Left 5/22/2019    Procedure: Left heart cath;  Surgeon: Casa Perdue MD;  Location: [facility] CATH;  Service: Cardiology;  Laterality: Left;    OVARIAN CYST REMOVAL Right 1981    RADIOFREQUENCY ABLATION OF LUMBAR MEDIAL BRANCH NERVE AT SINGLE LEVEL Bilateral 7/17/2018    Procedure: RADIOFREQUENCY ABLATION, NERVE, MEDIAL BRANCH, LUMBAR, L2,3,4;  Surgeon: Oswald Abdalla MD;  Location: [facility] OR;  Service: Pain Management;  Laterality: Bilateral;    RADIOFREQUENCY ABLATION OF LUMBAR MEDIAL BRANCH NERVE AT SINGLE LEVEL Bilateral 7/29/2019    Procedure: Radiofrequency Ablation, Nerve, Spinal, Lumbar, Medial Branch, L2,3,4;  Surgeon: Oswald Abdalla MD;  Location: [facility] OR;  Service: Pain Management;  Laterality: Bilateral;    SHOULDER SURGERY  2010    rotator cuff, right       Current Medications:   Current Outpatient Medications:     aspirin (ECOTRIN) 81 MG EC tablet, Take 81 mg by mouth once daily., Disp: , Rfl:     azelastine (ASTELIN) 137 mcg (0.1 %) nasal spray, USE 1 SPRAY(137 MCG) IN EACH NOSTRIL TWICE DAILY, Disp: 30 mL, Rfl: 0    diazePAM (VALIUM) 5 MG tablet, Take 1 tablet (5 mg total) by mouth On call Procedure for Anxiety. Take 30 minutes prior to mri, Disp: 1 tablet, Rfl: 0    diphenhydrAMINE (BENADRYL) 25 mg capsule, Take 25 mg by mouth every 6 (six) hours as needed., Disp: , Rfl:     estradiol (ESTRACE) 1 MG tablet, TK 1 T PO QD;; pt uses HS, Disp: , Rfl: 3    eszopiclone (LUNESTA) 2 MG Tab, Take 1 tablet (2 mg total) by mouth every evening., Disp: 30 tablet, Rfl: 2    hydroCHLOROthiazide (HYDRODIURIL) 25 MG tablet, TAKE 1 TABLET(25 MG) BY MOUTH EVERY DAY, Disp: 90  tablet, Rfl: 0    ibuprofen (ADVIL,MOTRIN) 800 MG tablet, TAKE 1 TABLET(800 MG) BY MOUTH TWICE DAILY AS NEEDED FOR PAIN, Disp: 60 tablet, Rfl: 0    lidocaine (LIDODERM) 5 %, Place 1 patch onto the skin daily as needed. Remove & Discard patch within 12 hours or as directed by MD (Patient taking differently: Place 1 patch onto the skin nightly as needed. Remove & Discard patch within 12 hours or as directed by MD; pt uses on back), Disp: 30 patch, Rfl: 5    loratadine (CLARITIN) 10 mg tablet, Take 10 mg by mouth once daily., Disp: , Rfl:     losartan (COZAAR) 100 MG tablet, Take 1 tablet (100 mg total) by mouth once daily., Disp: 90 tablet, Rfl: 3    meloxicam (MOBIC) 15 MG tablet, Take 1 tablet (15 mg total) by mouth once daily., Disp: 90 tablet, Rfl: 1    meloxicam (MOBIC) 15 MG tablet, TAKE 1 TABLET(15 MG) BY MOUTH EVERY DAY, Disp: 90 tablet, Rfl: 0    naratriptan (AMERGE) 2.5 MG tablet, TK 1 T PO BID FOR 5 DAYS AT THE ONSET OF MIGRAINE FLARE, Disp: , Rfl: 11    naratriptan (AMERGE) 2.5 MG tablet, 2.5 mg PO BID x 5 days at onset of migraine flare, Disp: 9 tablet, Rfl: 11    ondansetron (ZOFRAN) 4 MG tablet, Take 1 tablet (4 mg total) by mouth every 6 (six) hours as needed for Nausea., Disp: 30 tablet, Rfl: 11    pantoprazole (PROTONIX) 40 MG tablet, Take 1 tablet (40 mg total) by mouth every evening., Disp: 90 tablet, Rfl: 1    ranitidine (ZANTAC) 300 MG tablet, Take 1 tablet (300 mg total) by mouth 2 (two) times daily., Disp: 180 tablet, Rfl: 1    sumatriptan (IMITREX) 100 MG tablet, MAY TAKE 1 TABLET THEN FOLLOWUP WITH ANOTHER TABLET IN 2 HOURS IF HEADACHE NOT RESOLVED, Disp: 9 tablet, Rfl: 11    tiZANidine (ZANAFLEX) 2 MG tablet, TAKE 1 TABLET(2 MG) BY MOUTH EVERY NIGHT AS NEEDED, Disp: 90 tablet, Rfl: 0    traMADol (ULTRAM) 50 mg tablet, TAKE 1 TABLET BY MOUTH TWICE DAILY AS NEEDED, Disp: 60 tablet, Rfl: 2    Current Facility-Administered Medications:     onabotulinumtoxina injection 200 Units,  200 Units, Intramuscular, Q90 Days, Neva Cortes MD, 200 Units at 18 1625    onabotulinumtoxina injection 200 Units, 200 Units, Intramuscular, Q90 Days, Petty Fernandez MD, 200 Units at 19 1359    onabotulinumtoxina injection 200 Units, 200 Units, Intramuscular, Q90 Days, Neva Cortes MD, 200 Units at 19 1448    Family History: family history was reviewed and is noncontributory  Social History:   Social History     Socioeconomic History    Marital status:      Spouse name: Not on file    Number of children: 1    Years of education: Not on file    Highest education level: Not on file   Occupational History     Employer: [employer]   Social Needs    Financial resource strain: Not on file    Food insecurity:     Worry: Not on file     Inability: Not on file    Transportation needs:     Medical: Not on file     Non-medical: Not on file   Tobacco Use    Smoking status: Former Smoker     Packs/day: 1.00     Years: 5.00     Pack years: 5.00     Start date: 1992     Last attempt to quit: 1997     Years since quittin.7    Smokeless tobacco: Never Used   Substance and Sexual Activity    Alcohol use: Yes     Comment: 3x per year    Drug use: No    Sexual activity: Yes     Partners: Male   Lifestyle    Physical activity:     Days per week: Not on file     Minutes per session: Not on file    Stress: Not on file   Relationships    Social connections:     Talks on phone: Not on file     Gets together: Not on file     Attends L[employer]-day service: Not on file     Active member of club or organization: Not on file     Attends meetings of clubs or organizations: Not on file     Relationship status: Not on file   Other Topics Concern    Not on file   Social History Narrative    ** Merged History Encounter **            ROS:  Constitution: Negative for chills, fever, and sweats. Negative for unexplained weight loss.  Eyes: no redness, no discharge  Ears: no ear pain  "or tinnitus  Cardiovascular: Negative for chest pain, irregular heartbeat, leg swelling and palpitations.   Respiratory: Negative for cough and shortness of breath.   Gastrointestinal: Negative for abdominal pain, nausea and vomiting.   Genitourinary: Negative for bladder incontinence and dysuria.   Neurological: Negative for numbness.   Psychiatric/Behavioral: Negative for behavior changes.   Endocrine: Negative for palpitations.   Hematologic/Lymphatic: Negative for bleeding disorders.  Skin: Negative for pruritis or rash.     PHYSICAL EXAM:  Right Knee Exam     Tenderness   The patient is experiencing tenderness in the lateral joint line and medial joint line.    Range of Motion   Extension: normal   Flexion: normal     Tests   Varus: negative Valgus: negative  Patellar apprehension: negative    Other   Erythema: absent  Sensation: normal  Pulse: present  Swelling: none  Effusion: no effusion present      Left Knee Exam     Tenderness   The patient is experiencing tenderness in the lateral joint line and medial joint line.    Range of Motion   Extension: normal   Flexion: normal     Tests   Varus: negative Valgus: negative  Patellar apprehension: negative    Other   Erythema: absent  Sensation: normal  Pulse: present  Swelling: none  Effusion: no effusion present           Constitutional:  Elizabeth Giordano is a well developed, well nourished female in no acute distress.   Vitals:    10/17/19 1557   BP: 103/69   Pulse: 74   Weight: 101.6 kg (223 lb 15.8 oz)   Height: 5' 4" (1.626 m)   PainSc:   4   PainLoc: Knee     Psychiatric: pleasant,normal mood and affect, behavior is normal  Neurological:  Sensation intact to light touch, normal reflexes. Coordination normal.   Skin: warm, dry, intact  Cardiovascular: capillary refill less than 3 seconds, pulses 2+      IMAGING:   X-ray obtained and personally reviewed with patient. Radiologist report as follows:  Results for orders placed during the hospital " encounter of 09/19/19   X-ray Knee Ortho Right with Flexion    Narrative EXAMINATION:  XR KNEE ORTHO RIGHT WITH FLEXION    CLINICAL HISTORY:  Pain in right knee    TECHNIQUE:  AP standing as well as PA flexion standing and Merchant views of both knees were performed.  A lateral view of the right knee is also performed.    COMPARISON:  None.    FINDINGS:  Medial compartment narrowing is noted bilaterally.  The patella appear relatively well positioned within the intercondylar notches.  No acute fracture or dislocation is convincingly noted.  A small suprapatellar joint effusion on the right is noted.      Impression Medial compartment narrowing bilaterally.      Electronically signed by: Jerzy Diaz MD  Date:    09/19/2019  Time:    16:45              ASSESSMENT:   1. Bilateral primary osteoarthritis of knee           PLAN:  The nature of the diagnosis, using models and diagrams when appropriate, was explained to the patient in detail. Euflexxa injection #3 performed Bilateral knee today (see procedure documentation).  She will monitor for signs and symptoms of infection/inflammation.  Instructed to elevate legs and apply ice tonight.  She will return for follow up evaluation as needed.

## 2019-10-22 ENCOUNTER — OFFICE VISIT (OUTPATIENT)
Dept: NEUROSURGERY | Facility: CLINIC | Age: 59
End: 2019-10-22
Payer: COMMERCIAL

## 2019-10-22 ENCOUNTER — HOSPITAL ENCOUNTER (OUTPATIENT)
Dept: RADIOLOGY | Facility: HOSPITAL | Age: 59
Discharge: HOME | End: 2019-10-22
Attending: NURSE PRACTITIONER
Payer: COMMERCIAL

## 2019-10-22 DIAGNOSIS — M54.16 LUMBAR RADICULOPATHY: ICD-10-CM

## 2019-10-22 DIAGNOSIS — M47.816 LUMBAR SPONDYLOSIS: Primary | ICD-10-CM

## 2019-10-22 DIAGNOSIS — M47.816 LUMBAR SPONDYLOSIS: ICD-10-CM

## 2019-10-22 DIAGNOSIS — M51.36 DDD (DEGENERATIVE DISC DISEASE), LUMBAR: ICD-10-CM

## 2019-10-22 PROCEDURE — 72110 X-RAY EXAM L-2 SPINE 4/>VWS: CPT | Mod: TC,FY,PO

## 2019-10-22 PROCEDURE — 72110 X-RAY EXAM L-2 SPINE 4/>VWS: CPT | Mod: 26,,, | Performed by: RADIOLOGY

## 2019-10-22 PROCEDURE — 99999 PR PBB SHADOW E&M-EST. PATIENT-LVL II: CPT | Mod: PBBFAC,,, | Performed by: NEUROLOGICAL SURGERY

## 2019-10-22 PROCEDURE — 99204 PR OFFICE/OUTPT VISIT, NEW, LEVL IV, 45-59 MIN: ICD-10-PCS | Mod: S$GLB,,, | Performed by: NEUROLOGICAL SURGERY

## 2019-10-22 PROCEDURE — 99204 OFFICE O/P NEW MOD 45 MIN: CPT | Mod: S$GLB,,, | Performed by: NEUROLOGICAL SURGERY

## 2019-10-22 PROCEDURE — 99999 PR PBB SHADOW E&M-EST. PATIENT-LVL II: ICD-10-PCS | Mod: PBBFAC,,, | Performed by: NEUROLOGICAL SURGERY

## 2019-10-22 PROCEDURE — 72110 XR LUMBAR SPINE 5 VIEW WITH FLEX AND EXT: ICD-10-PCS | Mod: 26,,, | Performed by: RADIOLOGY

## 2019-10-22 NOTE — LETTER
October 26, 2019      JERED Cruz  [address] [facility] [address] [place] [address]           [facility]  [address] [place] [address]  Phone: [phone]  Fax: [phone]          Patient: Elizabeth Giordano   MR Number: [id number]   YOB: 1960   Date of Visit: 10/22/2019       Dear Jermain Felton:    Thank you for referring Elizabeth Giordano to me for evaluation. Attached you will find relevant portions of my assessment and plan of care.    If you have questions, please do not hesitate to call me. I look forward to following Elizabeth Giordano along with you.    Sincerely,    Vaughn Beckwith MD    Enclosure  CC:  No Recipients    If you would like to receive this communication electronically, please contact [email or web address] or [phone] to request more information on [employer] access.    For providers and/or their staff who would like to refer a patient to [facility], please contact us through our one-stop-shop provider referral line, [facility], at [phone].    If you feel you have received this communication in error or would no longer like to receive these types of communications, please e-mail [email or web address]

## 2019-10-23 DIAGNOSIS — M50.30 DDD (DEGENERATIVE DISC DISEASE), CERVICAL: Primary | ICD-10-CM

## 2019-10-23 RX ORDER — MELOXICAM 15 MG/1
TABLET ORAL
Qty: 90 TABLET | Refills: 0 | Status: SHIPPED | OUTPATIENT
Start: 2019-10-23 | End: 2020-01-20 | Stop reason: SDUPTHER

## 2019-10-24 ENCOUNTER — HOSPITAL ENCOUNTER (OUTPATIENT)
Dept: RADIOLOGY | Facility: HOSPITAL | Age: 59
Discharge: HOME | End: 2019-10-24
Attending: ANESTHESIOLOGY
Payer: COMMERCIAL

## 2019-10-24 ENCOUNTER — HOSPITAL ENCOUNTER (OUTPATIENT)
Facility: HOSPITAL | Age: 59
Discharge: HOME | End: 2019-10-24
Attending: ANESTHESIOLOGY | Admitting: ANESTHESIOLOGY
Payer: COMMERCIAL

## 2019-10-24 VITALS
HEIGHT: 64 IN | HEART RATE: 77 BPM | TEMPERATURE: 98 F | WEIGHT: 225 LBS | DIASTOLIC BLOOD PRESSURE: 62 MMHG | BODY MASS INDEX: 38.41 KG/M2 | SYSTOLIC BLOOD PRESSURE: 127 MMHG | OXYGEN SATURATION: 99 % | RESPIRATION RATE: 18 BRPM

## 2019-10-24 DIAGNOSIS — M54.12 CERVICAL RADICULOPATHY: Primary | ICD-10-CM

## 2019-10-24 DIAGNOSIS — M50.30 DDD (DEGENERATIVE DISC DISEASE), CERVICAL: ICD-10-CM

## 2019-10-24 PROCEDURE — 63600175 PHARM REV CODE 636 W HCPCS: Mod: PO | Performed by: ANESTHESIOLOGY

## 2019-10-24 PROCEDURE — 99152 MOD SED SAME PHYS/QHP 5/>YRS: CPT | Mod: ,,, | Performed by: ANESTHESIOLOGY

## 2019-10-24 PROCEDURE — 62321 NJX INTERLAMINAR CRV/THRC: CPT | Mod: PO | Performed by: ANESTHESIOLOGY

## 2019-10-24 PROCEDURE — 62321 PR INJ CERV/THORAC, W/GUIDANCE: ICD-10-PCS | Mod: ,,, | Performed by: ANESTHESIOLOGY

## 2019-10-24 PROCEDURE — 99152 PR MOD CONSCIOUS SEDATION, SAME PHYS, 5+ YRS, FIRST 15 MIN: ICD-10-PCS | Mod: ,,, | Performed by: ANESTHESIOLOGY

## 2019-10-24 PROCEDURE — 62321 NJX INTERLAMINAR CRV/THRC: CPT | Mod: ,,, | Performed by: ANESTHESIOLOGY

## 2019-10-24 PROCEDURE — 25500020 PHARM REV CODE 255: Mod: PO | Performed by: ANESTHESIOLOGY

## 2019-10-24 PROCEDURE — 76000 FLUOROSCOPY <1 HR PHYS/QHP: CPT | Mod: TC,PO

## 2019-10-24 PROCEDURE — 25000003 PHARM REV CODE 250: Mod: PO | Performed by: ANESTHESIOLOGY

## 2019-10-24 RX ORDER — SODIUM CHLORIDE, SODIUM LACTATE, POTASSIUM CHLORIDE, CALCIUM CHLORIDE 600; 310; 30; 20 MG/100ML; MG/100ML; MG/100ML; MG/100ML
INJECTION, SOLUTION INTRAVENOUS CONTINUOUS
Status: DISCONTINUED | OUTPATIENT
Start: 2019-10-24 | End: 2019-10-24 | Stop reason: HOSPADM

## 2019-10-24 RX ORDER — MIDAZOLAM HYDROCHLORIDE 1 MG/ML
5 INJECTION INTRAMUSCULAR; INTRAVENOUS ONCE
Status: COMPLETED | OUTPATIENT
Start: 2019-10-24 | End: 2019-10-24

## 2019-10-24 RX ORDER — LIDOCAINE HYDROCHLORIDE 10 MG/ML
INJECTION, SOLUTION EPIDURAL; INFILTRATION; INTRACAUDAL; PERINEURAL
Status: DISCONTINUED | OUTPATIENT
Start: 2019-10-24 | End: 2019-10-24 | Stop reason: HOSPADM

## 2019-10-24 RX ORDER — METHYLPREDNISOLONE ACETATE 80 MG/ML
INJECTION, SUSPENSION INTRA-ARTICULAR; INTRALESIONAL; INTRAMUSCULAR; SOFT TISSUE
Status: DISCONTINUED | OUTPATIENT
Start: 2019-10-24 | End: 2019-10-24 | Stop reason: HOSPADM

## 2019-10-24 RX ADMIN — MIDAZOLAM HYDROCHLORIDE 1 MG: 1 INJECTION, SOLUTION INTRAMUSCULAR; INTRAVENOUS at 03:10

## 2019-10-24 RX ADMIN — SODIUM CHLORIDE, SODIUM LACTATE, POTASSIUM CHLORIDE, AND CALCIUM CHLORIDE: .6; .31; .03; .02 INJECTION, SOLUTION INTRAVENOUS at 03:10

## 2019-10-24 NOTE — DISCHARGE INSTRUCTIONS
Home Care Instructions    Apply ice pack to injection site for 20 minute periods for the first 24 hours for soreness/discomfort at injection site  DO NOT USE HEAT FOR 24 HOURS  Keep site clean and dry for 24 hours. If Band-Aid present remove when desired.  Do not drive until tomorrow  Take care when walking after a lumbar injection    STEROIDS  Make take 10-14 days for full affects  Avoid strenuous exercises for 2 days      Resume home medications as prescribes today  Resume Aspirin, Plavix or Coumadin the day after the procedure unless otherwise intructed    SEE IMMEDIATE MEDICAL HELP FOR:  Severe increase in your usual pain or appearance of new pain  Prolonged or increasing weakness or numbness in the legs or arms  Drainage, redness, active bleeding, or increased swelling at the injection site  Temperature over 100.0 degrees F.  Headache that increases when your head is upright and decrease when you lie flat    CALL 911 OR GO DIRECTLY TO EMERGENCY DEPARTMENT FOR:  Shortness of breath, chest pain, or problems breathing      Recovery After Procedural Sedation (Adult)  You have been given medicine by vein to make you sleep during your surgery. This may have included both a pain medicine and sleeping medicine. Most of the effects have worn off. But you may still have some drowsiness for the next 6 to 8 hours.  Home care  Follow these guidelines when you get home:  · For the next 8 hours, you should be watched by a responsible adult. This person should make sure your condition is not getting worse.  · Don't drink any alcohol for the next 24 hours.  · Don't drive, operate dangerous machinery, or make important business or personal decisions during the next 24 hours.  Note: Your healthcare provider may tell you not to take any medicine by mouth for pain or sleep in the next 4 hours. These medicines may react with the medicines you were given in the hospital. This could cause a much stronger response than  usual.  Follow-up care  Follow up with your healthcare provider if you are not alert and back to your usual level of activity within 12 hours.  When to seek medical advice  Call your healthcare provider right away if any of these occur:  · Drowsiness gets worse  · Weakness or dizziness gets worse  · Repeated vomiting  · You can't be awakened   Date Last Reviewed: 10/18/2016  © 8609-0961 [employer]. [address] [place] [address], [place] [address]. All rights reserved. This information is not intended as a substitute for professional medical care. Always follow your healthcare professional's instructions.

## 2019-10-24 NOTE — DISCHARGE SUMMARY
[facility]  Discharge Note  Short Stay    Admit Date: 10/24/2019    Discharge Date: 10/24/2019    Attending Physician: Oswald Abdalla MD     Discharge Provider: Oswald Abdalla    Diagnoses:  Active Hospital Problems    Diagnosis  POA    *Cervical radiculopathy [M54.12]  Yes      Resolved Hospital Problems   No resolved problems to display.       Discharged Condition: good    Final Diagnoses: Cervical radiculopathy [M54.12]    Disposition: Home or Self Care    Hospital Course: no complications, uneventful    Outcome of Hospitalization, Treatment, Procedure, or Surgery:  Patient was admitted for outpatient procedure. The patient underwent procedure without complications and are discharged home    Follow up/Patient Instructions:  Follow up as scheduled in Pain Management clinic in 3-4 weeks/Patient has received instructions and follow up date and time    Medications:  Continue previous medications    Discharge Procedure Orders   Call MD for:  temperature >100.4     Call MD for:  severe uncontrolled pain     Call MD for:  redness, tenderness, or signs of infection (pain, swelling, redness, odor or green/yellow discharge around incision site)     Call MD for:  severe persistent headache     No dressing needed         Discharge Procedure Orders (must include Diet, Follow-up, Activity):   Discharge Procedure Orders (must include Diet, Follow-up, Activity)   Call MD for:  temperature >100.4     Call MD for:  severe uncontrolled pain     Call MD for:  redness, tenderness, or signs of infection (pain, swelling, redness, odor or green/yellow discharge around incision site)     Call MD for:  severe persistent headache     No dressing needed

## 2019-10-24 NOTE — OP NOTE
PROCEDURE DATE: 10/24/2019    Procedure: C7-T1 cervical interlaminar epidural steroid injection under utilizing fluoroscopy.    Diagnosis: Cervical Radiculopathy    POSTOP DIAGNOSIS: SAME    Physician: Oswald Abdalla MD    Medications injected:  Methylprednisone 80mg followed by a slow injection of 4 mL sterile, preservative-free normal saline.    Local anesthetic used: Lidocaine 1%, 4 ml.    Sedation Medications: 2mg versed    Complications:  none    Estimated blood loss: none    Technique:  A time-out was taken to identify patient and procedure prior to starting the procedure.  With the patient laying in a prone position with the neck in a mid-flexed forward position, the area was prepped and draped in the usual sterile fashion using ChloraPrep and a fenestrated drape.  The area was determined under AP fluoroscopic guidance.  Local anesthetic was given using a 25-gauge 1.5 inch needle by raising a wheal and then infiltrating ventrally.  A 3.5 inch 20-gauge Touhy needle was introduced under fluoroscopic guidance to meet the lamina of C7.  The needle was then hinged under the lamina then advanced using loss of resistance technique.  Once the tip of the needle was in the desired position, the contrast dye Omnipaque was injected to determine placement and no uptake.  The steroid was then injected slowly followed by a slow injection of 4 mL of the sterile preservative-free normal saline.  The patient tolerated the procedure well.    The patient was monitored after the procedure and was given post-procedure and discharge instructions to follow at home. The patient was discharged in a stable condition.

## 2019-10-25 ENCOUNTER — PROCEDURE VISIT (OUTPATIENT)
Dept: NEUROLOGY | Facility: CLINIC | Age: 59
End: 2019-10-25
Payer: COMMERCIAL

## 2019-10-25 ENCOUNTER — TELEPHONE (OUTPATIENT)
Dept: NEUROLOGY | Facility: CLINIC | Age: 59
End: 2019-10-25

## 2019-10-25 VITALS — RESPIRATION RATE: 18 BRPM | WEIGHT: 226.75 LBS | HEIGHT: 64 IN | BODY MASS INDEX: 38.71 KG/M2

## 2019-10-25 DIAGNOSIS — G43.719 INTRACTABLE CHRONIC MIGRAINE WITHOUT AURA AND WITHOUT STATUS MIGRAINOSUS: Primary | ICD-10-CM

## 2019-10-25 PROCEDURE — 64615 PR CHEMODENERVATION OF MUSCLE FOR CHRONIC MIGRAINE: ICD-10-PCS | Mod: S$GLB,,, | Performed by: PSYCHIATRY & NEUROLOGY

## 2019-10-25 PROCEDURE — 64615 CHEMODENERV MUSC MIGRAINE: CPT | Mod: S$GLB,,, | Performed by: PSYCHIATRY & NEUROLOGY

## 2019-10-25 NOTE — TELEPHONE ENCOUNTER
Tried to call patient in regards to her appointment this afternoon. Was calling to see if patient wanted to come in earlier due to weather. No answer. Left voicemail.

## 2019-10-25 NOTE — PROCEDURES
Procedures       DOING VERY WELL, HAVING NO WEAR-OFF EFFECT, HAVING ABOUT 1-2 SEVERE HEADACHES PER MONTH, HAVING 1 MILD ONE PER WEEK TREATS WITH EXCEDRIN 1 DAY PER WEEK. HAPPY WITH HER STATE.     RECOMMEND TO START WEANING BOTOX OFF, SCHEDULE OFFICE VISIT FOR CHECK UP WITH BRENDA IN 6 WEEKS. SCHEDULE NEXT BOTOX IN 4 MONTHS. IF NEEDED SOONER PATIENT INSTRUCTION TO LET US KNOW.     PROCEDURE PERFORMED: Botulinum toxin injection (63964)    CLINICAL INDICATION: G43.719    A time out was conducted just before the start of the procedure to verify the correct patient and procedure, procedure location, and all relevant critical information.     Conventional methods of treatment such as multiple medications, both on and   off label have been tried including:    anti-epileptics (topiramate, gabapentin, lyrica, zonisamide), and antidepressants (amitriptyline, venflaxine)    The patient has been unresponsive and refractory.The patient meets criteria for chronic headaches according to the ICHD-II, the patient has more than 15 headaches a month which last for more than 4 hours a day.    Injection session number: 7  Percentage relief since last session: 90% relief     Frequency of treatment is every 3 months unless no response to the treatments, at which time we will discontinue the injections.     DESCRIPTION OF PROCEDURE: After obtaining informed consent and under   aseptic technique, a total of 155 units of botulinum toxin type A were   injected in the following muscles:     -- Procerus 5 units  --  5 units bilaterally  -- Frontalis 20 units  -- Temporalis 20 units bilaterally  -- Occipitalis 15 units bilaterally  -- Upper cervical paraspinals 10 units bilaterally  -- Trapezius 15 units bilaterally.     The patient tolerated the procedure well. There were no complications. The patient was given a prescription for repeat treatment in 12 weeks       Unavoidable waste 45 units    Neva Cortes MD

## 2019-11-26 RX ORDER — IBUPROFEN 800 MG/1
TABLET ORAL
Qty: 60 TABLET | Refills: 0 | Status: SHIPPED | OUTPATIENT
Start: 2019-11-26 | End: 2020-01-20

## 2019-12-02 ENCOUNTER — OFFICE VISIT (OUTPATIENT)
Dept: NEUROLOGY | Facility: CLINIC | Age: 59
End: 2019-12-02
Payer: COMMERCIAL

## 2019-12-02 VITALS
RESPIRATION RATE: 16 BRPM | HEART RATE: 86 BPM | WEIGHT: 226.75 LBS | DIASTOLIC BLOOD PRESSURE: 80 MMHG | HEIGHT: 64 IN | SYSTOLIC BLOOD PRESSURE: 110 MMHG | BODY MASS INDEX: 38.71 KG/M2

## 2019-12-02 DIAGNOSIS — G43.719 INTRACTABLE CHRONIC MIGRAINE WITHOUT AURA AND WITHOUT STATUS MIGRAINOSUS: ICD-10-CM

## 2019-12-02 PROCEDURE — 3074F SYST BP LT 130 MM HG: CPT | Mod: CPTII,S$GLB,, | Performed by: NURSE PRACTITIONER

## 2019-12-02 PROCEDURE — 3079F DIAST BP 80-89 MM HG: CPT | Mod: CPTII,S$GLB,, | Performed by: NURSE PRACTITIONER

## 2019-12-02 PROCEDURE — 3079F PR MOST RECENT DIASTOLIC BLOOD PRESSURE 80-89 MM HG: ICD-10-PCS | Mod: CPTII,S$GLB,, | Performed by: NURSE PRACTITIONER

## 2019-12-02 PROCEDURE — 3008F PR BODY MASS INDEX (BMI) DOCUMENTED: ICD-10-PCS | Mod: CPTII,S$GLB,, | Performed by: NURSE PRACTITIONER

## 2019-12-02 PROCEDURE — 99213 OFFICE O/P EST LOW 20 MIN: CPT | Mod: S$GLB,,, | Performed by: NURSE PRACTITIONER

## 2019-12-02 PROCEDURE — 99999 PR PBB SHADOW E&M-EST. PATIENT-LVL IV: ICD-10-PCS | Mod: PBBFAC,,, | Performed by: NURSE PRACTITIONER

## 2019-12-02 PROCEDURE — 3074F PR MOST RECENT SYSTOLIC BLOOD PRESSURE < 130 MM HG: ICD-10-PCS | Mod: CPTII,S$GLB,, | Performed by: NURSE PRACTITIONER

## 2019-12-02 PROCEDURE — 99213 PR OFFICE/OUTPT VISIT, EST, LEVL III, 20-29 MIN: ICD-10-PCS | Mod: S$GLB,,, | Performed by: NURSE PRACTITIONER

## 2019-12-02 PROCEDURE — 99999 PR PBB SHADOW E&M-EST. PATIENT-LVL IV: CPT | Mod: PBBFAC,,, | Performed by: NURSE PRACTITIONER

## 2019-12-02 PROCEDURE — 3008F BODY MASS INDEX DOCD: CPT | Mod: CPTII,S$GLB,, | Performed by: NURSE PRACTITIONER

## 2019-12-02 RX ORDER — ESZOPICLONE 2 MG/1
TABLET, FILM COATED ORAL
Refills: 2 | COMMUNITY
Start: 2019-11-26 | End: 2019-12-30

## 2019-12-02 NOTE — ASSESSMENT & PLAN NOTE
Significant improvement with Botox. After 8 sessions, has had 90% improvement in headache days and would like to start weaning. Will continue with Botox and next session will be 4 months from previous injection on 10/15. Patient given headache diary to complete.

## 2019-12-02 NOTE — PROGRESS NOTES
"Date of service: 12/2/2019  Referring provider: No ref. provider found    Subjective:      Chief complaint: Migraine       Patient ID: Elizabeth Giordano is a 59 y.o. lady with carpal tunnel syndrome, low back pain secondary to lumbar spondylosis and DDD, lumbar radiculopathy followed by Dr. Abdalla, factor V leiden mutation, GERD and migraines here for follow up of migraines.     History of Present Illness    INTERVAL HISTORY 12/2/19    Patient presents for 6-week follow up of her 7th Botox session. She previously reported 90% relief of headaches, no wean off effect, and was interested in weaning off of Botox. She reports she is overall much better. Only one small migraine and it was "knocked out" with medications (amerge). Headaches remain right side of head and right eye. current pain 0 with range 0-10.     I/NTERVAL HISTORY - 7/17/18     She is status post first botox session 5/24/18. Today she reports she is a little better. She had no migraines for the first 1.5 weeks and now has returned to 3 headache days per week. Her current headaches are right side of head and eye. Her current pain is 0 with a range of 0 to 10. She remains on Effexor and naratriptan. The effexor still has not been helpful.     INTERVAL HISTORY - 5/8/18     At last visit we covered topiramate, which was working to reduce headaches by 50%, to zonisamide due to significant memory problems.The memory problems DID improve. The zonisamide caused itchiness so 6 weeks ago we converted to Effexor. She is now having MORE headaches - 3-4 per week. The as needed medications are working well.     INTERVAL HISTORY - 1/30/18     Last visit 3 months ago we planned on continuing topiramate titration and sumatriptan for acute.    Today she reports the current regimen seems to be working - her regular baseline headache went down from 12-15 per month to about half that, and had one long flare since last seen - used naratriptan once came back, used " sumatriptan once, came back . Her current pain is 0, with a range of 0 to 10. The pain is unchanged in location.    She has been having significant memory problems since being on topiramate.     INTERVAL HISTORY - 10/25/17     Last seen 2 months ago at which point I raised topiramate and started naratriptan bridges for long migraines. Today she reports she is about the same. However, last week she had a good week and had no headaches.The headaches have the same characteristics are before. Her current pain score is 5. Her range is 0-10. She can not predict when she will have severe migraines so naratriptan bridge was taken at terminal end (day 4 of flare), not at the start, and did not have the intended effect. She has tingling in all toes (topamax) but worse on right and gets injections in her foot for this.    HEADACHE HISTORY - 8/9/17   Age at onset and course over time: migraines began in her 30s, she reports going through menopause at age 40 (unclear why) and she thinks this may have started it. One day just became more sensitive to sensory stimuli especially smells. Had severe migraines at least one time per week. Started Celexa for this purpose which helped her sensitivity to smells. Overall thinks her headache number has stayed the same over the years but pain has become more severe and she is yet again more sensitive to smells, thinks medications aren't working as well.   Location: right side behind eye (baseline headache and migraine)   Quality: throbbing   Severity: current 2/10, at worst 10/10   Duration: exacerbations last 5-6 days   Frequency: baseline headache about 12-15 days per month; every other month will have a flare lasting 5-6 days   Alleviated by: sleep, ice, medication   Exacerbated by: light, noise, smells, coughing   Associated with: photo/noise, phobia, osmophobia. No aura. + right eye tearing. No ptosis, no redness. No eye irritation. No ear fullness.   Sleep habits:  Caffeine intake: 1      She is on daily HRT for menopause.     Current acute treatment - migraines treated 4 times per month   -- meloxicam 15mg for  Arthritis in right thumb daily   -- sumatriptan 100mg at onset of headache, gets recurrent headache next day, (zomig works better but not covered by her insurance)  -- amerge - works OK but sumatriptan is cheaper   -- tizanidine 2mg nightly - for migraines, daily   -- tramadol 50mg BID  -- zofran rare use    Current prevention:  -- Botox 7 sessions    Previously tried/failed acute treatment:  -- hydrocodone: allergy  -- oxycodone: allergy   -- zomig   -- relpax   -- naproxen  -- tramadol   -- fiorinal with codeine   -- Goody's   -- Excedrin   -- tylenol  -- ASA    Previously tried/failed preventative treatment:  -- amitriptyline (for back pain, but migraines were no better)  -- gabapentin - for back pain, didn't help   -- lyrica - memory problems   -- topiramate 100mg BID Tingling in toes - helping with migraines but having significant memory problems   -- zonisamide - itchy   (celexa 40mg)   -- Effexor XR 150mg in the morning - started 2/27/18 - headaches are worse    Hx multiple lumbar ESIs and LMB RFAs for low back pain/radiculopathy       Review of patient's allergies indicates:   Allergen Reactions    Hydrocodone Hives and Nausea Only           Sulfa (sulfonamide antibiotics) Hives and Rash           Oxycodone Itching and Nausea And Vomiting     Current Outpatient Medications   Medication Sig Dispense Refill    aspirin (ECOTRIN) 81 MG EC tablet Take 81 mg by mouth once daily.      azelastine (ASTELIN) 137 mcg (0.1 %) nasal spray USE 1 SPRAY(137 MCG) IN EACH NOSTRIL TWICE DAILY 30 mL 0    diphenhydrAMINE (BENADRYL) 25 mg capsule Take 25 mg by mouth every 6 (six) hours as needed.      estradiol (ESTRACE) 1 MG tablet TK 1 T PO QD;; pt uses HS  3    eszopiclone (LUNESTA) 2 MG Tab TK 1 T PO QPM  2    hydroCHLOROthiazide (HYDRODIURIL) 25 MG tablet TAKE 1 TABLET(25 MG) BY MOUTH  EVERY DAY 90 tablet 0    ibuprofen (ADVIL,MOTRIN) 800 MG tablet TAKE 1 TABLET(800 MG) BY MOUTH TWICE DAILY AS NEEDED FOR PAIN 60 tablet 0    lidocaine (LIDODERM) 5 % Place 1 patch onto the skin daily as needed. Remove & Discard patch within 12 hours or as directed by MD (Patient taking differently: Place 1 patch onto the skin nightly as needed. Remove & Discard patch within 12 hours or as directed by MD; pt uses on back) 30 patch 5    loratadine (CLARITIN) 10 mg tablet Take 10 mg by mouth once daily.      losartan (COZAAR) 100 MG tablet Take 1 tablet (100 mg total) by mouth once daily. 90 tablet 3    meloxicam (MOBIC) 15 MG tablet TAKE 1 TABLET(15 MG) BY MOUTH EVERY DAY 90 tablet 0    naratriptan (AMERGE) 2.5 MG tablet 2.5 mg PO BID x 5 days at onset of migraine flare 9 tablet 11    ondansetron (ZOFRAN) 4 MG tablet Take 1 tablet (4 mg total) by mouth every 6 (six) hours as needed for Nausea. 30 tablet 11    pantoprazole (PROTONIX) 40 MG tablet Take 1 tablet (40 mg total) by mouth every evening. 90 tablet 1    ranitidine (ZANTAC) 300 MG tablet Take 1 tablet (300 mg total) by mouth 2 (two) times daily. 180 tablet 1    sumatriptan (IMITREX) 100 MG tablet MAY TAKE 1 TABLET THEN FOLLOWUP WITH ANOTHER TABLET IN 2 HOURS IF HEADACHE NOT RESOLVED 9 tablet 11    tiZANidine (ZANAFLEX) 2 MG tablet TAKE 1 TABLET(2 MG) BY MOUTH EVERY NIGHT AS NEEDED 90 tablet 0    traMADol (ULTRAM) 50 mg tablet TAKE 1 TABLET BY MOUTH TWICE DAILY AS NEEDED 60 tablet 2     Current Facility-Administered Medications   Medication Dose Route Frequency Provider Last Rate Last Dose    onabotulinumtoxina injection 200 Units  200 Units Intramuscular Q90 Days Neva Cortes MD   200 Units at 08/16/18 1625    onabotulinumtoxina injection 200 Units  200 Units Intramuscular Q90 Days Neva Cortes MD   200 Units at 10/25/19 1557       Past Medical History  Past Medical History:   Diagnosis Date    Allergy     Arthritis of spine 2011    Chronic  low back pain     Class 2 obesity with body mass index (BMI) of 39.0 to 39.9 in adult 1/11/2019    Coronary artery disease     mild    DDD (degenerative disc disease), lumbar     Diverticulitis     Diverticulosis     DJD (degenerative joint disease)     Encounter for blood transfusion     Factor V Leiden     GERD (gastroesophageal reflux disease)     Hiatal hernia     Migraines     PONV (postoperative nausea and vomiting)     geta       Past Surgical History  Past Surgical History:   Procedure Laterality Date    APPENDECTOMY  1981    CARPAL TUNNEL RELEASE  2011    right    COLONOSCOPY  2014    reduntant colon, otherwise normal findings repeat in 8-10 years for surveillance    Epidural Steroid Injection      Pain Management    EPIDURAL STEROID INJECTION INTO CERVICAL SPINE N/A 9/25/2018    Procedure: Injection-steroid-epidural-cervical;  Surgeon: Oswald Abdalla MD;  Location: [facility] OR;  Service: Pain Management;  Laterality: N/A;    EPIDURAL STEROID INJECTION INTO CERVICAL SPINE N/A 10/24/2019    Procedure: Injection-steroid-epidural-cervical;  Surgeon: Oswald Abdalla MD;  Location: [facility] OR;  Service: Pain Management;  Laterality: N/A;    EPIDURAL STEROID INJECTION INTO LUMBAR SPINE N/A 12/27/2018    Procedure: Injection-steroid-epidural-lumbar L5/S1;  Surgeon: Oswald Abdalla MD;  Location: [facility] OR;  Service: Pain Management;  Laterality: N/A;    EPIDURAL STEROID INJECTION INTO LUMBAR SPINE N/A 9/4/2019    Procedure: Injection-steroid-epidural-lumbar;  Surgeon: Oswald Abdalla MD;  Location: [facility] OR;  Service: Pain Management;  Laterality: N/A;  L5/S1 to right    ESOPHAGOGASTRODUODENOSCOPY      Facet injection      Pain Management    HYSTERECTOMY      ovaries removed    KIDNEY SURGERY Right 1967    to correct urinary reflux into kidney    LEFT HEART CATHETERIZATION Left 5/22/2019    Procedure: Left heart cath;  Surgeon: Casa Perdue MD;  [email or web address] CATH;   Service: Cardiology;  Laterality: Left;    OVARIAN CYST REMOVAL Right 1981    RADIOFREQUENCY ABLATION OF LUMBAR MEDIAL BRANCH NERVE AT SINGLE LEVEL Bilateral 2018    Procedure: RADIOFREQUENCY ABLATION, NERVE, MEDIAL BRANCH, LUMBAR, L2,3,4;  Surgeon: Oswald Abdalla MD;  Location: [facility] OR;  Service: Pain Management;  Laterality: Bilateral;    RADIOFREQUENCY ABLATION OF LUMBAR MEDIAL BRANCH NERVE AT SINGLE LEVEL Bilateral 2019    Procedure: Radiofrequency Ablation, Nerve, Spinal, Lumbar, Medial Branch, L2,3,4;  Surgeon: Oswald Abdalla MD;  Location: [facility] OR;  Service: Pain Management;  Laterality: Bilateral;    SHOULDER SURGERY  2010    rotator cuff, right       Family History  Family History   Problem Relation Age of Onset    Heart attack Mother     Heart disease Mother     COPD Mother     Hypertension Mother     Hepatitis Sister     COPD Father     No Known Problems Daughter     Breast cancer Maternal Grandmother     Allergic rhinitis Neg Hx     Angioedema Neg Hx     Asthma Neg Hx     Atopy Neg Hx     Eczema Neg Hx     Immunodeficiency Neg Hx     Urticaria Neg Hx     Colon cancer Neg Hx     Colon polyps Neg Hx        Social History  Social History     Socioeconomic History    Marital status:      Spouse name: Not on file    Number of children: 1    Years of education: Not on file    Highest education level: Not on file   Occupational History     Employer: [employer]   Social Needs    Financial resource strain: Not on file    Food insecurity:     Worry: Not on file     Inability: Not on file    Transportation needs:     Medical: Not on file     Non-medical: Not on file   Tobacco Use    Smoking status: Former Smoker     Packs/day: 1.00     Years: 5.00     Pack years: 5.00     Start date: 1992     Last attempt to quit: 1997     Years since quittin.9    Smokeless tobacco: Never Used   Substance and Sexual Activity    Alcohol use: Yes      Comment: 3x per year    Drug use: No    Sexual activity: Yes     Partners: Male   Lifestyle    Physical activity:     Days per week: Not on file     Minutes per session: Not on file    Stress: Not on file   Relationships    Social connections:     Talks on phone: Not on file     Gets together: Not on file     Attends Yazidi service: Not on file     Active member of club or organization: Not on file     Attends meetings of clubs or organizations: Not on file     Relationship status: Not on file   Other Topics Concern    Not on file   Social History Narrative    ** Merged History Encounter **             Review of Systems  14-point review of systems as follows:   No check yoel indicates NEGATIVE response   Constitutional: [] weight loss, [] change to appetite   Eyes: [] change in vision, [] double vision   Ears, nose, mouth, throat: [] frequent nose bleeds, [] ringing in the ears   Respiratory: [] cough, [] wheezing   Cardiovascular: [] chest pain, [] palpitations   Gastrointestinal: [] jaundice, [] nausea/vomiting   Genitourinary: [] incontinence, [] burning with urination   Hematologic/lymphatic: [] easy bruising/bleeding, [x] night sweats   Neurological: [] numbness, [] weakness   Endocrine: [] fatigue, [] heat/cold intolerance   Allergy/Immunologic: [] fevers, [] chills   Musculoskeletal: [] muscle pain, [x] joint pain   Psychiatric: [] thoughts of harming self/others, [] depression   Integumentary: [] rashes, [] sores that do not heal       Objective:        Vitals:    12/02/19 0833   BP: 110/80   Pulse: 86   Resp: 16     Body mass index is 38.92 kg/m².    General: Well developed, well nourished.  No acute distress.  HEENT: Atraumatic, normocephalic.  Neck: Trachea midline  Cardiovascular: Vitals reviewed. Normal peripheral perfusion.   Pulmonary: No increased work of breathing.  Abdomen/GI: No guarding  Musculoskeletal: No obvious joint deformities, moves all extremities symmetrically and  well.     Neurological exam:  Mental status: Awake and alert. Oriented to situation.  Speech fluent and appropriate. Recent and remote memory appear to be intact.  Fund of knowledge normal.  Cranial nerves: Pupils equal round, extraocular movements intact, facial strength intact bilaterally, hearing grossly intact bilaterally.  Gait: Normal       Data Review:     No results found for this or any previous visit.    Lab Results   Component Value Date     07/11/2019    K 4.2 07/11/2019     07/11/2019    CO2 26 07/11/2019    BUN 26 (H) 07/11/2019    CREATININE 1.3 07/11/2019    GLU 94 07/11/2019    AST 25 08/21/2018    ALT 32 08/21/2018    ALBUMIN 4.0 08/21/2018    PROT 7.0 08/21/2018    BILITOT 0.3 08/21/2018    CHOL 202 (H) 07/11/2019    HDL 54 07/11/2019    LDLCALC 105.2 07/11/2019    TRIG 214 (H) 07/11/2019       Lab Results   Component Value Date    WBC 4.86 05/22/2019    HGB 13.2 05/22/2019    HCT 37.1 05/22/2019    MCV 91 05/22/2019     05/22/2019       Lab Results   Component Value Date    TSH 0.589 08/21/2018       Assessment & Plan:       Problem List Items Addressed This Visit        Neuro    Intractable chronic migraine without aura    Overview     Migraines of many years, triggered by menopause. Normal neuro exam. Excellent response to full dose topiramate but not tolerating due to memory impairment. Converted over to zonisamide to improve tolerance but developed rash to this. Therefore, converted citalpram to Effexor but as of 6 weeks on full dose headaches are only worse. Will give Effexor another 4 week trial and prepare to begin Botox.     The patient has chronic migraines (G43.719) and suffers from headaches more than 15 days a month lasting more than 4 hours a day with no relief of symptoms despite trying multiple medications including but not limited to anti-epileptics (topiramate, gabapentin, lyrica, zonisamide), and antidepressants (amitriptyline, venflaxine). Botox treatment was  approved for chronic migraines in October 2010. The patient will be an ideal candidate for Botox. We are planning for 3 treatments 3 months apart and aiming for at least 50% improvement in the symptoms. If we see no improvement after 3 treatments, we will discontinue the injections.           Current Assessment & Plan     Significant improvement with Botox. After 8 sessions, has had 90% improvement in headache days and would like to start weaning. Will continue with Botox and next session will be 4 months from previous injection on 10/15. Patient given headache diary to complete.                      WORKUP:  -- none     PREVENTION (use daily regardless of headache):  -- continue with Botox wean and next botox will be at 4 months.   -- continue keep headache diary     ACUTE TREATMENT:  -- may continue to use imitrex for shorter headaches  -- for those long flares, as soon as you know what is happening, take the amerge (naratriptan) twice per day x 4 days whether you have pain or not to try to get ahead of it   -- can't use imitrex and naratriptan same day       Follow up in 10 weeks (on 2/10/2020) for botox.    Linda Melgar, NP

## 2019-12-02 NOTE — PATIENT INSTRUCTIONS
WORKUP:  -- none     PREVENTION (use daily regardless of headache):  -- continue with Botox wean and next botox will be at 4 months.   -- continue keep headache diary     ACUTE TREATMENT:  -- may continue to use imitrex for shorter headaches  -- for those long flares, as soon as you know what is happening, take the amerge (naratriptan) twice per day x 4 days whether you have pain or not to try to get ahead of it   -- can't use imitrex and naratriptan same day

## 2019-12-12 ENCOUNTER — PATIENT MESSAGE (OUTPATIENT)
Dept: FAMILY MEDICINE | Facility: CLINIC | Age: 59
End: 2019-12-12

## 2019-12-12 ENCOUNTER — OFFICE VISIT (OUTPATIENT)
Dept: PAIN MEDICINE | Facility: CLINIC | Age: 59
End: 2019-12-12
Payer: COMMERCIAL

## 2019-12-12 VITALS
SYSTOLIC BLOOD PRESSURE: 115 MMHG | DIASTOLIC BLOOD PRESSURE: 54 MMHG | RESPIRATION RATE: 18 BRPM | OXYGEN SATURATION: 100 % | TEMPERATURE: 97 F | HEART RATE: 75 BPM | WEIGHT: 225.5 LBS | BODY MASS INDEX: 38.71 KG/M2

## 2019-12-12 DIAGNOSIS — M54.16 LUMBAR RADICULITIS: ICD-10-CM

## 2019-12-12 DIAGNOSIS — G89.4 CHRONIC PAIN DISORDER: Primary | ICD-10-CM

## 2019-12-12 DIAGNOSIS — R05.9 COUGH: Primary | ICD-10-CM

## 2019-12-12 PROCEDURE — 99999 PR PBB SHADOW E&M-EST. PATIENT-LVL V: CPT | Mod: PBBFAC,,, | Performed by: PHYSICIAN ASSISTANT

## 2019-12-12 PROCEDURE — 3078F DIAST BP <80 MM HG: CPT | Mod: CPTII,S$GLB,, | Performed by: PHYSICIAN ASSISTANT

## 2019-12-12 PROCEDURE — 99214 PR OFFICE/OUTPT VISIT, EST, LEVL IV, 30-39 MIN: ICD-10-PCS | Mod: S$GLB,,, | Performed by: PHYSICIAN ASSISTANT

## 2019-12-12 PROCEDURE — 99214 OFFICE O/P EST MOD 30 MIN: CPT | Mod: S$GLB,,, | Performed by: PHYSICIAN ASSISTANT

## 2019-12-12 PROCEDURE — 3078F PR MOST RECENT DIASTOLIC BLOOD PRESSURE < 80 MM HG: ICD-10-PCS | Mod: CPTII,S$GLB,, | Performed by: PHYSICIAN ASSISTANT

## 2019-12-12 PROCEDURE — 99999 PR PBB SHADOW E&M-EST. PATIENT-LVL V: ICD-10-PCS | Mod: PBBFAC,,, | Performed by: PHYSICIAN ASSISTANT

## 2019-12-12 PROCEDURE — 3074F SYST BP LT 130 MM HG: CPT | Mod: CPTII,S$GLB,, | Performed by: PHYSICIAN ASSISTANT

## 2019-12-12 PROCEDURE — 3074F PR MOST RECENT SYSTOLIC BLOOD PRESSURE < 130 MM HG: ICD-10-PCS | Mod: CPTII,S$GLB,, | Performed by: PHYSICIAN ASSISTANT

## 2019-12-12 PROCEDURE — 3008F BODY MASS INDEX DOCD: CPT | Mod: CPTII,S$GLB,, | Performed by: PHYSICIAN ASSISTANT

## 2019-12-12 PROCEDURE — 3008F PR BODY MASS INDEX (BMI) DOCUMENTED: ICD-10-PCS | Mod: CPTII,S$GLB,, | Performed by: PHYSICIAN ASSISTANT

## 2019-12-12 RX ORDER — BENZONATATE 200 MG/1
200 CAPSULE ORAL 3 TIMES DAILY PRN
Qty: 30 CAPSULE | Refills: 1 | Status: SHIPPED | OUTPATIENT
Start: 2019-12-12 | End: 2019-12-22

## 2019-12-12 RX ORDER — PROMETHAZINE HYDROCHLORIDE AND DEXTROMETHORPHAN HYDROBROMIDE 6.25; 15 MG/5ML; MG/5ML
5 SYRUP ORAL NIGHTLY PRN
Qty: 120 ML | Refills: 0 | Status: SHIPPED | OUTPATIENT
Start: 2019-12-12 | End: 2019-12-22

## 2019-12-12 NOTE — TELEPHONE ENCOUNTER
LOV 10/3/2019.    Patient is requesting tessalon pearls and a RX cough suppressant for cough due to cold.     Does she have to come in?

## 2019-12-16 ENCOUNTER — OFFICE VISIT (OUTPATIENT)
Dept: FAMILY MEDICINE | Facility: CLINIC | Age: 59
End: 2019-12-16
Payer: COMMERCIAL

## 2019-12-16 VITALS
HEART RATE: 99 BPM | TEMPERATURE: 99 F | SYSTOLIC BLOOD PRESSURE: 120 MMHG | HEIGHT: 64 IN | OXYGEN SATURATION: 98 % | BODY MASS INDEX: 37.94 KG/M2 | DIASTOLIC BLOOD PRESSURE: 86 MMHG | WEIGHT: 222.25 LBS

## 2019-12-16 DIAGNOSIS — J04.0 LARYNGITIS: ICD-10-CM

## 2019-12-16 DIAGNOSIS — R09.82 PND (POST-NASAL DRIP): Primary | ICD-10-CM

## 2019-12-16 PROCEDURE — 99214 PR OFFICE/OUTPT VISIT, EST, LEVL IV, 30-39 MIN: ICD-10-PCS | Mod: S$GLB,,, | Performed by: NURSE PRACTITIONER

## 2019-12-16 PROCEDURE — 99999 PR PBB SHADOW E&M-EST. PATIENT-LVL V: ICD-10-PCS | Mod: PBBFAC,,, | Performed by: NURSE PRACTITIONER

## 2019-12-16 PROCEDURE — 99999 PR PBB SHADOW E&M-EST. PATIENT-LVL V: CPT | Mod: PBBFAC,,, | Performed by: NURSE PRACTITIONER

## 2019-12-16 PROCEDURE — 99214 OFFICE O/P EST MOD 30 MIN: CPT | Mod: S$GLB,,, | Performed by: NURSE PRACTITIONER

## 2019-12-16 RX ORDER — MOMETASONE FUROATE 50 UG/1
2 SPRAY, METERED NASAL DAILY
Qty: 1 EACH | Refills: 6 | Status: SHIPPED | OUTPATIENT
Start: 2019-12-16 | End: 2020-07-09

## 2019-12-16 RX ORDER — PREDNISONE 20 MG/1
TABLET ORAL
Qty: 6 TABLET | Refills: 0 | Status: SHIPPED | OUTPATIENT
Start: 2019-12-16 | End: 2020-05-11

## 2019-12-16 NOTE — PROGRESS NOTES
Subjective:       Patient ID: Elizabeth Giordano is a 59 y.o. female.    Chief Complaint: Cough (symptoms started last sunday); Sinus Problem; and Sore Throat    Cough   This is a new problem. The current episode started in the past 7 days. The problem has been gradually worsening. The cough is non-productive. Associated symptoms include nasal congestion, postnasal drip and a sore throat. Pertinent negatives include no chest pain, chills, ear congestion, ear pain, eye redness, fever, headaches, heartburn, hemoptysis, myalgias, rash, rhinorrhea, shortness of breath, sweats, weight loss or wheezing. Associated symptoms comments: Hoarseness  Loss of voice . The symptoms are aggravated by lying down. She has tried OTC cough suppressant and rest for the symptoms. There is no history of environmental allergies.     Vitals:    12/16/19 0912   BP: 120/86   Pulse: 99   Temp: 98.6 °F (37 °C)     Review of Systems   Constitutional: Negative.  Negative for activity change, appetite change, chills, diaphoresis, fatigue, fever and weight loss.   HENT: Positive for congestion, postnasal drip, sore throat and voice change. Negative for drooling, ear discharge, ear pain, facial swelling, nosebleeds, rhinorrhea, sinus pressure and sinus pain.    Eyes: Negative.  Negative for discharge, redness and itching.   Respiratory: Positive for cough. Negative for apnea, hemoptysis, choking, chest tightness, shortness of breath and wheezing.    Cardiovascular: Negative.  Negative for chest pain and leg swelling.   Gastrointestinal: Negative.  Negative for abdominal pain, anal bleeding, constipation, diarrhea, heartburn and nausea.   Endocrine: Negative.  Negative for cold intolerance, heat intolerance and polydipsia.   Genitourinary: Negative.  Negative for decreased urine volume, difficulty urinating, dysuria, flank pain, hematuria, pelvic pain and vaginal bleeding.   Musculoskeletal: Negative.  Negative for arthralgias, gait problem,  joint swelling, myalgias and neck pain.   Skin: Negative.  Negative for color change and rash.   Allergic/Immunologic: Negative.  Negative for environmental allergies and food allergies.   Neurological: Negative.  Negative for dizziness, speech difficulty, weakness, numbness and headaches.   Hematological: Negative.  Negative for adenopathy.   Psychiatric/Behavioral: Negative.  Negative for behavioral problems, confusion, hallucinations and self-injury. The patient is not hyperactive.        Past Medical History:   Diagnosis Date    Allergy     Arthritis of spine 2011    Chronic low back pain     Class 2 obesity with body mass index (BMI) of 39.0 to 39.9 in adult 1/11/2019    Coronary artery disease     mild    DDD (degenerative disc disease), lumbar     Diverticulitis     Diverticulosis     DJD (degenerative joint disease)     Encounter for blood transfusion     Factor V Leiden     GERD (gastroesophageal reflux disease)     Hiatal hernia     Migraines     PONV (postoperative nausea and vomiting)     geta       Objective:      Physical Exam   Constitutional: She is oriented to person, place, and time. She appears well-developed and well-nourished.   HENT:   Head: Normocephalic and atraumatic.   Right Ear: Hearing, tympanic membrane, external ear and ear canal normal.   Left Ear: Hearing, external ear and ear canal normal. A middle ear effusion is present.   Nose: Rhinorrhea present. No mucosal edema or sinus tenderness. Right sinus exhibits no maxillary sinus tenderness and no frontal sinus tenderness. Left sinus exhibits no maxillary sinus tenderness and no frontal sinus tenderness.   Mouth/Throat: Uvula is midline and mucous membranes are normal. Posterior oropharyngeal erythema present. No oropharyngeal exudate or posterior oropharyngeal edema.   Eyes: Pupils are equal, round, and reactive to light. Conjunctivae and EOM are normal.   Neck: Normal range of motion. Neck supple.   Cardiovascular:  Normal rate, regular rhythm, normal heart sounds and intact distal pulses. Exam reveals no friction rub.   No murmur heard.  Pulmonary/Chest: Effort normal and breath sounds normal. No stridor. No respiratory distress. She has no wheezes. She has no rales.   Abdominal: Soft. Bowel sounds are normal.   Musculoskeletal: Normal range of motion. She exhibits no edema or tenderness.   Neurological: She is alert and oriented to person, place, and time. No cranial nerve deficit. Coordination normal.   Skin: Skin is warm and dry.   Psychiatric: She has a normal mood and affect. Her behavior is normal. Judgment and thought content normal.   Nursing note and vitals reviewed.      Assessment:       1. PND (post-nasal drip)    2. Laryngitis        Plan:       PND (post-nasal drip)  -     mometasone (NASONEX) 50 mcg/actuation nasal spray; 2 sprays by Nasal route once daily.  Dispense: 1 each; Refill: 6    Laryngitis  -     mometasone (NASONEX) 50 mcg/actuation nasal spray; 2 sprays by Nasal route once daily.  Dispense: 1 each; Refill: 6  -     predniSONE (DELTASONE) 20 MG tablet; Take 2 tablets for 3 days  Dispense: 6 tablet; Refill: 0            Fu if not better

## 2019-12-17 NOTE — PROGRESS NOTES
CHIEF COMPLAINT/REASON FOR VISIT: low back pain, neck pain    History of present illness: The patient is a 59 year old woman with a history of migraines, carpal tunnel syndrome and low back pain since her early 20s.  She is status post C7-T1 interlaminar epidural steroid injection on 10/24/2019 with 70% relief.  She is pleased with these results.  Her main complaint is low back pain. She has seen Neurosurgery and an operation was not recommended.  She states that this pain is debilitating and it is difficult for her to lose weight because she cannot exercise because of pain. It is worse with standing, walking and activity.  She does have some improvement with sitting.  She reports intermittent weakness in her right leg.  She currently denies numbness or incontinence.    Pain intervention history: She tried Neurontin for her leg/thigh pain with no success. She only takes Advil with about 30% relief. She is status post TFESI bilaterally to L3 on 12/6/2011 with no relief. Past history of status post L4/5 YARON on 5/25/11 with about 90% relief that lasted 3 weeks. She is status post 2 bilateral L3 transforaminal injections with 3 weeks relief each time. Her current pain medications include Celexa 40 mg once a day, Aleve 220 mg twice a day, Lyrica as previously stated, Zanaflex 5 mg at night. She uses her TENS at home, when she thinks about it.  She is status post L4/5 interlaminar epidural steroid injection on 1/8/14 with 90% relief.  She is status post L5/S1 interlaminar epidural steroid injection on 6/9/14 with moderate relief only on the right low back and right leg lasting 2 weeks.  She is status post bilateral L3/4 and L4/5 facet joint injections on 4/13/15 with initially 100% relief of her back pain and 80% relief of her left lateral hip and lateral thigh pain, now reporting at least 50% relief of both.  She is status post bilateral L2, 3 and 4 medial branch radiofrequency ablation on 5/20/15 with 60% relief.   She  is status post bilateral L2, 3 and 4 medial branch radiofrequency ablation on 6/20/16 with 50-70% relief.  She is status post L5/S1 interlaminar epidural steroid injection on 3/10/17 with 50% relief.  She is status post bilateral L2, 3, 4 medial branch radiofrequency ablation on 7/17/17 with about 75% relief.  She is status post bilateral L2, 3, 4 medial branch radiofrequency ablation on 07/17/2018 with 100% relief of her leg pain and 70-75% relief of her back pain.  She is status post C7-T1 cervical interlaminar epidural steroid injection on 09/25/2018 with 80% relief.   She is status post L5/S1 interlaminar epidural steroid injection on 12/27/2018 with 60% relief.  She is status post bilateral L2, 3 and 4 medial branch radiofrequency ablation on 07/29/2019 with 40% relief.  She is status post L5/S1 interlaminar epidural steroid injection on 09/04/2019 with almost 100% relief of her right leg pain but 0% relief for back pain. She is status post C7-T1 interlaminar epidural steroid injection on 10/24/2019 with 70% relief.    ROS: She reports back pain only.  Balance of review of systems is negative.    Medical, surgical, family and social history reviewed elsewhere in record.     Medications/Allergies: See med card      Vitals:    12/12/19 1025   BP: (!) 115/54   Pulse: 75   Resp: 18   Temp: 96.7 °F (35.9 °C)   TempSrc: Oral   SpO2: 100%   Weight: 102.3 kg (225 lb 8.5 oz)   PainSc:   4   PainLoc: Neck        PHYSICAL EXAM:  Gen: A and O x3, pleasant, well-groomed  HEENT: PERRLA  CVS: Regular rate and rhythm, normal S1 and S2, no murmurs.  Resp: Clear to auscultation bilaterally, no wheezes or rales.  Musculoskeletal: Able to heel walk, toe walk. No antalgic gait.     Neuro:  Upper extremities: 5/5 strength bilaterally   Lower extremities: 5/5 strength bilaterally  Reflexes: Brachioradialis 2+, Bicep 2+, Tricep 2+. Patellar 2+, Achilles 2+.  Sensory: Intact and symmetrical to light touch and pinprick in C2-T1  dermatomes bilaterally. Intact and symmetrical to light touch and pinprick in L2-S1 dermatomes bilaterally.    Lumbar spine:  Lumbar spine: ROM is moderately limited with flexion and extension with increased bilateral low back pain during extension and oblique extension.  Supine straight leg raise causes increased right lateral thigh pain.  Internal and external rotation of the hip causes no increased pain in either side.  Myofascial exam:  Mild tenderness to palpation to the right greater than left lumbar paraspinous muscles.         IMAGING:   MRI L-SPINE 5/10/11   IMPRESSION: AT L3-4, THERE IS CIRCUMFERENTIAL DISC PROTRUSION FOCALLY MORE PROMINENT TO THE LEFT AS WELL AS A SMALL DISC HERNIATION PROTRUDING INFERIORLY BEHIND L4 TO THE LEFT OF MIDLINE. THIS PRODUCES SPINAL CANAL AND BILATERAL NEURAL FORAMINAL STENOSIS, LEFT GREATER THAN RIGHT. AT L4-5, THERE IS CIRCUMFERENTIAL DISC PROTRUSION FOCALLY MORE PROMINENT TO THE RIGHT WITH MILD SPINAL CANAL AND RIGHT NEURAL FORAMINAL STENOSIS. AT L1-2 AND L2-3 ALTHOUGH THERE ARE DISC PROTRUSIONS IDENTIFIED, SIGNIFICANT STENOSIS IS NOT SEEN.     MRI lumbar spine 7/10/14  L1-2:There is chronic relatively advanced disc degeneration with disc space narrowing, disc dehydration, disc narrowing, endplate osteophytes, and degenerative vertebral endplate marrow change. There is a diffuse 2-mm posterior disc bulge with a superimposed4-mm right posterior disc extrusion causing mild right foraminal stenosis. There is no impingement of the right L1 nerve root and there has been no change.  L2-3: There is severe and chronic disc degeneration with severe disc space narrowing, disc dehydration, degenerative vertebral endplate marrow change, and vertebral endplate osteophytes. There is a diffuse 2-mm posterior disc bulge with a superimposed 4-mm right posterior disc extrusion causing mild right foraminal stenosis. There is no impingement of the right L2 nerve root and there has been no  significant change.  L3-4: There is severe disc degeneration which has progressed since the prior study. There is severe disc space narrowing, disc dehydration, degenerative vertebral endplate marrow change and endplate osteophytes. There is also mild posterior subluxation of L3over a distance of 4 mm. There is a broad-based 5-mm posterior disc extrusion. There is degenerative facet arthrosis with ligamentum flavum thickening. The combination of facet arthrosis and disc extrusion results in relatively severe spinal canal stenosis with compression of the thecal sac to an AP diameter of 5 mm, moderate right foraminal stenosis, and severe left foraminal stenosis. The spinal stenosis has also progressed since the prior study.  L4-5:There is a diffuse posterior disc bulge with a superimposed 3-mm left posterior paracentral disc protrusion causing left paracentral thecal sac compression. There is moderate left and mild right foraminal stenosis and there has been no significant change. There is mild degenerative facet arthrosis with ligamentum flavum thickening and small joint effusions.  L5-S1:There is no significant compromise of the spinal canal or foramina and there has been no change.    X-ray cervical spine 6/8/15  There is loss of normal cervical lordosis which may be positional or related muscular strain. Intervertebral disk height loss is noted at the C5-C6 C6-C7 levels and to a lesser degree C4-C5 and C7-T1 levels. Vertebral body alignment appears otherwise adequate. Vertebral body heights appear well-preserved. The atlas and odontoid appear in good relationship to each other. Osseous neuroforaminal narrowing is noted at the C3-C4 C4-C5 C5-C6 levels on the left and at the C4-C5 C5-C6 and C6-C7 levels on the right     07/10/2018 MRI cervical spine [facility] report  C2-3 broad-based signal asymmetry at the left subarticular and foraminal zone reflecting implant spondylosis and disc bulge complex, mild to  moderate left asymmetric foraminal narrowing, ectasia of the left vertebral artery, contralateral right asymmetric facet hypertrophy, right foramen widely narrowed, disc partially desiccated without collapse  C3-4 moderate to severe left greater than right foraminal narrowing secondary to uncinate joint hypertrophic signal alteration, disc partially desiccated  C4-5 endplate spondylosis moderate diffuse disc bulge noted, broad-based right paracentral disc herniation, asymmetric flattening of the ventral cord surface at the right paracentral zone, high-grade if lateral right foraminal narrowing, AP diameter of canal 9.9 mm, contralaterally, high grade left foraminal narrowing secondary to facet greater than uncinate joint hypertrophic signal alteration, disc desiccated and mildly narrowed  C5-6 disc space narrowing evident with generalized in Nigel spondylosis and concentric inter pose disc bulge complex, high-grade bilateral foraminal narrowing, flattening of the cord surface, AP diameter 7.9 mm, symmetric facet arthrosis, disc diffusely desiccated and narrowed  C6-7 moderate endplate spondylosis and concentric disc bulge complex, high-grade bilateral foraminal compromise, flattening of the anterior cord surface, AP diameter 8.8 mm, facet arthrosis symmetric, disc desiccated and narrowed  C7-T1 less than 2 mm depth disc bulge    11/06/2018 MRI lumbar spine  T12/L1: There is no evidence of disc protrusion, canal or foraminal stenosis.  L1/L2: Right posterolateral disc protrusion is evident and there is mild distortion of the right anterolateral canal.  Degenerative facet changes are noted bilaterally.  The left foramen is intact.  L2/L3: There is annular disc bulging with a superimposed right posterolateral disc extrusion.  This is slightly more pronounced on the previous examination and there is mild effacement of the anterior thecal sac and moderate narrowing the right foramen.  L3/L4: There is annular disc bulge  and osteophyte formation with a superimposed small left posterolateral disc extrusion.  There is moderate narrowing the central canal and left foramen.  This is little changed relative the previous examination.  Degenerative facet changes are noted.  L5/S1: There is a small central disc extrusion superimposed upon annular disc bulging.  This is slightly less pronounced than was seen previously.  Degenerative facet changes are noted.  L5/S1: Moderate degenerative facet changes are noted and there is mild effacement of the anterior thecal sac.  There is ligamentous hypertrophy particularly to the left in the posterior canal and there is left greater than right foraminal narrowing.  This is mildly worsened relative prior exam.      ASSESSMENT:  The patient is a 59 year old woman with a history of migraines, carpal tunnel syndrome and low back pain since her early 20s who returns in follow up.   1. Chronic pain disorder     2. Lumbar radiculitis         Plan:  1.  She did well following the cervical YARON.  This can be repeated in the future if necessary.  2.  She has seen Neurosurgery and an operation was not recommended.  She continues to have severe back pain although she has completed interventional procedures, physical therapy and taken medication.  We had a long discussion regarding spinal cord stimulation and I have given her information from [employer].  She would like to think about this and will call if she decides to schedule a trial.  If so, I will order a back brace with lateral support.  We discussed the risks involved.    Greater than 50% of this 25 min visit was spent counseling the patient.

## 2019-12-22 DIAGNOSIS — K21.9 GASTROESOPHAGEAL REFLUX DISEASE WITHOUT ESOPHAGITIS: ICD-10-CM

## 2019-12-23 RX ORDER — PANTOPRAZOLE SODIUM 40 MG/1
TABLET, DELAYED RELEASE ORAL
Qty: 90 TABLET | Refills: 1 | Status: SHIPPED | OUTPATIENT
Start: 2019-12-23 | End: 2020-05-11 | Stop reason: SDUPTHER

## 2019-12-30 RX ORDER — ESZOPICLONE 2 MG/1
TABLET, FILM COATED ORAL
Qty: 30 TABLET | Refills: 0 | Status: SHIPPED | OUTPATIENT
Start: 2019-12-30 | End: 2020-01-27

## 2019-12-30 NOTE — PROGRESS NOTES
Refill Routing Note     Medication(s) are not appropriate for processing by [facility]:    Medication Outside of Protocol    Appointments  past 12m or future 3m with PCP    Date Provider   Last Visit   10/3/2019 CARRIE Espinosa MD   Next Visit   3/4/2020 CARRIE Espinosa MD           Automatic Epic Protocol Generated Data:    Requested Prescriptions   Pending Prescriptions Disp Refills    eszopiclone (LUNESTA) 2 MG Tab [Pharmacy Med Name: ESZOPICLONE 2MG TABLETS] 30 tablet      Sig: TAKE 1 TABLET BY MOUTH EVERY EVENING       There is no refill protocol information for this order

## 2020-01-02 RX ORDER — TRAMADOL HYDROCHLORIDE 50 MG/1
TABLET ORAL
Qty: 60 TABLET | Refills: 2 | Status: SHIPPED | OUTPATIENT
Start: 2020-01-02 | End: 2020-03-05

## 2020-01-09 DIAGNOSIS — K21.9 GASTROESOPHAGEAL REFLUX DISEASE WITHOUT ESOPHAGITIS: ICD-10-CM

## 2020-01-10 ENCOUNTER — PATIENT MESSAGE (OUTPATIENT)
Dept: FAMILY MEDICINE | Facility: CLINIC | Age: 60
End: 2020-01-10

## 2020-01-10 RX ORDER — FAMOTIDINE 40 MG/1
40 TABLET, FILM COATED ORAL DAILY
Qty: 90 TABLET | Refills: 3 | Status: SHIPPED | OUTPATIENT
Start: 2020-01-10 | End: 2020-01-15 | Stop reason: CLARIF

## 2020-01-10 NOTE — TELEPHONE ENCOUNTER
I have reviewed and agree with the assessment below with changes. Famotidine ordered by PCP 2/2 to formulary change reported by patient (1/10/20). Resolved DDI b/w ranitidine and famotidine.    Requested Prescriptions     Refused Prescriptions Disp Refills    ranitidine (ZANTAC) 300 MG tablet [Pharmacy Med Name: RANITIDINE 300MG TABLETS] 180 tablet 1     Sig: TAKE 1 TABLET(300 MG) BY MOUTH TWICE DAILY     Refused By: KILO TAPIA     Reason for Refusal: Refill not appropriate

## 2020-01-10 NOTE — PROGRESS NOTES
Refill Authorization Note     is requesting a refill authorization.    Brief assessment and rationale for refill: APPROVE: prr                Medication reconciliation completed: No                         Comments:   Requested Prescriptions   Pending Prescriptions Disp Refills    ranitidine (ZANTAC) 300 MG tablet [Pharmacy Med Name: RANITIDINE 300MG TABLETS] 180 tablet 1     Sig: TAKE 1 TABLET(300 MG) BY MOUTH TWICE DAILY       Gastroenterology:  H2 Antagonists Passed - 1/9/2020  7:43 PM        Passed - Patient is at least 18 years old        Passed - Office visit in past 12 months or future 90 days     Recent Outpatient Visits            3 weeks ago PND (post-nasal drip)    [facility] Jenny Richey, NP    4 weeks ago Chronic pain disorder    [place] [facility] JERED Cruz    1 month ago Intractable chronic migraine without aura and without status migrainosus    [facility] Linda Melgar, NP    2 months ago Lumbar spondylosis    [place] [facility] Vaughn Beckwith MD    2 months ago Bilateral primary osteoarthritis of knee    [facility] Laurel Chaudhary, JEFFREY          Future Appointments              In 1 month Neva Cortes [place] [facility] [place]    In 1 month CARRIE Espinosa MD [facility]    In 2 months LAB, [place] [facility]    In 2 months [facility]                Passed - Cr is 1.4 or below and within 360 days     Creatinine   Date Value Ref Range Status   07/11/2019 1.3 0.5 - 1.4 mg/dL Final   05/22/2019 1.54 (H) 0.50 - 1.40 mg/dL Final   02/08/2019 0.9 0.5 - 1.4 mg/dL Final              Passed - eGFR is 50 or above and within 360 days     eGFR if non    Date Value Ref Range Status   07/11/2019 45.6 (A) >60 mL/min/1.73 m^2 Final     Comment:     Calculation used to obtain the  estimated glomerular filtration  rate (eGFR) is the CKD-EPI equation.      05/22/2019 37 (A) >60 mL/min/1.73 m^2 Final     Comment:     Calculation used to obtain the estimated glomerular filtration  rate (eGFR) is the CKD-EPI equation.      02/08/2019 >60.0 >60 mL/min/1.73 m^2 Final     Comment:     Calculation used to obtain the estimated glomerular filtration  rate (eGFR) is the CKD-EPI equation.        eGFR if    Date Value Ref Range Status   07/11/2019 52.6 (A) >60 mL/min/1.73 m^2 Final   05/22/2019 43 (A) >60 mL/min/1.73 m^2 Final   02/08/2019 >60.0 >60 mL/min/1.73 m^2 Final

## 2020-01-13 ENCOUNTER — PATIENT MESSAGE (OUTPATIENT)
Dept: FAMILY MEDICINE | Facility: CLINIC | Age: 60
End: 2020-01-13

## 2020-01-13 DIAGNOSIS — K21.9 GASTROESOPHAGEAL REFLUX DISEASE WITHOUT ESOPHAGITIS: Primary | ICD-10-CM

## 2020-01-14 RX ORDER — FAMOTIDINE 40 MG/1
40 TABLET, FILM COATED ORAL DAILY
Qty: 90 TABLET | Refills: 1 | Status: SHIPPED | OUTPATIENT
Start: 2020-01-14 | End: 2020-01-15 | Stop reason: CLARIF

## 2020-01-15 ENCOUNTER — PATIENT MESSAGE (OUTPATIENT)
Dept: FAMILY MEDICINE | Facility: CLINIC | Age: 60
End: 2020-01-15

## 2020-01-15 DIAGNOSIS — K21.9 GASTROESOPHAGEAL REFLUX DISEASE WITHOUT ESOPHAGITIS: Primary | ICD-10-CM

## 2020-01-15 RX ORDER — HYDROCHLOROTHIAZIDE 25 MG/1
TABLET ORAL
Qty: 90 TABLET | Refills: 0 | Status: SHIPPED | OUTPATIENT
Start: 2020-01-15 | End: 2020-04-02

## 2020-01-15 RX ORDER — CIMETIDINE 400 MG/1
400 TABLET, FILM COATED ORAL 2 TIMES DAILY
Qty: 60 TABLET | Refills: 3 | Status: SHIPPED | OUTPATIENT
Start: 2020-01-15 | End: 2020-05-12

## 2020-01-15 NOTE — TELEPHONE ENCOUNTER
Please schedule patient for Labs (BMP)    Please also check with your provider if any further labs need to be added and scheduled together.    Thanks !

## 2020-01-15 NOTE — PROGRESS NOTES
Refill Authorization Note     is requesting a refill authorization.    Brief assessment and rationale for refill: APPROVE: needs labs           Medication Therapy Plan: labs outdated; NTBS(bmp); approve 3 more     Medication reconciliation completed: No                         Comments:   Requested Prescriptions   Pending Prescriptions Disp Refills    hydroCHLOROthiazide (HYDRODIURIL) 25 MG tablet [Pharmacy Med Name: HYDROCHLOROTHIAZIDE 25MG TABLETS] 90 tablet 0     Sig: TAKE 1 TABLET(25 MG) BY MOUTH EVERY DAY       Cardiovascular: Diuretics - Thiazide Failed - 1/14/2020 12:34 PM        Failed - Cr is 1.4 or below and within 180 days     Creatinine   Date Value Ref Range Status   07/11/2019 1.3 0.5 - 1.4 mg/dL Final   05/22/2019 1.54 (H) 0.50 - 1.40 mg/dL Final   02/08/2019 0.9 0.5 - 1.4 mg/dL Final              Failed - K in normal range and within 180 days     Potassium   Date Value Ref Range Status   07/11/2019 4.2 3.5 - 5.1 mmol/L Final   05/22/2019 3.6 3.5 - 5.1 mmol/L Final   02/08/2019 4.2 3.5 - 5.1 mmol/L Final              Failed - Na in normal range and within 180 days     Sodium   Date Value Ref Range Status   07/11/2019 141 136 - 145 mmol/L Final   05/22/2019 138 136 - 145 mmol/L Final   02/08/2019 138 136 - 145 mmol/L Final              Failed - eGFR within 180 days     eGFR if non    Date Value Ref Range Status   07/11/2019 45.6 (A) >60 mL/min/1.73 m^2 Final     Comment:     Calculation used to obtain the estimated glomerular filtration  rate (eGFR) is the CKD-EPI equation.      05/22/2019 37 (A) >60 mL/min/1.73 m^2 Final     Comment:     Calculation used to obtain the estimated glomerular filtration  rate (eGFR) is the CKD-EPI equation.      02/08/2019 >60.0 >60 mL/min/1.73 m^2 Final     Comment:     Calculation used to obtain the estimated glomerular filtration  rate (eGFR) is the CKD-EPI equation.        eGFR if    Date Value Ref Range Status   07/11/2019  52.6 (A) >60 mL/min/1.73 m^2 Final   05/22/2019 43 (A) >60 mL/min/1.73 m^2 Final   02/08/2019 >60.0 >60 mL/min/1.73 m^2 Final              Passed - Patient is at least 18 years old        Passed - Last BP in normal range within 360 days     BP Readings from Last 3 Encounters:   12/16/19 120/86   12/12/19 (!) 115/54   12/02/19 110/80              Passed - Office visit in past 12 months or future 90 days     Recent Outpatient Visits            1 month ago PND (post-nasal drip)    [facility] Jenny Richey, NP    1 month ago Chronic pain disorder    [place] [facility] JERED Cruz    1 month ago Intractable chronic migraine without aura and without status migrainosus    [facility] Linda Melgar, NP    2 months ago Lumbar spondylosis    [place] [facility] Vaughn Beckwith MD    3 months ago Bilateral primary osteoarthritis of knee    [place] [facility] Laurel Chaudhary, APRN          Future Appointments              In 3 weeks Neva Cortes [place] [facility] [place] [place]    In 1 month [facility]    In 2 months LAB, [place] [facility]    In 2 months [facility] [place] [facility]                Passed - Ca in normal range and within 360 days     Calcium   Date Value Ref Range Status   07/11/2019 9.6 8.7 - 10.5 mg/dL Final   05/22/2019 8.9 8.4 - 10.2 mg/dL Final   02/08/2019 9.2 8.7 - 10.5 mg/dL Final

## 2020-01-16 RX ORDER — HYDROCHLOROTHIAZIDE 25 MG/1
TABLET ORAL
Qty: 90 TABLET | Refills: 0 | OUTPATIENT
Start: 2020-01-16

## 2020-01-16 NOTE — PROGRESS NOTES
Quick DC. Duplicate Request  Refill Authorization Note     is requesting a refill authorization.    Brief assessment and rationale for refill: QUICK DC: duplicate request          Medication Therapy Plan: Duplicate request, quick dc; Handled in a previous encounter                              Comments:   Last Prescribed Info:    Authorizing Provider: CARRIE Espinosa MD RADHA #:  [id number] NPI:  [id number]    Ordering User:  Parag Babin PharmD               Original Order:  hydroCHLOROthiazide (HYDRODIURIL) 25 MG tablet [[id number]]      Pharmacy:  [id number] [id number] AT [facility] [address]        hydroCHLOROthiazide (HYDRODIURIL) 25 MG tablet 90 tablet 0 1/15/2020     Sig: TAKE 1 TABLET(25 MG) BY MOUTH EVERY DAY    Sent to pharmacy as: hydroCHLOROthiazide (HYDRODIURIL) 25 MG tablet    Notes to Pharmacy: Please inactivate all prior scripts with same name and strength including on holds.    E-Prescribing Status: Receipt confirmed by pharmacy (1/15/2020  3:28 PM CST)

## 2020-01-20 ENCOUNTER — OFFICE VISIT (OUTPATIENT)
Dept: PAIN MEDICINE | Facility: CLINIC | Age: 60
End: 2020-01-20
Payer: COMMERCIAL

## 2020-01-20 VITALS
RESPIRATION RATE: 18 BRPM | TEMPERATURE: 98 F | WEIGHT: 227.75 LBS | BODY MASS INDEX: 39.09 KG/M2 | SYSTOLIC BLOOD PRESSURE: 132 MMHG | DIASTOLIC BLOOD PRESSURE: 75 MMHG | OXYGEN SATURATION: 99 % | HEART RATE: 98 BPM

## 2020-01-20 DIAGNOSIS — M54.16 LUMBAR RADICULITIS: ICD-10-CM

## 2020-01-20 DIAGNOSIS — M47.816 LUMBAR SPONDYLOSIS: ICD-10-CM

## 2020-01-20 DIAGNOSIS — G89.4 CHRONIC PAIN DISORDER: Primary | ICD-10-CM

## 2020-01-20 DIAGNOSIS — M51.36 DDD (DEGENERATIVE DISC DISEASE), LUMBAR: ICD-10-CM

## 2020-01-20 PROCEDURE — 3008F BODY MASS INDEX DOCD: CPT | Mod: CPTII,S$GLB,, | Performed by: PHYSICIAN ASSISTANT

## 2020-01-20 PROCEDURE — 99999 PR PBB SHADOW E&M-EST. PATIENT-LVL IV: CPT | Mod: PBBFAC,,, | Performed by: PHYSICIAN ASSISTANT

## 2020-01-20 PROCEDURE — 99213 PR OFFICE/OUTPT VISIT, EST, LEVL III, 20-29 MIN: ICD-10-PCS | Mod: S$GLB,,, | Performed by: PHYSICIAN ASSISTANT

## 2020-01-20 PROCEDURE — 3008F PR BODY MASS INDEX (BMI) DOCUMENTED: ICD-10-PCS | Mod: CPTII,S$GLB,, | Performed by: PHYSICIAN ASSISTANT

## 2020-01-20 PROCEDURE — 3075F SYST BP GE 130 - 139MM HG: CPT | Mod: CPTII,S$GLB,, | Performed by: PHYSICIAN ASSISTANT

## 2020-01-20 PROCEDURE — 3078F DIAST BP <80 MM HG: CPT | Mod: CPTII,S$GLB,, | Performed by: PHYSICIAN ASSISTANT

## 2020-01-20 PROCEDURE — 3078F PR MOST RECENT DIASTOLIC BLOOD PRESSURE < 80 MM HG: ICD-10-PCS | Mod: CPTII,S$GLB,, | Performed by: PHYSICIAN ASSISTANT

## 2020-01-20 PROCEDURE — 99999 PR PBB SHADOW E&M-EST. PATIENT-LVL IV: ICD-10-PCS | Mod: PBBFAC,,, | Performed by: PHYSICIAN ASSISTANT

## 2020-01-20 PROCEDURE — 99213 OFFICE O/P EST LOW 20 MIN: CPT | Mod: S$GLB,,, | Performed by: PHYSICIAN ASSISTANT

## 2020-01-20 PROCEDURE — 3075F PR MOST RECENT SYSTOLIC BLOOD PRESS GE 130-139MM HG: ICD-10-PCS | Mod: CPTII,S$GLB,, | Performed by: PHYSICIAN ASSISTANT

## 2020-01-20 RX ORDER — DULOXETIN HYDROCHLORIDE 60 MG/1
60 CAPSULE, DELAYED RELEASE ORAL DAILY
Qty: 30 CAPSULE | Refills: 2 | Status: SHIPPED | OUTPATIENT
Start: 2020-01-20 | End: 2020-02-07

## 2020-01-20 RX ORDER — DULOXETIN HYDROCHLORIDE 30 MG/1
30 CAPSULE, DELAYED RELEASE ORAL DAILY
Qty: 7 CAPSULE | Refills: 0 | Status: SHIPPED | OUTPATIENT
Start: 2020-01-20 | End: 2020-02-07

## 2020-01-20 RX ORDER — MELOXICAM 15 MG/1
TABLET ORAL
Qty: 90 TABLET | Refills: 1 | Status: SHIPPED | OUTPATIENT
Start: 2020-01-20 | End: 2020-07-09

## 2020-01-26 NOTE — PROGRESS NOTES
CHIEF COMPLAINT/REASON FOR VISIT: low back pain, neck pain    History of present illness: The patient is a 59 year old woman with a history of migraines, carpal tunnel syndrome and low back pain since her early 20s.  She returns in follow-up today with continued low back pain. She denies any major radiation at this time.  Although she does report it continues to be severe.  It is worse with standing and walking, mildly improved with tramadol and meloxicam.  She reports interval weakness in her right leg.  She denies numbness, bladder or incontinence.    Pain intervention history: She tried Neurontin for her leg/thigh pain with no success. She only takes Advil with about 30% relief. She is status post TFESI bilaterally to L3 on 12/6/2011 with no relief. Past history of status post L4/5 YARON on 5/25/11 with about 90% relief that lasted 3 weeks. She is status post 2 bilateral L3 transforaminal injections with 3 weeks relief each time. Her current pain medications include Celexa 40 mg once a day, Aleve 220 mg twice a day, Lyrica as previously stated, Zanaflex 5 mg at night. She uses her TENS at home, when she thinks about it.  She is status post L4/5 interlaminar epidural steroid injection on 1/8/14 with 90% relief.  She is status post L5/S1 interlaminar epidural steroid injection on 6/9/14 with moderate relief only on the right low back and right leg lasting 2 weeks.  She is status post bilateral L3/4 and L4/5 facet joint injections on 4/13/15 with initially 100% relief of her back pain and 80% relief of her left lateral hip and lateral thigh pain, now reporting at least 50% relief of both.  She is status post bilateral L2, 3 and 4 medial branch radiofrequency ablation on 5/20/15 with 60% relief.   She is status post bilateral L2, 3 and 4 medial branch radiofrequency ablation on 6/20/16 with 50-70% relief.  She is status post L5/S1 interlaminar epidural steroid injection on 3/10/17 with 50% relief.  She is status post  bilateral L2, 3, 4 medial branch radiofrequency ablation on 7/17/17 with about 75% relief.  She is status post bilateral L2, 3, 4 medial branch radiofrequency ablation on 07/17/2018 with 100% relief of her leg pain and 70-75% relief of her back pain.  She is status post C7-T1 cervical interlaminar epidural steroid injection on 09/25/2018 with 80% relief.   She is status post L5/S1 interlaminar epidural steroid injection on 12/27/2018 with 60% relief.  She is status post bilateral L2, 3 and 4 medial branch radiofrequency ablation on 07/29/2019 with 40% relief.  She is status post L5/S1 interlaminar epidural steroid injection on 09/04/2019 with almost 100% relief of her right leg pain but 0% relief for back pain. She is status post C7-T1 interlaminar epidural steroid injection on 10/24/2019 with 70% relief.    ROS: She reports back pain only.  Balance of review of systems is negative.    Medical, surgical, family and social history reviewed elsewhere in record.     Medications/Allergies: See med card      Vitals:    01/20/20 0856   BP: 132/75   Pulse: 98   Resp: 18   Temp: 98.4 °F (36.9 °C)   TempSrc: Oral   SpO2: 99%   Weight: 103.3 kg (227 lb 11.8 oz)   PainSc:   4   PainLoc: Back        PHYSICAL EXAM:  Gen: A and O x3, pleasant, well-groomed  HEENT: PERRLA  CVS: Regular rate and rhythm, normal S1 and S2, no murmurs.  Resp: Clear to auscultation bilaterally, no wheezes or rales.  Musculoskeletal: Able to heel walk, toe walk. No antalgic gait.     Neuro:  Upper extremities: 5/5 strength bilaterally   Lower extremities: 5/5 strength bilaterally  Reflexes: Brachioradialis 2+, Bicep 2+, Tricep 2+. Patellar 2+, Achilles 2+.  Sensory: Intact and symmetrical to light touch and pinprick in C2-T1 dermatomes bilaterally. Intact and symmetrical to light touch and pinprick in L2-S1 dermatomes bilaterally.    Lumbar spine:  Lumbar spine: ROM is moderately limited with flexion and extension with increased bilateral low back pain  during extension and oblique extension.  Supine straight leg raise causes increased right lateral thigh pain.  Internal and external rotation of the hip causes no increased pain in either side.  Myofascial exam:  Mild tenderness to palpation to the right greater than left lumbar paraspinous muscles.         IMAGING:   MRI L-SPINE 5/10/11   IMPRESSION: AT L3-4, THERE IS CIRCUMFERENTIAL DISC PROTRUSION FOCALLY MORE PROMINENT TO THE LEFT AS WELL AS A SMALL DISC HERNIATION PROTRUDING INFERIORLY BEHIND L4 TO THE LEFT OF MIDLINE. THIS PRODUCES SPINAL CANAL AND BILATERAL NEURAL FORAMINAL STENOSIS, LEFT GREATER THAN RIGHT. AT L4-5, THERE IS CIRCUMFERENTIAL DISC PROTRUSION FOCALLY MORE PROMINENT TO THE RIGHT WITH MILD SPINAL CANAL AND RIGHT NEURAL FORAMINAL STENOSIS. AT L1-2 AND L2-3 ALTHOUGH THERE ARE DISC PROTRUSIONS IDENTIFIED, SIGNIFICANT STENOSIS IS NOT SEEN.     MRI lumbar spine 7/10/14  L1-2:There is chronic relatively advanced disc degeneration with disc space narrowing, disc dehydration, disc narrowing, endplate osteophytes, and degenerative vertebral endplate marrow change. There is a diffuse 2-mm posterior disc bulge with a superimposed4-mm right posterior disc extrusion causing mild right foraminal stenosis. There is no impingement of the right L1 nerve root and there has been no change.  L2-3: There is severe and chronic disc degeneration with severe disc space narrowing, disc dehydration, degenerative vertebral endplate marrow change, and vertebral endplate osteophytes. There is a diffuse 2-mm posterior disc bulge with a superimposed 4-mm right posterior disc extrusion causing mild right foraminal stenosis. There is no impingement of the right L2 nerve root and there has been no significant change.  L3-4: There is severe disc degeneration which has progressed since the prior study. There is severe disc space narrowing, disc dehydration, degenerative vertebral endplate marrow change and endplate osteophytes.  There is also mild posterior subluxation of L3over a distance of 4 mm. There is a broad-based 5-mm posterior disc extrusion. There is degenerative facet arthrosis with ligamentum flavum thickening. The combination of facet arthrosis and disc extrusion results in relatively severe spinal canal stenosis with compression of the thecal sac to an AP diameter of 5 mm, moderate right foraminal stenosis, and severe left foraminal stenosis. The spinal stenosis has also progressed since the prior study.  L4-5:There is a diffuse posterior disc bulge with a superimposed 3-mm left posterior paracentral disc protrusion causing left paracentral thecal sac compression. There is moderate left and mild right foraminal stenosis and there has been no significant change. There is mild degenerative facet arthrosis with ligamentum flavum thickening and small joint effusions.  L5-S1:There is no significant compromise of the spinal canal or foramina and there has been no change.    X-ray cervical spine 6/8/15  There is loss of normal cervical lordosis which may be positional or related muscular strain. Intervertebral disk height loss is noted at the C5-C6 C6-C7 levels and to a lesser degree C4-C5 and C7-T1 levels. Vertebral body alignment appears otherwise adequate. Vertebral body heights appear well-preserved. The atlas and odontoid appear in good relationship to each other. Osseous neuroforaminal narrowing is noted at the C3-C4 C4-C5 C5-C6 levels on the left and at the C4-C5 C5-C6 and C6-C7 levels on the right     07/10/2018 MRI cervical spine [place] report  C2-3 broad-based signal asymmetry at the left subarticular and foraminal zone reflecting implant spondylosis and disc bulge complex, mild to moderate left asymmetric foraminal narrowing, ectasia of the left vertebral artery, contralateral right asymmetric facet hypertrophy, right foramen widely narrowed, disc partially desiccated without collapse  C3-4 moderate to severe left  greater than right foraminal narrowing secondary to uncinate joint hypertrophic signal alteration, disc partially desiccated  C4-5 endplate spondylosis moderate diffuse disc bulge noted, broad-based right paracentral disc herniation, asymmetric flattening of the ventral cord surface at the right paracentral zone, high-grade if lateral right foraminal narrowing, AP diameter of canal 9.9 mm, contralaterally, high grade left foraminal narrowing secondary to facet greater than uncinate joint hypertrophic signal alteration, disc desiccated and mildly narrowed  C5-6 disc space narrowing evident with generalized in Nigel spondylosis and concentric inter pose disc bulge complex, high-grade bilateral foraminal narrowing, flattening of the cord surface, AP diameter 7.9 mm, symmetric facet arthrosis, disc diffusely desiccated and narrowed  C6-7 moderate endplate spondylosis and concentric disc bulge complex, high-grade bilateral foraminal compromise, flattening of the anterior cord surface, AP diameter 8.8 mm, facet arthrosis symmetric, disc desiccated and narrowed  C7-T1 less than 2 mm depth disc bulge    11/06/2018 MRI lumbar spine  T12/L1: There is no evidence of disc protrusion, canal or foraminal stenosis.  L1/L2: Right posterolateral disc protrusion is evident and there is mild distortion of the right anterolateral canal.  Degenerative facet changes are noted bilaterally.  The left foramen is intact.  L2/L3: There is annular disc bulging with a superimposed right posterolateral disc extrusion.  This is slightly more pronounced on the previous examination and there is mild effacement of the anterior thecal sac and moderate narrowing the right foramen.  L3/L4: There is annular disc bulge and osteophyte formation with a superimposed small left posterolateral disc extrusion.  There is moderate narrowing the central canal and left foramen.  This is little changed relative the previous examination.  Degenerative facet changes  are noted.  L5/S1: There is a small central disc extrusion superimposed upon annular disc bulging.  This is slightly less pronounced than was seen previously.  Degenerative facet changes are noted.  L5/S1: Moderate degenerative facet changes are noted and there is mild effacement of the anterior thecal sac.  There is ligamentous hypertrophy particularly to the left in the posterior canal and there is left greater than right foraminal narrowing.  This is mildly worsened relative prior exam.      ASSESSMENT:  The patient is a 59 year old woman with a history of migraines, carpal tunnel syndrome and low back pain since her early 20s who returns in follow up.   1. Chronic pain disorder     2. Lumbar radiculitis     3. DDD (degenerative disc disease), lumbar     4. Lumbar spondylosis         Plan:  1.  We again discussed spinal cord stimulation and she would like to hold off on this for now.  We had discussed trying Cymbalta and I provided a prescription for 30 milligrams increasing to 60 milligrams after 1 week if tolerated.  If her pain worsens or she does not have improvement with medication, we may consider a trial with [facility].  2.  Follow-up in 2 months or sooner as needed.

## 2020-01-27 RX ORDER — ESZOPICLONE 2 MG/1
TABLET, FILM COATED ORAL
Qty: 30 TABLET | Refills: 1 | Status: SHIPPED | OUTPATIENT
Start: 2020-01-27 | End: 2020-03-04 | Stop reason: SINTOL

## 2020-02-05 ENCOUNTER — PATIENT MESSAGE (OUTPATIENT)
Dept: PAIN MEDICINE | Facility: CLINIC | Age: 60
End: 2020-02-05

## 2020-02-05 NOTE — TELEPHONE ENCOUNTER
Please let the patient know that there are other options, however there are interactions/risks with the medications that she takes and it may not be worth the possible side effects or interactions.  She has failed gabapentin, Lyrica, Topamax and Cymbalta.  I do not know how beneficial other anti neuropathic medications will be especially if there are interactions.  If she would like to discuss this further please make a follow-up appointment to review.

## 2020-02-07 ENCOUNTER — PATIENT OUTREACH (OUTPATIENT)
Dept: ADMINISTRATIVE | Facility: OTHER | Age: 60
End: 2020-02-07

## 2020-02-07 ENCOUNTER — OFFICE VISIT (OUTPATIENT)
Dept: PAIN MEDICINE | Facility: CLINIC | Age: 60
End: 2020-02-07
Payer: COMMERCIAL

## 2020-02-07 VITALS
SYSTOLIC BLOOD PRESSURE: 143 MMHG | HEART RATE: 100 BPM | TEMPERATURE: 98 F | BODY MASS INDEX: 38.9 KG/M2 | DIASTOLIC BLOOD PRESSURE: 71 MMHG | WEIGHT: 226.63 LBS | RESPIRATION RATE: 20 BRPM | OXYGEN SATURATION: 100 %

## 2020-02-07 DIAGNOSIS — M54.16 LUMBAR RADICULITIS: ICD-10-CM

## 2020-02-07 DIAGNOSIS — M47.816 LUMBAR SPONDYLOSIS: ICD-10-CM

## 2020-02-07 DIAGNOSIS — M51.36 DDD (DEGENERATIVE DISC DISEASE), LUMBAR: ICD-10-CM

## 2020-02-07 DIAGNOSIS — G89.4 CHRONIC PAIN DISORDER: Primary | ICD-10-CM

## 2020-02-07 PROCEDURE — 99213 PR OFFICE/OUTPT VISIT, EST, LEVL III, 20-29 MIN: ICD-10-PCS | Mod: S$GLB,,, | Performed by: PHYSICIAN ASSISTANT

## 2020-02-07 PROCEDURE — 3077F SYST BP >= 140 MM HG: CPT | Mod: CPTII,S$GLB,, | Performed by: PHYSICIAN ASSISTANT

## 2020-02-07 PROCEDURE — 3008F BODY MASS INDEX DOCD: CPT | Mod: CPTII,S$GLB,, | Performed by: PHYSICIAN ASSISTANT

## 2020-02-07 PROCEDURE — 99999 PR PBB SHADOW E&M-EST. PATIENT-LVL IV: CPT | Mod: PBBFAC,,, | Performed by: PHYSICIAN ASSISTANT

## 2020-02-07 PROCEDURE — 3078F PR MOST RECENT DIASTOLIC BLOOD PRESSURE < 80 MM HG: ICD-10-PCS | Mod: CPTII,S$GLB,, | Performed by: PHYSICIAN ASSISTANT

## 2020-02-07 PROCEDURE — 3008F PR BODY MASS INDEX (BMI) DOCUMENTED: ICD-10-PCS | Mod: CPTII,S$GLB,, | Performed by: PHYSICIAN ASSISTANT

## 2020-02-07 PROCEDURE — 3077F PR MOST RECENT SYSTOLIC BLOOD PRESSURE >= 140 MM HG: ICD-10-PCS | Mod: CPTII,S$GLB,, | Performed by: PHYSICIAN ASSISTANT

## 2020-02-07 PROCEDURE — 3078F DIAST BP <80 MM HG: CPT | Mod: CPTII,S$GLB,, | Performed by: PHYSICIAN ASSISTANT

## 2020-02-07 PROCEDURE — 99213 OFFICE O/P EST LOW 20 MIN: CPT | Mod: S$GLB,,, | Performed by: PHYSICIAN ASSISTANT

## 2020-02-07 PROCEDURE — 99999 PR PBB SHADOW E&M-EST. PATIENT-LVL IV: ICD-10-PCS | Mod: PBBFAC,,, | Performed by: PHYSICIAN ASSISTANT

## 2020-02-07 RX ORDER — GABAPENTIN 300 MG/1
300 CAPSULE ORAL 3 TIMES DAILY
Qty: 90 CAPSULE | Refills: 2 | Status: SHIPPED | OUTPATIENT
Start: 2020-02-07 | End: 2020-04-08 | Stop reason: SDUPTHER

## 2020-02-07 NOTE — PROGRESS NOTES
CHIEF COMPLAINT/REASON FOR VISIT: low back pain, neck pain    History of present illness: The patient is a 59 year old woman with a history of migraines, carpal tunnel syndrome and low back pain since her early 20s.  She returns in follow-up today to discuss medication.  I started her on Cymbalta last visit.  She took a few days of 30 milligrams and reported significant relief of her pain.  However, she started to develop significant pressure her ears.  This went away when she discontinue the medication.  She continues to complain of severe low back pain that is worse with standing and walking and improved with tramadol and meloxicam.  She reports intermittent weakness in her right leg but denies numbness.  She denies bladder or bowel incontinence.    Pain intervention history: She tried Neurontin for her leg/thigh pain with no success. She only takes Advil with about 30% relief. She is status post TFESI bilaterally to L3 on 12/6/2011 with no relief. Past history of status post L4/5 YARON on 5/25/11 with about 90% relief that lasted 3 weeks. She is status post 2 bilateral L3 transforaminal injections with 3 weeks relief each time. Her current pain medications include Celexa 40 mg once a day, Aleve 220 mg twice a day, Lyrica as previously stated, Zanaflex 5 mg at night. She uses her TENS at home, when she thinks about it.  She is status post L4/5 interlaminar epidural steroid injection on 1/8/14 with 90% relief.  She is status post L5/S1 interlaminar epidural steroid injection on 6/9/14 with moderate relief only on the right low back and right leg lasting 2 weeks.  She is status post bilateral L3/4 and L4/5 facet joint injections on 4/13/15 with initially 100% relief of her back pain and 80% relief of her left lateral hip and lateral thigh pain, now reporting at least 50% relief of both.  She is status post bilateral L2, 3 and 4 medial branch radiofrequency ablation on 5/20/15 with 60% relief.   She is status post  bilateral L2, 3 and 4 medial branch radiofrequency ablation on 6/20/16 with 50-70% relief.  She is status post L5/S1 interlaminar epidural steroid injection on 3/10/17 with 50% relief.  She is status post bilateral L2, 3, 4 medial branch radiofrequency ablation on 7/17/17 with about 75% relief.  She is status post bilateral L2, 3, 4 medial branch radiofrequency ablation on 07/17/2018 with 100% relief of her leg pain and 70-75% relief of her back pain.  She is status post C7-T1 cervical interlaminar epidural steroid injection on 09/25/2018 with 80% relief.   She is status post L5/S1 interlaminar epidural steroid injection on 12/27/2018 with 60% relief.  She is status post bilateral L2, 3 and 4 medial branch radiofrequency ablation on 07/29/2019 with 40% relief.  She is status post L5/S1 interlaminar epidural steroid injection on 09/04/2019 with almost 100% relief of her right leg pain but 0% relief for back pain. She is status post C7-T1 interlaminar epidural steroid injection on 10/24/2019 with 70% relief.  She failed Cymbalta due to side effects.    ROS: She reports back pain only.  Balance of review of systems is negative.    Medical, surgical, family and social history reviewed elsewhere in record.     Medications/Allergies: See med card      Vitals:    02/07/20 1130   BP: (!) 143/71   Pulse: 100   Resp: 20   Temp: 97.6 °F (36.4 °C)   TempSrc: Oral   SpO2: 100%   Weight: 102.8 kg (226 lb 10.1 oz)   PainSc:   4   PainLoc: Back        PHYSICAL EXAM:  Gen: A and O x3, pleasant, well-groomed  HEENT: PERRLA  CVS: Regular rate and rhythm, normal S1 and S2, no murmurs.  Resp: Clear to auscultation bilaterally, no wheezes or rales.  Musculoskeletal: Able to heel walk, toe walk. No antalgic gait.     Neuro:  Upper extremities: 5/5 strength bilaterally   Lower extremities: 5/5 strength bilaterally  Reflexes: Brachioradialis 2+, Bicep 2+, Tricep 2+. Patellar 2+, Achilles 2+.  Sensory: Intact and symmetrical to light touch  and pinprick in C2-T1 dermatomes bilaterally. Intact and symmetrical to light touch and pinprick in L2-S1 dermatomes bilaterally.    Lumbar spine:  Lumbar spine: ROM is moderately limited with flexion and extension with increased bilateral low back pain during extension and oblique extension.  Supine straight leg raise causes increased right lateral thigh pain.  Internal and external rotation of the hip causes no increased pain in either side.  Myofascial exam:  Mild tenderness to palpation to the right greater than left lumbar paraspinous muscles.         IMAGING:   MRI L-SPINE 5/10/11   IMPRESSION: AT L3-4, THERE IS CIRCUMFERENTIAL DISC PROTRUSION FOCALLY MORE PROMINENT TO THE LEFT AS WELL AS A SMALL DISC HERNIATION PROTRUDING INFERIORLY BEHIND L4 TO THE LEFT OF MIDLINE. THIS PRODUCES SPINAL CANAL AND BILATERAL NEURAL FORAMINAL STENOSIS, LEFT GREATER THAN RIGHT. AT L4-5, THERE IS CIRCUMFERENTIAL DISC PROTRUSION FOCALLY MORE PROMINENT TO THE RIGHT WITH MILD SPINAL CANAL AND RIGHT NEURAL FORAMINAL STENOSIS. AT L1-2 AND L2-3 ALTHOUGH THERE ARE DISC PROTRUSIONS IDENTIFIED, SIGNIFICANT STENOSIS IS NOT SEEN.     MRI lumbar spine 7/10/14  L1-2:There is chronic relatively advanced disc degeneration with disc space narrowing, disc dehydration, disc narrowing, endplate osteophytes, and degenerative vertebral endplate marrow change. There is a diffuse 2-mm posterior disc bulge with a superimposed4-mm right posterior disc extrusion causing mild right foraminal stenosis. There is no impingement of the right L1 nerve root and there has been no change.  L2-3: There is severe and chronic disc degeneration with severe disc space narrowing, disc dehydration, degenerative vertebral endplate marrow change, and vertebral endplate osteophytes. There is a diffuse 2-mm posterior disc bulge with a superimposed 4-mm right posterior disc extrusion causing mild right foraminal stenosis. There is no impingement of the right L2 nerve root and  there has been no significant change.  L3-4: There is severe disc degeneration which has progressed since the prior study. There is severe disc space narrowing, disc dehydration, degenerative vertebral endplate marrow change and endplate osteophytes. There is also mild posterior subluxation of L3over a distance of 4 mm. There is a broad-based 5-mm posterior disc extrusion. There is degenerative facet arthrosis with ligamentum flavum thickening. The combination of facet arthrosis and disc extrusion results in relatively severe spinal canal stenosis with compression of the thecal sac to an AP diameter of 5 mm, moderate right foraminal stenosis, and severe left foraminal stenosis. The spinal stenosis has also progressed since the prior study.  L4-5:There is a diffuse posterior disc bulge with a superimposed 3-mm left posterior paracentral disc protrusion causing left paracentral thecal sac compression. There is moderate left and mild right foraminal stenosis and there has been no significant change. There is mild degenerative facet arthrosis with ligamentum flavum thickening and small joint effusions.  L5-S1:There is no significant compromise of the spinal canal or foramina and there has been no change.    X-ray cervical spine 6/8/15  There is loss of normal cervical lordosis which may be positional or related muscular strain. Intervertebral disk height loss is noted at the C5-C6 C6-C7 levels and to a lesser degree C4-C5 and C7-T1 levels. Vertebral body alignment appears otherwise adequate. Vertebral body heights appear well-preserved. The atlas and odontoid appear in good relationship to each other. Osseous neuroforaminal narrowing is noted at the C3-C4 C4-C5 C5-C6 levels on the left and at the C4-C5 C5-C6 and C6-C7 levels on the right     07/10/2018 MRI cervical spine [facility] [facility] report  C2-3 broad-based signal asymmetry at the left subarticular and foraminal zone reflecting implant spondylosis and disc bulge  complex, mild to moderate left asymmetric foraminal narrowing, ectasia of the left vertebral artery, contralateral right asymmetric facet hypertrophy, right foramen widely narrowed, disc partially desiccated without collapse  C3-4 moderate to severe left greater than right foraminal narrowing secondary to uncinate joint hypertrophic signal alteration, disc partially desiccated  C4-5 endplate spondylosis moderate diffuse disc bulge noted, broad-based right paracentral disc herniation, asymmetric flattening of the ventral cord surface at the right paracentral zone, high-grade if lateral right foraminal narrowing, AP diameter of canal 9.9 mm, contralaterally, high grade left foraminal narrowing secondary to facet greater than uncinate joint hypertrophic signal alteration, disc desiccated and mildly narrowed  C5-6 disc space narrowing evident with generalized in Nigel spondylosis and concentric inter pose disc bulge complex, high-grade bilateral foraminal narrowing, flattening of the cord surface, AP diameter 7.9 mm, symmetric facet arthrosis, disc diffusely desiccated and narrowed  C6-7 moderate endplate spondylosis and concentric disc bulge complex, high-grade bilateral foraminal compromise, flattening of the anterior cord surface, AP diameter 8.8 mm, facet arthrosis symmetric, disc desiccated and narrowed  C7-T1 less than 2 mm depth disc bulge    11/06/2018 MRI lumbar spine  T12/L1: There is no evidence of disc protrusion, canal or foraminal stenosis.  L1/L2: Right posterolateral disc protrusion is evident and there is mild distortion of the right anterolateral canal.  Degenerative facet changes are noted bilaterally.  The left foramen is intact.  L2/L3: There is annular disc bulging with a superimposed right posterolateral disc extrusion.  This is slightly more pronounced on the previous examination and there is mild effacement of the anterior thecal sac and moderate narrowing the right foramen.  L3/L4: There is  annular disc bulge and osteophyte formation with a superimposed small left posterolateral disc extrusion.  There is moderate narrowing the central canal and left foramen.  This is little changed relative the previous examination.  Degenerative facet changes are noted.  L5/S1: There is a small central disc extrusion superimposed upon annular disc bulging.  This is slightly less pronounced than was seen previously.  Degenerative facet changes are noted.  L5/S1: Moderate degenerative facet changes are noted and there is mild effacement of the anterior thecal sac.  There is ligamentous hypertrophy particularly to the left in the posterior canal and there is left greater than right foraminal narrowing.  This is mildly worsened relative prior exam.      ASSESSMENT:  The patient is a 59 year old woman with a history of migraines, carpal tunnel syndrome and low back pain since her early 20s who returns in follow up.   1. Chronic pain disorder     2. Lumbar radiculitis     3. DDD (degenerative disc disease), lumbar     4. Lumbar spondylosis         Plan:  1.  The patient did not tolerate Cymbalta and we discussed her options.  She would like to try gabapentin again and I provided a prescription for 300 milligrams nightly increasing to 3 times a day if tolerated.  She is going to contact me in 1 month and we may increase to 600 milligrams twice a day or 3 times a day if tolerated.  If she fails this, we may consider spinal cord stimulation and she has received information from [employer] in the past.  2.  Follow-up in 2 months or sooner as needed.

## 2020-02-10 ENCOUNTER — PATIENT MESSAGE (OUTPATIENT)
Dept: FAMILY MEDICINE | Facility: CLINIC | Age: 60
End: 2020-02-10

## 2020-02-10 DIAGNOSIS — H44.009 EYE INFECTION, UNSPECIFIED LATERALITY: Primary | ICD-10-CM

## 2020-02-10 RX ORDER — TOBRAMYCIN AND DEXAMETHASONE 3; 1 MG/ML; MG/ML
1 SUSPENSION/ DROPS OPHTHALMIC EVERY 6 HOURS
Qty: 5 ML | Refills: 0 | Status: SHIPPED | OUTPATIENT
Start: 2020-02-10 | End: 2020-07-07

## 2020-02-11 ENCOUNTER — OFFICE VISIT (OUTPATIENT)
Dept: NEUROLOGY | Facility: CLINIC | Age: 60
End: 2020-02-11
Payer: COMMERCIAL

## 2020-02-11 ENCOUNTER — TELEPHONE (OUTPATIENT)
Dept: NEUROLOGY | Facility: CLINIC | Age: 60
End: 2020-02-11

## 2020-02-11 VITALS
HEIGHT: 64 IN | RESPIRATION RATE: 16 BRPM | BODY MASS INDEX: 38.94 KG/M2 | DIASTOLIC BLOOD PRESSURE: 73 MMHG | HEART RATE: 89 BPM | WEIGHT: 228.06 LBS | SYSTOLIC BLOOD PRESSURE: 133 MMHG

## 2020-02-11 DIAGNOSIS — G43.719 INTRACTABLE CHRONIC MIGRAINE WITHOUT AURA AND WITHOUT STATUS MIGRAINOSUS: Primary | ICD-10-CM

## 2020-02-11 PROCEDURE — 3078F PR MOST RECENT DIASTOLIC BLOOD PRESSURE < 80 MM HG: ICD-10-PCS | Mod: CPTII,S$GLB,, | Performed by: NURSE PRACTITIONER

## 2020-02-11 PROCEDURE — 99999 PR PBB SHADOW E&M-EST. PATIENT-LVL V: CPT | Mod: PBBFAC,,, | Performed by: NURSE PRACTITIONER

## 2020-02-11 PROCEDURE — 3075F SYST BP GE 130 - 139MM HG: CPT | Mod: CPTII,S$GLB,, | Performed by: NURSE PRACTITIONER

## 2020-02-11 PROCEDURE — 3008F BODY MASS INDEX DOCD: CPT | Mod: CPTII,S$GLB,, | Performed by: NURSE PRACTITIONER

## 2020-02-11 PROCEDURE — 99214 PR OFFICE/OUTPT VISIT, EST, LEVL IV, 30-39 MIN: ICD-10-PCS | Mod: S$GLB,,, | Performed by: NURSE PRACTITIONER

## 2020-02-11 PROCEDURE — 3075F PR MOST RECENT SYSTOLIC BLOOD PRESS GE 130-139MM HG: ICD-10-PCS | Mod: CPTII,S$GLB,, | Performed by: NURSE PRACTITIONER

## 2020-02-11 PROCEDURE — 3078F DIAST BP <80 MM HG: CPT | Mod: CPTII,S$GLB,, | Performed by: NURSE PRACTITIONER

## 2020-02-11 PROCEDURE — 3008F PR BODY MASS INDEX (BMI) DOCUMENTED: ICD-10-PCS | Mod: CPTII,S$GLB,, | Performed by: NURSE PRACTITIONER

## 2020-02-11 PROCEDURE — 99214 OFFICE O/P EST MOD 30 MIN: CPT | Mod: S$GLB,,, | Performed by: NURSE PRACTITIONER

## 2020-02-11 PROCEDURE — 99999 PR PBB SHADOW E&M-EST. PATIENT-LVL V: ICD-10-PCS | Mod: PBBFAC,,, | Performed by: NURSE PRACTITIONER

## 2020-02-11 RX ORDER — UBROGEPANT 50 MG/1
TABLET ORAL
Qty: 10 TABLET | Refills: 11 | Status: SHIPPED | OUTPATIENT
Start: 2020-02-11 | End: 2022-09-26

## 2020-02-11 NOTE — PROGRESS NOTES
"Date of service: 2/11/2020  Referring provider: No ref. provider found    Subjective:      Chief complaint: Migraine       Patient ID: Elizabeth Giordano is a 59 y.o. lady with carpal tunnel syndrome, low back pain secondary to lumbar spondylosis and DDD, lumbar radiculopathy followed by Dr. Abdalla, factor V leiden mutation, GERD and migraines here for follow up of migraines.     History of Present Illness    INTERVAL HISTORY 2/11/2020    At last office visit, patient was doing very well on Botox and interested in weaning off. Last Botox session was almost 15 weeks ago. Abortive medications worked well. She now has new insurance and Botox no longer possible due to pharmacy/distribution issues.     Today she reports she is about the same. When headaches present, they are in the front right, behind eye. Current pain 0 with range 0-10. She takes excedrin or amerge three times per month. Three migraines per month.     INTERVAL HISTORY 12/2/19    Patient presents for 6-week follow up of her 7th Botox session. She previously reported 90% relief of headaches, no wean off effect, and was interested in weaning off of Botox. She reports she is overall much better. Only one small migraine and it was "knocked out" with medications (amerge). Headaches remain right side of head and right eye. current pain 0 with range 0-10.     I/NTERVAL HISTORY - 7/17/18     She is status post first botox session 5/24/18. Today she reports she is a little better. She had no migraines for the first 1.5 weeks and now has returned to 3 headache days per week. Her current headaches are right side of head and eye. Her current pain is 0 with a range of 0 to 10. She remains on Effexor and naratriptan. The effexor still has not been helpful.     INTERVAL HISTORY - 5/8/18     At last visit we covered topiramate, which was working to reduce headaches by 50%, to zonisamide due to significant memory problems.The memory problems DID improve. The " zonisamide caused itchiness so 6 weeks ago we converted to Effexor. She is now having MORE headaches - 3-4 per week. The as needed medications are working well.     INTERVAL HISTORY - 1/30/18     Last visit 3 months ago we planned on continuing topiramate titration and sumatriptan for acute.    Today she reports the current regimen seems to be working - her regular baseline headache went down from 12-15 per month to about half that, and had one long flare since last seen - used naratriptan once came back, used sumatriptan once, came back . Her current pain is 0, with a range of 0 to 10. The pain is unchanged in location.    She has been having significant memory problems since being on topiramate.     INTERVAL HISTORY - 10/25/17     Last seen 2 months ago at which point I raised topiramate and started naratriptan bridges for long migraines. Today she reports she is about the same. However, last week she had a good week and had no headaches.The headaches have the same characteristics are before. Her current pain score is 5. Her range is 0-10. She can not predict when she will have severe migraines so naratriptan bridge was taken at terminal end (day 4 of flare), not at the start, and did not have the intended effect. She has tingling in all toes (topamax) but worse on right and gets injections in her foot for this.    HEADACHE HISTORY - 8/9/17   Age at onset and course over time: migraines began in her 30s, she reports going through menopause at age 40 (unclear why) and she thinks this may have started it. One day just became more sensitive to sensory stimuli especially smells. Had severe migraines at least one time per week. Started Celexa for this purpose which helped her sensitivity to smells. Overall thinks her headache number has stayed the same over the years but pain has become more severe and she is yet again more sensitive to smells, thinks medications aren't working as well.   Location: right side behind  eye (baseline headache and migraine)   Quality: throbbing   Severity: current 2/10, at worst 10/10   Duration: exacerbations last 5-6 days   Frequency: baseline headache about 12-15 days per month; every other month will have a flare lasting 5-6 days   Alleviated by: sleep, ice, medication   Exacerbated by: light, noise, smells, coughing   Associated with: photo/noise, phobia, osmophobia. No aura. + right eye tearing. No ptosis, no redness. No eye irritation. No ear fullness.   Sleep habits:  Caffeine intake: 1     She is on daily HRT for menopause.     Current acute treatment - migraines treated 4 times per month   -- meloxicam 15mg for  Arthritis in right thumb daily   -- sumatriptan 100mg at onset of headache, gets recurrent headache next day, (zomig works better but not covered by her insurance)  -- amerge - works OK but sumatriptan is cheaper   -- tramadol 50mg BID  -- zofran rare use    Current prevention:  (gabapentin)    Previously tried/failed acute treatment:  -- hydrocodone: allergy  -- oxycodone: allergy   -- zomig   -- relpax   -- naproxen  -- tramadol   -- fiorinal with codeine   -- Goody's   -- Excedrin   -- tylenol  -- ASA  - tizanidine 2mg nightly - for migraines, daily     Previously tried/failed preventative treatment:  -- amitriptyline (for back pain, but migraines were no better)  -- gabapentin - for back pain, didn't help   -- lyrica - memory problems   -- topiramate 100mg BID Tingling in toes - helping with migraines but having significant memory problems   -- zonisamide - itchy   (celexa 40mg)   -- Effexor XR 150mg in the morning - started 2/27/18 - headaches are worse  -- Botox 7 sessions -- worked very well, migraines in near remission     Hx multiple lumbar ESIs and LMB RFAs for low back pain/radiculopathy       Review of patient's allergies indicates:   Allergen Reactions    Hydrocodone Hives and Nausea Only           Sulfa (sulfonamide antibiotics) Hives and Rash           Oxycodone  Itching and Nausea And Vomiting     Current Outpatient Medications   Medication Sig Dispense Refill    aspirin (ECOTRIN) 81 MG EC tablet Take 81 mg by mouth once daily.      azelastine (ASTELIN) 137 mcg (0.1 %) nasal spray USE 1 SPRAY(137 MCG) IN EACH NOSTRIL TWICE DAILY 30 mL 0    cimetidine (TAGAMET) 400 MG tablet Take 1 tablet (400 mg total) by mouth 2 (two) times daily. 60 tablet 3    diphenhydrAMINE (BENADRYL) 25 mg capsule Take 25 mg by mouth every 6 (six) hours as needed.      estradiol (ESTRACE) 1 MG tablet TK 1 T PO QD;; pt uses HS  3    eszopiclone (LUNESTA) 2 MG Tab TAKE 1 TABLET BY MOUTH EVERY EVENING 30 tablet 1    gabapentin (NEURONTIN) 300 MG capsule Take 1 capsule (300 mg total) by mouth 3 (three) times daily. 90 capsule 2    hydroCHLOROthiazide (HYDRODIURIL) 25 MG tablet TAKE 1 TABLET(25 MG) BY MOUTH EVERY DAY 90 tablet 0    lidocaine (LIDODERM) 5 % Place 1 patch onto the skin daily as needed. Remove & Discard patch within 12 hours or as directed by MD (Patient taking differently: Place 1 patch onto the skin nightly as needed. Remove & Discard patch within 12 hours or as directed by MD; pt uses on back) 30 patch 5    loratadine (CLARITIN) 10 mg tablet Take 10 mg by mouth once daily.      losartan (COZAAR) 100 MG tablet Take 1 tablet (100 mg total) by mouth once daily. 90 tablet 3    meloxicam (MOBIC) 15 MG tablet TAKE 1 TABLET(15 MG) BY MOUTH EVERY DAY AS NEEDED FOR PAIN 90 tablet 1    mometasone (NASONEX) 50 mcg/actuation nasal spray 2 sprays by Nasal route once daily. 1 each 6    naratriptan (AMERGE) 2.5 MG tablet 2.5 mg PO BID x 5 days at onset of migraine flare 9 tablet 11    ondansetron (ZOFRAN) 4 MG tablet Take 1 tablet (4 mg total) by mouth every 6 (six) hours as needed for Nausea. 30 tablet 11    pantoprazole (PROTONIX) 40 MG tablet TAKE 1 TABLET(40 MG) BY MOUTH EVERY EVENING 90 tablet 1    predniSONE (DELTASONE) 20 MG tablet Take 2 tablets for 3 days 6 tablet 0     sumatriptan (IMITREX) 100 MG tablet MAY TAKE 1 TABLET THEN FOLLOWUP WITH ANOTHER TABLET IN 2 HOURS IF HEADACHE NOT RESOLVED 9 tablet 11    tobramycin-dexamethasone 0.3-0.1% (TOBRADEX) 0.3-0.1 % DrpS Place 1 drop into both eyes every 6 (six) hours. 5 mL 0    traMADol (ULTRAM) 50 mg tablet TAKE 1 TABLET BY MOUTH TWICE DAILY AS NEEDED 60 tablet 2    ubrogepant (UBRELVY) 50 mg tablet Take 1 tablet po at onset of migraine. May repeat in 2 hours if needed. Max 2 tablets per day. 10 tablet 11     Current Facility-Administered Medications   Medication Dose Route Frequency Provider Last Rate Last Dose    onabotulinumtoxina injection 200 Units  200 Units Intramuscular Q90 Days Neva Cortes MD   200 Units at 08/16/18 1625    onabotulinumtoxina injection 200 Units  200 Units Intramuscular Q90 Days Neva Cortes MD   200 Units at 10/25/19 1557       Past Medical History  Past Medical History:   Diagnosis Date    Allergy     Arthritis of spine 2011    Chronic low back pain     Class 2 obesity with body mass index (BMI) of 39.0 to 39.9 in adult 1/11/2019    Coronary artery disease     mild    DDD (degenerative disc disease), lumbar     Diverticulitis     Diverticulosis     DJD (degenerative joint disease)     Encounter for blood transfusion     Factor V Leiden     GERD (gastroesophageal reflux disease)     Hiatal hernia     Migraines     PONV (postoperative nausea and vomiting)     geta       Past Surgical History  Past Surgical History:   Procedure Laterality Date    APPENDECTOMY  1981    CARPAL TUNNEL RELEASE  2011    right    COLONOSCOPY  2014    reduntant colon, otherwise normal findings repeat in 8-10 years for surveillance    Epidural Steroid Injection      Pain Management    EPIDURAL STEROID INJECTION INTO CERVICAL SPINE N/A 9/25/2018    Procedure: Injection-steroid-epidural-cervical;  Surgeon: Oswald Abdalla MD;  Location: [facility] OR;  Service: Pain Management;  Laterality: N/A;    EPIDURAL  STEROID INJECTION INTO CERVICAL SPINE N/A 10/24/2019    Procedure: Injection-steroid-epidural-cervical;  Surgeon: Oswald Abdalla MD;  Location: [facility] OR;  Service: Pain Management;  Laterality: N/A;    EPIDURAL STEROID INJECTION INTO LUMBAR SPINE N/A 12/27/2018    Procedure: Injection-steroid-epidural-lumbar L5/S1;  Surgeon: Oswald Abdalla MD;  Location: [facility] OR;  Service: Pain Management;  Laterality: N/A;    EPIDURAL STEROID INJECTION INTO LUMBAR SPINE N/A 9/4/2019    Procedure: Injection-steroid-epidural-lumbar;  Surgeon: Oswald Abdalla MD;  Location: [facility] OR;  Service: Pain Management;  Laterality: N/A;  L5/S1 to right    ESOPHAGOGASTRODUODENOSCOPY      Facet injection      Pain Management    HYSTERECTOMY      ovaries removed    KIDNEY SURGERY Right 1967    to correct urinary reflux into kidney    LEFT HEART CATHETERIZATION Left 5/22/2019    Procedure: Left heart cath;  Surgeon: Casa Perdue MD;  Location: [facility] CATH;  Service: Cardiology;  Laterality: Left;    OVARIAN CYST REMOVAL Right 1981    RADIOFREQUENCY ABLATION OF LUMBAR MEDIAL BRANCH NERVE AT SINGLE LEVEL Bilateral 7/17/2018    Procedure: RADIOFREQUENCY ABLATION, NERVE, MEDIAL BRANCH, LUMBAR, L2,3,4;  Surgeon: Oswald Abdalla MD;  Location: [facility] OR;  Service: Pain Management;  Laterality: Bilateral;    RADIOFREQUENCY ABLATION OF LUMBAR MEDIAL BRANCH NERVE AT SINGLE LEVEL Bilateral 7/29/2019    Procedure: Radiofrequency Ablation, Nerve, Spinal, Lumbar, Medial Branch, L2,3,4;  Surgeon: Oswald Abdalla MD;  Location: [facility] OR;  Service: Pain Management;  Laterality: Bilateral;    SHOULDER SURGERY  2010    rotator cuff, right       Family History  Family History   Problem Relation Age of Onset    Heart attack Mother     Heart disease Mother     COPD Mother     Hypertension Mother     Hepatitis Sister     COPD Father     No Known Problems Daughter     Breast cancer Maternal Grandmother     Allergic rhinitis  Neg Hx     Angioedema Neg Hx     Asthma Neg Hx     Atopy Neg Hx     Eczema Neg Hx     Immunodeficiency Neg Hx     Urticaria Neg Hx     Colon cancer Neg Hx     Colon polyps Neg Hx        Social History  Social History     Socioeconomic History    Marital status:      Spouse name: Not on file    Number of children: 1    Years of education: Not on file    Highest education level: Not on file   Occupational History     Employer: [employer]   Social Needs    Financial resource strain: Not on file    Food insecurity:     Worry: Not on file     Inability: Not on file    Transportation needs:     Medical: Not on file     Non-medical: Not on file   Tobacco Use    Smoking status: Former Smoker     Packs/day: 1.00     Years: 5.00     Pack years: 5.00     Start date: 1992     Last attempt to quit: 1997     Years since quittin.1    Smokeless tobacco: Never Used   Substance and Sexual Activity    Alcohol use: Yes     Comment: 3x per year    Drug use: No    Sexual activity: Yes     Partners: Male   Lifestyle    Physical activity:     Days per week: Not on file     Minutes per session: Not on file    Stress: Not on file   Relationships    Social connections:     Talks on phone: Not on file     Gets together: Not on file     Attends Hoahaoism service: Not on file     Active member of club or organization: Not on file     Attends meetings of clubs or organizations: Not on file     Relationship status: Not on file   Other Topics Concern    Not on file   Social History Narrative    ** Merged History Encounter **             Review of Systems  14-point review of systems as follows:   No check yoel indicates NEGATIVE response   Constitutional: [] weight loss, [] change to appetite   Eyes: [] change in vision, [] double vision   Ears, nose, mouth, throat: [] frequent nose bleeds, [] ringing in the ears   Respiratory: [x] cough, [] wheezing   Cardiovascular: [] chest pain,  [] palpitations   Gastrointestinal: [] jaundice, [] nausea/vomiting   Genitourinary: [] incontinence, [] burning with urination   Hematologic/lymphatic: [] easy bruising/bleeding, [x] night sweats   Neurological: [] numbness, [] weakness   Endocrine: [] fatigue, [] heat/cold intolerance   Allergy/Immunologic: [] fevers, [] chills   Musculoskeletal: [] muscle pain, [x] joint pain   Psychiatric: [] thoughts of harming self/others, [] depression   Integumentary: [] rashes, [] sores that do not heal       Objective:        Vitals:    02/11/20 1548   BP: 133/73   Pulse: 89   Resp: 16     Body mass index is 39.15 kg/m².    General: Well developed, well nourished.  No acute distress.  HEENT: Atraumatic, normocephalic.  Neck: Trachea midline  Cardiovascular: Vitals reviewed. Normal peripheral perfusion.   Pulmonary: No increased work of breathing.  Abdomen/GI: No guarding  Musculoskeletal: No obvious joint deformities, moves all extremities symmetrically and well.     Neurological exam:  Mental status: Awake and alert. Oriented to situation.  Speech fluent and appropriate. Recent and remote memory appear to be intact.  Fund of knowledge normal.  Cranial nerves: Pupils equal round, extraocular movements intact, facial strength intact bilaterally, hearing grossly intact bilaterally.  Gait: Normal       Data Review:     No results found for this or any previous visit.    Lab Results   Component Value Date     07/11/2019    K 4.2 07/11/2019     07/11/2019    CO2 26 07/11/2019    BUN 26 (H) 07/11/2019    CREATININE 1.3 07/11/2019    GLU 94 07/11/2019    AST 25 08/21/2018    ALT 32 08/21/2018    ALBUMIN 4.0 08/21/2018    PROT 7.0 08/21/2018    BILITOT 0.3 08/21/2018    CHOL 202 (H) 07/11/2019    HDL 54 07/11/2019    LDLCALC 105.2 07/11/2019    TRIG 214 (H) 07/11/2019       Lab Results   Component Value Date    WBC 4.86 05/22/2019    HGB 13.2 05/22/2019    HCT 37.1 05/22/2019    MCV 91 05/22/2019     05/22/2019        Lab Results   Component Value Date    TSH 0.589 08/21/2018       Assessment & Plan:       Problem List Items Addressed This Visit        Neuro    Intractable chronic migraine without aura - Primary    Overview     Migraines of many years, triggered by menopause. Normal neuro exam. Excellent response to full dose topiramate but not tolerating due to memory impairment. Converted over to zonisamide to improve tolerance but developed rash to this. Therefore, converted citalpram to Effexor but as of 6 weeks on full dose headaches are only worse. Will give Effexor another 4 week trial and prepare to begin Botox.     The patient has chronic migraines (G43.719) and suffers from headaches more than 15 days a month lasting more than 4 hours a day with no relief of symptoms despite trying multiple medications including but not limited to anti-epileptics (topiramate, gabapentin, lyrica, zonisamide), and antidepressants (amitriptyline, venflaxine). Botox treatment was approved for chronic migraines in October 2010. The patient will be an ideal candidate for Botox. We are planning for 3 treatments 3 months apart and aiming for at least 50% improvement in the symptoms. If we see no improvement after 3 treatments, we will discontinue the injections.           Current Assessment & Plan     She would have been due this week for Botox (16 weeks since last session) however due to insurance change, we will not be able to continue. She is 15 weeks since last treatment and has not seen an increase in frequency or severity of migraines. Instead of weaning off Botox, we will have to simply stop immediately.     Abortive treatment is working very well. She recently started gabapentin for back pain. This will provide some prevention for migraines as well. If migraine frequency increases in the next few weeks, she is a good candidate for a CGRP receptor antagonist. First choice would be Emgality as she is prone to constipation.     Will  trial Ubrelvy to see if a better abortive than triptans.          Relevant Medications    ubrogepant (UBRELVY) 50 mg tablet                WORKUP:  -- none     PREVENTION (use daily regardless of headache):  -- Botox not an option at this time while on [facility]  -- gabapentin will provide some migraine prevention  -- if migraine frequency starts to increase again, we can Emgality monthly injections. You can send a message if needed.   -- continue keep headache diary     ACUTE TREATMENT:  -- Trial Ubrelvy to see if works better than Amerge   -- may continue to use imitrex for shorter headaches  -- for those long flares, as soon as you know what is happening, take the amerge (naratriptan) twice per day x 4 days whether you have pain or not to try to get ahead of it   -- can't use imitrex and naratriptan same day       Follow up in about 3 months (around 5/11/2020) for follow up with Dr. Cortes.    25 minutes spent on this visit, with greater than 50% of the time spent on discussion of diagnosis and treatment/coordination of care as documented above.    Linda Melgar NP

## 2020-02-11 NOTE — ASSESSMENT & PLAN NOTE
She would have been due this week for Botox (16 weeks since last session) however due to insurance change, we will not be able to continue. She is 15 weeks since last treatment and has not seen an increase in frequency or severity of migraines. Instead of weaning off Botox, we will have to simply stop immediately.     Abortive treatment is working very well. She recently started gabapentin for back pain. This will provide some prevention for migraines as well. If migraine frequency increases in the next few weeks, she is a good candidate for a CGRP receptor antagonist. First choice would be Emgality as she is prone to constipation.     Will trial Ubrelvy to see if a better abortive than triptans.

## 2020-02-11 NOTE — PATIENT INSTRUCTIONS
WORKUP:  -- none     PREVENTION (use daily regardless of headache):  -- Botox not an option at this time while on [facility]  -- gabapentin will provide some migraine prevention  -- if migraine frequency starts to increase again, we can Emgality monthly injections. You can send a message if needed.   -- continue keep headache diary     ACUTE TREATMENT:  -- Trial Ubrelvy to see if works better than Amerge   -- may continue to use imitrex for shorter headaches  -- for those long flares, as soon as you know what is happening, take the amerge (naratriptan) twice per day x 4 days whether you have pain or not to try to get ahead of it   -- can't use imitrex and naratriptan same day

## 2020-02-11 NOTE — TELEPHONE ENCOUNTER
Called and spoke with patient. Patient's Botox appointment was cancelled and patient was transferred to Linda Thomas's schedule to discuss other treatment options for migraines. Patient verbalized understanding.       Patient now had [employer] insurance which will require her Botox to come from an outside facility. [facility] is no able to accept medications from outside sources. Botox authorization has been denied.

## 2020-02-26 ENCOUNTER — PATIENT MESSAGE (OUTPATIENT)
Dept: PAIN MEDICINE | Facility: CLINIC | Age: 60
End: 2020-02-26

## 2020-02-26 RX ORDER — DICLOFENAC SODIUM 10 MG/G
2 GEL TOPICAL 3 TIMES DAILY PRN
Qty: 1 TUBE | Refills: 5 | Status: ON HOLD | OUTPATIENT
Start: 2020-02-26 | End: 2021-02-06 | Stop reason: SDUPTHER

## 2020-03-04 ENCOUNTER — LAB VISIT (OUTPATIENT)
Dept: LAB | Facility: HOSPITAL | Age: 60
End: 2020-03-04
Attending: FAMILY MEDICINE
Payer: COMMERCIAL

## 2020-03-04 ENCOUNTER — OFFICE VISIT (OUTPATIENT)
Dept: FAMILY MEDICINE | Facility: CLINIC | Age: 60
End: 2020-03-04
Payer: COMMERCIAL

## 2020-03-04 VITALS
HEART RATE: 90 BPM | BODY MASS INDEX: 39.3 KG/M2 | WEIGHT: 230.19 LBS | OXYGEN SATURATION: 95 % | SYSTOLIC BLOOD PRESSURE: 137 MMHG | HEIGHT: 64 IN | DIASTOLIC BLOOD PRESSURE: 80 MMHG

## 2020-03-04 DIAGNOSIS — R94.4 DECREASED GFR: ICD-10-CM

## 2020-03-04 DIAGNOSIS — F51.01 PRIMARY INSOMNIA: ICD-10-CM

## 2020-03-04 DIAGNOSIS — I10 ESSENTIAL HYPERTENSION: Primary | ICD-10-CM

## 2020-03-04 DIAGNOSIS — I25.10 MILD CAD: ICD-10-CM

## 2020-03-04 PROCEDURE — 99214 OFFICE O/P EST MOD 30 MIN: CPT | Mod: S$GLB,,, | Performed by: FAMILY MEDICINE

## 2020-03-04 PROCEDURE — 99214 PR OFFICE/OUTPT VISIT, EST, LEVL IV, 30-39 MIN: ICD-10-PCS | Mod: S$GLB,,, | Performed by: FAMILY MEDICINE

## 2020-03-04 PROCEDURE — 3079F PR MOST RECENT DIASTOLIC BLOOD PRESSURE 80-89 MM HG: ICD-10-PCS | Mod: CPTII,S$GLB,, | Performed by: FAMILY MEDICINE

## 2020-03-04 PROCEDURE — 80048 BASIC METABOLIC PNL TOTAL CA: CPT

## 2020-03-04 PROCEDURE — 3008F PR BODY MASS INDEX (BMI) DOCUMENTED: ICD-10-PCS | Mod: CPTII,S$GLB,, | Performed by: FAMILY MEDICINE

## 2020-03-04 PROCEDURE — 99999 PR PBB SHADOW E&M-EST. PATIENT-LVL III: CPT | Mod: PBBFAC,,, | Performed by: FAMILY MEDICINE

## 2020-03-04 PROCEDURE — 3008F BODY MASS INDEX DOCD: CPT | Mod: CPTII,S$GLB,, | Performed by: FAMILY MEDICINE

## 2020-03-04 PROCEDURE — 3075F PR MOST RECENT SYSTOLIC BLOOD PRESS GE 130-139MM HG: ICD-10-PCS | Mod: CPTII,S$GLB,, | Performed by: FAMILY MEDICINE

## 2020-03-04 PROCEDURE — 3075F SYST BP GE 130 - 139MM HG: CPT | Mod: CPTII,S$GLB,, | Performed by: FAMILY MEDICINE

## 2020-03-04 PROCEDURE — 99999 PR PBB SHADOW E&M-EST. PATIENT-LVL III: ICD-10-PCS | Mod: PBBFAC,,, | Performed by: FAMILY MEDICINE

## 2020-03-04 PROCEDURE — 3079F DIAST BP 80-89 MM HG: CPT | Mod: CPTII,S$GLB,, | Performed by: FAMILY MEDICINE

## 2020-03-04 PROCEDURE — 36415 COLL VENOUS BLD VENIPUNCTURE: CPT | Mod: PO

## 2020-03-04 RX ORDER — TRAZODONE HYDROCHLORIDE 50 MG/1
50 TABLET ORAL NIGHTLY
Qty: 30 TABLET | Refills: 2 | Status: SHIPPED | OUTPATIENT
Start: 2020-03-04 | End: 2020-07-09

## 2020-03-04 NOTE — PROGRESS NOTES
"Subjective:       Patient ID: Elizabeth Giordano is a 59 y.o. female.    Chief Complaint: Follow-up (5 month)    Pt is known to me.  Pt is here for followup of chronic medical issues.  The pt is doing well in general.  She is still seeing Dr. Cortes for migraines.  She had to stop the Lunesta for the bad taste in her mouth.  She wants to try something else for sleep.  The pt sees her cardiologist (Dr. Ann) next month.    Review of Systems   Constitutional: Negative for activity change, appetite change, fatigue and unexpected weight change.   Eyes: Negative for visual disturbance.   Respiratory: Negative for cough, chest tightness and shortness of breath.    Cardiovascular: Negative for chest pain, palpitations and leg swelling.   Gastrointestinal: Positive for nausea. Negative for abdominal pain, constipation, diarrhea and vomiting.   Endocrine: Negative for cold intolerance, heat intolerance and polyuria.   Genitourinary: Negative for decreased urine volume and dysuria.   Musculoskeletal: Negative for arthralgias and back pain.   Skin: Negative for rash.   Neurological: Negative for dizziness, numbness and headaches.   Psychiatric/Behavioral: Positive for sleep disturbance.       Objective:       Vitals:    03/04/20 1446   BP: 137/80   BP Location: Right arm   Patient Position: Sitting   BP Method: Large (Manual)   Pulse: 90   SpO2: 95%   Weight: 104.4 kg (230 lb 2.6 oz)   Height: 5' 4" (1.626 m)     Physical Exam   Constitutional: She is oriented to person, place, and time. She appears well-developed and well-nourished.   HENT:   Head: Normocephalic.   Moist MM's   Eyes: Pupils are equal, round, and reactive to light. Conjunctivae and EOM are normal.   Neck: Normal range of motion. Neck supple. No thyromegaly present.   Cardiovascular: Normal rate, regular rhythm and normal heart sounds.   Pulmonary/Chest: Effort normal and breath sounds normal.   Abdominal: Soft. Bowel sounds are normal. There is no " tenderness.   Musculoskeletal: Normal range of motion. She exhibits no tenderness or deformity.   Lymphadenopathy:     She has no cervical adenopathy.   Neurological: She is alert and oriented to person, place, and time. She displays normal reflexes. No cranial nerve deficit. She exhibits normal muscle tone. Coordination normal.   Skin: Skin is warm and dry.   Psychiatric: She has a normal mood and affect. Her behavior is normal.       Assessment:       1. Essential hypertension    2. Mild CAD    3. Primary insomnia    4. Decreased GFR        Plan:       Elizabeth was seen today for follow-up.    Diagnoses and all orders for this visit:    Essential hypertension    Mild CAD    Primary insomnia  -     traZODone (DESYREL) 50 MG tablet; Take 1 tablet (50 mg total) by mouth every evening.    Decreased GFR  -     Basic metabolic panel; Future      During this visit, I reviewed the pt's history, medications, allergies, and problem list.

## 2020-03-05 ENCOUNTER — PATIENT MESSAGE (OUTPATIENT)
Dept: FAMILY MEDICINE | Facility: CLINIC | Age: 60
End: 2020-03-05

## 2020-03-05 DIAGNOSIS — R94.4 DECREASED GFR: Primary | ICD-10-CM

## 2020-03-05 LAB
ANION GAP SERPL CALC-SCNC: 8 MMOL/L (ref 8–16)
BUN SERPL-MCNC: 23 MG/DL (ref 6–20)
CALCIUM SERPL-MCNC: 9.9 MG/DL (ref 8.7–10.5)
CHLORIDE SERPL-SCNC: 106 MMOL/L (ref 95–110)
CO2 SERPL-SCNC: 27 MMOL/L (ref 23–29)
CREAT SERPL-MCNC: 1.4 MG/DL (ref 0.5–1.4)
EST. GFR  (AFRICAN AMERICAN): 47.8 ML/MIN/1.73 M^2
EST. GFR  (NON AFRICAN AMERICAN): 41.4 ML/MIN/1.73 M^2
GLUCOSE SERPL-MCNC: 109 MG/DL (ref 70–110)
POTASSIUM SERPL-SCNC: 4.3 MMOL/L (ref 3.5–5.1)
SODIUM SERPL-SCNC: 141 MMOL/L (ref 136–145)

## 2020-03-05 RX ORDER — TRAMADOL HYDROCHLORIDE 50 MG/1
TABLET ORAL
Qty: 60 TABLET | Refills: 2 | Status: SHIPPED | OUTPATIENT
Start: 2020-03-05 | End: 2020-04-03 | Stop reason: SDUPTHER

## 2020-03-09 ENCOUNTER — PATIENT MESSAGE (OUTPATIENT)
Dept: FAMILY MEDICINE | Facility: CLINIC | Age: 60
End: 2020-03-09

## 2020-03-24 ENCOUNTER — PATIENT MESSAGE (OUTPATIENT)
Dept: FAMILY MEDICINE | Facility: CLINIC | Age: 60
End: 2020-03-24

## 2020-03-24 NOTE — TELEPHONE ENCOUNTER
Please see QFPayt message. When should the patient have her renal function done? Do you feel it is neccassary she come this week or should she wait until everything with the virus is over?

## 2020-03-30 ENCOUNTER — TELEPHONE (OUTPATIENT)
Dept: NEUROLOGY | Facility: CLINIC | Age: 60
End: 2020-03-30

## 2020-03-30 NOTE — TELEPHONE ENCOUNTER
Spoke with patient and informed her since the insurance will not cover it to try and run it as a cash payment and see if the coupon card will work. She will call back.

## 2020-03-30 NOTE — TELEPHONE ENCOUNTER
PA for Ubrelvy has been denied based on the patient's diagnosis. Please advise if you wish to try something else. Thanks.

## 2020-03-30 NOTE — TELEPHONE ENCOUNTER
She has [employer] so she should be using the copay assistance card. Linda Thomas prescribed this to Ana on 2/11 and i'm not sure if she gave the copay card or not. Please confirm with the patient that she received it and had the pharmacy use the card as her insurance for this script. If she did not reiceve it, please have her go on [email or web address] to sign up for a card. The card covers them for $10 per month for one year as long as they have commercial insurance.

## 2020-04-02 RX ORDER — HYDROCHLOROTHIAZIDE 25 MG/1
TABLET ORAL
Qty: 90 TABLET | Refills: 0 | Status: SHIPPED | OUTPATIENT
Start: 2020-04-02 | End: 2020-07-07

## 2020-04-02 NOTE — PROGRESS NOTES
Refill Authorization Note     is requesting a refill authorization.    Brief assessment and rationale for refill: APPROVE: prr  Name and strength of medication: hydroCHLOROthiazide (HYDRODIURIL) 25 MG tablet       Medication Therapy Plan: PCP commented on labs, see comments below, approve 3 more     Medication reconciliation completed: No                         Comments:     Result Notes for Basic metabolic panel      Notes recorded by CARRIE Espinosa MD on 3/5/2020 at 5:30 PM CST  Your kidney filtering number is still low.  I want you to hold the meloxicam and push oral fluids.  Let's recheck after 2 weeks of doing those 2 things.            Refill Center Care Gap Closure protocols temporarily suspended.   Requested Prescriptions   Pending Prescriptions Disp Refills    hydroCHLOROthiazide (HYDRODIURIL) 25 MG tablet [Pharmacy Med Name: HYDROCHLOROTHIAZIDE 25MG TABLETS] 90 tablet 0     Sig: TAKE 1 TABLET(25 MG) BY MOUTH EVERY DAY       Cardiovascular: Diuretics - Thiazide Failed - 4/2/2020  9:20 AM        Failed - Cr is 1.3 or below and within 180 days     Creatinine   Date Value Ref Range Status   03/04/2020 1.4 0.5 - 1.4 mg/dL Final   07/11/2019 1.3 0.5 - 1.4 mg/dL Final   05/22/2019 1.54 (H) 0.50 - 1.40 mg/dL Final              Passed - Patient is at least 18 years old        Passed - Last BP in normal range within 360 days.     BP Readings from Last 3 Encounters:   03/04/20 137/80   02/11/20 133/73   02/07/20 (!) 143/71              Passed - Office visit in past 12 months or future 90 days.     Recent Outpatient Visits            4 weeks ago Essential hypertension    [facility] CARRIE Espinosa MD    1 month ago Intractable chronic migraine without aura and without status migrainosus    [facility] Linda Melgar NP    1 month ago Chronic pain disorder    [facility] JERED Cruz    2 months ago Chronic pain disorder    [facility] JERED Cruz    3 months ago PND (post-nasal drip)    [place] [facility] Jenny Richey, NP          Future Appointments              In 6 days [facility], [place]    In 1 month Mary Kate B. Davis, NP [facility] [place] [place]    In 2 months Casa Perdue MD [place] [place]                Passed - Ca in normal range and within 360 days     Calcium   Date Value Ref Range Status   03/04/2020 9.9 8.7 - 10.5 mg/dL Final   07/11/2019 9.6 8.7 - 10.5 mg/dL Final   05/22/2019 8.9 8.4 - 10.2 mg/dL Final              Passed - K in normal range and within 180 days     Potassium   Date Value Ref Range Status   03/04/2020 4.3 3.5 - 5.1 mmol/L Final   07/11/2019 4.2 3.5 - 5.1 mmol/L Final   05/22/2019 3.6 3.5 - 5.1 mmol/L Final              Passed - Na is between 130 and 148 and within 180 days     Sodium   Date Value Ref Range Status   03/04/2020 141 136 - 145 mmol/L Final   07/11/2019 141 136 - 145 mmol/L Final   05/22/2019 138 136 - 145 mmol/L Final              Passed - eGFR within 180 days     eGFR if non    Date Value Ref Range Status   03/04/2020 41.4 (A) >60 mL/min/1.73 m^2 Final     Comment:     Calculation used to obtain the estimated glomerular filtration  rate (eGFR) is the CKD-EPI equation.      07/11/2019 45.6 (A) >60 mL/min/1.73 m^2 Final     Comment:     Calculation used to obtain the estimated glomerular filtration  rate (eGFR) is the CKD-EPI equation.      05/22/2019 37 (A) >60 mL/min/1.73 m^2 Final     Comment:     Calculation used to obtain the estimated glomerular filtration  rate (eGFR) is the CKD-EPI equation.        eGFR if    Date Value Ref Range Status   03/04/2020 47.8 (A) >60 mL/min/1.73 m^2 Final   07/11/2019 52.6 (A) >60 mL/min/1.73 m^2 Final   05/22/2019 43 (A) >60 mL/min/1.73 m^2 Final               Appointments  past 12m or future 3m with PCP     Date Provider   Last Visit   3/4/2020 CARRIE Espinosa MD   Next Visit   Visit date not found CARRIE Espinosa MD   .  ED visits in past 90 days: 0       Note composed:11:27 AM 04/02/2020

## 2020-04-03 RX ORDER — TRAMADOL HYDROCHLORIDE 50 MG/1
50 TABLET ORAL 2 TIMES DAILY PRN
Qty: 60 TABLET | Refills: 2 | Status: SHIPPED | OUTPATIENT
Start: 2020-04-03 | End: 2020-07-07

## 2020-04-07 ENCOUNTER — PATIENT OUTREACH (OUTPATIENT)
Dept: ADMINISTRATIVE | Facility: OTHER | Age: 60
End: 2020-04-07

## 2020-04-08 ENCOUNTER — OFFICE VISIT (OUTPATIENT)
Dept: PAIN MEDICINE | Facility: CLINIC | Age: 60
End: 2020-04-08
Payer: COMMERCIAL

## 2020-04-08 DIAGNOSIS — M48.061 SPINAL STENOSIS OF LUMBAR REGION WITHOUT NEUROGENIC CLAUDICATION: ICD-10-CM

## 2020-04-08 DIAGNOSIS — M51.36 DDD (DEGENERATIVE DISC DISEASE), LUMBAR: ICD-10-CM

## 2020-04-08 DIAGNOSIS — G89.4 CHRONIC PAIN DISORDER: Primary | ICD-10-CM

## 2020-04-08 DIAGNOSIS — M54.16 LUMBAR RADICULITIS: ICD-10-CM

## 2020-04-08 PROCEDURE — 99213 PR OFFICE/OUTPT VISIT, EST, LEVL III, 20-29 MIN: ICD-10-PCS | Mod: GT,,, | Performed by: PHYSICIAN ASSISTANT

## 2020-04-08 PROCEDURE — 99213 OFFICE O/P EST LOW 20 MIN: CPT | Mod: GT,,, | Performed by: PHYSICIAN ASSISTANT

## 2020-04-08 RX ORDER — GABAPENTIN 300 MG/1
300 CAPSULE ORAL 4 TIMES DAILY
Qty: 120 CAPSULE | Refills: 2 | Status: SHIPPED | OUTPATIENT
Start: 2020-04-08 | End: 2020-04-26

## 2020-04-08 NOTE — PROGRESS NOTES
The patient location is: [facility]  The chief complaint leading to consultation is: back pain  Visit type: Virtual visit with synchronous audio and video  Total time spent with patient: 10 minutes  Each patient to whom he or she provides medical services by telemedicine is:  (1) informed of the relationship between the physician and patient and the respective role of any other health care provider with respect to management of the patient; and (2) notified that he or she may decline to receive medical services by telemedicine and may withdraw from such care at any time.    CHIEF COMPLAINT/REASON FOR VISIT: low back pain    History of present illness: The patient is a 59 year old woman with a history of migraines, carpal tunnel syndrome and low back pain since her early 20s.  The patient presents with continued back pain.  She denies any significant changes to this.  She has been taking gabapentin with unknown relief.  Her pain is worse with standing and walking, improved with sitting and tramadol.  She currently denies weakness or numbness, no bladder or bowel incontinence.    Pain intervention history: She tried Neurontin for her leg/thigh pain with no success. She only takes Advil with about 30% relief. She is status post TFESI bilaterally to L3 on 12/6/2011 with no relief. Past history of status post L4/5 YARON on 5/25/11 with about 90% relief that lasted 3 weeks. She is status post 2 bilateral L3 transforaminal injections with 3 weeks relief each time. Her current pain medications include Celexa 40 mg once a day, Aleve 220 mg twice a day, Lyrica as previously stated, Zanaflex 5 mg at night. She uses her TENS at home, when she thinks about it.  She is status post L4/5 interlaminar epidural steroid injection on 1/8/14 with 90% relief.  She is status post L5/S1 interlaminar epidural steroid injection on 6/9/14 with moderate relief only on the right low back and right leg lasting 2 weeks.  She is status post  bilateral L3/4 and L4/5 facet joint injections on 4/13/15 with initially 100% relief of her back pain and 80% relief of her left lateral hip and lateral thigh pain, now reporting at least 50% relief of both.  She is status post bilateral L2, 3 and 4 medial branch radiofrequency ablation on 5/20/15 with 60% relief.   She is status post bilateral L2, 3 and 4 medial branch radiofrequency ablation on 6/20/16 with 50-70% relief.  She is status post L5/S1 interlaminar epidural steroid injection on 3/10/17 with 50% relief.  She is status post bilateral L2, 3, 4 medial branch radiofrequency ablation on 7/17/17 with about 75% relief.  She is status post bilateral L2, 3, 4 medial branch radiofrequency ablation on 07/17/2018 with 100% relief of her leg pain and 70-75% relief of her back pain.  She is status post C7-T1 cervical interlaminar epidural steroid injection on 09/25/2018 with 80% relief.   She is status post L5/S1 interlaminar epidural steroid injection on 12/27/2018 with 60% relief.  She is status post bilateral L2, 3 and 4 medial branch radiofrequency ablation on 07/29/2019 with 40% relief.  She is status post L5/S1 interlaminar epidural steroid injection on 09/04/2019 with almost 100% relief of her right leg pain but 0% relief for back pain. She is status post C7-T1 interlaminar epidural steroid injection on 10/24/2019 with 70% relief.  She failed Cymbalta due to side effects.    ROS: She reports back pain only.  Balance of review of systems is negative.    Medical, surgical, family and social history reviewed elsewhere in record.     Medications/Allergies: See med card      Vitals:    04/08/20 0825   PainSc:   5   PainLoc: Generalized        PHYSICAL EXAM:  Gen: A and O x3, pleasant, well-groomed  HEENT: PERRLA  Resp:  No increased work of breathing.  Neuro:  Moving extremities appropriately.    IMAGING:   MRI L-SPINE 5/10/11   IMPRESSION: AT L3-4, THERE IS CIRCUMFERENTIAL DISC PROTRUSION FOCALLY MORE PROMINENT TO  THE LEFT AS WELL AS A SMALL DISC HERNIATION PROTRUDING INFERIORLY BEHIND L4 TO THE LEFT OF MIDLINE. THIS PRODUCES SPINAL CANAL AND BILATERAL NEURAL FORAMINAL STENOSIS, LEFT GREATER THAN RIGHT. AT L4-5, THERE IS CIRCUMFERENTIAL DISC PROTRUSION FOCALLY MORE PROMINENT TO THE RIGHT WITH MILD SPINAL CANAL AND RIGHT NEURAL FORAMINAL STENOSIS. AT L1-2 AND L2-3 ALTHOUGH THERE ARE DISC PROTRUSIONS IDENTIFIED, SIGNIFICANT STENOSIS IS NOT SEEN.     MRI lumbar spine 7/10/14  L1-2:There is chronic relatively advanced disc degeneration with disc space narrowing, disc dehydration, disc narrowing, endplate osteophytes, and degenerative vertebral endplate marrow change. There is a diffuse 2-mm posterior disc bulge with a superimposed4-mm right posterior disc extrusion causing mild right foraminal stenosis. There is no impingement of the right L1 nerve root and there has been no change.  L2-3: There is severe and chronic disc degeneration with severe disc space narrowing, disc dehydration, degenerative vertebral endplate marrow change, and vertebral endplate osteophytes. There is a diffuse 2-mm posterior disc bulge with a superimposed 4-mm right posterior disc extrusion causing mild right foraminal stenosis. There is no impingement of the right L2 nerve root and there has been no significant change.  L3-4: There is severe disc degeneration which has progressed since the prior study. There is severe disc space narrowing, disc dehydration, degenerative vertebral endplate marrow change and endplate osteophytes. There is also mild posterior subluxation of L3over a distance of 4 mm. There is a broad-based 5-mm posterior disc extrusion. There is degenerative facet arthrosis with ligamentum flavum thickening. The combination of facet arthrosis and disc extrusion results in relatively severe spinal canal stenosis with compression of the thecal sac to an AP diameter of 5 mm, moderate right foraminal stenosis, and severe left foraminal  stenosis. The spinal stenosis has also progressed since the prior study.  L4-5:There is a diffuse posterior disc bulge with a superimposed 3-mm left posterior paracentral disc protrusion causing left paracentral thecal sac compression. There is moderate left and mild right foraminal stenosis and there has been no significant change. There is mild degenerative facet arthrosis with ligamentum flavum thickening and small joint effusions.  L5-S1:There is no significant compromise of the spinal canal or foramina and there has been no change.    X-ray cervical spine 6/8/15  There is loss of normal cervical lordosis which may be positional or related muscular strain. Intervertebral disk height loss is noted at the C5-C6 C6-C7 levels and to a lesser degree C4-C5 and C7-T1 levels. Vertebral body alignment appears otherwise adequate. Vertebral body heights appear well-preserved. The atlas and odontoid appear in good relationship to each other. Osseous neuroforaminal narrowing is noted at the C3-C4 C4-C5 C5-C6 levels on the left and at the C4-C5 C5-C6 and C6-C7 levels on the right     07/10/2018 MRI cervical spine [facility] report  C2-3 broad-based signal asymmetry at the left subarticular and foraminal zone reflecting implant spondylosis and disc bulge complex, mild to moderate left asymmetric foraminal narrowing, ectasia of the left vertebral artery, contralateral right asymmetric facet hypertrophy, right foramen widely narrowed, disc partially desiccated without collapse  C3-4 moderate to severe left greater than right foraminal narrowing secondary to uncinate joint hypertrophic signal alteration, disc partially desiccated  C4-5 endplate spondylosis moderate diffuse disc bulge noted, broad-based right paracentral disc herniation, asymmetric flattening of the ventral cord surface at the right paracentral zone, high-grade if lateral right foraminal narrowing, AP diameter of canal 9.9 mm, contralaterally, high grade left  foraminal narrowing secondary to facet greater than uncinate joint hypertrophic signal alteration, disc desiccated and mildly narrowed  C5-6 disc space narrowing evident with generalized in Nigel spondylosis and concentric inter pose disc bulge complex, high-grade bilateral foraminal narrowing, flattening of the cord surface, AP diameter 7.9 mm, symmetric facet arthrosis, disc diffusely desiccated and narrowed  C6-7 moderate endplate spondylosis and concentric disc bulge complex, high-grade bilateral foraminal compromise, flattening of the anterior cord surface, AP diameter 8.8 mm, facet arthrosis symmetric, disc desiccated and narrowed  C7-T1 less than 2 mm depth disc bulge    11/06/2018 MRI lumbar spine  T12/L1: There is no evidence of disc protrusion, canal or foraminal stenosis.  L1/L2: Right posterolateral disc protrusion is evident and there is mild distortion of the right anterolateral canal.  Degenerative facet changes are noted bilaterally.  The left foramen is intact.  L2/L3: There is annular disc bulging with a superimposed right posterolateral disc extrusion.  This is slightly more pronounced on the previous examination and there is mild effacement of the anterior thecal sac and moderate narrowing the right foramen.  L3/L4: There is annular disc bulge and osteophyte formation with a superimposed small left posterolateral disc extrusion.  There is moderate narrowing the central canal and left foramen.  This is little changed relative the previous examination.  Degenerative facet changes are noted.  L5/S1: There is a small central disc extrusion superimposed upon annular disc bulging.  This is slightly less pronounced than was seen previously.  Degenerative facet changes are noted.  L5/S1: Moderate degenerative facet changes are noted and there is mild effacement of the anterior thecal sac.  There is ligamentous hypertrophy particularly to the left in the posterior canal and there is left greater than right  foraminal narrowing.  This is mildly worsened relative prior exam.      ASSESSMENT:  The patient is a 59 year old woman with a history of migraines, carpal tunnel syndrome and low back pain since her early 20s who returns in follow up.   1. Chronic pain disorder     2. Lumbar radiculitis     3. DDD (degenerative disc disease), lumbar     4. Spinal stenosis of lumbar region without neurogenic claudication         Plan:  1.  The patient will increase gabapentin from 300 milligrams 3 times a day to 300 in the morning, 300 in the afternoon and 600 at night.  We can consider increasing this further in the future.  We can also reconsider spinal cord stimulation.  She has received information from [facility] in the past.  She also continues to take tramadol with relief.  2.  She will contact me for a follow-up and an update in the next month or so.

## 2020-04-12 NOTE — PROGRESS NOTES
Refill Routing Note     Medication(s) are not appropriate for processing by [facility]:    Medication Outside of Protocol    Appointments  past 12m or future 3m with PCP    Date Provider   Last Visit   3/4/2020 CARRIE Espinosa MD   Next Visit   Visit date not found CARRIE Espinosa MD           Automatic Epic Protocol Generated Data:    Requested Prescriptions   Pending Prescriptions Disp Refills    eszopiclone (LUNESTA) 2 MG Tab [Pharmacy Med Name: ESZOPICLONE 2MG TABLETS] 30 tablet      Sig: TAKE 1 TABLET BY MOUTH EVERY EVENING       There is no refill protocol information for this order           Note composed:5:43 PM 04/12/2020

## 2020-04-13 ENCOUNTER — PATIENT MESSAGE (OUTPATIENT)
Dept: FAMILY MEDICINE | Facility: CLINIC | Age: 60
End: 2020-04-13

## 2020-04-13 RX ORDER — ESZOPICLONE 2 MG/1
TABLET, FILM COATED ORAL
Qty: 30 TABLET | OUTPATIENT
Start: 2020-04-13

## 2020-04-14 RX ORDER — ESZOPICLONE 2 MG/1
2 TABLET, FILM COATED ORAL NIGHTLY
Qty: 30 TABLET | Refills: 2 | Status: SHIPPED | OUTPATIENT
Start: 2020-04-14 | End: 2020-07-07

## 2020-04-14 RX ORDER — TRAMADOL HYDROCHLORIDE 50 MG/1
50 TABLET ORAL 2 TIMES DAILY PRN
Qty: 60 TABLET | Refills: 2 | OUTPATIENT
Start: 2020-04-14 | End: 2020-07-13

## 2020-04-14 NOTE — TELEPHONE ENCOUNTER
Called patient to find out what local pharmacy she would like her Tramadol sent to, got voicemail, left return call message.

## 2020-04-14 NOTE — TELEPHONE ENCOUNTER
It looks like she filled a prescription at [facility] for 60 tablets on 04/03 and then at [facility] for 60 tablets on 04/06.  She should not need another prescription until June.

## 2020-04-14 NOTE — TELEPHONE ENCOUNTER
Please let patient know that this needs to go to a local pharmacy since it is a controlled substance.  Where would she like Dr. Abdalla to send?

## 2020-04-14 NOTE — TELEPHONE ENCOUNTER
Spoke with patient and let her know she does not need a refill until June and she indicated understanding. She said her  must have picked up the prescriptions for her.

## 2020-04-15 ENCOUNTER — TELEPHONE (OUTPATIENT)
Dept: NEUROLOGY | Facility: CLINIC | Age: 60
End: 2020-04-15

## 2020-04-16 RX ORDER — TRAMADOL HYDROCHLORIDE 50 MG/1
50 TABLET ORAL 2 TIMES DAILY PRN
Qty: 60 TABLET | Refills: 2 | Status: CANCELLED | OUTPATIENT
Start: 2020-04-16 | End: 2020-07-15

## 2020-04-23 NOTE — TELEPHONE ENCOUNTER
Called patient and left message regarding the PA for ubrevly being denied. Left information about savings card in message

## 2020-04-26 RX ORDER — GABAPENTIN 300 MG/1
CAPSULE ORAL
Qty: 90 CAPSULE | Refills: 1 | Status: SHIPPED | OUTPATIENT
Start: 2020-04-26 | End: 2020-07-09

## 2020-05-05 ENCOUNTER — PATIENT MESSAGE (OUTPATIENT)
Dept: ADMINISTRATIVE | Facility: HOSPITAL | Age: 60
End: 2020-05-05

## 2020-05-11 ENCOUNTER — HOSPITAL ENCOUNTER (OUTPATIENT)
Dept: RADIOLOGY | Facility: HOSPITAL | Age: 60
Discharge: HOME | End: 2020-05-11
Attending: FAMILY MEDICINE
Payer: COMMERCIAL

## 2020-05-11 ENCOUNTER — OFFICE VISIT (OUTPATIENT)
Dept: FAMILY MEDICINE | Facility: CLINIC | Age: 60
End: 2020-05-11
Payer: COMMERCIAL

## 2020-05-11 ENCOUNTER — CLINICAL SUPPORT (OUTPATIENT)
Dept: URGENT CARE | Facility: CLINIC | Age: 60
End: 2020-05-11
Payer: COMMERCIAL

## 2020-05-11 VITALS
DIASTOLIC BLOOD PRESSURE: 88 MMHG | HEIGHT: 64 IN | SYSTOLIC BLOOD PRESSURE: 138 MMHG | TEMPERATURE: 98 F | OXYGEN SATURATION: 97 % | HEART RATE: 99 BPM | BODY MASS INDEX: 39.48 KG/M2 | WEIGHT: 231.25 LBS

## 2020-05-11 DIAGNOSIS — J30.1 SEASONAL ALLERGIC RHINITIS DUE TO POLLEN: ICD-10-CM

## 2020-05-11 DIAGNOSIS — R06.09 DYSPNEA ON EXERTION: ICD-10-CM

## 2020-05-11 DIAGNOSIS — R05.9 COUGH: ICD-10-CM

## 2020-05-11 DIAGNOSIS — K21.9 GASTROESOPHAGEAL REFLUX DISEASE, ESOPHAGITIS PRESENCE NOT SPECIFIED: ICD-10-CM

## 2020-05-11 DIAGNOSIS — R05.9 COUGH: Primary | ICD-10-CM

## 2020-05-11 PROCEDURE — 3008F PR BODY MASS INDEX (BMI) DOCUMENTED: ICD-10-PCS | Mod: CPTII,S$GLB,, | Performed by: FAMILY MEDICINE

## 2020-05-11 PROCEDURE — 3079F DIAST BP 80-89 MM HG: CPT | Mod: CPTII,S$GLB,, | Performed by: FAMILY MEDICINE

## 2020-05-11 PROCEDURE — 99214 PR OFFICE/OUTPT VISIT, EST, LEVL IV, 30-39 MIN: ICD-10-PCS | Mod: S$GLB,,, | Performed by: FAMILY MEDICINE

## 2020-05-11 PROCEDURE — U0002 COVID-19 LAB TEST NON-CDC: HCPCS

## 2020-05-11 PROCEDURE — 99999 PR PBB SHADOW E&M-EST. PATIENT-LVL IV: CPT | Mod: PBBFAC,,, | Performed by: FAMILY MEDICINE

## 2020-05-11 PROCEDURE — 3075F PR MOST RECENT SYSTOLIC BLOOD PRESS GE 130-139MM HG: ICD-10-PCS | Mod: CPTII,S$GLB,, | Performed by: FAMILY MEDICINE

## 2020-05-11 PROCEDURE — 3079F PR MOST RECENT DIASTOLIC BLOOD PRESSURE 80-89 MM HG: ICD-10-PCS | Mod: CPTII,S$GLB,, | Performed by: FAMILY MEDICINE

## 2020-05-11 PROCEDURE — 99999 PR PBB SHADOW E&M-EST. PATIENT-LVL IV: ICD-10-PCS | Mod: PBBFAC,,, | Performed by: FAMILY MEDICINE

## 2020-05-11 PROCEDURE — 3075F SYST BP GE 130 - 139MM HG: CPT | Mod: CPTII,S$GLB,, | Performed by: FAMILY MEDICINE

## 2020-05-11 PROCEDURE — 3008F BODY MASS INDEX DOCD: CPT | Mod: CPTII,S$GLB,, | Performed by: FAMILY MEDICINE

## 2020-05-11 PROCEDURE — 71046 XR CHEST PA AND LATERAL: ICD-10-PCS | Mod: 26,,, | Performed by: RADIOLOGY

## 2020-05-11 PROCEDURE — 71046 X-RAY EXAM CHEST 2 VIEWS: CPT | Mod: 26,,, | Performed by: RADIOLOGY

## 2020-05-11 PROCEDURE — 71046 X-RAY EXAM CHEST 2 VIEWS: CPT | Mod: TC,FY,PO

## 2020-05-11 PROCEDURE — 99214 OFFICE O/P EST MOD 30 MIN: CPT | Mod: S$GLB,,, | Performed by: FAMILY MEDICINE

## 2020-05-11 RX ORDER — BENZONATATE 100 MG/1
100 CAPSULE ORAL 3 TIMES DAILY PRN
Qty: 40 CAPSULE | Refills: 1 | Status: SHIPPED | OUTPATIENT
Start: 2020-05-11 | End: 2020-05-21

## 2020-05-11 RX ORDER — PANTOPRAZOLE SODIUM 40 MG/1
40 TABLET, DELAYED RELEASE ORAL 2 TIMES DAILY
Qty: 180 TABLET | Refills: 1 | Status: ON HOLD | OUTPATIENT
Start: 2020-05-11 | End: 2021-02-06 | Stop reason: SDUPTHER

## 2020-05-11 NOTE — PROGRESS NOTES
"Subjective:       Patient ID: Elizabeth Giordano is a 59 y.o. female.    Chief Complaint: Cough (symptoms started 3 weeks ago) and Chest Pain (tightness in chest area)    Pt is known to me.  The pt reports about a 3 week history of cough, chest tightness and MEDELLIN.  She also reports some post nasal drainage.  She denies fever.  She has been spending a lot of time out of doors.  She also is having reflux--she wakes up at night "with a mouth full of acid."    Shortness of Breath   This is a new problem. The current episode started 1 to 4 weeks ago. The problem occurs intermittently. The problem has been unchanged. The average episode lasts 8 hours. Associated symptoms include chest pain and rhinorrhea. Pertinent negatives include no abdominal pain, claudication, coryza, ear pain, fever, headaches, hemoptysis, leg pain, leg swelling, neck pain, orthopnea, PND, rash, sore throat, sputum production, swollen glands, syncope, vomiting or wheezing. The symptoms are aggravated by any activity. The patient has no known risk factors for DVT/PE. She has tried nothing for the symptoms. The treatment provided no relief. Her past medical history is significant for CAD. There is no history of allergies, aspirin allergies, asthma, bronchiolitis, chronic lung disease, COPD, DVT, a heart failure, PE, pneumonia or a recent surgery.     Review of Systems   Constitutional: Negative for fever.   HENT: Positive for rhinorrhea. Negative for ear pain and sore throat.    Respiratory: Positive for cough, chest tightness and shortness of breath. Negative for hemoptysis, sputum production and wheezing.    Cardiovascular: Positive for chest pain. Negative for orthopnea, claudication, leg swelling, syncope and PND.   Gastrointestinal: Negative for abdominal pain and vomiting.        Acid reflux   Musculoskeletal: Negative for neck pain.   Skin: Negative for rash.   Neurological: Negative for headaches.       Objective:       Vitals:    " "05/11/20 1535   BP: 138/88   BP Location: Left arm   Patient Position: Sitting   Pulse: 99   Temp: 98.1 °F (36.7 °C)   TempSrc: Oral   SpO2: 97%   Weight: 104.9 kg (231 lb 4.2 oz)   Height: 5' 4" (1.626 m)     Physical Exam   Constitutional: She is oriented to person, place, and time. She appears well-developed and well-nourished.   Pt is obese   HENT:   Head: Normocephalic.   Moist MM's   Eyes: Pupils are equal, round, and reactive to light. Conjunctivae and EOM are normal.   Neck: Normal range of motion. Neck supple. No thyromegaly present.   Cardiovascular: Normal rate, regular rhythm and normal heart sounds.   Pulmonary/Chest: Effort normal and breath sounds normal.   Abdominal: Soft. Bowel sounds are normal. There is no tenderness.   Musculoskeletal: Normal range of motion. She exhibits no tenderness or deformity.   Lymphadenopathy:     She has no cervical adenopathy.   Neurological: She is alert and oriented to person, place, and time. She displays normal reflexes. No cranial nerve deficit. She exhibits normal muscle tone. Coordination normal.   Skin: Skin is warm and dry.   Psychiatric: She has a normal mood and affect. Her behavior is normal.       Assessment:       1. Cough    2. Dyspnea on exertion    3. Seasonal allergic rhinitis due to pollen    4. Gastroesophageal reflux disease, esophagitis presence not specified        Plan:       Elizabeth was seen today for cough and chest pain.    Diagnoses and all orders for this visit:    Cough  -     COVID-19 Routine Screening; Future  -     X-Ray Chest PA And Lateral; Future  -     benzonatate (TESSALON) 100 MG capsule; Take 1 capsule (100 mg total) by mouth 3 (three) times daily as needed for Cough.    Dyspnea on exertion  -     COVID-19 Routine Screening; Future  -     X-Ray Chest PA And Lateral; Future    Seasonal allergic rhinitis due to pollen    Gastroesophageal reflux disease, esophagitis presence not specified  -     pantoprazole (PROTONIX) 40 MG " tablet; Take 1 tablet (40 mg total) by mouth 2 (two) times daily.    Pt to use Astelin consistently at home.  During this visit, I reviewed the pt's history, medications, allergies, and problem list.

## 2020-05-11 NOTE — PROGRESS NOTES
"Subjective:       Patient ID: Elizabeth Giordano is a 59 y.o. female.    Chief Complaint: Cough (symptoms started 3 weeks ago) and Chest Pain (tightness in chest area)    Pt is known to me.  She reports a 3 week hx of cough, chest tightness and mild MEDELLIN.  She denies fever, chills.  She is not aware of any COVID-19 exposure.  She also reports post nasal drainage--she gets relief with Astelin but has not been taking it very often.  The pt report acid reflux--she says she will wake up during the night "with a mouth full of acid."    Shortness of Breath   This is a new problem. The current episode started 1 to 4 weeks ago. The problem occurs intermittently. The problem has been unchanged. The average episode lasts 8 hours. Associated symptoms include chest pain. Pertinent negatives include no abdominal pain, claudication, coryza, ear pain, fever, headaches, hemoptysis, leg pain, leg swelling, neck pain, orthopnea, PND, rash, rhinorrhea, sore throat, sputum production, swollen glands, syncope, vomiting or wheezing. The symptoms are aggravated by any activity. The patient has no known risk factors for DVT/PE. She has tried nothing for the symptoms. The treatment provided no relief. Her past medical history is significant for CAD. There is no history of allergies, aspirin allergies, asthma, bronchiolitis, chronic lung disease, COPD, DVT, a heart failure, PE, pneumonia or a recent surgery.     Review of Systems   Constitutional: Negative for fever.   HENT: Negative for ear pain, rhinorrhea, sore throat and trouble swallowing.    Respiratory: Positive for shortness of breath. Negative for hemoptysis, sputum production and wheezing.    Cardiovascular: Positive for chest pain. Negative for orthopnea, claudication, leg swelling, syncope and PND.   Gastrointestinal: Negative for abdominal pain and vomiting.   Musculoskeletal: Negative for neck pain.   Skin: Negative for rash.   Neurological: Negative for headaches.     " "  Objective:       Vitals:    05/11/20 1535   BP: 138/88   BP Location: Left arm   Patient Position: Sitting   Pulse: 99   Temp: 98.1 °F (36.7 °C)   TempSrc: Oral   SpO2: 97%   Weight: 104.9 kg (231 lb 4.2 oz)   Height: 5' 4" (1.626 m)     Physical Exam   Constitutional: She is oriented to person, place, and time. She appears well-developed and well-nourished.   HENT:   Head: Normocephalic.   Eyes: Pupils are equal, round, and reactive to light. Conjunctivae and EOM are normal.   Neck: Normal range of motion. Neck supple. No thyromegaly present.   Cardiovascular: Normal rate, regular rhythm and normal heart sounds.   Pulmonary/Chest: Effort normal and breath sounds normal.   Abdominal: Soft. Bowel sounds are normal. There is no tenderness.   Musculoskeletal: Normal range of motion. She exhibits no tenderness or deformity.   Lymphadenopathy:     She has no cervical adenopathy.   Neurological: She is alert and oriented to person, place, and time. She displays normal reflexes. No cranial nerve deficit. She exhibits normal muscle tone. Coordination normal.   Skin: Skin is warm and dry.   Psychiatric: She has a normal mood and affect. Her behavior is normal.       Assessment:       1. Cough    2. Dyspnea on exertion    3. Seasonal allergic rhinitis due to pollen    4. Gastroesophageal reflux disease, esophagitis presence not specified        Plan:       Elizabeth was seen today for cough and chest pain.    Diagnoses and all orders for this visit:    Cough  -     COVID-19 Routine Screening; Future  -     X-Ray Chest PA And Lateral; Future  -     benzonatate (TESSALON) 100 MG capsule; Take 1 capsule (100 mg total) by mouth 3 (three) times daily as needed for Cough.    Dyspnea on exertion  -     COVID-19 Routine Screening; Future  -     X-Ray Chest PA And Lateral; Future    Seasonal allergic rhinitis due to pollen    Gastroesophageal reflux disease, esophagitis presence not specified  -     pantoprazole (PROTONIX) 40 MG " tablet; Take 1 tablet (40 mg total) by mouth 2 (two) times daily.      During this visit, I reviewed the pt's history, medications, allergies, and problem list.

## 2020-05-12 DIAGNOSIS — K21.9 GASTROESOPHAGEAL REFLUX DISEASE WITHOUT ESOPHAGITIS: ICD-10-CM

## 2020-05-12 LAB — SARS-COV-2 RNA RESP QL NAA+PROBE: NOT DETECTED

## 2020-05-12 RX ORDER — CIMETIDINE 400 MG/1
TABLET, FILM COATED ORAL
Qty: 60 TABLET | Refills: 3 | Status: SHIPPED | OUTPATIENT
Start: 2020-05-12 | End: 2020-05-27

## 2020-05-12 NOTE — PROGRESS NOTES
Refill Routing Note     Medication(s) are not appropriate for processing by [facility]:    Medication Outside of Protocol    Appointments  past 12m or future 3m with PCP    Date Provider   Last Visit   5/11/2020 CARRIE Espinosa MD   Next Visit   Visit date not found CARRIE Espinosa MD           Automatic Epic Protocol Generated Data:    Requested Prescriptions   Pending Prescriptions Disp Refills    cimetidine (TAGAMET) 400 MG tablet [Pharmacy Med Name: CIMETIDINE 400MG TABLETS] 60 tablet 3     Sig: TAKE 1 TABLET(400 MG) BY MOUTH TWICE DAILY       There is no refill protocol information for this order           Note composed:11:12 AM 05/12/2020

## 2020-05-13 DIAGNOSIS — I10 ESSENTIAL HYPERTENSION: ICD-10-CM

## 2020-05-14 RX ORDER — LOSARTAN POTASSIUM 100 MG/1
TABLET ORAL
Qty: 90 TABLET | Refills: 3 | Status: SHIPPED | OUTPATIENT
Start: 2020-05-14 | End: 2021-06-07

## 2020-05-14 NOTE — TELEPHONE ENCOUNTER
Refill Authorization Note    is requesting a refill authorization.    Brief assessment and rationale for refill: APPROVE: prr               Medication reconciliation completed: No                         Comments:      Requested Prescriptions   Signed Prescriptions Disp Refills    losartan (COZAAR) 100 MG tablet 90 tablet 3     Sig: TAKE 1 TABLET(100 MG) BY MOUTH EVERY DAY       Cardiovascular:  Angiotensin Receptor Blockers Failed - 5/13/2020  3:50 AM        Failed - Cr is 1.3 or below and within 360 days     Creatinine   Date Value Ref Range Status   03/04/2020 1.4 0.5 - 1.4 mg/dL Final   07/11/2019 1.3 0.5 - 1.4 mg/dL Final   05/22/2019 1.54 (H) 0.50 - 1.40 mg/dL Final              Passed - Patient is at least 18 years old        Passed - Last BP in normal range within 360 days.     BP Readings from Last 3 Encounters:   05/11/20 138/88   03/04/20 137/80   02/11/20 133/73              Passed - Office visit in past 12 months or future 90 days.     Recent Outpatient Visits            3 days ago Cough    [place] [facility] CARRIE Espinosa MD    1 month ago Chronic pain disorder    [place] [facility] JERED Cruz    2 months ago Essential hypertension    [facility] CARRIE Espinosa MD    3 months ago Intractable chronic migraine without aura and without status migrainosus    [facility] Linda Melgar NP    3 months ago Chronic pain disorder    [place] [facility] JERED Cruz          Future Appointments              In 3 weeks Casa Perdue MD [place] [facility] [place]                Passed - K in normal range and within 360 days     Potassium   Date Value Ref Range Status   03/04/2020 4.3 3.5 - 5.1 mmol/L Final   07/11/2019 4.2 3.5 - 5.1 mmol/L Final   05/22/2019 3.6 3.5 - 5.1 mmol/L Final              Passed - eGFR within 360 days     eGFR if non    Date Value Ref Range Status    03/04/2020 41.4 (A) >60 mL/min/1.73 m^2 Final     Comment:     Calculation used to obtain the estimated glomerular filtration  rate (eGFR) is the CKD-EPI equation.      07/11/2019 45.6 (A) >60 mL/min/1.73 m^2 Final     Comment:     Calculation used to obtain the estimated glomerular filtration  rate (eGFR) is the CKD-EPI equation.      05/22/2019 37 (A) >60 mL/min/1.73 m^2 Final     Comment:     Calculation used to obtain the estimated glomerular filtration  rate (eGFR) is the CKD-EPI equation.        eGFR if    Date Value Ref Range Status   03/04/2020 47.8 (A) >60 mL/min/1.73 m^2 Final   07/11/2019 52.6 (A) >60 mL/min/1.73 m^2 Final   05/22/2019 43 (A) >60 mL/min/1.73 m^2 Final               Appointments  past 12m or future 3m with PCP    Date Provider   Last Visit   5/11/2020 CARRIE Espinosa MD   Next Visit   Visit date not found CARRIE Espinosa MD   ED visits in past 90 days: [unfilled]     Note composed:12:29 PM 05/14/2020

## 2020-05-26 ENCOUNTER — PATIENT OUTREACH (OUTPATIENT)
Dept: ADMINISTRATIVE | Facility: OTHER | Age: 60
End: 2020-05-26

## 2020-05-26 ENCOUNTER — PATIENT MESSAGE (OUTPATIENT)
Dept: FAMILY MEDICINE | Facility: CLINIC | Age: 60
End: 2020-05-26

## 2020-05-27 ENCOUNTER — OFFICE VISIT (OUTPATIENT)
Dept: GASTROENTEROLOGY | Facility: CLINIC | Age: 60
End: 2020-05-27
Payer: COMMERCIAL

## 2020-05-27 VITALS
DIASTOLIC BLOOD PRESSURE: 77 MMHG | HEIGHT: 64 IN | HEART RATE: 85 BPM | WEIGHT: 233.69 LBS | SYSTOLIC BLOOD PRESSURE: 119 MMHG | BODY MASS INDEX: 39.9 KG/M2

## 2020-05-27 DIAGNOSIS — R13.19 ESOPHAGEAL DYSPHAGIA: ICD-10-CM

## 2020-05-27 DIAGNOSIS — Z87.19 HISTORY OF GASTRITIS: ICD-10-CM

## 2020-05-27 DIAGNOSIS — Z01.812 PRE-PROCEDURE LAB EXAM: ICD-10-CM

## 2020-05-27 DIAGNOSIS — K21.9 GASTROESOPHAGEAL REFLUX DISEASE, ESOPHAGITIS PRESENCE NOT SPECIFIED: Primary | ICD-10-CM

## 2020-05-27 DIAGNOSIS — Z12.11 SCREENING FOR COLON CANCER: ICD-10-CM

## 2020-05-27 DIAGNOSIS — K44.9 HIATAL HERNIA: ICD-10-CM

## 2020-05-27 PROCEDURE — 3074F PR MOST RECENT SYSTOLIC BLOOD PRESSURE < 130 MM HG: ICD-10-PCS | Mod: CPTII,S$GLB,, | Performed by: NURSE PRACTITIONER

## 2020-05-27 PROCEDURE — 3074F SYST BP LT 130 MM HG: CPT | Mod: CPTII,S$GLB,, | Performed by: NURSE PRACTITIONER

## 2020-05-27 PROCEDURE — 3078F PR MOST RECENT DIASTOLIC BLOOD PRESSURE < 80 MM HG: ICD-10-PCS | Mod: CPTII,S$GLB,, | Performed by: NURSE PRACTITIONER

## 2020-05-27 PROCEDURE — 99999 PR PBB SHADOW E&M-EST. PATIENT-LVL V: CPT | Mod: PBBFAC,,, | Performed by: NURSE PRACTITIONER

## 2020-05-27 PROCEDURE — 3008F PR BODY MASS INDEX (BMI) DOCUMENTED: ICD-10-PCS | Mod: CPTII,S$GLB,, | Performed by: NURSE PRACTITIONER

## 2020-05-27 PROCEDURE — 3008F BODY MASS INDEX DOCD: CPT | Mod: CPTII,S$GLB,, | Performed by: NURSE PRACTITIONER

## 2020-05-27 PROCEDURE — 3078F DIAST BP <80 MM HG: CPT | Mod: CPTII,S$GLB,, | Performed by: NURSE PRACTITIONER

## 2020-05-27 PROCEDURE — 99204 PR OFFICE/OUTPT VISIT, NEW, LEVL IV, 45-59 MIN: ICD-10-PCS | Mod: S$GLB,,, | Performed by: NURSE PRACTITIONER

## 2020-05-27 PROCEDURE — 99999 PR PBB SHADOW E&M-EST. PATIENT-LVL V: ICD-10-PCS | Mod: PBBFAC,,, | Performed by: NURSE PRACTITIONER

## 2020-05-27 PROCEDURE — 99204 OFFICE O/P NEW MOD 45 MIN: CPT | Mod: S$GLB,,, | Performed by: NURSE PRACTITIONER

## 2020-05-27 RX ORDER — CIMETIDINE 800 MG
800 TABLET ORAL NIGHTLY
Qty: 30 TABLET | Refills: 2 | Status: SHIPPED | OUTPATIENT
Start: 2020-05-27 | End: 2020-09-02

## 2020-05-27 NOTE — PATIENT INSTRUCTIONS

## 2020-05-27 NOTE — PROGRESS NOTES
"Subjective:       Patient ID: Elizabeth Giordano is a 59 y.o. female, Body mass index is 40.11 kg/m².    Chief Complaint: Gastroesophageal Reflux      Patient is new to me. Established patient of Dr. Arreguin, Dr. Palomares, & Georgina Sampson NP.    Gastroesophageal Reflux   She complains of dysphagia (dysphagia to solids; denies trouble swallowing liquid) and heartburn. She reports no abdominal pain, no belching, no chest pain, no choking, no coughing, no early satiety, no globus sensation, no hoarse voice, no nausea or no sore throat. This is a recurrent problem. The current episode started more than 1 month ago (Recurred ~ five months ago). The problem occurs frequently. The problem has been gradually worsening. The heartburn is located in the substernum (and throat). The heartburn is of moderate intensity. The heartburn wakes (occ wakes up with " a mouth full of acid") her from sleep. The heartburn does not limit her activity. The heartburn changes with position. The symptoms are aggravated by lying down. Pertinent negatives include no anemia, melena or weight loss. Risk factors include obesity, hiatal hernia, ETOH use and smoking/tobacco exposure (former smoker). She has tried a PPI and head elevation (Past: Protonix 40 mg once daily- lost effectiveness over time; Currently: Recently increased Protonix 40 mg to BID and Tagamet 400 mg BID added) for the symptoms. The treatment provided no relief. Past procedures include an EGD.     Review of Systems   Constitutional: Negative for appetite change, fever, unexpected weight change and weight loss.   HENT: Positive for postnasal drip. Negative for hoarse voice, sore throat and trouble swallowing.    Respiratory: Negative for cough, choking and shortness of breath.    Cardiovascular: Negative for chest pain.   Gastrointestinal: Positive for dysphagia (dysphagia to solids; denies trouble swallowing liquid) and heartburn. Negative for abdominal pain, blood in " stool, constipation, diarrhea, melena, nausea and vomiting.   Genitourinary: Negative for difficulty urinating and dysuria.   Musculoskeletal: Negative for gait problem.   Skin: Negative for rash.   Neurological: Negative for speech difficulty.   Psychiatric/Behavioral: Negative for confusion.       Past Medical History:   Diagnosis Date    Allergy     Arthritis of spine 2011    Chronic low back pain     Class 2 obesity with body mass index (BMI) of 39.0 to 39.9 in adult 1/11/2019    Coronary artery disease     mild    DDD (degenerative disc disease), lumbar     Diverticulitis     Diverticulosis     DJD (degenerative joint disease)     Encounter for blood transfusion     Factor V Leiden     GERD (gastroesophageal reflux disease)     Hiatal hernia     Migraines     PONV (postoperative nausea and vomiting)     geta      Past Surgical History:   Procedure Laterality Date    APPENDECTOMY  1981    CARPAL TUNNEL RELEASE  2011    right    COLONOSCOPY  2014    Dr. Palomares; reduntant colon, otherwise normal findings repeat in 8-10 years for surveillance    Epidural Steroid Injection      Pain Management    EPIDURAL STEROID INJECTION INTO CERVICAL SPINE N/A 9/25/2018    Procedure: Injection-steroid-epidural-cervical;  Surgeon: Oswald Abdalla MD;  Location: [facility] OR;  Service: Pain Management;  Laterality: N/A;    EPIDURAL STEROID INJECTION INTO CERVICAL SPINE N/A 10/24/2019    Procedure: Injection-steroid-epidural-cervical;  Surgeon: Oswald Abdalla MD;  Location: [facility] OR;  Service: Pain Management;  Laterality: N/A;    EPIDURAL STEROID INJECTION INTO LUMBAR SPINE N/A 12/27/2018    Procedure: Injection-steroid-epidural-lumbar L5/S1;  Surgeon: Oswald Abdalla MD;  Location: [facility] OR;  Service: Pain Management;  Laterality: N/A;    EPIDURAL STEROID INJECTION INTO LUMBAR SPINE N/A 9/4/2019    Procedure: Injection-steroid-epidural-lumbar;  Surgeon: Oswald Abdalla MD;  Location: [facility] OR;   Service: Pain Management;  Laterality: N/A;  L5/S1 to right    ESOPHAGOGASTRODUODENOSCOPY  05/17/2016    Dr. Arreguin; small hiatal hernia; gastritis    Facet injection      Pain Management    HYSTERECTOMY      ovaries removed    KIDNEY SURGERY Right 1967    to correct urinary reflux into kidney    LEFT HEART CATHETERIZATION Left 5/22/2019    Procedure: Left heart cath;  Surgeon: Casa Perdue MD;  Location: [facility] CATH;  Service: Cardiology;  Laterality: Left;    OVARIAN CYST REMOVAL Right 1981    RADIOFREQUENCY ABLATION OF LUMBAR MEDIAL BRANCH NERVE AT SINGLE LEVEL Bilateral 7/17/2018    Procedure: RADIOFREQUENCY ABLATION, NERVE, MEDIAL BRANCH, LUMBAR, L2,3,4;  Surgeon: Oswald Abdalla MD;  Location: [facility] OR;  Service: Pain Management;  Laterality: Bilateral;    RADIOFREQUENCY ABLATION OF LUMBAR MEDIAL BRANCH NERVE AT SINGLE LEVEL Bilateral 7/29/2019    Procedure: Radiofrequency Ablation, Nerve, Spinal, Lumbar, Medial Branch, L2,3,4;  Surgeon: Oswald Abdalla MD;  Location: [facility] OR;  Service: Pain Management;  Laterality: Bilateral;    SHOULDER SURGERY  2010    rotator cuff, right      Family History   Problem Relation Age of Onset    Heart attack Mother     Heart disease Mother     COPD Mother     Hypertension Mother     Hepatitis Sister     COPD Father     No Known Problems Daughter     Breast cancer Maternal Grandmother     Allergic rhinitis Neg Hx     Angioedema Neg Hx     Asthma Neg Hx     Atopy Neg Hx     Eczema Neg Hx     Immunodeficiency Neg Hx     Urticaria Neg Hx     Colon cancer Neg Hx     Colon polyps Neg Hx       Wt Readings from Last 10 Encounters:   05/27/20 106 kg (233 lb 11 oz)   05/11/20 104.9 kg (231 lb 4.2 oz)   03/04/20 104.4 kg (230 lb 2.6 oz)   02/11/20 103.4 kg (228 lb 1.1 oz)   02/07/20 102.8 kg (226 lb 10.1 oz)   01/20/20 103.3 kg (227 lb 11.8 oz)   12/16/19 100.8 kg (222 lb 3.6 oz)   12/12/19 102.3 kg (225 lb 8.5 oz)   12/02/19 102.9 kg (226 lb  11.9 oz)   10/25/19 102.9 kg (226 lb 11.9 oz)     Lab Results   Component Value Date    WBC 4.86 05/22/2019    HGB 13.2 05/22/2019    HCT 37.1 05/22/2019    MCV 91 05/22/2019     05/22/2019     CMP  Sodium   Date Value Ref Range Status   03/04/2020 141 136 - 145 mmol/L Final     Potassium   Date Value Ref Range Status   03/04/2020 4.3 3.5 - 5.1 mmol/L Final     Chloride   Date Value Ref Range Status   03/04/2020 106 95 - 110 mmol/L Final     CO2   Date Value Ref Range Status   03/04/2020 27 23 - 29 mmol/L Final     Glucose   Date Value Ref Range Status   03/04/2020 109 70 - 110 mg/dL Final     BUN, Bld   Date Value Ref Range Status   03/04/2020 23 (H) 6 - 20 mg/dL Final     Creatinine   Date Value Ref Range Status   03/04/2020 1.4 0.5 - 1.4 mg/dL Final     Calcium   Date Value Ref Range Status   03/04/2020 9.9 8.7 - 10.5 mg/dL Final     Total Protein   Date Value Ref Range Status   08/21/2018 7.0 6.0 - 8.4 g/dL Final     Albumin   Date Value Ref Range Status   08/21/2018 4.0 3.5 - 5.2 g/dL Final     Total Bilirubin   Date Value Ref Range Status   08/21/2018 0.3 0.1 - 1.0 mg/dL Final     Comment:     For infants and newborns, interpretation of results should be based  on gestational age, weight and in agreement with clinical  observations.  Premature Infant recommended reference ranges:  Up to 24 hours.............<8.0 mg/dL  Up to 48 hours............<12.0 mg/dL  3-5 days..................<15.0 mg/dL  6-29 days.................<15.0 mg/dL       Alkaline Phosphatase   Date Value Ref Range Status   08/21/2018 66 55 - 135 U/L Final     AST   Date Value Ref Range Status   08/21/2018 25 10 - 40 U/L Final     ALT   Date Value Ref Range Status   08/21/2018 32 10 - 44 U/L Final     Anion Gap   Date Value Ref Range Status   03/04/2020 8 8 - 16 mmol/L Final     eGFR if    Date Value Ref Range Status   03/04/2020 47.8 (A) >60 mL/min/1.73 m^2 Final     eGFR if non    Date Value Ref Range  Status   03/04/2020 41.4 (A) >60 mL/min/1.73 m^2 Final     Comment:     Calculation used to obtain the estimated glomerular filtration  rate (eGFR) is the CKD-EPI equation.          Lab Results   Component Value Date    LIPASE 221 04/02/2017              Reviewed prior medical records including radiology report of Chest X-Ray 5/11/2020 & endoscopy history (see surgical history).     Objective:      Physical Exam   Constitutional: She is oriented to person, place, and time. Vital signs are normal. She appears well-developed and well-nourished. She is cooperative. She does not have a sickly appearance. No distress.   HENT:   Head: Normocephalic.   Right Ear: Hearing normal.   Left Ear: Hearing normal.   Nose: Nose normal.   Pt wearing mask due to COVID concerns   Eyes: Pupils are equal, round, and reactive to light. Conjunctivae and lids are normal.   Neck: Trachea normal and normal range of motion.   Cardiovascular: Normal rate, regular rhythm and normal heart sounds.   No murmur heard.  Pulmonary/Chest: Effort normal and breath sounds normal. No stridor. No respiratory distress. She has no wheezes.   Abdominal: Soft. Bowel sounds are normal. She exhibits no distension, no ascites and no mass. There is no tenderness. There is no rebound and no guarding.   Musculoskeletal: Normal range of motion.   Neurological: She is alert and oriented to person, place, and time.   Skin: Skin is warm, dry and intact. No rash noted.   Non jaundiced   Psychiatric: She has a normal mood and affect. Her speech is normal and behavior is normal.         Assessment:       1. Gastroesophageal reflux disease, esophagitis presence not specified    2. History of gastritis    3. Esophageal dysphagia    4. Hiatal hernia    5. Screening for colon cancer    6. Pre-procedure lab exam           Plan:   All diagnoses and orders for this visit:    Gastroesophageal reflux disease, esophagitis presence not specified & History of gastritis  - Start:  cimetidine (TAGAMET) 800 MG tablet; Take 1 tablet (800 mg total) by mouth every evening.  Dispense: 30 tablet; Refill: 2  - Continue Protonix 40 mg BID  - Schedule EGD, discussed procedure with patient, including risks and benefits, patient verbalized understanding  - Take PPI in the morning 30 minutes before breakfast and dinner  - Recommend to avoid large meals, avoid eating within 3 hours of bedtime, elevate head of bed if nocturnal symptoms are present, smoking cessation (if current smoker), & weight loss (if overweight).   - Recommend minimize/avoid high-fat foods, chocolate, caffeine, citrus, alcohol, & tomato products.  - Advised to avoid/limit use of NSAID's, since they can cause GI upset, bleeding, and/or ulcers. If needed, take with food.     Esophageal dysphagia   - Schedule EGD, discussed procedure with patient and possible esophageal dilation may be performed during procedure if indicated, patient verbalized understanding   - Educated patient to eat smaller more frequent meals and to eat slowly and advised to eat a soft diet.   - Possible UGI/esophagram/esophageal manometry if symptoms persist    Hiatal hernia   - Discussed diagnosis with patient & that it is usually managed by controlling reflux symptoms, surgery is an option, but usually performed if reflux is uncontrolled by medication management and lifestyle/dietary modifications; if symptoms persist despite medication management and lifestyle/dietary modifications, we can refer to general surgery to consult about surgical options, patient verbalized understanding    Screening for colon cancer   - Next surveillance colonoscopy due 1/2022    If no improvement in symptoms or symptoms worsen, call/follow-up at clinic or go to ER

## 2020-05-28 DIAGNOSIS — Z01.812 PRE-PROCEDURE LAB EXAM: Primary | ICD-10-CM

## 2020-06-19 RX ORDER — CETIRIZINE HYDROCHLORIDE 10 MG/1
10 TABLET ORAL DAILY
COMMUNITY
End: 2020-07-09

## 2020-06-20 ENCOUNTER — LAB VISIT (OUTPATIENT)
Dept: FAMILY MEDICINE | Facility: CLINIC | Age: 60
End: 2020-06-20
Payer: COMMERCIAL

## 2020-06-20 DIAGNOSIS — Z01.812 PRE-PROCEDURE LAB EXAM: ICD-10-CM

## 2020-06-20 PROCEDURE — U0003 INFECTIOUS AGENT DETECTION BY NUCLEIC ACID (DNA OR RNA); SEVERE ACUTE RESPIRATORY SYNDROME CORONAVIRUS 2 (SARS-COV-2) (CORONAVIRUS DISEASE [COVID-19]), AMPLIFIED PROBE TECHNIQUE, MAKING USE OF HIGH THROUGHPUT TECHNOLOGIES AS DESCRIBED BY CMS-2020-01-R: HCPCS

## 2020-06-21 LAB — SARS-COV-2 RNA RESP QL NAA+PROBE: NOT DETECTED

## 2020-06-22 ENCOUNTER — LAB VISIT (OUTPATIENT)
Dept: LAB | Facility: HOSPITAL | Age: 60
End: 2020-06-22
Attending: INTERNAL MEDICINE
Payer: COMMERCIAL

## 2020-06-22 ENCOUNTER — ANESTHESIA EVENT (OUTPATIENT)
Dept: ENDOSCOPY | Facility: HOSPITAL | Age: 60
End: 2020-06-22
Payer: COMMERCIAL

## 2020-06-22 ENCOUNTER — ANESTHESIA (OUTPATIENT)
Dept: ENDOSCOPY | Facility: HOSPITAL | Age: 60
End: 2020-06-22
Payer: COMMERCIAL

## 2020-06-22 ENCOUNTER — HOSPITAL ENCOUNTER (OUTPATIENT)
Facility: HOSPITAL | Age: 60
Discharge: HOME | End: 2020-06-22
Attending: INTERNAL MEDICINE | Admitting: INTERNAL MEDICINE
Payer: COMMERCIAL

## 2020-06-22 VITALS
HEART RATE: 78 BPM | WEIGHT: 230 LBS | HEIGHT: 64 IN | OXYGEN SATURATION: 98 % | SYSTOLIC BLOOD PRESSURE: 122 MMHG | TEMPERATURE: 98 F | DIASTOLIC BLOOD PRESSURE: 68 MMHG | BODY MASS INDEX: 39.27 KG/M2 | RESPIRATION RATE: 14 BRPM

## 2020-06-22 DIAGNOSIS — R94.39 ABNORMAL STRESS TEST: ICD-10-CM

## 2020-06-22 DIAGNOSIS — I25.10 MILD CAD: ICD-10-CM

## 2020-06-22 DIAGNOSIS — R13.10 DYSPHAGIA: ICD-10-CM

## 2020-06-22 DIAGNOSIS — I10 ESSENTIAL HYPERTENSION: ICD-10-CM

## 2020-06-22 DIAGNOSIS — R94.4 DECREASED GFR: ICD-10-CM

## 2020-06-22 LAB
ALBUMIN SERPL BCP-MCNC: 3.8 G/DL (ref 3.5–5.2)
ALBUMIN SERPL BCP-MCNC: 3.8 G/DL (ref 3.5–5.2)
ALP SERPL-CCNC: 57 U/L (ref 55–135)
ALT SERPL W/O P-5'-P-CCNC: 18 U/L (ref 10–44)
ANION GAP SERPL CALC-SCNC: 6 MMOL/L (ref 8–16)
ANION GAP SERPL CALC-SCNC: 6 MMOL/L (ref 8–16)
AST SERPL-CCNC: 22 U/L (ref 10–40)
BILIRUB DIRECT SERPL-MCNC: 0.1 MG/DL (ref 0.1–0.3)
BILIRUB SERPL-MCNC: 0.3 MG/DL (ref 0.1–1)
BUN SERPL-MCNC: 18 MG/DL (ref 6–20)
BUN SERPL-MCNC: 18 MG/DL (ref 6–20)
CALCIUM SERPL-MCNC: 9.2 MG/DL (ref 8.7–10.5)
CALCIUM SERPL-MCNC: 9.2 MG/DL (ref 8.7–10.5)
CHLORIDE SERPL-SCNC: 105 MMOL/L (ref 95–110)
CHLORIDE SERPL-SCNC: 105 MMOL/L (ref 95–110)
CHOLEST SERPL-MCNC: 194 MG/DL (ref 120–199)
CHOLEST/HDLC SERPL: 3.6 {RATIO} (ref 2–5)
CO2 SERPL-SCNC: 30 MMOL/L (ref 23–29)
CO2 SERPL-SCNC: 30 MMOL/L (ref 23–29)
CREAT SERPL-MCNC: 1.4 MG/DL (ref 0.5–1.4)
CREAT SERPL-MCNC: 1.4 MG/DL (ref 0.5–1.4)
EST. GFR  (AFRICAN AMERICAN): 47.8 ML/MIN/1.73 M^2
EST. GFR  (AFRICAN AMERICAN): 47.8 ML/MIN/1.73 M^2
EST. GFR  (NON AFRICAN AMERICAN): 41.4 ML/MIN/1.73 M^2
EST. GFR  (NON AFRICAN AMERICAN): 41.4 ML/MIN/1.73 M^2
GLUCOSE SERPL-MCNC: 119 MG/DL (ref 70–110)
GLUCOSE SERPL-MCNC: 119 MG/DL (ref 70–110)
HDLC SERPL-MCNC: 54 MG/DL (ref 40–75)
HDLC SERPL: 27.8 % (ref 20–50)
LDLC SERPL CALC-MCNC: 114.2 MG/DL (ref 63–159)
NONHDLC SERPL-MCNC: 140 MG/DL
PHOSPHATE SERPL-MCNC: 2.9 MG/DL (ref 2.7–4.5)
POTASSIUM SERPL-SCNC: 4.4 MMOL/L (ref 3.5–5.1)
POTASSIUM SERPL-SCNC: 4.4 MMOL/L (ref 3.5–5.1)
PROT SERPL-MCNC: 6.9 G/DL (ref 6–8.4)
SODIUM SERPL-SCNC: 141 MMOL/L (ref 136–145)
SODIUM SERPL-SCNC: 141 MMOL/L (ref 136–145)
TRIGL SERPL-MCNC: 129 MG/DL (ref 30–150)

## 2020-06-22 PROCEDURE — 36415 COLL VENOUS BLD VENIPUNCTURE: CPT | Mod: PO

## 2020-06-22 PROCEDURE — D9220A PRA ANESTHESIA: ICD-10-PCS | Mod: ANES,,, | Performed by: ANESTHESIOLOGY

## 2020-06-22 PROCEDURE — 43248 EGD GUIDE WIRE INSERTION: CPT | Mod: ,,, | Performed by: INTERNAL MEDICINE

## 2020-06-22 PROCEDURE — 27201012 HC FORCEPS, HOT/COLD, DISP: Mod: PO | Performed by: INTERNAL MEDICINE

## 2020-06-22 PROCEDURE — 37000008 HC ANESTHESIA 1ST 15 MINUTES: Mod: PO | Performed by: INTERNAL MEDICINE

## 2020-06-22 PROCEDURE — D9220A PRA ANESTHESIA: Mod: ANES,,, | Performed by: ANESTHESIOLOGY

## 2020-06-22 PROCEDURE — 43248 PR EGD, FLEX, W/DILATION OVER GUIDEWIRE: ICD-10-PCS | Mod: ,,, | Performed by: INTERNAL MEDICINE

## 2020-06-22 PROCEDURE — 43248 EGD GUIDE WIRE INSERTION: CPT | Mod: PO | Performed by: INTERNAL MEDICINE

## 2020-06-22 PROCEDURE — 43239 EGD BIOPSY SINGLE/MULTIPLE: CPT | Mod: PO | Performed by: INTERNAL MEDICINE

## 2020-06-22 PROCEDURE — 88305 TISSUE EXAM BY PATHOLOGIST: ICD-10-PCS | Mod: 26,,, | Performed by: PATHOLOGY

## 2020-06-22 PROCEDURE — 25000003 PHARM REV CODE 250: Mod: PO | Performed by: ANESTHESIOLOGY

## 2020-06-22 PROCEDURE — 80069 RENAL FUNCTION PANEL: CPT

## 2020-06-22 PROCEDURE — 88305 TISSUE EXAM BY PATHOLOGIST: CPT | Performed by: PATHOLOGY

## 2020-06-22 PROCEDURE — D9220A PRA ANESTHESIA: Mod: CRNA,,, | Performed by: NURSE ANESTHETIST, CERTIFIED REGISTERED

## 2020-06-22 PROCEDURE — 63600175 PHARM REV CODE 636 W HCPCS: Mod: PO | Performed by: NURSE ANESTHETIST, CERTIFIED REGISTERED

## 2020-06-22 PROCEDURE — 37000009 HC ANESTHESIA EA ADD 15 MINS: Mod: PO | Performed by: INTERNAL MEDICINE

## 2020-06-22 PROCEDURE — 80076 HEPATIC FUNCTION PANEL: CPT

## 2020-06-22 PROCEDURE — 80061 LIPID PANEL: CPT

## 2020-06-22 PROCEDURE — 63600175 PHARM REV CODE 636 W HCPCS: Mod: PO | Performed by: INTERNAL MEDICINE

## 2020-06-22 PROCEDURE — D9220A PRA ANESTHESIA: ICD-10-PCS | Mod: CRNA,,, | Performed by: NURSE ANESTHETIST, CERTIFIED REGISTERED

## 2020-06-22 PROCEDURE — 25000003 PHARM REV CODE 250: Mod: PO | Performed by: NURSE ANESTHETIST, CERTIFIED REGISTERED

## 2020-06-22 PROCEDURE — 88305 TISSUE EXAM BY PATHOLOGIST: CPT | Mod: 26,,, | Performed by: PATHOLOGY

## 2020-06-22 RX ORDER — LIDOCAINE HCL/PF 100 MG/5ML
SYRINGE (ML) INTRAVENOUS
Status: DISCONTINUED | OUTPATIENT
Start: 2020-06-22 | End: 2020-06-22

## 2020-06-22 RX ORDER — SODIUM CHLORIDE 0.9 % (FLUSH) 0.9 %
10 SYRINGE (ML) INJECTION
Status: DISCONTINUED | OUTPATIENT
Start: 2020-06-22 | End: 2020-06-22 | Stop reason: HOSPADM

## 2020-06-22 RX ORDER — PROPOFOL 10 MG/ML
VIAL (ML) INTRAVENOUS
Status: DISCONTINUED | OUTPATIENT
Start: 2020-06-22 | End: 2020-06-22

## 2020-06-22 RX ORDER — SODIUM CHLORIDE, SODIUM LACTATE, POTASSIUM CHLORIDE, CALCIUM CHLORIDE 600; 310; 30; 20 MG/100ML; MG/100ML; MG/100ML; MG/100ML
INJECTION, SOLUTION INTRAVENOUS CONTINUOUS
Status: DISCONTINUED | OUTPATIENT
Start: 2020-06-22 | End: 2020-06-22 | Stop reason: HOSPADM

## 2020-06-22 RX ORDER — LIDOCAINE HYDROCHLORIDE 10 MG/ML
1 INJECTION INFILTRATION; PERINEURAL ONCE
Status: COMPLETED | OUTPATIENT
Start: 2020-06-22 | End: 2020-06-22

## 2020-06-22 RX ADMIN — PROPOFOL 25 MG: 10 INJECTION, EMULSION INTRAVENOUS at 08:06

## 2020-06-22 RX ADMIN — PROPOFOL 100 MG: 10 INJECTION, EMULSION INTRAVENOUS at 08:06

## 2020-06-22 RX ADMIN — LIDOCAINE HYDROCHLORIDE 100 MG: 20 INJECTION, SOLUTION INTRAVENOUS at 08:06

## 2020-06-22 RX ADMIN — SODIUM CHLORIDE, SODIUM LACTATE, POTASSIUM CHLORIDE, AND CALCIUM CHLORIDE: .6; .31; .03; .02 INJECTION, SOLUTION INTRAVENOUS at 08:06

## 2020-06-22 RX ADMIN — LIDOCAINE HYDROCHLORIDE: 10 INJECTION, SOLUTION EPIDURAL; INFILTRATION; INTRACAUDAL; PERINEURAL at 08:06

## 2020-06-22 RX ADMIN — PROPOFOL 50 MG: 10 INJECTION, EMULSION INTRAVENOUS at 08:06

## 2020-06-22 NOTE — ANESTHESIA PREPROCEDURE EVALUATION
06/22/2020  Elizabeth Giordano is a 59 y.o., female.    Anesthesia Evaluation    I have reviewed the Patient Summary Reports.    I have reviewed the Nursing Notes. I have reviewed the NPO Status.   I have reviewed the Medications.     Review of Systems  Anesthesia Hx:  Hx of Anesthetic complications PONV   Cardiovascular:   Hypertension CAD      Pulmonary:  Pulmonary Normal    Hepatic/GI:   Hiatal Hernia, GERD    Musculoskeletal:   Arthritis   Spine Disorders: lumbar    Neurological:   Neuromuscular Disease, Headaches    Endocrine:  Endocrine Normal    Psych:   anxiety          Physical Exam  General:  Morbid Obesity    Airway/Jaw/Neck:  Airway Findings: Mouth Opening: Normal Tongue: Normal  General Airway Assessment: Adult  Mallampati: IV  Improves to III with phonation.      Dental:  Dental Findings: In tact   Chest/Lungs:  Chest/Lungs Findings: Clear to auscultation, Normal Respiratory Rate         Mental Status:  Mental Status Findings:  Cooperative, Alert and Oriented         Anesthesia Plan  Type of Anesthesia, risks & benefits discussed:  Anesthesia Type:  general  Patient's Preference:   Intra-op Monitoring Plan: standard ASA monitors  Intra-op Monitoring Plan Comments:   Post Op Pain Control Plan:   Post Op Pain Control Plan Comments:   Induction:   IV  Beta Blocker:  Patient is not currently on a Beta-Blocker (No further documentation required).       Informed Consent: Patient understands risks and agrees with Anesthesia plan.  Questions answered. Anesthesia consent signed with patient.  ASA Score: 3     Day of Surgery Review of History & Physical:            Ready For Surgery From Anesthesia Perspective.

## 2020-06-22 NOTE — TRANSFER OF CARE
"Anesthesia Transfer of Care Note    Patient: Elizabeth Giordano    Procedure(s) Performed: Procedure(s) (LRB):  EGD (ESOPHAGOGASTRODUODENOSCOPY) (N/A)    Patient location: PACU    Anesthesia Type: general    Transport from OR: Transported from OR on room air with adequate spontaneous ventilation    Post pain: adequate analgesia    Post assessment: no apparent anesthetic complications and tolerated procedure well    Post vital signs: stable    Level of consciousness: sedated and responds to stimulation    Nausea/Vomiting: no nausea/vomiting    Complications: none    Transfer of care protocol was followed      Last vitals:   Visit Vitals  BP (!) 95/55   Pulse 74   Temp 36.6 °C (97.9 °F) (Skin)   Resp 16   Ht 5' 4" (1.626 m)   Wt 104.3 kg (230 lb)   SpO2 95%   Breastfeeding No   BMI 39.48 kg/m²     "

## 2020-06-22 NOTE — H&P
History & Physical - Short Stay  Gastroenterology      SUBJECTIVE:     Procedure: EGD    Chief Complaint/Indication for Procedure: Dysphagia and Reflux    Facility-Administered Medications Prior to Admission   Medication    onabotulinumtoxina injection 200 Units    onabotulinumtoxina injection 200 Units     PTA Medications   Medication Sig    aspirin (ECOTRIN) 81 MG EC tablet Take 81 mg by mouth once daily.    azelastine (ASTELIN) 137 mcg (0.1 %) nasal spray USE 1 SPRAY(137 MCG) IN EACH NOSTRIL TWICE DAILY    cetirizine (ZYRTEC) 10 MG tablet Take 10 mg by mouth once daily.    cimetidine (TAGAMET) 800 MG tablet Take 1 tablet (800 mg total) by mouth every evening.    diclofenac sodium (VOLTAREN) 1 % Gel Apply 2 g topically 3 (three) times daily as needed.    diphenhydrAMINE (BENADRYL) 25 mg capsule Take 25 mg by mouth every 6 (six) hours as needed.    estradiol (ESTRACE) 1 MG tablet TK 1 T PO QD;; pt uses HS    gabapentin (NEURONTIN) 300 MG capsule TAKE 1 CAPSULE(300 MG) BY MOUTH THREE TIMES DAILY    hydroCHLOROthiazide (HYDRODIURIL) 25 MG tablet TAKE 1 TABLET(25 MG) BY MOUTH EVERY DAY    losartan (COZAAR) 100 MG tablet TAKE 1 TABLET(100 MG) BY MOUTH EVERY DAY    pantoprazole (PROTONIX) 40 MG tablet Take 1 tablet (40 mg total) by mouth 2 (two) times daily.    traMADoL (ULTRAM) 50 mg tablet Take 1 tablet (50 mg total) by mouth 2 (two) times daily as needed.    ubrogepant (UBRELVY) 50 mg tablet Take 1 tablet po at onset of migraine. May repeat in 2 hours if needed. Max 2 tablets per day.    lactobacillus combination no.8 (ADULT PROBIOTIC ORAL) Take by mouth once daily.    lidocaine (LIDODERM) 5 % Place 1 patch onto the skin daily as needed. Remove & Discard patch within 12 hours or as directed by MD (Patient not taking: Reported on 5/27/2020)    loratadine (CLARITIN) 10 mg tablet Take 10 mg by mouth once daily.    meloxicam (MOBIC) 15 MG tablet TAKE 1 TABLET(15 MG) BY MOUTH EVERY DAY AS NEEDED FOR  PAIN    mometasone (NASONEX) 50 mcg/actuation nasal spray 2 sprays by Nasal route once daily. (Patient not taking: Reported on 6/19/2020)    ondansetron (ZOFRAN) 4 MG tablet Take 1 tablet (4 mg total) by mouth every 6 (six) hours as needed for Nausea. (Patient not taking: Reported on 5/27/2020)    tobramycin-dexamethasone 0.3-0.1% (TOBRADEX) 0.3-0.1 % DrpS Place 1 drop into both eyes every 6 (six) hours.    traZODone (DESYREL) 50 MG tablet Take 1 tablet (50 mg total) by mouth every evening. (Patient not taking: Reported on 5/27/2020)       Review of patient's allergies indicates:   Allergen Reactions    Hydrocodone Hives and Nausea Only           Sulfa (sulfonamide antibiotics) Hives and Rash           Doxycycline Rash     Itchy rash and some scattered itchy lesions    Oxycodone Itching and Nausea And Vomiting        Past Medical History:   Diagnosis Date    Allergy     Arthritis of spine 2011    Chronic low back pain     Class 2 obesity with body mass index (BMI) of 39.0 to 39.9 in adult 1/11/2019    Coronary artery disease     mild    DDD (degenerative disc disease), lumbar     Diverticulitis     Diverticulosis     DJD (degenerative joint disease)     Encounter for blood transfusion     Factor V Leiden     GERD (gastroesophageal reflux disease)     Hiatal hernia     Migraines     PONV (postoperative nausea and vomiting)     geta     Past Surgical History:   Procedure Laterality Date    APPENDECTOMY  1981    CARPAL TUNNEL RELEASE  2011    right    COLONOSCOPY  2014    Dr. Palomares; reduntant colon, otherwise normal findings repeat in 8-10 years for surveillance    Epidural Steroid Injection      Pain Management    EPIDURAL STEROID INJECTION INTO CERVICAL SPINE N/A 9/25/2018    Procedure: Injection-steroid-epidural-cervical;  Surgeon: Oswald Abdalla MD;  Location: [facility];  Service: Pain Management;  Laterality: N/A;    EPIDURAL STEROID INJECTION INTO CERVICAL SPINE N/A 10/24/2019     Procedure: Injection-steroid-epidural-cervical;  Surgeon: Oswald Abdalla MD;  Location: [facility] OR;  Service: Pain Management;  Laterality: N/A;    EPIDURAL STEROID INJECTION INTO LUMBAR SPINE N/A 12/27/2018    Procedure: Injection-steroid-epidural-lumbar L5/S1;  Surgeon: Oswald Abdalla MD;  Location: [facility] OR;  Service: Pain Management;  Laterality: N/A;    EPIDURAL STEROID INJECTION INTO LUMBAR SPINE N/A 9/4/2019    Procedure: Injection-steroid-epidural-lumbar;  Surgeon: Oswald Abdalla MD;  Location: [facility] OR;  Service: Pain Management;  Laterality: N/A;  L5/S1 to right    ESOPHAGOGASTRODUODENOSCOPY  05/17/2016    Dr. Arreguin; small hiatal hernia; gastritis    Facet injection      Pain Management    HYSTERECTOMY      ovaries removed    KIDNEY SURGERY Right 1967    to correct urinary reflux into kidney    LEFT HEART CATHETERIZATION Left 5/22/2019    Procedure: Left heart cath;  Surgeon: Casa Perdue MD;  Location: [facility] CATH;  Service: Cardiology;  Laterality: Left;    OVARIAN CYST REMOVAL Right 1981    RADIOFREQUENCY ABLATION OF LUMBAR MEDIAL BRANCH NERVE AT SINGLE LEVEL Bilateral 7/17/2018    Procedure: RADIOFREQUENCY ABLATION, NERVE, MEDIAL BRANCH, LUMBAR, L2,3,4;  Surgeon: Oswald Abdalla MD;  Location: [facility] OR;  Service: Pain Management;  Laterality: Bilateral;    RADIOFREQUENCY ABLATION OF LUMBAR MEDIAL BRANCH NERVE AT SINGLE LEVEL Bilateral 7/29/2019    Procedure: Radiofrequency Ablation, Nerve, Spinal, Lumbar, Medial Branch, L2,3,4;  Surgeon: Oswald Abdalla MD;  Location: [facility] OR;  Service: Pain Management;  Laterality: Bilateral;    SHOULDER SURGERY  2010    rotator cuff, right     Family History   Problem Relation Age of Onset    Heart attack Mother     Heart disease Mother     COPD Mother     Hypertension Mother     Hepatitis Sister     COPD Father     No Known Problems Daughter     Breast cancer Maternal Grandmother     Allergic rhinitis Neg Hx      Angioedema Neg Hx     Asthma Neg Hx     Atopy Neg Hx     Eczema Neg Hx     Immunodeficiency Neg Hx     Urticaria Neg Hx     Colon cancer Neg Hx     Colon polyps Neg Hx      Social History     Tobacco Use    Smoking status: Former Smoker     Packs/day: 1.00     Years: 5.00     Pack years: 5.00     Start date: 1992     Quit date: 1997     Years since quittin.4    Smokeless tobacco: Never Used   Substance Use Topics    Alcohol use: Yes     Frequency: Monthly or less     Drinks per session: 1 or 2     Binge frequency: Never     Comment: 3x per year    Drug use: No         OBJECTIVE:     Vital Signs (Most Recent)  Temp: 97.9 °F (36.6 °C) (20)  Pulse: 72 (20)  Resp: 16 (20)  BP: (!) 115/56 (20)  SpO2: 97 % (20)    Physical Exam:                                                       GENERAL:  Comfortable, in no acute distress.                                 HEENT EXAM:  Nonicteric.  No adenopathy.  Oropharynx is clear.               NECK:  Supple.                                                               LUNGS:  Clear.                                                               CARDIAC:  Regular rate and rhythm.  S1, S2.  No murmur.                      ABDOMEN:  Soft, positive bowel sounds, nontender.  No hepatosplenomegaly or masses.  No rebound or guarding.                                             EXTREMITIES:  No edema.     MENTAL STATUS:  Normal, alert and oriented.      ASSESSMENT/PLAN:     Assessment: Dysphagia and Reflux    Plan: EGD    Anesthesia Plan: General    ASA Grade: ASA 2 - Patient with mild systemic disease with no functional limitations    MALLAMPATI SCORE:  I (soft palate, uvula, fauces, and tonsillar pillars visible)     In my medical opinion and judgment, this medical or surgical procedure was not able to be safely postponed in accordance with [place] Department of Health, Healthcare Facility Notice  #2020-COVID19-ALL-007.

## 2020-06-22 NOTE — ANESTHESIA POSTPROCEDURE EVALUATION
Anesthesia Post Evaluation    Patient: Elizabeth Giordano    Procedure(s) Performed: Procedure(s) (LRB):  EGD (ESOPHAGOGASTRODUODENOSCOPY) (N/A)    Final Anesthesia Type: general    Patient location during evaluation: PACU  Patient participation: Yes- Able to Participate  Level of consciousness: sedated and awake  Post-procedure vital signs: reviewed and stable  Pain management: adequate  Airway patency: patent    PONV status at discharge: No PONV  Anesthetic complications: no      Cardiovascular status: blood pressure returned to baseline  Respiratory status: spontaneous ventilation  Hydration status: euvolemic  Follow-up not needed.          Vitals Value Taken Time   /68 06/22/20 0912   Temp 36.6 °C (97.8 °F) 06/22/20 0842   Pulse 78 06/22/20 0912   Resp 14 06/22/20 0912   SpO2 98 % 06/22/20 0912         Event Time   Out of Recovery 09:17:40         Pain/Catarina Score: Catarina Score: 10 (6/22/2020  9:07 AM)

## 2020-06-22 NOTE — PLAN OF CARE
Patient tolerating oral liquids without difficulty. No apparent s&s of distress noted at this time, no complaints voiced at this time.   Discharge instructions reviewed with patient/family/friend with good verbal feedback received. Patient ready for discharge

## 2020-06-22 NOTE — DISCHARGE SUMMARY
Discharge Note  Short Stay      SUMMARY     Admit Date: 6/22/2020    Attending Physician: Marty Arreguin MD     Discharge Physician: Marty Arreguin MD    Discharge Date: 6/22/2020 8:46 AM    Final Diagnosis: Gastroesophageal reflux disease, esophagitis presence not specified [K21.9]  Dysphagia, unspecified type [R13.10]    Disposition: HOME OR SELF CARE    Patient Instructions:   Current Discharge Medication List      CONTINUE these medications which have NOT CHANGED    Details   aspirin (ECOTRIN) 81 MG EC tablet Take 81 mg by mouth once daily.      azelastine (ASTELIN) 137 mcg (0.1 %) nasal spray USE 1 SPRAY(137 MCG) IN EACH NOSTRIL TWICE DAILY  Qty: 30 mL, Refills: 0    Associated Diagnoses: Environmental allergies      cetirizine (ZYRTEC) 10 MG tablet Take 10 mg by mouth once daily.      cimetidine (TAGAMET) 800 MG tablet Take 1 tablet (800 mg total) by mouth every evening.  Qty: 30 tablet, Refills: 2    Associated Diagnoses: Gastroesophageal reflux disease, esophagitis presence not specified      diclofenac sodium (VOLTAREN) 1 % Gel Apply 2 g topically 3 (three) times daily as needed.  Qty: 1 Tube, Refills: 5      diphenhydrAMINE (BENADRYL) 25 mg capsule Take 25 mg by mouth every 6 (six) hours as needed.      estradiol (ESTRACE) 1 MG tablet TK 1 T PO QD;; pt uses HS  Refills: 3      gabapentin (NEURONTIN) 300 MG capsule TAKE 1 CAPSULE(300 MG) BY MOUTH THREE TIMES DAILY  Qty: 90 capsule, Refills: 1      hydroCHLOROthiazide (HYDRODIURIL) 25 MG tablet TAKE 1 TABLET(25 MG) BY MOUTH EVERY DAY  Qty: 90 tablet, Refills: 0    Comments: Please inactivate all prior scripts with same name and strength including any scripts on hold.      losartan (COZAAR) 100 MG tablet TAKE 1 TABLET(100 MG) BY MOUTH EVERY DAY  Qty: 90 tablet, Refills: 3    Associated Diagnoses: Essential hypertension      pantoprazole (PROTONIX) 40 MG tablet Take 1 tablet (40 mg total) by mouth 2 (two) times daily.  Qty: 180 tablet, Refills: 1     Associated Diagnoses: Gastroesophageal reflux disease, esophagitis presence not specified      traMADoL (ULTRAM) 50 mg tablet Take 1 tablet (50 mg total) by mouth 2 (two) times daily as needed.  Qty: 60 tablet, Refills: 2    Comments: Quantity prescribed more than 7 day supply? Yes, quantity medically necessary      ubrogepant (UBRELVY) 50 mg tablet Take 1 tablet po at onset of migraine. May repeat in 2 hours if needed. Max 2 tablets per day.  Qty: 10 tablet, Refills: 11    Associated Diagnoses: Intractable chronic migraine without aura and without status migrainosus      lactobacillus combination no.8 (ADULT PROBIOTIC ORAL) Take by mouth once daily.      lidocaine (LIDODERM) 5 % Place 1 patch onto the skin daily as needed. Remove & Discard patch within 12 hours or as directed by MD  Qty: 30 patch, Refills: 5      loratadine (CLARITIN) 10 mg tablet Take 10 mg by mouth once daily.      meloxicam (MOBIC) 15 MG tablet TAKE 1 TABLET(15 MG) BY MOUTH EVERY DAY AS NEEDED FOR PAIN  Qty: 90 tablet, Refills: 1      mometasone (NASONEX) 50 mcg/actuation nasal spray 2 sprays by Nasal route once daily.  Qty: 1 each, Refills: 6    Associated Diagnoses: PND (post-nasal drip); Laryngitis      ondansetron (ZOFRAN) 4 MG tablet Take 1 tablet (4 mg total) by mouth every 6 (six) hours as needed for Nausea.  Qty: 30 tablet, Refills: 11      tobramycin-dexamethasone 0.3-0.1% (TOBRADEX) 0.3-0.1 % DrpS Place 1 drop into both eyes every 6 (six) hours.  Qty: 5 mL, Refills: 0    Associated Diagnoses: Eye infection, unspecified laterality      traZODone (DESYREL) 50 MG tablet Take 1 tablet (50 mg total) by mouth every evening.  Qty: 30 tablet, Refills: 2    Associated Diagnoses: Primary insomnia             Discharge Procedure Orders (must include Diet, Follow-up, Activity)    Follow Up:  Follow up with PCP as previously scheduled  Resume routine diet.  Activity as tolerated.    No driving day of procedure.

## 2020-06-22 NOTE — [employer] PATIENT INSTRUCTIONS
Discharge Summary/Instructions after an Endoscopic Procedure  Patient Name: Elizabeth Giordano  Patient MRN: [id number]  Patient YOB: 1960  Monday, June 22, 2020  Marty Arreguin MD  RESTRICTIONS:  During your procedure today, you received medications for sedation.  These   medications may affect your judgment, balance and coordination.  Therefore,   for 24 hours, you have the following restrictions:   - DO NOT drive a car, operate machinery, make legal/financial decisions,   sign important papers or drink alcohol.    ACTIVITY:  Today: no heavy lifting, straining or running due to procedural   sedation/anesthesia.  The following day: return to full activity including work.  DIET:  Eat and drink normally unless instructed otherwise.     TREATMENT FOR COMMON SIDE EFFECTS:  - Mild abdominal pain, nausea, belching, bloating or excessive gas:  rest,   eat lightly and use a heating pad.  - Sore Throat: treat with throat lozenges and/or gargle with warm salt   water.  - Because air was used during the procedure, expelling large amounts of air   from your rectum or belching is normal.  - If a bowel prep was taken, you may not have a bowel movement for 1-3 days.    This is normal.  SYMPTOMS TO WATCH FOR AND REPORT TO YOUR PHYSICIAN:  1. Abdominal pain or bloating, other than gas cramps.  2. Chest pain.  3. Back pain.  4. Signs of infection such as: chills or fever occurring within 24 hours   after the procedure.  5. Rectal bleeding, which would show as bright red, maroon, or black stools.   (A tablespoon of blood from the rectum is not serious, especially if   hemorrhoids are present.)  6. Vomiting.  7. Weakness or dizziness.  GO DIRECTLY TO THE NEAREST EMERGENCY ROOM IF YOU HAVE ANY OF THE FOLLOWING:      Difficulty breathing              Chills and/or fever over 101 F   Persistent vomiting and/or vomiting blood   Severe abdominal pain   Severe chest pain   Black, tarry stools   Bleeding- more than one  tablespoon   Any other symptom or condition that you feel may need urgent attention  Your doctor recommends these additional instructions:  If any biopsies were taken, your doctors clinic will contact you in 1 to 2   weeks with any results.  We are waiting for your pathology results.   Continue your present medications.   You are being discharged to home.  For questions, problems or results please call your physician - Marty Arreguin MD at Work:  [phone].  EMERGENCY PHONE NUMBER: [phone], LAB RESULTS: [phone]  IF A COMPLICATION OR EMERGENCY SITUATION ARISES AND YOU ARE UNABLE TO REACH   YOUR PHYSICIAN - GO DIRECTLY TO THE EMERGENCY ROOM.  ___________________________________________  Nurse Signature  ___________________________________________  Patient/Designated Responsible Party Signature  Marty Arreguin MD  6/22/2020 8:43:52 AM  This report has been verified and signed electronically.  [employer]

## 2020-06-24 LAB
FINAL PATHOLOGIC DIAGNOSIS: NORMAL
GROSS: NORMAL

## 2020-06-28 ENCOUNTER — PATIENT OUTREACH (OUTPATIENT)
Dept: ADMINISTRATIVE | Facility: OTHER | Age: 60
End: 2020-06-28

## 2020-06-28 NOTE — PROGRESS NOTES
Requested updates within Care Everywhere.  Patient's chart was reviewed for overdue SARIKA topics.  Immunizations reconciled.

## 2020-06-30 ENCOUNTER — OFFICE VISIT (OUTPATIENT)
Dept: PAIN MEDICINE | Facility: CLINIC | Age: 60
End: 2020-06-30
Payer: COMMERCIAL

## 2020-06-30 ENCOUNTER — OFFICE VISIT (OUTPATIENT)
Dept: GASTROENTEROLOGY | Facility: CLINIC | Age: 60
End: 2020-06-30
Payer: COMMERCIAL

## 2020-06-30 VITALS
BODY MASS INDEX: 38.98 KG/M2 | SYSTOLIC BLOOD PRESSURE: 122 MMHG | RESPIRATION RATE: 18 BRPM | OXYGEN SATURATION: 97 % | HEART RATE: 88 BPM | WEIGHT: 227.06 LBS | DIASTOLIC BLOOD PRESSURE: 66 MMHG

## 2020-06-30 DIAGNOSIS — Z87.898 HISTORY OF DYSPHAGIA: ICD-10-CM

## 2020-06-30 DIAGNOSIS — G89.4 CHRONIC PAIN DISORDER: Primary | ICD-10-CM

## 2020-06-30 DIAGNOSIS — M48.061 SPINAL STENOSIS OF LUMBAR REGION WITHOUT NEUROGENIC CLAUDICATION: ICD-10-CM

## 2020-06-30 DIAGNOSIS — K21.9 GERD WITHOUT ESOPHAGITIS: Primary | ICD-10-CM

## 2020-06-30 DIAGNOSIS — Z12.11 SCREENING FOR COLON CANCER: ICD-10-CM

## 2020-06-30 DIAGNOSIS — M51.36 DDD (DEGENERATIVE DISC DISEASE), LUMBAR: ICD-10-CM

## 2020-06-30 DIAGNOSIS — K22.2 SCHATZKI'S RING: ICD-10-CM

## 2020-06-30 DIAGNOSIS — M54.16 LUMBAR RADICULITIS: ICD-10-CM

## 2020-06-30 DIAGNOSIS — K44.9 HIATAL HERNIA: ICD-10-CM

## 2020-06-30 PROCEDURE — 3008F BODY MASS INDEX DOCD: CPT | Mod: CPTII,S$GLB,, | Performed by: PHYSICIAN ASSISTANT

## 2020-06-30 PROCEDURE — 99213 PR OFFICE/OUTPT VISIT, EST, LEVL III, 20-29 MIN: ICD-10-PCS | Mod: 95,,, | Performed by: NURSE PRACTITIONER

## 2020-06-30 PROCEDURE — 99999 PR PBB SHADOW E&M-EST. PATIENT-LVL V: CPT | Mod: PBBFAC,,, | Performed by: PHYSICIAN ASSISTANT

## 2020-06-30 PROCEDURE — 99214 PR OFFICE/OUTPT VISIT, EST, LEVL IV, 30-39 MIN: ICD-10-PCS | Mod: S$GLB,,, | Performed by: PHYSICIAN ASSISTANT

## 2020-06-30 PROCEDURE — 3074F SYST BP LT 130 MM HG: CPT | Mod: CPTII,S$GLB,, | Performed by: PHYSICIAN ASSISTANT

## 2020-06-30 PROCEDURE — 3078F PR MOST RECENT DIASTOLIC BLOOD PRESSURE < 80 MM HG: ICD-10-PCS | Mod: CPTII,S$GLB,, | Performed by: PHYSICIAN ASSISTANT

## 2020-06-30 PROCEDURE — 99214 OFFICE O/P EST MOD 30 MIN: CPT | Mod: S$GLB,,, | Performed by: PHYSICIAN ASSISTANT

## 2020-06-30 PROCEDURE — 3008F PR BODY MASS INDEX (BMI) DOCUMENTED: ICD-10-PCS | Mod: CPTII,S$GLB,, | Performed by: PHYSICIAN ASSISTANT

## 2020-06-30 PROCEDURE — 99213 OFFICE O/P EST LOW 20 MIN: CPT | Mod: 95,,, | Performed by: NURSE PRACTITIONER

## 2020-06-30 PROCEDURE — 99999 PR PBB SHADOW E&M-EST. PATIENT-LVL V: ICD-10-PCS | Mod: PBBFAC,,, | Performed by: PHYSICIAN ASSISTANT

## 2020-06-30 PROCEDURE — 3074F PR MOST RECENT SYSTOLIC BLOOD PRESSURE < 130 MM HG: ICD-10-PCS | Mod: CPTII,S$GLB,, | Performed by: PHYSICIAN ASSISTANT

## 2020-06-30 PROCEDURE — 3078F DIAST BP <80 MM HG: CPT | Mod: CPTII,S$GLB,, | Performed by: PHYSICIAN ASSISTANT

## 2020-06-30 NOTE — PATIENT INSTRUCTIONS

## 2020-06-30 NOTE — PROGRESS NOTES
Subjective:       Patient ID: Elizabeth Giordano is a 59 y.o. female, There is no height or weight on file to calculate BMI.    Chief Complaint: No chief complaint on file.      Established patient of Dr. Arreguin and myself.    The patient location is: Home ([place])  The chief complaint leading to consultation is: Follow-up  Visit type: Audiovisual  Face to Face time with patient: 5 minutes  15 minutes of total time spent on the encounter, which includes face to face time and non-face to face time preparing to see the patient (eg, review of tests), Obtaining and/or reviewing separately obtained history, Documenting clinical information in the electronic or other health record, Independently interpreting results (not separately reported) and communicating results to the patient/family/caregiver, or Care coordination (not separately reported).   Each patient to whom he or she provides medical services by telemedicine is:  (1) informed of the relationship between the physician and patient and the respective role of any other health care provider with respect to management of the patient; and (2) notified that he or she may decline to receive medical services by telemedicine and may withdraw from such care at any time.    Gastroesophageal Reflux  She complains of heartburn. She reports no abdominal pain, no belching, no chest pain, no choking, no coughing, no dysphagia (resolved since last visit; esophageal dilation improved symptoms), no early satiety, no globus sensation, no hoarse voice, no nausea or no sore throat. This is a chronic problem. The current episode started more than 1 month ago. The problem occurs rarely (has only occurred one time since changing medication at last office visit). The problem has been rapidly improving. The heartburn is located in the substernum. The heartburn is of mild intensity. The heartburn does not wake her from sleep. The heartburn does not limit her activity. The  heartburn doesn't change with position. The symptoms are aggravated by certain foods. Pertinent negatives include no anemia, melena or weight loss. Risk factors include hiatal hernia, obesity, smoking/tobacco exposure and ETOH use (former smoker). She has tried a PPI, head elevation, a diet change and a histamine-2 antagonist (Currently: Protonix 40 mg BID and Tagamet 800 mg nightly- effective) for the symptoms. Past procedures include an EGD.     Review of Systems   Constitutional: Negative for appetite change, fever, unexpected weight change and weight loss.   HENT: Negative for hoarse voice, sore throat and trouble swallowing.    Respiratory: Negative for cough, choking and shortness of breath.    Cardiovascular: Negative for chest pain.   Gastrointestinal: Positive for heartburn. Negative for abdominal pain, blood in stool, constipation, diarrhea, dysphagia (resolved since last visit; esophageal dilation improved symptoms), melena, nausea and vomiting.   Genitourinary: Negative for difficulty urinating and dysuria.   Musculoskeletal: Negative for gait problem.   Skin: Negative for rash.   Neurological: Negative for speech difficulty.   Psychiatric/Behavioral: Negative for confusion.       Past Medical History:   Diagnosis Date    Allergy     Arthritis of spine 2011    Chronic low back pain     Class 2 obesity with body mass index (BMI) of 39.0 to 39.9 in adult 1/11/2019    Coronary artery disease     mild    DDD (degenerative disc disease), lumbar     Diverticulitis     Diverticulosis     DJD (degenerative joint disease)     Encounter for blood transfusion     Factor V Leiden     GERD (gastroesophageal reflux disease)     Hiatal hernia     Migraines     PONV (postoperative nausea and vomiting)     geta    Schatzki's ring       Past Surgical History:   Procedure Laterality Date    APPENDECTOMY  1981    CARPAL TUNNEL RELEASE  2011    right    COLONOSCOPY  2014    Dr. Palomares; reduntant colon,  otherwise normal findings repeat in 8-10 years for surveillance    Epidural Steroid Injection      Pain Management    EPIDURAL STEROID INJECTION INTO CERVICAL SPINE N/A 9/25/2018    Procedure: Injection-steroid-epidural-cervical;  Surgeon: Oswald Abdalla MD;  Location: [facility] OR;  Service: Pain Management;  Laterality: N/A;    EPIDURAL STEROID INJECTION INTO CERVICAL SPINE N/A 10/24/2019    Procedure: Injection-steroid-epidural-cervical;  Surgeon: Oswald Abdalla MD;  Location: [facility] OR;  Service: Pain Management;  Laterality: N/A;    EPIDURAL STEROID INJECTION INTO LUMBAR SPINE N/A 12/27/2018    Procedure: Injection-steroid-epidural-lumbar L5/S1;  Surgeon: Oswald Abdalla MD;  Location: [facility] OR;  Service: Pain Management;  Laterality: N/A;    EPIDURAL STEROID INJECTION INTO LUMBAR SPINE N/A 9/4/2019    Procedure: Injection-steroid-epidural-lumbar;  Surgeon: Oswald Abdalla MD;  Location: [facility] OR;  Service: Pain Management;  Laterality: N/A;  L5/S1 to right    ESOPHAGOGASTRODUODENOSCOPY  05/17/2016    Dr. Arreguin; small hiatal hernia; gastritis    ESOPHAGOGASTRODUODENOSCOPY N/A 6/22/2020    Dr. Arreguin; mild Schatzki ring- dilated; small hiatal hernia; bx unremarkable    Facet injection      Pain Management    HYSTERECTOMY      ovaries removed    KIDNEY SURGERY Right 1967    to correct urinary reflux into kidney    LEFT HEART CATHETERIZATION Left 5/22/2019    Procedure: Left heart cath;  Surgeon: Casa Perdue MD;  Location: [facility] CATH;  Service: Cardiology;  Laterality: Left;    OVARIAN CYST REMOVAL Right 1981    RADIOFREQUENCY ABLATION OF LUMBAR MEDIAL BRANCH NERVE AT SINGLE LEVEL Bilateral 7/17/2018    Procedure: RADIOFREQUENCY ABLATION, NERVE, MEDIAL BRANCH, LUMBAR, L2,3,4;  Surgeon: Oswald Abdalla MD;  Location: [facility] OR;  Service: Pain Management;  Laterality: Bilateral;    RADIOFREQUENCY ABLATION OF LUMBAR MEDIAL BRANCH NERVE AT SINGLE LEVEL Bilateral 7/29/2019     Procedure: Radiofrequency Ablation, Nerve, Spinal, Lumbar, Medial Branch, L2,3,4;  Surgeon: Oswald Abdalla MD;  Location: [facility] OR;  Service: Pain Management;  Laterality: Bilateral;    SHOULDER SURGERY  2010    rotator cuff, right      Family History   Problem Relation Age of Onset    Heart attack Mother     Heart disease Mother     COPD Mother     Hypertension Mother     Hepatitis Sister     COPD Father     No Known Problems Daughter     Breast cancer Maternal Grandmother     Allergic rhinitis Neg Hx     Angioedema Neg Hx     Asthma Neg Hx     Atopy Neg Hx     Eczema Neg Hx     Immunodeficiency Neg Hx     Urticaria Neg Hx     Colon cancer Neg Hx     Colon polyps Neg Hx       Wt Readings from Last 10 Encounters:   06/22/20 104.3 kg (230 lb)   05/27/20 106 kg (233 lb 11 oz)   05/11/20 104.9 kg (231 lb 4.2 oz)   03/04/20 104.4 kg (230 lb 2.6 oz)   02/11/20 103.4 kg (228 lb 1.1 oz)   02/07/20 102.8 kg (226 lb 10.1 oz)   01/20/20 103.3 kg (227 lb 11.8 oz)   12/16/19 100.8 kg (222 lb 3.6 oz)   12/12/19 102.3 kg (225 lb 8.5 oz)   12/02/19 102.9 kg (226 lb 11.9 oz)     Lab Results   Component Value Date    WBC 4.86 05/22/2019    HGB 13.2 05/22/2019    HCT 37.1 05/22/2019    MCV 91 05/22/2019     05/22/2019     CMP  Sodium   Date Value Ref Range Status   06/22/2020 141 136 - 145 mmol/L Final   06/22/2020 141 136 - 145 mmol/L Final     Potassium   Date Value Ref Range Status   06/22/2020 4.4 3.5 - 5.1 mmol/L Final   06/22/2020 4.4 3.5 - 5.1 mmol/L Final     Chloride   Date Value Ref Range Status   06/22/2020 105 95 - 110 mmol/L Final   06/22/2020 105 95 - 110 mmol/L Final     CO2   Date Value Ref Range Status   06/22/2020 30 (H) 23 - 29 mmol/L Final   06/22/2020 30 (H) 23 - 29 mmol/L Final     Glucose   Date Value Ref Range Status   06/22/2020 119 (H) 70 - 110 mg/dL Final   06/22/2020 119 (H) 70 - 110 mg/dL Final     BUN, Bld   Date Value Ref Range Status   06/22/2020 18 6 - 20 mg/dL Final    06/22/2020 18 6 - 20 mg/dL Final     Creatinine   Date Value Ref Range Status   06/22/2020 1.4 0.5 - 1.4 mg/dL Final   06/22/2020 1.4 0.5 - 1.4 mg/dL Final     Calcium   Date Value Ref Range Status   06/22/2020 9.2 8.7 - 10.5 mg/dL Final   06/22/2020 9.2 8.7 - 10.5 mg/dL Final     Total Protein   Date Value Ref Range Status   06/22/2020 6.9 6.0 - 8.4 g/dL Final     Albumin   Date Value Ref Range Status   06/22/2020 3.8 3.5 - 5.2 g/dL Final   06/22/2020 3.8 3.5 - 5.2 g/dL Final     Total Bilirubin   Date Value Ref Range Status   06/22/2020 0.3 0.1 - 1.0 mg/dL Final     Comment:     For infants and newborns, interpretation of results should be based  on gestational age, weight and in agreement with clinical  observations.  Premature Infant recommended reference ranges:  Up to 24 hours.............<8.0 mg/dL  Up to 48 hours............<12.0 mg/dL  3-5 days..................<15.0 mg/dL  6-29 days.................<15.0 mg/dL       Alkaline Phosphatase   Date Value Ref Range Status   06/22/2020 57 55 - 135 U/L Final     AST   Date Value Ref Range Status   06/22/2020 22 10 - 40 U/L Final     ALT   Date Value Ref Range Status   06/22/2020 18 10 - 44 U/L Final     Anion Gap   Date Value Ref Range Status   06/22/2020 6 (L) 8 - 16 mmol/L Final   06/22/2020 6 (L) 8 - 16 mmol/L Final     eGFR if    Date Value Ref Range Status   06/22/2020 47.8 (A) >60 mL/min/1.73 m^2 Final   06/22/2020 47.8 (A) >60 mL/min/1.73 m^2 Final     eGFR if non    Date Value Ref Range Status   06/22/2020 41.4 (A) >60 mL/min/1.73 m^2 Final     Comment:     Calculation used to obtain the estimated glomerular filtration  rate (eGFR) is the CKD-EPI equation.      06/22/2020 41.4 (A) >60 mL/min/1.73 m^2 Final     Comment:     Calculation used to obtain the estimated glomerular filtration  rate (eGFR) is the CKD-EPI equation.          Lab Results   Component Value Date    LIPASE 221 04/02/2017              Reviewed prior  medical records including radiology report of Chest X-Ray 5/11/2020 & endoscopy history (see surgical history).     Objective:      Physical Exam  Constitutional:       General: She is not in acute distress.     Appearance: She is well-developed.      Comments: Limited physical examination due to virtual visit   Pulmonary:      Effort: Pulmonary effort is normal. No respiratory distress.   Neurological:      Mental Status: She is alert and oriented to person, place, and time.   Psychiatric:         Speech: Speech normal.         Behavior: Behavior is cooperative.           Assessment:       1. GERD without esophagitis    2. Schatzki's ring    3. History of dysphagia    4. Hiatal hernia    5. Screening for colon cancer           Plan:   All diagnoses and orders for this visit:    GERD without esophagitis   - Continue Protonix 40 mg BID for an additional four weeks then decrease to once daily   - Continue Tagamet 800 mg nightly   - Take PPI in the morning 30 minutes before breakfast and dinner   - Recommend to avoid large meals, avoid eating within 3 hours of bedtime, elevate head of bed if nocturnal symptoms are present, smoking cessation (if current smoker), & weight loss (if overweight).    - Recommend minimize/avoid high-fat foods, chocolate, caffeine, citrus, alcohol, & tomato products.   - Advised to avoid/limit use of NSAID's, since they can cause GI upset, bleeding, and/or ulcers. If needed, take with food.     Schatzki's ring & History of dysphagia   - Resolved   - EGD if symptoms return    Hiatal hernia   - Discussed diagnosis with patient & that it is usually managed by controlling reflux symptoms, surgery is an option, but usually performed if reflux is uncontrolled by medication management and lifestyle/dietary modifications; if symptoms persist despite medication management and lifestyle/dietary modifications, we can refer to general surgery to consult about surgical options, patient verbalized  understanding    Screening for colon cancer   - Next surveillance colonoscopy due 1/2022    If no improvement in symptoms or symptoms worsen, call/follow-up at clinic or go to ER

## 2020-07-05 NOTE — PROGRESS NOTES
CHIEF COMPLAINT/REASON FOR VISIT: low back pain    History of present illness: The patient is a 59 year old woman with a history of migraines, carpal tunnel syndrome and low back pain since her early 20s.  She returns in follow-up today with back pain.  She complains of pain across her low back with intermittent radiation to the buttocks and posterior thighs.  She had to discontinue gabapentin because of swelling in her feet, hands and mouth.  She cannot take meloxicam secondary to her kidney function.  Her pain is worse with standing walking and improved with sitting.  She denies weakness, numbness, bladder or bowel incontinence.    Pain intervention history: She tried Neurontin for her leg/thigh pain with no success. She only takes Advil with about 30% relief. She is status post TFESI bilaterally to L3 on 12/6/2011 with no relief. Past history of status post L4/5 YARON on 5/25/11 with about 90% relief that lasted 3 weeks. She is status post 2 bilateral L3 transforaminal injections with 3 weeks relief each time. Her current pain medications include Celexa 40 mg once a day, Aleve 220 mg twice a day, Lyrica as previously stated, Zanaflex 5 mg at night. She uses her TENS at home, when she thinks about it.  She is status post L4/5 interlaminar epidural steroid injection on 1/8/14 with 90% relief.  She is status post L5/S1 interlaminar epidural steroid injection on 6/9/14 with moderate relief only on the right low back and right leg lasting 2 weeks.  She is status post bilateral L3/4 and L4/5 facet joint injections on 4/13/15 with initially 100% relief of her back pain and 80% relief of her left lateral hip and lateral thigh pain, now reporting at least 50% relief of both.  She is status post bilateral L2, 3 and 4 medial branch radiofrequency ablation on 5/20/15 with 60% relief.   She is status post bilateral L2, 3 and 4 medial branch radiofrequency ablation on 6/20/16 with 50-70% relief.  She is status post L5/S1  interlaminar epidural steroid injection on 3/10/17 with 50% relief.  She is status post bilateral L2, 3, 4 medial branch radiofrequency ablation on 7/17/17 with about 75% relief.  She is status post bilateral L2, 3, 4 medial branch radiofrequency ablation on 07/17/2018 with 100% relief of her leg pain and 70-75% relief of her back pain.  She is status post C7-T1 cervical interlaminar epidural steroid injection on 09/25/2018 with 80% relief.   She is status post L5/S1 interlaminar epidural steroid injection on 12/27/2018 with 60% relief.  She is status post bilateral L2, 3 and 4 medial branch radiofrequency ablation on 07/29/2019 with 40% relief.  She is status post L5/S1 interlaminar epidural steroid injection on 09/04/2019 with almost 100% relief of her right leg pain but 0% relief for back pain. She is status post C7-T1 interlaminar epidural steroid injection on 10/24/2019 with 70% relief.  She failed Cymbalta due to side effects.    ROS: She reports back pain only.  Balance of review of systems is negative.    Medical, surgical, family and social history reviewed elsewhere in record.     Medications/Allergies: See med card      Vitals:    06/30/20 1407 06/30/20 1408   BP:  122/66   Pulse:  88   Resp:  18   SpO2:  97%   Weight: 103 kg (227 lb 1.2 oz)    PainSc:   5   5   PainLoc: Back         PHYSICAL EXAM:  Gen: A and O x3, pleasant, well-groomed  HEENT: PERRLA  CVS: Regular rate and rhythm, normal S1 and S2, no murmurs.  Resp: Clear to auscultation bilaterally, no wheezes or rales.  Musculoskeletal: Able to heel walk, toe walk. No antalgic gait.      Neuro:  Upper extremities: 5/5 strength bilaterally   Lower extremities: 5/5 strength bilaterally  Reflexes: Brachioradialis 2+, Bicep 2+, Tricep 2+. Patellar 2+, Achilles 2+.  Sensory: Intact and symmetrical to light touch and pinprick in C2-T1 dermatomes bilaterally. Intact and symmetrical to light touch and pinprick in L2-S1 dermatomes bilaterally.    Lumbar  spine:  Lumbar spine: ROM is moderately limited with flexion and extension with increased bilateral low back pain during extension and oblique extension.  Supine straight leg raise causes increased right lateral thigh pain.  Internal and external rotation of the hip causes no increased pain in either side.  Myofascial exam:  Mild tenderness to palpation to the right greater than left lumbar paraspinous muscles.      IMAGING:   MRI L-SPINE 5/10/11   IMPRESSION: AT L3-4, THERE IS CIRCUMFERENTIAL DISC PROTRUSION FOCALLY MORE PROMINENT TO THE LEFT AS WELL AS A SMALL DISC HERNIATION PROTRUDING INFERIORLY BEHIND L4 TO THE LEFT OF MIDLINE. THIS PRODUCES SPINAL CANAL AND BILATERAL NEURAL FORAMINAL STENOSIS, LEFT GREATER THAN RIGHT. AT L4-5, THERE IS CIRCUMFERENTIAL DISC PROTRUSION FOCALLY MORE PROMINENT TO THE RIGHT WITH MILD SPINAL CANAL AND RIGHT NEURAL FORAMINAL STENOSIS. AT L1-2 AND L2-3 ALTHOUGH THERE ARE DISC PROTRUSIONS IDENTIFIED, SIGNIFICANT STENOSIS IS NOT SEEN.     MRI lumbar spine 7/10/14  L1-2:There is chronic relatively advanced disc degeneration with disc space narrowing, disc dehydration, disc narrowing, endplate osteophytes, and degenerative vertebral endplate marrow change. There is a diffuse 2-mm posterior disc bulge with a superimposed4-mm right posterior disc extrusion causing mild right foraminal stenosis. There is no impingement of the right L1 nerve root and there has been no change.  L2-3: There is severe and chronic disc degeneration with severe disc space narrowing, disc dehydration, degenerative vertebral endplate marrow change, and vertebral endplate osteophytes. There is a diffuse 2-mm posterior disc bulge with a superimposed 4-mm right posterior disc extrusion causing mild right foraminal stenosis. There is no impingement of the right L2 nerve root and there has been no significant change.  L3-4: There is severe disc degeneration which has progressed since the prior study. There is severe disc  space narrowing, disc dehydration, degenerative vertebral endplate marrow change and endplate osteophytes. There is also mild posterior subluxation of L3over a distance of 4 mm. There is a broad-based 5-mm posterior disc extrusion. There is degenerative facet arthrosis with ligamentum flavum thickening. The combination of facet arthrosis and disc extrusion results in relatively severe spinal canal stenosis with compression of the thecal sac to an AP diameter of 5 mm, moderate right foraminal stenosis, and severe left foraminal stenosis. The spinal stenosis has also progressed since the prior study.  L4-5:There is a diffuse posterior disc bulge with a superimposed 3-mm left posterior paracentral disc protrusion causing left paracentral thecal sac compression. There is moderate left and mild right foraminal stenosis and there has been no significant change. There is mild degenerative facet arthrosis with ligamentum flavum thickening and small joint effusions.  L5-S1:There is no significant compromise of the spinal canal or foramina and there has been no change.    X-ray cervical spine 6/8/15  There is loss of normal cervical lordosis which may be positional or related muscular strain. Intervertebral disk height loss is noted at the C5-C6 C6-C7 levels and to a lesser degree C4-C5 and C7-T1 levels. Vertebral body alignment appears otherwise adequate. Vertebral body heights appear well-preserved. The atlas and odontoid appear in good relationship to each other. Osseous neuroforaminal narrowing is noted at the C3-C4 C4-C5 C5-C6 levels on the left and at the C4-C5 C5-C6 and C6-C7 levels on the right     07/10/2018 MRI cervical spine [facility] report  C2-3 broad-based signal asymmetry at the left subarticular and foraminal zone reflecting implant spondylosis and disc bulge complex, mild to moderate left asymmetric foraminal narrowing, ectasia of the left vertebral artery, contralateral right asymmetric facet  hypertrophy, right foramen widely narrowed, disc partially desiccated without collapse  C3-4 moderate to severe left greater than right foraminal narrowing secondary to uncinate joint hypertrophic signal alteration, disc partially desiccated  C4-5 endplate spondylosis moderate diffuse disc bulge noted, broad-based right paracentral disc herniation, asymmetric flattening of the ventral cord surface at the right paracentral zone, high-grade if lateral right foraminal narrowing, AP diameter of canal 9.9 mm, contralaterally, high grade left foraminal narrowing secondary to facet greater than uncinate joint hypertrophic signal alteration, disc desiccated and mildly narrowed  C5-6 disc space narrowing evident with generalized in Nigel spondylosis and concentric inter pose disc bulge complex, high-grade bilateral foraminal narrowing, flattening of the cord surface, AP diameter 7.9 mm, symmetric facet arthrosis, disc diffusely desiccated and narrowed  C6-7 moderate endplate spondylosis and concentric disc bulge complex, high-grade bilateral foraminal compromise, flattening of the anterior cord surface, AP diameter 8.8 mm, facet arthrosis symmetric, disc desiccated and narrowed  C7-T1 less than 2 mm depth disc bulge    11/06/2018 MRI lumbar spine  T12/L1: There is no evidence of disc protrusion, canal or foraminal stenosis.  L1/L2: Right posterolateral disc protrusion is evident and there is mild distortion of the right anterolateral canal.  Degenerative facet changes are noted bilaterally.  The left foramen is intact.  L2/L3: There is annular disc bulging with a superimposed right posterolateral disc extrusion.  This is slightly more pronounced on the previous examination and there is mild effacement of the anterior thecal sac and moderate narrowing the right foramen.  L3/L4: There is annular disc bulge and osteophyte formation with a superimposed small left posterolateral disc extrusion.  There is moderate narrowing the  central canal and left foramen.  This is little changed relative the previous examination.  Degenerative facet changes are noted.  L5/S1: There is a small central disc extrusion superimposed upon annular disc bulging.  This is slightly less pronounced than was seen previously.  Degenerative facet changes are noted.  L5/S1: Moderate degenerative facet changes are noted and there is mild effacement of the anterior thecal sac.  There is ligamentous hypertrophy particularly to the left in the posterior canal and there is left greater than right foraminal narrowing.  This is mildly worsened relative prior exam.      ASSESSMENT:  The patient is a 59 year old woman with a history of migraines, carpal tunnel syndrome and low back pain since her early 20s who returns in follow up.   1. Chronic pain disorder     2. Lumbar radiculitis     3. DDD (degenerative disc disease), lumbar     4. Spinal stenosis of lumbar region without neurogenic claudication         Plan:  1.  She has failed conservative treatment including interventional procedures, physical therapy, home exercise plan, multiple medications and she has seen Neurosurgery.  An operation was not recommended and we again discussed spinal cord stimulation in detail.  She would like to think about this.  She has received information from [employer] in the past.  She may call to set up a trial and if so I will order a back brace with lateral support.  We discussed the risks involved.  If she does not decide to schedule a trial she will follow-up in 3 months or sooner as needed.    Greater than 50% of this 25 minutes visit was spent counseling the patient.

## 2020-07-09 ENCOUNTER — OFFICE VISIT (OUTPATIENT)
Dept: CARDIOLOGY | Facility: CLINIC | Age: 60
End: 2020-07-09
Payer: COMMERCIAL

## 2020-07-09 ENCOUNTER — PATIENT OUTREACH (OUTPATIENT)
Dept: ADMINISTRATIVE | Facility: OTHER | Age: 60
End: 2020-07-09

## 2020-07-09 VITALS
WEIGHT: 227.31 LBS | BODY MASS INDEX: 40.28 KG/M2 | SYSTOLIC BLOOD PRESSURE: 134 MMHG | HEIGHT: 63 IN | DIASTOLIC BLOOD PRESSURE: 84 MMHG | HEART RATE: 63 BPM

## 2020-07-09 DIAGNOSIS — I10 ESSENTIAL HYPERTENSION: Primary | ICD-10-CM

## 2020-07-09 DIAGNOSIS — I25.10 MILD CAD: ICD-10-CM

## 2020-07-09 PROCEDURE — 99999 PR PBB SHADOW E&M-EST. PATIENT-LVL III: CPT | Mod: PBBFAC,,, | Performed by: INTERNAL MEDICINE

## 2020-07-09 PROCEDURE — 3008F PR BODY MASS INDEX (BMI) DOCUMENTED: ICD-10-PCS | Mod: CPTII,S$GLB,, | Performed by: INTERNAL MEDICINE

## 2020-07-09 PROCEDURE — 3008F BODY MASS INDEX DOCD: CPT | Mod: CPTII,S$GLB,, | Performed by: INTERNAL MEDICINE

## 2020-07-09 PROCEDURE — 99213 OFFICE O/P EST LOW 20 MIN: CPT | Mod: S$GLB,,, | Performed by: INTERNAL MEDICINE

## 2020-07-09 PROCEDURE — 3079F DIAST BP 80-89 MM HG: CPT | Mod: CPTII,S$GLB,, | Performed by: INTERNAL MEDICINE

## 2020-07-09 PROCEDURE — 99999 PR PBB SHADOW E&M-EST. PATIENT-LVL III: ICD-10-PCS | Mod: PBBFAC,,, | Performed by: INTERNAL MEDICINE

## 2020-07-09 PROCEDURE — 3079F PR MOST RECENT DIASTOLIC BLOOD PRESSURE 80-89 MM HG: ICD-10-PCS | Mod: CPTII,S$GLB,, | Performed by: INTERNAL MEDICINE

## 2020-07-09 PROCEDURE — 3075F PR MOST RECENT SYSTOLIC BLOOD PRESS GE 130-139MM HG: ICD-10-PCS | Mod: CPTII,S$GLB,, | Performed by: INTERNAL MEDICINE

## 2020-07-09 PROCEDURE — 3075F SYST BP GE 130 - 139MM HG: CPT | Mod: CPTII,S$GLB,, | Performed by: INTERNAL MEDICINE

## 2020-07-09 PROCEDURE — 99213 PR OFFICE/OUTPT VISIT, EST, LEVL III, 20-29 MIN: ICD-10-PCS | Mod: S$GLB,,, | Performed by: INTERNAL MEDICINE

## 2020-07-09 NOTE — PROGRESS NOTES
Subjective:    Patient ID:  Elizabeth Giordano is a 59 y.o. female who presents for follow-up of Hypertension (follow up)      HPI  She comes with no complaints, no chest pain, no shortness of breath  BP good    Review of Systems   Constitution: Negative for decreased appetite, malaise/fatigue, weight gain and weight loss.   Cardiovascular: Negative for chest pain, dyspnea on exertion, leg swelling, palpitations and syncope.   Respiratory: Negative for cough and shortness of breath.    Gastrointestinal: Negative.    Neurological: Negative for weakness.   All other systems reviewed and are negative.       Objective:      Physical Exam   Constitutional: She is oriented to person, place, and time. She appears well-developed and well-nourished.   HENT:   Head: Normocephalic.   Eyes: Pupils are equal, round, and reactive to light.   Neck: Normal range of motion. Neck supple. No JVD present. Carotid bruit is not present. No thyromegaly present.   Cardiovascular: Normal rate, regular rhythm, normal heart sounds, intact distal pulses and normal pulses. PMI is not displaced. Exam reveals no gallop.   No murmur heard.  Pulmonary/Chest: Effort normal and breath sounds normal.   Abdominal: Soft. Normal appearance. She exhibits no mass. There is no hepatosplenomegaly. There is no abdominal tenderness.   Musculoskeletal: Normal range of motion.         General: No edema.   Neurological: She is alert and oriented to person, place, and time. She has normal strength and normal reflexes. No sensory deficit.   Skin: Skin is warm and intact.   Psychiatric: She has a normal mood and affect.   Nursing note and vitals reviewed.        Assessment:       1. Essential hypertension    2. Mild CAD         Plan:     Continue all cardiac medications  Regular exercise program  Weight loss  1 yr

## 2020-07-16 ENCOUNTER — DOCUMENTATION ONLY (OUTPATIENT)
Dept: FAMILY MEDICINE | Facility: CLINIC | Age: 60
End: 2020-07-16

## 2020-07-16 NOTE — PROGRESS NOTES
PA:   Eszopiclone 2 mg tablet    [facility] key:  [id number]  Case ID:   [id number]  [facility]  ----------------------------  Medication is on patient's plan's list of covered drugs. Prior authorization is not required at this time. If the pharmacy has questions regarding the prescription, have them call the [facility] pharmacy help desk at [phone].

## 2020-08-05 ENCOUNTER — PATIENT OUTREACH (OUTPATIENT)
Dept: ADMINISTRATIVE | Facility: HOSPITAL | Age: 60
End: 2020-08-05

## 2020-08-05 ENCOUNTER — PATIENT MESSAGE (OUTPATIENT)
Dept: NEUROLOGY | Facility: CLINIC | Age: 60
End: 2020-08-05

## 2020-08-05 DIAGNOSIS — G43.719 INTRACTABLE CHRONIC MIGRAINE WITHOUT AURA AND WITHOUT STATUS MIGRAINOSUS: Primary | ICD-10-CM

## 2020-08-05 NOTE — TELEPHONE ENCOUNTER
Patient would like a prescription to [facility] in [facility] for Emgality. Notiifed patient that would send request to Linda Melgar NP to complete. Patient has card for medication.

## 2020-08-05 NOTE — PROGRESS NOTES
Chart review completed 2020.  Care Everywhere updates requested and reviewed.  Immunizations reconciled. Media reports reviewed.  Duplicate HM overrides and  orders removed.  Overdue HM topic chart audit and/or requested.  Overdue lab testing linked to upcoming lab appointments if applies.    Lab XMS Penvision, and IRI reviewed for lab tests.      Health Maintenance Due   Topic Date Due    HIV Screening  10/30/1976    Pneumococcal Vaccine (Medium Risk) (1 of 1 - PPSV23) 10/30/1980    High Dose Statin  10/30/1982    Shingles Vaccine (1 of 2) 10/30/2011

## 2020-08-06 DIAGNOSIS — H44.009 EYE INFECTION, UNSPECIFIED LATERALITY: ICD-10-CM

## 2020-08-06 RX ORDER — TOBRAMYCIN AND DEXAMETHASONE 3; 1 MG/ML; MG/ML
SUSPENSION/ DROPS OPHTHALMIC
Qty: 5 ML | Refills: 0 | Status: SHIPPED | OUTPATIENT
Start: 2020-08-06 | End: 2020-12-23

## 2020-08-06 NOTE — PROGRESS NOTES
Refill Routing Note   Medication(s) are not appropriate for processing by [facility]:       - Outside of protocol           Medication reconciliation completed: No      Automatic Epic Generated Protocol Data:        Requested Prescriptions   Pending Prescriptions Disp Refills    tobramycin-dexamethasone 0.3-0.1% (TOBRADEX) 0.3-0.1 % DrpS [Pharmacy Med Name: TOBRAMYCIN/DEXAMETHASONESUSP 5ML] 5 mL 0     Sig: SHAKE LIQUID AND INSTILL 1 DROP IN BOTH EYES EVERY 6 HOURS       There is no refill protocol information for this order           Appointments  past 12m or future 3m with PCP    Date Provider   Last Visit   5/11/2020 CARRIE Espinosa MD   Next Visit   8/19/2020 CARRIE Espinosa MD   ED visits in past 90 days: 0     Note composed:1:08 PM 08/06/2020

## 2020-08-19 ENCOUNTER — OFFICE VISIT (OUTPATIENT)
Dept: FAMILY MEDICINE | Facility: CLINIC | Age: 60
End: 2020-08-19
Payer: COMMERCIAL

## 2020-08-19 DIAGNOSIS — J98.01 COUGH DUE TO BRONCHOSPASM: Primary | ICD-10-CM

## 2020-08-19 DIAGNOSIS — R35.89 POLYURIA: ICD-10-CM

## 2020-08-19 PROCEDURE — 99213 PR OFFICE/OUTPT VISIT, EST, LEVL III, 20-29 MIN: ICD-10-PCS | Mod: 95,,, | Performed by: FAMILY MEDICINE

## 2020-08-19 PROCEDURE — 99213 OFFICE O/P EST LOW 20 MIN: CPT | Mod: 95,,, | Performed by: FAMILY MEDICINE

## 2020-08-19 RX ORDER — ALBUTEROL SULFATE 90 UG/1
2 AEROSOL, METERED RESPIRATORY (INHALATION) EVERY 6 HOURS PRN
Qty: 6.7 G | Refills: 2 | Status: SHIPPED | OUTPATIENT
Start: 2020-08-19 | End: 2021-01-27 | Stop reason: SDUPTHER

## 2020-08-19 NOTE — PROGRESS NOTES
Subjective:       Patient ID: Elizabeth Giordano is a 59 y.o. female.    Chief Complaint: No chief complaint on file.    The patient location is: [place], [place]  The chief complaint leading to consultation is: shortness of breath    Visit type: TELE AUDIOVISUAL    Face to Face time with patient: 9 minutes  12 minutes of total time spent on the encounter, which includes face to face time and non-face to face time preparing to see the patient (eg, review of tests), Obtaining and/or reviewing separately obtained history, Documenting clinical information in the electronic or other health record, Independently interpreting results (not separately reported) and communicating results to the patient/family/caregiver, or Care coordination (not separately reported).         Each patient to whom he or she provides medical services by telemedicine is:  (1) informed of the relationship between the physician and patient and the respective role of any other health care provider with respect to management of the patient; and (2) notified that he or she may decline to receive medical services by telemedicine and may withdraw from such care at any time.    Notes:   Pt is known to me.  The pt reports SOB that she relates to wearing a mask at work.  She says it feels tight.  She coughs at night with no sputum.  She has no chest pain.  She wonders if an inhaler.  She is on pantoprazole and tagamet for GERD--she says it is working well.  She is concerned about how much she urinates.  She does drink a lot of water.  She takes HCTZ.  She has to go to the bathroom about every 2 hours.  She does not have much swelling in her legs.  She has cut back on sodium.    Shortness of Breath  This is a recurrent problem. The current episode started more than 1 month ago. The problem occurs daily. The problem has been unchanged. The average episode lasts 10 seconds. Associated symptoms include coryza and rhinorrhea. Pertinent negatives include  no abdominal pain, chest pain, claudication, ear pain, fever, headaches, hemoptysis, leg pain, leg swelling, neck pain, orthopnea, PND, rash, sore throat, sputum production, swollen glands, syncope, vomiting or wheezing. Nothing aggravates the symptoms. The patient has no known risk factors for DVT/PE. She has tried nothing for the symptoms. The treatment provided no relief. There is no history of allergies, aspirin allergies, asthma, bronchiolitis, CAD, chronic lung disease, COPD, DVT, a heart failure, PE, pneumonia or a recent surgery.     Review of Systems   Constitutional: Negative for fever.   HENT: Positive for rhinorrhea. Negative for ear pain and sore throat.    Respiratory: Positive for shortness of breath. Negative for hemoptysis, sputum production and wheezing.    Cardiovascular: Negative for chest pain, orthopnea, claudication, leg swelling, syncope and PND.   Gastrointestinal: Negative for abdominal pain and vomiting.   Musculoskeletal: Negative for neck pain.   Skin: Negative for rash.   Neurological: Negative for headaches.       Objective:      Physical Exam  Constitutional:       General: She is not in acute distress.     Appearance: Normal appearance. She is obese. She is not ill-appearing.   Pulmonary:      Effort: Pulmonary effort is normal.   Neurological:      Mental Status: She is alert and oriented to person, place, and time.   Psychiatric:         Mood and Affect: Mood normal.         Behavior: Behavior normal.         Thought Content: Thought content normal.         Judgment: Judgment normal.         Assessment:       1. Cough due to bronchospasm    2. Polyuria        Plan:       Diagnoses and all orders for this visit:    Cough due to bronchospasm  -     albuterol (PROVENTIL/VENTOLIN HFA) 90 mcg/actuation inhaler; Inhale 2 puffs into the lungs every 6 (six) hours as needed for Wheezing or Shortness of Breath. Rescue    Polyuria    Pt is to hold HCTZ.  She is to report a BP diary, effect on  urination and any swelling to me by Monday 8/24/2020.    During this visit, I reviewed the pt's history, medications, allergies, and problem list.

## 2020-08-24 ENCOUNTER — TELEPHONE (OUTPATIENT)
Dept: NEUROLOGY | Facility: CLINIC | Age: 60
End: 2020-08-24

## 2020-08-25 ENCOUNTER — TELEPHONE (OUTPATIENT)
Dept: NEUROLOGY | Facility: CLINIC | Age: 60
End: 2020-08-25

## 2020-08-25 DIAGNOSIS — G43.719 INTRACTABLE CHRONIC MIGRAINE WITHOUT AURA AND WITHOUT STATUS MIGRAINOSUS: Primary | ICD-10-CM

## 2020-08-25 NOTE — TELEPHONE ENCOUNTER
Called to inform patient about Emgality not being approved and the possibility of resuming the Botox injections, no answer, left voice mail.

## 2020-08-25 NOTE — TELEPHONE ENCOUNTER
Since she was previously in near remission with Botox, lets try to resume this for prevention. Her insurance does not want to cover Emgality while also on Ubrelvy. She has tried and failed multiple triptans.

## 2020-08-26 ENCOUNTER — PATIENT MESSAGE (OUTPATIENT)
Dept: FAMILY MEDICINE | Facility: CLINIC | Age: 60
End: 2020-08-26

## 2020-08-26 DIAGNOSIS — I10 ESSENTIAL HYPERTENSION: Primary | ICD-10-CM

## 2020-08-26 RX ORDER — AMLODIPINE BESYLATE 5 MG/1
5 TABLET ORAL DAILY
Qty: 30 TABLET | Refills: 2 | Status: SHIPPED | OUTPATIENT
Start: 2020-08-26 | End: 2020-12-01 | Stop reason: SDUPTHER

## 2020-09-01 ENCOUNTER — LAB VISIT (OUTPATIENT)
Dept: LAB | Facility: HOSPITAL | Age: 60
End: 2020-09-01
Attending: FAMILY MEDICINE
Payer: COMMERCIAL

## 2020-09-01 DIAGNOSIS — I10 ESSENTIAL HYPERTENSION: ICD-10-CM

## 2020-09-01 LAB
ALBUMIN SERPL BCP-MCNC: 4 G/DL (ref 3.5–5.2)
ANION GAP SERPL CALC-SCNC: 10 MMOL/L (ref 8–16)
BUN SERPL-MCNC: 24 MG/DL (ref 6–20)
CALCIUM SERPL-MCNC: 9.1 MG/DL (ref 8.7–10.5)
CHLORIDE SERPL-SCNC: 109 MMOL/L (ref 95–110)
CO2 SERPL-SCNC: 22 MMOL/L (ref 23–29)
CREAT SERPL-MCNC: 1.4 MG/DL (ref 0.5–1.4)
EST. GFR  (AFRICAN AMERICAN): 47.8 ML/MIN/1.73 M^2
EST. GFR  (NON AFRICAN AMERICAN): 41.4 ML/MIN/1.73 M^2
GLUCOSE SERPL-MCNC: 92 MG/DL (ref 70–110)
PHOSPHATE SERPL-MCNC: 3.2 MG/DL (ref 2.7–4.5)
POTASSIUM SERPL-SCNC: 3.8 MMOL/L (ref 3.5–5.1)
SODIUM SERPL-SCNC: 141 MMOL/L (ref 136–145)

## 2020-09-01 PROCEDURE — 80069 RENAL FUNCTION PANEL: CPT

## 2020-09-01 PROCEDURE — 36415 COLL VENOUS BLD VENIPUNCTURE: CPT | Mod: PO

## 2020-09-02 ENCOUNTER — PATIENT MESSAGE (OUTPATIENT)
Dept: FAMILY MEDICINE | Facility: CLINIC | Age: 60
End: 2020-09-02

## 2020-09-02 DIAGNOSIS — N28.9 RENAL INSUFFICIENCY: Primary | ICD-10-CM

## 2020-09-03 ENCOUNTER — TELEPHONE (OUTPATIENT)
Dept: INFECTIOUS DISEASES | Facility: CLINIC | Age: 60
End: 2020-09-03

## 2020-09-03 NOTE — TELEPHONE ENCOUNTER
DATE OF CALL: 9/3/2020  PROTOCOL: 2020.198  TITLE:  A phase 1/2/3, placebo-controlled, randomized, observer-blind, dose finding study to evaluate the safety, tolerability, immunogenicity, and efficacy of SARS-COV-2 RNA vaccine candidates against COVID-19 in healthy adults.  INVESTIGATOR: Rola Saleh  SPONSOR: [employer] is conducting the study for [employer]     Subject was contacted regarding participation in the Pfizer COVID 19 Vaccine study. Upon screening conversation, pt. would appear to meet inclusion/exclusion criteria.  Pt is scheduled for initial Pfizer vaccine study visit on 9/9 @ 12pm .

## 2020-09-09 ENCOUNTER — CLINICAL SUPPORT (OUTPATIENT)
Dept: RESEARCH | Facility: CLINIC | Age: 60
End: 2020-09-09
Payer: COMMERCIAL

## 2020-09-09 PROCEDURE — 99999 PR PBB SHADOW E&M-EST. PATIENT-LVL II: ICD-10-PCS | Mod: PBBFAC,,,

## 2020-09-09 PROCEDURE — 99999 PR PBB SHADOW E&M-EST. PATIENT-LVL II: CPT | Mod: PBBFAC,,,

## 2020-09-09 NOTE — PROGRESS NOTES
Date consent signed: 9/9/2020    Study title: A Phase 1/2/3. Placebo Controlled, Randomized, Observer-Blind, Dose-Finding Study to Describe the Safety, Tolerability, Immunogenicity and Potential Efficacy of SARS-COV-2 RNA Vaccine Candidates Against COVID-19 in Health Adults   IRB #: 2020.198  Sponsor: [employer]   Sponsor's Protocol: [id number]  Site Number: [id number]  Subject ID: [id number]    Informed Consent Process  Present for discussion: [id number]  Was the consent done electronically: yes     Prior to the Informed Consent (IC) being signed, or any protocol required testing, procedure, or intervention being performed, the following was done or discussed:  · Purpose of the Study, Qualifications to Participate: yes  · Study Design, Schedule and Procedures: yes  · Risks, Benefits, Alternative Treatments, Compensation and Costs: yes  · Confidentiality and HIPAA Authorization for Release of Medical Records for the research trial/subject's right/study related injury: yes  · Study related contact information: yes  · Voluntary Participation and Withdrawal from the research trial at any time: yes  · Patient has been offered the opportunity to ask questions regarding the study and all questions were answered satisfactorily: yes  · CRC and PI contact information given to patient: yes  · Signed copy given to patient: yes  · Copy in patient's chart and original uploaded to Epic: yes    Patient able to adequately summarize: the purpose of the study, the risks associated with the study, and all procedures, testing, and follow-ups associated with the study: yes    Elizabeth Giordano signed the current IRB approved ICF. Each portion of the consent form was reviewed with her and all questions answered satisfactorily. She then signed the consent form, which was countersigned by the CRC on this day. A copy of the fully executed ICF was then provided to the subject via emailed copy. The original consent was electronically saved and uploaded  to the [facility] EMR ([facility]) and filed appropriately.     Person Obtaining Consent: Rola Adorno

## 2020-09-14 ENCOUNTER — PATIENT MESSAGE (OUTPATIENT)
Dept: NEUROLOGY | Facility: CLINIC | Age: 60
End: 2020-09-14

## 2020-09-14 ENCOUNTER — PATIENT OUTREACH (OUTPATIENT)
Dept: ADMINISTRATIVE | Facility: OTHER | Age: 60
End: 2020-09-14

## 2020-09-14 DIAGNOSIS — G43.719 INTRACTABLE CHRONIC MIGRAINE WITHOUT AURA AND WITHOUT STATUS MIGRAINOSUS: ICD-10-CM

## 2020-09-14 DIAGNOSIS — M25.562 LEFT KNEE PAIN, UNSPECIFIED CHRONICITY: Primary | ICD-10-CM

## 2020-09-14 NOTE — PROGRESS NOTES
LINKS immunization registry updated  Care Everywhere updated  Health Maintenance updated  Chart reviewed for overdue Proactive [facility] ([facility]) health maintenance testing (CRS, Breast Ca, Diabetic Eye Exam)   Orders entered:N/A

## 2020-09-15 ENCOUNTER — TELEPHONE (OUTPATIENT)
Dept: PHARMACY | Facility: CLINIC | Age: 60
End: 2020-09-15

## 2020-09-15 NOTE — TELEPHONE ENCOUNTER
The prior authorization for Elizabeth Giordano's Emgality has been submitted on 9/15/2020 @ 1:02pm.  It can take up to 72 hours for a decision to be rendered from the insurance.  LVM for Patient concerning PA and process.  Please let me know if you have any questions.    Thank You!   Shannan Casiano CPhT, B.A  Patient Care Advocate   [facility]  Phone: [phone] Ext [phone]  Fax: [phone]

## 2020-09-16 ENCOUNTER — OFFICE VISIT (OUTPATIENT)
Dept: ORTHOPEDICS | Facility: CLINIC | Age: 60
End: 2020-09-16
Payer: COMMERCIAL

## 2020-09-16 ENCOUNTER — HOSPITAL ENCOUNTER (OUTPATIENT)
Dept: RADIOLOGY | Facility: HOSPITAL | Age: 60
Discharge: HOME | End: 2020-09-16
Attending: ORTHOPAEDIC SURGERY
Payer: COMMERCIAL

## 2020-09-16 VITALS — HEIGHT: 63 IN | WEIGHT: 227 LBS | RESPIRATION RATE: 18 BRPM | BODY MASS INDEX: 40.22 KG/M2

## 2020-09-16 DIAGNOSIS — M17.11 PRIMARY OSTEOARTHRITIS OF RIGHT KNEE: Primary | ICD-10-CM

## 2020-09-16 DIAGNOSIS — M25.562 LEFT KNEE PAIN, UNSPECIFIED CHRONICITY: ICD-10-CM

## 2020-09-16 DIAGNOSIS — M17.0 BILATERAL PRIMARY OSTEOARTHRITIS OF KNEE: Primary | ICD-10-CM

## 2020-09-16 DIAGNOSIS — M25.561 RIGHT KNEE PAIN, UNSPECIFIED CHRONICITY: ICD-10-CM

## 2020-09-16 PROCEDURE — 73562 X-RAY EXAM OF KNEE 3: CPT | Mod: TC,LT,59,PO

## 2020-09-16 PROCEDURE — 3008F BODY MASS INDEX DOCD: CPT | Mod: CPTII,S$GLB,, | Performed by: ORTHOPAEDIC SURGERY

## 2020-09-16 PROCEDURE — 73564 X-RAY EXAM KNEE 4 OR MORE: CPT | Mod: 26,RT,, | Performed by: RADIOLOGY

## 2020-09-16 PROCEDURE — 99213 OFFICE O/P EST LOW 20 MIN: CPT | Mod: S$GLB,,, | Performed by: ORTHOPAEDIC SURGERY

## 2020-09-16 PROCEDURE — 73564 XR KNEE ORTHO RIGHT WITH FLEXION: ICD-10-PCS | Mod: 26,RT,, | Performed by: RADIOLOGY

## 2020-09-16 PROCEDURE — 3008F PR BODY MASS INDEX (BMI) DOCUMENTED: ICD-10-PCS | Mod: CPTII,S$GLB,, | Performed by: ORTHOPAEDIC SURGERY

## 2020-09-16 PROCEDURE — 99213 PR OFFICE/OUTPT VISIT, EST, LEVL III, 20-29 MIN: ICD-10-PCS | Mod: S$GLB,,, | Performed by: ORTHOPAEDIC SURGERY

## 2020-09-16 PROCEDURE — 73562 XR KNEE ORTHO RIGHT WITH FLEXION: ICD-10-PCS | Mod: 26,LT,, | Performed by: RADIOLOGY

## 2020-09-16 PROCEDURE — 73562 X-RAY EXAM OF KNEE 3: CPT | Mod: 26,LT,, | Performed by: RADIOLOGY

## 2020-09-16 PROCEDURE — 99999 PR PBB SHADOW E&M-EST. PATIENT-LVL III: CPT | Mod: PBBFAC,,, | Performed by: ORTHOPAEDIC SURGERY

## 2020-09-16 PROCEDURE — 99999 PR PBB SHADOW E&M-EST. PATIENT-LVL III: ICD-10-PCS | Mod: PBBFAC,,, | Performed by: ORTHOPAEDIC SURGERY

## 2020-09-16 NOTE — LETTER
September 16, 2020      Laurel Chaudhary, APRN  [address] [facility] [address] [place] [address]           [facility]  [address] [place] [address]  Phone: [phone]          Patient: Elizabeth Giordano   MR Number: [id number]   YOB: 1960   Date of Visit: 9/16/2020       Dear Laurel Chaudhary:    Thank you for referring Elizabeth Giordano to me for evaluation. Attached you will find relevant portions of my assessment and plan of care.    If you have questions, please do not hesitate to call me. I look forward to following Elizabeth Giordano along with you.    Sincerely,    Vaughn Ortiz MD    Enclosure  CC:  No Recipients    If you would like to receive this communication electronically, please contact [email or web address] or [phone] to request more information on In[employer] Link access.    For providers and/or their staff who would like to refer a patient to [facility], please contact us through our one-stop-shop provider referral line, [facility] , at [phone].    If you feel you have received this communication in error or would no longer like to receive these types of communications, please e-mail [email or web address]

## 2020-09-16 NOTE — PROGRESS NOTES
Past Medical History:   Diagnosis Date    Allergy     Arthritis of spine 2011    Chronic low back pain     Class 2 obesity with body mass index (BMI) of 39.0 to 39.9 in adult 1/11/2019    Coronary artery disease     mild    DDD (degenerative disc disease), lumbar     Diverticulitis     Diverticulosis     DJD (degenerative joint disease)     Encounter for blood transfusion     Factor V Leiden     GERD (gastroesophageal reflux disease)     Hiatal hernia     Migraines     PONV (postoperative nausea and vomiting)     geta    Schatzki's ring        Past Surgical History:   Procedure Laterality Date    APPENDECTOMY  1981    CARPAL TUNNEL RELEASE  2011    right    COLONOSCOPY  2014    Dr. Palomares; reduntant colon, otherwise normal findings repeat in 8-10 years for surveillance    Epidural Steroid Injection      Pain Management    EPIDURAL STEROID INJECTION INTO CERVICAL SPINE N/A 9/25/2018    Procedure: Injection-steroid-epidural-cervical;  Surgeon: Oswald Abdalla MD;  Location: [facility] OR;  Service: Pain Management;  Laterality: N/A;    EPIDURAL STEROID INJECTION INTO CERVICAL SPINE N/A 10/24/2019    Procedure: Injection-steroid-epidural-cervical;  Surgeon: Oswald Abdalla MD;  Location: [facility] OR;  Service: Pain Management;  Laterality: N/A;    EPIDURAL STEROID INJECTION INTO LUMBAR SPINE N/A 12/27/2018    Procedure: Injection-steroid-epidural-lumbar L5/S1;  Surgeon: Oswald Abdalla MD;  Location: [facility] OR;  Service: Pain Management;  Laterality: N/A;    EPIDURAL STEROID INJECTION INTO LUMBAR SPINE N/A 9/4/2019    Procedure: Injection-steroid-epidural-lumbar;  Surgeon: Oswald Abdalla MD;  Location: [facility] OR;  Service: Pain Management;  Laterality: N/A;  L5/S1 to right    ESOPHAGOGASTRODUODENOSCOPY  05/17/2016    Dr. Arreguin; small hiatal hernia; gastritis    ESOPHAGOGASTRODUODENOSCOPY N/A 6/22/2020    Dr. Arreguin; mild Schatzki ring- dilated; small hiatal hernia; bx unremarkable     Facet injection      Pain Management    HYSTERECTOMY      ovaries removed    KIDNEY SURGERY Right 1967    to correct urinary reflux into kidney    LEFT HEART CATHETERIZATION Left 5/22/2019    Procedure: Left heart cath;  Surgeon: Casa Perdue MD;  Location: [facility] CATH;  Service: Cardiology;  Laterality: Left;    OVARIAN CYST REMOVAL Right 1981    RADIOFREQUENCY ABLATION OF LUMBAR MEDIAL BRANCH NERVE AT SINGLE LEVEL Bilateral 7/17/2018    Procedure: RADIOFREQUENCY ABLATION, NERVE, MEDIAL BRANCH, LUMBAR, L2,3,4;  Surgeon: Oswald Abdalla MD;  Location: [facility] OR;  Service: Pain Management;  Laterality: Bilateral;    RADIOFREQUENCY ABLATION OF LUMBAR MEDIAL BRANCH NERVE AT SINGLE LEVEL Bilateral 7/29/2019    Procedure: Radiofrequency Ablation, Nerve, Spinal, Lumbar, Medial Branch, L2,3,4;  Surgeon: Oswald Abdalla MD;  Location: [facility] OR;  Service: Pain Management;  Laterality: Bilateral;    SHOULDER SURGERY  2010    rotator cuff, right       Current Outpatient Medications   Medication Sig    albuterol (PROVENTIL/VENTOLIN HFA) 90 mcg/actuation inhaler Inhale 2 puffs into the lungs every 6 (six) hours as needed for Wheezing or Shortness of Breath. Rescue    amLODIPine (NORVASC) 5 MG tablet Take 1 tablet (5 mg total) by mouth once daily.    aspirin (ECOTRIN) 81 MG EC tablet Take 81 mg by mouth once daily.    azelastine (ASTELIN) 137 mcg (0.1 %) nasal spray USE 1 SPRAY(137 MCG) IN EACH NOSTRIL TWICE DAILY    cimetidine (TAGAMET) 800 MG tablet TAKE 1 TABLET(800 MG) BY MOUTH EVERY EVENING    diclofenac sodium (VOLTAREN) 1 % Gel Apply 2 g topically 3 (three) times daily as needed.    estradiol (ESTRACE) 1 MG tablet TK 1 T PO QD;; pt uses HS    eszopiclone (LUNESTA) 2 MG Tab TAKE 1 TABLET BY MOUTH EVERY EVENING    galcanezumab-gnlm 120 mg/mL PnIj Inject 240 mg (2 injections) subcutaneous at separate sites, once (loading dose). Start maintenance dose 28 days later    galcanezumab-gnlm 120 mg/mL  PnIj Inject 1 pen (120 mg total) into the skin every 28 days.    lactobacillus combination no.8 (ADULT PROBIOTIC ORAL) Take by mouth once daily.    loratadine (CLARITIN) 10 mg tablet Take 10 mg by mouth once daily.    losartan (COZAAR) 100 MG tablet TAKE 1 TABLET(100 MG) BY MOUTH EVERY DAY    pantoprazole (PROTONIX) 40 MG tablet Take 1 tablet (40 mg total) by mouth 2 (two) times daily.    tobramycin-dexamethasone 0.3-0.1% (TOBRADEX) 0.3-0.1 % DrpS SHAKE LIQUID AND INSTILL 1 DROP IN BOTH EYES EVERY 6 HOURS    traMADoL (ULTRAM) 50 mg tablet TAKE 1 TABLET BY MOUTH TWICE DAILY AS NEEDED    ubrogepant (UBRELVY) 50 mg tablet Take 1 tablet po at onset of migraine. May repeat in 2 hours if needed. Max 2 tablets per day.     Current Facility-Administered Medications   Medication    INV [id number]/placebo Injection 1 Dose    onabotulinumtoxina injection 200 Units    onabotulinumtoxina injection 200 Units    onabotulinumtoxina injection 200 Units       Review of patient's allergies indicates:   Allergen Reactions    Hydrocodone Hives and Nausea Only           Sulfa (sulfonamide antibiotics) Hives and Rash           Doxycycline Rash     Itchy rash and some scattered itchy lesions    Oxycodone Itching and Nausea And Vomiting       Family History   Problem Relation Age of Onset    Heart attack Mother     Heart disease Mother     COPD Mother     Hypertension Mother     Hepatitis Sister     COPD Father     No Known Problems Daughter     Breast cancer Maternal Grandmother     Allergic rhinitis Neg Hx     Angioedema Neg Hx     Asthma Neg Hx     Atopy Neg Hx     Eczema Neg Hx     Immunodeficiency Neg Hx     Urticaria Neg Hx     Colon cancer Neg Hx     Colon polyps Neg Hx        Social History     Socioeconomic History    Marital status:      Spouse name: Not on file    Number of children: 1    Years of education: Not on file    Highest education level: Not on file   Occupational History      Employer: [employer]   Social Needs    Financial resource strain: Not hard at all    Food insecurity     Worry: Never true     Inability: Never true    Transportation needs     Medical: No     Non-medical: No   Tobacco Use    Smoking status: Former Smoker     Packs/day: 1.00     Years: 5.00     Pack years: 5.00     Start date: 1992     Quit date: 1997     Years since quittin.7    Smokeless tobacco: Never Used   Substance and Sexual Activity    Alcohol use: Yes     Frequency: Monthly or less     Drinks per session: 1 or 2     Binge frequency: Never     Comment: 3x per year    Drug use: No    Sexual activity: Yes     Partners: Male   Lifestyle    Physical activity     Days per week: 2 days     Minutes per session: 60 min    Stress: To some extent   Relationships    Social connections     Talks on phone: More than three times a week     Gets together: Never     Attends Sabianism service: Not on file     Active member of club or organization: No     Attends meetings of clubs or organizations: Never     Relationship status:    Other Topics Concern    Not on file   Social History Narrative    ** Merged History Encounter **            Chief Complaint:   Chief Complaint   Patient presents with    Knee Pain     bilateral knee pain       History of present illness:  This is a 59-year-old female with chronic knee pain.  Right greater than left.  Patient had a Euflexxa series done back in 2019 with good results.  It lasted about 8 months.  Pain is up to a 5/10.  Pain is worse with weight-bearing and when laying on the side.      Answers for HPI/ROS submitted by the patient on 2020   Leg pain  unexpected weight change: No  appetite change : No  sleep disturbance: No  IMMUNOCOMPROMISED: No  nervous/ anxious: No  dysphoric mood: No  rash: No  visual disturbance: No  eye redness: No  eye pain: No  ear pain: No  tinnitus: No  hearing loss: No  sinus pressure :  No  nosebleeds: No  enviro allergies: Yes  food allergies: No  cough: No  shortness of breath: No  sweating: No  dysuria: No  frequency: Yes  difficulty urinating: No  hematuria: No  painful intercourse: No  chest pain: No  palpitations: No  nausea: No  vomiting: No  diarrhea: No  blood in stool: No  constipation: No  headaches: No  dizziness: No  numbness: Yes  seizures: No  joint swelling: Yes  myalgia: No  weakness: No  back pain: Yes  Pain Chronicity: recurrent  History of trauma: No  Onset: 1 to 4 weeks ago  Frequency: daily  Progression since onset: gradually worsening  injury location: at home  pain- numeric: 8/10  pain location: right knee  pain quality: sharp  Radiating Pain: No  Aggravating factors: walking  fever: No  inability to bear weight: Yes  itching: No  joint locking: No  limited range of motion: No  stiffness: No  tingling: No  Treatments tried: injection treatment  physical therapy: not tried  Improvement on treatment: significant      Physical Examination:    Vital Signs:    Vitals:    09/16/20 0836   Resp: 18       Body mass index is 40.21 kg/m².    This a well-developed, well nourished patient in no acute distress.  They are alert and oriented and cooperative to examination.  Pt. walks without an antalgic gait.      Examination of bilateral knees shows no rashes or erythema. There are no masses ecchymosis or effusion. Patient has full range of motion from 0-130°. Patient is nontender to palpation over lateral joint line and nontender to palpation over the medial joint line. Patient has a - Lachman exam, - anterior drawer exam, and - posterior drawer exam. - Neda's exam. Knee is stable to varus and valgus stress. 5 out of 5 motor strength. Palpable distal pulses. Intact light touch sensation. Negative Patellofemoral crepitus      X-rays:  X-rays of both knees are ordered and reviewed which show some mild arthritic changes     Assessment::  Bilateral knee arthritis    Plan:  Reviewed the  findings with her today.  Patient got great results from a previous Euflexxa series.  She would like to repeat those again.  We will need to get authorization for Euflexxa.  We will do it only on the right knee at this    This note was created using Clue App voice recognition software that occasionally misinterpreted phrases or words.    Consult note is delivered via Epic messaging service.\

## 2020-09-17 ENCOUNTER — TELEPHONE (OUTPATIENT)
Dept: PHARMACY | Facility: CLINIC | Age: 60
End: 2020-09-17

## 2020-09-17 NOTE — TELEPHONE ENCOUNTER
Good Morning,     The prior authorization for Elizabeth Giordano's Emgality 120mg/mL prescription has been APPROVED FROM 9/15/2020 TO 3/15/2021 with copayment of $0.00.       PHARMACY DISPENSED LOADING DOSE ON 9/16/2020.  MAINTENANCE DOSE 10/15/2020.    We were unable to reach patient on 9/17/2020.  A voicemail was left for the patient of the prior authorization status along with an appropriate pharmacy phone number should she have questions.    If there are any additional questions or concerns, please contact me.    Thank You!   Shannan Casiano CPhT, B.A  Patient Care Advocate   [facility]  Phone: [phone] Ext [phone]  Fax: [phone]

## 2020-09-23 ENCOUNTER — OFFICE VISIT (OUTPATIENT)
Dept: ORTHOPEDICS | Facility: CLINIC | Age: 60
End: 2020-09-23
Payer: COMMERCIAL

## 2020-09-23 VITALS — HEIGHT: 63 IN | RESPIRATION RATE: 16 BRPM | WEIGHT: 227 LBS | BODY MASS INDEX: 40.22 KG/M2

## 2020-09-23 DIAGNOSIS — M17.0 BILATERAL PRIMARY OSTEOARTHRITIS OF KNEE: Primary | ICD-10-CM

## 2020-09-23 PROCEDURE — 99999 PR PBB SHADOW E&M-EST. PATIENT-LVL III: ICD-10-PCS | Mod: PBBFAC,,, | Performed by: ORTHOPAEDIC SURGERY

## 2020-09-23 PROCEDURE — 20610 DRAIN/INJ JOINT/BURSA W/O US: CPT | Mod: RT,S$GLB,, | Performed by: ORTHOPAEDIC SURGERY

## 2020-09-23 PROCEDURE — 99499 NO LOS: ICD-10-PCS | Mod: S$GLB,,, | Performed by: ORTHOPAEDIC SURGERY

## 2020-09-23 PROCEDURE — 20610 LARGE JOINT ASPIRATION/INJECTION: R KNEE: ICD-10-PCS | Mod: RT,S$GLB,, | Performed by: ORTHOPAEDIC SURGERY

## 2020-09-23 PROCEDURE — 99499 UNLISTED E&M SERVICE: CPT | Mod: S$GLB,,, | Performed by: ORTHOPAEDIC SURGERY

## 2020-09-23 PROCEDURE — 99999 PR PBB SHADOW E&M-EST. PATIENT-LVL III: CPT | Mod: PBBFAC,,, | Performed by: ORTHOPAEDIC SURGERY

## 2020-09-23 NOTE — PROGRESS NOTES
Past Medical History:   Diagnosis Date    Allergy     Arthritis of spine 2011    Chronic low back pain     Class 2 obesity with body mass index (BMI) of 39.0 to 39.9 in adult 1/11/2019    Coronary artery disease     mild    DDD (degenerative disc disease), lumbar     Diverticulitis     Diverticulosis     DJD (degenerative joint disease)     Encounter for blood transfusion     Factor V Leiden     GERD (gastroesophageal reflux disease)     Hiatal hernia     Migraines     PONV (postoperative nausea and vomiting)     geta    Schatzki's ring        Past Surgical History:   Procedure Laterality Date    APPENDECTOMY  1981    CARPAL TUNNEL RELEASE  2011    right    COLONOSCOPY  2014    Dr. Palomares; reduntant colon, otherwise normal findings repeat in 8-10 years for surveillance    Epidural Steroid Injection      Pain Management    EPIDURAL STEROID INJECTION INTO CERVICAL SPINE N/A 9/25/2018    Procedure: Injection-steroid-epidural-cervical;  Surgeon: Oswald Abdalla MD;  Location: [facility] OR;  Service: Pain Management;  Laterality: N/A;    EPIDURAL STEROID INJECTION INTO CERVICAL SPINE N/A 10/24/2019    Procedure: Injection-steroid-epidural-cervical;  Surgeon: Oswald Abdalla MD;  Location: [facility] OR;  Service: Pain Management;  Laterality: N/A;    EPIDURAL STEROID INJECTION INTO LUMBAR SPINE N/A 12/27/2018    Procedure: Injection-steroid-epidural-lumbar L5/S1;  Surgeon: Oswald Abdalla MD;  Location: [facility] OR;  Service: Pain Management;  Laterality: N/A;    EPIDURAL STEROID INJECTION INTO LUMBAR SPINE N/A 9/4/2019    Procedure: Injection-steroid-epidural-lumbar;  Surgeon: Oswald Abdalla MD;  Location: [facility] OR;  Service: Pain Management;  Laterality: N/A;  L5/S1 to right    ESOPHAGOGASTRODUODENOSCOPY  05/17/2016    Dr. Arreguin; small hiatal hernia; gastritis    ESOPHAGOGASTRODUODENOSCOPY N/A 6/22/2020    Dr. Arreguin; mild Schatzki ring- dilated; small hiatal hernia; bx unremarkable     Facet injection      Pain Management    HYSTERECTOMY      ovaries removed    KIDNEY SURGERY Right 1967    to correct urinary reflux into kidney    LEFT HEART CATHETERIZATION Left 5/22/2019    Procedure: Left heart cath;  Surgeon: Casa Perdue MD;  Location: [facility] CATH;  Service: Cardiology;  Laterality: Left;    OVARIAN CYST REMOVAL Right 1981    RADIOFREQUENCY ABLATION OF LUMBAR MEDIAL BRANCH NERVE AT SINGLE LEVEL Bilateral 7/17/2018    Procedure: RADIOFREQUENCY ABLATION, NERVE, MEDIAL BRANCH, LUMBAR, L2,3,4;  Surgeon: Oswald Abdalla MD;  Location: [facility] OR;  Service: Pain Management;  Laterality: Bilateral;    RADIOFREQUENCY ABLATION OF LUMBAR MEDIAL BRANCH NERVE AT SINGLE LEVEL Bilateral 7/29/2019    Procedure: Radiofrequency Ablation, Nerve, Spinal, Lumbar, Medial Branch, L2,3,4;  Surgeon: Oswald Abdalla MD;  Location: [facility] OR;  Service: Pain Management;  Laterality: Bilateral;    SHOULDER SURGERY  2010    rotator cuff, right       Current Outpatient Medications   Medication Sig    albuterol (PROVENTIL/VENTOLIN HFA) 90 mcg/actuation inhaler Inhale 2 puffs into the lungs every 6 (six) hours as needed for Wheezing or Shortness of Breath. Rescue    amLODIPine (NORVASC) 5 MG tablet Take 1 tablet (5 mg total) by mouth once daily.    aspirin (ECOTRIN) 81 MG EC tablet Take 81 mg by mouth once daily.    azelastine (ASTELIN) 137 mcg (0.1 %) nasal spray USE 1 SPRAY(137 MCG) IN EACH NOSTRIL TWICE DAILY    cimetidine (TAGAMET) 800 MG tablet TAKE 1 TABLET(800 MG) BY MOUTH EVERY EVENING    diclofenac sodium (VOLTAREN) 1 % Gel Apply 2 g topically 3 (three) times daily as needed.    estradiol (ESTRACE) 1 MG tablet TK 1 T PO QD;; pt uses HS    eszopiclone (LUNESTA) 2 MG Tab TAKE 1 TABLET BY MOUTH EVERY EVENING    galcanezumab-gnlm 120 mg/mL PnIj Inject 240 mg (2 injections) subcutaneous at separate sites, once (loading dose). Start maintenance dose 28 days later    galcanezumab-gnlm 120 mg/mL  PnIj Inject 1 pen (120 mg total) into the skin every 28 days.    lactobacillus combination no.8 (ADULT PROBIOTIC ORAL) Take by mouth once daily.    loratadine (CLARITIN) 10 mg tablet Take 10 mg by mouth once daily.    losartan (COZAAR) 100 MG tablet TAKE 1 TABLET(100 MG) BY MOUTH EVERY DAY    pantoprazole (PROTONIX) 40 MG tablet Take 1 tablet (40 mg total) by mouth 2 (two) times daily.    tobramycin-dexamethasone 0.3-0.1% (TOBRADEX) 0.3-0.1 % DrpS SHAKE LIQUID AND INSTILL 1 DROP IN BOTH EYES EVERY 6 HOURS    traMADoL (ULTRAM) 50 mg tablet TAKE 1 TABLET BY MOUTH TWICE DAILY AS NEEDED    ubrogepant (UBRELVY) 50 mg tablet Take 1 tablet po at onset of migraine. May repeat in 2 hours if needed. Max 2 tablets per day.     Current Facility-Administered Medications   Medication    INV COT913/placebo Injection 1 Dose    onabotulinumtoxina injection 200 Units    onabotulinumtoxina injection 200 Units    onabotulinumtoxina injection 200 Units       Review of patient's allergies indicates:   Allergen Reactions    Hydrocodone Hives and Nausea Only           Sulfa (sulfonamide antibiotics) Hives and Rash           Doxycycline Rash     Itchy rash and some scattered itchy lesions    Oxycodone Itching and Nausea And Vomiting       Family History   Problem Relation Age of Onset    Heart attack Mother     Heart disease Mother     COPD Mother     Hypertension Mother     Hepatitis Sister     COPD Father     No Known Problems Daughter     Breast cancer Maternal Grandmother     Allergic rhinitis Neg Hx     Angioedema Neg Hx     Asthma Neg Hx     Atopy Neg Hx     Eczema Neg Hx     Immunodeficiency Neg Hx     Urticaria Neg Hx     Colon cancer Neg Hx     Colon polyps Neg Hx        Social History     Socioeconomic History    Marital status:      Spouse name: Not on file    Number of children: 1    Years of education: Not on file    Highest education level: Not on file   Occupational History      Employer: [employer]   Social Needs    Financial resource strain: Not hard at all    Food insecurity     Worry: Never true     Inability: Never true    Transportation needs     Medical: No     Non-medical: No   Tobacco Use    Smoking status: Former Smoker     Packs/day: 1.00     Years: 5.00     Pack years: 5.00     Start date: 1992     Quit date: 1997     Years since quittin.7    Smokeless tobacco: Never Used   Substance and Sexual Activity    Alcohol use: Yes     Frequency: Monthly or less     Drinks per session: 1 or 2     Binge frequency: Never     Comment: 3x per year    Drug use: No    Sexual activity: Yes     Partners: Male   Lifestyle    Physical activity     Days per week: 2 days     Minutes per session: 60 min    Stress: To some extent   Relationships    Social connections     Talks on phone: More than three times a week     Gets together: Never     Attends Christianity service: Not on file     Active member of club or organization: No     Attends meetings of clubs or organizations: Never     Relationship status:    Other Topics Concern    Not on file   Social History Narrative    ** Merged History Encounter **            Chief Complaint:   Chief Complaint   Patient presents with    Knee Pain     right knee-Euflexxa 1/3        History of present illness:  This is a 59-year-old female with chronic knee pain.  Right greater than left.  Patient had a Euflexxa series done back in 2019 with good results.  It lasted about 8 months.  Pain is up to a 5/10.  Pain is worse with weight-bearing and when laying on the side.      Answers for HPI/ROS submitted by the patient on 2020   Leg pain  unexpected weight change: No  appetite change : No  sleep disturbance: No  IMMUNOCOMPROMISED: No  nervous/ anxious: No  dysphoric mood: No  rash: No  visual disturbance: No  eye redness: No  eye pain: No  ear pain: No  tinnitus: No  hearing loss: No  sinus pressure :  No  nosebleeds: No  enviro allergies: Yes  food allergies: No  cough: No  shortness of breath: No  sweating: No  dysuria: No  frequency: Yes  difficulty urinating: No  hematuria: No  painful intercourse: No  chest pain: No  palpitations: No  nausea: No  vomiting: No  diarrhea: No  blood in stool: No  constipation: No  headaches: No  dizziness: No  numbness: Yes  seizures: No  joint swelling: Yes  myalgia: No  weakness: No  back pain: Yes  Pain Chronicity: recurrent  History of trauma: No  Onset: 1 to 4 weeks ago  Frequency: daily  Progression since onset: gradually worsening  injury location: at home  pain- numeric: 8/10  pain location: right knee  pain quality: sharp  Radiating Pain: No  Aggravating factors: walking  fever: No  inability to bear weight: Yes  itching: No  joint locking: No  limited range of motion: No  stiffness: No  tingling: No  Treatments tried: injection treatment  physical therapy: not tried  Improvement on treatment: significant      Physical Examination:    Vital Signs:    Vitals:    09/23/20 0835   Resp: 16       Body mass index is 40.21 kg/m².    This a well-developed, well nourished patient in no acute distress.  They are alert and oriented and cooperative to examination.  Pt. walks without an antalgic gait.      Examination of bilateral knees shows no rashes or erythema. There are no masses ecchymosis or effusion. Patient has full range of motion from 0-130°. Patient is nontender to palpation over lateral joint line and nontender to palpation over the medial joint line. Patient has a - Lachman exam, - anterior drawer exam, and - posterior drawer exam. - Ndea's exam. Knee is stable to varus and valgus stress. 5 out of 5 motor strength. Palpable distal pulses. Intact light touch sensation. Negative Patellofemoral crepitus      X-rays:  X-rays of both knees are ordered and reviewed which show some mild arthritic changes     Assessment::  Bilateral knee arthritis    Plan:  I injected her  right knee with Euflexxa 1 of 3.  Follow up next week.    This note was created using [employer] voice recognition software that occasionally misinterpreted phrases or words.    Consult note is delivered via Epic messaging service.

## 2020-09-23 NOTE — PROCEDURES
Large Joint Aspiration/Injection: R knee    Date/Time: 9/23/2020 8:45 AM  Performed by: Vaughn Ortiz MD  Authorized by: Vaughn Ortiz MD     Consent Done?:  Yes (Verbal)  Indications:  Pain  Site marked: the procedure site was marked    Timeout: prior to procedure the correct patient, procedure, and site was verified      Details:  Needle Size:  20 G  Approach:  Anterolateral  Location:  Knee  Site:  R knee  Medications:  20 mg sodium hyaluronate (EUFLEXXA) 10 mg/mL(mw 2.4 -3.6 million)  Patient tolerance:  Patient tolerated the procedure well with no immediate complications

## 2020-09-24 ENCOUNTER — PATIENT MESSAGE (OUTPATIENT)
Dept: RESEARCH | Facility: OTHER | Age: 60
End: 2020-09-24

## 2020-09-30 ENCOUNTER — OFFICE VISIT (OUTPATIENT)
Dept: ORTHOPEDICS | Facility: CLINIC | Age: 60
End: 2020-09-30
Payer: COMMERCIAL

## 2020-09-30 VITALS — BODY MASS INDEX: 40.22 KG/M2 | RESPIRATION RATE: 16 BRPM | WEIGHT: 227 LBS | HEIGHT: 63 IN

## 2020-09-30 DIAGNOSIS — M17.0 BILATERAL PRIMARY OSTEOARTHRITIS OF KNEE: Primary | ICD-10-CM

## 2020-09-30 PROCEDURE — 20610 PR DRAIN/INJECT LARGE JOINT/BURSA: ICD-10-PCS | Mod: RT,S$GLB,, | Performed by: ORTHOPAEDIC SURGERY

## 2020-09-30 PROCEDURE — 99499 NO LOS: ICD-10-PCS | Mod: S$GLB,,, | Performed by: ORTHOPAEDIC SURGERY

## 2020-09-30 PROCEDURE — 99499 UNLISTED E&M SERVICE: CPT | Mod: S$GLB,,, | Performed by: ORTHOPAEDIC SURGERY

## 2020-09-30 PROCEDURE — 99999 PR PBB SHADOW E&M-EST. PATIENT-LVL III: ICD-10-PCS | Mod: PBBFAC,,, | Performed by: ORTHOPAEDIC SURGERY

## 2020-09-30 PROCEDURE — 20610 DRAIN/INJ JOINT/BURSA W/O US: CPT | Mod: RT,S$GLB,, | Performed by: ORTHOPAEDIC SURGERY

## 2020-09-30 PROCEDURE — 99999 PR PBB SHADOW E&M-EST. PATIENT-LVL III: CPT | Mod: PBBFAC,,, | Performed by: ORTHOPAEDIC SURGERY

## 2020-09-30 NOTE — PROCEDURES
Large Joint Aspiration/Injection: R knee    Date/Time: 9/30/2020 8:45 AM  Performed by: Vaughn Ortiz MD  Authorized by: Vaughn Ortiz MD     Consent Done?:  Yes (Verbal)  Indications:  Pain  Site marked: the procedure site was marked    Timeout: prior to procedure the correct patient, procedure, and site was verified      Details:  Needle Size:  20 G  Approach:  Anterolateral  Location:  Knee  Site:  R knee  Medications:  20 mg sodium hyaluronate (EUFLEXXA) 10 mg/mL(mw 2.4 -3.6 million)  Patient tolerance:  Patient tolerated the procedure well with no immediate complications

## 2020-09-30 NOTE — PROGRESS NOTES
Past Medical History:   Diagnosis Date    Allergy     Arthritis of spine 2011    Chronic low back pain     Class 2 obesity with body mass index (BMI) of 39.0 to 39.9 in adult 1/11/2019    Coronary artery disease     mild    DDD (degenerative disc disease), lumbar     Diverticulitis     Diverticulosis     DJD (degenerative joint disease)     Encounter for blood transfusion     Factor V Leiden     GERD (gastroesophageal reflux disease)     Hiatal hernia     Migraines     PONV (postoperative nausea and vomiting)     geta    Schatzki's ring        Past Surgical History:   Procedure Laterality Date    APPENDECTOMY  1981    CARPAL TUNNEL RELEASE  2011    right    COLONOSCOPY  2014    Dr. Palomares; reduntant colon, otherwise normal findings repeat in 8-10 years for surveillance    Epidural Steroid Injection      Pain Management    EPIDURAL STEROID INJECTION INTO CERVICAL SPINE N/A 9/25/2018    Procedure: Injection-steroid-epidural-cervical;  Surgeon: Oswald Abdalla MD;  Location: [facility] OR;  Service: Pain Management;  Laterality: N/A;    EPIDURAL STEROID INJECTION INTO CERVICAL SPINE N/A 10/24/2019    Procedure: Injection-steroid-epidural-cervical;  Surgeon: Oswald Abdalla MD;  Location: [facility] OR;  Service: Pain Management;  Laterality: N/A;    EPIDURAL STEROID INJECTION INTO LUMBAR SPINE N/A 12/27/2018    Procedure: Injection-steroid-epidural-lumbar L5/S1;  Surgeon: Oswald Abdalla MD;  Location: [facility] OR;  Service: Pain Management;  Laterality: N/A;    EPIDURAL STEROID INJECTION INTO LUMBAR SPINE N/A 9/4/2019    Procedure: Injection-steroid-epidural-lumbar;  Surgeon: Oswald Abdalla MD;  Location: [facility] OR;  Service: Pain Management;  Laterality: N/A;  L5/S1 to right    ESOPHAGOGASTRODUODENOSCOPY  05/17/2016    Dr. Arreguin; small hiatal hernia; gastritis    ESOPHAGOGASTRODUODENOSCOPY N/A 6/22/2020    Dr. Arreguin; mild Schatzki ring- dilated; small hiatal hernia; bx unremarkable     Facet injection      Pain Management    HYSTERECTOMY      ovaries removed    KIDNEY SURGERY Right 1967    to correct urinary reflux into kidney    LEFT HEART CATHETERIZATION Left 5/22/2019    Procedure: Left heart cath;  Surgeon: Casa Perdue MD;  Location: [facility] CATH;  Service: Cardiology;  Laterality: Left;    OVARIAN CYST REMOVAL Right 1981    RADIOFREQUENCY ABLATION OF LUMBAR MEDIAL BRANCH NERVE AT SINGLE LEVEL Bilateral 7/17/2018    Procedure: RADIOFREQUENCY ABLATION, NERVE, MEDIAL BRANCH, LUMBAR, L2,3,4;  Surgeon: Oswald Abdalla MD;  Location: [facility] OR;  Service: Pain Management;  Laterality: Bilateral;    RADIOFREQUENCY ABLATION OF LUMBAR MEDIAL BRANCH NERVE AT SINGLE LEVEL Bilateral 7/29/2019    Procedure: Radiofrequency Ablation, Nerve, Spinal, Lumbar, Medial Branch, L2,3,4;  Surgeon: Oswald Abdalla MD;  Location: [facility] OR;  Service: Pain Management;  Laterality: Bilateral;    SHOULDER SURGERY  2010    rotator cuff, right       Current Outpatient Medications   Medication Sig    albuterol (PROVENTIL/VENTOLIN HFA) 90 mcg/actuation inhaler Inhale 2 puffs into the lungs every 6 (six) hours as needed for Wheezing or Shortness of Breath. Rescue    amLODIPine (NORVASC) 5 MG tablet Take 1 tablet (5 mg total) by mouth once daily.    aspirin (ECOTRIN) 81 MG EC tablet Take 81 mg by mouth once daily.    azelastine (ASTELIN) 137 mcg (0.1 %) nasal spray USE 1 SPRAY(137 MCG) IN EACH NOSTRIL TWICE DAILY    cimetidine (TAGAMET) 800 MG tablet TAKE 1 TABLET(800 MG) BY MOUTH EVERY EVENING    diclofenac sodium (VOLTAREN) 1 % Gel Apply 2 g topically 3 (three) times daily as needed.    estradiol (ESTRACE) 1 MG tablet TK 1 T PO QD;; pt uses HS    eszopiclone (LUNESTA) 2 MG Tab TAKE 1 TABLET BY MOUTH EVERY EVENING    galcanezumab-gnlm 120 mg/mL PnIj Inject 240 mg (2 injections) subcutaneous at separate sites, once (loading dose). Start maintenance dose 28 days later    galcanezumab-gnlm 120 mg/mL  PnIj Inject 1 pen (120 mg total) into the skin every 28 days.    lactobacillus combination no.8 (ADULT PROBIOTIC ORAL) Take by mouth once daily.    loratadine (CLARITIN) 10 mg tablet Take 10 mg by mouth once daily.    losartan (COZAAR) 100 MG tablet TAKE 1 TABLET(100 MG) BY MOUTH EVERY DAY    pantoprazole (PROTONIX) 40 MG tablet Take 1 tablet (40 mg total) by mouth 2 (two) times daily.    tobramycin-dexamethasone 0.3-0.1% (TOBRADEX) 0.3-0.1 % DrpS SHAKE LIQUID AND INSTILL 1 DROP IN BOTH EYES EVERY 6 HOURS    traMADoL (ULTRAM) 50 mg tablet TAKE 1 TABLET BY MOUTH TWICE DAILY AS NEEDED    ubrogepant (UBRELVY) 50 mg tablet Take 1 tablet po at onset of migraine. May repeat in 2 hours if needed. Max 2 tablets per day.     Current Facility-Administered Medications   Medication    INV [id number]/placebo Injection 1 Dose    onabotulinumtoxina injection 200 Units    onabotulinumtoxina injection 200 Units    onabotulinumtoxina injection 200 Units       Review of patient's allergies indicates:   Allergen Reactions    Hydrocodone Hives and Nausea Only           Sulfa (sulfonamide antibiotics) Hives and Rash           Doxycycline Rash     Itchy rash and some scattered itchy lesions    Oxycodone Itching and Nausea And Vomiting       Family History   Problem Relation Age of Onset    Heart attack Mother     Heart disease Mother     COPD Mother     Hypertension Mother     Hepatitis Sister     COPD Father     No Known Problems Daughter     Breast cancer Maternal Grandmother     Allergic rhinitis Neg Hx     Angioedema Neg Hx     Asthma Neg Hx     Atopy Neg Hx     Eczema Neg Hx     Immunodeficiency Neg Hx     Urticaria Neg Hx     Colon cancer Neg Hx     Colon polyps Neg Hx        Social History     Socioeconomic History    Marital status:      Spouse name: Not on file    Number of children: 1    Years of education: Not on file    Highest education level: Not on file   Occupational History      Employer: [employer]   Social Needs    Financial resource strain: Not hard at all    Food insecurity     Worry: Never true     Inability: Never true    Transportation needs     Medical: No     Non-medical: No   Tobacco Use    Smoking status: Former Smoker     Packs/day: 1.00     Years: 5.00     Pack years: 5.00     Start date: 1992     Quit date: 1997     Years since quittin.7    Smokeless tobacco: Never Used   Substance and Sexual Activity    Alcohol use: Yes     Frequency: Monthly or less     Drinks per session: 1 or 2     Binge frequency: Never     Comment: 3x per year    Drug use: No    Sexual activity: Yes     Partners: Male   Lifestyle    Physical activity     Days per week: 2 days     Minutes per session: 60 min    Stress: To some extent   Relationships    Social connections     Talks on phone: More than three times a week     Gets together: Never     Attends Confucianism service: Not on file     Active member of club or organization: No     Attends meetings of clubs or organizations: Never     Relationship status:    Other Topics Concern    Not on file   Social History Narrative    ** Merged History Encounter **            Chief Complaint:   Chief Complaint   Patient presents with    Knee Pain     right knee-Euflexxa 2/3       History of present illness:  This is a 59-year-old female with chronic knee pain.  Right greater than left.  Patient had a Euflexxa series done back in 2019 with good results.  It lasted about 8 months.  Pain is up to a 5/10.  Pain is worse with weight-bearing and when laying on the side.      Answers for HPI/ROS submitted by the patient on 2020   Leg pain  unexpected weight change: No  appetite change : No  sleep disturbance: No  IMMUNOCOMPROMISED: No  nervous/ anxious: No  dysphoric mood: No  rash: No  visual disturbance: No  eye redness: No  eye pain: No  ear pain: No  tinnitus: No  hearing loss: No  sinus pressure :  No  nosebleeds: No  enviro allergies: Yes  food allergies: No  cough: No  shortness of breath: No  sweating: No  dysuria: No  frequency: Yes  difficulty urinating: No  hematuria: No  painful intercourse: No  chest pain: No  palpitations: No  nausea: No  vomiting: No  diarrhea: No  blood in stool: No  constipation: No  headaches: No  dizziness: No  numbness: Yes  seizures: No  joint swelling: Yes  myalgia: No  weakness: No  back pain: Yes  Pain Chronicity: recurrent  History of trauma: No  Onset: 1 to 4 weeks ago  Frequency: daily  Progression since onset: gradually worsening  injury location: at home  pain- numeric: 8/10  pain location: right knee  pain quality: sharp  Radiating Pain: No  Aggravating factors: walking  fever: No  inability to bear weight: Yes  itching: No  joint locking: No  limited range of motion: No  stiffness: No  tingling: No  Treatments tried: injection treatment  physical therapy: not tried  Improvement on treatment: significant      Physical Examination:    Vital Signs:    Vitals:    09/30/20 0832   Resp: 16       Body mass index is 40.21 kg/m².    This a well-developed, well nourished patient in no acute distress.  They are alert and oriented and cooperative to examination.  Pt. walks without an antalgic gait.      Examination of bilateral knees shows no rashes or erythema. There are no masses ecchymosis or effusion. Patient has full range of motion from 0-130°. Patient is nontender to palpation over lateral joint line and nontender to palpation over the medial joint line. Patient has a - Lachman exam, - anterior drawer exam, and - posterior drawer exam. - Neda's exam. Knee is stable to varus and valgus stress. 5 out of 5 motor strength. Palpable distal pulses. Intact light touch sensation. Negative Patellofemoral crepitus      X-rays:  X-rays of both knees are ordered and reviewed which show some mild arthritic changes     Assessment::  Bilateral knee arthritis    Plan:  I injected her  right knee with Euflexxa 2 of 3.  Follow up next week.    This note was created using [employer] voice recognition software that occasionally misinterpreted phrases or words.    Consult note is delivered via Epic messaging service.

## 2020-10-02 ENCOUNTER — RESEARCH ENCOUNTER (OUTPATIENT)
Dept: RESEARCH | Facility: CLINIC | Age: 60
End: 2020-10-02
Payer: COMMERCIAL

## 2020-10-02 NOTE — PROGRESS NOTES
Study title: A Phase 1/2/3. Placebo Controlled, Randomized, Observer-Blind, Dose-Finding Study to Describe the Safety, Tolerability, Immunogenicity and Potential Efficacy of SARS-COV-2 RNA Vaccine Candidates Against COVID-19 in Health Adults   IRB #: 2020.198  Sponsor: [employer]   Sponsor's Protocol: [id number]  Site Number: [id number]  Subject ID: [id number]    Visit Assessments; 10/2/2020    The Subject presented for Visit 2 appointment as previously scheduled. Present during the visit are , Arabella Stearns and participant, Elizabeth Giordano. Participant wishes to continue participation in the trial, understands participation is voluntary and can withdraw at any point during the trial.     Visit 2 procedures completed during this visit include the following:      Review of Inclusion and Exclusion to determine the Subject's continued eligibility for study participation. Patient continues to meet eligibility. PI/Sub-I confirms and agrees to continued eligibility.    Review of Delay Criteria to confirm that the Subject is eligible for their second vaccination during today's visit.    Reviewed of adverse events since the Subject's Screening Visit.    Review for changes in concomitant medications since Subject's Screening Visit.    Review for changes in Contraceptive with Elizabeth Giordano. If patient of childbearing potential or male capable of producing, adequate contraceptive refresher discussion occurred. Patient expressed full understanding of adequate contraceptive measures and will contact site immediately if any changes are made in contraceptives.    Was UPT completed? No  o If no, UPT was not performed because subject is not WOCBP    Per protocol, no physical required at V2 unless new or major health changes since last visit  or at PI discretion.     All visit assessment procedures have been completed according to protocol, IP order will be placed, and second vaccine will be prepared.

## 2020-10-07 ENCOUNTER — OFFICE VISIT (OUTPATIENT)
Dept: ORTHOPEDICS | Facility: CLINIC | Age: 60
End: 2020-10-07
Payer: COMMERCIAL

## 2020-10-07 VITALS — RESPIRATION RATE: 15 BRPM | BODY MASS INDEX: 40.23 KG/M2 | WEIGHT: 227.06 LBS | HEIGHT: 63 IN

## 2020-10-07 DIAGNOSIS — M17.0 BILATERAL PRIMARY OSTEOARTHRITIS OF KNEE: Primary | ICD-10-CM

## 2020-10-07 PROCEDURE — 99999 PR PBB SHADOW E&M-EST. PATIENT-LVL IV: ICD-10-PCS | Mod: PBBFAC,,, | Performed by: ORTHOPAEDIC SURGERY

## 2020-10-07 PROCEDURE — 20610 PR DRAIN/INJECT LARGE JOINT/BURSA: ICD-10-PCS | Mod: RT,S$GLB,, | Performed by: ORTHOPAEDIC SURGERY

## 2020-10-07 PROCEDURE — 99499 NO LOS: ICD-10-PCS | Mod: S$GLB,,, | Performed by: ORTHOPAEDIC SURGERY

## 2020-10-07 PROCEDURE — 99499 UNLISTED E&M SERVICE: CPT | Mod: S$GLB,,, | Performed by: ORTHOPAEDIC SURGERY

## 2020-10-07 PROCEDURE — 99999 PR PBB SHADOW E&M-EST. PATIENT-LVL IV: CPT | Mod: PBBFAC,,, | Performed by: ORTHOPAEDIC SURGERY

## 2020-10-07 PROCEDURE — 20610 DRAIN/INJ JOINT/BURSA W/O US: CPT | Mod: RT,S$GLB,, | Performed by: ORTHOPAEDIC SURGERY

## 2020-10-07 RX ORDER — HYDROCHLOROTHIAZIDE 25 MG/1
TABLET ORAL
COMMUNITY
Start: 2020-10-01 | End: 2020-12-23

## 2020-10-07 NOTE — PROGRESS NOTES
Past Medical History:   Diagnosis Date    Allergy     Arthritis of spine 2011    Chronic low back pain     Class 2 obesity with body mass index (BMI) of 39.0 to 39.9 in adult 1/11/2019    Coronary artery disease     mild    DDD (degenerative disc disease), lumbar     Diverticulitis     Diverticulosis     DJD (degenerative joint disease)     Encounter for blood transfusion     Factor V Leiden     GERD (gastroesophageal reflux disease)     Hiatal hernia     Migraines     PONV (postoperative nausea and vomiting)     geta    Schatzki's ring        Past Surgical History:   Procedure Laterality Date    APPENDECTOMY  1981    CARPAL TUNNEL RELEASE  2011    right    COLONOSCOPY  2014    Dr. Palomares; reduntant colon, otherwise normal findings repeat in 8-10 years for surveillance    Epidural Steroid Injection      Pain Management    EPIDURAL STEROID INJECTION INTO CERVICAL SPINE N/A 9/25/2018    Procedure: Injection-steroid-epidural-cervical;  Surgeon: Oswald Abdalla MD;  Location: [facility] OR;  Service: Pain Management;  Laterality: N/A;    EPIDURAL STEROID INJECTION INTO CERVICAL SPINE N/A 10/24/2019    Procedure: Injection-steroid-epidural-cervical;  Surgeon: Oswald Abdalla MD;  Location: [facility] OR;  Service: Pain Management;  Laterality: N/A;    EPIDURAL STEROID INJECTION INTO LUMBAR SPINE N/A 12/27/2018    Procedure: Injection-steroid-epidural-lumbar L5/S1;  Surgeon: Oswald Abdalla MD;  Location: [facility] OR;  Service: Pain Management;  Laterality: N/A;    EPIDURAL STEROID INJECTION INTO LUMBAR SPINE N/A 9/4/2019    Procedure: Injection-steroid-epidural-lumbar;  Surgeon: Oswald Abdalla MD;  Location: [facility] OR;  Service: Pain Management;  Laterality: N/A;  L5/S1 to right    ESOPHAGOGASTRODUODENOSCOPY  05/17/2016    Dr. Arreguin; small hiatal hernia; gastritis    ESOPHAGOGASTRODUODENOSCOPY N/A 6/22/2020    Dr. Arreguin; mild Schatzki ring- dilated; small hiatal hernia; bx unremarkable     Facet injection      Pain Management    HYSTERECTOMY      ovaries removed    KIDNEY SURGERY Right 1967    to correct urinary reflux into kidney    LEFT HEART CATHETERIZATION Left 5/22/2019    Procedure: Left heart cath;  Surgeon: Casa Perdue MD;  [facility] CATH;  Service: Cardiology;  Laterality: Left;    OVARIAN CYST REMOVAL Right 1981    RADIOFREQUENCY ABLATION OF LUMBAR MEDIAL BRANCH NERVE AT SINGLE LEVEL Bilateral 7/17/2018    Procedure: RADIOFREQUENCY ABLATION, NERVE, MEDIAL BRANCH, LUMBAR, L2,3,4;  Surgeon: Oswald Abdalla MD;  Location: [facility] OR;  Service: Pain Management;  Laterality: Bilateral;    RADIOFREQUENCY ABLATION OF LUMBAR MEDIAL BRANCH NERVE AT SINGLE LEVEL Bilateral 7/29/2019    Procedure: Radiofrequency Ablation, Nerve, Spinal, Lumbar, Medial Branch, L2,3,4;  Surgeon: Oswald Abdalla MD;  Location: [facility] OR;  Service: Pain Management;  Laterality: Bilateral;    SHOULDER SURGERY  2010    rotator cuff, right       Current Outpatient Medications   Medication Sig    albuterol (PROVENTIL/VENTOLIN HFA) 90 mcg/actuation inhaler Inhale 2 puffs into the lungs every 6 (six) hours as needed for Wheezing or Shortness of Breath. Rescue    amLODIPine (NORVASC) 5 MG tablet Take 1 tablet (5 mg total) by mouth once daily.    aspirin (ECOTRIN) 81 MG EC tablet Take 81 mg by mouth once daily.    azelastine (ASTELIN) 137 mcg (0.1 %) nasal spray USE 1 SPRAY(137 MCG) IN EACH NOSTRIL TWICE DAILY    cimetidine (TAGAMET) 800 MG tablet TAKE 1 TABLET(800 MG) BY MOUTH EVERY EVENING    diclofenac sodium (VOLTAREN) 1 % Gel Apply 2 g topically 3 (three) times daily as needed.    estradiol (ESTRACE) 1 MG tablet TK 1 T PO QD;; pt uses HS    eszopiclone (LUNESTA) 2 MG Tab TAKE 1 TABLET BY MOUTH EVERY NIGHT AT BEDTIME    galcanezumab-gnlm 120 mg/mL PnIj Inject 240 mg (2 injections) subcutaneous at separate sites, once (loading dose). Start maintenance dose 28 days later    galcanezumab-gnlm  120 mg/mL PnIj Inject 1 pen (120 mg total) into the skin every 28 days.    hydroCHLOROthiazide (HYDRODIURIL) 25 MG tablet     lactobacillus combination no.8 (ADULT PROBIOTIC ORAL) Take by mouth once daily.    loratadine (CLARITIN) 10 mg tablet Take 10 mg by mouth once daily.    losartan (COZAAR) 100 MG tablet TAKE 1 TABLET(100 MG) BY MOUTH EVERY DAY    pantoprazole (PROTONIX) 40 MG tablet Take 1 tablet (40 mg total) by mouth 2 (two) times daily.    tobramycin-dexamethasone 0.3-0.1% (TOBRADEX) 0.3-0.1 % DrpS SHAKE LIQUID AND INSTILL 1 DROP IN BOTH EYES EVERY 6 HOURS    traMADoL (ULTRAM) 50 mg tablet TAKE 1 TABLET BY MOUTH TWICE DAILY AS NEEDED    ubrogepant (UBRELVY) 50 mg tablet Take 1 tablet po at onset of migraine. May repeat in 2 hours if needed. Max 2 tablets per day.     Current Facility-Administered Medications   Medication    INV DCW542/placebo Injection 1 Dose    INV XRB003/placebo Injection 1 Dose    onabotulinumtoxina injection 200 Units    onabotulinumtoxina injection 200 Units    onabotulinumtoxina injection 200 Units       Review of patient's allergies indicates:   Allergen Reactions    Hydrocodone Hives and Nausea Only           Sulfa (sulfonamide antibiotics) Hives and Rash           Doxycycline Rash     Itchy rash and some scattered itchy lesions    Oxycodone Itching and Nausea And Vomiting       Family History   Problem Relation Age of Onset    Heart attack Mother     Heart disease Mother     COPD Mother     Hypertension Mother     Hepatitis Sister     COPD Father     No Known Problems Daughter     Breast cancer Maternal Grandmother     Allergic rhinitis Neg Hx     Angioedema Neg Hx     Asthma Neg Hx     Atopy Neg Hx     Eczema Neg Hx     Immunodeficiency Neg Hx     Urticaria Neg Hx     Colon cancer Neg Hx     Colon polyps Neg Hx        Social History     Socioeconomic History    Marital status:      Spouse name: Not on file    Number of children: 1     Years of education: Not on file    Highest education level: Not on file   Occupational History     Employer: [employer]   Social Needs    Financial resource strain: Not hard at all    Food insecurity     Worry: Never true     Inability: Never true    Transportation needs     Medical: No     Non-medical: No   Tobacco Use    Smoking status: Former Smoker     Packs/day: 1.00     Years: 5.00     Pack years: 5.00     Start date: 1992     Quit date: 1997     Years since quittin.7    Smokeless tobacco: Never Used   Substance and Sexual Activity    Alcohol use: Yes     Frequency: Monthly or less     Drinks per session: 1 or 2     Binge frequency: Never     Comment: 3x per year    Drug use: No    Sexual activity: Yes     Partners: Male   Lifestyle    Physical activity     Days per week: 2 days     Minutes per session: 60 min    Stress: To some extent   Relationships    Social connections     Talks on phone: More than three times a week     Gets together: Never     Attends Muslim service: Not on file     Active member of club or organization: No     Attends meetings of clubs or organizations: Never     Relationship status:    Other Topics Concern    Not on file   Social History Narrative    ** Merged History Encounter **            Chief Complaint:   Chief Complaint   Patient presents with    Right Knee - Pain       History of present illness:  This is a 59-year-old female with chronic knee pain.  Right greater than left.  Patient had a Euflexxa series done back in 2019 with good results.  It lasted about 8 months.  Pain is up to a 5/10.  Pain is worse with weight-bearing and when laying on the side.      Answers for HPI/ROS submitted by the patient on 2020   Leg pain  unexpected weight change: No  appetite change : No  sleep disturbance: No  IMMUNOCOMPROMISED: No  nervous/ anxious: No  dysphoric mood: No  rash: No  visual disturbance: No  eye redness: No  eye  pain: No  ear pain: No  tinnitus: No  hearing loss: No  sinus pressure : No  nosebleeds: No  enviro allergies: Yes  food allergies: No  cough: No  shortness of breath: No  sweating: No  dysuria: No  frequency: Yes  difficulty urinating: No  hematuria: No  painful intercourse: No  chest pain: No  palpitations: No  nausea: No  vomiting: No  diarrhea: No  blood in stool: No  constipation: No  headaches: No  dizziness: No  numbness: Yes  seizures: No  joint swelling: Yes  myalgia: No  weakness: No  back pain: Yes  Pain Chronicity: recurrent  History of trauma: No  Onset: 1 to 4 weeks ago  Frequency: daily  Progression since onset: gradually worsening  injury location: at home  pain- numeric: 8/10  pain location: right knee  pain quality: sharp  Radiating Pain: No  Aggravating factors: walking  fever: No  inability to bear weight: Yes  itching: No  joint locking: No  limited range of motion: No  stiffness: No  tingling: No  Treatments tried: injection treatment  physical therapy: not tried  Improvement on treatment: significant      Physical Examination:    Vital Signs:    Vitals:    10/07/20 0840   Resp: 15       Body mass index is 40.22 kg/m².    This a well-developed, well nourished patient in no acute distress.  They are alert and oriented and cooperative to examination.  Pt. walks without an antalgic gait.      Examination of bilateral knees shows no rashes or erythema. There are no masses ecchymosis or effusion. Patient has full range of motion from 0-130°. Patient is nontender to palpation over lateral joint line and nontender to palpation over the medial joint line. Patient has a - Lachman exam, - anterior drawer exam, and - posterior drawer exam. - Neda's exam. Knee is stable to varus and valgus stress. 5 out of 5 motor strength. Palpable distal pulses. Intact light touch sensation. Negative Patellofemoral crepitus      X-rays:  X-rays of both knees are ordered and reviewed which show some mild arthritic  changes     Assessment::  Bilateral knee arthritis    Plan:  I injected her right knee with Euflexxa 3 of 3.  Follow up as needed.    This note was created using el? voice recognition software that occasionally misinterpreted phrases or words.    Consult note is delivered via Epic messaging service.

## 2020-10-08 ENCOUNTER — OFFICE VISIT (OUTPATIENT)
Dept: NEPHROLOGY | Facility: CLINIC | Age: 60
End: 2020-10-08
Payer: COMMERCIAL

## 2020-10-08 VITALS
DIASTOLIC BLOOD PRESSURE: 62 MMHG | OXYGEN SATURATION: 98 % | HEART RATE: 87 BPM | HEIGHT: 63 IN | SYSTOLIC BLOOD PRESSURE: 112 MMHG | WEIGHT: 227.06 LBS | BODY MASS INDEX: 40.23 KG/M2

## 2020-10-08 DIAGNOSIS — N17.9 AKI (ACUTE KIDNEY INJURY): Primary | ICD-10-CM

## 2020-10-08 PROCEDURE — 99204 OFFICE O/P NEW MOD 45 MIN: CPT | Mod: S$GLB,,, | Performed by: INTERNAL MEDICINE

## 2020-10-08 PROCEDURE — 3078F DIAST BP <80 MM HG: CPT | Mod: CPTII,S$GLB,, | Performed by: INTERNAL MEDICINE

## 2020-10-08 PROCEDURE — 99999 PR PBB SHADOW E&M-EST. PATIENT-LVL V: ICD-10-PCS | Mod: PBBFAC,,, | Performed by: INTERNAL MEDICINE

## 2020-10-08 PROCEDURE — 3074F PR MOST RECENT SYSTOLIC BLOOD PRESSURE < 130 MM HG: ICD-10-PCS | Mod: CPTII,S$GLB,, | Performed by: INTERNAL MEDICINE

## 2020-10-08 PROCEDURE — 99999 PR PBB SHADOW E&M-EST. PATIENT-LVL V: CPT | Mod: PBBFAC,,, | Performed by: INTERNAL MEDICINE

## 2020-10-08 PROCEDURE — 3074F SYST BP LT 130 MM HG: CPT | Mod: CPTII,S$GLB,, | Performed by: INTERNAL MEDICINE

## 2020-10-08 PROCEDURE — 3008F PR BODY MASS INDEX (BMI) DOCUMENTED: ICD-10-PCS | Mod: CPTII,S$GLB,, | Performed by: INTERNAL MEDICINE

## 2020-10-08 PROCEDURE — 3008F BODY MASS INDEX DOCD: CPT | Mod: CPTII,S$GLB,, | Performed by: INTERNAL MEDICINE

## 2020-10-08 PROCEDURE — 99204 PR OFFICE/OUTPT VISIT, NEW, LEVL IV, 45-59 MIN: ICD-10-PCS | Mod: S$GLB,,, | Performed by: INTERNAL MEDICINE

## 2020-10-08 PROCEDURE — 3078F PR MOST RECENT DIASTOLIC BLOOD PRESSURE < 80 MM HG: ICD-10-PCS | Mod: CPTII,S$GLB,, | Performed by: INTERNAL MEDICINE

## 2020-10-08 NOTE — PROGRESS NOTES
Subjective:       Patient ID: Elizabeth Giordano is a 59 y.o. White female who presents for new patient evaluation for chronic renal failure.    Elizabeth Giordano is referred by JEANCARLOS Espinosa MD to be evaluated for chronic renal failure.  She has no uremic or urinary symptoms and is in her usual state of health.  There have been no recent illnesses, hospitalizations or procedures.  She has chronic arthritis in her back and hands which she was taking mobic regularly up until 2 months ago when she started taking aleve.  She had a baseline of 0.9-1.1 from 2008 up until February of 2019 when it jumped to 1.4 where it has remained.      Review of Systems   Constitutional: Negative for appetite change, chills and fever.   HENT: Negative for congestion.    Eyes: Negative for visual disturbance.   Respiratory: Negative for cough and shortness of breath.    Cardiovascular: Negative for chest pain and leg swelling.   Gastrointestinal: Negative for abdominal pain, diarrhea, nausea and vomiting.   Genitourinary: Negative for difficulty urinating, dysuria and hematuria.   Musculoskeletal: Positive for arthralgias (back and hands). Negative for myalgias.   Skin: Negative for rash.   Neurological: Negative for headaches.   Psychiatric/Behavioral: Negative for sleep disturbance.       The past medical, family and social histories were reviewed for this encounter.     Past Medical History:   Diagnosis Date    Allergy     Arthritis of spine 2011    Chronic low back pain     Class 2 obesity with body mass index (BMI) of 39.0 to 39.9 in adult 1/11/2019    Coronary artery disease     mild    DDD (degenerative disc disease), lumbar     Diverticulitis     Diverticulosis     DJD (degenerative joint disease)     Encounter for blood transfusion     Factor V Leiden     GERD (gastroesophageal reflux disease)     Hiatal hernia     Migraines     PONV (postoperative nausea and vomiting)     geta    Schatzki's ring       Past Surgical History:   Procedure Laterality Date    APPENDECTOMY  1981    CARPAL TUNNEL RELEASE  2011    right    COLONOSCOPY  2014    Dr. Palomares; reduntant colon, otherwise normal findings repeat in 8-10 years for surveillance    Epidural Steroid Injection      Pain Management    EPIDURAL STEROID INJECTION INTO CERVICAL SPINE N/A 9/25/2018    Procedure: Injection-steroid-epidural-cervical;  Surgeon: Oswald Abdalla MD;  Location: [facility] OR;  Service: Pain Management;  Laterality: N/A;    EPIDURAL STEROID INJECTION INTO CERVICAL SPINE N/A 10/24/2019    Procedure: Injection-steroid-epidural-cervical;  Surgeon: Oswald Abdalla MD;  Location: [facility] OR;  Service: Pain Management;  Laterality: N/A;    EPIDURAL STEROID INJECTION INTO LUMBAR SPINE N/A 12/27/2018    Procedure: Injection-steroid-epidural-lumbar L5/S1;  Surgeon: Oswald Abdalla MD;  Location: [facility] OR;  Service: Pain Management;  Laterality: N/A;    EPIDURAL STEROID INJECTION INTO LUMBAR SPINE N/A 9/4/2019    Procedure: Injection-steroid-epidural-lumbar;  Surgeon: Oswald Abdalla MD;  Location: [facility] OR;  Service: Pain Management;  Laterality: N/A;  L5/S1 to right    ESOPHAGOGASTRODUODENOSCOPY  05/17/2016    Dr. Arreguin; small hiatal hernia; gastritis    ESOPHAGOGASTRODUODENOSCOPY N/A 6/22/2020    Dr. Arreguin; mild Schatzki ring- dilated; small hiatal hernia; bx unremarkable    Facet injection      Pain Management    HYSTERECTOMY      ovaries removed    KIDNEY SURGERY Right 1967    to correct urinary reflux into kidney    LEFT HEART CATHETERIZATION Left 5/22/2019    Procedure: Left heart cath;  Surgeon: Casa Perdue MD;  [facility] C[facility];  Service: Cardiology;  Laterality: Left;    OVARIAN CYST REMOVAL Right 1981    RADIOFREQUENCY ABLATION OF LUMBAR MEDIAL BRANCH NERVE AT SINGLE LEVEL Bilateral 7/17/2018    Procedure: RADIOFREQUENCY ABLATION, NERVE, MEDIAL BRANCH, LUMBAR, L2,3,4;  Surgeon: Oswald Abdalla MD;   Location: [facility] OR;  Service: Pain Management;  Laterality: Bilateral;    RADIOFREQUENCY ABLATION OF LUMBAR MEDIAL BRANCH NERVE AT SINGLE LEVEL Bilateral 2019    Procedure: Radiofrequency Ablation, Nerve, Spinal, Lumbar, Medial Branch, L2,3,4;  Surgeon: Oswald Abdalla MD;  Location: [facility] OR;  Service: Pain Management;  Laterality: Bilateral;    SHOULDER SURGERY  2010    rotator cuff, right     Social History     Socioeconomic History    Marital status:      Spouse name: Not on file    Number of children: 1    Years of education: Not on file    Highest education level: Not on file   Occupational History     Employer: [employer]   Social Needs    Financial resource strain: Not hard at all    Food insecurity     Worry: Never true     Inability: Never true    Transportation needs     Medical: No     Non-medical: No   Tobacco Use    Smoking status: Former Smoker     Packs/day: 1.00     Years: 5.00     Pack years: 5.00     Start date: 1992     Quit date: 1997     Years since quittin.7    Smokeless tobacco: Never Used   Substance and Sexual Activity    Alcohol use: Yes     Frequency: Monthly or less     Drinks per session: 1 or 2     Binge frequency: Never     Comment: 3x per year    Drug use: No    Sexual activity: Yes     Partners: Male   Lifestyle    Physical activity     Days per week: 2 days     Minutes per session: 60 min    Stress: To some extent   Relationships    Social connections     Talks on phone: More than three times a week     Gets together: Never     Attends Yazidism service: Not on file     Active member of club or organization: No     Attends meetings of clubs or organizations: Never     Relationship status:    Other Topics Concern    Not on file   Social History Narrative    ** Merged History Encounter **          Current Outpatient Medications   Medication Sig    albuterol (PROVENTIL/VENTOLIN HFA) 90 mcg/actuation inhaler Inhale 2  puffs into the lungs every 6 (six) hours as needed for Wheezing or Shortness of Breath. Rescue    amLODIPine (NORVASC) 5 MG tablet Take 1 tablet (5 mg total) by mouth once daily.    aspirin (ECOTRIN) 81 MG EC tablet Take 81 mg by mouth once daily.    azelastine (ASTELIN) 137 mcg (0.1 %) nasal spray USE 1 SPRAY(137 MCG) IN EACH NOSTRIL TWICE DAILY    cimetidine (TAGAMET) 800 MG tablet TAKE 1 TABLET(800 MG) BY MOUTH EVERY EVENING    diclofenac sodium (VOLTAREN) 1 % Gel Apply 2 g topically 3 (three) times daily as needed.    estradiol (ESTRACE) 1 MG tablet     eszopiclone (LUNESTA) 2 MG Tab TAKE 1 TABLET BY MOUTH EVERY NIGHT AT BEDTIME    galcanezumab-gnlm 120 mg/mL PnIj Inject 1 pen (120 mg total) into the skin every 28 days.    hydroCHLOROthiazide (HYDRODIURIL) 25 MG tablet     lactobacillus combination no.8 (ADULT PROBIOTIC ORAL) Take by mouth once daily.    loratadine (CLARITIN) 10 mg tablet Take 10 mg by mouth once daily.    losartan (COZAAR) 100 MG tablet TAKE 1 TABLET(100 MG) BY MOUTH EVERY DAY    pantoprazole (PROTONIX) 40 MG tablet Take 1 tablet (40 mg total) by mouth 2 (two) times daily. (Patient taking differently: Take 40 mg by mouth once daily. )    traMADoL (ULTRAM) 50 mg tablet TAKE 1 TABLET BY MOUTH TWICE DAILY AS NEEDED    ubrogepant (UBRELVY) 50 mg tablet Take 1 tablet po at onset of migraine. May repeat in 2 hours if needed. Max 2 tablets per day.    galcanezumab-gnlm 120 mg/mL PnIj Inject 240 mg (2 injections) subcutaneous at separate sites, once (loading dose). Start maintenance dose 28 days later (Patient not taking: Reported on 10/8/2020)    tobramycin-dexamethasone 0.3-0.1% (TOBRADEX) 0.3-0.1 % DrpS SHAKE LIQUID AND INSTILL 1 DROP IN BOTH EYES EVERY 6 HOURS (Patient not taking: Reported on 10/8/2020)     Current Facility-Administered Medications   Medication    INV [id number]/placebo Injection 1 Dose    INV OCC190/placebo Injection 1 Dose    onabotulinumtoxina injection 200 Units  "   onabotulinumtoxina injection 200 Units    onabotulinumtoxina injection 200 Units       /62 (BP Location: Left arm, Patient Position: Sitting)   Pulse 87   Ht 5' 3" (1.6 m)   Wt 103 kg (227 lb 1.2 oz)   SpO2 98%   BMI 40.22 kg/m²     Objective:      Physical Exam  Vitals signs reviewed.   Constitutional:       General: She is not in acute distress.     Appearance: She is well-developed.   HENT:      Head: Normocephalic and atraumatic.   Eyes:      General: No scleral icterus.     Conjunctiva/sclera: Conjunctivae normal.   Neck:      Musculoskeletal: Normal range of motion.      Vascular: No JVD.   Cardiovascular:      Rate and Rhythm: Normal rate and regular rhythm.      Heart sounds: Normal heart sounds. No murmur. No friction rub. No gallop.    Pulmonary:      Effort: Pulmonary effort is normal. No respiratory distress.      Breath sounds: Normal breath sounds. No wheezing or rales.   Abdominal:      General: Bowel sounds are normal. There is no distension.      Palpations: Abdomen is soft.      Tenderness: There is no abdominal tenderness.   Musculoskeletal:      Right lower leg: No edema.      Left lower leg: No edema.   Skin:     General: Skin is warm and dry.      Findings: No rash.   Neurological:      Mental Status: She is alert and oriented to person, place, and time.   Psychiatric:         Mood and Affect: Mood normal.         Behavior: Behavior normal.         Assessment:       1. RICHARD (acute kidney injury)        Plan:   Return to clinic in 4 months.  Labs for next visit include rp, ua.  RP, ua and renal US the end of this month.  Baseline creatinine is 0.9-1.1 since 2008.  Please avoid or minimize all NSAIDS (ibuprofen, motrin, aleve, indocin, naprosyn) to minimize the risk to your kidneys.  Aspirin in a dose of 325 mg or less a day is likely the safest with regards to kidney function.  If you are able to take aspirin and do not have any bleeding problems or ulcers, this may be your best " therapy.  Alternatively, acetaminophen (Tylenol) is the safer than NSAIDs with regards to kidney function.  I would ask you take this as directed on the bottle.  It is best to stay under 2 grams a day (4-500 mg tablets a day maximum).  Voltaren gel is ok to use.  I suspect she has had a departure from h er baseline related to the chronic use of NSAIDs.

## 2020-10-21 ENCOUNTER — HOSPITAL ENCOUNTER (OUTPATIENT)
Dept: RADIOLOGY | Facility: HOSPITAL | Age: 60
Discharge: HOME | End: 2020-10-21
Attending: INTERNAL MEDICINE
Payer: COMMERCIAL

## 2020-10-21 DIAGNOSIS — N17.9 AKI (ACUTE KIDNEY INJURY): ICD-10-CM

## 2020-10-21 PROCEDURE — 76770 US EXAM ABDO BACK WALL COMP: CPT | Mod: 26,,, | Performed by: RADIOLOGY

## 2020-10-21 PROCEDURE — 76770 US EXAM ABDO BACK WALL COMP: CPT | Mod: TC,PO

## 2020-10-21 PROCEDURE — 76770 US RETROPERITONEAL COMPLETE: ICD-10-PCS | Mod: 26,,, | Performed by: RADIOLOGY

## 2020-10-26 ENCOUNTER — PATIENT MESSAGE (OUTPATIENT)
Dept: NEPHROLOGY | Facility: CLINIC | Age: 60
End: 2020-10-26

## 2020-10-30 ENCOUNTER — RESEARCH ENCOUNTER (OUTPATIENT)
Dept: RESEARCH | Facility: CLINIC | Age: 60
End: 2020-10-30
Payer: COMMERCIAL

## 2020-10-30 NOTE — PROGRESS NOTES
Study title: A Phase 1/2/3. Placebo Controlled, Randomized, Observer-Blind, Dose-Finding Study to Describe the Safety, Tolerability, Immunogenicity and Potential Efficacy of SARS-COV-2 RNA Vaccine Candidates Against COVID-19 in Health Adults   IRB #: 2020.198  Sponsor: [employer]   Sponsor's Protocol: [id number]  Site Number: [id number]  Subject ID: [id number]    Visit Assessments; 10/30/2020    The Subject presented for Visit 3 appointment as previously scheduled. Present during the visit are , Alexey Escobedo and participant, Elizabeth Giordano. Participant wishes to continue participation in the trial, understands participation is voluntary and can withdraw at any point during the trial.     Visit 3 procedures completed during this visit include the following:      MARIAA Quintero -Reviewed of adverse events since the Subject's Screening Visit.    MARIAA Quintero -Review for changes in concomitant medications since Subject's Screening Visit.    MARIAA Quintero -Review for changes in Contraceptive with Elizabeth Giordano. If patient of childbearing potential or male capable of producing, adequate contraceptive refresher discussion occurred. Patient expressed full understanding of adequate contraceptive measures and will contact site immediately if any changes are made in contraceptives.    MARIAA Quintero -Participant not of child bearing potential    All visit assessment procedures have been completed according to protocol.

## 2020-11-04 ENCOUNTER — PATIENT OUTREACH (OUTPATIENT)
Dept: ADMINISTRATIVE | Facility: OTHER | Age: 60
End: 2020-11-04

## 2020-11-04 NOTE — PROGRESS NOTES
Health Maintenance Due   Topic Date Due    HIV Screening  10/30/1975    Pneumococcal Vaccine (Medium Risk) (1 of 1 - PPSV23) 10/30/1980    High Dose Statin  10/30/1982    Shingles Vaccine (1 of 2) 10/30/2011     Updates were requested from care everywhere.  Chart was reviewed for overdue [facility]active [facility] ([facility]) topics (CRS, Breast Cancer Screening, Eye exam)  Health Maintenance has been updated.  LINKS immunization registry triggered.  LINKS not responding.

## 2020-11-06 ENCOUNTER — OFFICE VISIT (OUTPATIENT)
Dept: PAIN MEDICINE | Facility: CLINIC | Age: 60
End: 2020-11-06
Payer: COMMERCIAL

## 2020-11-06 VITALS
DIASTOLIC BLOOD PRESSURE: 69 MMHG | BODY MASS INDEX: 39.91 KG/M2 | TEMPERATURE: 98 F | SYSTOLIC BLOOD PRESSURE: 123 MMHG | WEIGHT: 225.31 LBS | RESPIRATION RATE: 20 BRPM | HEART RATE: 86 BPM | OXYGEN SATURATION: 99 %

## 2020-11-06 DIAGNOSIS — G89.4 CHRONIC PAIN DISORDER: Primary | ICD-10-CM

## 2020-11-06 DIAGNOSIS — M51.36 DDD (DEGENERATIVE DISC DISEASE), LUMBAR: ICD-10-CM

## 2020-11-06 DIAGNOSIS — M54.16 LUMBAR RADICULITIS: ICD-10-CM

## 2020-11-06 DIAGNOSIS — M48.061 SPINAL STENOSIS OF LUMBAR REGION WITHOUT NEUROGENIC CLAUDICATION: ICD-10-CM

## 2020-11-06 PROCEDURE — 3078F PR MOST RECENT DIASTOLIC BLOOD PRESSURE < 80 MM HG: ICD-10-PCS | Mod: CPTII,S$GLB,, | Performed by: PHYSICIAN ASSISTANT

## 2020-11-06 PROCEDURE — 99213 OFFICE O/P EST LOW 20 MIN: CPT | Mod: S$GLB,,, | Performed by: PHYSICIAN ASSISTANT

## 2020-11-06 PROCEDURE — 99999 PR PBB SHADOW E&M-EST. PATIENT-LVL V: ICD-10-PCS | Mod: PBBFAC,,, | Performed by: PHYSICIAN ASSISTANT

## 2020-11-06 PROCEDURE — 99999 PR PBB SHADOW E&M-EST. PATIENT-LVL V: CPT | Mod: PBBFAC,,, | Performed by: PHYSICIAN ASSISTANT

## 2020-11-06 PROCEDURE — 3008F PR BODY MASS INDEX (BMI) DOCUMENTED: ICD-10-PCS | Mod: CPTII,S$GLB,, | Performed by: PHYSICIAN ASSISTANT

## 2020-11-06 PROCEDURE — 3074F PR MOST RECENT SYSTOLIC BLOOD PRESSURE < 130 MM HG: ICD-10-PCS | Mod: CPTII,S$GLB,, | Performed by: PHYSICIAN ASSISTANT

## 2020-11-06 PROCEDURE — 3074F SYST BP LT 130 MM HG: CPT | Mod: CPTII,S$GLB,, | Performed by: PHYSICIAN ASSISTANT

## 2020-11-06 PROCEDURE — 99213 PR OFFICE/OUTPT VISIT, EST, LEVL III, 20-29 MIN: ICD-10-PCS | Mod: S$GLB,,, | Performed by: PHYSICIAN ASSISTANT

## 2020-11-06 PROCEDURE — 3078F DIAST BP <80 MM HG: CPT | Mod: CPTII,S$GLB,, | Performed by: PHYSICIAN ASSISTANT

## 2020-11-06 PROCEDURE — 3008F BODY MASS INDEX DOCD: CPT | Mod: CPTII,S$GLB,, | Performed by: PHYSICIAN ASSISTANT

## 2020-11-06 RX ORDER — MELOXICAM 7.5 MG/1
7.5 TABLET ORAL DAILY PRN
Qty: 30 TABLET | Refills: 0 | Status: SHIPPED | OUTPATIENT
Start: 2020-11-06 | End: 2021-01-19

## 2020-11-11 NOTE — PROGRESS NOTES
CHIEF COMPLAINT/REASON FOR VISIT: low back pain    History of present illness: The patient is a 60 year old woman with a history of migraines, carpal tunnel syndrome and low back pain since her early 20s.  She returns in follow-up today with back pain.  She states that since her last visit her bilateral leg pain has improved and she reports aching constant bilateral low back pain that is worse in the mornings and with activity.  She states that her nephrologist has now cleared her to take NSAIDs but not on a daily basis and she would like to resume meloxicam as needed.  Otherwise she continues to do her home exercise program and has been walking for exercise as well.  She denies having any lower extremity weakness or numbness.  She denies bladder or bowel incontinence.    Pain intervention history: She tried Neurontin for her leg/thigh pain with no success. She only takes Advil with about 30% relief. She is status post TFESI bilaterally to L3 on 12/6/2011 with no relief. Past history of status post L4/5 YARON on 5/25/11 with about 90% relief that lasted 3 weeks. She is status post 2 bilateral L3 transforaminal injections with 3 weeks relief each time. Her current pain medications include Celexa 40 mg once a day, Aleve 220 mg twice a day, Lyrica as previously stated, Zanaflex 5 mg at night. She uses her TENS at home, when she thinks about it.  She is status post L4/5 interlaminar epidural steroid injection on 1/8/14 with 90% relief.  She is status post L5/S1 interlaminar epidural steroid injection on 6/9/14 with moderate relief only on the right low back and right leg lasting 2 weeks.  She is status post bilateral L3/4 and L4/5 facet joint injections on 4/13/15 with initially 100% relief of her back pain and 80% relief of her left lateral hip and lateral thigh pain, now reporting at least 50% relief of both.  She is status post bilateral L2, 3 and 4 medial branch radiofrequency ablation on 5/20/15 with 60% relief.    She is status post bilateral L2, 3 and 4 medial branch radiofrequency ablation on 6/20/16 with 50-70% relief.  She is status post L5/S1 interlaminar epidural steroid injection on 3/10/17 with 50% relief.  She is status post bilateral L2, 3, 4 medial branch radiofrequency ablation on 7/17/17 with about 75% relief.  She is status post bilateral L2, 3, 4 medial branch radiofrequency ablation on 07/17/2018 with 100% relief of her leg pain and 70-75% relief of her back pain.  She is status post C7-T1 cervical interlaminar epidural steroid injection on 09/25/2018 with 80% relief.   She is status post L5/S1 interlaminar epidural steroid injection on 12/27/2018 with 60% relief.  She is status post bilateral L2, 3 and 4 medial branch radiofrequency ablation on 07/29/2019 with 40% relief.  She is status post L5/S1 interlaminar epidural steroid injection on 09/04/2019 with almost 100% relief of her right leg pain but 0% relief for back pain. She is status post C7-T1 interlaminar epidural steroid injection on 10/24/2019 with 70% relief.  She failed Cymbalta due to side effects.    ROS: She reports back pain only.  Balance of review of systems is negative.    Medical, surgical, family and social history reviewed elsewhere in record.     Medications/Allergies: See med card      Vitals:    11/06/20 0902   BP: 123/69   Pulse: 86   Resp: 20   Temp: 98.2 °F (36.8 °C)   TempSrc: Temporal   SpO2: 99%   Weight: 102.2 kg (225 lb 5 oz)   PainSc:   4   PainLoc: Back        PHYSICAL EXAM:  Gen: A and O x3, pleasant, well-groomed  HEENT: PERRLA  CVS: Regular rate and rhythm, normal S1 and S2, no murmurs.  Resp: Clear to auscultation bilaterally, no wheezes or rales.  Musculoskeletal: Able to heel walk, toe walk. No antalgic gait.      Neuro:  Upper extremities: 5/5 strength bilaterally   Lower extremities: 5/5 strength bilaterally  Reflexes: Brachioradialis 2+, Bicep 2+, Tricep 2+. Patellar 2+, Achilles 2+.  Sensory: Intact and symmetrical  to light touch and pinprick in C2-T1 dermatomes bilaterally. Intact and symmetrical to light touch and pinprick in L2-S1 dermatomes bilaterally.    Lumbar spine:  Lumbar spine: ROM is moderately limited with flexion and extension with increased bilateral low back pain during extension and oblique extension.  Supine straight leg raise causes increased right lateral thigh pain.  Internal and external rotation of the hip causes no increased pain in either side.  Myofascial exam:  Mild tenderness to palpation to the right greater than left lumbar paraspinous muscles.      IMAGING:   MRI L-SPINE 5/10/11   IMPRESSION: AT L3-4, THERE IS CIRCUMFERENTIAL DISC PROTRUSION FOCALLY MORE PROMINENT TO THE LEFT AS WELL AS A SMALL DISC HERNIATION PROTRUDING INFERIORLY BEHIND L4 TO THE LEFT OF MIDLINE. THIS PRODUCES SPINAL CANAL AND BILATERAL NEURAL FORAMINAL STENOSIS, LEFT GREATER THAN RIGHT. AT L4-5, THERE IS CIRCUMFERENTIAL DISC PROTRUSION FOCALLY MORE PROMINENT TO THE RIGHT WITH MILD SPINAL CANAL AND RIGHT NEURAL FORAMINAL STENOSIS. AT L1-2 AND L2-3 ALTHOUGH THERE ARE DISC PROTRUSIONS IDENTIFIED, SIGNIFICANT STENOSIS IS NOT SEEN.     MRI lumbar spine 7/10/14  L1-2:There is chronic relatively advanced disc degeneration with disc space narrowing, disc dehydration, disc narrowing, endplate osteophytes, and degenerative vertebral endplate marrow change. There is a diffuse 2-mm posterior disc bulge with a superimposed4-mm right posterior disc extrusion causing mild right foraminal stenosis. There is no impingement of the right L1 nerve root and there has been no change.  L2-3: There is severe and chronic disc degeneration with severe disc space narrowing, disc dehydration, degenerative vertebral endplate marrow change, and vertebral endplate osteophytes. There is a diffuse 2-mm posterior disc bulge with a superimposed 4-mm right posterior disc extrusion causing mild right foraminal stenosis. There is no impingement of the right L2  nerve root and there has been no significant change.  L3-4: There is severe disc degeneration which has progressed since the prior study. There is severe disc space narrowing, disc dehydration, degenerative vertebral endplate marrow change and endplate osteophytes. There is also mild posterior subluxation of L3over a distance of 4 mm. There is a broad-based 5-mm posterior disc extrusion. There is degenerative facet arthrosis with ligamentum flavum thickening. The combination of facet arthrosis and disc extrusion results in relatively severe spinal canal stenosis with compression of the thecal sac to an AP diameter of 5 mm, moderate right foraminal stenosis, and severe left foraminal stenosis. The spinal stenosis has also progressed since the prior study.  L4-5:There is a diffuse posterior disc bulge with a superimposed 3-mm left posterior paracentral disc protrusion causing left paracentral thecal sac compression. There is moderate left and mild right foraminal stenosis and there has been no significant change. There is mild degenerative facet arthrosis with ligamentum flavum thickening and small joint effusions.  L5-S1:There is no significant compromise of the spinal canal or foramina and there has been no change.    X-ray cervical spine 6/8/15  There is loss of normal cervical lordosis which may be positional or related muscular strain. Intervertebral disk height loss is noted at the C5-C6 C6-C7 levels and to a lesser degree C4-C5 and C7-T1 levels. Vertebral body alignment appears otherwise adequate. Vertebral body heights appear well-preserved. The atlas and odontoid appear in good relationship to each other. Osseous neuroforaminal narrowing is noted at the C3-C4 C4-C5 C5-C6 levels on the left and at the C4-C5 C5-C6 and C6-C7 levels on the right     07/10/2018 MRI cervical spine [facility] report  C2-3 broad-based signal asymmetry at the left subarticular and foraminal zone reflecting implant spondylosis and  disc bulge complex, mild to moderate left asymmetric foraminal narrowing, ectasia of the left vertebral artery, contralateral right asymmetric facet hypertrophy, right foramen widely narrowed, disc partially desiccated without collapse  C3-4 moderate to severe left greater than right foraminal narrowing secondary to uncinate joint hypertrophic signal alteration, disc partially desiccated  C4-5 endplate spondylosis moderate diffuse disc bulge noted, broad-based right paracentral disc herniation, asymmetric flattening of the ventral cord surface at the right paracentral zone, high-grade if lateral right foraminal narrowing, AP diameter of canal 9.9 mm, contralaterally, high grade left foraminal narrowing secondary to facet greater than uncinate joint hypertrophic signal alteration, disc desiccated and mildly narrowed  C5-6 disc space narrowing evident with generalized in Nigel spondylosis and concentric inter pose disc bulge complex, high-grade bilateral foraminal narrowing, flattening of the cord surface, AP diameter 7.9 mm, symmetric facet arthrosis, disc diffusely desiccated and narrowed  C6-7 moderate endplate spondylosis and concentric disc bulge complex, high-grade bilateral foraminal compromise, flattening of the anterior cord surface, AP diameter 8.8 mm, facet arthrosis symmetric, disc desiccated and narrowed  C7-T1 less than 2 mm depth disc bulge    11/06/2018 MRI lumbar spine  T12/L1: There is no evidence of disc protrusion, canal or foraminal stenosis.  L1/L2: Right posterolateral disc protrusion is evident and there is mild distortion of the right anterolateral canal.  Degenerative facet changes are noted bilaterally.  The left foramen is intact.  L2/L3: There is annular disc bulging with a superimposed right posterolateral disc extrusion.  This is slightly more pronounced on the previous examination and there is mild effacement of the anterior thecal sac and moderate narrowing the right foramen.  L3/L4:  There is annular disc bulge and osteophyte formation with a superimposed small left posterolateral disc extrusion.  There is moderate narrowing the central canal and left foramen.  This is little changed relative the previous examination.  Degenerative facet changes are noted.  L5/S1: There is a small central disc extrusion superimposed upon annular disc bulging.  This is slightly less pronounced than was seen previously.  Degenerative facet changes are noted.  L5/S1: Moderate degenerative facet changes are noted and there is mild effacement of the anterior thecal sac.  There is ligamentous hypertrophy particularly to the left in the posterior canal and there is left greater than right foraminal narrowing.  This is mildly worsened relative prior exam.      ASSESSMENT:  The patient is a 60 year old woman with a history of migraines, carpal tunnel syndrome and low back pain since her early 20s who returns in follow up.   1. Chronic pain disorder     2. Lumbar radiculitis     3. DDD (degenerative disc disease), lumbar     4. Spinal stenosis of lumbar region without neurogenic claudication         Plan:  1.  She continues to take tramadol and recently received refills.  Since her nephrologist has cleared her to resume in says as needed.  I sent a prescription for meloxicam but discussed the importance of not taking this on a daily basis.  If this does not provide relief we can consider changing to a different NSAID.  2.  If her nerve pain worsens we can reconsider spinal cord stimulation and she has received information from [facility] in the past.  3.  Follow-up in 3 months or sooner as needed.

## 2020-11-30 ENCOUNTER — OFFICE VISIT (OUTPATIENT)
Dept: FAMILY MEDICINE | Facility: CLINIC | Age: 60
End: 2020-11-30
Payer: COMMERCIAL

## 2020-11-30 VITALS
HEART RATE: 79 BPM | DIASTOLIC BLOOD PRESSURE: 72 MMHG | TEMPERATURE: 98 F | SYSTOLIC BLOOD PRESSURE: 126 MMHG | OXYGEN SATURATION: 97 % | BODY MASS INDEX: 39.3 KG/M2 | WEIGHT: 221.81 LBS | HEIGHT: 63 IN

## 2020-11-30 DIAGNOSIS — F51.01 PRIMARY INSOMNIA: ICD-10-CM

## 2020-11-30 DIAGNOSIS — I10 ESSENTIAL HYPERTENSION: Primary | ICD-10-CM

## 2020-11-30 DIAGNOSIS — K21.9 GASTROESOPHAGEAL REFLUX DISEASE, UNSPECIFIED WHETHER ESOPHAGITIS PRESENT: ICD-10-CM

## 2020-11-30 DIAGNOSIS — R22.32 NODULE OF FINGER OF LEFT HAND: ICD-10-CM

## 2020-11-30 DIAGNOSIS — M51.37 DEGENERATION OF LUMBAR OR LUMBOSACRAL INTERVERTEBRAL DISC: ICD-10-CM

## 2020-11-30 PROCEDURE — 3008F BODY MASS INDEX DOCD: CPT | Mod: CPTII,S$GLB,, | Performed by: FAMILY MEDICINE

## 2020-11-30 PROCEDURE — 99999 PR PBB SHADOW E&M-EST. PATIENT-LVL V: ICD-10-PCS | Mod: PBBFAC,,, | Performed by: FAMILY MEDICINE

## 2020-11-30 PROCEDURE — 3074F SYST BP LT 130 MM HG: CPT | Mod: CPTII,S$GLB,, | Performed by: FAMILY MEDICINE

## 2020-11-30 PROCEDURE — 3008F PR BODY MASS INDEX (BMI) DOCUMENTED: ICD-10-PCS | Mod: CPTII,S$GLB,, | Performed by: FAMILY MEDICINE

## 2020-11-30 PROCEDURE — 99999 PR PBB SHADOW E&M-EST. PATIENT-LVL V: CPT | Mod: PBBFAC,,, | Performed by: FAMILY MEDICINE

## 2020-11-30 PROCEDURE — 99214 OFFICE O/P EST MOD 30 MIN: CPT | Mod: S$GLB,,, | Performed by: FAMILY MEDICINE

## 2020-11-30 PROCEDURE — 99214 PR OFFICE/OUTPT VISIT, EST, LEVL IV, 30-39 MIN: ICD-10-PCS | Mod: S$GLB,,, | Performed by: FAMILY MEDICINE

## 2020-11-30 PROCEDURE — 3078F DIAST BP <80 MM HG: CPT | Mod: CPTII,S$GLB,, | Performed by: FAMILY MEDICINE

## 2020-11-30 PROCEDURE — 1126F AMNT PAIN NOTED NONE PRSNT: CPT | Mod: S$GLB,,, | Performed by: FAMILY MEDICINE

## 2020-11-30 PROCEDURE — 3078F PR MOST RECENT DIASTOLIC BLOOD PRESSURE < 80 MM HG: ICD-10-PCS | Mod: CPTII,S$GLB,, | Performed by: FAMILY MEDICINE

## 2020-11-30 PROCEDURE — 1126F PR PAIN SEVERITY QUANTIFIED, NO PAIN PRESENT: ICD-10-PCS | Mod: S$GLB,,, | Performed by: FAMILY MEDICINE

## 2020-11-30 PROCEDURE — 3074F PR MOST RECENT SYSTOLIC BLOOD PRESSURE < 130 MM HG: ICD-10-PCS | Mod: CPTII,S$GLB,, | Performed by: FAMILY MEDICINE

## 2020-11-30 RX ORDER — ESZOPICLONE 3 MG/1
3 TABLET, FILM COATED ORAL NIGHTLY
Qty: 30 TABLET | Refills: 1 | Status: SHIPPED | OUTPATIENT
Start: 2020-11-30 | End: 2021-01-28

## 2020-11-30 RX ORDER — CIMETIDINE 800 MG
TABLET ORAL
Qty: 90 TABLET | Refills: 3 | Status: SHIPPED | OUTPATIENT
Start: 2020-11-30 | End: 2021-12-19 | Stop reason: SDUPTHER

## 2020-11-30 NOTE — PROGRESS NOTES
"Subjective:       Patient ID: Elizabeth Giordano is a 60 y.o. female.    Chief Complaint: medication check and lump on finger    Pt is known to me.  The pt rep;orts a lump of her left third finger.  It popped up when she had to stop NSAIDs due to RICHARD.  She has mild arthritis pain in her hands.  She says the lump hurts.  The pt continues with pain mgt and is set to get a nerve (pain ) stimulator after the first of the year.  Nothing else is helping her pain.  The pt is still not falling nor staying asleep on 2 mg Lunesta.  She continues to see Dr. Ann for mild CAD.    Review of Systems   Constitutional: Negative for activity change and unexpected weight change.   HENT: Positive for rhinorrhea. Negative for hearing loss and trouble swallowing.    Eyes: Negative for discharge and visual disturbance.   Respiratory: Negative for chest tightness and wheezing.    Cardiovascular: Negative for chest pain and palpitations.   Gastrointestinal: Negative for blood in stool, constipation, diarrhea and vomiting.   Endocrine: Negative for polydipsia and polyuria.   Genitourinary: Negative for difficulty urinating, dysuria, hematuria and menstrual problem.   Musculoskeletal: Positive for arthralgias. Negative for joint swelling and neck pain.   Neurological: Negative for weakness and headaches.   Psychiatric/Behavioral: Positive for sleep disturbance. Negative for confusion and dysphoric mood.       Objective:       Vitals:    11/30/20 1419   BP: 126/72   BP Location: Right arm   Patient Position: Sitting   Pulse: 79   Temp: 98.4 °F (36.9 °C)   TempSrc: Oral   SpO2: 97%   Weight: 100.6 kg (221 lb 12.5 oz)   Height: 5' 3" (1.6 m)     Physical Exam  Constitutional:       Appearance: She is well-developed. She is obese.   HENT:      Head: Normocephalic.   Eyes:      Conjunctiva/sclera: Conjunctivae normal.      Pupils: Pupils are equal, round, and reactive to light.   Neck:      Musculoskeletal: Normal range of motion and neck " supple.      Thyroid: No thyromegaly.   Cardiovascular:      Rate and Rhythm: Normal rate and regular rhythm.      Heart sounds: Normal heart sounds.   Pulmonary:      Effort: Pulmonary effort is normal.      Breath sounds: Normal breath sounds.   Abdominal:      General: Bowel sounds are normal.      Palpations: Abdomen is soft.      Tenderness: There is no abdominal tenderness.   Musculoskeletal: Normal range of motion.         General: No tenderness or deformity.      Comments: Nodule left 3rd finger at DIP   Lymphadenopathy:      Cervical: No cervical adenopathy.   Skin:     General: Skin is warm and dry.   Neurological:      Mental Status: She is alert and oriented to person, place, and time.      Cranial Nerves: No cranial nerve deficit.      Motor: No abnormal muscle tone.      Coordination: Coordination normal.      Deep Tendon Reflexes: Reflexes normal.   Psychiatric:         Behavior: Behavior normal.         Assessment:       1. Essential hypertension    2. Degeneration of lumbar or lumbosacral intervertebral disc    3. Gastroesophageal reflux disease, unspecified whether esophagitis present    4. Primary insomnia    5. Nodule of finger of left hand        Plan:       Elizabeth was seen today for medication check and lump on finger.    Diagnoses and all orders for this visit:    Essential hypertension    Degeneration of lumbar or lumbosacral intervertebral disc    Gastroesophageal reflux disease, unspecified whether esophagitis present  -     cimetidine (TAGAMET) 800 MG tablet; TAKE 1 TABLET(800 MG) BY MOUTH EVERY EVENING    Primary insomnia  -     eszopiclone (LUNESTA) 3 mg Tab; Take 1 tablet (3 mg total) by mouth nightly.    Nodule of finger of left hand  -     Ambulatory referral/consult to Orthopedics; Future      During this visit, I reviewed the pt's history, medications, allergies, and problem list.

## 2020-12-01 ENCOUNTER — PATIENT MESSAGE (OUTPATIENT)
Dept: FAMILY MEDICINE | Facility: CLINIC | Age: 60
End: 2020-12-01

## 2020-12-01 DIAGNOSIS — I10 ESSENTIAL HYPERTENSION: ICD-10-CM

## 2020-12-01 RX ORDER — AMLODIPINE BESYLATE 5 MG/1
5 TABLET ORAL DAILY
Qty: 90 TABLET | Refills: 3 | Status: SHIPPED | OUTPATIENT
Start: 2020-12-01 | End: 2021-10-18 | Stop reason: SDUPTHER

## 2020-12-01 NOTE — TELEPHONE ENCOUNTER
No new care gaps identified.  Powered by [employer]. Reference number: [id number]. 12/01/2020 1:43:53 PM CST

## 2020-12-02 NOTE — TELEPHONE ENCOUNTER
Refill Authorization Note   Elizabeth Giordano is requesting a refill authorization.  Brief assessment and rationale for refill: Approve     Medication Therapy Plan: CDMR. approve     Medication reconciliation completed: No   Comments:       Requested Prescriptions   Pending Prescriptions Disp Refills    amLODIPine (NORVASC) 5 MG tablet 90 tablet 3     Sig: Take 1 tablet (5 mg total) by mouth once daily.       Cardiovascular:  Calcium Channel Blockers Passed - 12/1/2020  7:41 PM        Passed - Patient is at least 18 years old        Passed - Last BP in normal range within 360 days.     BP Readings from Last 3 Encounters:   11/30/20 126/72   11/06/20 123/69   10/08/20 112/62              Passed - Office visit in past 12 months or future 90 days     Recent Outpatient Visits            Yesterday Essential hypertension    [facility] CARRIE Espinosa MD    3 weeks ago Chronic pain disorder    [facility] JERED Cruz    1 month ago RICHARD (acute kidney injury)    [facility] Thiago Khoury MD    1 month ago Bilateral primary osteoarthritis of knee    [facility] Vaughn Ortiz MD    2 months ago Bilateral primary osteoarthritis of knee    [facility] Vaughn Ortiz MD          Future Appointments              In 3 weeks Bentley Pascal MD [facility]    In 2 months SPECIMEN, [place] [facility]    In 2 months Oswald Abdalla MD [facility], [facility]    In 2 months LAB, [place] [facility]    In 3 months [facility]    In 4 months COVID VACCINE [facility]    In 6 months [facility]    In 7 months [facility]                     Appointments  past 12m or future 3m with PCP    Date Provider   Last Visit   11/30/2020 CARRIE Espinosa MD   Next Visit   Visit date not found CARRIE Espinosa MD   ED visits in past 90 days: [unfilled]     Note composed:7:41 PM 12/01/2020

## 2020-12-21 ENCOUNTER — PATIENT OUTREACH (OUTPATIENT)
Dept: ADMINISTRATIVE | Facility: OTHER | Age: 60
End: 2020-12-21

## 2020-12-21 NOTE — PROGRESS NOTES
LINKS immunization registry not responding  Care Everywhere updated  Health Maintenance updated  Chart reviewed for overdue [facility]active [facility] En[facility] ([facility]) health maintenance testing (CRS, Breast Ca, Diabetic Eye Exam)   Orders entered:N/A

## 2020-12-22 ENCOUNTER — RESEARCH ENCOUNTER (OUTPATIENT)
Dept: RESEARCH | Facility: HOSPITAL | Age: 60
End: 2020-12-22

## 2020-12-22 NOTE — PROGRESS NOTES
Study title: A Phase 1/2/3. Placebo Controlled, Randomized, Observer-Blind, Dose-Finding Study to Describe the Safety, Tolerability, Immunogenicity and Potential Efficacy of SARS-COV-2 RNA Vaccine Candidates Against COVID-19 in Healthy Adults   IRB #: 2020.198  Sponsor: [employer]   Sponsor's Protocol: [id number]  Site Number: [id number]  Subject ID: [id number]     Subject contacted site regarding Pfizer's COVID-19 Vaccine Emergency Use Authorization (EUA). Site explained the process of unbinding at two potential time points. Elizabeth Giordano stated she understands.     Does subject wish to be unblinded? yes    Elizabeth Giordano was informed that unblinded site staff will perform unblinding procedures and site will follow up.

## 2020-12-23 ENCOUNTER — PATIENT MESSAGE (OUTPATIENT)
Dept: FAMILY MEDICINE | Facility: CLINIC | Age: 60
End: 2020-12-23

## 2020-12-23 ENCOUNTER — OFFICE VISIT (OUTPATIENT)
Dept: FAMILY MEDICINE | Facility: CLINIC | Age: 60
End: 2020-12-23
Payer: COMMERCIAL

## 2020-12-23 DIAGNOSIS — J01.00 ACUTE MAXILLARY SINUSITIS, RECURRENCE NOT SPECIFIED: Primary | ICD-10-CM

## 2020-12-23 DIAGNOSIS — Z91.09 ENVIRONMENTAL ALLERGIES: ICD-10-CM

## 2020-12-23 PROCEDURE — 99213 OFFICE O/P EST LOW 20 MIN: CPT | Mod: 95,,, | Performed by: PHYSICIAN ASSISTANT

## 2020-12-23 PROCEDURE — 99213 PR OFFICE/OUTPT VISIT, EST, LEVL III, 20-29 MIN: ICD-10-PCS | Mod: 95,,, | Performed by: PHYSICIAN ASSISTANT

## 2020-12-23 RX ORDER — AMOXICILLIN AND CLAVULANATE POTASSIUM 875; 125 MG/1; MG/1
1 TABLET, FILM COATED ORAL EVERY 12 HOURS
Qty: 14 TABLET | Refills: 0 | Status: SHIPPED | OUTPATIENT
Start: 2020-12-23 | End: 2020-12-30

## 2020-12-23 RX ORDER — AZELASTINE 1 MG/ML
1 SPRAY, METERED NASAL 2 TIMES DAILY
Qty: 30 ML | Refills: 0 | Status: ON HOLD | OUTPATIENT
Start: 2020-12-23 | End: 2021-02-06 | Stop reason: SDUPTHER

## 2020-12-23 NOTE — PROGRESS NOTES
Subjective:      Patient ID: Elizabeth Giordano is a 60 y.o. female.    Chief Complaint: Sinus Problem  Patient is new to me.    HPI   Patient reports two weeks of bilateral ear fullness, post nasal drip, and dry cough.  Taking Claritin and Dayquil without resolution of symptoms. Rapid Covid test negative on Monday.  No sick contacts.  Patient denies fever, chest pain, or shortness of breath.    Review of Systems   Constitutional: Negative for chills and fever.   HENT: Positive for ear pain (fluid), postnasal drip, sinus pressure and sinus pain (right max). Negative for rhinorrhea and sore throat.    Eyes: Negative for pain.   Respiratory: Positive for cough (dry). Negative for shortness of breath and wheezing.    Cardiovascular: Negative for chest pain.   Gastrointestinal: Negative for abdominal pain, constipation, diarrhea, nausea and vomiting.   Musculoskeletal: Negative for myalgias.   Skin: Negative for rash.   Allergic/Immunologic: Positive for environmental allergies.   Neurological: Negative for dizziness, light-headedness and headaches.       Objective:   There were no vitals taken for this visit.     Physical Exam  Constitutional:       Appearance: Normal appearance. She is well-developed and well-groomed.   HENT:      Head: Normocephalic and atraumatic.      Right Ear: Hearing and external ear normal.      Left Ear: Hearing and external ear normal.      Nose:      Right Sinus: Maxillary sinus tenderness present. No frontal sinus tenderness.      Left Sinus: No maxillary sinus tenderness or frontal sinus tenderness.      Mouth/Throat:      Lips: Pink.   Eyes:      General: Lids are normal.      Conjunctiva/sclera: Conjunctivae normal.   Neck:      Trachea: Phonation normal.   Pulmonary:      Effort: No respiratory distress.      Comments: Able to talk without labored breathing.  Skin:     Coloration: Skin is not ashen, cyanotic, jaundiced or pale.   Neurological:      General: No focal deficit  present.      Mental Status: She is alert and oriented to person, place, and time.   Psychiatric:         Attention and Perception: Attention normal.         Mood and Affect: Mood normal.         Speech: Speech normal.         Behavior: Behavior normal. Behavior is cooperative.         Cognition and Memory: Cognition normal.         Judgment: Judgment normal.        Assessment:      1. Acute maxillary sinusitis, recurrence not specified       Plan:   1. Acute maxillary sinusitis, recurrence not specified  Take antibiotic with food.  Use albuterol 1-2 times a day.  Take mucinex DM twice daily.  - amoxicillin-clavulanate 875-125mg (AUGMENTIN) 875-125 mg per tablet; Take 1 tablet by mouth every 12 (twelve) hours. for 7 days  Dispense: 14 tablet; Refill: 0    Follow up as needed.  Patient agreed with plan and expressed understanding.  The patient location is:  Patient Home   The chief complaint leading to consultation is: sinus problem  Visit type: Virtual visit with synchronous audio and video  Total time spent with patient: 12 minutes  Each patient to whom he or she provides medical services by telemedicine is:  (1) informed of the relationship between the physician and patient and the respective role of any other health care provider with respect to management of the patient; and (2) notified that he or she may decline to receive medical services by telemedicine and may withdraw from such care at any time.

## 2020-12-28 ENCOUNTER — OFFICE VISIT (OUTPATIENT)
Dept: ORTHOPEDICS | Facility: CLINIC | Age: 60
End: 2020-12-28
Payer: COMMERCIAL

## 2020-12-28 VITALS
HEART RATE: 83 BPM | SYSTOLIC BLOOD PRESSURE: 129 MMHG | WEIGHT: 221.81 LBS | BODY MASS INDEX: 39.3 KG/M2 | DIASTOLIC BLOOD PRESSURE: 69 MMHG | HEIGHT: 63 IN

## 2020-12-28 DIAGNOSIS — M67.40 MUCOID CYST OF JOINT: Primary | ICD-10-CM

## 2020-12-28 PROCEDURE — 99203 OFFICE O/P NEW LOW 30 MIN: CPT | Mod: S$GLB,,, | Performed by: ORTHOPAEDIC SURGERY

## 2020-12-28 PROCEDURE — 99203 PR OFFICE/OUTPT VISIT, NEW, LEVL III, 30-44 MIN: ICD-10-PCS | Mod: S$GLB,,, | Performed by: ORTHOPAEDIC SURGERY

## 2020-12-28 PROCEDURE — 1125F PR PAIN SEVERITY QUANTIFIED, PAIN PRESENT: ICD-10-PCS | Mod: S$GLB,,, | Performed by: ORTHOPAEDIC SURGERY

## 2020-12-28 PROCEDURE — 3008F PR BODY MASS INDEX (BMI) DOCUMENTED: ICD-10-PCS | Mod: CPTII,S$GLB,, | Performed by: ORTHOPAEDIC SURGERY

## 2020-12-28 PROCEDURE — 3078F PR MOST RECENT DIASTOLIC BLOOD PRESSURE < 80 MM HG: ICD-10-PCS | Mod: CPTII,S$GLB,, | Performed by: ORTHOPAEDIC SURGERY

## 2020-12-28 PROCEDURE — 99999 PR PBB SHADOW E&M-EST. PATIENT-LVL IV: ICD-10-PCS | Mod: PBBFAC,,, | Performed by: ORTHOPAEDIC SURGERY

## 2020-12-28 PROCEDURE — 1125F AMNT PAIN NOTED PAIN PRSNT: CPT | Mod: S$GLB,,, | Performed by: ORTHOPAEDIC SURGERY

## 2020-12-28 PROCEDURE — 3008F BODY MASS INDEX DOCD: CPT | Mod: CPTII,S$GLB,, | Performed by: ORTHOPAEDIC SURGERY

## 2020-12-28 PROCEDURE — 3074F SYST BP LT 130 MM HG: CPT | Mod: CPTII,S$GLB,, | Performed by: ORTHOPAEDIC SURGERY

## 2020-12-28 PROCEDURE — 3078F DIAST BP <80 MM HG: CPT | Mod: CPTII,S$GLB,, | Performed by: ORTHOPAEDIC SURGERY

## 2020-12-28 PROCEDURE — 99999 PR PBB SHADOW E&M-EST. PATIENT-LVL IV: CPT | Mod: PBBFAC,,, | Performed by: ORTHOPAEDIC SURGERY

## 2020-12-28 PROCEDURE — 3074F PR MOST RECENT SYSTOLIC BLOOD PRESSURE < 130 MM HG: ICD-10-PCS | Mod: CPTII,S$GLB,, | Performed by: ORTHOPAEDIC SURGERY

## 2020-12-28 NOTE — PROGRESS NOTES
12/28/2020    Chief Complaint:  Chief Complaint   Patient presents with    Left Hand - Pain, Swelling       HPI:  Elizabeth Giordano is a 60 y.o. female, who presents to clinic today she has a nodule on her left middle finger.  She states that it has been there for months.  She states that on several occasions it has gotten larger and drained clear fluid.  She states that it is painful when it is enlargement currently it has ruptured and is nearly gone.  She has no other complaints currently.    PMHX:  Past Medical History:   Diagnosis Date    Allergy     Arthritis of spine 2011    Chronic low back pain     Class 2 obesity with body mass index (BMI) of 39.0 to 39.9 in adult 1/11/2019    Coronary artery disease     mild    DDD (degenerative disc disease), lumbar     Diverticulitis     Diverticulosis     DJD (degenerative joint disease)     Encounter for blood transfusion     Factor V Leiden     GERD (gastroesophageal reflux disease)     Hiatal hernia     Migraines     PONV (postoperative nausea and vomiting)     geta    Schatzki's ring        PSHX:  Past Surgical History:   Procedure Laterality Date    APPENDECTOMY  1981    CARPAL TUNNEL RELEASE  2011    right    COLONOSCOPY  2014    Dr. Palomares; reduntant colon, otherwise normal findings repeat in 8-10 years for surveillance    Epidural Steroid Injection      Pain Management    EPIDURAL STEROID INJECTION INTO CERVICAL SPINE N/A 9/25/2018    Procedure: Injection-steroid-epidural-cervical;  Surgeon: Oswald Abdalla MD;  Location: [facility] OR;  Service: Pain Management;  Laterality: N/A;    EPIDURAL STEROID INJECTION INTO CERVICAL SPINE N/A 10/24/2019    Procedure: Injection-steroid-epidural-cervical;  Surgeon: Oswald Abdalla MD;  Location: [facility] OR;  Service: Pain Management;  Laterality: N/A;    EPIDURAL STEROID INJECTION INTO LUMBAR SPINE N/A 12/27/2018    Procedure: Injection-steroid-epidural-lumbar L5/S1;  Surgeon: Osawld VIEYRA  MD Rm;  Location: [facility] OR;  Service: Pain Management;  Laterality: N/A;    EPIDURAL STEROID INJECTION INTO LUMBAR SPINE N/A 9/4/2019    Procedure: Injection-steroid-epidural-lumbar;  Surgeon: Oswald Abdalla MD;  Location: [facility] OR;  Service: Pain Management;  Laterality: N/A;  L5/S1 to right    ESOPHAGOGASTRODUODENOSCOPY  05/17/2016    Dr. Arreguin; small hiatal hernia; gastritis    ESOPHAGOGASTRODUODENOSCOPY N/A 6/22/2020    Dr. Arreguin; mild Schatzki ring- dilated; small hiatal hernia; bx unremarkable    Facet injection      Pain Management    HYSTERECTOMY      ovaries removed    KIDNEY SURGERY Right 1967    to correct urinary reflux into kidney    LEFT HEART CATHETERIZATION Left 5/22/2019    Procedure: Left heart cath;  Surgeon: Casa Perdue MD;  Location: [facility] CATH;  Service: Cardiology;  Laterality: Left;    OVARIAN CYST REMOVAL Right 1981    RADIOFREQUENCY ABLATION OF LUMBAR MEDIAL BRANCH NERVE AT SINGLE LEVEL Bilateral 7/17/2018    Procedure: RADIOFREQUENCY ABLATION, NERVE, MEDIAL BRANCH, LUMBAR, L2,3,4;  Surgeon: Oswald Abdalla MD;  Location: [facility] OR;  Service: Pain Management;  Laterality: Bilateral;    RADIOFREQUENCY ABLATION OF LUMBAR MEDIAL BRANCH NERVE AT SINGLE LEVEL Bilateral 7/29/2019    Procedure: Radiofrequency Ablation, Nerve, Spinal, Lumbar, Medial Branch, L2,3,4;  Surgeon: Oswald Abdalla MD;  Location: [facility] OR;  Service: Pain Management;  Laterality: Bilateral;    SHOULDER SURGERY  2010    rotator cuff, right       FMHX:  Family History   Problem Relation Age of Onset    Heart attack Mother     Heart disease Mother     COPD Mother     Hypertension Mother     Hepatitis Sister     COPD Father     No Known Problems Daughter     Breast cancer Maternal Grandmother     Allergic rhinitis Neg Hx     Angioedema Neg Hx     Asthma Neg Hx     Atopy Neg Hx     Eczema Neg Hx     Immunodeficiency Neg Hx     Urticaria Neg Hx     Colon cancer Neg Hx      Colon polyps Neg Hx        SOCHX:  Social History     Tobacco Use    Smoking status: Former Smoker     Packs/day: 1.00     Years: 5.00     Pack years: 5.00     Start date: 1992     Quit date: 1997     Years since quittin.9    Smokeless tobacco: Never Used   Substance Use Topics    Alcohol use: Yes     Frequency: Monthly or less     Drinks per session: 1 or 2     Binge frequency: Never     Comment: 3x per year       ALLERGIES:  Hydrocodone, Sulfa (sulfonamide antibiotics), Doxycycline, and Oxycodone    CURRENT MEDICATIONS:  Current Outpatient Medications on File Prior to Visit   Medication Sig Dispense Refill    albuterol (PROVENTIL/VENTOLIN HFA) 90 mcg/actuation inhaler Inhale 2 puffs into the lungs every 6 (six) hours as needed for Wheezing or Shortness of Breath. Rescue 6.7 g 2    amLODIPine (NORVASC) 5 MG tablet Take 1 tablet (5 mg total) by mouth once daily. 90 tablet 3    amoxicillin-clavulanate 875-125mg (AUGMENTIN) 875-125 mg per tablet Take 1 tablet by mouth every 12 (twelve) hours. for 7 days 14 tablet 0    aspirin (ECOTRIN) 81 MG EC tablet Take 81 mg by mouth once daily.      azelastine (ASTELIN) 137 mcg (0.1 %) nasal spray 1 spray (137 mcg total) by Nasal route 2 (two) times daily. 30 mL 0    cimetidine (TAGAMET) 800 MG tablet TAKE 1 TABLET(800 MG) BY MOUTH EVERY EVENING 90 tablet 3    diclofenac sodium (VOLTAREN) 1 % Gel Apply 2 g topically 3 (three) times daily as needed. 1 Tube 5    estradiol (ESTRACE) 1 MG tablet   3    eszopiclone (LUNESTA) 3 mg Tab Take 1 tablet (3 mg total) by mouth nightly. 30 tablet 1    galcanezumab-gnlm 120 mg/mL PnIj Inject 1 pen (120 mg total) into the skin every 28 days. 1 mL 11    lactobacillus combination no.8 (ADULT PROBIOTIC ORAL) Take by mouth once daily.      loratadine (CLARITIN) 10 mg tablet Take 10 mg by mouth once daily.      losartan (COZAAR) 100 MG tablet TAKE 1 TABLET(100 MG) BY MOUTH EVERY DAY 90 tablet 3    meloxicam (MOBIC) 7.5  "MG tablet Take 1 tablet (7.5 mg total) by mouth daily as needed for Pain. 30 tablet 0    pantoprazole (PROTONIX) 40 MG tablet Take 1 tablet (40 mg total) by mouth 2 (two) times daily. (Patient taking differently: Take 40 mg by mouth once daily. ) 180 tablet 1    traMADoL (ULTRAM) 50 mg tablet TAKE 1 TABLET BY MOUTH TWICE DAILY AS NEEDED 60 tablet 2    ubrogepant (UBRELVY) 50 mg tablet Take 1 tablet po at onset of migraine. May repeat in 2 hours if needed. Max 2 tablets per day. 10 tablet 11     Current Facility-Administered Medications on File Prior to Visit   Medication Dose Route Frequency Provider Last Rate Last Dose    INV OGV341/placebo Injection 1 Dose  1 Dose Intramuscular 1 time in Clinic/HOD Rola Saleh MD        INV KZY781/placebo Injection 1 Dose  1 Dose Intramuscular 1 time in Clinic/HOD Rola Saleh MD        onabotulinumtoxina injection 200 Units  200 Units Intramuscular Q90 Days Neva Cortes MD   200 Units at 08/16/18 1625    onabotulinumtoxina injection 200 Units  200 Units Intramuscular Q90 Days Neva Cortes MD   200 Units at 10/25/19 1557    onabotulinumtoxina injection 200 Units  200 Units Intramuscular q12 weeks Linda Melgar NP           REVIEW OF SYSTEMS:  ROS    GENERAL PHYSICAL EXAM:   /69   Pulse 83   Ht 5' 3" (1.6 m)   Wt 100.6 kg (221 lb 12.5 oz)   BMI 39.29 kg/m²    GEN: well developed, well nourished, no acute distress   HENT: Normocephalic, atraumatic   EYES: No discharge, conjunctiva normal   NECK: Supple, non-tender   PULM: No wheezing, no respiratory distress   CV: RRR   ABD: Soft, non-tender    ORTHO EXAM:   Examination left hand middle finger reveals that there is small area erythema overlying the dorsum of the distal interphalangeal joint.  The skin in that area reveals evidence of a mucoid cyst which has ruptured.  Currently there is no cyst.  Palpation in that area produces minimal tenderness.  She does have sensation intact over the " radial and ulnar sides of the digit.  Capillary refill is less 2 sec at the tip of the finger.    RADIOLOGY:   None    ASSESSMENT:   Left middle finger mucoid cyst    PLAN:  1.  I have discussed treatment options with the patient.  Currently there is no treatment necessary as the cyst is no longer present.  If she does have a recurrence of the cyst we will discuss surgical excision.    2.  Follow up with me on a p.r.n. basis

## 2021-01-06 DIAGNOSIS — Z12.31 OTHER SCREENING MAMMOGRAM: ICD-10-CM

## 2021-01-19 RX ORDER — IBUPROFEN 800 MG/1
800 TABLET ORAL DAILY PRN
Qty: 30 TABLET | Refills: 0 | Status: SHIPPED | OUTPATIENT
Start: 2021-01-19 | End: 2021-02-15 | Stop reason: SDUPTHER

## 2021-01-26 ENCOUNTER — OFFICE VISIT (OUTPATIENT)
Dept: FAMILY MEDICINE | Facility: CLINIC | Age: 61
End: 2021-01-26
Payer: COMMERCIAL

## 2021-01-26 VITALS
TEMPERATURE: 98 F | WEIGHT: 221.31 LBS | HEART RATE: 99 BPM | DIASTOLIC BLOOD PRESSURE: 70 MMHG | HEIGHT: 63 IN | SYSTOLIC BLOOD PRESSURE: 140 MMHG | BODY MASS INDEX: 39.21 KG/M2 | OXYGEN SATURATION: 95 %

## 2021-01-26 DIAGNOSIS — J34.9 SINUS DISORDER: Primary | ICD-10-CM

## 2021-01-26 PROCEDURE — 99213 PR OFFICE/OUTPT VISIT, EST, LEVL III, 20-29 MIN: ICD-10-PCS | Mod: S$GLB,,, | Performed by: FAMILY MEDICINE

## 2021-01-26 PROCEDURE — 1126F AMNT PAIN NOTED NONE PRSNT: CPT | Mod: S$GLB,,, | Performed by: FAMILY MEDICINE

## 2021-01-26 PROCEDURE — 99999 PR PBB SHADOW E&M-EST. PATIENT-LVL III: ICD-10-PCS | Mod: PBBFAC,,, | Performed by: FAMILY MEDICINE

## 2021-01-26 PROCEDURE — 3077F SYST BP >= 140 MM HG: CPT | Mod: CPTII,S$GLB,, | Performed by: FAMILY MEDICINE

## 2021-01-26 PROCEDURE — 3077F PR MOST RECENT SYSTOLIC BLOOD PRESSURE >= 140 MM HG: ICD-10-PCS | Mod: CPTII,S$GLB,, | Performed by: FAMILY MEDICINE

## 2021-01-26 PROCEDURE — 3008F PR BODY MASS INDEX (BMI) DOCUMENTED: ICD-10-PCS | Mod: CPTII,S$GLB,, | Performed by: FAMILY MEDICINE

## 2021-01-26 PROCEDURE — 3078F DIAST BP <80 MM HG: CPT | Mod: CPTII,S$GLB,, | Performed by: FAMILY MEDICINE

## 2021-01-26 PROCEDURE — 99999 PR PBB SHADOW E&M-EST. PATIENT-LVL III: CPT | Mod: PBBFAC,,, | Performed by: FAMILY MEDICINE

## 2021-01-26 PROCEDURE — 3078F PR MOST RECENT DIASTOLIC BLOOD PRESSURE < 80 MM HG: ICD-10-PCS | Mod: CPTII,S$GLB,, | Performed by: FAMILY MEDICINE

## 2021-01-26 PROCEDURE — 3008F BODY MASS INDEX DOCD: CPT | Mod: CPTII,S$GLB,, | Performed by: FAMILY MEDICINE

## 2021-01-26 PROCEDURE — 99213 OFFICE O/P EST LOW 20 MIN: CPT | Mod: S$GLB,,, | Performed by: FAMILY MEDICINE

## 2021-01-26 PROCEDURE — 1126F PR PAIN SEVERITY QUANTIFIED, NO PAIN PRESENT: ICD-10-PCS | Mod: S$GLB,,, | Performed by: FAMILY MEDICINE

## 2021-01-26 RX ORDER — IPRATROPIUM BROMIDE 42 UG/1
2 SPRAY, METERED NASAL 4 TIMES DAILY PRN
Qty: 15 ML | Refills: 2 | Status: SHIPPED | OUTPATIENT
Start: 2021-01-26 | End: 2021-05-03 | Stop reason: SDUPTHER

## 2021-01-26 RX ORDER — METHYLPREDNISOLONE 4 MG/1
TABLET ORAL
Qty: 1 PACKAGE | Refills: 0 | Status: ON HOLD | OUTPATIENT
Start: 2021-01-26 | End: 2021-02-06 | Stop reason: HOSPADM

## 2021-01-27 ENCOUNTER — PATIENT MESSAGE (OUTPATIENT)
Dept: FAMILY MEDICINE | Facility: CLINIC | Age: 61
End: 2021-01-27

## 2021-01-27 DIAGNOSIS — F51.01 PRIMARY INSOMNIA: ICD-10-CM

## 2021-01-27 DIAGNOSIS — J98.01 COUGH DUE TO BRONCHOSPASM: ICD-10-CM

## 2021-01-30 RX ORDER — ALBUTEROL SULFATE 90 UG/1
2 AEROSOL, METERED RESPIRATORY (INHALATION) EVERY 6 HOURS PRN
Qty: 6.7 G | Refills: 2 | Status: SHIPPED | OUTPATIENT
Start: 2021-01-30 | End: 2022-09-26

## 2021-01-30 RX ORDER — ESZOPICLONE 3 MG/1
3 TABLET, FILM COATED ORAL NIGHTLY
Qty: 30 TABLET | Refills: 1 | Status: SHIPPED | OUTPATIENT
Start: 2021-01-30 | End: 2021-04-05

## 2021-01-31 ENCOUNTER — RESEARCH ENCOUNTER (OUTPATIENT)
Dept: RESEARCH | Facility: HOSPITAL | Age: 61
End: 2021-01-31

## 2021-02-01 ENCOUNTER — RESEARCH ENCOUNTER (OUTPATIENT)
Dept: RESEARCH | Facility: HOSPITAL | Age: 61
End: 2021-02-01

## 2021-02-02 PROBLEM — J12.82 PNEUMONIA DUE TO COVID-19 VIRUS: Status: ACTIVE | Noted: 2021-02-02

## 2021-02-02 PROBLEM — U07.1 COVID-19 VIRUS DETECTED: Status: ACTIVE | Noted: 2021-02-02

## 2021-02-02 PROBLEM — J96.01 ACUTE HYPOXEMIC RESPIRATORY FAILURE DUE TO COVID-19: Status: ACTIVE | Noted: 2021-02-02

## 2021-02-02 PROBLEM — U07.1 PNEUMONIA DUE TO COVID-19 VIRUS: Status: ACTIVE | Noted: 2021-02-02

## 2021-02-02 PROBLEM — E87.6 HYPOKALEMIA: Status: ACTIVE | Noted: 2021-02-02

## 2021-02-09 ENCOUNTER — PATIENT OUTREACH (OUTPATIENT)
Dept: ADMINISTRATIVE | Facility: HOSPITAL | Age: 61
End: 2021-02-09

## 2021-02-12 ENCOUNTER — RESEARCH ENCOUNTER (OUTPATIENT)
Dept: RESEARCH | Facility: HOSPITAL | Age: 61
End: 2021-02-12

## 2021-02-12 ENCOUNTER — PATIENT MESSAGE (OUTPATIENT)
Dept: FAMILY MEDICINE | Facility: CLINIC | Age: 61
End: 2021-02-12

## 2021-02-12 ENCOUNTER — OFFICE VISIT (OUTPATIENT)
Dept: FAMILY MEDICINE | Facility: CLINIC | Age: 61
End: 2021-02-12
Payer: COMMERCIAL

## 2021-02-12 DIAGNOSIS — U07.1 PNEUMONIA DUE TO COVID-19 VIRUS: Primary | ICD-10-CM

## 2021-02-12 DIAGNOSIS — J96.01 ACUTE HYPOXEMIC RESPIRATORY FAILURE DUE TO COVID-19: ICD-10-CM

## 2021-02-12 DIAGNOSIS — U07.1 ACUTE HYPOXEMIC RESPIRATORY FAILURE DUE TO COVID-19: ICD-10-CM

## 2021-02-12 DIAGNOSIS — J12.82 PNEUMONIA DUE TO COVID-19 VIRUS: Primary | ICD-10-CM

## 2021-02-12 PROCEDURE — 99213 PR OFFICE/OUTPT VISIT, EST, LEVL III, 20-29 MIN: ICD-10-PCS | Mod: 95,,, | Performed by: FAMILY MEDICINE

## 2021-02-12 PROCEDURE — 99213 OFFICE O/P EST LOW 20 MIN: CPT | Mod: 95,,, | Performed by: FAMILY MEDICINE

## 2021-02-15 RX ORDER — IBUPROFEN 800 MG/1
800 TABLET ORAL DAILY PRN
Qty: 30 TABLET | Refills: 0 | Status: SHIPPED | OUTPATIENT
Start: 2021-02-15 | End: 2021-05-24

## 2021-02-18 ENCOUNTER — RESEARCH ENCOUNTER (OUTPATIENT)
Dept: RESEARCH | Facility: HOSPITAL | Age: 61
End: 2021-02-18

## 2021-02-19 DIAGNOSIS — K21.9 GASTROESOPHAGEAL REFLUX DISEASE: ICD-10-CM

## 2021-02-22 ENCOUNTER — RESEARCH ENCOUNTER (OUTPATIENT)
Dept: RESEARCH | Facility: HOSPITAL | Age: 61
End: 2021-02-22

## 2021-02-22 ENCOUNTER — PATIENT MESSAGE (OUTPATIENT)
Dept: FAMILY MEDICINE | Facility: CLINIC | Age: 61
End: 2021-02-22

## 2021-02-22 DIAGNOSIS — U07.1 PNEUMONIA DUE TO COVID-19 VIRUS: Primary | ICD-10-CM

## 2021-02-22 DIAGNOSIS — J12.82 PNEUMONIA DUE TO COVID-19 VIRUS: Primary | ICD-10-CM

## 2021-02-23 RX ORDER — PANTOPRAZOLE SODIUM 40 MG/1
40 TABLET, DELAYED RELEASE ORAL DAILY
Qty: 90 TABLET | Refills: 3 | Status: SHIPPED | OUTPATIENT
Start: 2021-02-23 | End: 2021-10-26

## 2021-02-24 ENCOUNTER — HOSPITAL ENCOUNTER (OUTPATIENT)
Dept: RADIOLOGY | Facility: HOSPITAL | Age: 61
Discharge: HOME | End: 2021-02-24
Attending: FAMILY MEDICINE
Payer: COMMERCIAL

## 2021-02-24 DIAGNOSIS — U07.1 PNEUMONIA DUE TO COVID-19 VIRUS: ICD-10-CM

## 2021-02-24 DIAGNOSIS — J12.82 PNEUMONIA DUE TO COVID-19 VIRUS: ICD-10-CM

## 2021-02-24 PROCEDURE — 71046 X-RAY EXAM CHEST 2 VIEWS: CPT | Mod: 26,,, | Performed by: RADIOLOGY

## 2021-02-24 PROCEDURE — 71046 XR CHEST PA AND LATERAL: ICD-10-PCS | Mod: 26,,, | Performed by: RADIOLOGY

## 2021-02-24 PROCEDURE — 71046 X-RAY EXAM CHEST 2 VIEWS: CPT | Mod: TC,FY,PO

## 2021-02-26 ENCOUNTER — PATIENT MESSAGE (OUTPATIENT)
Dept: FAMILY MEDICINE | Facility: CLINIC | Age: 61
End: 2021-02-26

## 2021-03-03 ENCOUNTER — RESEARCH ENCOUNTER (OUTPATIENT)
Dept: RESEARCH | Facility: CLINIC | Age: 61
End: 2021-03-03
Payer: COMMERCIAL

## 2021-03-09 ENCOUNTER — OFFICE VISIT (OUTPATIENT)
Dept: FAMILY MEDICINE | Facility: CLINIC | Age: 61
End: 2021-03-09
Payer: COMMERCIAL

## 2021-03-09 ENCOUNTER — HOSPITAL ENCOUNTER (OUTPATIENT)
Dept: RADIOLOGY | Facility: HOSPITAL | Age: 61
Discharge: HOME | End: 2021-03-09
Attending: FAMILY MEDICINE
Payer: COMMERCIAL

## 2021-03-09 VITALS
TEMPERATURE: 98 F | OXYGEN SATURATION: 99 % | DIASTOLIC BLOOD PRESSURE: 66 MMHG | HEART RATE: 95 BPM | WEIGHT: 207.88 LBS | BODY MASS INDEX: 35.68 KG/M2 | SYSTOLIC BLOOD PRESSURE: 122 MMHG

## 2021-03-09 DIAGNOSIS — J12.82 PNEUMONIA DUE TO COVID-19 VIRUS: Primary | ICD-10-CM

## 2021-03-09 DIAGNOSIS — J12.82 PNEUMONIA DUE TO COVID-19 VIRUS: ICD-10-CM

## 2021-03-09 DIAGNOSIS — U07.1 PNEUMONIA DUE TO COVID-19 VIRUS: Primary | ICD-10-CM

## 2021-03-09 DIAGNOSIS — U07.1 PNEUMONIA DUE TO COVID-19 VIRUS: ICD-10-CM

## 2021-03-09 PROCEDURE — 3074F PR MOST RECENT SYSTOLIC BLOOD PRESSURE < 130 MM HG: ICD-10-PCS | Mod: CPTII,S$GLB,, | Performed by: FAMILY MEDICINE

## 2021-03-09 PROCEDURE — 3074F SYST BP LT 130 MM HG: CPT | Mod: CPTII,S$GLB,, | Performed by: FAMILY MEDICINE

## 2021-03-09 PROCEDURE — 1126F AMNT PAIN NOTED NONE PRSNT: CPT | Mod: S$GLB,,, | Performed by: FAMILY MEDICINE

## 2021-03-09 PROCEDURE — 71046 X-RAY EXAM CHEST 2 VIEWS: CPT | Mod: TC,FY,PO

## 2021-03-09 PROCEDURE — 3008F BODY MASS INDEX DOCD: CPT | Mod: CPTII,S$GLB,, | Performed by: FAMILY MEDICINE

## 2021-03-09 PROCEDURE — 3008F PR BODY MASS INDEX (BMI) DOCUMENTED: ICD-10-PCS | Mod: CPTII,S$GLB,, | Performed by: FAMILY MEDICINE

## 2021-03-09 PROCEDURE — 1126F PR PAIN SEVERITY QUANTIFIED, NO PAIN PRESENT: ICD-10-PCS | Mod: S$GLB,,, | Performed by: FAMILY MEDICINE

## 2021-03-09 PROCEDURE — 3078F DIAST BP <80 MM HG: CPT | Mod: CPTII,S$GLB,, | Performed by: FAMILY MEDICINE

## 2021-03-09 PROCEDURE — 3078F PR MOST RECENT DIASTOLIC BLOOD PRESSURE < 80 MM HG: ICD-10-PCS | Mod: CPTII,S$GLB,, | Performed by: FAMILY MEDICINE

## 2021-03-09 PROCEDURE — 71046 XR CHEST PA AND LATERAL: ICD-10-PCS | Mod: 26,,, | Performed by: RADIOLOGY

## 2021-03-09 PROCEDURE — 99214 OFFICE O/P EST MOD 30 MIN: CPT | Mod: S$GLB,,, | Performed by: FAMILY MEDICINE

## 2021-03-09 PROCEDURE — 99999 PR PBB SHADOW E&M-EST. PATIENT-LVL V: CPT | Mod: PBBFAC,,, | Performed by: FAMILY MEDICINE

## 2021-03-09 PROCEDURE — 99999 PR PBB SHADOW E&M-EST. PATIENT-LVL V: ICD-10-PCS | Mod: PBBFAC,,, | Performed by: FAMILY MEDICINE

## 2021-03-09 PROCEDURE — 71046 X-RAY EXAM CHEST 2 VIEWS: CPT | Mod: 26,,, | Performed by: RADIOLOGY

## 2021-03-09 PROCEDURE — 99214 PR OFFICE/OUTPT VISIT, EST, LEVL IV, 30-39 MIN: ICD-10-PCS | Mod: S$GLB,,, | Performed by: FAMILY MEDICINE

## 2021-03-10 ENCOUNTER — PATIENT MESSAGE (OUTPATIENT)
Dept: FAMILY MEDICINE | Facility: CLINIC | Age: 61
End: 2021-03-10

## 2021-03-18 ENCOUNTER — RESEARCH ENCOUNTER (OUTPATIENT)
Dept: RESEARCH | Facility: HOSPITAL | Age: 61
End: 2021-03-18

## 2021-03-19 ENCOUNTER — RESEARCH ENCOUNTER (OUTPATIENT)
Dept: RESEARCH | Facility: CLINIC | Age: 61
End: 2021-03-19
Payer: COMMERCIAL

## 2021-03-19 DIAGNOSIS — Z23 NEED FOR VACCINATION: Primary | ICD-10-CM

## 2021-03-19 PROCEDURE — 91300 COVID-19, MRNA, LNP-S, PF, 30 MCG/0.3 ML DOSE VACCINE: ICD-10-PCS | Mod: ,,, | Performed by: INTERNAL MEDICINE

## 2021-03-19 PROCEDURE — 91300 COVID-19, MRNA, LNP-S, PF, 30 MCG/0.3 ML DOSE VACCINE: CPT | Mod: ,,, | Performed by: INTERNAL MEDICINE

## 2021-03-23 ENCOUNTER — TELEPHONE (OUTPATIENT)
Dept: PHARMACY | Facility: CLINIC | Age: 61
End: 2021-03-23

## 2021-03-31 ENCOUNTER — OFFICE VISIT (OUTPATIENT)
Dept: PAIN MEDICINE | Facility: CLINIC | Age: 61
End: 2021-03-31
Payer: COMMERCIAL

## 2021-03-31 VITALS
WEIGHT: 209.44 LBS | RESPIRATION RATE: 18 BRPM | HEART RATE: 90 BPM | OXYGEN SATURATION: 97 % | TEMPERATURE: 98 F | DIASTOLIC BLOOD PRESSURE: 63 MMHG | BODY MASS INDEX: 35.95 KG/M2 | SYSTOLIC BLOOD PRESSURE: 134 MMHG

## 2021-03-31 DIAGNOSIS — M47.816 LUMBAR SPONDYLOSIS: ICD-10-CM

## 2021-03-31 DIAGNOSIS — M54.16 LUMBAR RADICULOPATHY: ICD-10-CM

## 2021-03-31 DIAGNOSIS — G89.4 CHRONIC PAIN DISORDER: ICD-10-CM

## 2021-03-31 DIAGNOSIS — M54.16 LUMBAR RADICULITIS: Primary | ICD-10-CM

## 2021-03-31 DIAGNOSIS — M48.061 SPINAL STENOSIS OF LUMBAR REGION WITHOUT NEUROGENIC CLAUDICATION: ICD-10-CM

## 2021-03-31 PROCEDURE — 99213 OFFICE O/P EST LOW 20 MIN: CPT | Mod: S$GLB,,, | Performed by: ANESTHESIOLOGY

## 2021-03-31 PROCEDURE — 99999 PR PBB SHADOW E&M-EST. PATIENT-LVL V: ICD-10-PCS | Mod: PBBFAC,,, | Performed by: ANESTHESIOLOGY

## 2021-03-31 PROCEDURE — 1125F AMNT PAIN NOTED PAIN PRSNT: CPT | Mod: S$GLB,,, | Performed by: ANESTHESIOLOGY

## 2021-03-31 PROCEDURE — 99999 PR PBB SHADOW E&M-EST. PATIENT-LVL V: CPT | Mod: PBBFAC,,, | Performed by: ANESTHESIOLOGY

## 2021-03-31 PROCEDURE — 3078F DIAST BP <80 MM HG: CPT | Mod: CPTII,S$GLB,, | Performed by: ANESTHESIOLOGY

## 2021-03-31 PROCEDURE — 3008F BODY MASS INDEX DOCD: CPT | Mod: CPTII,S$GLB,, | Performed by: ANESTHESIOLOGY

## 2021-03-31 PROCEDURE — 3078F PR MOST RECENT DIASTOLIC BLOOD PRESSURE < 80 MM HG: ICD-10-PCS | Mod: CPTII,S$GLB,, | Performed by: ANESTHESIOLOGY

## 2021-03-31 PROCEDURE — 3075F SYST BP GE 130 - 139MM HG: CPT | Mod: CPTII,S$GLB,, | Performed by: ANESTHESIOLOGY

## 2021-03-31 PROCEDURE — 99213 PR OFFICE/OUTPT VISIT, EST, LEVL III, 20-29 MIN: ICD-10-PCS | Mod: S$GLB,,, | Performed by: ANESTHESIOLOGY

## 2021-03-31 PROCEDURE — 1125F PR PAIN SEVERITY QUANTIFIED, PAIN PRESENT: ICD-10-PCS | Mod: S$GLB,,, | Performed by: ANESTHESIOLOGY

## 2021-03-31 PROCEDURE — 3008F PR BODY MASS INDEX (BMI) DOCUMENTED: ICD-10-PCS | Mod: CPTII,S$GLB,, | Performed by: ANESTHESIOLOGY

## 2021-03-31 PROCEDURE — 3075F PR MOST RECENT SYSTOLIC BLOOD PRESS GE 130-139MM HG: ICD-10-PCS | Mod: CPTII,S$GLB,, | Performed by: ANESTHESIOLOGY

## 2021-03-31 RX ORDER — TRAMADOL HYDROCHLORIDE 50 MG/1
50 TABLET ORAL EVERY 12 HOURS PRN
Qty: 60 TABLET | Refills: 2 | Status: SHIPPED | OUTPATIENT
Start: 2021-03-31 | End: 2021-06-30 | Stop reason: SDUPTHER

## 2021-04-02 DIAGNOSIS — F51.01 PRIMARY INSOMNIA: ICD-10-CM

## 2021-04-05 RX ORDER — ESZOPICLONE 3 MG/1
TABLET, FILM COATED ORAL
Qty: 30 TABLET | Refills: 1 | Status: SHIPPED | OUTPATIENT
Start: 2021-04-05 | End: 2021-06-07

## 2021-04-06 ENCOUNTER — PATIENT MESSAGE (OUTPATIENT)
Dept: ADMINISTRATIVE | Facility: HOSPITAL | Age: 61
End: 2021-04-06

## 2021-04-09 ENCOUNTER — RESEARCH ENCOUNTER (OUTPATIENT)
Dept: RESEARCH | Facility: CLINIC | Age: 61
End: 2021-04-09
Payer: COMMERCIAL

## 2021-04-09 DIAGNOSIS — Z23 NEED FOR VACCINATION: Primary | ICD-10-CM

## 2021-04-09 PROCEDURE — 91300 COVID-19, MRNA, LNP-S, PF, 30 MCG/0.3 ML DOSE VACCINE: ICD-10-PCS | Mod: ,,, | Performed by: INTERNAL MEDICINE

## 2021-04-09 PROCEDURE — 91300 COVID-19, MRNA, LNP-S, PF, 30 MCG/0.3 ML DOSE VACCINE: CPT | Mod: ,,, | Performed by: INTERNAL MEDICINE

## 2021-04-29 ENCOUNTER — PATIENT MESSAGE (OUTPATIENT)
Dept: FAMILY MEDICINE | Facility: CLINIC | Age: 61
End: 2021-04-29

## 2021-05-03 ENCOUNTER — OFFICE VISIT (OUTPATIENT)
Dept: FAMILY MEDICINE | Facility: CLINIC | Age: 61
End: 2021-05-03
Payer: COMMERCIAL

## 2021-05-03 DIAGNOSIS — R05.9 COUGH: Primary | ICD-10-CM

## 2021-05-03 DIAGNOSIS — J34.9 SINUS DISORDER: ICD-10-CM

## 2021-05-03 DIAGNOSIS — Z86.16 HISTORY OF 2019 NOVEL CORONAVIRUS DISEASE (COVID-19): ICD-10-CM

## 2021-05-03 PROCEDURE — 99213 OFFICE O/P EST LOW 20 MIN: CPT | Mod: 95,,, | Performed by: FAMILY MEDICINE

## 2021-05-03 PROCEDURE — 99213 PR OFFICE/OUTPT VISIT, EST, LEVL III, 20-29 MIN: ICD-10-PCS | Mod: 95,,, | Performed by: FAMILY MEDICINE

## 2021-05-03 RX ORDER — PROMETHAZINE HYDROCHLORIDE AND DEXTROMETHORPHAN HYDROBROMIDE 6.25; 15 MG/5ML; MG/5ML
5 SYRUP ORAL EVERY 4 HOURS PRN
Qty: 180 ML | Refills: 0 | Status: SHIPPED | OUTPATIENT
Start: 2021-05-03 | End: 2021-06-21

## 2021-05-03 RX ORDER — IPRATROPIUM BROMIDE 42 UG/1
2 SPRAY, METERED NASAL 2 TIMES DAILY
Qty: 15 ML | Refills: 2 | Status: SHIPPED | OUTPATIENT
Start: 2021-05-03 | End: 2021-08-18

## 2021-05-04 ENCOUNTER — HOSPITAL ENCOUNTER (OUTPATIENT)
Dept: RADIOLOGY | Facility: HOSPITAL | Age: 61
Discharge: HOME | End: 2021-05-04
Attending: FAMILY MEDICINE
Payer: COMMERCIAL

## 2021-05-04 DIAGNOSIS — R05.9 COUGH: ICD-10-CM

## 2021-05-04 DIAGNOSIS — Z86.16 HISTORY OF 2019 NOVEL CORONAVIRUS DISEASE (COVID-19): ICD-10-CM

## 2021-05-04 PROCEDURE — 71046 XR CHEST PA AND LATERAL: ICD-10-PCS | Mod: 26,,, | Performed by: RADIOLOGY

## 2021-05-04 PROCEDURE — 71046 X-RAY EXAM CHEST 2 VIEWS: CPT | Mod: 26,,, | Performed by: RADIOLOGY

## 2021-05-04 PROCEDURE — 71046 X-RAY EXAM CHEST 2 VIEWS: CPT | Mod: TC,FY,PO

## 2021-05-05 ENCOUNTER — TELEPHONE (OUTPATIENT)
Dept: PHARMACY | Facility: CLINIC | Age: 61
End: 2021-05-05

## 2021-05-05 ENCOUNTER — PATIENT MESSAGE (OUTPATIENT)
Dept: NEUROLOGY | Facility: CLINIC | Age: 61
End: 2021-05-05

## 2021-05-17 ENCOUNTER — PATIENT MESSAGE (OUTPATIENT)
Dept: ADMINISTRATIVE | Facility: HOSPITAL | Age: 61
End: 2021-05-17

## 2021-05-17 ENCOUNTER — PATIENT OUTREACH (OUTPATIENT)
Dept: ADMINISTRATIVE | Facility: HOSPITAL | Age: 61
End: 2021-05-17

## 2021-05-21 ENCOUNTER — PATIENT OUTREACH (OUTPATIENT)
Dept: ADMINISTRATIVE | Facility: OTHER | Age: 61
End: 2021-05-21

## 2021-05-26 ENCOUNTER — OFFICE VISIT (OUTPATIENT)
Dept: NEPHROLOGY | Facility: CLINIC | Age: 61
End: 2021-05-26
Payer: COMMERCIAL

## 2021-05-26 VITALS
WEIGHT: 214.31 LBS | DIASTOLIC BLOOD PRESSURE: 66 MMHG | HEIGHT: 64 IN | HEART RATE: 85 BPM | BODY MASS INDEX: 36.59 KG/M2 | OXYGEN SATURATION: 98 % | SYSTOLIC BLOOD PRESSURE: 118 MMHG

## 2021-05-26 DIAGNOSIS — N17.9 AKI (ACUTE KIDNEY INJURY): Primary | ICD-10-CM

## 2021-05-26 PROCEDURE — 99999 PR PBB SHADOW E&M-EST. PATIENT-LVL IV: CPT | Mod: PBBFAC,,, | Performed by: INTERNAL MEDICINE

## 2021-05-26 PROCEDURE — 3008F PR BODY MASS INDEX (BMI) DOCUMENTED: ICD-10-PCS | Mod: CPTII,S$GLB,, | Performed by: INTERNAL MEDICINE

## 2021-05-26 PROCEDURE — 99999 PR PBB SHADOW E&M-EST. PATIENT-LVL IV: ICD-10-PCS | Mod: PBBFAC,,, | Performed by: INTERNAL MEDICINE

## 2021-05-26 PROCEDURE — 99214 PR OFFICE/OUTPT VISIT, EST, LEVL IV, 30-39 MIN: ICD-10-PCS | Mod: S$GLB,,, | Performed by: INTERNAL MEDICINE

## 2021-05-26 PROCEDURE — 99214 OFFICE O/P EST MOD 30 MIN: CPT | Mod: S$GLB,,, | Performed by: INTERNAL MEDICINE

## 2021-05-26 PROCEDURE — 3008F BODY MASS INDEX DOCD: CPT | Mod: CPTII,S$GLB,, | Performed by: INTERNAL MEDICINE

## 2021-06-01 RX ORDER — FLUTICASONE FUROATE, UMECLIDINIUM BROMIDE AND VILANTEROL TRIFENATATE 100; 62.5; 25 UG/1; UG/1; UG/1
POWDER RESPIRATORY (INHALATION)
Qty: 180 EACH | Refills: 3 | Status: SHIPPED | OUTPATIENT
Start: 2021-06-01 | End: 2021-08-18

## 2021-06-04 DIAGNOSIS — I10 ESSENTIAL HYPERTENSION: ICD-10-CM

## 2021-06-07 RX ORDER — LOSARTAN POTASSIUM 100 MG/1
TABLET ORAL
Qty: 90 TABLET | Refills: 2 | Status: SHIPPED | OUTPATIENT
Start: 2021-06-07 | End: 2022-05-02

## 2021-06-10 ENCOUNTER — RESEARCH ENCOUNTER (OUTPATIENT)
Dept: RESEARCH | Facility: HOSPITAL | Age: 61
End: 2021-06-10

## 2021-06-17 NOTE — TELEPHONE ENCOUNTER
Returned call and spoke with patient. Appointment rescheduled to 10/18 at 0930. Patient verbalized understanding.   normal (ped)...

## 2021-06-20 DIAGNOSIS — R05.9 COUGH: ICD-10-CM

## 2021-06-21 RX ORDER — PROMETHAZINE HYDROCHLORIDE AND DEXTROMETHORPHAN HYDROBROMIDE 6.25; 15 MG/5ML; MG/5ML
SYRUP ORAL
Qty: 180 ML | Refills: 0 | Status: SHIPPED | OUTPATIENT
Start: 2021-06-21 | End: 2022-07-19 | Stop reason: SDUPTHER

## 2021-06-29 ENCOUNTER — PATIENT OUTREACH (OUTPATIENT)
Dept: ADMINISTRATIVE | Facility: OTHER | Age: 61
End: 2021-06-29

## 2021-06-30 ENCOUNTER — OFFICE VISIT (OUTPATIENT)
Dept: PAIN MEDICINE | Facility: CLINIC | Age: 61
End: 2021-06-30
Payer: COMMERCIAL

## 2021-06-30 VITALS
SYSTOLIC BLOOD PRESSURE: 119 MMHG | BODY MASS INDEX: 36.21 KG/M2 | OXYGEN SATURATION: 95 % | WEIGHT: 211 LBS | DIASTOLIC BLOOD PRESSURE: 62 MMHG | TEMPERATURE: 98 F | HEART RATE: 88 BPM | RESPIRATION RATE: 18 BRPM

## 2021-06-30 DIAGNOSIS — M48.061 SPINAL STENOSIS OF LUMBAR REGION WITHOUT NEUROGENIC CLAUDICATION: Primary | ICD-10-CM

## 2021-06-30 DIAGNOSIS — M50.30 DDD (DEGENERATIVE DISC DISEASE), CERVICAL: ICD-10-CM

## 2021-06-30 DIAGNOSIS — M51.36 DDD (DEGENERATIVE DISC DISEASE), LUMBAR: ICD-10-CM

## 2021-06-30 DIAGNOSIS — M54.12 CERVICAL RADICULOPATHY: ICD-10-CM

## 2021-06-30 PROCEDURE — 3008F PR BODY MASS INDEX (BMI) DOCUMENTED: ICD-10-PCS | Mod: CPTII,S$GLB,, | Performed by: PHYSICIAN ASSISTANT

## 2021-06-30 PROCEDURE — 99214 OFFICE O/P EST MOD 30 MIN: CPT | Mod: S$GLB,,, | Performed by: PHYSICIAN ASSISTANT

## 2021-06-30 PROCEDURE — 1125F PR PAIN SEVERITY QUANTIFIED, PAIN PRESENT: ICD-10-PCS | Mod: S$GLB,,, | Performed by: PHYSICIAN ASSISTANT

## 2021-06-30 PROCEDURE — 99999 PR PBB SHADOW E&M-EST. PATIENT-LVL III: CPT | Mod: PBBFAC,,, | Performed by: PHYSICIAN ASSISTANT

## 2021-06-30 PROCEDURE — 99214 PR OFFICE/OUTPT VISIT, EST, LEVL IV, 30-39 MIN: ICD-10-PCS | Mod: S$GLB,,, | Performed by: PHYSICIAN ASSISTANT

## 2021-06-30 PROCEDURE — 99999 PR PBB SHADOW E&M-EST. PATIENT-LVL III: ICD-10-PCS | Mod: PBBFAC,,, | Performed by: PHYSICIAN ASSISTANT

## 2021-06-30 PROCEDURE — 1125F AMNT PAIN NOTED PAIN PRSNT: CPT | Mod: S$GLB,,, | Performed by: PHYSICIAN ASSISTANT

## 2021-06-30 PROCEDURE — 3008F BODY MASS INDEX DOCD: CPT | Mod: CPTII,S$GLB,, | Performed by: PHYSICIAN ASSISTANT

## 2021-06-30 RX ORDER — TRAMADOL HYDROCHLORIDE 50 MG/1
50 TABLET ORAL EVERY 12 HOURS PRN
Qty: 60 TABLET | Refills: 2 | Status: SHIPPED | OUTPATIENT
Start: 2021-06-30 | End: 2021-10-04

## 2021-07-07 ENCOUNTER — PATIENT MESSAGE (OUTPATIENT)
Dept: ADMINISTRATIVE | Facility: HOSPITAL | Age: 61
End: 2021-07-07

## 2021-07-13 ENCOUNTER — OFFICE VISIT (OUTPATIENT)
Dept: CARDIOLOGY | Facility: CLINIC | Age: 61
End: 2021-07-13
Payer: COMMERCIAL

## 2021-07-13 VITALS
HEIGHT: 64 IN | HEART RATE: 80 BPM | BODY MASS INDEX: 36.66 KG/M2 | DIASTOLIC BLOOD PRESSURE: 77 MMHG | SYSTOLIC BLOOD PRESSURE: 130 MMHG | WEIGHT: 214.75 LBS

## 2021-07-13 DIAGNOSIS — I10 ESSENTIAL HYPERTENSION: Primary | ICD-10-CM

## 2021-07-13 DIAGNOSIS — R00.2 PALPITATIONS: ICD-10-CM

## 2021-07-13 DIAGNOSIS — I25.10 MILD CAD: ICD-10-CM

## 2021-07-13 PROCEDURE — 99214 OFFICE O/P EST MOD 30 MIN: CPT | Mod: S$GLB,,, | Performed by: INTERNAL MEDICINE

## 2021-07-13 PROCEDURE — 1126F AMNT PAIN NOTED NONE PRSNT: CPT | Mod: S$GLB,,, | Performed by: INTERNAL MEDICINE

## 2021-07-13 PROCEDURE — 3008F PR BODY MASS INDEX (BMI) DOCUMENTED: ICD-10-PCS | Mod: CPTII,S$GLB,, | Performed by: INTERNAL MEDICINE

## 2021-07-13 PROCEDURE — 3078F DIAST BP <80 MM HG: CPT | Mod: CPTII,S$GLB,, | Performed by: INTERNAL MEDICINE

## 2021-07-13 PROCEDURE — 99214 PR OFFICE/OUTPT VISIT, EST, LEVL IV, 30-39 MIN: ICD-10-PCS | Mod: S$GLB,,, | Performed by: INTERNAL MEDICINE

## 2021-07-13 PROCEDURE — 3075F PR MOST RECENT SYSTOLIC BLOOD PRESS GE 130-139MM HG: ICD-10-PCS | Mod: CPTII,S$GLB,, | Performed by: INTERNAL MEDICINE

## 2021-07-13 PROCEDURE — 3008F BODY MASS INDEX DOCD: CPT | Mod: CPTII,S$GLB,, | Performed by: INTERNAL MEDICINE

## 2021-07-13 PROCEDURE — 1126F PR PAIN SEVERITY QUANTIFIED, NO PAIN PRESENT: ICD-10-PCS | Mod: S$GLB,,, | Performed by: INTERNAL MEDICINE

## 2021-07-13 PROCEDURE — 99999 PR PBB SHADOW E&M-EST. PATIENT-LVL III: ICD-10-PCS | Mod: PBBFAC,,, | Performed by: INTERNAL MEDICINE

## 2021-07-13 PROCEDURE — 99999 PR PBB SHADOW E&M-EST. PATIENT-LVL III: CPT | Mod: PBBFAC,,, | Performed by: INTERNAL MEDICINE

## 2021-07-13 PROCEDURE — 3078F PR MOST RECENT DIASTOLIC BLOOD PRESSURE < 80 MM HG: ICD-10-PCS | Mod: CPTII,S$GLB,, | Performed by: INTERNAL MEDICINE

## 2021-07-13 PROCEDURE — 3075F SYST BP GE 130 - 139MM HG: CPT | Mod: CPTII,S$GLB,, | Performed by: INTERNAL MEDICINE

## 2021-07-15 ENCOUNTER — HOSPITAL ENCOUNTER (OUTPATIENT)
Dept: CARDIOLOGY | Facility: HOSPITAL | Age: 61
Discharge: HOME | End: 2021-07-15
Attending: INTERNAL MEDICINE
Payer: COMMERCIAL

## 2021-07-15 VITALS — BODY MASS INDEX: 36.54 KG/M2 | WEIGHT: 214 LBS | HEIGHT: 64 IN

## 2021-07-15 DIAGNOSIS — R00.2 PALPITATIONS: ICD-10-CM

## 2021-07-15 DIAGNOSIS — I25.10 MILD CAD: ICD-10-CM

## 2021-07-15 DIAGNOSIS — I10 ESSENTIAL HYPERTENSION: ICD-10-CM

## 2021-07-15 PROCEDURE — 93306 ECHO (CUPID ONLY): ICD-10-PCS | Mod: 26,,, | Performed by: INTERNAL MEDICINE

## 2021-07-15 PROCEDURE — 93306 TTE W/DOPPLER COMPLETE: CPT | Mod: 26,,, | Performed by: INTERNAL MEDICINE

## 2021-07-15 PROCEDURE — 93306 TTE W/DOPPLER COMPLETE: CPT | Mod: PO

## 2021-07-16 LAB
ASCENDING AORTA: 2.65 CM
AV INDEX (PROSTH): 0.85
AV MEAN GRADIENT: 5 MMHG
AV PEAK GRADIENT: 8 MMHG
AV VALVE AREA: 3.06 CM2
AV VELOCITY RATIO: 0.88
BSA FOR ECHO PROCEDURE: 2.09 M2
CV ECHO LV RWT: 0.42 CM
DOP CALC AO PEAK VEL: 1.41 M/S
DOP CALC AO VTI: 31.99 CM
DOP CALC LVOT AREA: 3.6 CM2
DOP CALC LVOT DIAMETER: 2.14 CM
DOP CALC LVOT PEAK VEL: 1.24 M/S
DOP CALC LVOT STROKE VOLUME: 98.04 CM3
DOP CALCLVOT PEAK VEL VTI: 27.27 CM
E WAVE DECELERATION TIME: 135.12 MSEC
E/A RATIO: 1.19
E/E' RATIO: 5.93 M/S
ECHO LV POSTERIOR WALL: 1 CM (ref 0.6–1.1)
EJECTION FRACTION: 60 %
FRACTIONAL SHORTENING: 44 % (ref 28–44)
INTERVENTRICULAR SEPTUM: 0.98 CM (ref 0.6–1.1)
IVRT: 77.07 MSEC
LA MAJOR: 4.74 CM
LA MINOR: 4.89 CM
LA WIDTH: 3.28 CM
LEFT ATRIUM SIZE: 3.32 CM
LEFT ATRIUM VOLUME INDEX: 22.2 ML/M2
LEFT ATRIUM VOLUME: 44.56 CM3
LEFT INTERNAL DIMENSION IN SYSTOLE: 2.68 CM (ref 2.1–4)
LEFT VENTRICLE DIASTOLIC VOLUME INDEX: 52.52 ML/M2
LEFT VENTRICLE DIASTOLIC VOLUME: 105.57 ML
LEFT VENTRICLE MASS INDEX: 82 G/M2
LEFT VENTRICLE SYSTOLIC VOLUME INDEX: 13.2 ML/M2
LEFT VENTRICLE SYSTOLIC VOLUME: 26.49 ML
LEFT VENTRICULAR INTERNAL DIMENSION IN DIASTOLE: 4.76 CM (ref 3.5–6)
LEFT VENTRICULAR MASS: 165.61 G
LV LATERAL E/E' RATIO: 5 M/S
LV SEPTAL E/E' RATIO: 7.27 M/S
MV A" WAVE DURATION": 7.71 MSEC
MV PEAK A VEL: 0.67 M/S
MV PEAK E VEL: 0.8 M/S
PISA TR MAX VEL: 2.37 M/S
PULM VEIN S/D RATIO: 1.41
PV PEAK D VEL: 0.41 M/S
PV PEAK S VEL: 0.58 M/S
RA MAJOR: 4.65 CM
RA PRESSURE: 3 MMHG
RA WIDTH: 3.21 CM
RIGHT VENTRICULAR END-DIASTOLIC DIMENSION: 2.98 CM
RV TISSUE DOPPLER FREE WALL SYSTOLIC VELOCITY 1 (APICAL 4 CHAMBER VIEW): 16.05 CM/S
SINUS: 2.53 CM
STJ: 2.22 CM
TDI LATERAL: 0.16 M/S
TDI SEPTAL: 0.11 M/S
TDI: 0.14 M/S
TR MAX PG: 22 MMHG
TRICUSPID ANNULAR PLANE SYSTOLIC EXCURSION: 2.91 CM
TV REST PULMONARY ARTERY PRESSURE: 25 MMHG

## 2021-07-28 ENCOUNTER — OFFICE VISIT (OUTPATIENT)
Dept: ORTHOPEDICS | Facility: CLINIC | Age: 61
End: 2021-07-28
Payer: COMMERCIAL

## 2021-07-28 VITALS — WEIGHT: 214 LBS | HEIGHT: 64 IN | BODY MASS INDEX: 36.54 KG/M2

## 2021-07-28 DIAGNOSIS — M17.10 ARTHRITIS OF KNEE: ICD-10-CM

## 2021-07-28 DIAGNOSIS — M17.11 PRIMARY OSTEOARTHRITIS OF RIGHT KNEE: Primary | ICD-10-CM

## 2021-07-28 PROCEDURE — 99213 PR OFFICE/OUTPT VISIT, EST, LEVL III, 20-29 MIN: ICD-10-PCS | Mod: S$GLB,,, | Performed by: ORTHOPAEDIC SURGERY

## 2021-07-28 PROCEDURE — 99999 PR PBB SHADOW E&M-EST. PATIENT-LVL III: CPT | Mod: PBBFAC,,, | Performed by: ORTHOPAEDIC SURGERY

## 2021-07-28 PROCEDURE — 1159F PR MEDICATION LIST DOCUMENTED IN MEDICAL RECORD: ICD-10-PCS | Mod: CPTII,S$GLB,, | Performed by: ORTHOPAEDIC SURGERY

## 2021-07-28 PROCEDURE — 3008F PR BODY MASS INDEX (BMI) DOCUMENTED: ICD-10-PCS | Mod: CPTII,S$GLB,, | Performed by: ORTHOPAEDIC SURGERY

## 2021-07-28 PROCEDURE — 1125F PR PAIN SEVERITY QUANTIFIED, PAIN PRESENT: ICD-10-PCS | Mod: CPTII,S$GLB,, | Performed by: ORTHOPAEDIC SURGERY

## 2021-07-28 PROCEDURE — 1160F PR REVIEW ALL MEDS BY PRESCRIBER/CLIN PHARMACIST DOCUMENTED: ICD-10-PCS | Mod: CPTII,S$GLB,, | Performed by: ORTHOPAEDIC SURGERY

## 2021-07-28 PROCEDURE — 1125F AMNT PAIN NOTED PAIN PRSNT: CPT | Mod: CPTII,S$GLB,, | Performed by: ORTHOPAEDIC SURGERY

## 2021-07-28 PROCEDURE — 99999 PR PBB SHADOW E&M-EST. PATIENT-LVL III: ICD-10-PCS | Mod: PBBFAC,,, | Performed by: ORTHOPAEDIC SURGERY

## 2021-07-28 PROCEDURE — 3008F BODY MASS INDEX DOCD: CPT | Mod: CPTII,S$GLB,, | Performed by: ORTHOPAEDIC SURGERY

## 2021-07-28 PROCEDURE — 99213 OFFICE O/P EST LOW 20 MIN: CPT | Mod: S$GLB,,, | Performed by: ORTHOPAEDIC SURGERY

## 2021-07-28 PROCEDURE — 1160F RVW MEDS BY RX/DR IN RCRD: CPT | Mod: CPTII,S$GLB,, | Performed by: ORTHOPAEDIC SURGERY

## 2021-07-28 PROCEDURE — 1159F MED LIST DOCD IN RCRD: CPT | Mod: CPTII,S$GLB,, | Performed by: ORTHOPAEDIC SURGERY

## 2021-08-07 ENCOUNTER — PATIENT OUTREACH (OUTPATIENT)
Dept: ADMINISTRATIVE | Facility: OTHER | Age: 61
End: 2021-08-07

## 2021-08-11 ENCOUNTER — OFFICE VISIT (OUTPATIENT)
Dept: ORTHOPEDICS | Facility: CLINIC | Age: 61
End: 2021-08-11
Payer: COMMERCIAL

## 2021-08-11 ENCOUNTER — PATIENT MESSAGE (OUTPATIENT)
Dept: FAMILY MEDICINE | Facility: CLINIC | Age: 61
End: 2021-08-11

## 2021-08-11 VITALS — HEIGHT: 64 IN | WEIGHT: 214 LBS | BODY MASS INDEX: 36.54 KG/M2 | RESPIRATION RATE: 16 BRPM

## 2021-08-11 DIAGNOSIS — M17.11 PRIMARY OSTEOARTHRITIS OF RIGHT KNEE: Primary | ICD-10-CM

## 2021-08-11 PROCEDURE — 99999 PR PBB SHADOW E&M-EST. PATIENT-LVL III: ICD-10-PCS | Mod: PBBFAC,,, | Performed by: ORTHOPAEDIC SURGERY

## 2021-08-11 PROCEDURE — 99999 PR PBB SHADOW E&M-EST. PATIENT-LVL III: CPT | Mod: PBBFAC,,, | Performed by: ORTHOPAEDIC SURGERY

## 2021-08-11 PROCEDURE — 1125F AMNT PAIN NOTED PAIN PRSNT: CPT | Mod: CPTII,S$GLB,, | Performed by: ORTHOPAEDIC SURGERY

## 2021-08-11 PROCEDURE — 1159F MED LIST DOCD IN RCRD: CPT | Mod: CPTII,S$GLB,, | Performed by: ORTHOPAEDIC SURGERY

## 2021-08-11 PROCEDURE — 99499 UNLISTED E&M SERVICE: CPT | Mod: S$GLB,,, | Performed by: ORTHOPAEDIC SURGERY

## 2021-08-11 PROCEDURE — 99499 NO LOS: ICD-10-PCS | Mod: S$GLB,,, | Performed by: ORTHOPAEDIC SURGERY

## 2021-08-11 PROCEDURE — 20610 PR DRAIN/INJECT LARGE JOINT/BURSA: ICD-10-PCS | Mod: RT,S$GLB,, | Performed by: ORTHOPAEDIC SURGERY

## 2021-08-11 PROCEDURE — 20610 DRAIN/INJ JOINT/BURSA W/O US: CPT | Mod: RT,S$GLB,, | Performed by: ORTHOPAEDIC SURGERY

## 2021-08-11 PROCEDURE — 1125F PR PAIN SEVERITY QUANTIFIED, PAIN PRESENT: ICD-10-PCS | Mod: CPTII,S$GLB,, | Performed by: ORTHOPAEDIC SURGERY

## 2021-08-11 PROCEDURE — 3008F BODY MASS INDEX DOCD: CPT | Mod: CPTII,S$GLB,, | Performed by: ORTHOPAEDIC SURGERY

## 2021-08-11 PROCEDURE — 3008F PR BODY MASS INDEX (BMI) DOCUMENTED: ICD-10-PCS | Mod: CPTII,S$GLB,, | Performed by: ORTHOPAEDIC SURGERY

## 2021-08-11 PROCEDURE — 1159F PR MEDICATION LIST DOCUMENTED IN MEDICAL RECORD: ICD-10-PCS | Mod: CPTII,S$GLB,, | Performed by: ORTHOPAEDIC SURGERY

## 2021-08-11 RX ORDER — TRIAMCINOLONE ACETONIDE 40 MG/ML
40 INJECTION, SUSPENSION INTRA-ARTICULAR; INTRAMUSCULAR
Status: DISCONTINUED | OUTPATIENT
Start: 2021-08-11 | End: 2021-08-11 | Stop reason: HOSPADM

## 2021-08-11 RX ADMIN — TRIAMCINOLONE ACETONIDE 40 MG: 40 INJECTION, SUSPENSION INTRA-ARTICULAR; INTRAMUSCULAR at 09:08

## 2021-08-14 ENCOUNTER — HOSPITAL ENCOUNTER (OUTPATIENT)
Dept: RADIOLOGY | Facility: HOSPITAL | Age: 61
Discharge: HOME | End: 2021-08-14
Attending: FAMILY MEDICINE
Payer: COMMERCIAL

## 2021-08-14 DIAGNOSIS — Z12.31 OTHER SCREENING MAMMOGRAM: ICD-10-CM

## 2021-08-14 PROCEDURE — 77063 MAMMO DIGITAL SCREENING BILAT WITH TOMO: ICD-10-PCS | Mod: 26,,, | Performed by: RADIOLOGY

## 2021-08-14 PROCEDURE — 77063 BREAST TOMOSYNTHESIS BI: CPT | Mod: 26,,, | Performed by: RADIOLOGY

## 2021-08-14 PROCEDURE — 77067 SCR MAMMO BI INCL CAD: CPT | Mod: 26,,, | Performed by: RADIOLOGY

## 2021-08-14 PROCEDURE — 77067 MAMMO DIGITAL SCREENING BILAT WITH TOMO: ICD-10-PCS | Mod: 26,,, | Performed by: RADIOLOGY

## 2021-08-14 PROCEDURE — 77067 SCR MAMMO BI INCL CAD: CPT | Mod: TC,PO

## 2021-08-17 ENCOUNTER — TELEPHONE (OUTPATIENT)
Dept: RADIOLOGY | Facility: HOSPITAL | Age: 61
End: 2021-08-17

## 2021-08-18 ENCOUNTER — OFFICE VISIT (OUTPATIENT)
Dept: ORTHOPEDICS | Facility: CLINIC | Age: 61
End: 2021-08-18
Payer: COMMERCIAL

## 2021-08-18 VITALS — BODY MASS INDEX: 36.54 KG/M2 | RESPIRATION RATE: 16 BRPM | WEIGHT: 214 LBS | HEIGHT: 64 IN

## 2021-08-18 DIAGNOSIS — M17.11 PRIMARY OSTEOARTHRITIS OF RIGHT KNEE: Primary | ICD-10-CM

## 2021-08-18 PROCEDURE — 1160F PR REVIEW ALL MEDS BY PRESCRIBER/CLIN PHARMACIST DOCUMENTED: ICD-10-PCS | Mod: CPTII,S$GLB,, | Performed by: ORTHOPAEDIC SURGERY

## 2021-08-18 PROCEDURE — 1159F PR MEDICATION LIST DOCUMENTED IN MEDICAL RECORD: ICD-10-PCS | Mod: CPTII,S$GLB,, | Performed by: ORTHOPAEDIC SURGERY

## 2021-08-18 PROCEDURE — 20610 PR DRAIN/INJECT LARGE JOINT/BURSA: ICD-10-PCS | Mod: RT,S$GLB,, | Performed by: ORTHOPAEDIC SURGERY

## 2021-08-18 PROCEDURE — 99999 PR PBB SHADOW E&M-EST. PATIENT-LVL IV: ICD-10-PCS | Mod: PBBFAC,,, | Performed by: ORTHOPAEDIC SURGERY

## 2021-08-18 PROCEDURE — 99999 PR PBB SHADOW E&M-EST. PATIENT-LVL IV: CPT | Mod: PBBFAC,,, | Performed by: ORTHOPAEDIC SURGERY

## 2021-08-18 PROCEDURE — 3008F BODY MASS INDEX DOCD: CPT | Mod: CPTII,S$GLB,, | Performed by: ORTHOPAEDIC SURGERY

## 2021-08-18 PROCEDURE — 99499 UNLISTED E&M SERVICE: CPT | Mod: S$GLB,,, | Performed by: ORTHOPAEDIC SURGERY

## 2021-08-18 PROCEDURE — 3008F PR BODY MASS INDEX (BMI) DOCUMENTED: ICD-10-PCS | Mod: CPTII,S$GLB,, | Performed by: ORTHOPAEDIC SURGERY

## 2021-08-18 PROCEDURE — 1125F AMNT PAIN NOTED PAIN PRSNT: CPT | Mod: CPTII,S$GLB,, | Performed by: ORTHOPAEDIC SURGERY

## 2021-08-18 PROCEDURE — 1125F PR PAIN SEVERITY QUANTIFIED, PAIN PRESENT: ICD-10-PCS | Mod: CPTII,S$GLB,, | Performed by: ORTHOPAEDIC SURGERY

## 2021-08-18 PROCEDURE — 1160F RVW MEDS BY RX/DR IN RCRD: CPT | Mod: CPTII,S$GLB,, | Performed by: ORTHOPAEDIC SURGERY

## 2021-08-18 PROCEDURE — 1159F MED LIST DOCD IN RCRD: CPT | Mod: CPTII,S$GLB,, | Performed by: ORTHOPAEDIC SURGERY

## 2021-08-18 PROCEDURE — 20610 DRAIN/INJ JOINT/BURSA W/O US: CPT | Mod: RT,S$GLB,, | Performed by: ORTHOPAEDIC SURGERY

## 2021-08-18 PROCEDURE — 99499 NO LOS: ICD-10-PCS | Mod: S$GLB,,, | Performed by: ORTHOPAEDIC SURGERY

## 2021-08-19 ENCOUNTER — PATIENT MESSAGE (OUTPATIENT)
Dept: FAMILY MEDICINE | Facility: CLINIC | Age: 61
End: 2021-08-19

## 2021-08-23 ENCOUNTER — HOSPITAL ENCOUNTER (OUTPATIENT)
Dept: RADIOLOGY | Facility: HOSPITAL | Age: 61
Discharge: HOME | End: 2021-08-23
Attending: FAMILY MEDICINE
Payer: COMMERCIAL

## 2021-08-23 DIAGNOSIS — R92.8 ABNORMAL MAMMOGRAM: ICD-10-CM

## 2021-08-23 PROCEDURE — 76642 US BREAST BILATERAL LIMITED: ICD-10-PCS | Mod: 26,50,, | Performed by: RADIOLOGY

## 2021-08-23 PROCEDURE — 76642 ULTRASOUND BREAST LIMITED: CPT | Mod: TC,50,PO

## 2021-08-23 PROCEDURE — 77066 MAMMO DIGITAL DIAGNOSTIC BILAT WITH TOMO: ICD-10-PCS | Mod: 26,,, | Performed by: RADIOLOGY

## 2021-08-23 PROCEDURE — 77062 BREAST TOMOSYNTHESIS BI: CPT | Mod: 26,,, | Performed by: RADIOLOGY

## 2021-08-23 PROCEDURE — 77066 DX MAMMO INCL CAD BI: CPT | Mod: TC,PO

## 2021-08-23 PROCEDURE — 77062 MAMMO DIGITAL DIAGNOSTIC BILAT WITH TOMO: ICD-10-PCS | Mod: 26,,, | Performed by: RADIOLOGY

## 2021-08-23 PROCEDURE — 77066 DX MAMMO INCL CAD BI: CPT | Mod: 26,,, | Performed by: RADIOLOGY

## 2021-08-23 PROCEDURE — 76642 ULTRASOUND BREAST LIMITED: CPT | Mod: 26,50,, | Performed by: RADIOLOGY

## 2021-08-25 ENCOUNTER — OFFICE VISIT (OUTPATIENT)
Dept: ORTHOPEDICS | Facility: CLINIC | Age: 61
End: 2021-08-25
Payer: COMMERCIAL

## 2021-08-25 ENCOUNTER — OFFICE VISIT (OUTPATIENT)
Dept: FAMILY MEDICINE | Facility: CLINIC | Age: 61
End: 2021-08-25
Payer: COMMERCIAL

## 2021-08-25 VITALS
BODY MASS INDEX: 36.33 KG/M2 | HEART RATE: 86 BPM | SYSTOLIC BLOOD PRESSURE: 120 MMHG | OXYGEN SATURATION: 98 % | DIASTOLIC BLOOD PRESSURE: 76 MMHG | WEIGHT: 211.63 LBS

## 2021-08-25 VITALS — BODY MASS INDEX: 36.02 KG/M2 | RESPIRATION RATE: 16 BRPM | HEIGHT: 64 IN | WEIGHT: 211 LBS

## 2021-08-25 DIAGNOSIS — M17.11 PRIMARY OSTEOARTHRITIS OF RIGHT KNEE: Primary | ICD-10-CM

## 2021-08-25 DIAGNOSIS — R05.3 CHRONIC COUGH: ICD-10-CM

## 2021-08-25 DIAGNOSIS — F51.01 PRIMARY INSOMNIA: ICD-10-CM

## 2021-08-25 DIAGNOSIS — K21.9 GASTROESOPHAGEAL REFLUX DISEASE WITHOUT ESOPHAGITIS: ICD-10-CM

## 2021-08-25 DIAGNOSIS — I10 ESSENTIAL HYPERTENSION: Primary | ICD-10-CM

## 2021-08-25 PROCEDURE — 1159F PR MEDICATION LIST DOCUMENTED IN MEDICAL RECORD: ICD-10-PCS | Mod: CPTII,S$GLB,, | Performed by: ORTHOPAEDIC SURGERY

## 2021-08-25 PROCEDURE — 1159F MED LIST DOCD IN RCRD: CPT | Mod: CPTII,S$GLB,, | Performed by: ORTHOPAEDIC SURGERY

## 2021-08-25 PROCEDURE — 1160F PR REVIEW ALL MEDS BY PRESCRIBER/CLIN PHARMACIST DOCUMENTED: ICD-10-PCS | Mod: CPTII,S$GLB,, | Performed by: ORTHOPAEDIC SURGERY

## 2021-08-25 PROCEDURE — 99999 PR PBB SHADOW E&M-EST. PATIENT-LVL IV: CPT | Mod: PBBFAC,,, | Performed by: ORTHOPAEDIC SURGERY

## 2021-08-25 PROCEDURE — 1160F RVW MEDS BY RX/DR IN RCRD: CPT | Mod: CPTII,S$GLB,, | Performed by: FAMILY MEDICINE

## 2021-08-25 PROCEDURE — 99499 NO LOS: ICD-10-PCS | Mod: S$GLB,,, | Performed by: ORTHOPAEDIC SURGERY

## 2021-08-25 PROCEDURE — 99214 OFFICE O/P EST MOD 30 MIN: CPT | Mod: S$GLB,,, | Performed by: FAMILY MEDICINE

## 2021-08-25 PROCEDURE — 20550 NJX 1 TENDON SHEATH/LIGAMENT: CPT | Mod: RT,S$GLB,, | Performed by: ORTHOPAEDIC SURGERY

## 2021-08-25 PROCEDURE — 3078F DIAST BP <80 MM HG: CPT | Mod: CPTII,S$GLB,, | Performed by: FAMILY MEDICINE

## 2021-08-25 PROCEDURE — 1126F AMNT PAIN NOTED NONE PRSNT: CPT | Mod: CPTII,S$GLB,, | Performed by: FAMILY MEDICINE

## 2021-08-25 PROCEDURE — 3078F PR MOST RECENT DIASTOLIC BLOOD PRESSURE < 80 MM HG: ICD-10-PCS | Mod: CPTII,S$GLB,, | Performed by: FAMILY MEDICINE

## 2021-08-25 PROCEDURE — 99999 PR PBB SHADOW E&M-EST. PATIENT-LVL IV: ICD-10-PCS | Mod: PBBFAC,,, | Performed by: ORTHOPAEDIC SURGERY

## 2021-08-25 PROCEDURE — 99999 PR PBB SHADOW E&M-EST. PATIENT-LVL III: ICD-10-PCS | Mod: PBBFAC,,, | Performed by: FAMILY MEDICINE

## 2021-08-25 PROCEDURE — 99214 PR OFFICE/OUTPT VISIT, EST, LEVL IV, 30-39 MIN: ICD-10-PCS | Mod: S$GLB,,, | Performed by: FAMILY MEDICINE

## 2021-08-25 PROCEDURE — 20550 PR INJECT TENDON SHEATH/LIGAMENT: ICD-10-PCS | Mod: RT,S$GLB,, | Performed by: ORTHOPAEDIC SURGERY

## 2021-08-25 PROCEDURE — 99999 PR PBB SHADOW E&M-EST. PATIENT-LVL III: CPT | Mod: PBBFAC,,, | Performed by: FAMILY MEDICINE

## 2021-08-25 PROCEDURE — 3008F BODY MASS INDEX DOCD: CPT | Mod: CPTII,S$GLB,, | Performed by: ORTHOPAEDIC SURGERY

## 2021-08-25 PROCEDURE — 1160F PR REVIEW ALL MEDS BY PRESCRIBER/CLIN PHARMACIST DOCUMENTED: ICD-10-PCS | Mod: CPTII,S$GLB,, | Performed by: FAMILY MEDICINE

## 2021-08-25 PROCEDURE — 3008F PR BODY MASS INDEX (BMI) DOCUMENTED: ICD-10-PCS | Mod: CPTII,S$GLB,, | Performed by: ORTHOPAEDIC SURGERY

## 2021-08-25 PROCEDURE — 1159F MED LIST DOCD IN RCRD: CPT | Mod: CPTII,S$GLB,, | Performed by: FAMILY MEDICINE

## 2021-08-25 PROCEDURE — 3008F BODY MASS INDEX DOCD: CPT | Mod: CPTII,S$GLB,, | Performed by: FAMILY MEDICINE

## 2021-08-25 PROCEDURE — 1126F PR PAIN SEVERITY QUANTIFIED, NO PAIN PRESENT: ICD-10-PCS | Mod: CPTII,S$GLB,, | Performed by: FAMILY MEDICINE

## 2021-08-25 PROCEDURE — 3074F PR MOST RECENT SYSTOLIC BLOOD PRESSURE < 130 MM HG: ICD-10-PCS | Mod: CPTII,S$GLB,, | Performed by: FAMILY MEDICINE

## 2021-08-25 PROCEDURE — 3074F SYST BP LT 130 MM HG: CPT | Mod: CPTII,S$GLB,, | Performed by: FAMILY MEDICINE

## 2021-08-25 PROCEDURE — 3008F PR BODY MASS INDEX (BMI) DOCUMENTED: ICD-10-PCS | Mod: CPTII,S$GLB,, | Performed by: FAMILY MEDICINE

## 2021-08-25 PROCEDURE — 99499 UNLISTED E&M SERVICE: CPT | Mod: S$GLB,,, | Performed by: ORTHOPAEDIC SURGERY

## 2021-08-25 PROCEDURE — 1160F RVW MEDS BY RX/DR IN RCRD: CPT | Mod: CPTII,S$GLB,, | Performed by: ORTHOPAEDIC SURGERY

## 2021-08-25 PROCEDURE — 1159F PR MEDICATION LIST DOCUMENTED IN MEDICAL RECORD: ICD-10-PCS | Mod: CPTII,S$GLB,, | Performed by: FAMILY MEDICINE

## 2021-08-25 RX ORDER — FLUTICASONE PROPIONATE AND SALMETEROL XINAFOATE 45; 21 UG/1; UG/1
2 AEROSOL, METERED RESPIRATORY (INHALATION) EVERY 12 HOURS
Qty: 12 G | Refills: 3 | Status: SHIPPED | OUTPATIENT
Start: 2021-08-25 | End: 2022-09-26

## 2021-08-25 RX ORDER — ESZOPICLONE 3 MG/1
3 TABLET, FILM COATED ORAL NIGHTLY
Qty: 90 TABLET | Refills: 1 | Status: SHIPPED | OUTPATIENT
Start: 2021-08-25 | End: 2022-02-16

## 2021-08-26 ENCOUNTER — PATIENT MESSAGE (OUTPATIENT)
Dept: ADMINISTRATIVE | Facility: OTHER | Age: 61
End: 2021-08-26

## 2021-08-26 ENCOUNTER — PATIENT MESSAGE (OUTPATIENT)
Dept: FAMILY MEDICINE | Facility: CLINIC | Age: 61
End: 2021-08-26

## 2021-09-15 ENCOUNTER — PATIENT OUTREACH (OUTPATIENT)
Dept: ADMINISTRATIVE | Facility: OTHER | Age: 61
End: 2021-09-15

## 2021-09-15 DIAGNOSIS — G43.719 INTRACTABLE CHRONIC MIGRAINE WITHOUT AURA AND WITHOUT STATUS MIGRAINOSUS: ICD-10-CM

## 2021-09-15 RX ORDER — GALCANEZUMAB 120 MG/ML
120 INJECTION, SOLUTION SUBCUTANEOUS
Qty: 1 ML | Refills: 0 | Status: SHIPPED | OUTPATIENT
Start: 2021-09-15 | End: 2021-09-16 | Stop reason: SDUPTHER

## 2021-09-16 ENCOUNTER — TELEPHONE (OUTPATIENT)
Dept: NEUROLOGY | Facility: CLINIC | Age: 61
End: 2021-09-16

## 2021-09-16 ENCOUNTER — OFFICE VISIT (OUTPATIENT)
Dept: NEUROLOGY | Facility: CLINIC | Age: 61
End: 2021-09-16
Payer: COMMERCIAL

## 2021-09-16 DIAGNOSIS — G43.719 INTRACTABLE CHRONIC MIGRAINE WITHOUT AURA AND WITHOUT STATUS MIGRAINOSUS: ICD-10-CM

## 2021-09-16 PROCEDURE — 1159F PR MEDICATION LIST DOCUMENTED IN MEDICAL RECORD: ICD-10-PCS | Mod: CPTII,95,, | Performed by: NURSE PRACTITIONER

## 2021-09-16 PROCEDURE — 99213 PR OFFICE/OUTPT VISIT, EST, LEVL III, 20-29 MIN: ICD-10-PCS | Mod: 95,,, | Performed by: NURSE PRACTITIONER

## 2021-09-16 PROCEDURE — 1160F RVW MEDS BY RX/DR IN RCRD: CPT | Mod: CPTII,95,, | Performed by: NURSE PRACTITIONER

## 2021-09-16 PROCEDURE — 3066F NEPHROPATHY DOC TX: CPT | Mod: CPTII,95,, | Performed by: NURSE PRACTITIONER

## 2021-09-16 PROCEDURE — 4010F ACE/ARB THERAPY RXD/TAKEN: CPT | Mod: CPTII,95,, | Performed by: NURSE PRACTITIONER

## 2021-09-16 PROCEDURE — 99213 OFFICE O/P EST LOW 20 MIN: CPT | Mod: 95,,, | Performed by: NURSE PRACTITIONER

## 2021-09-16 PROCEDURE — 1159F MED LIST DOCD IN RCRD: CPT | Mod: CPTII,95,, | Performed by: NURSE PRACTITIONER

## 2021-09-16 PROCEDURE — 4010F PR ACE/ARB THEARPY RXD/TAKEN: ICD-10-PCS | Mod: CPTII,95,, | Performed by: NURSE PRACTITIONER

## 2021-09-16 PROCEDURE — 3066F PR DOCUMENTATION OF TREATMENT FOR NEPHROPATHY: ICD-10-PCS | Mod: CPTII,95,, | Performed by: NURSE PRACTITIONER

## 2021-09-16 PROCEDURE — 1160F PR REVIEW ALL MEDS BY PRESCRIBER/CLIN PHARMACIST DOCUMENTED: ICD-10-PCS | Mod: CPTII,95,, | Performed by: NURSE PRACTITIONER

## 2021-09-16 RX ORDER — GALCANEZUMAB 120 MG/ML
120 INJECTION, SOLUTION SUBCUTANEOUS
Qty: 1 ML | Refills: 11 | Status: SHIPPED | OUTPATIENT
Start: 2021-09-16 | End: 2022-08-17 | Stop reason: SDUPTHER

## 2021-09-30 ENCOUNTER — TELEPHONE (OUTPATIENT)
Dept: PHARMACY | Facility: CLINIC | Age: 61
End: 2021-09-30

## 2021-10-04 RX ORDER — TRAMADOL HYDROCHLORIDE 50 MG/1
TABLET ORAL
Qty: 60 TABLET | Refills: 2 | Status: SHIPPED | OUTPATIENT
Start: 2021-10-04 | End: 2022-01-05

## 2021-10-06 ENCOUNTER — TELEPHONE (OUTPATIENT)
Dept: RESEARCH | Facility: HOSPITAL | Age: 61
End: 2021-10-06

## 2021-10-06 ENCOUNTER — TELEPHONE (OUTPATIENT)
Dept: RESEARCH | Facility: CLINIC | Age: 61
End: 2021-10-06

## 2021-10-07 ENCOUNTER — RESEARCH ENCOUNTER (OUTPATIENT)
Dept: RESEARCH | Facility: HOSPITAL | Age: 61
End: 2021-10-07

## 2021-10-09 ENCOUNTER — IMMUNIZATION (OUTPATIENT)
Dept: FAMILY MEDICINE | Facility: CLINIC | Age: 61
End: 2021-10-09
Payer: COMMERCIAL

## 2021-10-09 PROCEDURE — 90686 IIV4 VACC NO PRSV 0.5 ML IM: CPT | Mod: S$GLB,,, | Performed by: INTERNAL MEDICINE

## 2021-10-09 PROCEDURE — 90471 FLU VACCINE (QUAD) GREATER THAN OR EQUAL TO 3YO PRESERVATIVE FREE IM: ICD-10-PCS | Mod: S$GLB,,, | Performed by: INTERNAL MEDICINE

## 2021-10-09 PROCEDURE — 90471 IMMUNIZATION ADMIN: CPT | Mod: S$GLB,,, | Performed by: INTERNAL MEDICINE

## 2021-10-09 PROCEDURE — 90686 FLU VACCINE (QUAD) GREATER THAN OR EQUAL TO 3YO PRESERVATIVE FREE IM: ICD-10-PCS | Mod: S$GLB,,, | Performed by: INTERNAL MEDICINE

## 2021-10-11 ENCOUNTER — OFFICE VISIT (OUTPATIENT)
Dept: PAIN MEDICINE | Facility: CLINIC | Age: 61
End: 2021-10-11
Payer: COMMERCIAL

## 2021-10-11 VITALS
WEIGHT: 211 LBS | TEMPERATURE: 97 F | DIASTOLIC BLOOD PRESSURE: 64 MMHG | OXYGEN SATURATION: 98 % | SYSTOLIC BLOOD PRESSURE: 143 MMHG | BODY MASS INDEX: 36.21 KG/M2 | HEART RATE: 93 BPM | RESPIRATION RATE: 20 BRPM

## 2021-10-11 DIAGNOSIS — G89.4 CHRONIC PAIN DISORDER: ICD-10-CM

## 2021-10-11 DIAGNOSIS — M54.16 LUMBAR RADICULITIS: ICD-10-CM

## 2021-10-11 DIAGNOSIS — M51.36 DDD (DEGENERATIVE DISC DISEASE), LUMBAR: ICD-10-CM

## 2021-10-11 DIAGNOSIS — M48.061 SPINAL STENOSIS OF LUMBAR REGION WITHOUT NEUROGENIC CLAUDICATION: Primary | ICD-10-CM

## 2021-10-11 PROCEDURE — 99214 PR OFFICE/OUTPT VISIT, EST, LEVL IV, 30-39 MIN: ICD-10-PCS | Mod: S$GLB,,, | Performed by: PHYSICIAN ASSISTANT

## 2021-10-11 PROCEDURE — 4010F PR ACE/ARB THEARPY RXD/TAKEN: ICD-10-PCS | Mod: CPTII,S$GLB,, | Performed by: PHYSICIAN ASSISTANT

## 2021-10-11 PROCEDURE — 3066F PR DOCUMENTATION OF TREATMENT FOR NEPHROPATHY: ICD-10-PCS | Mod: CPTII,S$GLB,, | Performed by: PHYSICIAN ASSISTANT

## 2021-10-11 PROCEDURE — 3078F DIAST BP <80 MM HG: CPT | Mod: CPTII,S$GLB,, | Performed by: PHYSICIAN ASSISTANT

## 2021-10-11 PROCEDURE — 3008F BODY MASS INDEX DOCD: CPT | Mod: CPTII,S$GLB,, | Performed by: PHYSICIAN ASSISTANT

## 2021-10-11 PROCEDURE — 1160F PR REVIEW ALL MEDS BY PRESCRIBER/CLIN PHARMACIST DOCUMENTED: ICD-10-PCS | Mod: CPTII,S$GLB,, | Performed by: PHYSICIAN ASSISTANT

## 2021-10-11 PROCEDURE — 3078F PR MOST RECENT DIASTOLIC BLOOD PRESSURE < 80 MM HG: ICD-10-PCS | Mod: CPTII,S$GLB,, | Performed by: PHYSICIAN ASSISTANT

## 2021-10-11 PROCEDURE — 3066F NEPHROPATHY DOC TX: CPT | Mod: CPTII,S$GLB,, | Performed by: PHYSICIAN ASSISTANT

## 2021-10-11 PROCEDURE — 99214 OFFICE O/P EST MOD 30 MIN: CPT | Mod: S$GLB,,, | Performed by: PHYSICIAN ASSISTANT

## 2021-10-11 PROCEDURE — 1160F RVW MEDS BY RX/DR IN RCRD: CPT | Mod: CPTII,S$GLB,, | Performed by: PHYSICIAN ASSISTANT

## 2021-10-11 PROCEDURE — 4010F ACE/ARB THERAPY RXD/TAKEN: CPT | Mod: CPTII,S$GLB,, | Performed by: PHYSICIAN ASSISTANT

## 2021-10-11 PROCEDURE — 3077F SYST BP >= 140 MM HG: CPT | Mod: CPTII,S$GLB,, | Performed by: PHYSICIAN ASSISTANT

## 2021-10-11 PROCEDURE — 99999 PR PBB SHADOW E&M-EST. PATIENT-LVL V: CPT | Mod: PBBFAC,,, | Performed by: PHYSICIAN ASSISTANT

## 2021-10-11 PROCEDURE — 1159F MED LIST DOCD IN RCRD: CPT | Mod: CPTII,S$GLB,, | Performed by: PHYSICIAN ASSISTANT

## 2021-10-11 PROCEDURE — 99999 PR PBB SHADOW E&M-EST. PATIENT-LVL V: ICD-10-PCS | Mod: PBBFAC,,, | Performed by: PHYSICIAN ASSISTANT

## 2021-10-11 PROCEDURE — 3008F PR BODY MASS INDEX (BMI) DOCUMENTED: ICD-10-PCS | Mod: CPTII,S$GLB,, | Performed by: PHYSICIAN ASSISTANT

## 2021-10-11 PROCEDURE — 1159F PR MEDICATION LIST DOCUMENTED IN MEDICAL RECORD: ICD-10-PCS | Mod: CPTII,S$GLB,, | Performed by: PHYSICIAN ASSISTANT

## 2021-10-11 PROCEDURE — 3077F PR MOST RECENT SYSTOLIC BLOOD PRESSURE >= 140 MM HG: ICD-10-PCS | Mod: CPTII,S$GLB,, | Performed by: PHYSICIAN ASSISTANT

## 2021-10-14 ENCOUNTER — TELEPHONE (OUTPATIENT)
Dept: RESEARCH | Facility: HOSPITAL | Age: 61
End: 2021-10-14

## 2021-10-17 DIAGNOSIS — I10 ESSENTIAL HYPERTENSION: ICD-10-CM

## 2021-10-18 ENCOUNTER — TELEPHONE (OUTPATIENT)
Dept: RESEARCH | Facility: CLINIC | Age: 61
End: 2021-10-18

## 2021-10-19 ENCOUNTER — RESEARCH ENCOUNTER (OUTPATIENT)
Dept: RESEARCH | Facility: HOSPITAL | Age: 61
End: 2021-10-19

## 2021-10-19 RX ORDER — AMLODIPINE BESYLATE 5 MG/1
TABLET ORAL
Qty: 90 TABLET | Refills: 3 | Status: SHIPPED | OUTPATIENT
Start: 2021-10-19 | End: 2022-10-05

## 2021-10-20 ENCOUNTER — RESEARCH ENCOUNTER (OUTPATIENT)
Dept: RESEARCH | Facility: CLINIC | Age: 61
End: 2021-10-20
Payer: COMMERCIAL

## 2021-10-20 DIAGNOSIS — Z23 NEED FOR VACCINATION: Primary | ICD-10-CM

## 2021-10-20 PROCEDURE — 91300 COVID-19, MRNA, LNP-S, PF, 30 MCG/0.3 ML DOSE VACCINE: CPT | Mod: PBBFAC | Performed by: INTERNAL MEDICINE

## 2021-10-25 DIAGNOSIS — K21.9 GASTROESOPHAGEAL REFLUX DISEASE: ICD-10-CM

## 2021-10-26 ENCOUNTER — PATIENT MESSAGE (OUTPATIENT)
Dept: FAMILY MEDICINE | Facility: CLINIC | Age: 61
End: 2021-10-26
Payer: COMMERCIAL

## 2021-10-26 DIAGNOSIS — R05.9 COUGH: Primary | ICD-10-CM

## 2021-10-26 RX ORDER — BENZONATATE 200 MG/1
200 CAPSULE ORAL 3 TIMES DAILY PRN
Qty: 30 CAPSULE | Refills: 1 | Status: SHIPPED | OUTPATIENT
Start: 2021-10-26 | End: 2021-11-05

## 2021-10-26 RX ORDER — PANTOPRAZOLE SODIUM 40 MG/1
TABLET, DELAYED RELEASE ORAL
Qty: 90 TABLET | Refills: 3 | Status: SHIPPED | OUTPATIENT
Start: 2021-10-26 | End: 2022-12-03

## 2021-11-11 ENCOUNTER — PATIENT OUTREACH (OUTPATIENT)
Dept: ADMINISTRATIVE | Facility: OTHER | Age: 61
End: 2021-11-11
Payer: COMMERCIAL

## 2021-11-15 ENCOUNTER — HOSPITAL ENCOUNTER (OUTPATIENT)
Dept: RADIOLOGY | Facility: HOSPITAL | Age: 61
Discharge: HOME | End: 2021-11-15
Attending: ORTHOPAEDIC SURGERY
Payer: COMMERCIAL

## 2021-11-15 ENCOUNTER — OFFICE VISIT (OUTPATIENT)
Dept: ORTHOPEDICS | Facility: CLINIC | Age: 61
End: 2021-11-15
Payer: COMMERCIAL

## 2021-11-15 VITALS
WEIGHT: 210 LBS | HEIGHT: 64 IN | HEART RATE: 80 BPM | BODY MASS INDEX: 35.85 KG/M2 | DIASTOLIC BLOOD PRESSURE: 76 MMHG | SYSTOLIC BLOOD PRESSURE: 118 MMHG

## 2021-11-15 DIAGNOSIS — M67.40 MUCOID CYST OF JOINT: Primary | ICD-10-CM

## 2021-11-15 DIAGNOSIS — M67.40 MUCOID CYST OF JOINT: ICD-10-CM

## 2021-11-15 PROCEDURE — 99999 PR PBB SHADOW E&M-EST. PATIENT-LVL III: ICD-10-PCS | Mod: PBBFAC,,, | Performed by: ORTHOPAEDIC SURGERY

## 2021-11-15 PROCEDURE — 3066F NEPHROPATHY DOC TX: CPT | Mod: CPTII,S$GLB,, | Performed by: ORTHOPAEDIC SURGERY

## 2021-11-15 PROCEDURE — 3078F DIAST BP <80 MM HG: CPT | Mod: CPTII,S$GLB,, | Performed by: ORTHOPAEDIC SURGERY

## 2021-11-15 PROCEDURE — 1160F PR REVIEW ALL MEDS BY PRESCRIBER/CLIN PHARMACIST DOCUMENTED: ICD-10-PCS | Mod: CPTII,S$GLB,, | Performed by: ORTHOPAEDIC SURGERY

## 2021-11-15 PROCEDURE — 99214 OFFICE O/P EST MOD 30 MIN: CPT | Mod: S$GLB,,, | Performed by: ORTHOPAEDIC SURGERY

## 2021-11-15 PROCEDURE — 73140 X-RAY EXAM OF FINGER(S): CPT | Mod: TC,PO

## 2021-11-15 PROCEDURE — 3078F PR MOST RECENT DIASTOLIC BLOOD PRESSURE < 80 MM HG: ICD-10-PCS | Mod: CPTII,S$GLB,, | Performed by: ORTHOPAEDIC SURGERY

## 2021-11-15 PROCEDURE — 1160F RVW MEDS BY RX/DR IN RCRD: CPT | Mod: CPTII,S$GLB,, | Performed by: ORTHOPAEDIC SURGERY

## 2021-11-15 PROCEDURE — 4010F ACE/ARB THERAPY RXD/TAKEN: CPT | Mod: CPTII,S$GLB,, | Performed by: ORTHOPAEDIC SURGERY

## 2021-11-15 PROCEDURE — 99214 PR OFFICE/OUTPT VISIT, EST, LEVL IV, 30-39 MIN: ICD-10-PCS | Mod: S$GLB,,, | Performed by: ORTHOPAEDIC SURGERY

## 2021-11-15 PROCEDURE — 3074F SYST BP LT 130 MM HG: CPT | Mod: CPTII,S$GLB,, | Performed by: ORTHOPAEDIC SURGERY

## 2021-11-15 PROCEDURE — 73140 XR FINGER 2 OR MORE VIEWS: ICD-10-PCS | Mod: 26,LT,, | Performed by: RADIOLOGY

## 2021-11-15 PROCEDURE — 3008F PR BODY MASS INDEX (BMI) DOCUMENTED: ICD-10-PCS | Mod: CPTII,S$GLB,, | Performed by: ORTHOPAEDIC SURGERY

## 2021-11-15 PROCEDURE — 4010F PR ACE/ARB THEARPY RXD/TAKEN: ICD-10-PCS | Mod: CPTII,S$GLB,, | Performed by: ORTHOPAEDIC SURGERY

## 2021-11-15 PROCEDURE — 1159F MED LIST DOCD IN RCRD: CPT | Mod: CPTII,S$GLB,, | Performed by: ORTHOPAEDIC SURGERY

## 2021-11-15 PROCEDURE — 99999 PR PBB SHADOW E&M-EST. PATIENT-LVL III: CPT | Mod: PBBFAC,,, | Performed by: ORTHOPAEDIC SURGERY

## 2021-11-15 PROCEDURE — 3074F PR MOST RECENT SYSTOLIC BLOOD PRESSURE < 130 MM HG: ICD-10-PCS | Mod: CPTII,S$GLB,, | Performed by: ORTHOPAEDIC SURGERY

## 2021-11-15 PROCEDURE — 3066F PR DOCUMENTATION OF TREATMENT FOR NEPHROPATHY: ICD-10-PCS | Mod: CPTII,S$GLB,, | Performed by: ORTHOPAEDIC SURGERY

## 2021-11-15 PROCEDURE — 73140 X-RAY EXAM OF FINGER(S): CPT | Mod: 26,LT,, | Performed by: RADIOLOGY

## 2021-11-15 PROCEDURE — 3008F BODY MASS INDEX DOCD: CPT | Mod: CPTII,S$GLB,, | Performed by: ORTHOPAEDIC SURGERY

## 2021-11-15 PROCEDURE — 1159F PR MEDICATION LIST DOCUMENTED IN MEDICAL RECORD: ICD-10-PCS | Mod: CPTII,S$GLB,, | Performed by: ORTHOPAEDIC SURGERY

## 2021-11-15 RX ORDER — BENZONATATE 200 MG/1
CAPSULE ORAL
COMMUNITY
Start: 2021-11-06 | End: 2021-12-07 | Stop reason: SDUPTHER

## 2021-11-15 RX ORDER — FLUCONAZOLE 100 MG/1
TABLET ORAL
COMMUNITY
Start: 2021-11-13 | End: 2022-11-28

## 2021-11-17 ENCOUNTER — RESEARCH ENCOUNTER (OUTPATIENT)
Dept: RESEARCH | Facility: HOSPITAL | Age: 61
End: 2021-11-17
Payer: COMMERCIAL

## 2021-11-19 ENCOUNTER — PATIENT MESSAGE (OUTPATIENT)
Dept: ORTHOPEDICS | Facility: CLINIC | Age: 61
End: 2021-11-19
Payer: COMMERCIAL

## 2021-12-02 ENCOUNTER — PATIENT MESSAGE (OUTPATIENT)
Dept: ORTHOPEDICS | Facility: CLINIC | Age: 61
End: 2021-12-02
Payer: COMMERCIAL

## 2021-12-07 RX ORDER — BENZONATATE 200 MG/1
CAPSULE ORAL
Qty: 30 CAPSULE | Refills: 1 | Status: SHIPPED | OUTPATIENT
Start: 2021-12-07 | End: 2022-03-04

## 2021-12-16 DIAGNOSIS — K21.9 GASTROESOPHAGEAL REFLUX DISEASE, UNSPECIFIED WHETHER ESOPHAGITIS PRESENT: ICD-10-CM

## 2021-12-19 RX ORDER — CIMETIDINE 800 MG
TABLET ORAL
Qty: 90 TABLET | Refills: 3 | Status: SHIPPED | OUTPATIENT
Start: 2021-12-19 | End: 2022-12-18 | Stop reason: SDUPTHER

## 2022-01-05 DIAGNOSIS — Z91.09 ENVIRONMENTAL ALLERGIES: ICD-10-CM

## 2022-01-05 RX ORDER — TRAMADOL HYDROCHLORIDE 50 MG/1
TABLET ORAL
Qty: 60 TABLET | Refills: 2 | Status: SHIPPED | OUTPATIENT
Start: 2022-01-05 | End: 2022-04-06

## 2022-01-05 NOTE — TELEPHONE ENCOUNTER
No new care gaps identified.  Powered by [employer]. Reference number: [id number].   1/05/2022 10:16:21 AM CST

## 2022-01-06 RX ORDER — AZELASTINE 1 MG/ML
SPRAY, METERED NASAL
Qty: 90 ML | Refills: 2 | Status: SHIPPED | OUTPATIENT
Start: 2022-01-06 | End: 2022-06-23

## 2022-01-06 NOTE — TELEPHONE ENCOUNTER
Refill Routing Note   Medication(s) are not appropriate for processing by [facility]:    - Medication not previously prescribed by PCP           Medication reconciliation completed: No      Automatic Epic Protocol Generated Data:    Requested Prescriptions   Pending Prescriptions Disp Refills    azelastine (ASTELIN) 137 mcg (0.1 %) nasal spray [Pharmacy Med Name: AZELASTINE 0.1%(137MCG) NASAL-200SP] 90 mL 2     Sig: USE 1 SPRAY BY NASAL ROUTE TWICE DAILY       Ear, Nose, and Throat: Nasal Preparations - Antiallergy Passed - 1/5/2022 10:15 AM        Passed - Patient is at least 18 years old        Passed - Valid encounter within last 15 months     Recent Visits  Date Type Provider Dept   08/25/21 Office Visit CARRIE Espinosa MD [facility]   05/03/21 Office Visit CARRIE Espinosa MD [facility]   03/09/21 Office Visit CARRIE Espinosa MD [facility]   02/12/21 Office Visit CARRIE Espinosa MD [facility]   01/26/21 Office Visit CARRIE Espinosa MD [facility]   11/30/20 Office Visit CARRIE Espinosa MD [facility]   08/19/20 Office Visit CARRIE Espinosa MD [facility]   05/11/20 Office Visit CARRIE Espinosa MD [facility]   03/04/20 Office Visit CARRIE Espinosa MD [facility]   Showing recent visits within past 720 days and meeting all other requirements  Future Appointments  No visits were found meeting these conditions.  Showing future appointments within next 150 days and meeting all other requirements      Future Appointments              In 5 days JERED Cruz - Pain Management, [place]    In 5 days MD Alonzo Smith - Family Medicine, [place]    In 4 months LAB, ALONZO Alfaro - Lab, [place]    In 4 months MD Alonzo Friedman - Nephrology, [facility]    In 6 months MD Alonzo Suazo - Cardiology, [place]    In 9 months COVID VACCINE P PEDIATRICS,  [facility] INFECTIOUS DISEASE RESEARCH [facility] - Infectious [facility]                      Appointments  past 12m or future 3m with PCP    Date Provider   Last Visit   8/25/2021 CARRIE Espinosa MD   Next Visit   1/11/2022 CARRIE Espinosa MD   ED visits in past 90 days: 0     Note composed:8:52 AM 01/06/2022

## 2022-02-07 ENCOUNTER — PATIENT OUTREACH (OUTPATIENT)
Dept: ADMINISTRATIVE | Facility: OTHER | Age: 62
End: 2022-02-07
Payer: COMMERCIAL

## 2022-02-07 NOTE — PROGRESS NOTES
Health Maintenance Due   Topic Date Due    Pneumococcal Vaccines (Age 0-64) (1 of 2 - PPSV23) Never done    HIV Screening  Never done    High Dose Statin  Never done    Shingles Vaccine (1 of 2) Never done     Updates were requested from care everywhere.  Chart was reviewed for overdue Proactive [facility] Encounters (SARIKA) topics (CRS, Breast Cancer Screening, Eye exam)  Health Maintenance has been updated.  LINKS immunization registry triggered.  Immunizations were reconciled.

## 2022-02-08 ENCOUNTER — OFFICE VISIT (OUTPATIENT)
Dept: FAMILY MEDICINE | Facility: CLINIC | Age: 62
End: 2022-02-08
Payer: COMMERCIAL

## 2022-02-08 ENCOUNTER — OFFICE VISIT (OUTPATIENT)
Dept: PAIN MEDICINE | Facility: CLINIC | Age: 62
End: 2022-02-08
Payer: COMMERCIAL

## 2022-02-08 VITALS
RESPIRATION RATE: 18 BRPM | TEMPERATURE: 98 F | HEART RATE: 84 BPM | OXYGEN SATURATION: 99 % | WEIGHT: 205.38 LBS | BODY MASS INDEX: 35.25 KG/M2 | DIASTOLIC BLOOD PRESSURE: 62 MMHG | SYSTOLIC BLOOD PRESSURE: 133 MMHG

## 2022-02-08 VITALS
SYSTOLIC BLOOD PRESSURE: 122 MMHG | HEIGHT: 64 IN | HEART RATE: 78 BPM | DIASTOLIC BLOOD PRESSURE: 68 MMHG | OXYGEN SATURATION: 97 % | BODY MASS INDEX: 35.12 KG/M2 | WEIGHT: 205.69 LBS

## 2022-02-08 DIAGNOSIS — R09.82 POST-NASAL DRAINAGE: ICD-10-CM

## 2022-02-08 DIAGNOSIS — Z79.899 HIGH RISK MEDICATION USE: ICD-10-CM

## 2022-02-08 DIAGNOSIS — I25.10 CORONARY ARTERY DISEASE, UNSPECIFIED VESSEL OR LESION TYPE, UNSPECIFIED WHETHER ANGINA PRESENT, UNSPECIFIED WHETHER NATIVE OR TRANSPLANTED HEART: Primary | ICD-10-CM

## 2022-02-08 DIAGNOSIS — M54.12 CERVICAL RADICULOPATHY: ICD-10-CM

## 2022-02-08 DIAGNOSIS — I10 ESSENTIAL HYPERTENSION: ICD-10-CM

## 2022-02-08 DIAGNOSIS — M54.16 LUMBAR RADICULITIS: ICD-10-CM

## 2022-02-08 DIAGNOSIS — M48.061 SPINAL STENOSIS OF LUMBAR REGION WITHOUT NEUROGENIC CLAUDICATION: Primary | ICD-10-CM

## 2022-02-08 DIAGNOSIS — M51.36 DDD (DEGENERATIVE DISC DISEASE), LUMBAR: ICD-10-CM

## 2022-02-08 DIAGNOSIS — G89.4 CHRONIC PAIN DISORDER: ICD-10-CM

## 2022-02-08 DIAGNOSIS — K21.9 GASTROESOPHAGEAL REFLUX DISEASE WITHOUT ESOPHAGITIS: ICD-10-CM

## 2022-02-08 PROCEDURE — 99214 OFFICE O/P EST MOD 30 MIN: CPT | Mod: S$GLB,,, | Performed by: PHYSICIAN ASSISTANT

## 2022-02-08 PROCEDURE — 1159F PR MEDICATION LIST DOCUMENTED IN MEDICAL RECORD: ICD-10-PCS | Mod: CPTII,S$GLB,, | Performed by: FAMILY MEDICINE

## 2022-02-08 PROCEDURE — 3008F PR BODY MASS INDEX (BMI) DOCUMENTED: ICD-10-PCS | Mod: CPTII,S$GLB,, | Performed by: PHYSICIAN ASSISTANT

## 2022-02-08 PROCEDURE — 3008F PR BODY MASS INDEX (BMI) DOCUMENTED: ICD-10-PCS | Mod: CPTII,S$GLB,, | Performed by: FAMILY MEDICINE

## 2022-02-08 PROCEDURE — 3074F SYST BP LT 130 MM HG: CPT | Mod: CPTII,S$GLB,, | Performed by: FAMILY MEDICINE

## 2022-02-08 PROCEDURE — 99214 PR OFFICE/OUTPT VISIT, EST, LEVL IV, 30-39 MIN: ICD-10-PCS | Mod: S$GLB,,, | Performed by: PHYSICIAN ASSISTANT

## 2022-02-08 PROCEDURE — 3078F PR MOST RECENT DIASTOLIC BLOOD PRESSURE < 80 MM HG: ICD-10-PCS | Mod: CPTII,S$GLB,, | Performed by: PHYSICIAN ASSISTANT

## 2022-02-08 PROCEDURE — 1160F RVW MEDS BY RX/DR IN RCRD: CPT | Mod: CPTII,S$GLB,, | Performed by: PHYSICIAN ASSISTANT

## 2022-02-08 PROCEDURE — 3008F BODY MASS INDEX DOCD: CPT | Mod: CPTII,S$GLB,, | Performed by: FAMILY MEDICINE

## 2022-02-08 PROCEDURE — 4010F PR ACE/ARB THEARPY RXD/TAKEN: ICD-10-PCS | Mod: CPTII,S$GLB,, | Performed by: PHYSICIAN ASSISTANT

## 2022-02-08 PROCEDURE — 99999 PR PBB SHADOW E&M-EST. PATIENT-LVL III: CPT | Mod: PBBFAC,,, | Performed by: FAMILY MEDICINE

## 2022-02-08 PROCEDURE — 3075F PR MOST RECENT SYSTOLIC BLOOD PRESS GE 130-139MM HG: ICD-10-PCS | Mod: CPTII,S$GLB,, | Performed by: PHYSICIAN ASSISTANT

## 2022-02-08 PROCEDURE — 4010F ACE/ARB THERAPY RXD/TAKEN: CPT | Mod: CPTII,S$GLB,, | Performed by: PHYSICIAN ASSISTANT

## 2022-02-08 PROCEDURE — 99214 PR OFFICE/OUTPT VISIT, EST, LEVL IV, 30-39 MIN: ICD-10-PCS | Mod: S$GLB,,, | Performed by: FAMILY MEDICINE

## 2022-02-08 PROCEDURE — 99999 PR PBB SHADOW E&M-EST. PATIENT-LVL V: ICD-10-PCS | Mod: PBBFAC,,, | Performed by: PHYSICIAN ASSISTANT

## 2022-02-08 PROCEDURE — 1159F PR MEDICATION LIST DOCUMENTED IN MEDICAL RECORD: ICD-10-PCS | Mod: CPTII,S$GLB,, | Performed by: PHYSICIAN ASSISTANT

## 2022-02-08 PROCEDURE — 99214 OFFICE O/P EST MOD 30 MIN: CPT | Mod: S$GLB,,, | Performed by: FAMILY MEDICINE

## 2022-02-08 PROCEDURE — 4010F ACE/ARB THERAPY RXD/TAKEN: CPT | Mod: CPTII,S$GLB,, | Performed by: FAMILY MEDICINE

## 2022-02-08 PROCEDURE — 3078F PR MOST RECENT DIASTOLIC BLOOD PRESSURE < 80 MM HG: ICD-10-PCS | Mod: CPTII,S$GLB,, | Performed by: FAMILY MEDICINE

## 2022-02-08 PROCEDURE — 1160F PR REVIEW ALL MEDS BY PRESCRIBER/CLIN PHARMACIST DOCUMENTED: ICD-10-PCS | Mod: CPTII,S$GLB,, | Performed by: FAMILY MEDICINE

## 2022-02-08 PROCEDURE — 1159F MED LIST DOCD IN RCRD: CPT | Mod: CPTII,S$GLB,, | Performed by: PHYSICIAN ASSISTANT

## 2022-02-08 PROCEDURE — 3008F BODY MASS INDEX DOCD: CPT | Mod: CPTII,S$GLB,, | Performed by: PHYSICIAN ASSISTANT

## 2022-02-08 PROCEDURE — 3074F PR MOST RECENT SYSTOLIC BLOOD PRESSURE < 130 MM HG: ICD-10-PCS | Mod: CPTII,S$GLB,, | Performed by: FAMILY MEDICINE

## 2022-02-08 PROCEDURE — 3078F DIAST BP <80 MM HG: CPT | Mod: CPTII,S$GLB,, | Performed by: PHYSICIAN ASSISTANT

## 2022-02-08 PROCEDURE — 3078F DIAST BP <80 MM HG: CPT | Mod: CPTII,S$GLB,, | Performed by: FAMILY MEDICINE

## 2022-02-08 PROCEDURE — 99999 PR PBB SHADOW E&M-EST. PATIENT-LVL V: CPT | Mod: PBBFAC,,, | Performed by: PHYSICIAN ASSISTANT

## 2022-02-08 PROCEDURE — 99999 PR PBB SHADOW E&M-EST. PATIENT-LVL III: ICD-10-PCS | Mod: PBBFAC,,, | Performed by: FAMILY MEDICINE

## 2022-02-08 PROCEDURE — 4010F PR ACE/ARB THEARPY RXD/TAKEN: ICD-10-PCS | Mod: CPTII,S$GLB,, | Performed by: FAMILY MEDICINE

## 2022-02-08 PROCEDURE — 1160F PR REVIEW ALL MEDS BY PRESCRIBER/CLIN PHARMACIST DOCUMENTED: ICD-10-PCS | Mod: CPTII,S$GLB,, | Performed by: PHYSICIAN ASSISTANT

## 2022-02-08 PROCEDURE — 3075F SYST BP GE 130 - 139MM HG: CPT | Mod: CPTII,S$GLB,, | Performed by: PHYSICIAN ASSISTANT

## 2022-02-08 PROCEDURE — 1159F MED LIST DOCD IN RCRD: CPT | Mod: CPTII,S$GLB,, | Performed by: FAMILY MEDICINE

## 2022-02-08 PROCEDURE — 1160F RVW MEDS BY RX/DR IN RCRD: CPT | Mod: CPTII,S$GLB,, | Performed by: FAMILY MEDICINE

## 2022-02-08 RX ORDER — IPRATROPIUM BROMIDE 42 UG/1
2 SPRAY, METERED NASAL 4 TIMES DAILY
Qty: 45 ML | Refills: 1 | Status: SHIPPED | OUTPATIENT
Start: 2022-02-08 | End: 2022-09-26

## 2022-02-08 RX ORDER — ATORVASTATIN CALCIUM 10 MG/1
10 TABLET, FILM COATED ORAL DAILY
Qty: 90 TABLET | Refills: 3 | Status: SHIPPED | OUTPATIENT
Start: 2022-02-08 | End: 2022-10-28

## 2022-02-08 NOTE — PROGRESS NOTES
"Subjective:       Patient ID: Elizabeth Giordano is a 61 y.o. female.    Chief Complaint: Medication Problem (Requesting changes to medications. )    Pt is known to me.  The pt has no acute complaints.  She has noticed that in the mornings before she takes her BP meds her readings are high.    The pt also says that the Astelin clears up her congestion but her nose gets very dry.  She still has some post nasal drainage and she coughs a lot at night.  Her reflux is under good control.  The pt has changed jobs--she walks a lot.  Her neck is hurting worse--she has disc problems in her neck.  She has some radiation into her right arm.  She has an upcoming appt with pain mgt.    Hypertension  This is a chronic problem. The current episode started more than 1 year ago. The problem has been waxing and waning since onset. The problem is controlled. Associated symptoms include neck pain. Pertinent negatives include no anxiety, blurred vision, chest pain, headaches, malaise/fatigue, orthopnea, palpitations, peripheral edema, PND, shortness of breath or sweats. Agents associated with hypertension include estrogens and NSAIDs. Risk factors for coronary artery disease include family history, obesity, post-menopausal state and stress. The current treatment provides significant improvement. There are no compliance problems.      Review of Systems   Constitutional: Negative for malaise/fatigue.   Eyes: Positive for visual disturbance (trouble driving at night--seeing eye doctor this week). Negative for blurred vision.   Respiratory: Negative for shortness of breath.    Cardiovascular: Negative for chest pain, palpitations, orthopnea and PND.   Musculoskeletal: Positive for neck pain.   Neurological: Negative for headaches.       Objective:       Vitals:    02/08/22 0901   BP: 122/68   Pulse: 78   SpO2: 97%   Weight: 93.3 kg (205 lb 11 oz)   Height: 5' 4" (1.626 m)     Physical Exam  Constitutional:       Appearance: She is " well-developed. She is obese.   HENT:      Head: Normocephalic.   Eyes:      Conjunctiva/sclera: Conjunctivae normal.      Pupils: Pupils are equal, round, and reactive to light.   Neck:      Thyroid: No thyromegaly.   Cardiovascular:      Rate and Rhythm: Normal rate and regular rhythm.      Heart sounds: Normal heart sounds.   Pulmonary:      Effort: Pulmonary effort is normal.      Breath sounds: Normal breath sounds.   Abdominal:      General: Bowel sounds are normal.      Palpations: Abdomen is soft.      Tenderness: There is no abdominal tenderness.   Musculoskeletal:         General: No tenderness or deformity. Normal range of motion.      Cervical back: Normal range of motion and neck supple.   Lymphadenopathy:      Cervical: No cervical adenopathy.   Skin:     General: Skin is warm and dry.   Neurological:      General: No focal deficit present.      Mental Status: She is alert and oriented to person, place, and time.      Cranial Nerves: No cranial nerve deficit.      Motor: No abnormal muscle tone.      Coordination: Coordination normal.      Deep Tendon Reflexes: Reflexes normal.   Psychiatric:         Mood and Affect: Mood normal.         Behavior: Behavior normal.         Thought Content: Thought content normal.         Assessment:       1. Coronary artery disease, unspecified vessel or lesion type, unspecified whether angina present, unspecified whether native or transplanted heart    2. Essential hypertension    3. Cervical radiculopathy    4. Gastroesophageal reflux disease without esophagitis    5. High risk medication use    6. Post-nasal drainage        Plan:       Elizabeth was seen today for medication problem.    Diagnoses and all orders for this visit:    Coronary artery disease, unspecified vessel or lesion type, unspecified whether angina present, unspecified whether native or transplanted heart  -     atorvastatin (LIPITOR) 10 MG tablet; Take 1 tablet (10 mg total) by mouth once daily.  -      Lipid Panel; Future    Essential hypertension    Cervical radiculopathy    Gastroesophageal reflux disease without esophagitis    High risk medication use  -     Comprehensive Metabolic Panel; Future    Post-nasal drainage  -     ipratropium (ATROVENT) 42 mcg (0.06 %) nasal spray; 2 sprays by Nasal route 4 (four) times daily.      During this visit, I reviewed the pt's history, medications, allergies, and problem list.

## 2022-02-13 NOTE — PROGRESS NOTES
CHIEF COMPLAINT/REASON FOR VISIT: low back pain    History of present illness: The patient is a 61 year old woman with a history of migraines, carpal tunnel syndrome and low back pain since her early 20s.  She returns in follow-up today with back pain.  She describes the same aching low back pain across with radiation intermittently to the left lateral thigh.  She does report numbness in the right lateral thigh.  She states that her pain is usually tolerable with taking tramadol.  She denies weakness or incontinence.    Pain intervention history: She tried Neurontin for her leg/thigh pain with no success. She only takes Advil with about 30% relief. She is status post TFESI bilaterally to L3 on 12/6/2011 with no relief. Past history of status post L4/5 YARON on 5/25/11 with about 90% relief that lasted 3 weeks. She is status post 2 bilateral L3 transforaminal injections with 3 weeks relief each time. Her current pain medications include Celexa 40 mg once a day, Aleve 220 mg twice a day, Lyrica as previously stated, Zanaflex 5 mg at night. She uses her TENS at home, when she thinks about it.  She is status post L4/5 interlaminar epidural steroid injection on 1/8/14 with 90% relief.  She is status post L5/S1 interlaminar epidural steroid injection on 6/9/14 with moderate relief only on the right low back and right leg lasting 2 weeks.  She is status post bilateral L3/4 and L4/5 facet joint injections on 4/13/15 with initially 100% relief of her back pain and 80% relief of her left lateral hip and lateral thigh pain, now reporting at least 50% relief of both.  She is status post bilateral L2, 3 and 4 medial branch radiofrequency ablation on 5/20/15 with 60% relief.   She is status post bilateral L2, 3 and 4 medial branch radiofrequency ablation on 6/20/16 with 50-70% relief.  She is status post L5/S1 interlaminar epidural steroid injection on 3/10/17 with 50% relief.  She is status post bilateral L2, 3, 4 medial branch  radiofrequency ablation on 7/17/17 with about 75% relief.  She is status post bilateral L2, 3, 4 medial branch radiofrequency ablation on 07/17/2018 with 100% relief of her leg pain and 70-75% relief of her back pain.  She is status post C7-T1 cervical interlaminar epidural steroid injection on 09/25/2018 with 80% relief.   She is status post L5/S1 interlaminar epidural steroid injection on 12/27/2018 with 60% relief.  She is status post bilateral L2, 3 and 4 medial branch radiofrequency ablation on 07/29/2019 with 40% relief.  She is status post L5/S1 interlaminar epidural steroid injection on 09/04/2019 with almost 100% relief of her right leg pain but 0% relief for back pain. She is status post C7-T1 interlaminar epidural steroid injection on 10/24/2019 with 70% relief.  She failed Cymbalta due to side effects.    ROS: She reports back pain only.  Balance of review of systems is negative.    Medical, surgical, family and social history reviewed elsewhere in record.     Medications/Allergies: See med card      Vitals:    02/08/22 1016   BP: 133/62   Pulse: 84   Resp: 18   Temp: 97.8 °F (36.6 °C)   TempSrc: Oral   SpO2: 99%   Weight: 93.2 kg (205 lb 5.7 oz)   PainSc:   4   PainLoc: Back        PHYSICAL EXAM:  Gen: A and O x3, pleasant, well-groomed  HEENT: PERRLA  CVS: Regular rate and rhythm  Resp:  No obvious shortness of breath, no increased work of breathing  Musculoskeletal: Able to heel walk, toe walk. No antalgic gait.     Neuro:  Upper extremities: 5/5 strength bilaterally   Lower extremities: 5/5 strength bilaterally  Reflexes: Brachioradialis 2+, Bicep 2+, Tricep 2+.   Patellar 2+, Achilles 2+.  Sensory: Intact and symmetrical to light touch and pinprick in C2-T1 dermatomes bilaterally.Intact and symmetrical to light touch and pinprick in L2-S1 dermatomes bilaterally.    Lumbar spine:  Lumbar spine: ROM is moderately limited with flexion and extension with increased bilateral low back pain during  extension and oblique extension.  Supine straight leg raise is negative bilaterally.  Internal and external rotation of the hip causes no increased pain in either side.  Myofascial exam:  Mild tenderness to palpation to the right greater than left lumbar paraspinous muscles.      IMAGING:   MRI L-SPINE 5/10/11   IMPRESSION: AT L3-4, THERE IS CIRCUMFERENTIAL DISC PROTRUSION FOCALLY MORE PROMINENT TO THE LEFT AS WELL AS A SMALL DISC HERNIATION PROTRUDING INFERIORLY BEHIND L4 TO THE LEFT OF MIDLINE. THIS PRODUCES SPINAL CANAL AND BILATERAL NEURAL FORAMINAL STENOSIS, LEFT GREATER THAN RIGHT. AT L4-5, THERE IS CIRCUMFERENTIAL DISC PROTRUSION FOCALLY MORE PROMINENT TO THE RIGHT WITH MILD SPINAL CANAL AND RIGHT NEURAL FORAMINAL STENOSIS. AT L1-2 AND L2-3 ALTHOUGH THERE ARE DISC PROTRUSIONS IDENTIFIED, SIGNIFICANT STENOSIS IS NOT SEEN.     MRI lumbar spine 7/10/14  L1-2:There is chronic relatively advanced disc degeneration with disc space narrowing, disc dehydration, disc narrowing, endplate osteophytes, and degenerative vertebral endplate marrow change. There is a diffuse 2-mm posterior disc bulge with a superimposed4-mm right posterior disc extrusion causing mild right foraminal stenosis. There is no impingement of the right L1 nerve root and there has been no change.  L2-3: There is severe and chronic disc degeneration with severe disc space narrowing, disc dehydration, degenerative vertebral endplate marrow change, and vertebral endplate osteophytes. There is a diffuse 2-mm posterior disc bulge with a superimposed 4-mm right posterior disc extrusion causing mild right foraminal stenosis. There is no impingement of the right L2 nerve root and there has been no significant change.  L3-4: There is severe disc degeneration which has progressed since the prior study. There is severe disc space narrowing, disc dehydration, degenerative vertebral endplate marrow change and endplate osteophytes. There is also mild posterior  subluxation of L3over a distance of 4 mm. There is a broad-based 5-mm posterior disc extrusion. There is degenerative facet arthrosis with ligamentum flavum thickening. The combination of facet arthrosis and disc extrusion results in relatively severe spinal canal stenosis with compression of the thecal sac to an AP diameter of 5 mm, moderate right foraminal stenosis, and severe left foraminal stenosis. The spinal stenosis has also progressed since the prior study.  L4-5:There is a diffuse posterior disc bulge with a superimposed 3-mm left posterior paracentral disc protrusion causing left paracentral thecal sac compression. There is moderate left and mild right foraminal stenosis and there has been no significant change. There is mild degenerative facet arthrosis with ligamentum flavum thickening and small joint effusions.  L5-S1:There is no significant compromise of the spinal canal or foramina and there has been no change.    X-ray cervical spine 6/8/15  There is loss of normal cervical lordosis which may be positional or related muscular strain. Intervertebral disk height loss is noted at the C5-C6 C6-C7 levels and to a lesser degree C4-C5 and C7-T1 levels. Vertebral body alignment appears otherwise adequate. Vertebral body heights appear well-preserved. The atlas and odontoid appear in good relationship to each other. Osseous neuroforaminal narrowing is noted at the C3-C4 C4-C5 C5-C6 levels on the left and at the C4-C5 C5-C6 and C6-C7 levels on the right     07/10/2018 MRI cervical spine [facility] report  C2-3 broad-based signal asymmetry at the left subarticular and foraminal zone reflecting implant spondylosis and disc bulge complex, mild to moderate left asymmetric foraminal narrowing, ectasia of the left vertebral artery, contralateral right asymmetric facet hypertrophy, right foramen widely narrowed, disc partially desiccated without collapse  C3-4 moderate to severe left greater than right foraminal  narrowing secondary to uncinate joint hypertrophic signal alteration, disc partially desiccated  C4-5 endplate spondylosis moderate diffuse disc bulge noted, broad-based right paracentral disc herniation, asymmetric flattening of the ventral cord surface at the right paracentral zone, high-grade if lateral right foraminal narrowing, AP diameter of canal 9.9 mm, contralaterally, high grade left foraminal narrowing secondary to facet greater than uncinate joint hypertrophic signal alteration, disc desiccated and mildly narrowed  C5-6 disc space narrowing evident with generalized in Nigel spondylosis and concentric inter pose disc bulge complex, high-grade bilateral foraminal narrowing, flattening of the cord surface, AP diameter 7.9 mm, symmetric facet arthrosis, disc diffusely desiccated and narrowed  C6-7 moderate endplate spondylosis and concentric disc bulge complex, high-grade bilateral foraminal compromise, flattening of the anterior cord surface, AP diameter 8.8 mm, facet arthrosis symmetric, disc desiccated and narrowed  C7-T1 less than 2 mm depth disc bulge    11/06/2018 MRI lumbar spine  T12/L1: There is no evidence of disc protrusion, canal or foraminal stenosis.  L1/L2: Right posterolateral disc protrusion is evident and there is mild distortion of the right anterolateral canal.  Degenerative facet changes are noted bilaterally.  The left foramen is intact.  L2/L3: There is annular disc bulging with a superimposed right posterolateral disc extrusion.  This is slightly more pronounced on the previous examination and there is mild effacement of the anterior thecal sac and moderate narrowing the right foramen.  L3/L4: There is annular disc bulge and osteophyte formation with a superimposed small left posterolateral disc extrusion.  There is moderate narrowing the central canal and left foramen.  This is little changed relative the previous examination.  Degenerative facet changes are noted.  L5/S1: There is a  small central disc extrusion superimposed upon annular disc bulging.  This is slightly less pronounced than was seen previously.  Degenerative facet changes are noted.  L5/S1: Moderate degenerative facet changes are noted and there is mild effacement of the anterior thecal sac.  There is ligamentous hypertrophy particularly to the left in the posterior canal and there is left greater than right foraminal narrowing.  This is mildly worsened relative prior exam.      ASSESSMENT:  The patient is a 61 year old woman with a history of migraines, carpal tunnel syndrome and low back pain since her early 20s who returns in follow up.   1. Spinal stenosis of lumbar region without neurogenic claudication     2. DDD (degenerative disc disease), lumbar     3. Lumbar radiculitis     4. Chronic pain disorder         Plan:  1.  Dr. Abdalla recently provided prescriptions for tramadol 50 milligrams twice daily as needed for pain.  I have reviewed the [place] Board of Pharmacy website and there are no abberancies.    2. If her low back and left leg pain worsens she will contact me to schedule an L5/S1 interlaminar epidural steroid injection.  If this does not give her relief I will update her lumbar spine MRI prior to considering spinal cord stimulation.  She has seen Neurosurgery in the past and an operation was not recommended however this has now been a couple years.  3. Follow-up in 3 months or sooner as needed.

## 2022-04-06 ENCOUNTER — PATIENT MESSAGE (OUTPATIENT)
Dept: PAIN MEDICINE | Facility: CLINIC | Age: 62
End: 2022-04-06
Payer: COMMERCIAL

## 2022-04-10 ENCOUNTER — PATIENT MESSAGE (OUTPATIENT)
Dept: FAMILY MEDICINE | Facility: CLINIC | Age: 62
End: 2022-04-10
Payer: COMMERCIAL

## 2022-04-10 DIAGNOSIS — H44.009 EYE INFECTION, UNSPECIFIED LATERALITY: Primary | ICD-10-CM

## 2022-04-11 RX ORDER — TOBRAMYCIN AND DEXAMETHASONE 3; 1 MG/ML; MG/ML
1 SUSPENSION/ DROPS OPHTHALMIC EVERY 6 HOURS
Qty: 5 ML | Refills: 0 | Status: SHIPPED | OUTPATIENT
Start: 2022-04-11 | End: 2022-11-28

## 2022-04-12 ENCOUNTER — RESEARCH ENCOUNTER (OUTPATIENT)
Dept: RESEARCH | Facility: HOSPITAL | Age: 62
End: 2022-04-12
Payer: COMMERCIAL

## 2022-04-12 NOTE — PROGRESS NOTES
Study title: A Phase 1/2/3. Placebo Controlled, Randomized, Observer-Blind, Dose-Finding Study to Describe the Safety, Tolerability, Immunogenicity and Potential Efficacy of SARS-COV-2 RNA Vaccine Candidates Against COVID-19 in Healthy Adults   IRB #: [id number]  Sponsor: [employer]   Sponsor's Protocol: [id number]  Site Number: [id number]  Subject ID: [id number]      Elizabeth Giordano was contacted via telephone call on 4/12/2022 for their Visit 503, 6 month follow up. The following information was collected from her         Has the patient had any prohibited medications since their last visit? no   Has the patient had any major changes in health since their last visit (EFRAIN)? no   Has the patient had any non-study vaccinations since their last visit? no        Elizabeth Giordano was informed that their next visit will be an in person visit. Subject instructed to continue completing their weekly Illness Diary and contact the site staff or investigator if a medically attend event or hospitalization occurs.    Subject was also informed that no 2nd Boosters were being offered at this time, and any off-study doses will result in being withdrawn.

## 2022-04-19 NOTE — PROGRESS NOTES
Study title: A Phase 1/2/3. Placebo Controlled, Randomized, Observer-Blind, Dose-Finding Study to Describe the Safety, Tolerability, Immunogenicity and Potential Efficacy of SARS-COV-2 RNA Vaccine Candidates Against COVID-19 in Healthy Adults   IRB #: [id number]  Sponsor: [employer]   Sponsor's Protocol: [id number]  Site Number: [id number]  Subject ID: [id number]    V503 was conducted 1 day OOW due to site error. PD noted.

## 2022-05-02 DIAGNOSIS — I10 ESSENTIAL HYPERTENSION: ICD-10-CM

## 2022-05-02 RX ORDER — BENZONATATE 200 MG/1
CAPSULE ORAL
Qty: 30 CAPSULE | Refills: 1 | Status: SHIPPED | OUTPATIENT
Start: 2022-05-02 | End: 2023-01-25

## 2022-05-02 RX ORDER — LOSARTAN POTASSIUM 100 MG/1
TABLET ORAL
Qty: 90 TABLET | Refills: 2 | Status: SHIPPED | OUTPATIENT
Start: 2022-05-02 | End: 2023-01-27

## 2022-05-02 NOTE — TELEPHONE ENCOUNTER
Refill Routing Note   Medication(s) are not appropriate for processing by [facility] for the following reason(s):      - Required laboratory values are outdated    [facility] action(s):  Defer          Medication reconciliation completed: No     Appointments  past 12m or future 3m with PCP    Date Provider   Last Visit   2/8/2022 CARRIE Espinosa MD   Next Visit   Visit date not found CARRIE Espinosa MD   ED visits in past 90 days: 0        Note composed:12:11 PM 05/02/2022

## 2022-05-02 NOTE — TELEPHONE ENCOUNTER
Care Due:                  Date            Visit Type   Department     Provider  --------------------------------------------------------------------------------                                MYCHART                              FOLLOWUP/OF  [facility] FAMILY  Last Visit: 02-      FICE VISIT   MEDICINE       CARRIE SKAGGS                              EP -                              PRIMARY      [facility]  Next Visit: 02-      CARE ([facility])   MEDICINE       CARRIE SKAGGS                                                            Last  Test          Frequency    Reason                     Performed    Due Date  --------------------------------------------------------------------------------    CMP.........  12 months..  atorvastatin, losartan...  02- 02-    Lipid Panel.  12 months..  atorvastatin.............  06- 06-    Powered by [facility] by [employer]. Reference number: [id number].   5/02/2022 7:30:21 AM CDT

## 2022-05-09 ENCOUNTER — PATIENT MESSAGE (OUTPATIENT)
Dept: SMOKING CESSATION | Facility: CLINIC | Age: 62
End: 2022-05-09
Payer: COMMERCIAL

## 2022-05-10 ENCOUNTER — LAB VISIT (OUTPATIENT)
Dept: LAB | Facility: HOSPITAL | Age: 62
End: 2022-05-10
Attending: FAMILY MEDICINE
Payer: COMMERCIAL

## 2022-05-10 ENCOUNTER — OFFICE VISIT (OUTPATIENT)
Dept: PAIN MEDICINE | Facility: CLINIC | Age: 62
End: 2022-05-10
Payer: COMMERCIAL

## 2022-05-10 VITALS
HEIGHT: 64 IN | BODY MASS INDEX: 35.19 KG/M2 | SYSTOLIC BLOOD PRESSURE: 135 MMHG | DIASTOLIC BLOOD PRESSURE: 66 MMHG | WEIGHT: 206.13 LBS | HEART RATE: 77 BPM

## 2022-05-10 DIAGNOSIS — M54.12 CERVICAL RADICULOPATHY: Primary | ICD-10-CM

## 2022-05-10 DIAGNOSIS — M54.16 LUMBAR RADICULOPATHY: ICD-10-CM

## 2022-05-10 DIAGNOSIS — M50.30 DDD (DEGENERATIVE DISC DISEASE), CERVICAL: ICD-10-CM

## 2022-05-10 DIAGNOSIS — I25.10 CORONARY ARTERY DISEASE, UNSPECIFIED VESSEL OR LESION TYPE, UNSPECIFIED WHETHER ANGINA PRESENT, UNSPECIFIED WHETHER NATIVE OR TRANSPLANTED HEART: ICD-10-CM

## 2022-05-10 DIAGNOSIS — M48.061 SPINAL STENOSIS OF LUMBAR REGION WITHOUT NEUROGENIC CLAUDICATION: ICD-10-CM

## 2022-05-10 DIAGNOSIS — Z79.899 HIGH RISK MEDICATION USE: ICD-10-CM

## 2022-05-10 DIAGNOSIS — M51.36 DDD (DEGENERATIVE DISC DISEASE), LUMBAR: ICD-10-CM

## 2022-05-10 DIAGNOSIS — N17.9 AKI (ACUTE KIDNEY INJURY): ICD-10-CM

## 2022-05-10 LAB
ALBUMIN SERPL BCP-MCNC: 4 G/DL (ref 3.5–5.2)
ALBUMIN SERPL BCP-MCNC: 4 G/DL (ref 3.5–5.2)
ALP SERPL-CCNC: 54 U/L (ref 55–135)
ALT SERPL W/O P-5'-P-CCNC: 15 U/L (ref 10–44)
ANION GAP SERPL CALC-SCNC: 9 MMOL/L (ref 8–16)
ANION GAP SERPL CALC-SCNC: 9 MMOL/L (ref 8–16)
AST SERPL-CCNC: 21 U/L (ref 10–40)
BILIRUB SERPL-MCNC: 0.6 MG/DL (ref 0.1–1)
BUN SERPL-MCNC: 16 MG/DL (ref 6–20)
BUN SERPL-MCNC: 16 MG/DL (ref 6–20)
CALCIUM SERPL-MCNC: 9.6 MG/DL (ref 8.7–10.5)
CALCIUM SERPL-MCNC: 9.6 MG/DL (ref 8.7–10.5)
CHLORIDE SERPL-SCNC: 107 MMOL/L (ref 95–110)
CHLORIDE SERPL-SCNC: 107 MMOL/L (ref 95–110)
CHOLEST SERPL-MCNC: 157 MG/DL (ref 120–199)
CHOLEST/HDLC SERPL: 2.5 {RATIO} (ref 2–5)
CO2 SERPL-SCNC: 24 MMOL/L (ref 23–29)
CO2 SERPL-SCNC: 24 MMOL/L (ref 23–29)
CREAT SERPL-MCNC: 1 MG/DL (ref 0.5–1.4)
CREAT SERPL-MCNC: 1 MG/DL (ref 0.5–1.4)
EST. GFR  (AFRICAN AMERICAN): >60 ML/MIN/1.73 M^2
EST. GFR  (AFRICAN AMERICAN): >60 ML/MIN/1.73 M^2
EST. GFR  (NON AFRICAN AMERICAN): >60 ML/MIN/1.73 M^2
EST. GFR  (NON AFRICAN AMERICAN): >60 ML/MIN/1.73 M^2
GLUCOSE SERPL-MCNC: 93 MG/DL (ref 70–110)
GLUCOSE SERPL-MCNC: 93 MG/DL (ref 70–110)
HDLC SERPL-MCNC: 62 MG/DL (ref 40–75)
HDLC SERPL: 39.5 % (ref 20–50)
LDLC SERPL CALC-MCNC: 76.6 MG/DL (ref 63–159)
NONHDLC SERPL-MCNC: 95 MG/DL
PHOSPHATE SERPL-MCNC: 2.5 MG/DL (ref 2.7–4.5)
POTASSIUM SERPL-SCNC: 4.1 MMOL/L (ref 3.5–5.1)
POTASSIUM SERPL-SCNC: 4.1 MMOL/L (ref 3.5–5.1)
PROT SERPL-MCNC: 6.9 G/DL (ref 6–8.4)
SODIUM SERPL-SCNC: 140 MMOL/L (ref 136–145)
SODIUM SERPL-SCNC: 140 MMOL/L (ref 136–145)
TRIGL SERPL-MCNC: 92 MG/DL (ref 30–150)

## 2022-05-10 PROCEDURE — 4010F PR ACE/ARB THEARPY RXD/TAKEN: ICD-10-PCS | Mod: CPTII,S$GLB,, | Performed by: PHYSICIAN ASSISTANT

## 2022-05-10 PROCEDURE — 99999 PR PBB SHADOW E&M-EST. PATIENT-LVL V: ICD-10-PCS | Mod: PBBFAC,,, | Performed by: PHYSICIAN ASSISTANT

## 2022-05-10 PROCEDURE — 36415 COLL VENOUS BLD VENIPUNCTURE: CPT | Mod: PO | Performed by: INTERNAL MEDICINE

## 2022-05-10 PROCEDURE — 1159F PR MEDICATION LIST DOCUMENTED IN MEDICAL RECORD: ICD-10-PCS | Mod: CPTII,S$GLB,, | Performed by: PHYSICIAN ASSISTANT

## 2022-05-10 PROCEDURE — 3008F PR BODY MASS INDEX (BMI) DOCUMENTED: ICD-10-PCS | Mod: CPTII,S$GLB,, | Performed by: PHYSICIAN ASSISTANT

## 2022-05-10 PROCEDURE — 99214 PR OFFICE/OUTPT VISIT, EST, LEVL IV, 30-39 MIN: ICD-10-PCS | Mod: S$GLB,,, | Performed by: PHYSICIAN ASSISTANT

## 2022-05-10 PROCEDURE — 3078F DIAST BP <80 MM HG: CPT | Mod: CPTII,S$GLB,, | Performed by: PHYSICIAN ASSISTANT

## 2022-05-10 PROCEDURE — 3075F SYST BP GE 130 - 139MM HG: CPT | Mod: CPTII,S$GLB,, | Performed by: PHYSICIAN ASSISTANT

## 2022-05-10 PROCEDURE — 99999 PR PBB SHADOW E&M-EST. PATIENT-LVL V: CPT | Mod: PBBFAC,,, | Performed by: PHYSICIAN ASSISTANT

## 2022-05-10 PROCEDURE — 4010F ACE/ARB THERAPY RXD/TAKEN: CPT | Mod: CPTII,S$GLB,, | Performed by: PHYSICIAN ASSISTANT

## 2022-05-10 PROCEDURE — 3008F BODY MASS INDEX DOCD: CPT | Mod: CPTII,S$GLB,, | Performed by: PHYSICIAN ASSISTANT

## 2022-05-10 PROCEDURE — 99214 OFFICE O/P EST MOD 30 MIN: CPT | Mod: S$GLB,,, | Performed by: PHYSICIAN ASSISTANT

## 2022-05-10 PROCEDURE — 3078F PR MOST RECENT DIASTOLIC BLOOD PRESSURE < 80 MM HG: ICD-10-PCS | Mod: CPTII,S$GLB,, | Performed by: PHYSICIAN ASSISTANT

## 2022-05-10 PROCEDURE — 1160F PR REVIEW ALL MEDS BY PRESCRIBER/CLIN PHARMACIST DOCUMENTED: ICD-10-PCS | Mod: CPTII,S$GLB,, | Performed by: PHYSICIAN ASSISTANT

## 2022-05-10 PROCEDURE — 3075F PR MOST RECENT SYSTOLIC BLOOD PRESS GE 130-139MM HG: ICD-10-PCS | Mod: CPTII,S$GLB,, | Performed by: PHYSICIAN ASSISTANT

## 2022-05-10 PROCEDURE — 84100 ASSAY OF PHOSPHORUS: CPT | Performed by: INTERNAL MEDICINE

## 2022-05-10 PROCEDURE — 80061 LIPID PANEL: CPT | Performed by: FAMILY MEDICINE

## 2022-05-10 PROCEDURE — 80053 COMPREHEN METABOLIC PANEL: CPT | Performed by: FAMILY MEDICINE

## 2022-05-10 PROCEDURE — 1160F RVW MEDS BY RX/DR IN RCRD: CPT | Mod: CPTII,S$GLB,, | Performed by: PHYSICIAN ASSISTANT

## 2022-05-10 PROCEDURE — 1159F MED LIST DOCD IN RCRD: CPT | Mod: CPTII,S$GLB,, | Performed by: PHYSICIAN ASSISTANT

## 2022-05-10 RX ORDER — TRAMADOL HYDROCHLORIDE 50 MG/1
50 TABLET ORAL EVERY 12 HOURS PRN
Qty: 60 TABLET | Refills: 2 | Status: SHIPPED | OUTPATIENT
Start: 2022-06-02 | End: 2022-09-16

## 2022-05-10 NOTE — PROGRESS NOTES
CHIEF COMPLAINT/REASON FOR VISIT: low back pain    History of present illness: The patient is a 61 year old woman with a history of migraines, carpal tunnel syndrome and low back pain since her early 20s.  She returns in follow-up today with worsening back and neck pain.  She describes right sided neck pain with numbness radiating into her right arm and forearm.  She reports pain in her right shoulder but feels that this is due to her shoulder.  She also complains of bilateral low back pain with radiation to the right lateral thigh.  She reports some pain into the left lateral thigh as well.  She reports numbness in her right leg.  She continues to take tramadol with relief.  Her pain is typically worse with standing and walking.  She denies bladder or bowel incontinence.    Pain intervention history:She is status post TFESI bilaterally to L3 on 12/6/2011 with no relief. Past history of status post L4/5 YARON on 5/25/11 with about 90% relief that lasted 3 weeks. She is status post 2 bilateral L3 transforaminal injections with 3 weeks relief each time.  She is status post L4/5 interlaminar epidural steroid injection on 1/8/14 with 90% relief.  She is status post L5/S1 interlaminar epidural steroid injection on 6/9/14 with moderate relief only on the right low back and right leg lasting 2 weeks.  She is status post bilateral L3/4 and L4/5 facet joint injections on 4/13/15 with initially 100% relief of her back pain and 80% relief of her left lateral hip and lateral thigh pain, now reporting at least 50% relief of both.  She is status post bilateral L2, 3 and 4 medial branch radiofrequency ablation on 5/20/15 with 60% relief.   She is status post bilateral L2, 3 and 4 medial branch radiofrequency ablation on 6/20/16 with 50-70% relief.  She is status post L5/S1 interlaminar epidural steroid injection on 3/10/17 with 50% relief.  She is status post bilateral L2, 3, 4 medial branch radiofrequency ablation on 7/17/17 with  "about 75% relief.  She is status post bilateral L2, 3, 4 medial branch radiofrequency ablation on 07/17/2018 with 100% relief of her leg pain and 70-75% relief of her back pain.  She is status post C7-T1 cervical interlaminar epidural steroid injection on 09/25/2018 with 80% relief.   She is status post L5/S1 interlaminar epidural steroid injection on 12/27/2018 with 60% relief.  She is status post bilateral L2, 3 and 4 medial branch radiofrequency ablation on 07/29/2019 with 40% relief.  She is status post L5/S1 interlaminar epidural steroid injection on 09/04/2019 with almost 100% relief of her right leg pain but 0% relief for back pain. She is status post C7-T1 interlaminar epidural steroid injection on 10/24/2019 with 70% relief.       Spine surgeries:    Antineuropathics: failed gabapentin, failed Cymbalta due to side effects  NSAIDs:  Ibuprofen  Physical therapy:  Antidepressants:  Muscle relaxers:  Opioids:  Tramadol 50 mg twice daily as needed  Antiplatelets/Anticoagulants:  ASA 81 mg    ROS: She reports back pain only.  Balance of review of systems is negative.    Medical, surgical, family and social history reviewed elsewhere in record.     Medications/Allergies: See med card      Vitals:    05/10/22 0831   BP: 135/66   Pulse: 77   Weight: 93.5 kg (206 lb 2.1 oz)   Height: 5' 4" (1.626 m)   PainSc:   6   PainLoc: Back        PHYSICAL EXAM:  Gen: A and O x3, pleasant, well-groomed  HEENT: PERRLA  CVS: Regular rate and rhythm  Resp:  No obvious shortness of breath, no increased work of breathing  Musculoskeletal: Able to heel walk, toe walk. No antalgic gait.     Neuro:  Upper extremities: 5/5 strength bilaterally   Lower extremities: 5/5 strength bilaterally  Reflexes: Brachioradialis 2+, Bicep 2+, Tricep 2+.   Patellar 2+, Achilles 2+.  Sensory: Intact and symmetrical to light touch and pinprick in C2-T1 dermatomes bilaterally.Intact and symmetrical to light touch and pinprick in L2-S1 dermatomes " bilaterally.    Cervical spine:  Range of motion is mildly reduced with flexion, extension lateral rotation with increased right neck pain during right lateral station.  Myofascial exam:  Tenderness to palpation to the right cervical paraspinous muscles.    Lumbar spine:  Lumbar spine: ROM is moderately limited with flexion and extension with increased bilateral low back pain during extension and oblique extension.  Supine straight leg raise is negative bilaterally.  Internal and external rotation of the hip causes no increased pain in either side.  Myofascial exam:  Mild tenderness to palpation to the right greater than left lumbar paraspinous muscles.      IMAGING:   MRI L-SPINE 5/10/11   IMPRESSION: AT L3-4, THERE IS CIRCUMFERENTIAL DISC PROTRUSION FOCALLY MORE PROMINENT TO THE LEFT AS WELL AS A SMALL DISC HERNIATION PROTRUDING INFERIORLY BEHIND L4 TO THE LEFT OF MIDLINE. THIS PRODUCES SPINAL CANAL AND BILATERAL NEURAL FORAMINAL STENOSIS, LEFT GREATER THAN RIGHT. AT L4-5, THERE IS CIRCUMFERENTIAL DISC PROTRUSION FOCALLY MORE PROMINENT TO THE RIGHT WITH MILD SPINAL CANAL AND RIGHT NEURAL FORAMINAL STENOSIS. AT L1-2 AND L2-3 ALTHOUGH THERE ARE DISC PROTRUSIONS IDENTIFIED, SIGNIFICANT STENOSIS IS NOT SEEN.     MRI lumbar spine 7/10/14  L1-2:There is chronic relatively advanced disc degeneration with disc space narrowing, disc dehydration, disc narrowing, endplate osteophytes, and degenerative vertebral endplate marrow change. There is a diffuse 2-mm posterior disc bulge with a superimposed4-mm right posterior disc extrusion causing mild right foraminal stenosis. There is no impingement of the right L1 nerve root and there has been no change.  L2-3: There is severe and chronic disc degeneration with severe disc space narrowing, disc dehydration, degenerative vertebral endplate marrow change, and vertebral endplate osteophytes. There is a diffuse 2-mm posterior disc bulge with a superimposed 4-mm right posterior disc  extrusion causing mild right foraminal stenosis. There is no impingement of the right L2 nerve root and there has been no significant change.  L3-4: There is severe disc degeneration which has progressed since the prior study. There is severe disc space narrowing, disc dehydration, degenerative vertebral endplate marrow change and endplate osteophytes. There is also mild posterior subluxation of L3over a distance of 4 mm. There is a broad-based 5-mm posterior disc extrusion. There is degenerative facet arthrosis with ligamentum flavum thickening. The combination of facet arthrosis and disc extrusion results in relatively severe spinal canal stenosis with compression of the thecal sac to an AP diameter of 5 mm, moderate right foraminal stenosis, and severe left foraminal stenosis. The spinal stenosis has also progressed since the prior study.  L4-5:There is a diffuse posterior disc bulge with a superimposed 3-mm left posterior paracentral disc protrusion causing left paracentral thecal sac compression. There is moderate left and mild right foraminal stenosis and there has been no significant change. There is mild degenerative facet arthrosis with ligamentum flavum thickening and small joint effusions.  L5-S1:There is no significant compromise of the spinal canal or foramina and there has been no change.    X-ray cervical spine 6/8/15  There is loss of normal cervical lordosis which may be positional or related muscular strain. Intervertebral disk height loss is noted at the C5-C6 C6-C7 levels and to a lesser degree C4-C5 and C7-T1 levels. Vertebral body alignment appears otherwise adequate. Vertebral body heights appear well-preserved. The atlas and odontoid appear in good relationship to each other. Osseous neuroforaminal narrowing is noted at the C3-C4 C4-C5 C5-C6 levels on the left and at the C4-C5 C5-C6 and C6-C7 levels on the right     07/10/2018 MRI cervical spine [facility] report  C2-3 broad-based signal  asymmetry at the left subarticular and foraminal zone reflecting implant spondylosis and disc bulge complex, mild to moderate left asymmetric foraminal narrowing, ectasia of the left vertebral artery, contralateral right asymmetric facet hypertrophy, right foramen widely narrowed, disc partially desiccated without collapse  C3-4 moderate to severe left greater than right foraminal narrowing secondary to uncinate joint hypertrophic signal alteration, disc partially desiccated  C4-5 endplate spondylosis moderate diffuse disc bulge noted, broad-based right paracentral disc herniation, asymmetric flattening of the ventral cord surface at the right paracentral zone, high-grade if lateral right foraminal narrowing, AP diameter of canal 9.9 mm, contralaterally, high grade left foraminal narrowing secondary to facet greater than uncinate joint hypertrophic signal alteration, disc desiccated and mildly narrowed  C5-6 disc space narrowing evident with generalized in Nigel spondylosis and concentric inter pose disc bulge complex, high-grade bilateral foraminal narrowing, flattening of the cord surface, AP diameter 7.9 mm, symmetric facet arthrosis, disc diffusely desiccated and narrowed  C6-7 moderate endplate spondylosis and concentric disc bulge complex, high-grade bilateral foraminal compromise, flattening of the anterior cord surface, AP diameter 8.8 mm, facet arthrosis symmetric, disc desiccated and narrowed  C7-T1 less than 2 mm depth disc bulge    11/06/2018 MRI lumbar spine  T12/L1: There is no evidence of disc protrusion, canal or foraminal stenosis.  L1/L2: Right posterolateral disc protrusion is evident and there is mild distortion of the right anterolateral canal.  Degenerative facet changes are noted bilaterally.  The left foramen is intact.  L2/L3: There is annular disc bulging with a superimposed right posterolateral disc extrusion.  This is slightly more pronounced on the previous examination and there is mild  effacement of the anterior thecal sac and moderate narrowing the right foramen.  L3/L4: There is annular disc bulge and osteophyte formation with a superimposed small left posterolateral disc extrusion.  There is moderate narrowing the central canal and left foramen.  This is little changed relative the previous examination.  Degenerative facet changes are noted.  L5/S1: There is a small central disc extrusion superimposed upon annular disc bulging.  This is slightly less pronounced than was seen previously.  Degenerative facet changes are noted.  L5/S1: Moderate degenerative facet changes are noted and there is mild effacement of the anterior thecal sac.  There is ligamentous hypertrophy particularly to the left in the posterior canal and there is left greater than right foraminal narrowing.  This is mildly worsened relative prior exam.      ASSESSMENT:  The patient is a 61 year old woman with a history of migraines, carpal tunnel syndrome and low back pain since her early 20s who returns in follow up.   1. Cervical radiculopathy     2. DDD (degenerative disc disease), cervical     3. Lumbar radiculopathy     4. DDD (degenerative disc disease), lumbar     5. Spinal stenosis of lumbar region without neurogenic claudication         Plan:  1.  Dr. Abdalla provided prescriptions for tramadol 50 milligrams twice daily as needed for pain.  I have reviewed the [place] Board of Pharmacy website and there are no abberancies.    2. We discussed that she may benefit from both a C7-T1 and L5-S1 epidural steroid injection.  She is going to contact me with what she would like to do 1st.  We will schedule 1 injection and have her follow-up 2 weeks later to schedule the 2nd injection 4 weeks after the 1st.

## 2022-05-13 ENCOUNTER — PATIENT MESSAGE (OUTPATIENT)
Dept: FAMILY MEDICINE | Facility: CLINIC | Age: 62
End: 2022-05-13
Payer: COMMERCIAL

## 2022-05-13 DIAGNOSIS — F51.01 PRIMARY INSOMNIA: ICD-10-CM

## 2022-05-13 RX ORDER — ESZOPICLONE 3 MG/1
3 TABLET, FILM COATED ORAL NIGHTLY
Qty: 30 TABLET | Refills: 2 | Status: SHIPPED | OUTPATIENT
Start: 2022-05-13 | End: 2022-06-19

## 2022-05-13 NOTE — TELEPHONE ENCOUNTER
No new care gaps identified.  [facility] Embedded Care Gaps. Reference number: [id number]. 5/13/2022   10:33:04 AM DERRELLT

## 2022-05-25 ENCOUNTER — OFFICE VISIT (OUTPATIENT)
Dept: NEPHROLOGY | Facility: CLINIC | Age: 62
End: 2022-05-25
Payer: COMMERCIAL

## 2022-05-25 VITALS
BODY MASS INDEX: 35.17 KG/M2 | SYSTOLIC BLOOD PRESSURE: 126 MMHG | DIASTOLIC BLOOD PRESSURE: 72 MMHG | HEART RATE: 80 BPM | OXYGEN SATURATION: 98 % | HEIGHT: 64 IN | WEIGHT: 206 LBS

## 2022-05-25 DIAGNOSIS — N17.9 AKI (ACUTE KIDNEY INJURY): Primary | ICD-10-CM

## 2022-05-25 PROCEDURE — 4010F PR ACE/ARB THEARPY RXD/TAKEN: ICD-10-PCS | Mod: CPTII,S$GLB,, | Performed by: INTERNAL MEDICINE

## 2022-05-25 PROCEDURE — 99999 PR PBB SHADOW E&M-EST. PATIENT-LVL V: ICD-10-PCS | Mod: PBBFAC,,, | Performed by: INTERNAL MEDICINE

## 2022-05-25 PROCEDURE — 3078F DIAST BP <80 MM HG: CPT | Mod: CPTII,S$GLB,, | Performed by: INTERNAL MEDICINE

## 2022-05-25 PROCEDURE — 3066F NEPHROPATHY DOC TX: CPT | Mod: CPTII,S$GLB,, | Performed by: INTERNAL MEDICINE

## 2022-05-25 PROCEDURE — 99213 OFFICE O/P EST LOW 20 MIN: CPT | Mod: S$GLB,,, | Performed by: INTERNAL MEDICINE

## 2022-05-25 PROCEDURE — 1159F MED LIST DOCD IN RCRD: CPT | Mod: CPTII,S$GLB,, | Performed by: INTERNAL MEDICINE

## 2022-05-25 PROCEDURE — 3074F PR MOST RECENT SYSTOLIC BLOOD PRESSURE < 130 MM HG: ICD-10-PCS | Mod: CPTII,S$GLB,, | Performed by: INTERNAL MEDICINE

## 2022-05-25 PROCEDURE — 99213 PR OFFICE/OUTPT VISIT, EST, LEVL III, 20-29 MIN: ICD-10-PCS | Mod: S$GLB,,, | Performed by: INTERNAL MEDICINE

## 2022-05-25 PROCEDURE — 3066F PR DOCUMENTATION OF TREATMENT FOR NEPHROPATHY: ICD-10-PCS | Mod: CPTII,S$GLB,, | Performed by: INTERNAL MEDICINE

## 2022-05-25 PROCEDURE — 3078F PR MOST RECENT DIASTOLIC BLOOD PRESSURE < 80 MM HG: ICD-10-PCS | Mod: CPTII,S$GLB,, | Performed by: INTERNAL MEDICINE

## 2022-05-25 PROCEDURE — 3008F BODY MASS INDEX DOCD: CPT | Mod: CPTII,S$GLB,, | Performed by: INTERNAL MEDICINE

## 2022-05-25 PROCEDURE — 3074F SYST BP LT 130 MM HG: CPT | Mod: CPTII,S$GLB,, | Performed by: INTERNAL MEDICINE

## 2022-05-25 PROCEDURE — 4010F ACE/ARB THERAPY RXD/TAKEN: CPT | Mod: CPTII,S$GLB,, | Performed by: INTERNAL MEDICINE

## 2022-05-25 PROCEDURE — 99999 PR PBB SHADOW E&M-EST. PATIENT-LVL V: CPT | Mod: PBBFAC,,, | Performed by: INTERNAL MEDICINE

## 2022-05-25 PROCEDURE — 1159F PR MEDICATION LIST DOCUMENTED IN MEDICAL RECORD: ICD-10-PCS | Mod: CPTII,S$GLB,, | Performed by: INTERNAL MEDICINE

## 2022-05-25 PROCEDURE — 3008F PR BODY MASS INDEX (BMI) DOCUMENTED: ICD-10-PCS | Mod: CPTII,S$GLB,, | Performed by: INTERNAL MEDICINE

## 2022-05-25 NOTE — PROGRESS NOTES
"Subjective:       Patient ID: Elizabeth Giordano is a 61 y.o. White female who presents for return patient evaluation for chronic renal failure.      She has no uremic or urinary symptoms and is in her usual state of health.  There have been no recent illnesses, hospitalizations or procedures.  She has largely been avoiding NSAIDs.      Review of Systems   Constitutional: Negative for appetite change, chills and fever.   HENT: Negative for congestion.    Eyes: Negative for visual disturbance.   Respiratory: Negative for cough and shortness of breath.    Cardiovascular: Negative for chest pain and leg swelling.   Gastrointestinal: Negative for abdominal pain, diarrhea, nausea and vomiting.   Genitourinary: Negative for difficulty urinating, dysuria and hematuria.   Musculoskeletal: Positive for arthralgias (back and hands). Negative for myalgias.   Skin: Negative for rash.   Neurological: Negative for headaches.   Psychiatric/Behavioral: Negative for sleep disturbance.       The past medical, family and social histories were reviewed for this encounter.     /72 (BP Location: Left arm, Patient Position: Sitting)   Pulse 80   Ht 5' 4" (1.626 m)   Wt 93.4 kg (206 lb)   SpO2 98%   BMI 35.36 kg/m²     Objective:      Physical Exam  Vitals reviewed.   Constitutional:       General: She is not in acute distress.     Appearance: She is well-developed.   HENT:      Head: Normocephalic and atraumatic.   Eyes:      General: No scleral icterus.     Conjunctiva/sclera: Conjunctivae normal.   Neck:      Vascular: No JVD.   Cardiovascular:      Rate and Rhythm: Normal rate and regular rhythm.      Heart sounds: Normal heart sounds. No murmur heard.    No friction rub. No gallop.   Pulmonary:      Effort: Pulmonary effort is normal. No respiratory distress.      Breath sounds: Normal breath sounds. No wheezing or rales.   Abdominal:      General: Bowel sounds are normal. There is no distension.      Palpations: " Abdomen is soft.      Tenderness: There is no abdominal tenderness.   Musculoskeletal:      Cervical back: Normal range of motion.      Right lower leg: No edema.      Left lower leg: No edema.   Skin:     General: Skin is warm and dry.      Findings: No rash.   Neurological:      Mental Status: She is alert and oriented to person, place, and time.   Psychiatric:         Mood and Affect: Mood normal.         Behavior: Behavior normal.         Assessment:       1. RICHARD (acute kidney injury)        Plan:   Return to clinic prn.    Baseline creatinine is 0.9-1.1 since 2008.  Renal US showed R 10.2 cm L 10.4 cm.  Please avoid or minimize all NSAIDS (ibuprofen, motrin, aleve, indocin, naprosyn) to minimize the risk to your kidneys.  Aspirin in a dose of 325 mg or less a day is likely the safest with regards to kidney function.  If you are able to take aspirin and do not have any bleeding problems or ulcers, this may be your best therapy.  Alternatively, acetaminophen (Tylenol) is the safer than NSAIDs with regards to kidney function.  I would ask you take this as directed on the bottle.  It is best to stay under 2 grams a day (4-500 mg tablets a day maximum).  Voltaren gel is ok to use.  She had a departure from her baseline related to the chronic use of NSAIDs.

## 2022-06-22 DIAGNOSIS — H44.009 EYE INFECTION, UNSPECIFIED LATERALITY: ICD-10-CM

## 2022-06-22 DIAGNOSIS — Z91.09 ENVIRONMENTAL ALLERGIES: ICD-10-CM

## 2022-06-23 RX ORDER — AZELASTINE 1 MG/ML
SPRAY, METERED NASAL
Qty: 90 ML | Refills: 2 | Status: SHIPPED | OUTPATIENT
Start: 2022-06-23 | End: 2022-09-26

## 2022-06-23 RX ORDER — TOBRAMYCIN AND DEXAMETHASONE 3; 1 MG/ML; MG/ML
SUSPENSION/ DROPS OPHTHALMIC
Qty: 5 ML | Refills: 0 | OUTPATIENT
Start: 2022-06-23

## 2022-06-23 NOTE — TELEPHONE ENCOUNTER
Refill Routing Note   Medication(s) are not appropriate for processing by [facility] for the following reason(s):      - Outside of protocol    [facility] action(s):  Route  Approve       Medication Therapy Plan: ROUTE tobramycin/dexamethazone (medication is non-delegated); APPROVE azelastine  Medication reconciliation completed: No     Appointments  past 12m or future 3m with PCP    Date Provider   Last Visit   2/8/2022 CARRIE Espinosa MD   Next Visit   Visit date not found CARRIE Espinosa MD   ED visits in past 90 days: 0        Note composed:9:06 AM 06/23/2022

## 2022-06-23 NOTE — TELEPHONE ENCOUNTER
No new care gaps identified.  [facility] Embedded Care Gaps. Reference number: [id number]. 6/22/2022   9:22:47 PM CDT

## 2022-07-09 ENCOUNTER — OFFICE VISIT (OUTPATIENT)
Dept: FAMILY MEDICINE | Facility: CLINIC | Age: 62
End: 2022-07-09
Payer: COMMERCIAL

## 2022-07-09 DIAGNOSIS — H04.551 LACRIMAL DUCT STENOSIS, RIGHT: ICD-10-CM

## 2022-07-09 DIAGNOSIS — J01.00 SUBACUTE MAXILLARY SINUSITIS: Primary | ICD-10-CM

## 2022-07-09 PROCEDURE — 3066F PR DOCUMENTATION OF TREATMENT FOR NEPHROPATHY: ICD-10-PCS | Mod: CPTII,95,, | Performed by: NURSE PRACTITIONER

## 2022-07-09 PROCEDURE — 4010F PR ACE/ARB THEARPY RXD/TAKEN: ICD-10-PCS | Mod: CPTII,95,, | Performed by: NURSE PRACTITIONER

## 2022-07-09 PROCEDURE — 4010F ACE/ARB THERAPY RXD/TAKEN: CPT | Mod: CPTII,95,, | Performed by: NURSE PRACTITIONER

## 2022-07-09 PROCEDURE — 99213 OFFICE O/P EST LOW 20 MIN: CPT | Mod: 95,,, | Performed by: NURSE PRACTITIONER

## 2022-07-09 PROCEDURE — 3066F NEPHROPATHY DOC TX: CPT | Mod: CPTII,95,, | Performed by: NURSE PRACTITIONER

## 2022-07-09 PROCEDURE — 99213 PR OFFICE/OUTPT VISIT, EST, LEVL III, 20-29 MIN: ICD-10-PCS | Mod: 95,,, | Performed by: NURSE PRACTITIONER

## 2022-07-09 RX ORDER — AMOXICILLIN 875 MG/1
875 TABLET, FILM COATED ORAL EVERY 12 HOURS
Qty: 14 TABLET | Refills: 0 | Status: SHIPPED | OUTPATIENT
Start: 2022-07-09 | End: 2022-07-16

## 2022-07-09 RX ORDER — PREDNISONE 20 MG/1
TABLET ORAL
Qty: 9 TABLET | Refills: 0 | Status: SHIPPED | OUTPATIENT
Start: 2022-07-09 | End: 2022-09-26

## 2022-07-09 NOTE — PROGRESS NOTES
"Elizabeth Giordano is a 61 y.o. female patient of JEANCARLOS Espinosa MD who presents to the clinic today for a Virtual Visit.    The patient location is: [place]  The chief complaint leading to consultation is: sinus blockage and watery right eye  Visit type: audiovisual  Total time spent with patient: 17 minutes.  Each patient to whom he or she provides medical services by telemedicine is:  (1) informed of the relationship between the physician and patient and the respective role of any other health care provider with respect to management of the patient; and (2) notified that he or she may decline to receive medical services by telemedicine and may withdraw from such care at any time.    19 minutes of total time spent on the encounter, which includes face to face time and non-face to face time preparing to see the patient (eg, review of tests), Obtaining and/or reviewing separately obtained history, Documenting clinical information in the electronic or other health record, Independently interpreting results (not separately reported) and communicating results to the patient/family/caregiver, or Care coordination (not separately reported).     HPI    Pt, who is not known to me, reports a new problem to me: astelin spray "fro a while", no working well,.  Blocked tear duct per eye doctor. Tobramycin intermittently helps but not completely. .    No nasal d/c.  Sneezes a lot.  Using Xyzal, Claritin and Zyrtec.    These symptoms began 1 year  ago and status is worsening..     Symptoms were initially acute, but now are prolonged.... nearly a year    Pt denies the following symptoms:  Fever, thick green drainage, sputum, sinus pain. Nasal valsalva causes air to come out her left tear duct.    Aggravating factors include at work r/t AC.  Blow on her eye. .    Relieving factors include nothing .Eye patch hot pack helps the eye.    OTC Medications tried are see above.    Prescription medications taken for symptoms are " allergy relief, eye drops.    Pertinent medical history:  H/o allergies likely.  Has 2 dogs and works outside..    Smoking status:  Former (quit 1997)    ROS    Constitutional:   No  fever, no unusual fatigue.    Answers for HPI/ROS submitted by the patient on 7/8/2022  activity change: No  unexpected weight change: No  neck pain: No  hearing loss: No  rhinorrhea: Yes  trouble swallowing: No  eye discharge: Yes  visual disturbance: No  chest tightness: No  wheezing: No  chest pain: No  palpitations: No  blood in stool: No  constipation: No  vomiting: No  diarrhea: No  polydipsia: No  polyuria: No  difficulty urinating: No  hematuria: No  menstrual problem: No  dysuria: No  joint swelling: No  arthralgias: No  headaches: No  weakness: No  confusion: No  dysphoric mood: No    Skin:  No rashes, itching.    Past Medical History:   Diagnosis Date    Allergy     Arthritis of spine 2011    Chronic low back pain     Class 2 obesity with body mass index (BMI) of 39.0 to 39.9 in adult 1/11/2019    Coronary artery disease     mild    DDD (degenerative disc disease), lumbar     Diverticulitis     Diverticulosis     DJD (degenerative joint disease)     Encounter for blood transfusion     Factor V Leiden     GERD (gastroesophageal reflux disease)     Hiatal hernia     Migraines     PONV (postoperative nausea and vomiting)     geta    Schatzki's ring        Current Outpatient Medications:     albuterol (PROVENTIL/VENTOLIN HFA) 90 mcg/actuation inhaler, Inhale 2 puffs into the lungs every 6 (six) hours as needed for Wheezing or Shortness of Breath. Rescue, Disp: 6.7 g, Rfl: 2    amLODIPine (NORVASC) 5 MG tablet, TAKE 1 TABLET(5 MG) BY MOUTH EVERY DAY, Disp: 90 tablet, Rfl: 3    ascorbic acid, vitamin C, (VITAMIN C) 500 MG tablet, Take 1 tablet (500 mg total) by mouth 2 (two) times daily., Disp: , Rfl:     aspirin (ECOTRIN) 81 MG EC tablet, Take 81 mg by mouth once daily., Disp: , Rfl:     atorvastatin (LIPITOR)  10 MG tablet, Take 1 tablet (10 mg total) by mouth once daily., Disp: 90 tablet, Rfl: 3    azelastine (ASTELIN) 137 mcg (0.1 %) nasal spray, USE 1 SPRAY BY NASAL ROUTE TWICE DAILY, Disp: 90 mL, Rfl: 2    benzonatate (TESSALON) 200 MG capsule, TAKE 1 CAPSULE(200 MG) BY MOUTH THREE TIMES DAILY AS NEEDED FOR COUGH, Disp: 30 capsule, Rfl: 1    cimetidine (TAGAMET) 800 MG tablet, TAKE ONE TABLET BY MOUTH EVERY EVENING, Disp: 90 tablet, Rfl: 3    estradioL (ESTRACE) 1 MG tablet, Take 1 tablet (1 mg total) by mouth once daily. Can cause blood clots with covid, recommend NOT taking this medication until covid symptoms completely gone and discussing with PCP, Disp: , Rfl: 3    eszopiclone (LUNESTA) 3 mg Tab, TAKE 1 TABLET BY MOUTH NIGHTLY, Disp: 90 tablet, Rfl: 1    fluconazole (DIFLUCAN) 100 MG tablet, Take by mouth., Disp: , Rfl:     fluticasone propion-salmeterol 45-21 mcg/dose (ADVAIR HFA) 45-21 mcg/actuation HFAA inhaler, Inhale 2 puffs into the lungs every 12 (twelve) hours. Controller (Patient not taking: Reported on 5/25/2022), Disp: 12 g, Rfl: 3    galcanezumab-gnlm (EMGALITY PEN) 120 mg/mL PnIj, Inject 1 pen (120 mg total) into the skin every 28 days., Disp: 1 mL, Rfl: 11    ibuprofen (ADVIL,MOTRIN) 800 MG tablet, TAKE 1 TABLET(800 MG) BY MOUTH DAILY AS NEEDED FOR PAIN, Disp: 30 tablet, Rfl: 0    ipratropium (ATROVENT) 42 mcg (0.06 %) nasal spray, 2 sprays by Nasal route 4 (four) times daily. (Patient not taking: Reported on 5/25/2022), Disp: 45 mL, Rfl: 1    losartan (COZAAR) 100 MG tablet, TAKE 1 TABLET(100 MG) BY MOUTH EVERY DAY, Disp: 90 tablet, Rfl: 2    pantoprazole (PROTONIX) 40 MG tablet, TAKE 1 TABLET(40 MG) BY MOUTH EVERY DAY, Disp: 90 tablet, Rfl: 3    promethazine-dextromethorphan (PROMETHAZINE-DM) 6.25-15 mg/5 mL Syrp, TAKE 5 ML BY MOUTH EVERY 4 HOURS AS NEEDED FOR COUGH (Patient not taking: Reported on 5/25/2022), Disp: 180 mL, Rfl: 0    tobramycin-dexamethasone 0.3-0.1% (TOBRADEX)  0.3-0.1 % DrpS, Place 1 drop into both eyes every 6 (six) hours., Disp: 5 mL, Rfl: 0    traMADoL (ULTRAM) 50 mg tablet, Take 1 tablet (50 mg total) by mouth every 12 (twelve) hours as needed for Pain., Disp: 60 tablet, Rfl: 2    ubrogepant (UBRELVY) 50 mg tablet, Take 1 tablet po at onset of migraine. May repeat in 2 hours if needed. Max 2 tablets per day. (Patient not taking: Reported on 5/25/2022), Disp: 10 tablet, Rfl: 11    ubrogepant (UBROGEPANT) 50 mg tablet, Take 1 tablet by mouth at onset of migraine. May repeat in 2 hours if needed. Max 2 tablets per day. (Patient taking differently: Take 1 tablet by mouth at onset of migraine. May repeat in 2 hours if needed. Max 2 tablets per day.), Disp: 10 tablet, Rfl: 11    vitamin D (VITAMIN D3) 1000 units Tab, Take 1 tablet (1,000 Units total) by mouth once daily., Disp: , Rfl:     zinc sulfate (ZINCATE) 220 (50) mg capsule, Take 1 capsule (220 mg total) by mouth once daily., Disp: , Rfl:     Current Facility-Administered Medications:     INV LOY299/placebo Injection 1 Dose, 1 Dose, Intramuscular, 1 time in Clinic/HOD, Sariah Suarez CNS    INV THX153/placebo Injection 1 Dose, 1 Dose, Intramuscular, 1 time in Clinic/HOD, Sariah Suarez CNS    INV [id number]/placebo Injection 1 Dose, 1 Dose, Intramuscular, 1 time in Clinic/HOD, Sariah Suarez CNS    INV [id number] vaccine 1 Dose, 1 Dose, Intramuscular, 1 time in Clinic/HOD, Rola Saleh MD    onabotulinumtoxina injection 200 Units, 200 Units, Intramuscular, Q90 Days, Neva Cortes MD, 200 Units at 08/16/18 1625    onabotulinumtoxina injection 200 Units, 200 Units, Intramuscular, Q90 Days, Neva Cortes MD, 200 Units at 10/25/19 1557    onabotulinumtoxina injection 200 Units, 200 Units, Intramuscular, q12 weeks, Linda Melgar NP      PHYSICAL EXAM    There were no vitals filed for this visit.  Vital signs not taken per patient.  Patient's questionnaire (if available), medications & PMH all reviewed  today.    Gen:  Alert, coop 61 y.o. female patient in no acute distress, is not ill-appearing.           Well developed, well-nourished  Psych:   Behavior and affect appropriate for the situation and setting.  HENT:   Normocephalic, atraumatic.  Symmetrical facial movements.    Eyes without visible discharge or swelling.  No max sinus pain on palpation to frontal sinuses on patient self-exam.  + pain on palpation to maxillary sinuses on patient self-exam.  No cervical lymph nodes tender to palpation on patient self-exam.  No sneezing during the visit.  Voice steady, no hoarseness noted.  Resp:   Respiratory effort symmetrical.  No retractions  No increased work of breathing  No audible wheezes  Talks in full sentences.  No coughing during the interaction today.  CV:   No herbert oral cyanosis  MSK:  Head and upper extremity movements smooth and even.  Neuro:  Responds promptly to questions showing good insight.  Skin:   No observed rashes/bruises    Subacute maxillary sinusitis  -     amoxicillin (AMOXIL) 875 MG tablet; Take 1 tablet (875 mg total) by mouth every 12 (twelve) hours. for 7 days  Dispense: 14 tablet; Refill: 0  -     predniSONE (DELTASONE) 20 MG tablet; 2 daily for 3 days then 1 daily.  Dispense: 9 tablet; Refill: 0  -     Ambulatory referral/consult to ENT; Future; Expected date: 07/16/2022    Lacrimal duct stenosis, right          Pt today presents with report of nearly a year of right eye tearing.  Had been able to open the blocked tear duct x 1 with the drops prescribed.  However, the 2nd time it didn't really work.  Thinks blocked sinuses are to blame.  Takes her allergy medication regularly .  Additionally, thinks she has allergies but not interested in a referral.      This is a new problem to me.  No work up is planned.    Referred to ENT if treatment not effective. .    Pt advised to perform comfort measures recommended on patient instruction sheet, which were reviewed at the time of the  visit..    If not better in 5 days, the patient is advised to call here, PCP office or go for an in-person/follow up evaluation.  If worse or concerns, the patient is advised to call for advise to this office or the PCP office or call [facility] ON CALL or go to the nearest URGENT CARE or ER.  Explained exam findings, diagnosis and treatment plan to patient.  Questions answered and patient states understanding.

## 2022-07-14 ENCOUNTER — PATIENT MESSAGE (OUTPATIENT)
Dept: FAMILY MEDICINE | Facility: CLINIC | Age: 62
End: 2022-07-14
Payer: COMMERCIAL

## 2022-07-15 ENCOUNTER — PATIENT MESSAGE (OUTPATIENT)
Dept: FAMILY MEDICINE | Facility: CLINIC | Age: 62
End: 2022-07-15
Payer: COMMERCIAL

## 2022-07-15 DIAGNOSIS — G43.719 INTRACTABLE CHRONIC MIGRAINE WITHOUT AURA AND WITHOUT STATUS MIGRAINOSUS: ICD-10-CM

## 2022-07-15 DIAGNOSIS — R05.9 COUGH: ICD-10-CM

## 2022-07-15 RX ORDER — UBROGEPANT 50 MG/1
TABLET ORAL
Qty: 10 TABLET | Refills: 11 | Status: SHIPPED | OUTPATIENT
Start: 2022-07-15 | End: 2023-06-05 | Stop reason: SDUPTHER

## 2022-07-18 ENCOUNTER — TELEPHONE (OUTPATIENT)
Dept: PHARMACY | Facility: CLINIC | Age: 62
End: 2022-07-18
Payer: COMMERCIAL

## 2022-07-19 RX ORDER — PROMETHAZINE HYDROCHLORIDE AND DEXTROMETHORPHAN HYDROBROMIDE 6.25; 15 MG/5ML; MG/5ML
SYRUP ORAL
Qty: 180 ML | Refills: 0 | Status: SHIPPED | OUTPATIENT
Start: 2022-07-19 | End: 2022-10-05

## 2022-07-21 ENCOUNTER — LAB VISIT (OUTPATIENT)
Dept: LAB | Facility: HOSPITAL | Age: 62
End: 2022-07-21
Attending: NURSE PRACTITIONER
Payer: COMMERCIAL

## 2022-07-21 ENCOUNTER — OFFICE VISIT (OUTPATIENT)
Dept: OTOLARYNGOLOGY | Facility: CLINIC | Age: 62
End: 2022-07-21
Payer: COMMERCIAL

## 2022-07-21 VITALS — HEIGHT: 64 IN | BODY MASS INDEX: 34.74 KG/M2 | WEIGHT: 203.5 LBS

## 2022-07-21 DIAGNOSIS — J31.0 CHRONIC RHINITIS: ICD-10-CM

## 2022-07-21 DIAGNOSIS — R09.89 CHRONIC THROAT CLEARING: ICD-10-CM

## 2022-07-21 DIAGNOSIS — R05.3 PERSISTENT DRY COUGH: Primary | ICD-10-CM

## 2022-07-21 PROCEDURE — 86003 ALLG SPEC IGE CRUDE XTRC EA: CPT | Mod: 59 | Performed by: NURSE PRACTITIONER

## 2022-07-21 PROCEDURE — 3066F PR DOCUMENTATION OF TREATMENT FOR NEPHROPATHY: ICD-10-PCS | Mod: CPTII,S$GLB,, | Performed by: NURSE PRACTITIONER

## 2022-07-21 PROCEDURE — 4010F PR ACE/ARB THEARPY RXD/TAKEN: ICD-10-PCS | Mod: CPTII,S$GLB,, | Performed by: NURSE PRACTITIONER

## 2022-07-21 PROCEDURE — 3066F NEPHROPATHY DOC TX: CPT | Mod: CPTII,S$GLB,, | Performed by: NURSE PRACTITIONER

## 2022-07-21 PROCEDURE — 36415 COLL VENOUS BLD VENIPUNCTURE: CPT | Mod: PO | Performed by: NURSE PRACTITIONER

## 2022-07-21 PROCEDURE — 3008F BODY MASS INDEX DOCD: CPT | Mod: CPTII,S$GLB,, | Performed by: NURSE PRACTITIONER

## 2022-07-21 PROCEDURE — 99213 PR OFFICE/OUTPT VISIT, EST, LEVL III, 20-29 MIN: ICD-10-PCS | Mod: S$GLB,,, | Performed by: NURSE PRACTITIONER

## 2022-07-21 PROCEDURE — 1159F PR MEDICATION LIST DOCUMENTED IN MEDICAL RECORD: ICD-10-PCS | Mod: CPTII,S$GLB,, | Performed by: NURSE PRACTITIONER

## 2022-07-21 PROCEDURE — 3008F PR BODY MASS INDEX (BMI) DOCUMENTED: ICD-10-PCS | Mod: CPTII,S$GLB,, | Performed by: NURSE PRACTITIONER

## 2022-07-21 PROCEDURE — 86003 ALLG SPEC IGE CRUDE XTRC EA: CPT | Performed by: NURSE PRACTITIONER

## 2022-07-21 PROCEDURE — 1159F MED LIST DOCD IN RCRD: CPT | Mod: CPTII,S$GLB,, | Performed by: NURSE PRACTITIONER

## 2022-07-21 PROCEDURE — 99213 OFFICE O/P EST LOW 20 MIN: CPT | Mod: S$GLB,,, | Performed by: NURSE PRACTITIONER

## 2022-07-21 PROCEDURE — 99999 PR PBB SHADOW E&M-EST. PATIENT-LVL V: ICD-10-PCS | Mod: PBBFAC,,, | Performed by: NURSE PRACTITIONER

## 2022-07-21 PROCEDURE — 99999 PR PBB SHADOW E&M-EST. PATIENT-LVL V: CPT | Mod: PBBFAC,,, | Performed by: NURSE PRACTITIONER

## 2022-07-21 PROCEDURE — 4010F ACE/ARB THERAPY RXD/TAKEN: CPT | Mod: CPTII,S$GLB,, | Performed by: NURSE PRACTITIONER

## 2022-07-21 NOTE — PATIENT INSTRUCTIONS
"DIFFERENT TYPES OF "ENT-APPROVED" NASAL SPRAYS:  Flonase / fluticasone / Nasacort / Rhinocort (steroid spray) is best for stuffy, pressure, fullness.  Astelin / azelastine (antihistamine spray) is best for itchy, drippy, sneezy.  Atrovent / ipratropium is best for chronic watery nasal drip ("leaky faucet" nose), which may worsen with eating.    Use as directed, spraying 1-2 times in each nostril each day. It may take 2-3 days to 2-3 weeks to begin seeing improvement. This medication needs to be taken consistently to see results. Overall, this is a well-tolerated medication with low side effects. The benefit of nasal steroids as opposed to oral steroids is that the nasal steroid spray works primarily in the nose. Common side effects can include: headache, nasal dryness, minor nose bleed.  Rare side effects may include:  septal perforation, elevation in eye pressure, dry eyes, change in smell, allergic reaction.  Notify your provider if you have any concerns or experience these symptoms.     Nasal spray instructions:  Blow nose first gently to clean. Keep chin level with the floor (do not tilt head forward or back). Using the opposite hand (example: right hand for left nostril, left hand for right nostril) insert nasal spray taking caution to direct it AWAY from the middle wall inside the nose (septum) to avoid irritating nasal septum which could cause nosebleed.  Do not tilt spray up but rather flat and out along the roof of your mouth to spray. Angle the tip of the spray out slightly toward the direction of the ears; then spray. Do not take quick vigorous sniff but rather slow gentle inhalation while waiting for medication to absorb into nasal passages. Then administer second spray in same way.     EUSTACHIAN TUBE INSTRUCTIONS:  Nasal valsalva:  Pinch nose and with closed mouth try to "pop" air into ears through the back of the nose. Attempt this several times a day. The more popping you have, the more likely the " fluid will resolve.     Nasal saline rinse kit (use Neti pot or NeilMed sinus rinse kit) -- Rinse your sinuses once to twice daily to reduce the allergen burden in your nose. Use sterile water (boiled tap water which has cooled) or distilled bottled water. Add 1/4 teaspoon sea salt and a pinch of baking soda or a mixture packet from the maker of your sinus rinse kit.  Rinse through both sides of nose to cleanse sinus and nasal passages, bending forward with head tilted down. Keep your mouth open, without holding your breath. Squeeze bottle gently until solution starts draining from the opposite nasal passage. After bottle is empty, blow nose very gently, without pinching nose completely, to avoid pressure on eardrums.  There are useful YouTube videos that show demonstration of how to do these properly.     Ponaris Nasal Emollient is used for the relief of: nasal congestion due to colds, nasal irritation, allergy exacerbations, nasal crusting. Specifically prepared iodized organic oils of pine, eucalyptus, peppermint, cajeput, and cottonseed. To order Ponaris: ask your pharmacist to order it for you or we carry it in our pharmacy downstairs on the first floor.                Coughing is a fundamental protective reflux in response to airway irritation. When cough lasts for more than 8 weeks, it is considered chronic. The protective cough reflex is important for clearing the airway of inhaled particles. Chemical irritants or inflammatory mediators can stimulate the vagus nerve resulting in cough.     Some of the Top Considerations for Chronic Cough:     1. Nasal allergies -- Typical constellation of symptoms seen with nasal allergies: itchy, red, watery eyes; itchy, red, watery nose; excessive sneezing; excessive stuffiness. If this one best describes your current state, then discuss with your primary care provider whether you should see an allergist or take daily allergy medications.     2. Sinus Infection -- Typical  constellation of symptoms seen with acute bacterial sinus infection are:  Green-gold, foul-smelling, foul-tasting mucus from nose and throat, inability to breathe through nose, inability to smell or taste well, facial pain and swelling, dental pain, headaches around eyes, sore throat and productive cough. If this one best describes your current state, then let's get sinus imaging to rule out infection.     3.  Silent reflux -- Typical constellation of symptoms seen with silent reflux: post-nasal drip sensation with absence of significant runny nose or nasal congestion, sensation of thick or too much mucus in the back of throat, raspy voice, frequent throat clearing, lump in the back of throat, frequent sore throats. If this one best describes your current state, discuss with your primary care provider whether you should see a gastroenterologist or take daily reflux medications. Your GI doctor may want to do an Upper GI or obtain a barium swallow or pH monitoring test.     4. Irritable Larynx Syndrome -- Otherwise known as Laryngeal Sensory Neuropathy. Typical constellation of symptoms:  a dry persistent cough for months or even years, coughing fits last seconds to minutes and sometimes longer, frequent throat clearing, abrupt onset that may follow a viral illness or surgery. Diagnostic tests for asthma, allergy, and reflux are all normal, and patient has not responded to maximal therapy for those conditions. This usually responds to either low-dose Elavil or Gabapentin.     5. Asthma/Pulmonary (Lung) issue -- Typical constellation of symptoms: wheezing, shortness of breath. Discuss with your primary care provider whether you should see a pulmonologist (lung specialist) and have Pulmonary Function Testing (PFTs) done.     6. Certain blood pressure medications known as ACE-inhibitors, such as lisinopril, can cause chronic cough. Though estimates vary in literature, 20% or more of patients taking lisinopril (or other  "ACE-inhibitor for blood pressure) will develop a chronic dry cough.     7. Post-Viral Cough Syndrome -- Occasionally a cough can persist for 4-8 weeks after an acute upper respiratory viral illness, due to hyperactive sensitivity of the airway nerves. A steroid inhaler and night-time cough suppressant can often help.     8. Pertussis -- Pertussis is a under-recognized cause of persistent cough in adults. A blood test can check for Bordetella.             Mucous (Post nasal drip) Management     A fullness sensation in the back of the throat is called "globus."   Many people attribute this fullness to a sensation of increased mucous, because they can feel a sticky material in the throat that they will occasionally cough up.     Nasal  / Throat Mucous  Our body NORMALLY makes 1 liter or more of saliva (spit) and nasal mucous every day. These body fluids are important for breaking down the food we eat, protecting our teeth from cavities, and clearing out the pollen and irritants that we breathe in our nose. As our body makes these fluids, we swallow them throughout the day, where they are recycled back through the body. This process is happening all our life without us thinking about it. When an adjustment to this process causes the mucous to be increased or thicker, is when we notice it.      Some problems cause an INCREASE in these body fluids, which we perceive as irritating, excess phlegm in the throat.   However, it is not always an increase. Many times there is a change in CONSISTENCY of the mucous to make it thicker. This will cause the mucous to be held up in the throat instead of being swallowed easily.     Several factors can cause these problems:  Dryness of throat and nose which increases the mucous sticking - Causes include inadequate hydration, excess caffeine / soda / sugary drinks, medication side effects.  Acid reflux  Increased mucous production from allergies or chronic sinus drainage.      We will " evaluate the cause(s) of increased or thickened mucous and consider different treatment strategies:     MANY COMMON medications cause dryness of the throat, including medications for allergy, depression/anxiety, heart, and urination issues.   There is some evidence that added sugars or processed sugars in the diet (not the kind that occur naturally in honey or ripe fruit) can increase mucus, as well as too much dairy. To avoid these refined carbohydrates, on food labels, watch out for wheat flour (also called white, refined or enriched flour) on the ingredients list.      Recommendations for Increased Mucous Sensation     Water, water, water - this cannot be understated. Most people are not drinking enough water regularly to keep up with body's demand. Recommend AT LEAST 64 oz of water per day, and more for men (up to 100 oz.) unless a medical issue prevents this.  Reduce intake of caffeine / tea / sugary drinks or soda, which all will cause increased mucous.  If your nose is the source of mucous (if you must repeated clear your nose of mucus in order to effectively pass air through your nose daily), then nasal medications discussed by your doctor as well as nasal saline can be effective to wash away the mucous.  However if your nose is essentially open and clear (no mucus), then prevention of acid reflux is advised by avoiding late-night eating (nothing 3 hours before laying down at night), greasy and spicy foods, alcohol, and acidic foods.   Humidifier in the bedroom if you find dryness increases at night.  Smoking cessation if you use tobacco  Examination your medication list with your doctor to determine medications that may be contributing     There are NUMEROUS over the counter mucous thinning agents. Here are some suggestions that can be purchased online:  Araujo's Breezer's (Sugar Free), Alysa's black currant pastilles, and Entertainer's Secret Throat Relief can all help dry mouth and thickened  "mucous.   Biotene spray and mouthwash can help lubricate a dry mouth  Mucinex can be helpful in some cases, but stop taking if you don't notice improvement after a few weeks.             LARYNGOPHARYNGEAL REFLUX  (SILENT OR ATYPICAL REFLUX)     If you have any of the following symptoms you may have laryngopharyngeal reflux (LPR):  hoarseness, thick or too much mucus, chronic throat pain/irritation, chronic throat clearing, chronic cough, especially cough that wake you up from sleep, chronic "postnasal drip" without the need to blow your nose.      LPR is also called extra esophageal reflux. This means that if you have reflux into the tissues above the esophagus (into the voicebox,) you may experience throat issues as above.      Many people with LPR do not have symptoms of heartburn. Compared to the esophagus, the voice box and the back of the throat are significantly more sensitive to the effects of acid and digestive enzymes on surrounding tissue. Acid passing quickly through the esophagus does not have a chance to irritate the area for too long.  However acid that pools in the throat or voice box can cause prolonged irritation resulting in the symptoms of LPR.     The symptoms of LPR can consist of a dry cough and the sensation of something being stuck in the throat.  Some people will complain of heartburn while others may have intermittent hoarseness or loss of voice.  Another major symptom of LPR is "postnasal drip."  Patients are often told symptoms are due to abnormal nasal drainage or sinus infection; however this is rarely the cause of chronic throat irritation. For post nasal drip to cause the complaints described, signs and symptoms of an active nasal infection should be present.      Treatments for LPR include: postural changes (sleeping or laying with an incline), weight reduction, diet modification, medication to reduce stomach acid and promote normal motility, and rarely: surgery to prevent reflux. " Most patients will begin to notice some relief in her symptoms about 2-4 weeks after starting the medication; however it is generally recommended the medication should be continued for at least 2 months. If the symptoms completely resolve, the medication can then be tapered.  Some people will remain symptom free while others may have relapses which required treatment again.     Things you may be able to do to prevent reflux.  1. Do not smoke.  Smoking will worsen reflux.    2. Avoid eating at least 2-3 hours prior to bedtime.  Try to avoid very large meals at night.    3. Weight loss.  For patient's with recent weight gain, shedding a few pounds is all that is required to improve reflux.    4. Avoid reflux triggers. These can worsen acid in the stomach or even cause the esophagus muscles to relax: caffeine, soft drinks, acidic foods (tomato, fruit), mints, alcoholic beverages, particularly at night, cheese, fried foods, spicy foods, eggs, and chocolate.    5. Sleep with the head of bed elevated at least 6 inches.  6. You may also be prescribed a medication, or a medication you take may also be increased. Typically PPIs and H2-blockers or a combination of both are commonly used.  After 4-8 weeks, with significant symptomatic improvement, you may begin weaning your reflux medications down, from prescription strength to over-the-counter strength. Then every other day. Continue to wean as symptoms allow.    See a Gastro doctor (GI) for refractory symptoms and continued management.

## 2022-07-21 NOTE — PROGRESS NOTES
Subjective:       Patient ID: Elizabeth Giordano is a 61 y.o. female.    Chief Complaint: No chief complaint on file.    HPI   Patient was last seen by me 9 years ago for rhinitis, INGA, etc. Patient is referred back at this time by CLAIR Garcia for consultation for sinuses.     ALLERGY: evaluated by allergist in January 2012; that note has been reviewed. PCN-challenge allergy testing but no other allergy testing was done at that time. Patient takes Astelin. Patient reports itchy, watery eyes, itchy nose, and frequent sneezing. Patient states all symptoms have progressively worsened since moving here in 2005. If she skips a few days of Astelin, her symptoms significantly worsen.   SINUS: No sinus imaging is available for review. One course of antibiotics in the past year: Amoxicillin 07/09/2022. Patient denies all s/s of acute bacterial sinusitis:  No mucopus from nose or throat, no facial swelling/pain, no dental pain, no diminished olfaction/taste, no headaches around the eyes, no sore throat or productive cough. Patient takes Protonix and Tagamet daily.   ACID REFLUX:  Recent GI notes and EGD report reviewed. A mild Schatzki ring was found in the lower third of the esophagus. Dilation was performed. A small hiatal hernia was present. The entire examined stomach was normal. The examined duodenum was normal.  Patient reports frequent throat clearing, globus sensation, chronic dry cough.   ASTHMA:  Former smoker. Most recent CXR was negative >1 year ago. She is on albuterol, Advair, and ipratropium. No pulmonology notes.   ACE-INHIBITOR: none  POST-VIRAL COUGH SYNDROME: none    Review of Systems   Constitutional: Negative.    HENT: Positive for postnasal drip and sneezing. Negative for nasal congestion, dental problem, facial swelling, rhinorrhea, sinus pressure/congestion, sore throat, trouble swallowing and voice change.         Frequent throat clearing  Globus sensation (sensation of thick or too much mucus in  the back of her throat, sensation of postnasal drip with absence of significant anterior rhinorrhea or nasal congestion)   Eyes: Positive for discharge and itching. Negative for pain and redness.   Respiratory: Positive for cough. Negative for choking.    Cardiovascular: Negative.    Gastrointestinal: Negative.    Endocrine: Negative.    Genitourinary: Negative.    Musculoskeletal: Positive for back pain.   Integumentary:  Negative.   Neurological: Negative.  Negative for headaches.   Hematological: Negative.    Psychiatric/Behavioral: Negative.          Objective:      Physical Exam  Vitals and nursing note reviewed.   Constitutional:       General: She is not in acute distress.     Appearance: She is well-developed. She is not ill-appearing or diaphoretic.   HENT:      Head: Normocephalic and atraumatic.      Right Ear: Hearing, tympanic membrane, ear canal and external ear normal. No middle ear effusion. Tympanic membrane is not erythematous.      Left Ear: Hearing, tympanic membrane, ear canal and external ear normal.  No middle ear effusion. Tympanic membrane is not erythematous.      Nose: Nose normal. No mucosal edema or rhinorrhea.      Right Sinus: No maxillary sinus tenderness or frontal sinus tenderness.      Left Sinus: No maxillary sinus tenderness or frontal sinus tenderness.      Mouth/Throat:      Mouth: Mucous membranes are not pale, not dry and not cyanotic. No oral lesions.      Pharynx: Uvula midline. No oropharyngeal exudate or posterior oropharyngeal erythema.   Eyes:      General: Lids are normal. No scleral icterus.        Right eye: No discharge.         Left eye: No discharge.      Conjunctiva/sclera: Conjunctivae normal.      Pupils: Pupils are equal, round, and reactive to light.   Neck:      Thyroid: No thyroid mass or thyromegaly.      Trachea: Trachea normal. No tracheal deviation.   Cardiovascular:      Rate and Rhythm: Normal rate.   Pulmonary:      Effort: Pulmonary effort is  normal. No respiratory distress.      Breath sounds: No stridor. No wheezing.   Musculoskeletal:         General: Normal range of motion.      Cervical back: Normal range of motion and neck supple.   Lymphadenopathy:      Head:      Right side of head: No submental, submandibular, tonsillar, preauricular or posterior auricular adenopathy.      Left side of head: No submental, submandibular, tonsillar, preauricular or posterior auricular adenopathy.      Cervical: No cervical adenopathy.      Right cervical: No superficial or posterior cervical adenopathy.     Left cervical: No superficial or posterior cervical adenopathy.   Skin:     General: Skin is warm and dry.      Coloration: Skin is not pale.      Findings: No lesion or rash.   Neurological:      Mental Status: She is alert and oriented to person, place, and time.      Coordination: Coordination normal.      Gait: Gait normal.   Psychiatric:         Speech: Speech normal.         Behavior: Behavior normal. Behavior is cooperative.         Thought Content: Thought content normal.         Judgment: Judgment normal.         Assessment:       Problem List Items Addressed This Visit    None     Visit Diagnoses     Persistent dry cough    -  Primary    Chronic rhinitis        Relevant Orders    Allergen, Cocklebur    Allergen, Elm Blanchard    Allergen, Meadow Grass ([place] Blue)    Allergen, Mucor Racemosus    Allergen, Pecan Tree IgE    Allergen, White Luis    Allergen-Alternaria Alternata    Allergen-Blanchard    Allergen-Common Pigweed    Allergen-Silver Birch    Aspergillus fumagatus IgE    Bermuda grass IgE    Cat epithelium IgE    Cladosporium IgE    Cockroach, American IgE    [place], bald IgE    D. farinae IgE    D. pteronyssinus IgE    Dog dander IgE    Feather Panel #2    Giles grass IgE    Marsh elder, rough IgE    Mugwort IgE    Nettle IgE    Oak, white IgE    Penicillium IgE    Plantain, English IgE    RAST Allergen Maple (Orla)    RAST Allergen  Ravenden    RAST Allergen for Eastern Monson    RAST Allergen, Lamb's Quarters    RAST Allergen, Sheep Sorrel(Yellow Dock)    Ragweed, short, common IgE    Riley IgE    Bahia grass IgE    Chaetomium globosum IgE    Curvularia lunata IgE    Setomalanomma rostrata IgE    Phoma betae IgE    Allergen-Lauderdale    Allergen-Maple Redding/Monson    [place], western white IgE    Pollen, walnut IgE    [place], black IgE    Ragweed, Western IgE    Thistle, Russian IgE    Horse dander IgE    Chronic throat clearing              Plan:     RAST testing ordered. If multiple positives, will refer back to Dr. Garcia.     Discussed typical constellation of symptoms seen with some of the more common differentials for chronic cough may include but not limited to: allergic rhinitis (RAST testing ordered today), silent reflux (see GI or take daily reflux meds), sinusitis (imaging not warranted at this time due to lack of PE findings or reported symptoms), pulmonary issues (see pulmonologist), ACEi (not taking).   Patient encouraged to return to clinic if symptoms worsen/persist and as needed for further ENT symptoms or concerns.           Answers for HPI/ROS submitted by the patient on 7/14/2022  Eye Drainage?: Yes  Seasonal Allergies?: Yes

## 2022-07-22 LAB
AMER SYCAMORE IGE QN: <0.35 KU/L
FEATHER PANEL #2: <0.35 KU/L

## 2022-07-26 ENCOUNTER — OFFICE VISIT (OUTPATIENT)
Dept: CARDIOLOGY | Facility: CLINIC | Age: 62
End: 2022-07-26
Payer: COMMERCIAL

## 2022-07-26 VITALS
BODY MASS INDEX: 34.82 KG/M2 | DIASTOLIC BLOOD PRESSURE: 90 MMHG | HEIGHT: 64 IN | SYSTOLIC BLOOD PRESSURE: 145 MMHG | HEART RATE: 95 BPM | WEIGHT: 203.94 LBS

## 2022-07-26 DIAGNOSIS — R94.39 ABNORMAL STRESS TEST: ICD-10-CM

## 2022-07-26 DIAGNOSIS — I10 ESSENTIAL HYPERTENSION: ICD-10-CM

## 2022-07-26 DIAGNOSIS — I25.10 CORONARY ARTERY DISEASE INVOLVING NATIVE CORONARY ARTERY OF NATIVE HEART WITHOUT ANGINA PECTORIS: Primary | ICD-10-CM

## 2022-07-26 LAB
A ALTERNATA IGE QN: <0.1 KU/L
A FUMIGATUS IGE QN: <0.1 KU/L
ALLERGEN BOXELDER MAPLE TREE IGE: <0.1 KU/L
ALLERGEN CHAETOMIUM GLOBOSUM IGE: <0.1 KU/L
ALLERGEN MAPLE (BOX ELDER) CLASS: NORMAL
ALLERGEN MULBERRY CLASS: NORMAL
ALLERGEN MULBERRY TREE IGE: <0.1 KU/L
ALLERGEN PIGWEED IGE: <0.1 KU/L
ALLERGEN WALNUT TREE IGE: <0.1 KU/L
ALLERGEN WHITE ASH TREE IGE: <0.1 KU/L
ALLERGEN WHITE PINE TREE IGE: <0.1 KU/L
BAHIA GRASS IGE QN: <0.1 KU/L
BALD CYPRESS IGE QN: <0.1 KU/L
BERMUDA GRASS IGE QN: <0.1 KU/L
C HERBARUM IGE QN: <0.1 KU/L
C LUNATA IGE QN: <0.1 KU/L
CAT DANDER IGE QN: <0.1 KU/L
CEDAR IGE QN: <0.1 KU/L
CHAETOMIUM GLOB. CLASS: NORMAL
COCKLEBUR IGE QN: <0.1 KU/L
COMMON PIGWEED CLASS: NORMAL
COMMON RAGWEED IGE QN: <0.1 KU/L
COTTONWOOD IGE QN: <0.1 KU/L
D FARINAE IGE QN: 0.16 KU/L
D PTERONYSS IGE QN: 0.15 KU/L
DEPRECATED A ALTERNATA IGE RAST QL: NORMAL
DEPRECATED A FUMIGATUS IGE RAST QL: NORMAL
DEPRECATED BAHIA GRASS IGE RAST QL: NORMAL
DEPRECATED BALD CYPRESS IGE RAST QL: NORMAL
DEPRECATED BERMUDA GRASS IGE RAST QL: NORMAL
DEPRECATED C HERBARUM IGE RAST QL: NORMAL
DEPRECATED C LUNATA IGE RAST QL: NORMAL
DEPRECATED CAT DANDER IGE RAST QL: NORMAL
DEPRECATED CEDAR IGE RAST QL: NORMAL
DEPRECATED COCKLEBUR IGE RAST QL: NORMAL
DEPRECATED COMMON RAGWEED IGE RAST QL: NORMAL
DEPRECATED COTTONWOOD IGE RAST QL: NORMAL
DEPRECATED D FARINAE IGE RAST QL: ABNORMAL
DEPRECATED D PTERONYSS IGE RAST QL: ABNORMAL
DEPRECATED DOG DANDER IGE RAST QL: ABNORMAL
DEPRECATED ELDER IGE RAST QL: NORMAL
DEPRECATED ENGL PLANTAIN IGE RAST QL: NORMAL
DEPRECATED GOOSEFOOT IGE RAST QL: NORMAL
DEPRECATED HORSE DANDER IGE RAST QL: NORMAL
DEPRECATED JOHNSON GRASS IGE RAST QL: NORMAL
DEPRECATED KENT BLUE GRASS IGE RAST QL: NORMAL
DEPRECATED LONDON PLANE IGE RAST QL: NORMAL
DEPRECATED MUGWORT IGE RAST QL: NORMAL
DEPRECATED NETTLE IGE RAST QL: ABNORMAL
DEPRECATED P BETAE IGE RAST QL: NORMAL
DEPRECATED P NOTATUM IGE RAST QL: NORMAL
DEPRECATED PECAN/HICK TREE IGE RAST QL: NORMAL
DEPRECATED ROACH IGE RAST QL: NORMAL
DEPRECATED S ROSTRATA IGE RAST QL: NORMAL
DEPRECATED SALTWORT IGE RAST QL: NORMAL
DEPRECATED SHEEP SORREL IGE RAST QL: NORMAL
DEPRECATED SILVER BIRCH IGE RAST QL: NORMAL
DEPRECATED TIMOTHY IGE RAST QL: NORMAL
DEPRECATED WEST RAGWEED IGE RAST QL: NORMAL
DEPRECATED WHITE OAK IGE RAST QL: NORMAL
DEPRECATED WILLOW IGE RAST QL: NORMAL
DOG DANDER IGE QN: 0.12 KU/L
ELDER IGE QN: <0.1 KU/L
ELM CEDAR CLASS: NORMAL
ELM CEDAR, IGE: <0.1 KU/L
ENGL PLANTAIN IGE QN: <0.1 KU/L
GOOSEFOOT IGE QN: <0.1 KU/L
HORSE DANDER IGE QN: <0.1 KU/L
JOHNSON GRASS IGE QN: <0.1 KU/L
KENT BLUE GRASS IGE QN: <0.1 KU/L
LONDON PLANE IGE QN: <0.1 KU/L
MUGWORT IGE QN: <0.1 KU/L
NETTLE IGE QN: 0.11 KU/L
P BETAE IGE QN: <0.1 KU/L
P NOTATUM IGE QN: <0.1 KU/L
PECAN/HICK TREE IGE QN: <0.1 KU/L
ROACH IGE QN: <0.1 KU/L
S ROSTRATA IGE QN: <0.1 KU/L
SALTWORT IGE QN: <0.1 KU/L
SHEEP SORREL IGE QN: <0.1 KU/L
SILVER BIRCH IGE QN: <0.1 KU/L
TIMOTHY IGE QN: <0.1 KU/L
WALNUT TREE CLASS: NORMAL
WEST RAGWEED IGE QN: <0.1 KU/L
WHITE ASH CLASS: NORMAL
WHITE OAK IGE QN: <0.1 KU/L
WHITE PINE CLASS: NORMAL
WILLOW IGE QN: <0.1 KU/L

## 2022-07-26 PROCEDURE — 1160F RVW MEDS BY RX/DR IN RCRD: CPT | Mod: CPTII,S$GLB,, | Performed by: INTERNAL MEDICINE

## 2022-07-26 PROCEDURE — 99214 PR OFFICE/OUTPT VISIT, EST, LEVL IV, 30-39 MIN: ICD-10-PCS | Mod: S$GLB,,, | Performed by: INTERNAL MEDICINE

## 2022-07-26 PROCEDURE — 3080F DIAST BP >= 90 MM HG: CPT | Mod: CPTII,S$GLB,, | Performed by: INTERNAL MEDICINE

## 2022-07-26 PROCEDURE — 3077F PR MOST RECENT SYSTOLIC BLOOD PRESSURE >= 140 MM HG: ICD-10-PCS | Mod: CPTII,S$GLB,, | Performed by: INTERNAL MEDICINE

## 2022-07-26 PROCEDURE — 99999 PR PBB SHADOW E&M-EST. PATIENT-LVL V: ICD-10-PCS | Mod: PBBFAC,,, | Performed by: INTERNAL MEDICINE

## 2022-07-26 PROCEDURE — 3080F PR MOST RECENT DIASTOLIC BLOOD PRESSURE >= 90 MM HG: ICD-10-PCS | Mod: CPTII,S$GLB,, | Performed by: INTERNAL MEDICINE

## 2022-07-26 PROCEDURE — 4010F PR ACE/ARB THEARPY RXD/TAKEN: ICD-10-PCS | Mod: CPTII,S$GLB,, | Performed by: INTERNAL MEDICINE

## 2022-07-26 PROCEDURE — 3077F SYST BP >= 140 MM HG: CPT | Mod: CPTII,S$GLB,, | Performed by: INTERNAL MEDICINE

## 2022-07-26 PROCEDURE — 1160F PR REVIEW ALL MEDS BY PRESCRIBER/CLIN PHARMACIST DOCUMENTED: ICD-10-PCS | Mod: CPTII,S$GLB,, | Performed by: INTERNAL MEDICINE

## 2022-07-26 PROCEDURE — 99999 PR PBB SHADOW E&M-EST. PATIENT-LVL V: CPT | Mod: PBBFAC,,, | Performed by: INTERNAL MEDICINE

## 2022-07-26 PROCEDURE — 1159F PR MEDICATION LIST DOCUMENTED IN MEDICAL RECORD: ICD-10-PCS | Mod: CPTII,S$GLB,, | Performed by: INTERNAL MEDICINE

## 2022-07-26 PROCEDURE — 3066F PR DOCUMENTATION OF TREATMENT FOR NEPHROPATHY: ICD-10-PCS | Mod: CPTII,S$GLB,, | Performed by: INTERNAL MEDICINE

## 2022-07-26 PROCEDURE — 1159F MED LIST DOCD IN RCRD: CPT | Mod: CPTII,S$GLB,, | Performed by: INTERNAL MEDICINE

## 2022-07-26 PROCEDURE — 4010F ACE/ARB THERAPY RXD/TAKEN: CPT | Mod: CPTII,S$GLB,, | Performed by: INTERNAL MEDICINE

## 2022-07-26 PROCEDURE — 3008F BODY MASS INDEX DOCD: CPT | Mod: CPTII,S$GLB,, | Performed by: INTERNAL MEDICINE

## 2022-07-26 PROCEDURE — 99214 OFFICE O/P EST MOD 30 MIN: CPT | Mod: S$GLB,,, | Performed by: INTERNAL MEDICINE

## 2022-07-26 PROCEDURE — 3008F PR BODY MASS INDEX (BMI) DOCUMENTED: ICD-10-PCS | Mod: CPTII,S$GLB,, | Performed by: INTERNAL MEDICINE

## 2022-07-26 PROCEDURE — 3066F NEPHROPATHY DOC TX: CPT | Mod: CPTII,S$GLB,, | Performed by: INTERNAL MEDICINE

## 2022-07-26 NOTE — PROGRESS NOTES
Subjective:    Patient ID:  Elizabeth Giordano is a 61 y.o. female who presents for follow-up of Annual Exam      HPI  She comes with no complaints, no chest pain, no shortness of breath  Nonobstructive CAD in 2019   Main complaint at this time is palpitations, mostly at night or when she is resting.  No syncope    Review of Systems   Constitutional: Negative for decreased appetite, malaise/fatigue, weight gain and weight loss.   Cardiovascular: Negative for chest pain, dyspnea on exertion, leg swelling, palpitations and syncope.   Respiratory: Negative for cough and shortness of breath.    Gastrointestinal: Negative.    Neurological: Negative for weakness.   All other systems reviewed and are negative.       Objective:      Physical Exam  Vitals and nursing note reviewed.   Constitutional:       Appearance: Normal appearance. She is well-developed.   HENT:      Head: Normocephalic.   Eyes:      Pupils: Pupils are equal, round, and reactive to light.   Neck:      Thyroid: No thyromegaly.      Vascular: No carotid bruit or JVD.   Cardiovascular:      Rate and Rhythm: Normal rate and regular rhythm.      Chest Wall: PMI is not displaced.      Pulses: Normal pulses and intact distal pulses.      Heart sounds: Normal heart sounds. No murmur heard.    No gallop.   Pulmonary:      Effort: Pulmonary effort is normal.      Breath sounds: Normal breath sounds.   Abdominal:      Palpations: Abdomen is soft. There is no mass.      Tenderness: There is no abdominal tenderness.   Musculoskeletal:         General: Normal range of motion.      Cervical back: Normal range of motion and neck supple.   Skin:     General: Skin is warm.   Neurological:      Mental Status: She is alert and oriented to person, place, and time.      Sensory: No sensory deficit.      Deep Tendon Reflexes: Reflexes are normal and symmetric.           Assessment:       1. Coronary artery disease involving native coronary artery of native heart without  angina pectoris    2. Abnormal stress test    3. Essential hypertension         Plan:   Holter, call with results  Continue all cardiac medications  Regular exercise program  Weight loss  One year follow-up with Kristen if Holter okay

## 2022-07-29 ENCOUNTER — PATIENT MESSAGE (OUTPATIENT)
Dept: ADMINISTRATIVE | Facility: HOSPITAL | Age: 62
End: 2022-07-29
Payer: COMMERCIAL

## 2022-08-11 ENCOUNTER — PATIENT MESSAGE (OUTPATIENT)
Dept: OTOLARYNGOLOGY | Facility: CLINIC | Age: 62
End: 2022-08-11
Payer: COMMERCIAL

## 2022-08-19 ENCOUNTER — PATIENT OUTREACH (OUTPATIENT)
Dept: ADMINISTRATIVE | Facility: HOSPITAL | Age: 62
End: 2022-08-19
Payer: COMMERCIAL

## 2022-08-19 NOTE — PROGRESS NOTES
Non-compliant report chart audits. Chart review completed for HM test overdue (Mammogram, Colon Cancer Screening, Pap smear, DM labs, HTN and/or Eye Exam)      Care Everywhere and media, updates requested and reviewed.        Contacted about htn, pt has cardio appt 8/23

## 2022-09-12 NOTE — LETTER
December 28, 2020      Vaughn Ortiz MD  [address] [place] [address] [address]  [place] [address]           [facility]  [address] [place] [address]  Phone: [phone]          Patient: Elizabeth Giordano   MR Number: [id number]   YOB: 1960   Date of Visit: 12/28/2020       Dear Dr. Vaughn Ortiz:    Thank you for referring Elizabeth Giordano to me for evaluation. Attached you will find relevant portions of my assessment and plan of care.    If you have questions, please do not hesitate to call me. I look forward to following Elizabeth Giordano along with you.    Sincerely,    Bentley Pascal MD    Enclosure  CC:  No Recipients    If you would like to receive this communication electronically, please contact [email or web address] or [phone] to request more information on [facility] access.    For providers and/or their staff who would like to refer a patient to [facility], please contact us through our one-stop-shop provider referral line, [facility] , at [phone].    If you feel you have received this communication in error or would no longer like to receive these types of communications, please e-mail [email or web address]          [Chest Pain] : chest pain [Shortness Of Breath] : shortness of breath [Wheezing] : wheezing [Negative] : Heme/Lymph

## 2022-09-21 ENCOUNTER — OFFICE VISIT (OUTPATIENT)
Dept: PAIN MEDICINE | Facility: CLINIC | Age: 62
End: 2022-09-21
Payer: COMMERCIAL

## 2022-09-21 VITALS
BODY MASS INDEX: 34.87 KG/M2 | HEIGHT: 64 IN | WEIGHT: 204.25 LBS | HEART RATE: 74 BPM | SYSTOLIC BLOOD PRESSURE: 132 MMHG | DIASTOLIC BLOOD PRESSURE: 62 MMHG | OXYGEN SATURATION: 97 %

## 2022-09-21 DIAGNOSIS — M54.12 CERVICAL RADICULOPATHY: Primary | ICD-10-CM

## 2022-09-21 DIAGNOSIS — M51.36 DDD (DEGENERATIVE DISC DISEASE), LUMBAR: ICD-10-CM

## 2022-09-21 DIAGNOSIS — M50.30 DDD (DEGENERATIVE DISC DISEASE), CERVICAL: ICD-10-CM

## 2022-09-21 DIAGNOSIS — M48.061 SPINAL STENOSIS OF LUMBAR REGION WITHOUT NEUROGENIC CLAUDICATION: ICD-10-CM

## 2022-09-21 PROCEDURE — 99999 PR PBB SHADOW E&M-EST. PATIENT-LVL V: ICD-10-PCS | Mod: PBBFAC,,, | Performed by: PHYSICIAN ASSISTANT

## 2022-09-21 PROCEDURE — 3078F DIAST BP <80 MM HG: CPT | Mod: CPTII,S$GLB,, | Performed by: PHYSICIAN ASSISTANT

## 2022-09-21 PROCEDURE — 1160F RVW MEDS BY RX/DR IN RCRD: CPT | Mod: CPTII,S$GLB,, | Performed by: PHYSICIAN ASSISTANT

## 2022-09-21 PROCEDURE — 3066F NEPHROPATHY DOC TX: CPT | Mod: CPTII,S$GLB,, | Performed by: PHYSICIAN ASSISTANT

## 2022-09-21 PROCEDURE — 3008F BODY MASS INDEX DOCD: CPT | Mod: CPTII,S$GLB,, | Performed by: PHYSICIAN ASSISTANT

## 2022-09-21 PROCEDURE — 4010F ACE/ARB THERAPY RXD/TAKEN: CPT | Mod: CPTII,S$GLB,, | Performed by: PHYSICIAN ASSISTANT

## 2022-09-21 PROCEDURE — 3008F PR BODY MASS INDEX (BMI) DOCUMENTED: ICD-10-PCS | Mod: CPTII,S$GLB,, | Performed by: PHYSICIAN ASSISTANT

## 2022-09-21 PROCEDURE — 99214 OFFICE O/P EST MOD 30 MIN: CPT | Mod: S$GLB,,, | Performed by: PHYSICIAN ASSISTANT

## 2022-09-21 PROCEDURE — 3075F SYST BP GE 130 - 139MM HG: CPT | Mod: CPTII,S$GLB,, | Performed by: PHYSICIAN ASSISTANT

## 2022-09-21 PROCEDURE — 99214 PR OFFICE/OUTPT VISIT, EST, LEVL IV, 30-39 MIN: ICD-10-PCS | Mod: S$GLB,,, | Performed by: PHYSICIAN ASSISTANT

## 2022-09-21 PROCEDURE — 3066F PR DOCUMENTATION OF TREATMENT FOR NEPHROPATHY: ICD-10-PCS | Mod: CPTII,S$GLB,, | Performed by: PHYSICIAN ASSISTANT

## 2022-09-21 PROCEDURE — 3078F PR MOST RECENT DIASTOLIC BLOOD PRESSURE < 80 MM HG: ICD-10-PCS | Mod: CPTII,S$GLB,, | Performed by: PHYSICIAN ASSISTANT

## 2022-09-21 PROCEDURE — 3075F PR MOST RECENT SYSTOLIC BLOOD PRESS GE 130-139MM HG: ICD-10-PCS | Mod: CPTII,S$GLB,, | Performed by: PHYSICIAN ASSISTANT

## 2022-09-21 PROCEDURE — 99999 PR PBB SHADOW E&M-EST. PATIENT-LVL V: CPT | Mod: PBBFAC,,, | Performed by: PHYSICIAN ASSISTANT

## 2022-09-21 PROCEDURE — 1160F PR REVIEW ALL MEDS BY PRESCRIBER/CLIN PHARMACIST DOCUMENTED: ICD-10-PCS | Mod: CPTII,S$GLB,, | Performed by: PHYSICIAN ASSISTANT

## 2022-09-21 PROCEDURE — 4010F PR ACE/ARB THEARPY RXD/TAKEN: ICD-10-PCS | Mod: CPTII,S$GLB,, | Performed by: PHYSICIAN ASSISTANT

## 2022-09-21 PROCEDURE — 1159F PR MEDICATION LIST DOCUMENTED IN MEDICAL RECORD: ICD-10-PCS | Mod: CPTII,S$GLB,, | Performed by: PHYSICIAN ASSISTANT

## 2022-09-21 PROCEDURE — 1159F MED LIST DOCD IN RCRD: CPT | Mod: CPTII,S$GLB,, | Performed by: PHYSICIAN ASSISTANT

## 2022-09-21 RX ORDER — TRIAMCINOLONE ACETONIDE 1 MG/G
CREAM TOPICAL 2 TIMES DAILY PRN
COMMUNITY
Start: 2022-09-06 | End: 2023-11-13

## 2022-09-26 NOTE — PROGRESS NOTES
CHIEF COMPLAINT/REASON FOR VISIT: low back pain    History of present illness: The patient is a 61 year old woman with a history of migraines, carpal tunnel syndrome and low back pain since her early 20s.  She returns in follow-up today with neck pain greater than back pain.  She states that her low back is better than usual.  She describes midline neck pain radiating to the right trapezius muscle and right shoulder.  She reports some itching in her right shoulder and along her right lateral wrist.  She reports intermittent upper extremity weakness bilaterally.  She states that she has been walking about 2 miles a day while at work.  She denies incontinence or new numbness.    Pain intervention history:She is status post TFESI bilaterally to L3 on 12/6/2011 with no relief. Past history of status post L4/5 YARON on 5/25/11 with about 90% relief that lasted 3 weeks. She is status post 2 bilateral L3 transforaminal injections with 3 weeks relief each time.  She is status post L4/5 interlaminar epidural steroid injection on 1/8/14 with 90% relief.  She is status post L5/S1 interlaminar epidural steroid injection on 6/9/14 with moderate relief only on the right low back and right leg lasting 2 weeks.  She is status post bilateral L3/4 and L4/5 facet joint injections on 4/13/15 with initially 100% relief of her back pain and 80% relief of her left lateral hip and lateral thigh pain, now reporting at least 50% relief of both.  She is status post bilateral L2, 3 and 4 medial branch radiofrequency ablation on 5/20/15 with 60% relief.   She is status post bilateral L2, 3 and 4 medial branch radiofrequency ablation on 6/20/16 with 50-70% relief.  She is status post L5/S1 interlaminar epidural steroid injection on 3/10/17 with 50% relief.  She is status post bilateral L2, 3, 4 medial branch radiofrequency ablation on 7/17/17 with about 75% relief.  She is status post bilateral L2, 3, 4 medial branch radiofrequency ablation on  "07/17/2018 with 100% relief of her leg pain and 70-75% relief of her back pain.  She is status post C7-T1 cervical interlaminar epidural steroid injection on 09/25/2018 with 80% relief.   She is status post L5/S1 interlaminar epidural steroid injection on 12/27/2018 with 60% relief.  She is status post bilateral L2, 3 and 4 medial branch radiofrequency ablation on 07/29/2019 with 40% relief.  She is status post L5/S1 interlaminar epidural steroid injection on 09/04/2019 with almost 100% relief of her right leg pain but 0% relief for back pain. She is status post C7-T1 interlaminar epidural steroid injection on 10/24/2019 with 70% relief.       Spine surgeries:    Antineuropathics: failed gabapentin, failed Cymbalta due to side effects  NSAIDs:  Ibuprofen  Physical therapy:  Antidepressants:  Muscle relaxers:  Opioids:  Tramadol 50 mg twice daily as needed  Antiplatelets/Anticoagulants:  ASA 81 mg    ROS: She reports back pain only.  Balance of review of systems is negative.    Medical, surgical, family and social history reviewed elsewhere in record.     Medications/Allergies: See med card      Vitals:    09/21/22 1345   BP: 132/62   Pulse: 74   SpO2: 97%   Weight: 92.7 kg (204 lb 4.1 oz)   Height: 5' 4" (1.626 m)   PainSc:   6   PainLoc: Hip        PHYSICAL EXAM:  Gen: A and O x3, pleasant, well-groomed  HEENT: PERRLA  CVS: Regular rate and rhythm  Resp:  No obvious shortness of breath, no increased work of breathing  Musculoskeletal: Able to heel walk, toe walk. No antalgic gait.     Neuro:  Upper extremities: 5/5 strength bilaterally   Lower extremities: 5/5 strength bilaterally  Reflexes: Brachioradialis 2+, Bicep 2+, Tricep 2+.   Patellar 2+, Achilles 2+.  Sensory: Intact and symmetrical to light touch and pinprick in C2-T1 dermatomes bilaterally.Intact and symmetrical to light touch and pinprick in L2-S1 dermatomes bilaterally.    Cervical spine:  Range of motion is mildly reduced with flexion, extension " lateral rotation with increased right neck pain during right lateral station.  Myofascial exam:  Tenderness to palpation to the right cervical paraspinous muscles.    Lumbar spine:  Lumbar spine: ROM is moderately limited with flexion and extension with increased bilateral low back pain during extension and oblique extension.  Supine straight leg raise is negative bilaterally.  Internal and external rotation of the hip causes no increased pain in either side.  Myofascial exam:  Mild tenderness to palpation to the right greater than left lumbar paraspinous muscles.      IMAGING:   MRI L-SPINE 5/10/11   IMPRESSION: AT L3-4, THERE IS CIRCUMFERENTIAL DISC PROTRUSION FOCALLY MORE PROMINENT TO THE LEFT AS WELL AS A SMALL DISC HERNIATION PROTRUDING INFERIORLY BEHIND L4 TO THE LEFT OF MIDLINE. THIS PRODUCES SPINAL CANAL AND BILATERAL NEURAL FORAMINAL STENOSIS, LEFT GREATER THAN RIGHT. AT L4-5, THERE IS CIRCUMFERENTIAL DISC PROTRUSION FOCALLY MORE PROMINENT TO THE RIGHT WITH MILD SPINAL CANAL AND RIGHT NEURAL FORAMINAL STENOSIS. AT L1-2 AND L2-3 ALTHOUGH THERE ARE DISC PROTRUSIONS IDENTIFIED, SIGNIFICANT STENOSIS IS NOT SEEN.     MRI lumbar spine 7/10/14  L1-2:There is chronic relatively advanced disc degeneration with disc space narrowing, disc dehydration, disc narrowing, endplate osteophytes, and degenerative vertebral endplate marrow change. There is a diffuse 2-mm posterior disc bulge with a superimposed4-mm right posterior disc extrusion causing mild right foraminal stenosis. There is no impingement of the right L1 nerve root and there has been no change.  L2-3: There is severe and chronic disc degeneration with severe disc space narrowing, disc dehydration, degenerative vertebral endplate marrow change, and vertebral endplate osteophytes. There is a diffuse 2-mm posterior disc bulge with a superimposed 4-mm right posterior disc extrusion causing mild right foraminal stenosis. There is no impingement of the right L2  nerve root and there has been no significant change.  L3-4: There is severe disc degeneration which has progressed since the prior study. There is severe disc space narrowing, disc dehydration, degenerative vertebral endplate marrow change and endplate osteophytes. There is also mild posterior subluxation of L3over a distance of 4 mm. There is a broad-based 5-mm posterior disc extrusion. There is degenerative facet arthrosis with ligamentum flavum thickening. The combination of facet arthrosis and disc extrusion results in relatively severe spinal canal stenosis with compression of the thecal sac to an AP diameter of 5 mm, moderate right foraminal stenosis, and severe left foraminal stenosis. The spinal stenosis has also progressed since the prior study.  L4-5:There is a diffuse posterior disc bulge with a superimposed 3-mm left posterior paracentral disc protrusion causing left paracentral thecal sac compression. There is moderate left and mild right foraminal stenosis and there has been no significant change. There is mild degenerative facet arthrosis with ligamentum flavum thickening and small joint effusions.  L5-S1:There is no significant compromise of the spinal canal or foramina and there has been no change.    X-ray cervical spine 6/8/15  There is loss of normal cervical lordosis which may be positional or related muscular strain. Intervertebral disk height loss is noted at the C5-C6 C6-C7 levels and to a lesser degree C4-C5 and C7-T1 levels. Vertebral body alignment appears otherwise adequate. Vertebral body heights appear well-preserved. The atlas and odontoid appear in good relationship to each other. Osseous neuroforaminal narrowing is noted at the C3-C4 C4-C5 C5-C6 levels on the left and at the C4-C5 C5-C6 and C6-C7 levels on the right     07/10/2018 MRI cervical spine [facility] report  C2-3 broad-based signal asymmetry at the left subarticular and foraminal zone reflecting implant spondylosis and  disc bulge complex, mild to moderate left asymmetric foraminal narrowing, ectasia of the left vertebral artery, contralateral right asymmetric facet hypertrophy, right foramen widely narrowed, disc partially desiccated without collapse  C3-4 moderate to severe left greater than right foraminal narrowing secondary to uncinate joint hypertrophic signal alteration, disc partially desiccated  C4-5 endplate spondylosis moderate diffuse disc bulge noted, broad-based right paracentral disc herniation, asymmetric flattening of the ventral cord surface at the right paracentral zone, high-grade if lateral right foraminal narrowing, AP diameter of canal 9.9 mm, contralaterally, high grade left foraminal narrowing secondary to facet greater than uncinate joint hypertrophic signal alteration, disc desiccated and mildly narrowed  C5-6 disc space narrowing evident with generalized in Nigel spondylosis and concentric inter pose disc bulge complex, high-grade bilateral foraminal narrowing, flattening of the cord surface, AP diameter 7.9 mm, symmetric facet arthrosis, disc diffusely desiccated and narrowed  C6-7 moderate endplate spondylosis and concentric disc bulge complex, high-grade bilateral foraminal compromise, flattening of the anterior cord surface, AP diameter 8.8 mm, facet arthrosis symmetric, disc desiccated and narrowed  C7-T1 less than 2 mm depth disc bulge    11/06/2018 MRI lumbar spine  T12/L1: There is no evidence of disc protrusion, canal or foraminal stenosis.  L1/L2: Right posterolateral disc protrusion is evident and there is mild distortion of the right anterolateral canal.  Degenerative facet changes are noted bilaterally.  The left foramen is intact.  L2/L3: There is annular disc bulging with a superimposed right posterolateral disc extrusion.  This is slightly more pronounced on the previous examination and there is mild effacement of the anterior thecal sac and moderate narrowing the right foramen.  L3/L4:  There is annular disc bulge and osteophyte formation with a superimposed small left posterolateral disc extrusion.  There is moderate narrowing the central canal and left foramen.  This is little changed relative the previous examination.  Degenerative facet changes are noted.  L5/S1: There is a small central disc extrusion superimposed upon annular disc bulging.  This is slightly less pronounced than was seen previously.  Degenerative facet changes are noted.  L5/S1: Moderate degenerative facet changes are noted and there is mild effacement of the anterior thecal sac.  There is ligamentous hypertrophy particularly to the left in the posterior canal and there is left greater than right foraminal narrowing.  This is mildly worsened relative prior exam.      ASSESSMENT:  The patient is a 61 year old woman with a history of migraines, carpal tunnel syndrome and low back pain since her early 20s who returns in follow up.   1. Cervical radiculopathy        2. DDD (degenerative disc disease), cervical        3. DDD (degenerative disc disease), lumbar        4. Spinal stenosis of lumbar region without neurogenic claudication            Plan:  1.  Dr. Abdalla provided prescriptions for tramadol 50 milligrams twice daily as needed for pain.  I have reviewed the [place] Board of Pharmacy website and there are no abberancies.    2. Follow-up in 3 months or sooner as needed.

## 2022-10-05 DIAGNOSIS — I10 ESSENTIAL HYPERTENSION: ICD-10-CM

## 2022-10-05 RX ORDER — AMLODIPINE BESYLATE 5 MG/1
5 TABLET ORAL DAILY
Qty: 90 TABLET | Refills: 1 | Status: SHIPPED | OUTPATIENT
Start: 2022-10-05 | End: 2023-06-14

## 2022-10-05 NOTE — TELEPHONE ENCOUNTER
Refill Decision Note   Elizabeth Giordano  is requesting a refill authorization.  Brief Assessment and Rationale for Refill:  Approve     Medication Therapy Plan:       Medication Reconciliation Completed: No   Comments:     No Care Gaps recommended.     Note composed:12:57 PM 10/05/2022

## 2022-10-05 NOTE — TELEPHONE ENCOUNTER
No new care gaps identified.  [facility] Embedded Care Gaps. Reference number: [id number]. 10/05/2022   8:10:25 AM CDT

## 2022-10-28 DIAGNOSIS — I25.10 CORONARY ARTERY DISEASE, UNSPECIFIED VESSEL OR LESION TYPE, UNSPECIFIED WHETHER ANGINA PRESENT, UNSPECIFIED WHETHER NATIVE OR TRANSPLANTED HEART: ICD-10-CM

## 2022-10-28 RX ORDER — ATORVASTATIN CALCIUM 10 MG/1
TABLET, FILM COATED ORAL
Qty: 90 TABLET | Refills: 1 | Status: SHIPPED | OUTPATIENT
Start: 2022-10-28 | End: 2023-06-14

## 2022-10-28 NOTE — TELEPHONE ENCOUNTER
Refill Decision Note   Elizabeth Giordano  is requesting a refill authorization.  Brief Assessment and Rationale for Refill:  Approve     Medication Therapy Plan:       Medication Reconciliation Completed: No   Comments:     No Care Gaps recommended.     Note composed:5:25 PM 10/28/2022

## 2022-10-28 NOTE — TELEPHONE ENCOUNTER
No new care gaps identified.  [facility] Embedded Care Gaps. Reference number: [id number]. 10/28/2022   12:48:02 PM CDT

## 2022-11-07 DIAGNOSIS — G43.719 INTRACTABLE CHRONIC MIGRAINE WITHOUT AURA AND WITHOUT STATUS MIGRAINOSUS: ICD-10-CM

## 2022-11-07 RX ORDER — GALCANEZUMAB 120 MG/ML
120 INJECTION, SOLUTION SUBCUTANEOUS
Qty: 1 ML | Refills: 0 | Status: SHIPPED | OUTPATIENT
Start: 2022-11-07 | End: 2022-11-10 | Stop reason: SDUPTHER

## 2022-11-08 ENCOUNTER — TELEPHONE (OUTPATIENT)
Dept: PHARMACY | Facility: CLINIC | Age: 62
End: 2022-11-08
Payer: COMMERCIAL

## 2022-11-10 ENCOUNTER — OFFICE VISIT (OUTPATIENT)
Dept: NEUROLOGY | Facility: CLINIC | Age: 62
End: 2022-11-10
Payer: COMMERCIAL

## 2022-11-10 DIAGNOSIS — G43.719 INTRACTABLE CHRONIC MIGRAINE WITHOUT AURA AND WITHOUT STATUS MIGRAINOSUS: ICD-10-CM

## 2022-11-10 PROCEDURE — 3066F PR DOCUMENTATION OF TREATMENT FOR NEPHROPATHY: ICD-10-PCS | Mod: CPTII,95,, | Performed by: NURSE PRACTITIONER

## 2022-11-10 PROCEDURE — 4010F ACE/ARB THERAPY RXD/TAKEN: CPT | Mod: CPTII,95,, | Performed by: NURSE PRACTITIONER

## 2022-11-10 PROCEDURE — 99213 OFFICE O/P EST LOW 20 MIN: CPT | Mod: 95,,, | Performed by: NURSE PRACTITIONER

## 2022-11-10 PROCEDURE — 1159F MED LIST DOCD IN RCRD: CPT | Mod: CPTII,95,, | Performed by: NURSE PRACTITIONER

## 2022-11-10 PROCEDURE — 4010F PR ACE/ARB THEARPY RXD/TAKEN: ICD-10-PCS | Mod: CPTII,95,, | Performed by: NURSE PRACTITIONER

## 2022-11-10 PROCEDURE — 1159F PR MEDICATION LIST DOCUMENTED IN MEDICAL RECORD: ICD-10-PCS | Mod: CPTII,95,, | Performed by: NURSE PRACTITIONER

## 2022-11-10 PROCEDURE — 3066F NEPHROPATHY DOC TX: CPT | Mod: CPTII,95,, | Performed by: NURSE PRACTITIONER

## 2022-11-10 PROCEDURE — 99213 PR OFFICE/OUTPT VISIT, EST, LEVL III, 20-29 MIN: ICD-10-PCS | Mod: 95,,, | Performed by: NURSE PRACTITIONER

## 2022-11-10 PROCEDURE — 1160F RVW MEDS BY RX/DR IN RCRD: CPT | Mod: CPTII,95,, | Performed by: NURSE PRACTITIONER

## 2022-11-10 PROCEDURE — 1160F PR REVIEW ALL MEDS BY PRESCRIBER/CLIN PHARMACIST DOCUMENTED: ICD-10-PCS | Mod: CPTII,95,, | Performed by: NURSE PRACTITIONER

## 2022-11-10 RX ORDER — GALCANEZUMAB 120 MG/ML
120 INJECTION, SOLUTION SUBCUTANEOUS
Qty: 1 ML | Refills: 11 | Status: SHIPPED | OUTPATIENT
Start: 2022-11-10 | End: 2023-10-22 | Stop reason: SDUPTHER

## 2022-11-10 NOTE — PATIENT INSTRUCTIONS
WORKUP:  -- none     PREVENTION (use daily regardless of headache):  -- continue Emgality. If still doing this well in 6 months, we can consider weaning    ACUTE TREATMENT:  -- continue Ubrelvy

## 2022-11-10 NOTE — PROGRESS NOTES
"Date of service: 11/10/2022  Referring provider: No ref. provider found    Subjective:      Chief complaint: Headache (Follow up)       Patient ID: Elizabeth Giordano is a 62 y.o. lady with carpal tunnel syndrome, low back pain secondary to lumbar spondylosis and DDD, lumbar radiculopathy followed by Dr. Abdalla, factor V leiden mutation, GERD and migraines here for follow up of migraines.     History of Present Illness    INTERVAL HISTORY   The patient location is: home   The chief complaint leading to consultation is: follow up  Visit type: audiovisual  Face to Face time with patient: 10  15 minutes of total time spent on the encounter, which includes face to face time and non-face to face time preparing to see the patient (eg, review of tests), Obtaining and/or reviewing separately obtained history, Documenting clinical information in the electronic or other health record, Independently interpreting results (not separately reported) and communicating results to the patient/family/caregiver, or Care coordination (not separately reported).   Each patient to whom he or she provides medical services by telemedicine is:  (1) informed of the relationship between the physician and patient and the respective role of any other health care provider with respect to management of the patient; and (2) notified that he or she may decline to receive medical services by telemedicine and may withdraw from such care at any time.    Notes:     Last office visit was 13 months ago. At that time she was doing very well on Emgality.    Today she reports she is "still doing good". She did try to "wean" off Emgality. She would delay dose to 30 days and migraines would return. When taken every 28 days, she gets maybe two headaches per month. They are frontal. Current pain 0 with range 0-3. She treats early with Ubrelvy which is very effective. Otherwise information below is reviewed and verified with no changes made    INTERVAL " "HISTORY 9/16/21  The patient location is: home  The chief complaint leading to consultation is: follow up  Visit type: audiovisual  Face to Face time with patient: 10  20 minutes of total time spent on the encounter, which includes face to face time and non-face to face time preparing to see the patient (eg, review of tests), Obtaining and/or reviewing separately obtained history, Documenting clinical information in the electronic or other health record, Independently interpreting results (not separately reported) and communicating results to the patient/family/caregiver, or Care coordination (not separately reported).   Each patient to whom he or she provides medical services by telemedicine is:  (1) informed of the relationship between the physician and patient and the respective role of any other health care provider with respect to management of the patient; and (2) notified that he or she may decline to receive medical services by telemedicine and may withdraw from such care at any time.    Notes:     Last visit was over a year ago and at that time she was doing well and we were still weaning off Botox. We added Ubrelvy for migraine abortive.     Today she reports she is still doing very well. She reports "maybe three" headache days per month and all are mild. She take Ubrelvy three times per month or less. She cannot recall the last time she had a full escalation to migraine. She has been on Emgality for a year as insurance would not cover Botox.    INTERVAL HISTORY 2/11/2020    At last office visit, patient was doing very well on Botox and interested in weaning off. Last Botox session was almost 15 weeks ago. Abortive medications worked well. She now has new insurance and Botox no longer possible due to pharmacy/distribution issues.     Today she reports she is about the same. When headaches present, they are in the front right, behind eye. Current pain 0 with range 0-10. She takes excedrin or amerge three " "times per month. Three migraines per month.     INTERVAL HISTORY 12/2/19    Patient presents for 6-week follow up of her 7th Botox session. She previously reported 90% relief of headaches, no wean off effect, and was interested in weaning off of Botox. She reports she is overall much better. Only one small migraine and it was "knocked out" with medications (amerge). Headaches remain right side of head and right eye. current pain 0 with range 0-10.     I/NTERVAL HISTORY - 7/17/18     She is status post first botox session 5/24/18. Today she reports she is a little better. She had no migraines for the first 1.5 weeks and now has returned to 3 headache days per week. Her current headaches are right side of head and eye. Her current pain is 0 with a range of 0 to 10. She remains on Effexor and naratriptan. The effexor still has not been helpful.     INTERVAL HISTORY - 5/8/18     At last visit we covered topiramate, which was working to reduce headaches by 50%, to zonisamide due to significant memory problems.The memory problems DID improve. The zonisamide caused itchiness so 6 weeks ago we converted to Effexor. She is now having MORE headaches - 3-4 per week. The as needed medications are working well.     INTERVAL HISTORY - 1/30/18     Last visit 3 months ago we planned on continuing topiramate titration and sumatriptan for acute.    Today she reports the current regimen seems to be working - her regular baseline headache went down from 12-15 per month to about half that, and had one long flare since last seen - used naratriptan once came back, used sumatriptan once, came back . Her current pain is 0, with a range of 0 to 10. The pain is unchanged in location.    She has been having significant memory problems since being on topiramate.     INTERVAL HISTORY - 10/25/17     Last seen 2 months ago at which point I raised topiramate and started naratriptan bridges for long migraines. Today she reports she is about the " same. However, last week she had a good week and had no headaches.The headaches have the same characteristics are before. Her current pain score is 5. Her range is 0-10. She can not predict when she will have severe migraines so naratriptan bridge was taken at terminal end (day 4 of flare), not at the start, and did not have the intended effect. She has tingling in all toes (topamax) but worse on right and gets injections in her foot for this.    HEADACHE HISTORY - 8/9/17   Age at onset and course over time: migraines began in her 30s, she reports going through menopause at age 40 (unclear why) and she thinks this may have started it. One day just became more sensitive to sensory stimuli especially smells. Had severe migraines at least one time per week. Started Celexa for this purpose which helped her sensitivity to smells. Overall thinks her headache number has stayed the same over the years but pain has become more severe and she is yet again more sensitive to smells, thinks medications aren't working as well.   Location: right side behind eye (baseline headache and migraine)   Quality: throbbing   Severity: current 2/10, at worst 10/10   Duration: exacerbations last 5-6 days   Frequency: baseline headache about 12-15 days per month; every other month will have a flare lasting 5-6 days   Alleviated by: sleep, ice, medication   Exacerbated by: light, noise, smells, coughing   Associated with: photo/noise, phobia, osmophobia. No aura. + right eye tearing. No ptosis, no redness. No eye irritation. No ear fullness.   Sleep habits:  Caffeine intake: 1     She is on daily HRT for menopause.     Current acute treatment - migraines treated 4 times per month    -- tramadol 50mg BID  -- zofran rare use  Ubrelvy - effective     Current prevention:  Emgality - started August 2020    (losarta)  (amlodipine)    Previously tried/failed acute treatment:  -- hydrocodone: allergy  -- oxycodone: allergy   -- zomig   -- relpax   --  naproxen  -- tramadol   -- fiorinal with codeine   -- Goody's   -- Excedrin   -- tylenol  -- ASA  - tizanidine 2mg nightly - for migraines, daily   -- sumatriptan 100mg at onset of headache, gets recurrent headache next day, (zomig works better but not covered by her insurance)  -- amerge - works OK but sumatriptan is cheaper   -- meloxicam 15mg for  Arthritis in right thumb daily    Previously tried/failed preventative treatment:  -- amitriptyline (for back pain, but migraines were no better)  -- gabapentin - for back pain, didn't help   -- lyrica - memory problems   -- topiramate 100mg BID Tingling in toes - helping with migraines but having significant memory problems   -- zonisamide - itchy   (celexa 40mg)   -- Effexor XR 150mg in the morning - started 2/27/18 - headaches are worse  -- Botox 7 sessions -- worked very well, migraines in near remission   (gabapentin)    Hx multiple lumbar ESIs and LMB RFAs for low back pain/radiculopathy       Review of patient's allergies indicates:   Allergen Reactions    Hydrocodone Hives and Nausea Only           Sulfa (sulfonamide antibiotics) Hives and Rash           Oxycodone Itching and Nausea And Vomiting     Current Outpatient Medications   Medication Sig Dispense Refill    amLODIPine (NORVASC) 5 MG tablet Take 1 tablet (5 mg total) by mouth once daily. 90 tablet 1    ascorbic acid, vitamin C, (VITAMIN C) 500 MG tablet Take 1 tablet (500 mg total) by mouth 2 (two) times daily.      aspirin (ECOTRIN) 81 MG EC tablet Take 81 mg by mouth once daily.      atorvastatin (LIPITOR) 10 MG tablet TAKE 1 TABLET(10 MG) BY MOUTH EVERY DAY 90 tablet 1    benzonatate (TESSALON) 200 MG capsule TAKE 1 CAPSULE(200 MG) BY MOUTH THREE TIMES DAILY AS NEEDED FOR COUGH 30 capsule 1    cimetidine (TAGAMET) 800 MG tablet TAKE ONE TABLET BY MOUTH EVERY EVENING 90 tablet 3    estradioL (ESTRACE) 1 MG tablet Take 1 tablet (1 mg total) by mouth once daily. Can cause blood clots with covid, recommend  NOT taking this medication until covid symptoms completely gone and discussing with PCP  3    eszopiclone (LUNESTA) 3 mg Tab TAKE 1 TABLET BY MOUTH NIGHTLY 90 tablet 1    fluconazole (DIFLUCAN) 100 MG tablet Take by mouth.      galcanezumab-gnlm (EMGALITY PEN) 120 mg/mL PnIj Inject 1 pen (120 mg total) into the skin every 28 days. 1 mL 11    ibuprofen (ADVIL,MOTRIN) 800 MG tablet TAKE 1 TABLET(800 MG) BY MOUTH DAILY AS NEEDED FOR PAIN 30 tablet 0    losartan (COZAAR) 100 MG tablet TAKE 1 TABLET(100 MG) BY MOUTH EVERY DAY 90 tablet 2    ondansetron (ZOFRAN-ODT) 4 MG TbDL Take 1 tablet (4 mg total) by mouth every 6 (six) hours as needed (nausea). 20 tablet 1    pantoprazole (PROTONIX) 40 MG tablet TAKE 1 TABLET(40 MG) BY MOUTH EVERY DAY 90 tablet 3    tobramycin-dexamethasone 0.3-0.1% (TOBRADEX) 0.3-0.1 % DrpS Place 1 drop into both eyes every 6 (six) hours. 5 mL 0    traMADoL (ULTRAM) 50 mg tablet TAKE 1 TABLET BY MOUTH EVERY 12 HOURS AS NEEDED FOR PAIN. 60 tablet 2    triamcinolone acetonide 0.1% (KENALOG) 0.1 % cream Apply topically 2 (two) times daily as needed.      ubrogepant (UBRELVY) 50 mg tablet Take 1 tablet by mouth at onset of migraine. May repeat in 2 hours if needed. Max 2 tablets per day. 10 tablet 11    vitamin D (VITAMIN D3) 1000 units Tab Take 1 tablet (1,000 Units total) by mouth once daily.      zinc sulfate (ZINCATE) 220 (50) mg capsule Take 1 capsule (220 mg total) by mouth once daily.       Current Facility-Administered Medications   Medication Dose Route Frequency Provider Last Rate Last Admin    INV [id number]/placebo Injection 1 Dose  1 Dose Intramuscular 1 time in Clinic/HOD Sariah Suarez, CNS        INV [id number]/placebo Injection 1 Dose  1 Dose Intramuscular 1 time in Clinic/HOD Sariah Suarez, CNS        INV [id number]/placebo Injection 1 Dose  1 Dose Intramuscular 1 time in Clinic/HOD Sariah Suarez, CNS        INV [id number] vaccine 1 Dose  1 Dose Intramuscular 1 time in Clinic/HOD Rola Saleh,  MD        onabotulinumtoxina injection 200 Units  200 Units Intramuscular Q90 Days Neva Cortes MD   200 Units at 08/16/18 1625    onabotulinumtoxina injection 200 Units  200 Units Intramuscular Q90 Days Neva Cortes MD   200 Units at 10/25/19 1557    onabotulinumtoxina injection 200 Units  200 Units Intramuscular q12 weeks Linda Melgar NP           Past Medical History  Past Medical History:   Diagnosis Date    Allergy     Arthritis of spine 2011    Chronic low back pain     Class 2 obesity with body mass index (BMI) of 39.0 to 39.9 in adult 1/11/2019    Coronary artery disease     mild    DDD (degenerative disc disease), lumbar     Diverticulitis     Diverticulosis     DJD (degenerative joint disease)     Encounter for blood transfusion     Factor V Leiden     GERD (gastroesophageal reflux disease)     Hiatal hernia     Migraines     PONV (postoperative nausea and vomiting)     geta    Schatzki's ring        Past Surgical History  Past Surgical History:   Procedure Laterality Date    APPENDECTOMY  1981    CARPAL TUNNEL RELEASE  2011    right    COLONOSCOPY  2014    Dr. Palomares; reduntant colon, otherwise normal findings repeat in 8-10 years for surveillance    Epidural Steroid Injection      Pain Management    EPIDURAL STEROID INJECTION INTO CERVICAL SPINE N/A 9/25/2018    Procedure: Injection-steroid-epidural-cervical;  Surgeon: Oswald Abdalla MD;  Location: [facility] OR;  Service: Pain Management;  Laterality: N/A;    EPIDURAL STEROID INJECTION INTO CERVICAL SPINE N/A 10/24/2019    Procedure: Injection-steroid-epidural-cervical;  Surgeon: Oswald Abdalla MD;  Location: [facility] OR;  Service: Pain Management;  Laterality: N/A;    EPIDURAL STEROID INJECTION INTO LUMBAR SPINE N/A 12/27/2018    Procedure: Injection-steroid-epidural-lumbar L5/S1;  Surgeon: Oswald Abdalla MD;  Location: [facility] OR;  Service: Pain Management;  Laterality: N/A;    EPIDURAL STEROID INJECTION INTO LUMBAR SPINE N/A 9/4/2019     Procedure: Injection-steroid-epidural-lumbar;  Surgeon: Oswald Abdalla MD;  Location: [facility] OR;  Service: Pain Management;  Laterality: N/A;  L5/S1 to right    ESOPHAGOGASTRODUODENOSCOPY  05/17/2016    Dr. Arreguin; small hiatal hernia; gastritis    ESOPHAGOGASTRODUODENOSCOPY N/A 6/22/2020    Dr. Arreguin; mild Schatzki ring- dilated; small hiatal hernia; bx unremarkable    Facet injection      Pain Management    HYSTERECTOMY      ovaries removed    KIDNEY SURGERY Right 1967    to correct urinary reflux into kidney    LEFT HEART CATHETERIZATION Left 5/22/2019    Procedure: Left heart cath;  Surgeon: Casa Perdue MD;  Location: [facility] CATH;  Service: Cardiology;  Laterality: Left;    OVARIAN CYST REMOVAL Right 1981    RADIOFREQUENCY ABLATION OF LUMBAR MEDIAL BRANCH NERVE AT SINGLE LEVEL Bilateral 7/17/2018    Procedure: RADIOFREQUENCY ABLATION, NERVE, MEDIAL BRANCH, LUMBAR, L2,3,4;  Surgeon: Oswald Abdalla MD;  Location: [facility] OR;  Service: Pain Management;  Laterality: Bilateral;    RADIOFREQUENCY ABLATION OF LUMBAR MEDIAL BRANCH NERVE AT SINGLE LEVEL Bilateral 7/29/2019    Procedure: Radiofrequency Ablation, Nerve, Spinal, Lumbar, Medial Branch, L2,3,4;  Surgeon: Oswald Abdalla MD;  Location: [facility] OR;  Service: Pain Management;  Laterality: Bilateral;    SHOULDER SURGERY  2010    rotator cuff, right       Family History  Family History   Problem Relation Age of Onset    Heart attack Mother     Heart disease Mother     COPD Mother     Hypertension Mother     Hepatitis Sister     COPD Father     No Known Problems Daughter     Breast cancer Maternal Grandmother     Allergic rhinitis Neg Hx     Angioedema Neg Hx     Asthma Neg Hx     Atopy Neg Hx     Eczema Neg Hx     Immunodeficiency Neg Hx     Urticaria Neg Hx     Colon cancer Neg Hx     Colon polyps Neg Hx        Social History  Social History     Socioeconomic History    Marital status:     Number of children: 1   Occupational History      Employer: [employer]   Tobacco Use    Smoking status: Former     Packs/day: 1.00     Years: 5.00     Pack years: 5.00     Types: Cigarettes     Start date: 1992     Quit date: 1997     Years since quittin.8    Smokeless tobacco: Never   Substance and Sexual Activity    Alcohol use: Yes     Comment: 3x per year    Drug use: No    Sexual activity: Yes     Partners: Male   Social History Narrative    ** Merged History Encounter **          Social Determinants of Health     Financial Resource Strain: Low Risk     Difficulty of Paying Living Expenses: Not hard at all   Food Insecurity: No Food Insecurity    Worried About Running Out of Food in the Last Year: Never true    Ran Out of Food in the Last Year: Never true   Transportation Needs: No Transportation Needs    Lack of Transportation (Medical): No    Lack of Transportation (Non-Medical): No   Physical Activity: Insufficiently Active    Days of Exercise per Week: 2 days    Minutes of Exercise per Session: 30 min   Stress: No Stress Concern Present    Feeling of Stress : Not at all   Social Connections: Unknown    Frequency of Communication with Friends and Family: More than three times a week    Frequency of Social Gatherings with Friends and Family: Once a week    Active Member of Clubs or Organizations: No    Attends Club or Organization Meetings: Never    Marital Status:    Housing Stability: Low Risk     Unable to Pay for Housing in the Last Year: No    Number of Places Lived in the Last Year: 1    Unstable Housing in the Last Year: No        Review of Systems  14-point review of systems as follows:   No check yoel indicates NEGATIVE response   Constitutional: [] weight loss, [] change to appetite   Eyes: [] change in vision, [] double vision   Ears, nose, mouth, throat: [] frequent nose bleeds, [] ringing in the ears   Respiratory: [] cough, [] wheezing   Cardiovascular: [] chest pain, [] palpitations   Gastrointestinal: []  jaundice, [] nausea/vomiting   Genitourinary: [] incontinence, [] burning with urination   Hematologic/lymphatic: [] easy bruising/bleeding, [x] night sweats   Neurological: [] numbness, [] weakness   Endocrine: [] fatigue, [] heat/cold intolerance   Allergy/Immunologic: [] fevers, [] chills   Musculoskeletal: [] muscle pain, [x] joint pain   Psychiatric: [] thoughts of harming self/others, [] depression   Integumentary: [] rashes, [] sores that do not heal       Objective:        There were no vitals filed for this visit.  There is no height or weight on file to calculate BMI.    11/10/22  Constitutional:   She appears well-developed and well-nourished. She is well groomed     Neurological Exam:  General: well-developed, well-nourished, no distress  Mental status: Awake and alert  Speech language: No dysarthria or aphasia on conversation  Cranial nerves: Face symmetric        Data Review:     No results found for this or any previous visit.    Lab Results   Component Value Date     05/10/2022     05/10/2022    K 4.1 05/10/2022    K 4.1 05/10/2022    MG 2.2 02/06/2021     05/10/2022     05/10/2022    CO2 24 05/10/2022    CO2 24 05/10/2022    BUN 16 05/10/2022    BUN 16 05/10/2022    CREATININE 1.0 05/10/2022    CREATININE 1.0 05/10/2022    GLU 93 05/10/2022    GLU 93 05/10/2022    AST 21 05/10/2022    ALT 15 05/10/2022    ALBUMIN 4.0 05/10/2022    ALBUMIN 4.0 05/10/2022    PROT 6.9 05/10/2022    BILITOT 0.6 05/10/2022    CHOL 157 05/10/2022    HDL 62 05/10/2022    LDLCALC 76.6 05/10/2022    TRIG 92 05/10/2022       Lab Results   Component Value Date    WBC 6.55 02/06/2021    HGB 12.8 02/06/2021    HCT 37.7 02/06/2021    MCV 92 02/06/2021     02/06/2021       Lab Results   Component Value Date    TSH 0.589 08/21/2018       Assessment & Plan:       Problem List Items Addressed This Visit          Neuro    Intractable chronic migraine without aura    Overview     Migraines of many years,  triggered by menopause. Normal neuro exam. Excellent response to full dose topiramate but not tolerating due to memory impairment. Converted over to zonisamide to improve tolerance but developed rash to this. Therefore, converted citalpram to Effexor but as of 6 weeks on full dose headaches are only worse. Will give Effexor another 4 week trial and prepare to begin Botox.     The patient has chronic migraines (G43.719) and suffers from headaches more than 15 days a month lasting more than 4 hours a day with no relief of symptoms despite trying multiple medications including but not limited to anti-epileptics (topiramate, gabapentin, lyrica, zonisamide), and antidepressants (amitriptyline, venflaxine). Botox treatment was approved for chronic migraines in October 2010. The patient will be an ideal candidate for Botox. We are planning for 3 treatments 3 months apart and aiming for at least 50% improvement in the symptoms. If we see no improvement after 3 treatments, we will discontinue the injections.    She has been on Emgality since 2020 with excellent results. She tried to wean off Emgality but migraines immediately returned. She treats early with Ubrelvy which is very effective.          Current Assessment & Plan     Continue Emgality and Ubrelvy         Relevant Medications    galcanezumab-gnlm (EMGALITY PEN) 120 mg/mL PnIj               WORKUP:  -- none     PREVENTION (use daily regardless of headache):  -- continue Emgality. If still doing this well in 6 months, we can consider weaning    ACUTE TREATMENT:  -- continue Ubrelvy       Follow up in about 1 year (around 11/10/2023) for follow up with MELVIN.      Linda Melgar NP

## 2022-11-28 ENCOUNTER — OFFICE VISIT (OUTPATIENT)
Dept: ALLERGY | Facility: CLINIC | Age: 62
End: 2022-11-28
Payer: COMMERCIAL

## 2022-11-28 VITALS — OXYGEN SATURATION: 98 % | BODY MASS INDEX: 33.84 KG/M2 | WEIGHT: 198.19 LBS | HEART RATE: 78 BPM | HEIGHT: 64 IN

## 2022-11-28 DIAGNOSIS — R05.3 CHRONIC COUGH: ICD-10-CM

## 2022-11-28 DIAGNOSIS — J31.0 CHRONIC RHINITIS: Primary | ICD-10-CM

## 2022-11-28 DIAGNOSIS — H10.423 SIMPLE CHRONIC CONJUNCTIVITIS OF BOTH EYES: ICD-10-CM

## 2022-11-28 PROCEDURE — 3066F NEPHROPATHY DOC TX: CPT | Mod: CPTII,S$GLB,, | Performed by: ALLERGY & IMMUNOLOGY

## 2022-11-28 PROCEDURE — 4010F ACE/ARB THERAPY RXD/TAKEN: CPT | Mod: CPTII,S$GLB,, | Performed by: ALLERGY & IMMUNOLOGY

## 2022-11-28 PROCEDURE — 1160F RVW MEDS BY RX/DR IN RCRD: CPT | Mod: CPTII,S$GLB,, | Performed by: ALLERGY & IMMUNOLOGY

## 2022-11-28 PROCEDURE — 1159F MED LIST DOCD IN RCRD: CPT | Mod: CPTII,S$GLB,, | Performed by: ALLERGY & IMMUNOLOGY

## 2022-11-28 PROCEDURE — 3008F BODY MASS INDEX DOCD: CPT | Mod: CPTII,S$GLB,, | Performed by: ALLERGY & IMMUNOLOGY

## 2022-11-28 PROCEDURE — 1159F PR MEDICATION LIST DOCUMENTED IN MEDICAL RECORD: ICD-10-PCS | Mod: CPTII,S$GLB,, | Performed by: ALLERGY & IMMUNOLOGY

## 2022-11-28 PROCEDURE — 99999 PR PBB SHADOW E&M-EST. PATIENT-LVL V: ICD-10-PCS | Mod: PBBFAC,,, | Performed by: ALLERGY & IMMUNOLOGY

## 2022-11-28 PROCEDURE — 99999 PR PBB SHADOW E&M-EST. PATIENT-LVL V: CPT | Mod: PBBFAC,,, | Performed by: ALLERGY & IMMUNOLOGY

## 2022-11-28 PROCEDURE — 3008F PR BODY MASS INDEX (BMI) DOCUMENTED: ICD-10-PCS | Mod: CPTII,S$GLB,, | Performed by: ALLERGY & IMMUNOLOGY

## 2022-11-28 PROCEDURE — 99204 PR OFFICE/OUTPT VISIT, NEW, LEVL IV, 45-59 MIN: ICD-10-PCS | Mod: S$GLB,,, | Performed by: ALLERGY & IMMUNOLOGY

## 2022-11-28 PROCEDURE — 1160F PR REVIEW ALL MEDS BY PRESCRIBER/CLIN PHARMACIST DOCUMENTED: ICD-10-PCS | Mod: CPTII,S$GLB,, | Performed by: ALLERGY & IMMUNOLOGY

## 2022-11-28 PROCEDURE — 4010F PR ACE/ARB THEARPY RXD/TAKEN: ICD-10-PCS | Mod: CPTII,S$GLB,, | Performed by: ALLERGY & IMMUNOLOGY

## 2022-11-28 PROCEDURE — 3066F PR DOCUMENTATION OF TREATMENT FOR NEPHROPATHY: ICD-10-PCS | Mod: CPTII,S$GLB,, | Performed by: ALLERGY & IMMUNOLOGY

## 2022-11-28 PROCEDURE — 99204 OFFICE O/P NEW MOD 45 MIN: CPT | Mod: S$GLB,,, | Performed by: ALLERGY & IMMUNOLOGY

## 2022-11-28 RX ORDER — CHLOPHEDIANOL HYDROCHLORIDE, DEXBROMPHENIRAMINE MALEATE 12.5; 1 MG/5ML; MG/5ML
10 LIQUID ORAL
COMMUNITY
Start: 2022-10-04 | End: 2022-11-28

## 2022-11-28 RX ORDER — AZELASTINE 1 MG/ML
1 SPRAY, METERED NASAL 2 TIMES DAILY
COMMUNITY
Start: 2022-10-31

## 2022-11-28 NOTE — PROGRESS NOTES
Subjective:       Patient ID: Elizabeth Giordano is a 62 y.o. female.    Chief Complaint:  Cough and Allergic Rhinitis  (Constant cough PND  Rhinitis)      63 yo woman presents for new patient evaluation of rhinitis. She states she has constant PND and cough from drip. She does have sneeze and runny nose as well. Has watery eyes, not much but watery and left worse then right. No SOB or wheeze. Has symptoms all year but worse in October and spring. Worse at night. No triggers. She takes Flonase daily and Mucinex and does help. No food, insect or latex allergy. No asthma or eczema. Saw ENT and had immunocaps with class 0/1 dog, dust mites and nettle rest all class 0 so was advised to see allergist. Has HTN, migraines and back issues. No ENT surgery.      Environmental History: see history section for home environment  Review of Systems   Constitutional:  Negative for appetite change, chills, fatigue and fever.   HENT:  Positive for congestion, postnasal drip, rhinorrhea and sneezing. Negative for ear discharge, ear pain, facial swelling, nosebleeds, sinus pressure, sore throat, trouble swallowing and voice change.    Eyes:  Positive for discharge. Negative for redness, itching and visual disturbance.   Respiratory:  Positive for cough. Negative for choking, chest tightness, shortness of breath and wheezing.    Cardiovascular:  Negative for chest pain, palpitations and leg swelling.   Gastrointestinal:  Negative for abdominal distention, abdominal pain, constipation, diarrhea, nausea and vomiting.   Genitourinary:  Negative for difficulty urinating.   Musculoskeletal:  Positive for arthralgias and back pain. Negative for gait problem, joint swelling and myalgias.   Skin:  Negative for color change and rash.   Neurological:  Negative for dizziness, syncope, weakness, light-headedness and headaches.   Hematological:  Negative for adenopathy. Does not bruise/bleed easily.   Psychiatric/Behavioral:  Negative for  agitation, behavioral problems, confusion and sleep disturbance. The patient is not nervous/anxious.       Objective:      Physical Exam  Vitals and nursing note reviewed.   Constitutional:       General: She is not in acute distress.     Appearance: Normal appearance. She is not ill-appearing.   HENT:      Head: Normocephalic and atraumatic.      Right Ear: External ear normal.      Left Ear: External ear normal.      Nose: No rhinorrhea.   Eyes:      General:         Right eye: No discharge.         Left eye: No discharge.      Conjunctiva/sclera: Conjunctivae normal.   Cardiovascular:      Rate and Rhythm: Normal rate and regular rhythm.   Pulmonary:      Effort: Pulmonary effort is normal. No respiratory distress.   Abdominal:      General: There is no distension.   Musculoskeletal:         General: Normal range of motion.      Cervical back: Normal range of motion.   Skin:     General: Skin is warm and dry.      Findings: No erythema or rash.   Neurological:      Mental Status: She is alert and oriented to person, place, and time.   Psychiatric:         Mood and Affect: Mood normal.         Behavior: Behavior normal.         Thought Content: Thought content normal.         Judgment: Judgment normal.       Laboratory:   none performed   Assessment:       1. Chronic rhinitis    2. Simple chronic conjunctivitis of both eyes    3. Chronic cough         Plan:       Advised pt immunocaps are negative so does not have IgE mediated allergic rhinitis but has chronic non allergic rhinitis. Explained mechanism in detail  Continue fluticasone but use 1 SEN BID and add azelastine 1 SEN BID  Can continue Mucinex as needed  If not better will consider oral antihistamine vs montelukast

## 2022-12-03 DIAGNOSIS — K21.9 GASTROESOPHAGEAL REFLUX DISEASE: ICD-10-CM

## 2022-12-03 RX ORDER — PANTOPRAZOLE SODIUM 40 MG/1
TABLET, DELAYED RELEASE ORAL
Qty: 90 TABLET | Refills: 0 | Status: SHIPPED | OUTPATIENT
Start: 2022-12-03 | End: 2023-03-12

## 2022-12-03 NOTE — TELEPHONE ENCOUNTER
No new care gaps identified.  [facility] Embedded Care Gaps. Reference number: [id number]. 12/03/2022   9:07:54 AM CST

## 2022-12-04 NOTE — TELEPHONE ENCOUNTER
Refill Decision Note   Elizabeth Giordano  is requesting a refill authorization.  Brief Assessment and Rationale for Refill:  Approve     Medication Therapy Plan:       Medication Reconciliation Completed: No   Comments:     No Care Gaps recommended.     Note composed:10:51 PM 12/03/2022

## 2022-12-12 ENCOUNTER — PATIENT MESSAGE (OUTPATIENT)
Dept: ADMINISTRATIVE | Facility: HOSPITAL | Age: 62
End: 2022-12-12
Payer: COMMERCIAL

## 2023-01-13 ENCOUNTER — PATIENT MESSAGE (OUTPATIENT)
Dept: FAMILY MEDICINE | Facility: CLINIC | Age: 63
End: 2023-01-13
Payer: COMMERCIAL

## 2023-01-13 ENCOUNTER — TELEPHONE (OUTPATIENT)
Dept: PAIN MEDICINE | Facility: CLINIC | Age: 63
End: 2023-01-13

## 2023-01-13 ENCOUNTER — OFFICE VISIT (OUTPATIENT)
Dept: PAIN MEDICINE | Facility: CLINIC | Age: 63
End: 2023-01-13
Payer: COMMERCIAL

## 2023-01-13 VITALS
BODY MASS INDEX: 33.53 KG/M2 | DIASTOLIC BLOOD PRESSURE: 71 MMHG | SYSTOLIC BLOOD PRESSURE: 156 MMHG | WEIGHT: 195.31 LBS | HEART RATE: 86 BPM

## 2023-01-13 DIAGNOSIS — M48.061 SPINAL STENOSIS OF LUMBAR REGION WITHOUT NEUROGENIC CLAUDICATION: ICD-10-CM

## 2023-01-13 DIAGNOSIS — M50.30 DDD (DEGENERATIVE DISC DISEASE), CERVICAL: ICD-10-CM

## 2023-01-13 DIAGNOSIS — M54.12 CERVICAL RADICULOPATHY: Primary | ICD-10-CM

## 2023-01-13 DIAGNOSIS — M51.36 DDD (DEGENERATIVE DISC DISEASE), LUMBAR: Primary | ICD-10-CM

## 2023-01-13 DIAGNOSIS — F51.01 PRIMARY INSOMNIA: ICD-10-CM

## 2023-01-13 DIAGNOSIS — M51.36 DDD (DEGENERATIVE DISC DISEASE), LUMBAR: ICD-10-CM

## 2023-01-13 PROCEDURE — 1159F PR MEDICATION LIST DOCUMENTED IN MEDICAL RECORD: ICD-10-PCS | Mod: CPTII,S$GLB,, | Performed by: PHYSICIAN ASSISTANT

## 2023-01-13 PROCEDURE — 99999 PR PBB SHADOW E&M-EST. PATIENT-LVL IV: ICD-10-PCS | Mod: PBBFAC,,, | Performed by: PHYSICIAN ASSISTANT

## 2023-01-13 PROCEDURE — 3078F DIAST BP <80 MM HG: CPT | Mod: CPTII,S$GLB,, | Performed by: PHYSICIAN ASSISTANT

## 2023-01-13 PROCEDURE — 99214 PR OFFICE/OUTPT VISIT, EST, LEVL IV, 30-39 MIN: ICD-10-PCS | Mod: S$GLB,,, | Performed by: PHYSICIAN ASSISTANT

## 2023-01-13 PROCEDURE — 3078F PR MOST RECENT DIASTOLIC BLOOD PRESSURE < 80 MM HG: ICD-10-PCS | Mod: CPTII,S$GLB,, | Performed by: PHYSICIAN ASSISTANT

## 2023-01-13 PROCEDURE — 1160F PR REVIEW ALL MEDS BY PRESCRIBER/CLIN PHARMACIST DOCUMENTED: ICD-10-PCS | Mod: CPTII,S$GLB,, | Performed by: PHYSICIAN ASSISTANT

## 2023-01-13 PROCEDURE — 1159F MED LIST DOCD IN RCRD: CPT | Mod: CPTII,S$GLB,, | Performed by: PHYSICIAN ASSISTANT

## 2023-01-13 PROCEDURE — 3008F BODY MASS INDEX DOCD: CPT | Mod: CPTII,S$GLB,, | Performed by: PHYSICIAN ASSISTANT

## 2023-01-13 PROCEDURE — 1160F RVW MEDS BY RX/DR IN RCRD: CPT | Mod: CPTII,S$GLB,, | Performed by: PHYSICIAN ASSISTANT

## 2023-01-13 PROCEDURE — 3008F PR BODY MASS INDEX (BMI) DOCUMENTED: ICD-10-PCS | Mod: CPTII,S$GLB,, | Performed by: PHYSICIAN ASSISTANT

## 2023-01-13 PROCEDURE — 99214 OFFICE O/P EST MOD 30 MIN: CPT | Mod: S$GLB,,, | Performed by: PHYSICIAN ASSISTANT

## 2023-01-13 PROCEDURE — 3077F PR MOST RECENT SYSTOLIC BLOOD PRESSURE >= 140 MM HG: ICD-10-PCS | Mod: CPTII,S$GLB,, | Performed by: PHYSICIAN ASSISTANT

## 2023-01-13 PROCEDURE — 99999 PR PBB SHADOW E&M-EST. PATIENT-LVL IV: CPT | Mod: PBBFAC,,, | Performed by: PHYSICIAN ASSISTANT

## 2023-01-13 PROCEDURE — 3077F SYST BP >= 140 MM HG: CPT | Mod: CPTII,S$GLB,, | Performed by: PHYSICIAN ASSISTANT

## 2023-01-13 RX ORDER — TRAMADOL HYDROCHLORIDE 50 MG/1
50 TABLET ORAL EVERY 12 HOURS PRN
Qty: 60 TABLET | Refills: 2 | Status: SHIPPED | OUTPATIENT
Start: 2023-02-11 | End: 2023-06-07

## 2023-01-13 RX ORDER — ESZOPICLONE 3 MG/1
3 TABLET, FILM COATED ORAL NIGHTLY
Qty: 90 TABLET | Refills: 1 | OUTPATIENT
Start: 2023-01-13

## 2023-01-13 NOTE — TELEPHONE ENCOUNTER
M54.12 Cervical radiculopathy       Procedure -> Epidural Injection (specify level) Cmt: C7-T1, RNIV          Physician -> Rm           Is patient on anti-coagulants? -> Yes Cmt: ASA, need clearance          Pre-op Diagnosis -> Cervical radiculopathy              Facility Name: -> [facility]           Follow-up: -> 4 weeks

## 2023-01-13 NOTE — TELEPHONE ENCOUNTER
No new care gaps identified.  [facility] Embedded Care Gaps. Reference number: [id number]. 1/13/2023   11:05:31 AM CST

## 2023-01-22 NOTE — PROGRESS NOTES
CHIEF COMPLAINT/REASON FOR VISIT: low back pain, neck pain    History of present illness: The patient is a 62 year old woman with a history of migraines, carpal tunnel syndrome and low back pain since her early 20s.  She returns in follow-up today with neck pain greater than back pain.  She describes midline neck pain with intermittent radiation to her right arm.  When she has pain traveling down her right arm she will also experience numbness.  Her pain is worse with doing too much or doing to little.  She does have improvement with tramadol.  She denies weakness or incontinence.    Pain intervention history:She is status post TFESI bilaterally to L3 on 12/6/2011 with no relief. Past history of status post L4/5 YARON on 5/25/11 with about 90% relief that lasted 3 weeks. She is status post 2 bilateral L3 transforaminal injections with 3 weeks relief each time.  She is status post L4/5 interlaminar epidural steroid injection on 1/8/14 with 90% relief.  She is status post L5/S1 interlaminar epidural steroid injection on 6/9/14 with moderate relief only on the right low back and right leg lasting 2 weeks.  She is status post bilateral L3/4 and L4/5 facet joint injections on 4/13/15 with initially 100% relief of her back pain and 80% relief of her left lateral hip and lateral thigh pain, now reporting at least 50% relief of both.  She is status post bilateral L2, 3 and 4 medial branch radiofrequency ablation on 5/20/15 with 60% relief.   She is status post bilateral L2, 3 and 4 medial branch radiofrequency ablation on 6/20/16 with 50-70% relief.  She is status post L5/S1 interlaminar epidural steroid injection on 3/10/17 with 50% relief.  She is status post bilateral L2, 3, 4 medial branch radiofrequency ablation on 7/17/17 with about 75% relief.  She is status post bilateral L2, 3, 4 medial branch radiofrequency ablation on 07/17/2018 with 100% relief of her leg pain and 70-75% relief of her back pain.  She is status  post C7-T1 cervical interlaminar epidural steroid injection on 09/25/2018 with 80% relief.   She is status post L5/S1 interlaminar epidural steroid injection on 12/27/2018 with 60% relief.  She is status post bilateral L2, 3 and 4 medial branch radiofrequency ablation on 07/29/2019 with 40% relief.  She is status post L5/S1 interlaminar epidural steroid injection on 09/04/2019 with almost 100% relief of her right leg pain but 0% relief for back pain. She is status post C7-T1 interlaminar epidural steroid injection on 10/24/2019 with 70% relief.       Spine surgeries:    Antineuropathics: failed gabapentin, failed Cymbalta due to side effects  NSAIDs:  Ibuprofen  Physical therapy:  Antidepressants:  Muscle relaxers:  Opioids:  Tramadol 50 mg twice daily as needed  Antiplatelets/Anticoagulants:  ASA 81 mg    ROS: She reports back pain only.  Balance of review of systems is negative.    Medical, surgical, family and social history reviewed elsewhere in record.     Medications/Allergies: See med card      Vitals:    01/13/23 1334   BP: (!) 156/71   Pulse: 86   Weight: 88.6 kg (195 lb 5.2 oz)   PainSc: 0-No pain        PHYSICAL EXAM:  Gen: A and O x3, pleasant, well-groomed  HEENT: PERRLA  CVS: Regular rate and rhythm  Resp:  No obvious shortness of breath, no increased work of breathing  Musculoskeletal: Able to heel walk, toe walk. No antalgic gait.     Neuro:  Upper extremities: 5/5 strength bilaterally   Lower extremities: 5/5 strength bilaterally  Reflexes: Brachioradialis 2+, Bicep 2+, Tricep 2+.   Patellar 2+, Achilles 2+.  Sensory: Intact and symmetrical to light touch and pinprick in C2-T1 dermatomes bilaterally.Intact and symmetrical to light touch and pinprick in L2-S1 dermatomes bilaterally.    Cervical spine:  Range of motion is mildly reduced with flexion, extension lateral rotation with increased right neck pain during right lateral station.  Myofascial exam:  Tenderness to palpation to the right cervical  paraspinous muscles.    Lumbar spine:  Lumbar spine: ROM is moderately limited with flexion and extension with increased bilateral low back pain during extension and oblique extension.  Supine straight leg raise is negative bilaterally.  Internal and external rotation of the hip causes no increased pain in either side.  Myofascial exam:  Mild tenderness to palpation to the right greater than left lumbar paraspinous muscles.      IMAGING:   MRI L-SPINE 5/10/11   IMPRESSION: AT L3-4, THERE IS CIRCUMFERENTIAL DISC PROTRUSION FOCALLY MORE PROMINENT TO THE LEFT AS WELL AS A SMALL DISC HERNIATION PROTRUDING INFERIORLY BEHIND L4 TO THE LEFT OF MIDLINE. THIS PRODUCES SPINAL CANAL AND BILATERAL NEURAL FORAMINAL STENOSIS, LEFT GREATER THAN RIGHT. AT L4-5, THERE IS CIRCUMFERENTIAL DISC PROTRUSION FOCALLY MORE PROMINENT TO THE RIGHT WITH MILD SPINAL CANAL AND RIGHT NEURAL FORAMINAL STENOSIS. AT L1-2 AND L2-3 ALTHOUGH THERE ARE DISC PROTRUSIONS IDENTIFIED, SIGNIFICANT STENOSIS IS NOT SEEN.     MRI lumbar spine 7/10/14  L1-2:There is chronic relatively advanced disc degeneration with disc space narrowing, disc dehydration, disc narrowing, endplate osteophytes, and degenerative vertebral endplate marrow change. There is a diffuse 2-mm posterior disc bulge with a superimposed4-mm right posterior disc extrusion causing mild right foraminal stenosis. There is no impingement of the right L1 nerve root and there has been no change.  L2-3: There is severe and chronic disc degeneration with severe disc space narrowing, disc dehydration, degenerative vertebral endplate marrow change, and vertebral endplate osteophytes. There is a diffuse 2-mm posterior disc bulge with a superimposed 4-mm right posterior disc extrusion causing mild right foraminal stenosis. There is no impingement of the right L2 nerve root and there has been no significant change.  L3-4: There is severe disc degeneration which has progressed since the prior study. There  is severe disc space narrowing, disc dehydration, degenerative vertebral endplate marrow change and endplate osteophytes. There is also mild posterior subluxation of L3over a distance of 4 mm. There is a broad-based 5-mm posterior disc extrusion. There is degenerative facet arthrosis with ligamentum flavum thickening. The combination of facet arthrosis and disc extrusion results in relatively severe spinal canal stenosis with compression of the thecal sac to an AP diameter of 5 mm, moderate right foraminal stenosis, and severe left foraminal stenosis. The spinal stenosis has also progressed since the prior study.  L4-5:There is a diffuse posterior disc bulge with a superimposed 3-mm left posterior paracentral disc protrusion causing left paracentral thecal sac compression. There is moderate left and mild right foraminal stenosis and there has been no significant change. There is mild degenerative facet arthrosis with ligamentum flavum thickening and small joint effusions.  L5-S1:There is no significant compromise of the spinal canal or foramina and there has been no change.    X-ray cervical spine 6/8/15  There is loss of normal cervical lordosis which may be positional or related muscular strain. Intervertebral disk height loss is noted at the C5-C6 C6-C7 levels and to a lesser degree C4-C5 and C7-T1 levels. Vertebral body alignment appears otherwise adequate. Vertebral body heights appear well-preserved. The atlas and odontoid appear in good relationship to each other. Osseous neuroforaminal narrowing is noted at the C3-C4 C4-C5 C5-C6 levels on the left and at the C4-C5 C5-C6 and C6-C7 levels on the right     07/10/2018 MRI cervical spine [facility] report  C2-3 broad-based signal asymmetry at the left subarticular and foraminal zone reflecting implant spondylosis and disc bulge complex, mild to moderate left asymmetric foraminal narrowing, ectasia of the left vertebral artery, contralateral right asymmetric  facet hypertrophy, right foramen widely narrowed, disc partially desiccated without collapse  C3-4 moderate to severe left greater than right foraminal narrowing secondary to uncinate joint hypertrophic signal alteration, disc partially desiccated  C4-5 endplate spondylosis moderate diffuse disc bulge noted, broad-based right paracentral disc herniation, asymmetric flattening of the ventral cord surface at the right paracentral zone, high-grade if lateral right foraminal narrowing, AP diameter of canal 9.9 mm, contralaterally, high grade left foraminal narrowing secondary to facet greater than uncinate joint hypertrophic signal alteration, disc desiccated and mildly narrowed  C5-6 disc space narrowing evident with generalized in Nigel spondylosis and concentric inter pose disc bulge complex, high-grade bilateral foraminal narrowing, flattening of the cord surface, AP diameter 7.9 mm, symmetric facet arthrosis, disc diffusely desiccated and narrowed  C6-7 moderate endplate spondylosis and concentric disc bulge complex, high-grade bilateral foraminal compromise, flattening of the anterior cord surface, AP diameter 8.8 mm, facet arthrosis symmetric, disc desiccated and narrowed  C7-T1 less than 2 mm depth disc bulge    11/06/2018 MRI lumbar spine  T12/L1: There is no evidence of disc protrusion, canal or foraminal stenosis.  L1/L2: Right posterolateral disc protrusion is evident and there is mild distortion of the right anterolateral canal.  Degenerative facet changes are noted bilaterally.  The left foramen is intact.  L2/L3: There is annular disc bulging with a superimposed right posterolateral disc extrusion.  This is slightly more pronounced on the previous examination and there is mild effacement of the anterior thecal sac and moderate narrowing the right foramen.  L3/L4: There is annular disc bulge and osteophyte formation with a superimposed small left posterolateral disc extrusion.  There is moderate narrowing  the central canal and left foramen.  This is little changed relative the previous examination.  Degenerative facet changes are noted.  L5/S1: There is a small central disc extrusion superimposed upon annular disc bulging.  This is slightly less pronounced than was seen previously.  Degenerative facet changes are noted.  L5/S1: Moderate degenerative facet changes are noted and there is mild effacement of the anterior thecal sac.  There is ligamentous hypertrophy particularly to the left in the posterior canal and there is left greater than right foraminal narrowing.  This is mildly worsened relative prior exam.      ASSESSMENT:  The patient is a 62 year old woman with a history of migraines, carpal tunnel syndrome and low back pain since her early 20s who returns in follow up.   1. Cervical radiculopathy  Procedure Order to Pain Management      2. DDD (degenerative disc disease), cervical        3. DDD (degenerative disc disease), lumbar        4. Spinal stenosis of lumbar region without neurogenic claudication            Plan:  1. Dr. Abdalla provided prescriptions for tramadol 50 milligrams twice daily as needed for pain.  I have reviewed the [place] Board of Pharmacy website and there are no abberancies.    2. I will schedule the patient to repeat a C7-T1 interlaminar epidural steroid injection.  She has done well with this in the past.  3. Follow-up in 3 months or sooner as needed.

## 2023-01-25 ENCOUNTER — LAB VISIT (OUTPATIENT)
Dept: LAB | Facility: HOSPITAL | Age: 63
End: 2023-01-25
Attending: FAMILY MEDICINE
Payer: COMMERCIAL

## 2023-01-25 ENCOUNTER — OFFICE VISIT (OUTPATIENT)
Dept: FAMILY MEDICINE | Facility: CLINIC | Age: 63
End: 2023-01-25
Payer: COMMERCIAL

## 2023-01-25 VITALS
BODY MASS INDEX: 33.49 KG/M2 | SYSTOLIC BLOOD PRESSURE: 112 MMHG | HEART RATE: 72 BPM | OXYGEN SATURATION: 98 % | DIASTOLIC BLOOD PRESSURE: 72 MMHG | HEIGHT: 64 IN | WEIGHT: 196.19 LBS

## 2023-01-25 DIAGNOSIS — F51.01 PRIMARY INSOMNIA: ICD-10-CM

## 2023-01-25 DIAGNOSIS — D68.9 COAGULATION DISORDER: ICD-10-CM

## 2023-01-25 DIAGNOSIS — Z00.00 WELLNESS EXAMINATION: Primary | ICD-10-CM

## 2023-01-25 DIAGNOSIS — Z00.00 WELLNESS EXAMINATION: ICD-10-CM

## 2023-01-25 LAB
ALBUMIN SERPL BCP-MCNC: 4.3 G/DL (ref 3.5–5.2)
ALP SERPL-CCNC: 53 U/L (ref 55–135)
ALT SERPL W/O P-5'-P-CCNC: 19 U/L (ref 10–44)
ANION GAP SERPL CALC-SCNC: 8 MMOL/L (ref 8–16)
AST SERPL-CCNC: 21 U/L (ref 10–40)
BASOPHILS # BLD AUTO: 0.02 K/UL (ref 0–0.2)
BASOPHILS NFR BLD: 0.3 % (ref 0–1.9)
BILIRUB SERPL-MCNC: 0.4 MG/DL (ref 0.1–1)
BUN SERPL-MCNC: 16 MG/DL (ref 8–23)
CALCIUM SERPL-MCNC: 9.5 MG/DL (ref 8.7–10.5)
CHLORIDE SERPL-SCNC: 106 MMOL/L (ref 95–110)
CHOLEST SERPL-MCNC: 163 MG/DL (ref 120–199)
CHOLEST/HDLC SERPL: 2.6 {RATIO} (ref 2–5)
CO2 SERPL-SCNC: 27 MMOL/L (ref 23–29)
CREAT SERPL-MCNC: 1.2 MG/DL (ref 0.5–1.4)
DIFFERENTIAL METHOD: ABNORMAL
EOSINOPHIL # BLD AUTO: 0.1 K/UL (ref 0–0.5)
EOSINOPHIL NFR BLD: 1.9 % (ref 0–8)
ERYTHROCYTE [DISTWIDTH] IN BLOOD BY AUTOMATED COUNT: 11.8 % (ref 11.5–14.5)
EST. GFR  (NO RACE VARIABLE): 51.5 ML/MIN/1.73 M^2
ESTIMATED AVG GLUCOSE: 105 MG/DL (ref 68–131)
GLUCOSE SERPL-MCNC: 102 MG/DL (ref 70–110)
HBA1C MFR BLD: 5.3 % (ref 4–5.6)
HCT VFR BLD AUTO: 37.1 % (ref 37–48.5)
HDLC SERPL-MCNC: 63 MG/DL (ref 40–75)
HDLC SERPL: 38.7 % (ref 20–50)
HGB BLD-MCNC: 12.4 G/DL (ref 12–16)
IMM GRANULOCYTES # BLD AUTO: 0.01 K/UL (ref 0–0.04)
IMM GRANULOCYTES NFR BLD AUTO: 0.2 % (ref 0–0.5)
LDLC SERPL CALC-MCNC: 85.8 MG/DL (ref 63–159)
LYMPHOCYTES # BLD AUTO: 2.4 K/UL (ref 1–4.8)
LYMPHOCYTES NFR BLD: 40.4 % (ref 18–48)
MCH RBC QN AUTO: 32.2 PG (ref 27–31)
MCHC RBC AUTO-ENTMCNC: 33.4 G/DL (ref 32–36)
MCV RBC AUTO: 96 FL (ref 82–98)
MONOCYTES # BLD AUTO: 0.5 K/UL (ref 0.3–1)
MONOCYTES NFR BLD: 8 % (ref 4–15)
NEUTROPHILS # BLD AUTO: 2.9 K/UL (ref 1.8–7.7)
NEUTROPHILS NFR BLD: 49.2 % (ref 38–73)
NONHDLC SERPL-MCNC: 100 MG/DL
NRBC BLD-RTO: 0 /100 WBC
PLATELET # BLD AUTO: 195 K/UL (ref 150–450)
PMV BLD AUTO: 10.5 FL (ref 9.2–12.9)
POTASSIUM SERPL-SCNC: 4.5 MMOL/L (ref 3.5–5.1)
PROT SERPL-MCNC: 6.9 G/DL (ref 6–8.4)
RBC # BLD AUTO: 3.85 M/UL (ref 4–5.4)
SODIUM SERPL-SCNC: 141 MMOL/L (ref 136–145)
TRIGL SERPL-MCNC: 71 MG/DL (ref 30–150)
TSH SERPL DL<=0.005 MIU/L-ACNC: 0.62 UIU/ML (ref 0.4–4)
WBC # BLD AUTO: 5.86 K/UL (ref 3.9–12.7)

## 2023-01-25 PROCEDURE — 99396 PR PREVENTIVE VISIT,EST,40-64: ICD-10-PCS | Mod: S$GLB,,, | Performed by: FAMILY MEDICINE

## 2023-01-25 PROCEDURE — 3008F BODY MASS INDEX DOCD: CPT | Mod: CPTII,S$GLB,, | Performed by: FAMILY MEDICINE

## 2023-01-25 PROCEDURE — 80053 COMPREHEN METABOLIC PANEL: CPT | Performed by: FAMILY MEDICINE

## 2023-01-25 PROCEDURE — 3078F DIAST BP <80 MM HG: CPT | Mod: CPTII,S$GLB,, | Performed by: FAMILY MEDICINE

## 2023-01-25 PROCEDURE — 99999 PR PBB SHADOW E&M-EST. PATIENT-LVL IV: CPT | Mod: PBBFAC,,, | Performed by: FAMILY MEDICINE

## 2023-01-25 PROCEDURE — 3074F PR MOST RECENT SYSTOLIC BLOOD PRESSURE < 130 MM HG: ICD-10-PCS | Mod: CPTII,S$GLB,, | Performed by: FAMILY MEDICINE

## 2023-01-25 PROCEDURE — 99396 PREV VISIT EST AGE 40-64: CPT | Mod: S$GLB,,, | Performed by: FAMILY MEDICINE

## 2023-01-25 PROCEDURE — 1160F RVW MEDS BY RX/DR IN RCRD: CPT | Mod: CPTII,S$GLB,, | Performed by: FAMILY MEDICINE

## 2023-01-25 PROCEDURE — 1160F PR REVIEW ALL MEDS BY PRESCRIBER/CLIN PHARMACIST DOCUMENTED: ICD-10-PCS | Mod: CPTII,S$GLB,, | Performed by: FAMILY MEDICINE

## 2023-01-25 PROCEDURE — 3078F PR MOST RECENT DIASTOLIC BLOOD PRESSURE < 80 MM HG: ICD-10-PCS | Mod: CPTII,S$GLB,, | Performed by: FAMILY MEDICINE

## 2023-01-25 PROCEDURE — 3074F SYST BP LT 130 MM HG: CPT | Mod: CPTII,S$GLB,, | Performed by: FAMILY MEDICINE

## 2023-01-25 PROCEDURE — 1159F PR MEDICATION LIST DOCUMENTED IN MEDICAL RECORD: ICD-10-PCS | Mod: CPTII,S$GLB,, | Performed by: FAMILY MEDICINE

## 2023-01-25 PROCEDURE — 36415 COLL VENOUS BLD VENIPUNCTURE: CPT | Mod: PO | Performed by: FAMILY MEDICINE

## 2023-01-25 PROCEDURE — 99999 PR PBB SHADOW E&M-EST. PATIENT-LVL IV: ICD-10-PCS | Mod: PBBFAC,,, | Performed by: FAMILY MEDICINE

## 2023-01-25 PROCEDURE — 1159F MED LIST DOCD IN RCRD: CPT | Mod: CPTII,S$GLB,, | Performed by: FAMILY MEDICINE

## 2023-01-25 PROCEDURE — 83036 HEMOGLOBIN GLYCOSYLATED A1C: CPT | Performed by: FAMILY MEDICINE

## 2023-01-25 PROCEDURE — 3008F PR BODY MASS INDEX (BMI) DOCUMENTED: ICD-10-PCS | Mod: CPTII,S$GLB,, | Performed by: FAMILY MEDICINE

## 2023-01-25 PROCEDURE — 80061 LIPID PANEL: CPT | Performed by: FAMILY MEDICINE

## 2023-01-25 PROCEDURE — 84443 ASSAY THYROID STIM HORMONE: CPT | Performed by: FAMILY MEDICINE

## 2023-01-25 PROCEDURE — 85025 COMPLETE CBC W/AUTO DIFF WBC: CPT | Performed by: FAMILY MEDICINE

## 2023-01-25 RX ORDER — ESZOPICLONE 3 MG/1
3 TABLET, FILM COATED ORAL NIGHTLY
Qty: 90 TABLET | Refills: 1 | Status: SHIPPED | OUTPATIENT
Start: 2023-01-25 | End: 2023-06-14 | Stop reason: SDUPTHER

## 2023-01-25 NOTE — PROGRESS NOTES
Initial Anesthesia Post-op Note    Patient: Becca Ibarra  Procedure(s) Performed: LAPAROSCOPIC JEFF-EN-Y  Anesthesia type: General    Vitals Value Taken Time   Temp 97.1f 6/25/2020 10:09 AM   Pulse 88 6/25/2020 10:07 AM   Resp 23 6/25/2020 10:07 AM   SpO2 97 % 6/25/2020 10:07 AM   /75 6/25/2020 10:05 AM   Vitals shown include unvalidated device data.    Last 24 I/O:     Intake/Output Summary (Last 24 hours) at 6/25/2020 1009  Last data filed at 6/25/2020 1002  Gross per 24 hour   Intake 1000 ml   Output 10 ml   Net 990 ml         Patient Location: PACU Phase 1  Post-op Vital Signs:stable  Level of Consciousness: participates in exam and oriented  Respiratory Status: spontaneous ventilation  Cardiovascular blood pressure returned to baseline  Hydration: euvolemic  Pain Management: well controlled  Handoff: Handoff to receiving clinician was performed and questions were answered  Nausea: None  Airway Patency:patent  Post-op Assessment: no complications, patient tolerated procedure well with no complications, evidence of recall, dentition within defined limits, moving all extremities and No Corneal Abrasion     Subjective:       Patient ID: Elizabeth Giordano is a 62 y.o. female.    Chief Complaint: Medication Refill    Pt is known to me.  The pt presents for annual wellness exam.  The pt is watching her diet and has stopped eating after dinner.  She is also more active.  She has lost >10 pounds.  She has no acute complaints.  Her migraines areunder good control.  She has excellent glucose control.  The pt continues to require the Lunesta for sleep.  She has no adverse effects.  The pt is compliant with regulations per  website.   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    Medication Refill  Associated symptoms include neck pain (sees pain mgt for YARON). Pertinent negatives include no chest pain or headaches.   Review of Systems   Eyes:  Negative for visual disturbance.   Respiratory:  Negative for shortness of breath.    Cardiovascular:  Negative for chest pain and palpitations.   Musculoskeletal:  Positive for neck pain (sees pain mgt for YARON).   Neurological:  Negative for headaches.     Objective:      Physical Exam  Vitals and nursing note reviewed.   Constitutional:       Appearance: She is well-developed. She is obese. She is not ill-appearing.   HENT:      Head: Normocephalic.   Eyes:      Conjunctiva/sclera: Conjunctivae normal.      Pupils: Pupils are equal, round, and reactive to light.   Neck:      Thyroid: No thyromegaly.   Cardiovascular:      Rate and Rhythm: Normal rate and regular rhythm.      Heart sounds: Normal heart sounds.   Pulmonary:      Effort: Pulmonary effort is normal.      Breath sounds: Normal breath sounds.   Abdominal:      General: Bowel sounds are normal.      Palpations: Abdomen is soft.      Tenderness: There is no abdominal tenderness.   Musculoskeletal:         General: No tenderness or deformity. Normal range of motion.      Cervical back: Normal range of motion and neck supple.      Right lower leg: No edema.      Left lower leg: No  edema.   Lymphadenopathy:      Cervical: No cervical adenopathy.   Skin:     General: Skin is warm and dry.   Neurological:      Mental Status: She is alert and oriented to person, place, and time.      Cranial Nerves: No cranial nerve deficit.      Motor: No abnormal muscle tone.      Coordination: Coordination normal.      Deep Tendon Reflexes: Reflexes normal.   Psychiatric:         Behavior: Behavior normal.       Assessment:       1. Wellness examination    2. Coagulation disorder    3. Primary insomnia        Plan:       Elizabeth was seen today for medication refill.    Diagnoses and all orders for this visit:    Wellness examination  -     CBC Auto Differential; Future  -     Comprehensive Metabolic Panel; Future  -     Hemoglobin A1C; Future  -     Lipid Panel; Future  -     TSH; Future    Coagulation disorder    Primary insomnia  -     eszopiclone (LUNESTA) 3 mg Tab; Take 1 tablet (3 mg total) by mouth every evening.      During this visit, I reviewed the pt's history, medications, allergies, and problem list.

## 2023-01-26 ENCOUNTER — PATIENT MESSAGE (OUTPATIENT)
Dept: PAIN MEDICINE | Facility: CLINIC | Age: 63
End: 2023-01-26
Payer: COMMERCIAL

## 2023-01-26 DIAGNOSIS — M54.12 CERVICAL RADICULOPATHY: Primary | ICD-10-CM

## 2023-01-26 RX ORDER — SODIUM CHLORIDE, SODIUM LACTATE, POTASSIUM CHLORIDE, CALCIUM CHLORIDE 600; 310; 30; 20 MG/100ML; MG/100ML; MG/100ML; MG/100ML
INJECTION, SOLUTION INTRAVENOUS CONTINUOUS
Status: CANCELLED | OUTPATIENT
Start: 2023-01-26

## 2023-01-26 RX ORDER — LIDOCAINE HYDROCHLORIDE 10 MG/ML
1 INJECTION, SOLUTION EPIDURAL; INFILTRATION; INTRACAUDAL; PERINEURAL ONCE
Status: DISCONTINUED | OUTPATIENT
Start: 2023-01-26 | End: 2024-03-04 | Stop reason: HOSPADM

## 2023-01-26 NOTE — TELEPHONE ENCOUNTER
Scheduled procedure for 02/22 per pts request went over instructions will see if okay to hold ASA x 5 days per her MD.

## 2023-01-27 DIAGNOSIS — I10 ESSENTIAL HYPERTENSION: ICD-10-CM

## 2023-01-27 RX ORDER — LOSARTAN POTASSIUM 100 MG/1
TABLET ORAL
Qty: 90 TABLET | Refills: 3 | Status: SHIPPED | OUTPATIENT
Start: 2023-01-27 | End: 2023-11-13 | Stop reason: SDUPTHER

## 2023-01-27 NOTE — TELEPHONE ENCOUNTER
Refill Decision Note   Elizabeth Giordano  is requesting a refill authorization.  Brief Assessment and Rationale for Refill:  Approve     Medication Therapy Plan:       Medication Reconciliation Completed: No   Comments:     No Care Gaps recommended.     Note composed:2:57 PM 01/27/2023

## 2023-01-27 NOTE — TELEPHONE ENCOUNTER
No new care gaps identified.  [facility] Embedded Care Gaps. Reference number: [id number]. 1/27/2023   1:14:58 PM CST

## 2023-02-01 ENCOUNTER — TELEPHONE (OUTPATIENT)
Dept: PHARMACY | Facility: CLINIC | Age: 63
End: 2023-02-01
Payer: COMMERCIAL

## 2023-02-01 ENCOUNTER — PATIENT MESSAGE (OUTPATIENT)
Dept: FAMILY MEDICINE | Facility: CLINIC | Age: 63
End: 2023-02-01
Payer: COMMERCIAL

## 2023-02-02 RX ORDER — MUPIROCIN 20 MG/G
OINTMENT TOPICAL 3 TIMES DAILY
Qty: 2 G | Refills: 0 | Status: ON HOLD | OUTPATIENT
Start: 2023-02-02 | End: 2024-03-04 | Stop reason: HOSPADM

## 2023-02-15 ENCOUNTER — TELEPHONE (OUTPATIENT)
Dept: FAMILY MEDICINE | Facility: CLINIC | Age: 63
End: 2023-02-15
Payer: COMMERCIAL

## 2023-02-15 NOTE — TELEPHONE ENCOUNTER
Spoke to Patient about normal lab results. Patient verbalized understanding. No concerns at this time. States she will hydrate more.

## 2023-02-22 ENCOUNTER — HOSPITAL ENCOUNTER (OUTPATIENT)
Dept: RADIOLOGY | Facility: HOSPITAL | Age: 63
Discharge: HOME | End: 2023-02-22
Attending: ANESTHESIOLOGY | Admitting: ANESTHESIOLOGY
Payer: COMMERCIAL

## 2023-02-22 ENCOUNTER — HOSPITAL ENCOUNTER (OUTPATIENT)
Facility: HOSPITAL | Age: 63
Discharge: HOME | End: 2023-02-22
Attending: ANESTHESIOLOGY | Admitting: ANESTHESIOLOGY
Payer: COMMERCIAL

## 2023-02-22 DIAGNOSIS — M54.2 NECK PAIN: ICD-10-CM

## 2023-02-22 DIAGNOSIS — M54.12 CERVICAL RADICULOPATHY: ICD-10-CM

## 2023-02-22 PROCEDURE — 25500020 PHARM REV CODE 255: Mod: PO | Performed by: ANESTHESIOLOGY

## 2023-02-22 PROCEDURE — 62321 NJX INTERLAMINAR CRV/THRC: CPT | Mod: PO | Performed by: ANESTHESIOLOGY

## 2023-02-22 PROCEDURE — 76000 FLUOROSCOPY <1 HR PHYS/QHP: CPT | Mod: TC,PO

## 2023-02-22 PROCEDURE — 62321 NJX INTERLAMINAR CRV/THRC: CPT | Mod: ,,, | Performed by: ANESTHESIOLOGY

## 2023-02-22 PROCEDURE — A4216 STERILE WATER/SALINE, 10 ML: HCPCS | Mod: PO | Performed by: ANESTHESIOLOGY

## 2023-02-22 PROCEDURE — 62321 PR INJ CERV/THORAC, W/GUIDANCE: ICD-10-PCS | Mod: ,,, | Performed by: ANESTHESIOLOGY

## 2023-02-22 PROCEDURE — 25000003 PHARM REV CODE 250: Mod: PO | Performed by: ANESTHESIOLOGY

## 2023-02-22 PROCEDURE — 63600175 PHARM REV CODE 636 W HCPCS: Mod: PO | Performed by: ANESTHESIOLOGY

## 2023-02-22 RX ORDER — MIDAZOLAM HYDROCHLORIDE 2 MG/2ML
INJECTION, SOLUTION INTRAMUSCULAR; INTRAVENOUS
Status: DISCONTINUED | OUTPATIENT
Start: 2023-02-22 | End: 2023-02-22 | Stop reason: HOSPADM

## 2023-02-22 RX ORDER — SODIUM CHLORIDE, SODIUM LACTATE, POTASSIUM CHLORIDE, CALCIUM CHLORIDE 600; 310; 30; 20 MG/100ML; MG/100ML; MG/100ML; MG/100ML
INJECTION, SOLUTION INTRAVENOUS CONTINUOUS
Status: DISCONTINUED | OUTPATIENT
Start: 2023-02-22 | End: 2023-02-22 | Stop reason: HOSPADM

## 2023-02-22 RX ORDER — LIDOCAINE HYDROCHLORIDE 10 MG/ML
INJECTION, SOLUTION EPIDURAL; INFILTRATION; INTRACAUDAL; PERINEURAL
Status: DISCONTINUED | OUTPATIENT
Start: 2023-02-22 | End: 2023-02-22 | Stop reason: HOSPADM

## 2023-02-22 RX ORDER — METHYLPREDNISOLONE ACETATE 80 MG/ML
INJECTION, SUSPENSION INTRA-ARTICULAR; INTRALESIONAL; INTRAMUSCULAR; SOFT TISSUE
Status: DISCONTINUED | OUTPATIENT
Start: 2023-02-22 | End: 2023-02-22 | Stop reason: HOSPADM

## 2023-02-22 RX ORDER — SODIUM CHLORIDE 9 MG/ML
INJECTION, SOLUTION INTRAMUSCULAR; INTRAVENOUS; SUBCUTANEOUS
Status: DISCONTINUED | OUTPATIENT
Start: 2023-02-22 | End: 2023-02-22 | Stop reason: HOSPADM

## 2023-02-22 RX ADMIN — SODIUM CHLORIDE, POTASSIUM CHLORIDE, SODIUM LACTATE AND CALCIUM CHLORIDE: 600; 310; 30; 20 INJECTION, SOLUTION INTRAVENOUS at 12:02

## 2023-02-22 NOTE — H&P
CC: Neck pain    HPI: The patient is a 63yo woman with a history of cervical radiculopathy here for cervical YARON. There are no major changes in history and physical from 1/13/22.    Past Medical History:   Diagnosis Date    Allergy     Arthritis of spine 2011    Chronic low back pain     Class 2 obesity with body mass index (BMI) of 39.0 to 39.9 in adult 01/11/2019    Coronary artery disease     mild    DDD (degenerative disc disease), lumbar     Diverticulitis     Diverticulosis     DJD (degenerative joint disease)     Encounter for blood transfusion     Factor V Leiden     GERD (gastroesophageal reflux disease)     Hiatal hernia     Migraines     PONV (postoperative nausea and vomiting)     geta    Schatzki's ring     Urticaria        Past Surgical History:   Procedure Laterality Date    APPENDECTOMY  1981    CARPAL TUNNEL RELEASE  2011    right    COLONOSCOPY  2014    Dr. Palomares; reduntant colon, otherwise normal findings repeat in 8-10 years for surveillance    Epidural Steroid Injection      Pain Management    EPIDURAL STEROID INJECTION INTO CERVICAL SPINE N/A 9/25/2018    Procedure: Injection-steroid-epidural-cervical;  Surgeon: Oswald Abdalla MD;  Location: [facility] OR;  Service: Pain Management;  Laterality: N/A;    EPIDURAL STEROID INJECTION INTO CERVICAL SPINE N/A 10/24/2019    Procedure: Injection-steroid-epidural-cervical;  Surgeon: Oswald Abdalla MD;  Location: [facility] OR;  Service: Pain Management;  Laterality: N/A;    EPIDURAL STEROID INJECTION INTO LUMBAR SPINE N/A 12/27/2018    Procedure: Injection-steroid-epidural-lumbar L5/S1;  Surgeon: Oswald Abdalla MD;  Location: [facility] OR;  Service: Pain Management;  Laterality: N/A;    EPIDURAL STEROID INJECTION INTO LUMBAR SPINE N/A 9/4/2019    Procedure: Injection-steroid-epidural-lumbar;  Surgeon: Oswald Abdalla MD;  Location: [facility] OR;  Service: Pain Management;  Laterality: N/A;  L5/S1 to right    ESOPHAGOGASTRODUODENOSCOPY  05/17/2016      Rodríguez; small hiatal hernia; gastritis    ESOPHAGOGASTRODUODENOSCOPY N/A 2020    Dr. Arreguin; mild Schatzki ring- dilated; small hiatal hernia; bx unremarkable    Facet injection      Pain Management    HYSTERECTOMY      ovaries removed    KIDNEY SURGERY Right     to correct urinary reflux into kidney    LEFT HEART CATHETERIZATION Left 2019    Procedure: Left heart cath;  Surgeon: Casa Perdue MD;  Location: [facility];  Service: Cardiology;  Laterality: Left;    OVARIAN CYST REMOVAL Right     RADIOFREQUENCY ABLATION OF LUMBAR MEDIAL BRANCH NERVE AT SINGLE LEVEL Bilateral 2018    Procedure: RADIOFREQUENCY ABLATION, NERVE, MEDIAL BRANCH, LUMBAR, L2,3,4;  Surgeon: Oswald Abdalla MD;  Location: [facility] OR;  Service: Pain Management;  Laterality: Bilateral;    RADIOFREQUENCY ABLATION OF LUMBAR MEDIAL BRANCH NERVE AT SINGLE LEVEL Bilateral 2019    Procedure: Radiofrequency Ablation, Nerve, Spinal, Lumbar, Medial Branch, L2,3,4;  Surgeon: Oswald Abdalla MD;  Location: [facility] OR;  Service: Pain Management;  Laterality: Bilateral;    SHOULDER SURGERY  2010    rotator cuff, right       Family History   Problem Relation Age of Onset    Heart attack Mother     Heart disease Mother     COPD Mother     Hypertension Mother     Hepatitis Sister     COPD Father     No Known Problems Daughter     Breast cancer Maternal Grandmother     Allergic rhinitis Neg Hx     Angioedema Neg Hx     Asthma Neg Hx     Atopy Neg Hx     Eczema Neg Hx     Immunodeficiency Neg Hx     Urticaria Neg Hx     Colon cancer Neg Hx     Colon polyps Neg Hx        Social History     Socioeconomic History    Marital status:     Number of children: 1   Occupational History     Employer: [employer]   Tobacco Use    Smoking status: Former     Packs/day: 1.00     Years: 5.00     Pack years: 5.00     Types: Cigarettes     Start date: 1992     Quit date: 1997     Years since quittin.1      "Passive exposure: Never    Smokeless tobacco: Never   Substance and Sexual Activity    Alcohol use: Yes     Comment: 3x per year    Drug use: No    Sexual activity: Yes     Partners: Male   Social History Narrative    ** Merged History Encounter **          Social Determinants of Health     Financial Resource Strain: Low Risk     Difficulty of Paying Living Expenses: Not hard at all   Food Insecurity: No Food Insecurity    Worried About Running Out of Food in the Last Year: Never true    Ran Out of Food in the Last Year: Never true   Transportation Needs: No Transportation Needs    Lack of Transportation (Medical): No    Lack of Transportation (Non-Medical): No   Physical Activity: Sufficiently Active    Days of Exercise per Week: 3 days    Minutes of Exercise per Session: 50 min   Stress: No Stress Concern Present    Feeling of Stress : Not at all   Social Connections: Unknown    Frequency of Communication with Friends and Family: More than three times a week    Frequency of Social Gatherings with Friends and Family: Once a week    Active Member of Clubs or Organizations: No    Attends Club or Organization Meetings: Never    Marital Status:    Housing Stability: Low Risk     Unable to Pay for Housing in the Last Year: No    Number of Places Lived in the Last Year: 1    Unstable Housing in the Last Year: No       No current facility-administered medications for this encounter.       Review of patient's allergies indicates:   Allergen Reactions    Hydrocodone Hives and Nausea Only           Sulfa (sulfonamide antibiotics) Hives and Rash           Doxycycline Rash     Itchy rash and some scattered itchy lesions    Oxycodone Itching and Nausea And Vomiting       Vitals:    02/16/23 1501 02/22/23 1228   BP:  (!) 140/66   Pulse:  62   Resp:  16   Temp:  98.4 °F (36.9 °C)   TempSrc:  Skin   SpO2:  99%   Weight: 86.2 kg (190 lb)    Height: 5' 4" (1.626 m)        ASA 2, Mallampati 2    REVIEW OF SYSTEMS:     GENERAL: " No weight loss, malaise or fevers.  HEENT:  No recent changes in vision or hearing  NECK: Negative for lumps, no difficulty with swallowing.  RESPIRATORY: Negative for cough, wheezing or shortness of breath, patient denies any recent URI.  CARDIOVASCULAR: Negative for chest pain, leg swelling or palpitations.  GI: Negative for abdominal discomfort, blood in stools or black stools or change in bowel habits.  MUSCULOSKELETAL: See HPI.  SKIN: Negative for lesions, rash, and itching.  PSYCH: No suicidal or homicidal ideations, no current mood disturbances.  HEMATOLOGY/LYMPHOLOGY: Negative for prolonged bleeding, bruising easily or swollen nodes. Patient is not currently taking any anti-coagulants  ENDO: No history of diabetes or thyroid dysfunction  NEURO: No history of syncope, paralysis, seizures or tremors.All other reviewed and negative other than HPI.    Physical exam:  Gen: A and O x3, pleasant, well-groomed  Skin: No rashes or obvious lesions  HEENT: PERRLA, no obvious deformities on ears or in canals. No thyroid masses, trachea midline, no palpable lymph nodes in neck, axilla.  CVS: Regular rate and rhythm, normal S1 and S2, no murmurs.  Resp: Clear to auscultation bilaterally.  Abdomen: Soft, NT/ND, normal bowel sounds present.  Musculoskeletal/Neuro: Moving all extremities    Assessment:  There are no diagnoses linked to this encounter.

## 2023-02-22 NOTE — OP NOTE
PROCEDURE DATE: 2/22/2023    Procedure: C7-T1 cervical interlaminar epidural steroid injection under utilizing fluoroscopy.    Diagnosis: Cervical Radiculopathy    POSTOP DIAGNOSIS: SAME    Physician: Oswald Abdalla MD    Medications injected:  Methylprednisone 80mg followed by a slow injection of 4 mL sterile, preservative-free normal saline.    Local anesthetic used: Lidocaine 1%, 4 ml.    Sedation Medications: 2mg versed    Complications:  none    Estimated blood loss: none    Technique:  A time-out was taken to identify patient and procedure prior to starting the procedure.  With the patient laying in a prone position with the neck in a mid-flexed forward position, the area was prepped and draped in the usual sterile fashion using ChloraPrep and a fenestrated drape.  The area was determined under AP fluoroscopic guidance.  Local anesthetic was given using a 25-gauge 1.5 inch needle by raising a wheal and then infiltrating ventrally.  A 3.5 inch 20-gauge Touhy needle was introduced under fluoroscopic guidance to meet the lamina of C7.  The needle was then hinged under the lamina then advanced using loss of resistance technique.  Once the tip of the needle was in the desired position, the contrast dye Omnipaque was injected to determine placement and no uptake.  The steroid was then injected slowly followed by a slow injection of 4 mL of the sterile preservative-free normal saline.  The patient tolerated the procedure well.    The patient was monitored after the procedure and was given post-procedure and discharge instructions to follow at home. The patient was discharged in a stable condition.

## 2023-02-22 NOTE — DISCHARGE SUMMARY
[place] - Surgery  Discharge Note  Short Stay    Procedure(s) (LRB):  Injection-steroid-epidural-cervical C7-T1 (N/A)      OUTCOME: Patient tolerated treatment/procedure well without complication and is now ready for discharge.    DISPOSITION: Home or Self Care    FINAL DIAGNOSIS:  Cervical radiculopathy    FOLLOWUP: In clinic    DISCHARGE INSTRUCTIONS:    Discharge Procedure Orders   Diet Adult Regular     No dressing needed     Notify your health care provider if you experience any of the following:  temperature >100.4     Activity as tolerated

## 2023-02-23 VITALS
DIASTOLIC BLOOD PRESSURE: 58 MMHG | HEIGHT: 64 IN | WEIGHT: 190 LBS | TEMPERATURE: 98 F | RESPIRATION RATE: 16 BRPM | OXYGEN SATURATION: 99 % | BODY MASS INDEX: 32.44 KG/M2 | HEART RATE: 62 BPM | SYSTOLIC BLOOD PRESSURE: 121 MMHG

## 2023-03-03 ENCOUNTER — TELEPHONE (OUTPATIENT)
Dept: PHARMACY | Facility: CLINIC | Age: 63
End: 2023-03-03
Payer: COMMERCIAL

## 2023-03-10 ENCOUNTER — PATIENT MESSAGE (OUTPATIENT)
Dept: CARDIOLOGY | Facility: CLINIC | Age: 63
End: 2023-03-10
Payer: COMMERCIAL

## 2023-03-10 ENCOUNTER — TELEPHONE (OUTPATIENT)
Dept: CARDIOLOGY | Facility: CLINIC | Age: 63
End: 2023-03-10
Payer: COMMERCIAL

## 2023-03-23 ENCOUNTER — OFFICE VISIT (OUTPATIENT)
Dept: PAIN MEDICINE | Facility: CLINIC | Age: 63
End: 2023-03-23
Payer: COMMERCIAL

## 2023-03-23 VITALS
WEIGHT: 192.81 LBS | SYSTOLIC BLOOD PRESSURE: 129 MMHG | HEART RATE: 66 BPM | DIASTOLIC BLOOD PRESSURE: 60 MMHG | HEIGHT: 64 IN | BODY MASS INDEX: 32.92 KG/M2

## 2023-03-23 DIAGNOSIS — M54.16 LUMBAR RADICULOPATHY, CHRONIC: Primary | ICD-10-CM

## 2023-03-23 PROCEDURE — 3074F PR MOST RECENT SYSTOLIC BLOOD PRESSURE < 130 MM HG: ICD-10-PCS | Mod: CPTII,S$GLB,, | Performed by: ANESTHESIOLOGY

## 2023-03-23 PROCEDURE — 3074F SYST BP LT 130 MM HG: CPT | Mod: CPTII,S$GLB,, | Performed by: ANESTHESIOLOGY

## 2023-03-23 PROCEDURE — 3044F PR MOST RECENT HEMOGLOBIN A1C LEVEL <7.0%: ICD-10-PCS | Mod: CPTII,S$GLB,, | Performed by: ANESTHESIOLOGY

## 2023-03-23 PROCEDURE — 99999 PR PBB SHADOW E&M-EST. PATIENT-LVL IV: CPT | Mod: PBBFAC,,, | Performed by: ANESTHESIOLOGY

## 2023-03-23 PROCEDURE — 4010F ACE/ARB THERAPY RXD/TAKEN: CPT | Mod: CPTII,S$GLB,, | Performed by: ANESTHESIOLOGY

## 2023-03-23 PROCEDURE — 1159F MED LIST DOCD IN RCRD: CPT | Mod: CPTII,S$GLB,, | Performed by: ANESTHESIOLOGY

## 2023-03-23 PROCEDURE — 99999 PR PBB SHADOW E&M-EST. PATIENT-LVL IV: ICD-10-PCS | Mod: PBBFAC,,, | Performed by: ANESTHESIOLOGY

## 2023-03-23 PROCEDURE — 3044F HG A1C LEVEL LT 7.0%: CPT | Mod: CPTII,S$GLB,, | Performed by: ANESTHESIOLOGY

## 2023-03-23 PROCEDURE — 1159F PR MEDICATION LIST DOCUMENTED IN MEDICAL RECORD: ICD-10-PCS | Mod: CPTII,S$GLB,, | Performed by: ANESTHESIOLOGY

## 2023-03-23 PROCEDURE — 3078F DIAST BP <80 MM HG: CPT | Mod: CPTII,S$GLB,, | Performed by: ANESTHESIOLOGY

## 2023-03-23 PROCEDURE — 4010F PR ACE/ARB THEARPY RXD/TAKEN: ICD-10-PCS | Mod: CPTII,S$GLB,, | Performed by: ANESTHESIOLOGY

## 2023-03-23 PROCEDURE — 3008F PR BODY MASS INDEX (BMI) DOCUMENTED: ICD-10-PCS | Mod: CPTII,S$GLB,, | Performed by: ANESTHESIOLOGY

## 2023-03-23 PROCEDURE — 3008F BODY MASS INDEX DOCD: CPT | Mod: CPTII,S$GLB,, | Performed by: ANESTHESIOLOGY

## 2023-03-23 PROCEDURE — 99214 PR OFFICE/OUTPT VISIT, EST, LEVL IV, 30-39 MIN: ICD-10-PCS | Mod: S$GLB,,, | Performed by: ANESTHESIOLOGY

## 2023-03-23 PROCEDURE — 99214 OFFICE O/P EST MOD 30 MIN: CPT | Mod: S$GLB,,, | Performed by: ANESTHESIOLOGY

## 2023-03-23 PROCEDURE — 3078F PR MOST RECENT DIASTOLIC BLOOD PRESSURE < 80 MM HG: ICD-10-PCS | Mod: CPTII,S$GLB,, | Performed by: ANESTHESIOLOGY

## 2023-03-23 RX ORDER — ALPRAZOLAM 1 MG/1
1 TABLET ORAL
Qty: 1 TABLET | Refills: 0 | Status: SHIPPED | OUTPATIENT
Start: 2023-03-23 | End: 2023-09-26

## 2023-03-23 NOTE — PROGRESS NOTES
CHIEF COMPLAINT/REASON FOR VISIT: low back pain, neck pain    History of present illness: The patient is a 62 year old woman with a history of migraines, carpal tunnel syndrome and low back pain since her early 20s.   She returns in follow-up today for neck pain and back pain.  She is status post cervical YARON on 02/22/2023 with 90% relief of her neck pain.  She no longer has pain in the midline neck radiating into her shoulders.  She was not having any numbness or tingling.  She denies any new balance issues.  She does report new issues with her back and her legs.  She was previously having mostly back and right leg pain.  She is now having pain into both legs, laterally, somewhat anteriorly and somewhat into the lower legs as well.  This is worse with standing and walking especially worse with a great deal of activity.  She denies any new vonda weakness, no bowel or bladder incontinence.      Pain intervention history:She is status post TFESI bilaterally to L3 on 12/6/2011 with no relief. Past history of status post L4/5 YARON on 5/25/11 with about 90% relief that lasted 3 weeks. She is status post 2 bilateral L3 transforaminal injections with 3 weeks relief each time.  She is status post L4/5 interlaminar epidural steroid injection on 1/8/14 with 90% relief.  She is status post L5/S1 interlaminar epidural steroid injection on 6/9/14 with moderate relief only on the right low back and right leg lasting 2 weeks.  She is status post bilateral L3/4 and L4/5 facet joint injections on 4/13/15 with initially 100% relief of her back pain and 80% relief of her left lateral hip and lateral thigh pain, now reporting at least 50% relief of both.  She is status post bilateral L2, 3 and 4 medial branch radiofrequency ablation on 5/20/15 with 60% relief.   She is status post bilateral L2, 3 and 4 medial branch radiofrequency ablation on 6/20/16 with 50-70% relief.  She is status post L5/S1 interlaminar epidural steroid injection  "on 3/10/17 with 50% relief.  She is status post bilateral L2, 3, 4 medial branch radiofrequency ablation on 7/17/17 with about 75% relief.  She is status post bilateral L2, 3, 4 medial branch radiofrequency ablation on 07/17/2018 with 100% relief of her leg pain and 70-75% relief of her back pain.  She is status post C7-T1 cervical interlaminar epidural steroid injection on 09/25/2018 with 80% relief.   She is status post L5/S1 interlaminar epidural steroid injection on 12/27/2018 with 60% relief.  She is status post bilateral L2, 3 and 4 medial branch radiofrequency ablation on 07/29/2019 with 40% relief.  She is status post L5/S1 interlaminar epidural steroid injection on 09/04/2019 with almost 100% relief of her right leg pain but 0% relief for back pain. She is status post C7-T1 interlaminar epidural steroid injection on 10/24/2019 with 70% relief.       Spine surgeries:    Antineuropathics: failed gabapentin, failed Cymbalta due to side effects  NSAIDs:  Ibuprofen  Physical therapy:  Antidepressants:  Muscle relaxers:  Opioids:  Tramadol 50 mg twice daily as needed  Antiplatelets/Anticoagulants:  ASA 81 mg    ROS: She reports back pain only.  Balance of review of systems is negative.    Medical, surgical, family and social history reviewed elsewhere in record.     Medications/Allergies: See med card      Vitals:    03/23/23 0943   BP: 129/60   Pulse: 66   Weight: 87.5 kg (192 lb 12.7 oz)   Height: 5' 4" (1.626 m)   PainSc:   6   PainLoc: Leg        PHYSICAL EXAM:  Gen: A and O x3, pleasant, well-groomed  HEENT: PERRLA  CVS: Regular rate and rhythm  Resp:  No obvious shortness of breath, no increased work of breathing  Musculoskeletal: No antalgic gait.     Neuro:  Upper extremities: 5/5 strength bilaterally   Lower extremities: 5/5 strength bilaterally  Reflexes: Brachioradialis 2+, Bicep 2+, Tricep 2+.   Patellar 3+, Achilles 2+.  Sensory: Intact and symmetrical to light touch and pinprick in C2-T1 dermatomes " bilaterally.Intact and symmetrical to light touch and pinprick in L2-S1 dermatomes bilaterally.      Lumbar spine:  Lumbar spine: ROM is moderately limited with flexion and extension with increased bilateral low back pain during extension and oblique extension.  Supine straight leg raise is negative bilaterally.  Internal and external rotation of the hip causes no increased pain in either side.  Myofascial exam:  Mild tenderness to palpation to the right greater than left lumbar paraspinous muscles.      IMAGING:   MRI L-SPINE 5/10/11   IMPRESSION: AT L3-4, THERE IS CIRCUMFERENTIAL DISC PROTRUSION FOCALLY MORE PROMINENT TO THE LEFT AS WELL AS A SMALL DISC HERNIATION PROTRUDING INFERIORLY BEHIND L4 TO THE LEFT OF MIDLINE. THIS PRODUCES SPINAL CANAL AND BILATERAL NEURAL FORAMINAL STENOSIS, LEFT GREATER THAN RIGHT. AT L4-5, THERE IS CIRCUMFERENTIAL DISC PROTRUSION FOCALLY MORE PROMINENT TO THE RIGHT WITH MILD SPINAL CANAL AND RIGHT NEURAL FORAMINAL STENOSIS. AT L1-2 AND L2-3 ALTHOUGH THERE ARE DISC PROTRUSIONS IDENTIFIED, SIGNIFICANT STENOSIS IS NOT SEEN.     MRI lumbar spine 7/10/14  L1-2:There is chronic relatively advanced disc degeneration with disc space narrowing, disc dehydration, disc narrowing, endplate osteophytes, and degenerative vertebral endplate marrow change. There is a diffuse 2-mm posterior disc bulge with a superimposed4-mm right posterior disc extrusion causing mild right foraminal stenosis. There is no impingement of the right L1 nerve root and there has been no change.  L2-3: There is severe and chronic disc degeneration with severe disc space narrowing, disc dehydration, degenerative vertebral endplate marrow change, and vertebral endplate osteophytes. There is a diffuse 2-mm posterior disc bulge with a superimposed 4-mm right posterior disc extrusion causing mild right foraminal stenosis. There is no impingement of the right L2 nerve root and there has been no significant change.  L3-4: There is  severe disc degeneration which has progressed since the prior study. There is severe disc space narrowing, disc dehydration, degenerative vertebral endplate marrow change and endplate osteophytes. There is also mild posterior subluxation of L3over a distance of 4 mm. There is a broad-based 5-mm posterior disc extrusion. There is degenerative facet arthrosis with ligamentum flavum thickening. The combination of facet arthrosis and disc extrusion results in relatively severe spinal canal stenosis with compression of the thecal sac to an AP diameter of 5 mm, moderate right foraminal stenosis, and severe left foraminal stenosis. The spinal stenosis has also progressed since the prior study.  L4-5:There is a diffuse posterior disc bulge with a superimposed 3-mm left posterior paracentral disc protrusion causing left paracentral thecal sac compression. There is moderate left and mild right foraminal stenosis and there has been no significant change. There is mild degenerative facet arthrosis with ligamentum flavum thickening and small joint effusions.  L5-S1:There is no significant compromise of the spinal canal or foramina and there has been no change.    X-ray cervical spine 6/8/15  There is loss of normal cervical lordosis which may be positional or related muscular strain. Intervertebral disk height loss is noted at the C5-C6 C6-C7 levels and to a lesser degree C4-C5 and C7-T1 levels. Vertebral body alignment appears otherwise adequate. Vertebral body heights appear well-preserved. The atlas and odontoid appear in good relationship to each other. Osseous neuroforaminal narrowing is noted at the C3-C4 C4-C5 C5-C6 levels on the left and at the C4-C5 C5-C6 and C6-C7 levels on the right     07/10/2018 MRI cervical spine [place] [facility] report  C2-3 broad-based signal asymmetry at the left subarticular and foraminal zone reflecting implant spondylosis and disc bulge complex, mild to moderate left asymmetric foraminal  narrowing, ectasia of the left vertebral artery, contralateral right asymmetric facet hypertrophy, right foramen widely narrowed, disc partially desiccated without collapse  C3-4 moderate to severe left greater than right foraminal narrowing secondary to uncinate joint hypertrophic signal alteration, disc partially desiccated  C4-5 endplate spondylosis moderate diffuse disc bulge noted, broad-based right paracentral disc herniation, asymmetric flattening of the ventral cord surface at the right paracentral zone, high-grade if lateral right foraminal narrowing, AP diameter of canal 9.9 mm, contralaterally, high grade left foraminal narrowing secondary to facet greater than uncinate joint hypertrophic signal alteration, disc desiccated and mildly narrowed  C5-6 disc space narrowing evident with generalized in Nigel spondylosis and concentric inter pose disc bulge complex, high-grade bilateral foraminal narrowing, flattening of the cord surface, AP diameter 7.9 mm, symmetric facet arthrosis, disc diffusely desiccated and narrowed  C6-7 moderate endplate spondylosis and concentric disc bulge complex, high-grade bilateral foraminal compromise, flattening of the anterior cord surface, AP diameter 8.8 mm, facet arthrosis symmetric, disc desiccated and narrowed  C7-T1 less than 2 mm depth disc bulge    11/06/2018 MRI lumbar spine  T12/L1: There is no evidence of disc protrusion, canal or foraminal stenosis.  L1/L2: Right posterolateral disc protrusion is evident and there is mild distortion of the right anterolateral canal.  Degenerative facet changes are noted bilaterally.  The left foramen is intact.  L2/L3: There is annular disc bulging with a superimposed right posterolateral disc extrusion.  This is slightly more pronounced on the previous examination and there is mild effacement of the anterior thecal sac and moderate narrowing the right foramen.  L3/L4: There is annular disc bulge and osteophyte formation with a  superimposed small left posterolateral disc extrusion.  There is moderate narrowing the central canal and left foramen.  This is little changed relative the previous examination.  Degenerative facet changes are noted.  L5/S1: There is a small central disc extrusion superimposed upon annular disc bulging.  This is slightly less pronounced than was seen previously.  Degenerative facet changes are noted.  L5/S1: Moderate degenerative facet changes are noted and there is mild effacement of the anterior thecal sac.  There is ligamentous hypertrophy particularly to the left in the posterior canal and there is left greater than right foraminal narrowing.  This is mildly worsened relative prior exam.      ASSESSMENT:  The patient is a 62 year old woman with a history of migraines, carpal tunnel syndrome and low back pain since her early 20s who returns in follow up.   1. Lumbar radiculopathy, chronic  MRI Lumbar Spine Without Contrast            Plan:    1.  We discussed her cervical issues, chronic radicular issues, while she does have spinal stenosis, she is doing well, currently stable.  2.  We discussed her low back issues, discussed that it has been a long time since she has had MRI of her lumbar spine and since she is having new symptoms the left side I am going to get an MRI and call her with the results.    3.  We do not need to refill her tramadol right now but she has had this filled in the past and continues to take this.

## 2023-04-21 ENCOUNTER — PATIENT MESSAGE (OUTPATIENT)
Dept: ADMINISTRATIVE | Facility: OTHER | Age: 63
End: 2023-04-21
Payer: COMMERCIAL

## 2023-04-27 DIAGNOSIS — M17.10 ARTHRITIS OF KNEE: Primary | ICD-10-CM

## 2023-04-27 DIAGNOSIS — M25.552 LEFT HIP PAIN: ICD-10-CM

## 2023-05-03 ENCOUNTER — OFFICE VISIT (OUTPATIENT)
Dept: ORTHOPEDICS | Facility: CLINIC | Age: 63
End: 2023-05-03
Payer: COMMERCIAL

## 2023-05-03 ENCOUNTER — HOSPITAL ENCOUNTER (OUTPATIENT)
Dept: RADIOLOGY | Facility: HOSPITAL | Age: 63
Discharge: HOME | End: 2023-05-03
Attending: ORTHOPAEDIC SURGERY
Payer: COMMERCIAL

## 2023-05-03 VITALS — HEIGHT: 64 IN | BODY MASS INDEX: 32.78 KG/M2 | WEIGHT: 192 LBS | RESPIRATION RATE: 18 BRPM

## 2023-05-03 DIAGNOSIS — M17.12 ARTHRITIS OF KNEE, LEFT: Primary | ICD-10-CM

## 2023-05-03 DIAGNOSIS — M25.552 LEFT HIP PAIN: ICD-10-CM

## 2023-05-03 DIAGNOSIS — M70.62 TROCHANTERIC BURSITIS, LEFT HIP: ICD-10-CM

## 2023-05-03 DIAGNOSIS — M17.10 ARTHRITIS OF KNEE: ICD-10-CM

## 2023-05-03 DIAGNOSIS — M17.12 PRIMARY OSTEOARTHRITIS OF LEFT KNEE: Primary | ICD-10-CM

## 2023-05-03 PROCEDURE — 4010F ACE/ARB THERAPY RXD/TAKEN: CPT | Mod: CPTII,S$GLB,, | Performed by: ORTHOPAEDIC SURGERY

## 2023-05-03 PROCEDURE — 99999 PR PBB SHADOW E&M-EST. PATIENT-LVL IV: CPT | Mod: PBBFAC,,, | Performed by: ORTHOPAEDIC SURGERY

## 2023-05-03 PROCEDURE — 73502 XR HIP WITH PELVIS WHEN PERFORMED, 2 OR 3 VIEWS LEFT: ICD-10-PCS | Mod: 26,LT,, | Performed by: RADIOLOGY

## 2023-05-03 PROCEDURE — 1159F PR MEDICATION LIST DOCUMENTED IN MEDICAL RECORD: ICD-10-PCS | Mod: CPTII,S$GLB,, | Performed by: ORTHOPAEDIC SURGERY

## 2023-05-03 PROCEDURE — 99214 OFFICE O/P EST MOD 30 MIN: CPT | Mod: 25,S$GLB,, | Performed by: ORTHOPAEDIC SURGERY

## 2023-05-03 PROCEDURE — 1160F PR REVIEW ALL MEDS BY PRESCRIBER/CLIN PHARMACIST DOCUMENTED: ICD-10-PCS | Mod: CPTII,S$GLB,, | Performed by: ORTHOPAEDIC SURGERY

## 2023-05-03 PROCEDURE — 20610 LARGE JOINT ASPIRATION/INJECTION: L GREATER TROCHANTERIC BURSA: ICD-10-PCS | Mod: LT,S$GLB,, | Performed by: ORTHOPAEDIC SURGERY

## 2023-05-03 PROCEDURE — 3008F PR BODY MASS INDEX (BMI) DOCUMENTED: ICD-10-PCS | Mod: CPTII,S$GLB,, | Performed by: ORTHOPAEDIC SURGERY

## 2023-05-03 PROCEDURE — 99999 PR PBB SHADOW E&M-EST. PATIENT-LVL IV: ICD-10-PCS | Mod: PBBFAC,,, | Performed by: ORTHOPAEDIC SURGERY

## 2023-05-03 PROCEDURE — 73562 XR KNEE ORTHO LEFT WITH FLEXION: ICD-10-PCS | Mod: 26,RT,, | Performed by: RADIOLOGY

## 2023-05-03 PROCEDURE — 3044F PR MOST RECENT HEMOGLOBIN A1C LEVEL <7.0%: ICD-10-PCS | Mod: CPTII,S$GLB,, | Performed by: ORTHOPAEDIC SURGERY

## 2023-05-03 PROCEDURE — 3008F BODY MASS INDEX DOCD: CPT | Mod: CPTII,S$GLB,, | Performed by: ORTHOPAEDIC SURGERY

## 2023-05-03 PROCEDURE — 73564 X-RAY EXAM KNEE 4 OR MORE: CPT | Mod: TC,PO,LT

## 2023-05-03 PROCEDURE — 3044F HG A1C LEVEL LT 7.0%: CPT | Mod: CPTII,S$GLB,, | Performed by: ORTHOPAEDIC SURGERY

## 2023-05-03 PROCEDURE — 73502 X-RAY EXAM HIP UNI 2-3 VIEWS: CPT | Mod: 26,LT,, | Performed by: RADIOLOGY

## 2023-05-03 PROCEDURE — 73562 X-RAY EXAM OF KNEE 3: CPT | Mod: 26,RT,, | Performed by: RADIOLOGY

## 2023-05-03 PROCEDURE — 20610 DRAIN/INJ JOINT/BURSA W/O US: CPT | Mod: LT,S$GLB,, | Performed by: ORTHOPAEDIC SURGERY

## 2023-05-03 PROCEDURE — 1160F RVW MEDS BY RX/DR IN RCRD: CPT | Mod: CPTII,S$GLB,, | Performed by: ORTHOPAEDIC SURGERY

## 2023-05-03 PROCEDURE — 73564 X-RAY EXAM KNEE 4 OR MORE: CPT | Mod: 26,LT,, | Performed by: RADIOLOGY

## 2023-05-03 PROCEDURE — 73564 XR KNEE ORTHO LEFT WITH FLEXION: ICD-10-PCS | Mod: 26,LT,, | Performed by: RADIOLOGY

## 2023-05-03 PROCEDURE — 1159F MED LIST DOCD IN RCRD: CPT | Mod: CPTII,S$GLB,, | Performed by: ORTHOPAEDIC SURGERY

## 2023-05-03 PROCEDURE — 73502 X-RAY EXAM HIP UNI 2-3 VIEWS: CPT | Mod: TC,PO,LT

## 2023-05-03 PROCEDURE — 99214 PR OFFICE/OUTPT VISIT, EST, LEVL IV, 30-39 MIN: ICD-10-PCS | Mod: 25,S$GLB,, | Performed by: ORTHOPAEDIC SURGERY

## 2023-05-03 PROCEDURE — 4010F PR ACE/ARB THEARPY RXD/TAKEN: ICD-10-PCS | Mod: CPTII,S$GLB,, | Performed by: ORTHOPAEDIC SURGERY

## 2023-05-03 RX ORDER — TRIAMCINOLONE ACETONIDE 40 MG/ML
40 INJECTION, SUSPENSION INTRA-ARTICULAR; INTRAMUSCULAR
Status: DISCONTINUED | OUTPATIENT
Start: 2023-05-03 | End: 2023-05-03 | Stop reason: HOSPADM

## 2023-05-03 RX ADMIN — TRIAMCINOLONE ACETONIDE 40 MG: 40 INJECTION, SUSPENSION INTRA-ARTICULAR; INTRAMUSCULAR at 03:05

## 2023-05-03 NOTE — PROGRESS NOTES
Past Medical History:   Diagnosis Date    Allergy     Arthritis of spine 2011    Chronic low back pain     Class 2 obesity with body mass index (BMI) of 39.0 to 39.9 in adult 01/11/2019    Coronary artery disease     mild    DDD (degenerative disc disease), lumbar     Diverticulitis     Diverticulosis     DJD (degenerative joint disease)     Encounter for blood transfusion     Factor V Leiden     GERD (gastroesophageal reflux disease)     Hiatal hernia     Migraines     PONV (postoperative nausea and vomiting)     geta    Schatzki's ring     Urticaria        Past Surgical History:   Procedure Laterality Date    APPENDECTOMY  1981    CARPAL TUNNEL RELEASE  2011    right    COLONOSCOPY  2014    Dr. Palomares; reduntant colon, otherwise normal findings repeat in 8-10 years for surveillance    Epidural Steroid Injection      Pain Management    EPIDURAL STEROID INJECTION INTO CERVICAL SPINE N/A 9/25/2018    Procedure: Injection-steroid-epidural-cervical;  Surgeon: Oswald Abdalla MD;  Location: [facility] OR;  Service: Pain Management;  Laterality: N/A;    EPIDURAL STEROID INJECTION INTO CERVICAL SPINE N/A 10/24/2019    Procedure: Injection-steroid-epidural-cervical;  Surgeon: Oswald Abdalla MD;  Location: [facility] OR;  Service: Pain Management;  Laterality: N/A;    EPIDURAL STEROID INJECTION INTO CERVICAL SPINE N/A 2/22/2023    Procedure: Injection-steroid-epidural-cervical C7-T1;  Surgeon: Oswald Abdalla MD;  Location: [facility] OR;  Service: Pain Management;  Laterality: N/A;    EPIDURAL STEROID INJECTION INTO LUMBAR SPINE N/A 12/27/2018    Procedure: Injection-steroid-epidural-lumbar L5/S1;  Surgeon: Oswald Abdalla MD;  Location: [facility] OR;  Service: Pain Management;  Laterality: N/A;    EPIDURAL STEROID INJECTION INTO LUMBAR SPINE N/A 9/4/2019    Procedure: Injection-steroid-epidural-lumbar;  Surgeon: Oswald Abdalla MD;  Location: [facility] OR;  Service: Pain Management;  Laterality: N/A;  L5/S1 to right     ESOPHAGOGASTRODUODENOSCOPY  05/17/2016    Dr. Arreguin; small hiatal hernia; gastritis    ESOPHAGOGASTRODUODENOSCOPY N/A 6/22/2020    Dr. Arreguin; mild Schatzki ring- dilated; small hiatal hernia; bx unremarkable    Facet injection      Pain Management    HYSTERECTOMY      ovaries removed    KIDNEY SURGERY Right 1967    to correct urinary reflux into kidney    LEFT HEART CATHETERIZATION Left 5/22/2019    Procedure: Left heart cath;  Surgeon: Casa Perdue MD;  [facility] CATH;  Service: Cardiology;  Laterality: Left;    OVARIAN CYST REMOVAL Right 1981    RADIOFREQUENCY ABLATION OF LUMBAR MEDIAL BRANCH NERVE AT SINGLE LEVEL Bilateral 7/17/2018    Procedure: RADIOFREQUENCY ABLATION, NERVE, MEDIAL BRANCH, LUMBAR, L2,3,4;  Surgeon: Oswald Abdalla MD;  Location: [facility] OR;  Service: Pain Management;  Laterality: Bilateral;    RADIOFREQUENCY ABLATION OF LUMBAR MEDIAL BRANCH NERVE AT SINGLE LEVEL Bilateral 7/29/2019    Procedure: Radiofrequency Ablation, Nerve, Spinal, Lumbar, Medial Branch, L2,3,4;  Surgeon: Oswald Abdalla MD;  Location: [facility] OR;  Service: Pain Management;  Laterality: Bilateral;    SHOULDER SURGERY  2010    rotator cuff, right       Current Outpatient Medications   Medication Sig    amLODIPine (NORVASC) 5 MG tablet Take 1 tablet (5 mg total) by mouth once daily.    ascorbic acid, vitamin C, (VITAMIN C) 500 MG tablet Take 1 tablet (500 mg total) by mouth 2 (two) times daily.    aspirin (ECOTRIN) 81 MG EC tablet Take 81 mg by mouth once daily.    atorvastatin (LIPITOR) 10 MG tablet TAKE 1 TABLET(10 MG) BY MOUTH EVERY DAY    azelastine (ASTELIN) 137 mcg (0.1 %) nasal spray 1 spray 2 (two) times daily.    cimetidine (TAGAMET) 800 MG tablet TAKE ONE TABLET BY MOUTH EVERY EVENING    estradioL (ESTRACE) 1 MG tablet Take 1 tablet (1 mg total) by mouth once daily. Can cause blood clots with covid, recommend NOT taking this medication until covid symptoms completely gone and discussing with  PCP    eszopiclone (LUNESTA) 3 mg Tab Take 1 tablet (3 mg total) by mouth every evening.    galcanezumab-gnlm (EMGALITY PEN) 120 mg/mL PnIj Inject 1 pen (120 mg total) into the skin every 28 days.    ibuprofen (ADVIL,MOTRIN) 800 MG tablet Take 1 tablet (800 mg total) by mouth every 6 (six) hours as needed for Pain.    losartan (COZAAR) 100 MG tablet TAKE 1 TABLET(100 MG) BY MOUTH EVERY DAY    mupirocin (BACTROBAN) 2 % ointment Apply topically 3 (three) times daily.    ondansetron (ZOFRAN-ODT) 4 MG TbDL Take 1 tablet (4 mg total) by mouth every 6 (six) hours as needed (nausea).    pantoprazole (PROTONIX) 40 MG tablet TAKE 1 TABLET(40 MG) BY MOUTH EVERY DAY    triamcinolone acetonide 0.1% (KENALOG) 0.1 % cream Apply topically 2 (two) times daily as needed.    ubrogepant (UBRELVY) 50 mg tablet Take 1 tablet by mouth at onset of migraine. May repeat in 2 hours if needed. Max 2 tablets per day.    vitamin D (VITAMIN D3) 1000 units Tab Take 1 tablet (1,000 Units total) by mouth once daily.    zinc sulfate (ZINCATE) 220 (50) mg capsule Take 1 capsule (220 mg total) by mouth once daily.    ALPRAZolam (XANAX) 1 MG tablet Take 1 tablet (1 mg total) by mouth On call Procedure for Anxiety. Take 30min prior to procedure     Current Facility-Administered Medications   Medication    INV GMZ245/placebo Injection 1 Dose    INV HHV521/placebo Injection 1 Dose    INV WRR722/placebo Injection 1 Dose    INV [id number] vaccine 1 Dose    LIDOcaine (PF) 10 mg/ml (1%) injection 10 mg    onabotulinumtoxina injection 200 Units    onabotulinumtoxina injection 200 Units    onabotulinumtoxina injection 200 Units       Review of patient's allergies indicates:   Allergen Reactions    Hydrocodone Hives and Nausea Only           Sulfa (sulfonamide antibiotics) Hives and Rash           Doxycycline Rash     Itchy rash and some scattered itchy lesions    Oxycodone Itching and Nausea And Vomiting       Family History   Problem Relation Age of Onset     Heart attack Mother     Heart disease Mother     COPD Mother     Hypertension Mother     Hepatitis Sister     COPD Father     No Known Problems Daughter     Breast cancer Maternal Grandmother     Allergic rhinitis Neg Hx     Angioedema Neg Hx     Asthma Neg Hx     Atopy Neg Hx     Eczema Neg Hx     Immunodeficiency Neg Hx     Urticaria Neg Hx     Colon cancer Neg Hx     Colon polyps Neg Hx        Social History     Socioeconomic History    Marital status:     Number of children: 1   Occupational History     Employer: [employer]   Tobacco Use    Smoking status: Former     Packs/day: 1.00     Years: 5.00     Pack years: 5.00     Types: Cigarettes     Start date: 1992     Quit date: 1997     Years since quittin.3     Passive exposure: Never    Smokeless tobacco: Never   Substance and Sexual Activity    Alcohol use: Yes     Comment: 3x per year    Drug use: No    Sexual activity: Yes     Partners: Male   Social History Narrative    ** Merged History Encounter **          Social Determinants of Health     Financial Resource Strain: Low Risk     Difficulty of Paying Living Expenses: Not hard at all   Food Insecurity: No Food Insecurity    Worried About Running Out of Food in the Last Year: Never true    Ran Out of Food in the Last Year: Never true   Transportation Needs: No Transportation Needs    Lack of Transportation (Medical): No    Lack of Transportation (Non-Medical): No   Physical Activity: Sufficiently Active    Days of Exercise per Week: 3 days    Minutes of Exercise per Session: 50 min   Stress: No Stress Concern Present    Feeling of Stress : Not at all   Social Connections: Unknown    Frequency of Communication with Friends and Family: More than three times a week    Frequency of Social Gatherings with Friends and Family: Once a week    Active Member of Clubs or Organizations: No    Attends Club or Organization Meetings: Never    Marital Status:    Housing Stability: Low  Risk     Unable to Pay for Housing in the Last Year: No    Number of Places Lived in the Last Year: 1    Unstable Housing in the Last Year: No       Chief Complaint:   Chief Complaint   Patient presents with    Knee Pain     eval left knee    Hip Pain     eval left hip        History of present illness:  62-year-old female with a history of left knee arthritis and previous treatment including a Euflexxa series back in 2019 presents with worsening left knee pain again since March.  No injury or trauma.  Pain along the medial aspect of her knee.  Pain with prolonged walking or standing.  She is also having lateral hip pain.  Patient was seen by Dr. Abdalla and diagnosed with trochanteric bursitis.  No previous treatment for the hip itself though.  Pain today is a 6/10    Answers submitted by the patient for this visit:  Orthopedics Questionnaire (Submitted on 5/2/2023)  unexpected weight change: No  appetite change : No  sleep disturbance: No  IMMUNOCOMPROMISED: No  nervous/ anxious: No  dysphoric mood: No  rash: No  visual disturbance: No  eye redness: No  eye pain: No  ear pain: No  tinnitus: No  hearing loss: No  sinus pressure : No  nosebleeds: No  enviro allergies: No  food allergies: No  cough: No  shortness of breath: No  sweating: No  dysuria: No  frequency: No  difficulty urinating: No  hematuria: No  painful intercourse: No  chest pain: No  palpitations: No  nausea: No  vomiting: No  diarrhea: No  blood in stool: No  constipation: No  headaches: No  dizziness: No  numbness: No  seizures: No  joint swelling: No  myalgia: No  weakness: No  back pain: No   (Submitted on 5/2/2023)  Chief Complaint: Hip pain  Pain Chronicity: new  History of trauma: Yes  Onset: 1 to 4 weeks ago  Frequency: constantly  Progression since onset: gradually worsening  injury location: at home  pain- numeric: 8/10  pain location: left hip, left knee  pain quality: burning  Radiating Pain: No  Aggravating factors: bearing weight,  sitting  fever: No  inability to bear weight: No  itching: No  joint locking: No  limited range of motion: No  stiffness: No  tingling: No  Treatments tried: cold, OTC ointments, OTC pain meds  physical therapy: ineffective  Improvement on treatment: mild      Physical Examination:    Vital Signs:    Vitals:    05/03/23 1446   Resp: 18       Body mass index is 32.96 kg/m².    This a well-developed, well nourished patient in no acute distress.  They are alert and oriented and cooperative to examination.  Pt. walks without an antalgic gait.      Examination of the patient's left hip shows full range of motion with flexion to 160°, extension to 0, external rotation to 50°, internal rotation of 15°, abduction of 50°, adduction of 15°. Skin has no rashes or bruising. Patient has negative Stinchfield exam. Patient has negative straight leg raise.Negative internal impingement test. Negative BRENT test. Negative Rohan's test. Patient has no pain with hip range of motion.  Moderately tender to palpation over the greater trochanteric bursa. Patient is 5 out of 5 motor strength, palpable distal pulses, and intact light touch sensation.     Examination of the left knee shows no rashes or erythema. There are no masses ecchymosis or effusion. Patient has full range of motion from 0-130°. Patient is nontender to palpation over lateral joint line and moderately tender to palpation over the medial joint line. Patient has a - Lachman exam, - anterior drawer exam, and - posterior drawer exam. - Apley exam. Knee is stable to varus and valgus stress. 5 out of 5 motor strength. Palpable distal pulses. Intact light touch sensation. Negative Patellofemoral crepitus        X-rays:  X-rays left knee are ordered and reviewed which show some medial narrowing bilaterally.  Patient has a small ossicle located near the medial epicondyle on the right knee from previous MCL injury possibly.  X-rays of the left hip are ordered and reviewed which  shows well-maintained joint space.  Possibly some mild dysplasia and femoral acetabular impingement.     Assessment::  Left trochanteric bursitis   Left knee arthritis    Plan:  I reviewed the findings with her today.  We talked about the diagnosis bursitis and its treatment.  Patient elected to try a trochanteric bursal injection today.  We talked about her knee arthritis as well.  Patient has done well with viscosupplementation in the past and would like to repeat that.  We will get a Synvisc-One authorization and start that in 2 weeks once approved.    The patient has tried a self guided exercise program that has included walking without significant improvement. Minimal relief with tylenol or OTC Nsaids. Reports less than 8 weeks relief with IA steroid injection. Kellgren Danis scale of 3. They are not receiving another HA injectable at this time. I will precert for gel injection.       All previous pertinent notes including ER visits, physical therapy visits, other orthopedic visits as well as other care for the same musculoskeletal problem were reviewed.  All pertinent lab values and previous imaging was reviewed pertinent to the current visit.    This note was created using [employer] voice recognition software that occasionally misinterpreted phrases or words.    Consult note is delivered via Epic messaging service.

## 2023-05-03 NOTE — PROCEDURES
Large Joint Aspiration/Injection: L greater trochanteric bursa    Date/Time: 5/3/2023 3:15 PM  Performed by: Vaughn Ortiz MD  Authorized by: Vaughn Ortiz MD     Consent Done?:  Yes (Verbal)  Indications:  Pain  Site marked: the procedure site was marked    Timeout: prior to procedure the correct patient, procedure, and site was verified    Local anesthetic:  Lidocaine 1% without epinephrine and bupivacaine 0.25% without epinephrine  Anesthetic total (ml):  6      Details:  Needle Size:  20 G  Ultrasonic Guidance for needle placement?: No    Approach:  Lateral  Location:  Hip  Site:  L greater trochanteric bursa  Medications:  40 mg triamcinolone acetonide 40 mg/mL  Patient tolerance:  Patient tolerated the procedure well with no immediate complications

## 2023-05-04 ENCOUNTER — PATIENT MESSAGE (OUTPATIENT)
Dept: PAIN MEDICINE | Facility: CLINIC | Age: 63
End: 2023-05-04
Payer: COMMERCIAL

## 2023-05-08 RX ORDER — DIAZEPAM 5 MG/1
5 TABLET ORAL
Qty: 2 TABLET | Refills: 0 | Status: SHIPPED | OUTPATIENT
Start: 2023-05-08 | End: 2023-08-17

## 2023-05-17 ENCOUNTER — OFFICE VISIT (OUTPATIENT)
Dept: ORTHOPEDICS | Facility: CLINIC | Age: 63
End: 2023-05-17
Payer: COMMERCIAL

## 2023-05-17 VITALS — WEIGHT: 192 LBS | BODY MASS INDEX: 32.78 KG/M2 | RESPIRATION RATE: 18 BRPM | HEIGHT: 64 IN

## 2023-05-17 DIAGNOSIS — M17.12 PRIMARY OSTEOARTHRITIS OF LEFT KNEE: Primary | ICD-10-CM

## 2023-05-17 PROCEDURE — 20610 DRAIN/INJ JOINT/BURSA W/O US: CPT | Mod: LT,S$GLB,, | Performed by: ORTHOPAEDIC SURGERY

## 2023-05-17 PROCEDURE — 20610 PR DRAIN/INJECT LARGE JOINT/BURSA: ICD-10-PCS | Mod: LT,S$GLB,, | Performed by: ORTHOPAEDIC SURGERY

## 2023-05-17 PROCEDURE — 99999 PR PBB SHADOW E&M-EST. PATIENT-LVL III: ICD-10-PCS | Mod: PBBFAC,,, | Performed by: ORTHOPAEDIC SURGERY

## 2023-05-17 PROCEDURE — 99999 PR PBB SHADOW E&M-EST. PATIENT-LVL III: CPT | Mod: PBBFAC,,, | Performed by: ORTHOPAEDIC SURGERY

## 2023-05-17 PROCEDURE — 99499 UNLISTED E&M SERVICE: CPT | Mod: S$GLB,,, | Performed by: ORTHOPAEDIC SURGERY

## 2023-05-17 PROCEDURE — 99499 NO LOS: ICD-10-PCS | Mod: S$GLB,,, | Performed by: ORTHOPAEDIC SURGERY

## 2023-05-17 NOTE — PROCEDURES
Large Joint Aspiration/Injection: L knee joint    Date/Time: 5/17/2023 3:30 PM  Performed by: Vaughn Ortiz MD  Authorized by: Vaughn Ortiz MD     Consent Done?:  Yes (Verbal)  Indications:  Pain  Site marked: the procedure site was marked    Timeout: prior to procedure the correct patient, procedure, and site was verified      Details:  Needle Size:  18 G  Approach:  Anterolateral  Location:  Knee  Site:  L knee joint  Medications:  48 mg hylan g-f 20 48 mg/6 mL  Patient tolerance:  Patient tolerated the procedure well with no immediate complications

## 2023-05-17 NOTE — PROGRESS NOTES
Past Medical History:   Diagnosis Date    Allergy     Arthritis of spine 2011    Chronic low back pain     Class 2 obesity with body mass index (BMI) of 39.0 to 39.9 in adult 01/11/2019    Coronary artery disease     mild    DDD (degenerative disc disease), lumbar     Diverticulitis     Diverticulosis     DJD (degenerative joint disease)     Encounter for blood transfusion     Factor V Leiden     GERD (gastroesophageal reflux disease)     Hiatal hernia     Migraines     PONV (postoperative nausea and vomiting)     geta    Schatzki's ring     Urticaria        Past Surgical History:   Procedure Laterality Date    APPENDECTOMY  1981    CARPAL TUNNEL RELEASE  2011    right    COLONOSCOPY  2014    Dr. Palomares; reduntant colon, otherwise normal findings repeat in 8-10 years for surveillance    Epidural Steroid Injection      Pain Management    EPIDURAL STEROID INJECTION INTO CERVICAL SPINE N/A 9/25/2018    Procedure: Injection-steroid-epidural-cervical;  Surgeon: Oswald Abdalla MD;  Location: [facility] OR;  Service: Pain Management;  Laterality: N/A;    EPIDURAL STEROID INJECTION INTO CERVICAL SPINE N/A 10/24/2019    Procedure: Injection-steroid-epidural-cervical;  Surgeon: Oswald Abdalla MD;  Location: [facility] OR;  Service: Pain Management;  Laterality: N/A;    EPIDURAL STEROID INJECTION INTO CERVICAL SPINE N/A 2/22/2023    Procedure: Injection-steroid-epidural-cervical C7-T1;  Surgeon: Oswald Abdalla MD;  Location: [facility] OR;  Service: Pain Management;  Laterality: N/A;    EPIDURAL STEROID INJECTION INTO LUMBAR SPINE N/A 12/27/2018    Procedure: Injection-steroid-epidural-lumbar L5/S1;  Surgeon: Oswald Abdalla MD;  Location: [facility] OR;  Service: Pain Management;  Laterality: N/A;    EPIDURAL STEROID INJECTION INTO LUMBAR SPINE N/A 9/4/2019    Procedure: Injection-steroid-epidural-lumbar;  Surgeon: Oswald Abdalla MD;  Location: [facility] OR;  Service: Pain Management;  Laterality: N/A;  L5/S1 to right     ESOPHAGOGASTRODUODENOSCOPY  05/17/2016    Dr. Arreguin; small hiatal hernia; gastritis    ESOPHAGOGASTRODUODENOSCOPY N/A 6/22/2020    Dr. Arreguin; mild Schatzki ring- dilated; small hiatal hernia; bx unremarkable    Facet injection      Pain Management    HYSTERECTOMY      ovaries removed    KIDNEY SURGERY Right 1967    to correct urinary reflux into kidney    LEFT HEART CATHETERIZATION Left 5/22/2019    Procedure: Left heart cath;  Surgeon: Casa Perdue MD;  Location: [facility] CATH;  Service: Cardiology;  Laterality: Left;    OVARIAN CYST REMOVAL Right 1981    RADIOFREQUENCY ABLATION OF LUMBAR MEDIAL BRANCH NERVE AT SINGLE LEVEL Bilateral 7/17/2018    Procedure: RADIOFREQUENCY ABLATION, NERVE, MEDIAL BRANCH, LUMBAR, L2,3,4;  Surgeon: Oswald Abdalla MD;  [place] [facility] [place];  Service: Pain Management;  Laterality: Bilateral;    RADIOFREQUENCY ABLATION OF LUMBAR MEDIAL BRANCH NERVE AT SINGLE LEVEL Bilateral 7/29/2019    Procedure: Radiofrequency Ablation, Nerve, Spinal, Lumbar, Medial Branch, L2,3,4;  Surgeon: Oswald Abdalla MD;  Location: [facility] [place];  Service: Pain Management;  Laterality: Bilateral;    SHOULDER SURGERY  2010    rotator cuff, right       Current Outpatient Medications   Medication Sig    ALPRAZolam (XANAX) 1 MG tablet Take 1 tablet (1 mg total) by mouth On call Procedure for Anxiety. Take 30min prior to procedure    amLODIPine (NORVASC) 5 MG tablet Take 1 tablet (5 mg total) by mouth once daily.    ascorbic acid, vitamin C, (VITAMIN C) 500 MG tablet Take 1 tablet (500 mg total) by mouth 2 (two) times daily.    aspirin (ECOTRIN) 81 MG EC tablet Take 81 mg by mouth once daily.    atorvastatin (LIPITOR) 10 MG tablet TAKE 1 TABLET(10 MG) BY MOUTH EVERY DAY    azelastine (ASTELIN) 137 mcg (0.1 %) nasal spray 1 spray 2 (two) times daily.    cimetidine (TAGAMET) 800 MG tablet TAKE ONE TABLET BY MOUTH EVERY EVENING    diazePAM (VALIUM) 5 MG tablet Take 1 tablet (5 mg total) by mouth On call  Procedure for Anxiety (Take 1-2 pills prior to MRI).    estradioL (ESTRACE) 1 MG tablet Take 1 tablet (1 mg total) by mouth once daily. Can cause blood clots with covid, recommend NOT taking this medication until covid symptoms completely gone and discussing with PCP    eszopiclone (LUNESTA) 3 mg Tab Take 1 tablet (3 mg total) by mouth every evening.    galcanezumab-gnlm (EMGALITY PEN) 120 mg/mL PnIj Inject 1 pen (120 mg total) into the skin every 28 days.    ibuprofen (ADVIL,MOTRIN) 800 MG tablet Take 1 tablet (800 mg total) by mouth every 6 (six) hours as needed for Pain.    losartan (COZAAR) 100 MG tablet TAKE 1 TABLET(100 MG) BY MOUTH EVERY DAY    mupirocin (BACTROBAN) 2 % ointment Apply topically 3 (three) times daily.    ondansetron (ZOFRAN-ODT) 4 MG TbDL Take 1 tablet (4 mg total) by mouth every 6 (six) hours as needed (nausea).    pantoprazole (PROTONIX) 40 MG tablet TAKE 1 TABLET(40 MG) BY MOUTH EVERY DAY    triamcinolone acetonide 0.1% (KENALOG) 0.1 % cream Apply topically 2 (two) times daily as needed.    ubrogepant (UBRELVY) 50 mg tablet Take 1 tablet by mouth at onset of migraine. May repeat in 2 hours if needed. Max 2 tablets per day.    vitamin D (VITAMIN D3) 1000 units Tab Take 1 tablet (1,000 Units total) by mouth once daily.    zinc sulfate (ZINCATE) 220 (50) mg capsule Take 1 capsule (220 mg total) by mouth once daily.     Current Facility-Administered Medications   Medication    INV AWP356/placebo Injection 1 Dose    INV [id number]/placebo Injection 1 Dose    INV JIS077/placebo Injection 1 Dose    INV [id number] vaccine 1 Dose    LIDOcaine (PF) 10 mg/ml (1%) injection 10 mg    onabotulinumtoxina injection 200 Units    onabotulinumtoxina injection 200 Units    onabotulinumtoxina injection 200 Units       Review of patient's allergies indicates:   Allergen Reactions    Hydrocodone Hives and Nausea Only           Sulfa (sulfonamide antibiotics) Hives and Rash           Doxycycline Rash     Itchy rash and  some scattered itchy lesions    Oxycodone Itching and Nausea And Vomiting       Family History   Problem Relation Age of Onset    Heart attack Mother     Heart disease Mother     COPD Mother     Hypertension Mother     Hepatitis Sister     COPD Father     No Known Problems Daughter     Breast cancer Maternal Grandmother     Allergic rhinitis Neg Hx     Angioedema Neg Hx     Asthma Neg Hx     Atopy Neg Hx     Eczema Neg Hx     Immunodeficiency Neg Hx     Urticaria Neg Hx     Colon cancer Neg Hx     Colon polyps Neg Hx        Social History     Socioeconomic History    Marital status:     Number of children: 1   Occupational History     Employer: [employer]   Tobacco Use    Smoking status: Former     Packs/day: 1.00     Years: 5.00     Pack years: 5.00     Types: Cigarettes     Start date: 1992     Quit date: 1997     Years since quittin.3     Passive exposure: Never    Smokeless tobacco: Never   Substance and Sexual Activity    Alcohol use: Yes     Comment: 3x per year    Drug use: No    Sexual activity: Yes     Partners: Male   Social History Narrative    ** Merged History Encounter **          Social Determinants of Health     Financial Resource Strain: Low Risk     Difficulty of Paying Living Expenses: Not hard at all   Food Insecurity: No Food Insecurity    Worried About Running Out of Food in the Last Year: Never true    Ran Out of Food in the Last Year: Never true   Transportation Needs: No Transportation Needs    Lack of Transportation (Medical): No    Lack of Transportation (Non-Medical): No   Physical Activity: Sufficiently Active    Days of Exercise per Week: 3 days    Minutes of Exercise per Session: 50 min   Stress: No Stress Concern Present    Feeling of Stress : Not at all   Social Connections: Unknown    Frequency of Communication with Friends and Family: More than three times a week    Frequency of Social Gatherings with Friends and Family: Once a week    Active Member  of Clubs or Organizations: No    Attends Club or Organization Meetings: Never    Marital Status:    Housing Stability: Low Risk     Unable to Pay for Housing in the Last Year: No    Number of Places Lived in the Last Year: 1    Unstable Housing in the Last Year: No       Chief Complaint:   No chief complaint on file.      History of present illness:  62-year-old female with a history of left knee arthritis and previous treatment including a Euflexxa series back in 2019 presents with worsening left knee pain again since March.  No injury or trauma.  Pain along the medial aspect of her knee.  Pain with prolonged walking or standing.  She is also having lateral hip pain.  Patient was seen by Dr. Abdalla and diagnosed with trochanteric bursitis.  No previous treatment for the hip itself though.  Pain today is a 6/10    Answers submitted by the patient for this visit:  Orthopedics Questionnaire (Submitted on 5/2/2023)  unexpected weight change: No  appetite change : No  sleep disturbance: No  IMMUNOCOMPROMISED: No  nervous/ anxious: No  dysphoric mood: No  rash: No  visual disturbance: No  eye redness: No  eye pain: No  ear pain: No  tinnitus: No  hearing loss: No  sinus pressure : No  nosebleeds: No  enviro allergies: No  food allergies: No  cough: No  shortness of breath: No  sweating: No  dysuria: No  frequency: No  difficulty urinating: No  hematuria: No  painful intercourse: No  chest pain: No  palpitations: No  nausea: No  vomiting: No  diarrhea: No  blood in stool: No  constipation: No  headaches: No  dizziness: No  numbness: No  seizures: No  joint swelling: No  myalgia: No  weakness: No  back pain: No   (Submitted on 5/2/2023)  Chief Complaint: Hip pain  Pain Chronicity: new  History of trauma: Yes  Onset: 1 to 4 weeks ago  Frequency: constantly  Progression since onset: gradually worsening  injury location: at home  pain- numeric: 8/10  pain location: left hip, left knee  pain quality:  burning  Radiating Pain: No  Aggravating factors: bearing weight, sitting  fever: No  inability to bear weight: No  itching: No  joint locking: No  limited range of motion: No  stiffness: No  tingling: No  Treatments tried: cold, OTC ointments, OTC pain meds  physical therapy: ineffective  Improvement on treatment: mild      Physical Examination:    Vital Signs:    There were no vitals filed for this visit.      There is no height or weight on file to calculate BMI.    This a well-developed, well nourished patient in no acute distress.  They are alert and oriented and cooperative to examination.  Pt. walks without an antalgic gait.      Examination of the patient's left hip shows full range of motion with flexion to 160°, extension to 0, external rotation to 50°, internal rotation of 15°, abduction of 50°, adduction of 15°. Skin has no rashes or bruising. Patient has negative Stinchfield exam. Patient has negative straight leg raise.Negative internal impingement test. Negative BRENT test. Negative Rohan's test. Patient has no pain with hip range of motion.  Moderately tender to palpation over the greater trochanteric bursa. Patient is 5 out of 5 motor strength, palpable distal pulses, and intact light touch sensation.     Examination of the left knee shows no rashes or erythema. There are no masses ecchymosis or effusion. Patient has full range of motion from 0-130°. Patient is nontender to palpation over lateral joint line and moderately tender to palpation over the medial joint line. Patient has a - Lachman exam, - anterior drawer exam, and - posterior drawer exam. - Apley exam. Knee is stable to varus and valgus stress. 5 out of 5 motor strength. Palpable distal pulses. Intact light touch sensation. Negative Patellofemoral crepitus        X-rays:  X-rays left knee are reviewed which show some medial narrowing bilaterally.  Patient has a small ossicle located near the medial epicondyle on the right knee from  previous MCL injury possibly.  X-rays of the left hip are  reviewed which shows well-maintained joint space.  Possibly some mild dysplasia and femoral acetabular impingement.     Assessment::  Left trochanteric bursitis   Left knee arthritis    Plan:  I injected her left knee with Synvisc-One today.  Follow-up in a couple months.    The patient has tried a self guided exercise program that has included walking without significant improvement. Minimal relief with tylenol or OTC Nsaids. Reports less than 8 weeks relief with IA steroid injection. Kellgren Danis scale of 3. They are not receiving another HA injectable at this time. I will precert for gel injection.       All previous pertinent notes including ER visits, physical therapy visits, other orthopedic visits as well as other care for the same musculoskeletal problem were reviewed.  All pertinent lab values and previous imaging was reviewed pertinent to the current visit.    This note was created using M [employer] voice recognition software that occasionally misinterpreted phrases or words.    Consult note is delivered via Epic messaging service.

## 2023-05-23 ENCOUNTER — HOSPITAL ENCOUNTER (OUTPATIENT)
Dept: RADIOLOGY | Facility: HOSPITAL | Age: 63
Discharge: HOME | End: 2023-05-23
Attending: ANESTHESIOLOGY
Payer: COMMERCIAL

## 2023-05-23 DIAGNOSIS — M54.16 LUMBAR RADICULOPATHY, CHRONIC: ICD-10-CM

## 2023-05-23 PROCEDURE — 72148 MRI LUMBAR SPINE W/O DYE: CPT | Mod: TC,PO

## 2023-05-23 PROCEDURE — 72148 MRI LUMBAR SPINE WITHOUT CONTRAST: ICD-10-PCS | Mod: 26,,, | Performed by: RADIOLOGY

## 2023-05-23 PROCEDURE — 72148 MRI LUMBAR SPINE W/O DYE: CPT | Mod: 26,,, | Performed by: RADIOLOGY

## 2023-05-29 ENCOUNTER — TELEPHONE (OUTPATIENT)
Dept: PAIN MEDICINE | Facility: HOSPITAL | Age: 63
End: 2023-05-29

## 2023-05-29 NOTE — TELEPHONE ENCOUNTER
Please let the patient know that I have reviewed her lumbar spine MRI and it does show more narrowing at L5/S1 especially off to the left side may account for her new left leg pain.  My recommendation would be to set up an L5/S1 YARON if she would like.    Physician - Dr Abdalla    Type of Procedure/Injection - Lumbar Epidural  L5/S1           Laterality - NA      Type of Sedation - Local      Need to hold medication - No      N/A      Clearance needed - No      Follow up - 3 week

## 2023-06-05 DIAGNOSIS — G43.719 INTRACTABLE CHRONIC MIGRAINE WITHOUT AURA AND WITHOUT STATUS MIGRAINOSUS: ICD-10-CM

## 2023-06-05 RX ORDER — UBROGEPANT 50 MG/1
TABLET ORAL
Qty: 10 TABLET | Refills: 11 | Status: SHIPPED | OUTPATIENT
Start: 2023-06-05

## 2023-06-05 RX ORDER — UBROGEPANT 50 MG/1
TABLET ORAL
Qty: 10 TABLET | Refills: 11 | Status: CANCELLED | OUTPATIENT
Start: 2023-06-05

## 2023-06-06 ENCOUNTER — OFFICE VISIT (OUTPATIENT)
Dept: PAIN MEDICINE | Facility: CLINIC | Age: 63
End: 2023-06-06
Payer: COMMERCIAL

## 2023-06-06 ENCOUNTER — TELEPHONE (OUTPATIENT)
Dept: PAIN MEDICINE | Facility: CLINIC | Age: 63
End: 2023-06-06

## 2023-06-06 VITALS
WEIGHT: 185.88 LBS | SYSTOLIC BLOOD PRESSURE: 139 MMHG | BODY MASS INDEX: 31.73 KG/M2 | DIASTOLIC BLOOD PRESSURE: 60 MMHG | HEART RATE: 80 BPM | HEIGHT: 64 IN

## 2023-06-06 DIAGNOSIS — M51.36 DDD (DEGENERATIVE DISC DISEASE), LUMBAR: ICD-10-CM

## 2023-06-06 DIAGNOSIS — M54.16 LUMBAR RADICULOPATHY: Primary | ICD-10-CM

## 2023-06-06 DIAGNOSIS — B02.29 POST HERPETIC NEURALGIA: ICD-10-CM

## 2023-06-06 DIAGNOSIS — M50.30 DDD (DEGENERATIVE DISC DISEASE), CERVICAL: ICD-10-CM

## 2023-06-06 DIAGNOSIS — M48.061 SPINAL STENOSIS OF LUMBAR REGION WITHOUT NEUROGENIC CLAUDICATION: ICD-10-CM

## 2023-06-06 PROCEDURE — 99214 OFFICE O/P EST MOD 30 MIN: CPT | Mod: S$GLB,,, | Performed by: PHYSICIAN ASSISTANT

## 2023-06-06 PROCEDURE — 3078F DIAST BP <80 MM HG: CPT | Mod: CPTII,S$GLB,, | Performed by: PHYSICIAN ASSISTANT

## 2023-06-06 PROCEDURE — 3075F SYST BP GE 130 - 139MM HG: CPT | Mod: CPTII,S$GLB,, | Performed by: PHYSICIAN ASSISTANT

## 2023-06-06 PROCEDURE — 1160F RVW MEDS BY RX/DR IN RCRD: CPT | Mod: CPTII,S$GLB,, | Performed by: PHYSICIAN ASSISTANT

## 2023-06-06 PROCEDURE — 1159F PR MEDICATION LIST DOCUMENTED IN MEDICAL RECORD: ICD-10-PCS | Mod: CPTII,S$GLB,, | Performed by: PHYSICIAN ASSISTANT

## 2023-06-06 PROCEDURE — 99999 PR PBB SHADOW E&M-EST. PATIENT-LVL IV: ICD-10-PCS | Mod: PBBFAC,,, | Performed by: PHYSICIAN ASSISTANT

## 2023-06-06 PROCEDURE — 4010F ACE/ARB THERAPY RXD/TAKEN: CPT | Mod: CPTII,S$GLB,, | Performed by: PHYSICIAN ASSISTANT

## 2023-06-06 PROCEDURE — 4010F PR ACE/ARB THEARPY RXD/TAKEN: ICD-10-PCS | Mod: CPTII,S$GLB,, | Performed by: PHYSICIAN ASSISTANT

## 2023-06-06 PROCEDURE — 1159F MED LIST DOCD IN RCRD: CPT | Mod: CPTII,S$GLB,, | Performed by: PHYSICIAN ASSISTANT

## 2023-06-06 PROCEDURE — 99214 PR OFFICE/OUTPT VISIT, EST, LEVL IV, 30-39 MIN: ICD-10-PCS | Mod: S$GLB,,, | Performed by: PHYSICIAN ASSISTANT

## 2023-06-06 PROCEDURE — 3008F PR BODY MASS INDEX (BMI) DOCUMENTED: ICD-10-PCS | Mod: CPTII,S$GLB,, | Performed by: PHYSICIAN ASSISTANT

## 2023-06-06 PROCEDURE — 3008F BODY MASS INDEX DOCD: CPT | Mod: CPTII,S$GLB,, | Performed by: PHYSICIAN ASSISTANT

## 2023-06-06 PROCEDURE — 3044F PR MOST RECENT HEMOGLOBIN A1C LEVEL <7.0%: ICD-10-PCS | Mod: CPTII,S$GLB,, | Performed by: PHYSICIAN ASSISTANT

## 2023-06-06 PROCEDURE — 3078F PR MOST RECENT DIASTOLIC BLOOD PRESSURE < 80 MM HG: ICD-10-PCS | Mod: CPTII,S$GLB,, | Performed by: PHYSICIAN ASSISTANT

## 2023-06-06 PROCEDURE — 3075F PR MOST RECENT SYSTOLIC BLOOD PRESS GE 130-139MM HG: ICD-10-PCS | Mod: CPTII,S$GLB,, | Performed by: PHYSICIAN ASSISTANT

## 2023-06-06 PROCEDURE — 99999 PR PBB SHADOW E&M-EST. PATIENT-LVL IV: CPT | Mod: PBBFAC,,, | Performed by: PHYSICIAN ASSISTANT

## 2023-06-06 PROCEDURE — 3044F HG A1C LEVEL LT 7.0%: CPT | Mod: CPTII,S$GLB,, | Performed by: PHYSICIAN ASSISTANT

## 2023-06-06 PROCEDURE — 1160F PR REVIEW ALL MEDS BY PRESCRIBER/CLIN PHARMACIST DOCUMENTED: ICD-10-PCS | Mod: CPTII,S$GLB,, | Performed by: PHYSICIAN ASSISTANT

## 2023-06-06 RX ORDER — LIDOCAINE 50 MG/G
1 PATCH TOPICAL DAILY
Qty: 30 PATCH | Refills: 5 | Status: SHIPPED | OUTPATIENT
Start: 2023-06-06 | End: 2023-06-06 | Stop reason: SDUPTHER

## 2023-06-06 RX ORDER — LIDOCAINE 50 MG/G
1 PATCH TOPICAL DAILY
Qty: 30 PATCH | Refills: 5 | Status: SHIPPED | OUTPATIENT
Start: 2023-06-06 | End: 2023-12-21

## 2023-06-06 RX ORDER — ALPRAZOLAM 0.5 MG/1
1 TABLET, ORALLY DISINTEGRATING ORAL ONCE AS NEEDED
Status: CANCELLED | OUTPATIENT
Start: 2023-06-06 | End: 2034-11-02

## 2023-06-06 NOTE — TELEPHONE ENCOUNTER
Pt scheduled for a LESI on 6/16. She will need to hold ASA for 5-7 days prior. Please advise if okay to hold. Thanks!

## 2023-06-08 NOTE — TELEPHONE ENCOUNTER
----- Message from Staci Stearns sent at 5/6/2019  8:59 AM CDT -----  Contact: Patient  Type: Needs Medical Advice    Who Called:  Patient  Best Call Back Number:   Additional Information: Calling to let the Doctor know that the olmesartan (BENICAR) 20 MG tablet isn't avail at the Pharmacy. It is currently on back order. Please advise    
The recent Rx for Olmesartan is on back order at patient's pharmacy. Please advise on alternative or if she should go to another pharmacy to fill.   
DISPLAY PLAN FREE TEXT

## 2023-06-12 NOTE — H&P (VIEW-ONLY)
CHIEF COMPLAINT/REASON FOR VISIT: low back pain, neck pain    History of present illness: The patient is a 62 year old woman with a history of migraines, carpal tunnel syndrome and low back pain since her early 20s.  She returns in follow-up today with worsening low back and left leg pain.  She describes pain starting in the left low back radiating to the left buttock, left posterolateral thigh and lateral shin.  She does feel that she has some instability in her left leg, possibly weakness.  She denies numbness.  She also reports occasional left low back post herpetic neuralgia which involves itching, burning pain.    Pain intervention history:She is status post TFESI bilaterally to L3 on 12/6/2011 with no relief. Past history of status post L4/5 YARON on 5/25/11 with about 90% relief that lasted 3 weeks. She is status post 2 bilateral L3 transforaminal injections with 3 weeks relief each time.  She is status post L4/5 interlaminar epidural steroid injection on 1/8/14 with 90% relief.  She is status post L5/S1 interlaminar epidural steroid injection on 6/9/14 with moderate relief only on the right low back and right leg lasting 2 weeks.  She is status post bilateral L3/4 and L4/5 facet joint injections on 4/13/15 with initially 100% relief of her back pain and 80% relief of her left lateral hip and lateral thigh pain, now reporting at least 50% relief of both.  She is status post bilateral L2, 3 and 4 medial branch radiofrequency ablation on 5/20/15 with 60% relief.   She is status post bilateral L2, 3 and 4 medial branch radiofrequency ablation on 6/20/16 with 50-70% relief.  She is status post L5/S1 interlaminar epidural steroid injection on 3/10/17 with 50% relief.  She is status post bilateral L2, 3, 4 medial branch radiofrequency ablation on 7/17/17 with about 75% relief.  She is status post bilateral L2, 3, 4 medial branch radiofrequency ablation on 07/17/2018 with 100% relief of her leg pain and 70-75%  "relief of her back pain.  She is status post C7-T1 cervical interlaminar epidural steroid injection on 09/25/2018 with 80% relief.   She is status post L5/S1 interlaminar epidural steroid injection on 12/27/2018 with 60% relief.  She is status post bilateral L2, 3 and 4 medial branch radiofrequency ablation on 07/29/2019 with 40% relief.  She is status post L5/S1 interlaminar epidural steroid injection on 09/04/2019 with almost 100% relief of her right leg pain but 0% relief for back pain. She is status post C7-T1 interlaminar epidural steroid injection on 10/24/2019 with 70% relief.       Spine surgeries:    Antineuropathics: failed gabapentin, failed Cymbalta due to side effects  NSAIDs:  Ibuprofen  Physical therapy:  Antidepressants:  Muscle relaxers:  Opioids:  Tramadol 50 mg twice daily as needed  Antiplatelets/Anticoagulants:  ASA 81 mg    ROS: She reports back pain only.  Balance of review of systems is negative.    Medical, surgical, family and social history reviewed elsewhere in record.     Medications/Allergies: See med card      Vitals:    06/06/23 1318   BP: 139/60   Pulse: 80   Weight: 84.3 kg (185 lb 13.6 oz)   Height: 5' 4" (1.626 m)   PainSc:   8   PainLoc: Leg        PHYSICAL EXAM:  Gen: A and O x3, pleasant, well-groomed  HEENT: PERRLA  CVS: Regular rate and rhythm  Resp:  No obvious shortness of breath, no increased work of breathing  Musculoskeletal: No antalgic gait.     Neuro:  Upper extremities: 5/5 strength bilaterally   Lower extremities: 5/5 strength bilaterally  Reflexes: Brachioradialis 2+, Bicep 2+, Tricep 2+.   Patellar 3+, Achilles 2+.  Sensory: Intact and symmetrical to light touch and pinprick in C2-T1 dermatomes bilaterally.Intact and symmetrical to light touch and pinprick in L2-S1 dermatomes bilaterally.      Lumbar spine:  Lumbar spine: ROM is moderately limited with flexion and extension with increased bilateral low back pain during extension and oblique extension.  Supine " straight leg raise is negative bilaterally.  Internal and external rotation of the hip causes no increased pain in either side.  Myofascial exam:  Mild tenderness to palpation to the right greater than left lumbar paraspinous muscles.      IMAGING:   MRI L-SPINE 5/10/11   IMPRESSION: AT L3-4, THERE IS CIRCUMFERENTIAL DISC PROTRUSION FOCALLY MORE PROMINENT TO THE LEFT AS WELL AS A SMALL DISC HERNIATION PROTRUDING INFERIORLY BEHIND L4 TO THE LEFT OF MIDLINE. THIS PRODUCES SPINAL CANAL AND BILATERAL NEURAL FORAMINAL STENOSIS, LEFT GREATER THAN RIGHT. AT L4-5, THERE IS CIRCUMFERENTIAL DISC PROTRUSION FOCALLY MORE PROMINENT TO THE RIGHT WITH MILD SPINAL CANAL AND RIGHT NEURAL FORAMINAL STENOSIS. AT L1-2 AND L2-3 ALTHOUGH THERE ARE DISC PROTRUSIONS IDENTIFIED, SIGNIFICANT STENOSIS IS NOT SEEN.     MRI lumbar spine 7/10/14  L1-2:There is chronic relatively advanced disc degeneration with disc space narrowing, disc dehydration, disc narrowing, endplate osteophytes, and degenerative vertebral endplate marrow change. There is a diffuse 2-mm posterior disc bulge with a superimposed4-mm right posterior disc extrusion causing mild right foraminal stenosis. There is no impingement of the right L1 nerve root and there has been no change.  L2-3: There is severe and chronic disc degeneration with severe disc space narrowing, disc dehydration, degenerative vertebral endplate marrow change, and vertebral endplate osteophytes. There is a diffuse 2-mm posterior disc bulge with a superimposed 4-mm right posterior disc extrusion causing mild right foraminal stenosis. There is no impingement of the right L2 nerve root and there has been no significant change.  L3-4: There is severe disc degeneration which has progressed since the prior study. There is severe disc space narrowing, disc dehydration, degenerative vertebral endplate marrow change and endplate osteophytes. There is also mild posterior subluxation of L3over a distance of 4 mm.  There is a broad-based 5-mm posterior disc extrusion. There is degenerative facet arthrosis with ligamentum flavum thickening. The combination of facet arthrosis and disc extrusion results in relatively severe spinal canal stenosis with compression of the thecal sac to an AP diameter of 5 mm, moderate right foraminal stenosis, and severe left foraminal stenosis. The spinal stenosis has also progressed since the prior study.  L4-5:There is a diffuse posterior disc bulge with a superimposed 3-mm left posterior paracentral disc protrusion causing left paracentral thecal sac compression. There is moderate left and mild right foraminal stenosis and there has been no significant change. There is mild degenerative facet arthrosis with ligamentum flavum thickening and small joint effusions.  L5-S1:There is no significant compromise of the spinal canal or foramina and there has been no change.    X-ray cervical spine 6/8/15  There is loss of normal cervical lordosis which may be positional or related muscular strain. Intervertebral disk height loss is noted at the C5-C6 C6-C7 levels and to a lesser degree C4-C5 and C7-T1 levels. Vertebral body alignment appears otherwise adequate. Vertebral body heights appear well-preserved. The atlas and odontoid appear in good relationship to each other. Osseous neuroforaminal narrowing is noted at the C3-C4 C4-C5 C5-C6 levels on the left and at the C4-C5 C5-C6 and C6-C7 levels on the right     07/10/2018 MRI cervical spine [place] report  C2-3 broad-based signal asymmetry at the left subarticular and foraminal zone reflecting implant spondylosis and disc bulge complex, mild to moderate left asymmetric foraminal narrowing, ectasia of the left vertebral artery, contralateral right asymmetric facet hypertrophy, right foramen widely narrowed, disc partially desiccated without collapse  C3-4 moderate to severe left greater than right foraminal narrowing secondary to uncinate joint  hypertrophic signal alteration, disc partially desiccated  C4-5 endplate spondylosis moderate diffuse disc bulge noted, broad-based right paracentral disc herniation, asymmetric flattening of the ventral cord surface at the right paracentral zone, high-grade if lateral right foraminal narrowing, AP diameter of canal 9.9 mm, contralaterally, high grade left foraminal narrowing secondary to facet greater than uncinate joint hypertrophic signal alteration, disc desiccated and mildly narrowed  C5-6 disc space narrowing evident with generalized in Nigel spondylosis and concentric inter pose disc bulge complex, high-grade bilateral foraminal narrowing, flattening of the cord surface, AP diameter 7.9 mm, symmetric facet arthrosis, disc diffusely desiccated and narrowed  C6-7 moderate endplate spondylosis and concentric disc bulge complex, high-grade bilateral foraminal compromise, flattening of the anterior cord surface, AP diameter 8.8 mm, facet arthrosis symmetric, disc desiccated and narrowed  C7-T1 less than 2 mm depth disc bulge    11/06/2018 MRI lumbar spine  T12/L1: There is no evidence of disc protrusion, canal or foraminal stenosis.  L1/L2: Right posterolateral disc protrusion is evident and there is mild distortion of the right anterolateral canal.  Degenerative facet changes are noted bilaterally.  The left foramen is intact.  L2/L3: There is annular disc bulging with a superimposed right posterolateral disc extrusion.  This is slightly more pronounced on the previous examination and there is mild effacement of the anterior thecal sac and moderate narrowing the right foramen.  L3/L4: There is annular disc bulge and osteophyte formation with a superimposed small left posterolateral disc extrusion.  There is moderate narrowing the central canal and left foramen.  This is little changed relative the previous examination.  Degenerative facet changes are noted.  L5/S1: There is a small central disc extrusion  superimposed upon annular disc bulging.  This is slightly less pronounced than was seen previously.  Degenerative facet changes are noted.  L5/S1: Moderate degenerative facet changes are noted and there is mild effacement of the anterior thecal sac.  There is ligamentous hypertrophy particularly to the left in the posterior canal and there is left greater than right foraminal narrowing.  This is mildly worsened relative prior exam.      ASSESSMENT:  The patient is a 62 year old woman with a history of migraines, carpal tunnel syndrome and low back pain since her early 20s who returns in follow up.   1. Lumbar radiculopathy  Place in Outpatient    Case Request Operating Room: Injection-steroid-epidural-lumbar L5/S1    Vital signs    Verify informed consent    Notify physician     Notify physician     Notify physician (specify)    Diet NPO    alprazolam ODT dissolvable tablet 1 mg      2. DDD (degenerative disc disease), lumbar  DISCONTINUED: LIDOcaine (LIDODERM) 5 %      3. Spinal stenosis of lumbar region without neurogenic claudication        4. DDD (degenerative disc disease), cervical        5. Post herpetic neuralgia  LIDOcaine (LIDODERM) 5 %            Plan:  1. We discussed her low back and radicular left leg pain and I will schedule her for an L5/S1 interlaminar epidural steroid injection.  We will ask approval to hold her aspirin from Dr. Espinosa due to her factor 5 Leiden.    2. We discussed her post herpetic neuralgia and I have ordered Lidoderm patches for her.  3. Dr. Abdalla provided a prescription for tramadol 50 mg twice daily as needed for pain.  I have reviewed the [place] Board of [employer]acy website and there are no abberancies.    4. Follow-up in 4 weeks postprocedure or sooner as needed.

## 2023-06-12 NOTE — PROGRESS NOTES
CHIEF COMPLAINT/REASON FOR VISIT: low back pain, neck pain    History of present illness: The patient is a 62 year old woman with a history of migraines, carpal tunnel syndrome and low back pain since her early 20s.  She returns in follow-up today with worsening low back and left leg pain.  She describes pain starting in the left low back radiating to the left buttock, left posterolateral thigh and lateral shin.  She does feel that she has some instability in her left leg, possibly weakness.  She denies numbness.  She also reports occasional left low back post herpetic neuralgia which involves itching, burning pain.    Pain intervention history:She is status post TFESI bilaterally to L3 on 12/6/2011 with no relief. Past history of status post L4/5 YARON on 5/25/11 with about 90% relief that lasted 3 weeks. She is status post 2 bilateral L3 transforaminal injections with 3 weeks relief each time.  She is status post L4/5 interlaminar epidural steroid injection on 1/8/14 with 90% relief.  She is status post L5/S1 interlaminar epidural steroid injection on 6/9/14 with moderate relief only on the right low back and right leg lasting 2 weeks.  She is status post bilateral L3/4 and L4/5 facet joint injections on 4/13/15 with initially 100% relief of her back pain and 80% relief of her left lateral hip and lateral thigh pain, now reporting at least 50% relief of both.  She is status post bilateral L2, 3 and 4 medial branch radiofrequency ablation on 5/20/15 with 60% relief.   She is status post bilateral L2, 3 and 4 medial branch radiofrequency ablation on 6/20/16 with 50-70% relief.  She is status post L5/S1 interlaminar epidural steroid injection on 3/10/17 with 50% relief.  She is status post bilateral L2, 3, 4 medial branch radiofrequency ablation on 7/17/17 with about 75% relief.  She is status post bilateral L2, 3, 4 medial branch radiofrequency ablation on 07/17/2018 with 100% relief of her leg pain and 70-75%  "relief of her back pain.  She is status post C7-T1 cervical interlaminar epidural steroid injection on 09/25/2018 with 80% relief.   She is status post L5/S1 interlaminar epidural steroid injection on 12/27/2018 with 60% relief.  She is status post bilateral L2, 3 and 4 medial branch radiofrequency ablation on 07/29/2019 with 40% relief.  She is status post L5/S1 interlaminar epidural steroid injection on 09/04/2019 with almost 100% relief of her right leg pain but 0% relief for back pain. She is status post C7-T1 interlaminar epidural steroid injection on 10/24/2019 with 70% relief.       Spine surgeries:    Antineuropathics: failed gabapentin, failed Cymbalta due to side effects  NSAIDs:  Ibuprofen  Physical therapy:  Antidepressants:  Muscle relaxers:  Opioids:  Tramadol 50 mg twice daily as needed  Antiplatelets/Anticoagulants:  ASA 81 mg    ROS: She reports back pain only.  Balance of review of systems is negative.    Medical, surgical, family and social history reviewed elsewhere in record.     Medications/Allergies: See med card      Vitals:    06/06/23 1318   BP: 139/60   Pulse: 80   Weight: 84.3 kg (185 lb 13.6 oz)   Height: 5' 4" (1.626 m)   PainSc:   8   PainLoc: Leg        PHYSICAL EXAM:  Gen: A and O x3, pleasant, well-groomed  HEENT: PERRLA  CVS: Regular rate and rhythm  Resp:  No obvious shortness of breath, no increased work of breathing  Musculoskeletal: No antalgic gait.     Neuro:  Upper extremities: 5/5 strength bilaterally   Lower extremities: 5/5 strength bilaterally  Reflexes: Brachioradialis 2+, Bicep 2+, Tricep 2+.   Patellar 3+, Achilles 2+.  Sensory: Intact and symmetrical to light touch and pinprick in C2-T1 dermatomes bilaterally.Intact and symmetrical to light touch and pinprick in L2-S1 dermatomes bilaterally.      Lumbar spine:  Lumbar spine: ROM is moderately limited with flexion and extension with increased bilateral low back pain during extension and oblique extension.  Supine " straight leg raise is negative bilaterally.  Internal and external rotation of the hip causes no increased pain in either side.  Myofascial exam:  Mild tenderness to palpation to the right greater than left lumbar paraspinous muscles.      IMAGING:   MRI L-SPINE 5/10/11   IMPRESSION: AT L3-4, THERE IS CIRCUMFERENTIAL DISC PROTRUSION FOCALLY MORE PROMINENT TO THE LEFT AS WELL AS A SMALL DISC HERNIATION PROTRUDING INFERIORLY BEHIND L4 TO THE LEFT OF MIDLINE. THIS PRODUCES SPINAL CANAL AND BILATERAL NEURAL FORAMINAL STENOSIS, LEFT GREATER THAN RIGHT. AT L4-5, THERE IS CIRCUMFERENTIAL DISC PROTRUSION FOCALLY MORE PROMINENT TO THE RIGHT WITH MILD SPINAL CANAL AND RIGHT NEURAL FORAMINAL STENOSIS. AT L1-2 AND L2-3 ALTHOUGH THERE ARE DISC PROTRUSIONS IDENTIFIED, SIGNIFICANT STENOSIS IS NOT SEEN.     MRI lumbar spine 7/10/14  L1-2:There is chronic relatively advanced disc degeneration with disc space narrowing, disc dehydration, disc narrowing, endplate osteophytes, and degenerative vertebral endplate marrow change. There is a diffuse 2-mm posterior disc bulge with a superimposed4-mm right posterior disc extrusion causing mild right foraminal stenosis. There is no impingement of the right L1 nerve root and there has been no change.  L2-3: There is severe and chronic disc degeneration with severe disc space narrowing, disc dehydration, degenerative vertebral endplate marrow change, and vertebral endplate osteophytes. There is a diffuse 2-mm posterior disc bulge with a superimposed 4-mm right posterior disc extrusion causing mild right foraminal stenosis. There is no impingement of the right L2 nerve root and there has been no significant change.  L3-4: There is severe disc degeneration which has progressed since the prior study. There is severe disc space narrowing, disc dehydration, degenerative vertebral endplate marrow change and endplate osteophytes. There is also mild posterior subluxation of L3over a distance of 4 mm.  There is a broad-based 5-mm posterior disc extrusion. There is degenerative facet arthrosis with ligamentum flavum thickening. The combination of facet arthrosis and disc extrusion results in relatively severe spinal canal stenosis with compression of the thecal sac to an AP diameter of 5 mm, moderate right foraminal stenosis, and severe left foraminal stenosis. The spinal stenosis has also progressed since the prior study.  L4-5:There is a diffuse posterior disc bulge with a superimposed 3-mm left posterior paracentral disc protrusion causing left paracentral thecal sac compression. There is moderate left and mild right foraminal stenosis and there has been no significant change. There is mild degenerative facet arthrosis with ligamentum flavum thickening and small joint effusions.  L5-S1:There is no significant compromise of the spinal canal or foramina and there has been no change.    X-ray cervical spine 6/8/15  There is loss of normal cervical lordosis which may be positional or related muscular strain. Intervertebral disk height loss is noted at the C5-C6 C6-C7 levels and to a lesser degree C4-C5 and C7-T1 levels. Vertebral body alignment appears otherwise adequate. Vertebral body heights appear well-preserved. The atlas and odontoid appear in good relationship to each other. Osseous neuroforaminal narrowing is noted at the C3-C4 C4-C5 C5-C6 levels on the left and at the C4-C5 C5-C6 and C6-C7 levels on the right     07/10/2018 MRI cervical spine [facility] report  C2-3 broad-based signal asymmetry at the left subarticular and foraminal zone reflecting implant spondylosis and disc bulge complex, mild to moderate left asymmetric foraminal narrowing, ectasia of the left vertebral artery, contralateral right asymmetric facet hypertrophy, right foramen widely narrowed, disc partially desiccated without collapse  C3-4 moderate to severe left greater than right foraminal narrowing secondary to uncinate joint  hypertrophic signal alteration, disc partially desiccated  C4-5 endplate spondylosis moderate diffuse disc bulge noted, broad-based right paracentral disc herniation, asymmetric flattening of the ventral cord surface at the right paracentral zone, high-grade if lateral right foraminal narrowing, AP diameter of canal 9.9 mm, contralaterally, high grade left foraminal narrowing secondary to facet greater than uncinate joint hypertrophic signal alteration, disc desiccated and mildly narrowed  C5-6 disc space narrowing evident with generalized in Nigel spondylosis and concentric inter pose disc bulge complex, high-grade bilateral foraminal narrowing, flattening of the cord surface, AP diameter 7.9 mm, symmetric facet arthrosis, disc diffusely desiccated and narrowed  C6-7 moderate endplate spondylosis and concentric disc bulge complex, high-grade bilateral foraminal compromise, flattening of the anterior cord surface, AP diameter 8.8 mm, facet arthrosis symmetric, disc desiccated and narrowed  C7-T1 less than 2 mm depth disc bulge    11/06/2018 MRI lumbar spine  T12/L1: There is no evidence of disc protrusion, canal or foraminal stenosis.  L1/L2: Right posterolateral disc protrusion is evident and there is mild distortion of the right anterolateral canal.  Degenerative facet changes are noted bilaterally.  The left foramen is intact.  L2/L3: There is annular disc bulging with a superimposed right posterolateral disc extrusion.  This is slightly more pronounced on the previous examination and there is mild effacement of the anterior thecal sac and moderate narrowing the right foramen.  L3/L4: There is annular disc bulge and osteophyte formation with a superimposed small left posterolateral disc extrusion.  There is moderate narrowing the central canal and left foramen.  This is little changed relative the previous examination.  Degenerative facet changes are noted.  L5/S1: There is a small central disc extrusion  superimposed upon annular disc bulging.  This is slightly less pronounced than was seen previously.  Degenerative facet changes are noted.  L5/S1: Moderate degenerative facet changes are noted and there is mild effacement of the anterior thecal sac.  There is ligamentous hypertrophy particularly to the left in the posterior canal and there is left greater than right foraminal narrowing.  This is mildly worsened relative prior exam.      ASSESSMENT:  The patient is a 62 year old woman with a history of migraines, carpal tunnel syndrome and low back pain since her early 20s who returns in follow up.   1. Lumbar radiculopathy  Place in Outpatient    Case Request Operating Room: Injection-steroid-epidural-lumbar L5/S1    Vital signs    Verify informed consent    Notify physician     Notify physician     Notify physician (specify)    Diet NPO    alprazolam ODT dissolvable tablet 1 mg      2. DDD (degenerative disc disease), lumbar  DISCONTINUED: LIDOcaine (LIDODERM) 5 %      3. Spinal stenosis of lumbar region without neurogenic claudication        4. DDD (degenerative disc disease), cervical        5. Post herpetic neuralgia  LIDOcaine (LIDODERM) 5 %            Plan:  1. We discussed her low back and radicular left leg pain and I will schedule her for an L5/S1 interlaminar epidural steroid injection.  We will ask approval to hold her aspirin from Dr. Espinosa due to her factor 5 Leiden.    2. We discussed her post herpetic neuralgia and I have ordered Lidoderm patches for her.  3. Dr. Abdalla provided a prescription for tramadol 50 mg twice daily as needed for pain.  I have reviewed the [place] Board of Pharmacy website and there are no abberancies.    4. Follow-up in 4 weeks postprocedure or sooner as needed.

## 2023-06-14 ENCOUNTER — OFFICE VISIT (OUTPATIENT)
Dept: FAMILY MEDICINE | Facility: CLINIC | Age: 63
End: 2023-06-14
Payer: COMMERCIAL

## 2023-06-14 DIAGNOSIS — J30.89 ALLERGIC RHINITIS DUE TO OTHER ALLERGIC TRIGGER, UNSPECIFIED SEASONALITY: Primary | ICD-10-CM

## 2023-06-14 DIAGNOSIS — I10 ESSENTIAL HYPERTENSION: ICD-10-CM

## 2023-06-14 DIAGNOSIS — F51.01 PRIMARY INSOMNIA: ICD-10-CM

## 2023-06-14 DIAGNOSIS — I25.10 CORONARY ARTERY DISEASE, UNSPECIFIED VESSEL OR LESION TYPE, UNSPECIFIED WHETHER ANGINA PRESENT, UNSPECIFIED WHETHER NATIVE OR TRANSPLANTED HEART: ICD-10-CM

## 2023-06-14 DIAGNOSIS — T63.481A INSECT STINGS, ACCIDENTAL OR UNINTENTIONAL, INITIAL ENCOUNTER: ICD-10-CM

## 2023-06-14 PROCEDURE — 1160F PR REVIEW ALL MEDS BY PRESCRIBER/CLIN PHARMACIST DOCUMENTED: ICD-10-PCS | Mod: CPTII,95,, | Performed by: NURSE PRACTITIONER

## 2023-06-14 PROCEDURE — 4010F ACE/ARB THERAPY RXD/TAKEN: CPT | Mod: CPTII,95,, | Performed by: NURSE PRACTITIONER

## 2023-06-14 PROCEDURE — 3044F HG A1C LEVEL LT 7.0%: CPT | Mod: CPTII,95,, | Performed by: NURSE PRACTITIONER

## 2023-06-14 PROCEDURE — 1159F PR MEDICATION LIST DOCUMENTED IN MEDICAL RECORD: ICD-10-PCS | Mod: CPTII,95,, | Performed by: NURSE PRACTITIONER

## 2023-06-14 PROCEDURE — 99213 PR OFFICE/OUTPT VISIT, EST, LEVL III, 20-29 MIN: ICD-10-PCS | Mod: 95,,, | Performed by: NURSE PRACTITIONER

## 2023-06-14 PROCEDURE — 1160F RVW MEDS BY RX/DR IN RCRD: CPT | Mod: CPTII,95,, | Performed by: NURSE PRACTITIONER

## 2023-06-14 PROCEDURE — 1159F MED LIST DOCD IN RCRD: CPT | Mod: CPTII,95,, | Performed by: NURSE PRACTITIONER

## 2023-06-14 PROCEDURE — 99213 OFFICE O/P EST LOW 20 MIN: CPT | Mod: 95,,, | Performed by: NURSE PRACTITIONER

## 2023-06-14 PROCEDURE — 4010F PR ACE/ARB THEARPY RXD/TAKEN: ICD-10-PCS | Mod: CPTII,95,, | Performed by: NURSE PRACTITIONER

## 2023-06-14 PROCEDURE — 3044F PR MOST RECENT HEMOGLOBIN A1C LEVEL <7.0%: ICD-10-PCS | Mod: CPTII,95,, | Performed by: NURSE PRACTITIONER

## 2023-06-14 RX ORDER — FLUTICASONE PROPIONATE 50 MCG
1 SPRAY, SUSPENSION (ML) NASAL DAILY
Qty: 16 G | Refills: 0 | Status: SHIPPED | OUTPATIENT
Start: 2023-06-14 | End: 2023-08-16

## 2023-06-14 RX ORDER — TRIAMCINOLONE ACETONIDE 1 MG/G
OINTMENT TOPICAL 2 TIMES DAILY
Qty: 30 G | Refills: 0 | Status: SHIPPED | OUTPATIENT
Start: 2023-06-14 | End: 2023-11-13

## 2023-06-14 RX ORDER — AMLODIPINE BESYLATE 5 MG/1
TABLET ORAL
Qty: 90 TABLET | Refills: 2 | Status: SHIPPED | OUTPATIENT
Start: 2023-06-14 | End: 2023-11-13 | Stop reason: SDUPTHER

## 2023-06-14 RX ORDER — ESZOPICLONE 3 MG/1
3 TABLET, FILM COATED ORAL NIGHTLY
Qty: 90 TABLET | Refills: 1 | Status: SHIPPED | OUTPATIENT
Start: 2023-06-14 | End: 2023-11-13 | Stop reason: SDUPTHER

## 2023-06-14 RX ORDER — ATORVASTATIN CALCIUM 10 MG/1
TABLET, FILM COATED ORAL
Qty: 90 TABLET | Refills: 2 | Status: SHIPPED | OUTPATIENT
Start: 2023-06-14 | End: 2023-11-13 | Stop reason: SDUPTHER

## 2023-06-14 NOTE — PROGRESS NOTES
Subjective     Patient ID: Elizabeth Giordano is a 62 y.o. female.    Chief Complaint: No chief complaint on file.    The patient location is: [place], [place]  The chief complaint leading to consultation is: med refill    Visit type: audiovisual    Face to Face time with patient: 13  15 minutes of total time spent on the encounter, which includes face to face time and non-face to face time preparing to see the patient (eg, review of tests), Obtaining and/or reviewing separately obtained history, Documenting clinical information in the electronic or other health record, Independently interpreting results (not separately reported) and communicating results to the patient/family/caregiver, or Care coordination (not separately reported).         Each patient to whom he or she provides medical services by telemedicine is:  (1) informed of the relationship between the physician and patient and the respective role of any other health care provider with respect to management of the patient; and (2) notified that he or she may decline to receive medical services by telemedicine and may withdraw from such care at any time.    Notes:    Patient is on lunesta for insomnia, working well, no side effects. Last prescribed by PCP at annual exam 01/25/23. She is also on flonase and astelin for allergic rhinitis, needs flonase refill, working well. She says a couple of days ago she was bitten/stung by an insect on her right heel and it is itching and red, she has been using hydrocortisone cream with minimal relief.    Review of Systems   Constitutional:  Negative for activity change and unexpected weight change.   HENT:  Negative for hearing loss, rhinorrhea and trouble swallowing.    Eyes:  Negative for discharge and visual disturbance.   Respiratory:  Negative for chest tightness and wheezing.    Cardiovascular:  Negative for chest pain and palpitations.   Gastrointestinal:  Negative for blood in stool, constipation, diarrhea  and vomiting.   Endocrine: Negative for polydipsia and polyuria.   Genitourinary:  Negative for difficulty urinating, dysuria, hematuria and menstrual problem.   Musculoskeletal:  Negative for arthralgias, joint swelling and neck pain.   Integumentary:  Positive for color change and rash.   Neurological:  Negative for weakness and headaches.   Psychiatric/Behavioral:  Negative for confusion, dysphoric mood and sleep disturbance. The patient is not nervous/anxious.         Objective     Physical Exam  Constitutional:       Appearance: Normal appearance.   HENT:      Head: Normocephalic and atraumatic.   Pulmonary:      Effort: Pulmonary effort is normal. No respiratory distress.   Neurological:      Mental Status: She is alert and oriented to person, place, and time.   Psychiatric:         Mood and Affect: Mood normal.         Behavior: Behavior normal.         Thought Content: Thought content normal.         Judgment: Judgment normal.          Assessment and Plan     1. Allergic rhinitis due to other allergic trigger, unspecified seasonality  -     fluticasone propionate (FLONASE) 50 mcg/actuation nasal spray; 1 spray (50 mcg total) by Each Nostril route once daily.  Dispense: 16 g; Refill: 0    2. Primary insomnia  -     eszopiclone (LUNESTA) 3 mg Tab; Take 1 tablet (3 mg total) by mouth every evening.  Dispense: 90 tablet; Refill: 1    3. Insect stings, accidental or unintentional, initial encounter  -     triamcinolone acetonide 0.1% (KENALOG) 0.1 % ointment; Apply topically 2 (two) times daily.  Dispense: 30 g; Refill: 0        Follow up with PCP for annual exam in 6 months. Medication safety and dependency reviewed, follow up sooner for new or worsening symptoms.          No follow-ups on file.

## 2023-06-14 NOTE — TELEPHONE ENCOUNTER
Refill Decision Note   Elizabeth Giordano  is requesting a refill authorization.  Brief Assessment and Rationale for Refill:  Approve     Medication Therapy Plan:       Medication Reconciliation Completed: No   Comments:     No Care Gaps recommended.     Note composed:2:26 PM 06/14/2023

## 2023-06-14 NOTE — PROGRESS NOTES
Answers submitted by the patient for this visit:  Review of Systems Questionnaire (Submitted on 6/13/2023)  activity change: No  unexpected weight change: No  neck pain: No  hearing loss: No  rhinorrhea: No  trouble swallowing: No  eye discharge: No  visual disturbance: No  chest tightness: No  wheezing: No  chest pain: No  palpitations: No  blood in stool: No  constipation: No  vomiting: No  diarrhea: No  polydipsia: No  polyuria: No  difficulty urinating: No  hematuria: No  menstrual problem: No  dysuria: No  joint swelling: No  arthralgias: No  headaches: No  weakness: No  confusion: No  dysphoric mood: No

## 2023-06-14 NOTE — TELEPHONE ENCOUNTER
No care due was identified.  [facility] Embedded Care Due Messages. Reference number: [id number].   6/14/2023 1:34:33 PM CDT

## 2023-06-16 ENCOUNTER — HOSPITAL ENCOUNTER (OUTPATIENT)
Facility: HOSPITAL | Age: 63
Discharge: HOME | End: 2023-06-16
Attending: ANESTHESIOLOGY | Admitting: ANESTHESIOLOGY
Payer: COMMERCIAL

## 2023-06-16 ENCOUNTER — HOSPITAL ENCOUNTER (OUTPATIENT)
Dept: RADIOLOGY | Facility: HOSPITAL | Age: 63
Discharge: HOME | End: 2023-06-16
Attending: ANESTHESIOLOGY | Admitting: ANESTHESIOLOGY
Payer: COMMERCIAL

## 2023-06-16 VITALS
DIASTOLIC BLOOD PRESSURE: 66 MMHG | BODY MASS INDEX: 31.58 KG/M2 | SYSTOLIC BLOOD PRESSURE: 122 MMHG | HEIGHT: 64 IN | TEMPERATURE: 98 F | OXYGEN SATURATION: 99 % | WEIGHT: 185 LBS | HEART RATE: 64 BPM | RESPIRATION RATE: 17 BRPM

## 2023-06-16 DIAGNOSIS — M54.16 LUMBAR RADICULOPATHY: ICD-10-CM

## 2023-06-16 DIAGNOSIS — M54.50 LOWER BACK PAIN: ICD-10-CM

## 2023-06-16 PROCEDURE — 62323 NJX INTERLAMINAR LMBR/SAC: CPT | Mod: PO | Performed by: ANESTHESIOLOGY

## 2023-06-16 PROCEDURE — 62323 NJX INTERLAMINAR LMBR/SAC: CPT | Mod: ,,, | Performed by: ANESTHESIOLOGY

## 2023-06-16 PROCEDURE — 63600175 PHARM REV CODE 636 W HCPCS: Mod: PO | Performed by: ANESTHESIOLOGY

## 2023-06-16 PROCEDURE — 76000 FLUOROSCOPY <1 HR PHYS/QHP: CPT | Mod: TC,PO

## 2023-06-16 PROCEDURE — 25500020 PHARM REV CODE 255: Mod: PO | Performed by: ANESTHESIOLOGY

## 2023-06-16 PROCEDURE — 62323 PR INJ LUMBAR/SACRAL, W/IMAGING GUIDANCE: ICD-10-PCS | Mod: ,,, | Performed by: ANESTHESIOLOGY

## 2023-06-16 PROCEDURE — 25000003 PHARM REV CODE 250: Mod: PO | Performed by: ANESTHESIOLOGY

## 2023-06-16 RX ORDER — ALPRAZOLAM 0.5 MG/1
1 TABLET, ORALLY DISINTEGRATING ORAL ONCE AS NEEDED
Status: COMPLETED | OUTPATIENT
Start: 2023-06-16 | End: 2023-06-16

## 2023-06-16 RX ORDER — METHYLPREDNISOLONE ACETATE 80 MG/ML
INJECTION, SUSPENSION INTRA-ARTICULAR; INTRALESIONAL; INTRAMUSCULAR; SOFT TISSUE
Status: DISCONTINUED | OUTPATIENT
Start: 2023-06-16 | End: 2023-06-16 | Stop reason: HOSPADM

## 2023-06-16 RX ORDER — LIDOCAINE HYDROCHLORIDE 10 MG/ML
INJECTION, SOLUTION EPIDURAL; INFILTRATION; INTRACAUDAL; PERINEURAL
Status: DISCONTINUED | OUTPATIENT
Start: 2023-06-16 | End: 2023-06-16 | Stop reason: HOSPADM

## 2023-06-16 RX ADMIN — ALPRAZOLAM 1 MG: 0.5 TABLET, ORALLY DISINTEGRATING ORAL at 03:06

## 2023-06-16 NOTE — OP NOTE

## 2023-06-16 NOTE — DISCHARGE SUMMARY
[facility] - Surgery  Discharge Note  Short Stay    Procedure(s) (LRB):  Injection-steroid-epidural-lumbar L5/S1 (N/A)      OUTCOME: Patient tolerated treatment/procedure well without complication and is now ready for discharge.    DISPOSITION: Home or Self Care    FINAL DIAGNOSIS:  Lumbar radiculopathy    FOLLOWUP: In clinic    DISCHARGE INSTRUCTIONS:    Discharge Procedure Orders   Diet Adult Regular     No dressing needed     Notify your health care provider if you experience any of the following:  temperature >100.4     Activity as tolerated

## 2023-06-26 ENCOUNTER — OFFICE VISIT (OUTPATIENT)
Dept: CARDIOLOGY | Facility: CLINIC | Age: 63
End: 2023-06-26
Payer: COMMERCIAL

## 2023-06-26 VITALS
DIASTOLIC BLOOD PRESSURE: 64 MMHG | WEIGHT: 187.38 LBS | BODY MASS INDEX: 31.99 KG/M2 | HEART RATE: 76 BPM | SYSTOLIC BLOOD PRESSURE: 120 MMHG | HEIGHT: 64 IN

## 2023-06-26 DIAGNOSIS — I10 ESSENTIAL HYPERTENSION: ICD-10-CM

## 2023-06-26 DIAGNOSIS — E78.5 DYSLIPIDEMIA: ICD-10-CM

## 2023-06-26 DIAGNOSIS — I25.10 CORONARY ARTERY DISEASE INVOLVING NATIVE CORONARY ARTERY OF NATIVE HEART WITHOUT ANGINA PECTORIS: ICD-10-CM

## 2023-06-26 PROCEDURE — 3078F DIAST BP <80 MM HG: CPT | Mod: CPTII,S$GLB,,

## 2023-06-26 PROCEDURE — 1159F MED LIST DOCD IN RCRD: CPT | Mod: CPTII,S$GLB,,

## 2023-06-26 PROCEDURE — 99214 OFFICE O/P EST MOD 30 MIN: CPT | Mod: S$GLB,,,

## 2023-06-26 PROCEDURE — 1159F PR MEDICATION LIST DOCUMENTED IN MEDICAL RECORD: ICD-10-PCS | Mod: CPTII,S$GLB,,

## 2023-06-26 PROCEDURE — 99214 PR OFFICE/OUTPT VISIT, EST, LEVL IV, 30-39 MIN: ICD-10-PCS | Mod: S$GLB,,,

## 2023-06-26 PROCEDURE — 3078F PR MOST RECENT DIASTOLIC BLOOD PRESSURE < 80 MM HG: ICD-10-PCS | Mod: CPTII,S$GLB,,

## 2023-06-26 PROCEDURE — 4010F PR ACE/ARB THEARPY RXD/TAKEN: ICD-10-PCS | Mod: CPTII,S$GLB,,

## 2023-06-26 PROCEDURE — 99999 PR PBB SHADOW E&M-EST. PATIENT-LVL IV: ICD-10-PCS | Mod: PBBFAC,,,

## 2023-06-26 PROCEDURE — 4010F ACE/ARB THERAPY RXD/TAKEN: CPT | Mod: CPTII,S$GLB,,

## 2023-06-26 PROCEDURE — 3008F PR BODY MASS INDEX (BMI) DOCUMENTED: ICD-10-PCS | Mod: CPTII,S$GLB,,

## 2023-06-26 PROCEDURE — 3074F PR MOST RECENT SYSTOLIC BLOOD PRESSURE < 130 MM HG: ICD-10-PCS | Mod: CPTII,S$GLB,,

## 2023-06-26 PROCEDURE — 3008F BODY MASS INDEX DOCD: CPT | Mod: CPTII,S$GLB,,

## 2023-06-26 PROCEDURE — 3074F SYST BP LT 130 MM HG: CPT | Mod: CPTII,S$GLB,,

## 2023-06-26 PROCEDURE — 99999 PR PBB SHADOW E&M-EST. PATIENT-LVL IV: CPT | Mod: PBBFAC,,,

## 2023-06-26 PROCEDURE — 3044F PR MOST RECENT HEMOGLOBIN A1C LEVEL <7.0%: ICD-10-PCS | Mod: CPTII,S$GLB,,

## 2023-06-26 PROCEDURE — 3044F HG A1C LEVEL LT 7.0%: CPT | Mod: CPTII,S$GLB,,

## 2023-06-26 NOTE — ASSESSMENT & PLAN NOTE
Bp well controlled   Continue amlodipine and losartan at present dosing   Low Na diet   Continue to increase activity

## 2023-06-26 NOTE — PROGRESS NOTES
Subjective:    Patient ID:  Elizabeth Giordano is a 62 y.o. female patient here for evaluation Coronary Artery Disease and Hypertension    History of Present Illness:       Mrs. Johnson is a 62 year old F who follows with Dr. Perdue here today for a follow up. She was having some night time palpitations last year and so a Holter was arranged. It was not completed because she lost greater than 20 lbs and her symptoms resolved. No chest pain or SOB. No other complaints. BP well controlled.       Most Recent Echocardiogram Results  Results for orders placed during the hospital encounter of 07/15/21    Echo    Interpretation Summary  · The left ventricle is normal in size with normal systolic function.  · The estimated ejection fraction is 60%.  · Normal left ventricular diastolic function.  · Normal right ventricular size with normal right ventricular systolic function.  · Normal central venous pressure (3 mmHg).  · The estimated PA systolic pressure is 25 mmHg.    Most Recent Cardiovascular Angiogram results  Results for orders placed during the hospital encounter of 05/22/19    Cardiac catheterization    Conclusion  · Non-obstructive CAD.  · The left ventricular systolic function is normal.  · LV end diastolic pressure is normal.      Other Most Recent Cardiology Results  Results for orders placed during the hospital encounter of 02/02/21    CARDIAC MONITORING STRIPS      REVIEW OF SYSTEMS: As noted in HPI   CARDIOVASCULAR: No recent chest pain, palpitations, arm/neck/jaw pain, or edema.  RESPIRATORY: No recent fever, cough, SOB.  : No blood in the urine  GI: No reflux, nausea, vomiting, or blood in stool.   MUSCULOSKELETAL: No falls.   NEURO: No headaches, syncope, or dizziness.  EYES: No sudden changes in vision.     Past Medical History:   Diagnosis Date    Allergy     Arthritis of spine 2011    Chronic low back pain     Class 2 obesity with body mass index (BMI) of 39.0 to 39.9 in adult 01/11/2019     Coronary artery disease     mild    DDD (degenerative disc disease), lumbar     Diverticulitis     Diverticulosis     DJD (degenerative joint disease)     Encounter for blood transfusion     Factor V Leiden     GERD (gastroesophageal reflux disease)     Hiatal hernia     Migraines     PONV (postoperative nausea and vomiting)     geta    Schatzki's ring     Urticaria      Past Surgical History:   Procedure Laterality Date    APPENDECTOMY  1981    CARPAL TUNNEL RELEASE  2011    right    COLONOSCOPY  2014    Dr. Palomares; reduntant colon, otherwise normal findings repeat in 8-10 years for surveillance    Epidural Steroid Injection      Pain Management    EPIDURAL STEROID INJECTION INTO CERVICAL SPINE N/A 9/25/2018    Procedure: Injection-steroid-epidural-cervical;  Surgeon: Oswald Abdalla MD;  Location: [facility] OR;  Service: Pain Management;  Laterality: N/A;    EPIDURAL STEROID INJECTION INTO CERVICAL SPINE N/A 10/24/2019    Procedure: Injection-steroid-epidural-cervical;  Surgeon: Oswald Abdalla MD;  Location: [facility] OR;  Service: Pain Management;  Laterality: N/A;    EPIDURAL STEROID INJECTION INTO CERVICAL SPINE N/A 2/22/2023    Procedure: Injection-steroid-epidural-cervical C7-T1;  Surgeon: Oswald Abdalla MD;  Location: [facility] OR;  Service: Pain Management;  Laterality: N/A;    EPIDURAL STEROID INJECTION INTO LUMBAR SPINE N/A 12/27/2018    Procedure: Injection-steroid-epidural-lumbar L5/S1;  Surgeon: Oswald Abdalla MD;  Location: [facility] OR;  Service: Pain Management;  Laterality: N/A;    EPIDURAL STEROID INJECTION INTO LUMBAR SPINE N/A 9/4/2019    Procedure: Injection-steroid-epidural-lumbar;  Surgeon: Oswald Abdalla MD;  Location: [facility] OR;  Service: Pain Management;  Laterality: N/A;  L5/S1 to right    EPIDURAL STEROID INJECTION INTO LUMBAR SPINE N/A 6/16/2023    Procedure: Injection-steroid-epidural-lumbar L5/S1;  Surgeon: Oswald Abdalla MD;  Location: [facility] OR;  Service: Pain Management;   Laterality: N/A;    ESOPHAGOGASTRODUODENOSCOPY  2016    Dr. Arreguin; small hiatal hernia; gastritis    ESOPHAGOGASTRODUODENOSCOPY N/A 2020    Dr. Arreguin; mild Schatzki ring- dilated; small hiatal hernia; bx unremarkable    Facet injection      Pain Management    HYSTERECTOMY      ovaries removed    KIDNEY SURGERY Right     to correct urinary reflux into kidney    LEFT HEART CATHETERIZATION Left 2019    Procedure: Left heart cath;  Surgeon: Casa Perdue MD;  Location: [facility] CATH;  Service: Cardiology;  Laterality: Left;    OVARIAN CYST REMOVAL Right     RADIOFREQUENCY ABLATION OF LUMBAR MEDIAL BRANCH NERVE AT SINGLE LEVEL Bilateral 2018    Procedure: RADIOFREQUENCY ABLATION, NERVE, MEDIAL BRANCH, LUMBAR, L2,3,4;  Surgeon: Oswald Abdalla MD;  Location: [facility] OR;  Service: Pain Management;  Laterality: Bilateral;    RADIOFREQUENCY ABLATION OF LUMBAR MEDIAL BRANCH NERVE AT SINGLE LEVEL Bilateral 2019    Procedure: Radiofrequency Ablation, Nerve, Spinal, Lumbar, Medial Branch, L2,3,4;  Surgeon: Oswald Abdalla MD;  Location: [facility] OR;  Service: Pain Management;  Laterality: Bilateral;    SHOULDER SURGERY  2010    rotator cuff, right     Social History     Tobacco Use    Smoking status: Former     Packs/day: 1.00     Years: 5.00     Pack years: 5.00     Types: Cigarettes     Start date: 1992     Quit date: 1997     Years since quittin.4     Passive exposure: Never    Smokeless tobacco: Never   Substance Use Topics    Alcohol use: Yes     Comment: 3x per year    Drug use: No         Objective      Vitals:    23 1535   BP: 120/64   Pulse: 76       LAST EKG  Results for orders placed or performed during the hospital encounter of 21   EKG 12-lead    Collection Time: 21  4:58 AM    Narrative    Test Reason : R06.02,    Vent. Rate : 111 BPM     Atrial Rate : 111 BPM     P-R Int : 150 ms          QRS Dur : 080 ms      QT Int : 330 ms       P-R-T  Axes : 067 -27 057 degrees     QTc Int : 448 ms    Sinus tachycardia  Possible Left atrial enlargement  Borderline Abnormal ECG  When compared with ECG of 14-MAR-2019 14:14,  Previous ECG has undetermined rhythm, needs review  The axis Shifted left  Confirmed by Bill Burgos MD ([id number]) on 2/2/2021 8:48:30 AM    Referred By: [email or web address]   SELF           Confirmed By:Bill Burgos MD     LIPIDS - LAST 2   Lab Results   Component Value Date    CHOL 163 01/25/2023    CHOL 157 05/10/2022    HDL 63 01/25/2023    HDL 62 05/10/2022    LDLCALC 85.8 01/25/2023    LDLCALC 76.6 05/10/2022    TRIG 71 01/25/2023    TRIG 92 05/10/2022    CHOLHDL 38.7 01/25/2023    CHOLHDL 39.5 05/10/2022     CARDIAC PROFILE - LAST 2  Lab Results   Component Value Date    CPK 43 (L) 02/02/2021     (H) 02/02/2021    TROPONINI <0.012 02/02/2021      CBC - LAST 2  Lab Results   Component Value Date    WBC 5.86 01/25/2023    WBC 6.55 02/06/2021    RBC 3.85 (L) 01/25/2023    RBC 4.08 02/06/2021    HGB 12.4 01/25/2023    HGB 12.8 02/06/2021    HCT 37.1 01/25/2023    HCT 37.7 02/06/2021     01/25/2023     02/06/2021     No results found for: LABPT, INR, APTT  CHEMISTRY - LAST 2  Lab Results   Component Value Date     01/25/2023     05/10/2022     05/10/2022    K 4.5 01/25/2023    K 4.1 05/10/2022    K 4.1 05/10/2022     01/25/2023     05/10/2022     05/10/2022    CO2 27 01/25/2023    CO2 24 05/10/2022    CO2 24 05/10/2022    ANIONGAP 8 01/25/2023    ANIONGAP 9 05/10/2022    ANIONGAP 9 05/10/2022    BUN 16 01/25/2023    BUN 16 05/10/2022    BUN 16 05/10/2022    CREATININE 1.2 01/25/2023    CREATININE 1.0 05/10/2022    CREATININE 1.0 05/10/2022     01/25/2023    GLU 93 05/10/2022    GLU 93 05/10/2022    CALCIUM 9.5 01/25/2023    CALCIUM 9.6 05/10/2022    CALCIUM 9.6 05/10/2022    MG 2.2 02/06/2021    MG 2.2 02/05/2021    ALBUMIN 4.3 01/25/2023    ALBUMIN 4.0 05/10/2022    ALBUMIN 4.0 05/10/2022     PROT 6.9 01/25/2023    PROT 6.9 05/10/2022    ALKPHOS 53 (L) 01/25/2023    ALKPHOS 54 (L) 05/10/2022    ALT 19 01/25/2023    ALT 15 05/10/2022    AST 21 01/25/2023    AST 21 05/10/2022    BILITOT 0.4 01/25/2023    BILITOT 0.6 05/10/2022      ENDOCRINE - LAST 2  Lab Results   Component Value Date    HGBA1C 5.3 01/25/2023    TSH 0.619 01/25/2023    TSH 0.589 08/21/2018        PHYSICAL EXAM  CONSTITUTIONAL: Well built, well nourished in no apparent distress  NECK: no carotid bruit, no JVD  LUNGS: CTA  CHEST WALL: no tenderness  HEART: regular rate and rhythm, S1, S2 normal, no murmur, click, rub or gallop   ABDOMEN: soft, non-tender; bowel sounds normal; no masses,  no organomegaly  EXTREMITIES: Extremities normal, no edema, no calf tenderness noted  NEURO: AAO X 3    I HAVE REVIEWED :    The vital signs, most recent cardiac testing, and most recent pertinent non-cardiology provider notes.    Current Outpatient Medications   Medication Instructions    ALPRAZolam (XANAX) 1 mg, Oral, On Call Procedure, Take 30min prior to procedure    amLODIPine (NORVASC) 5 MG tablet TAKE 1 TABLET(5 MG) BY MOUTH EVERY DAY    ascorbic acid (vitamin C) (VITAMIN C) 500 mg, Oral, 2 times daily    aspirin (ECOTRIN) 81 mg, Oral, Daily    atorvastatin (LIPITOR) 10 MG tablet TAKE 1 TABLET(10 MG) BY MOUTH EVERY DAY    azelastine (ASTELIN) 137 mcg (0.1 %) nasal spray 1 spray, 2 times daily    cimetidine (TAGAMET) 800 MG tablet TAKE ONE TABLET BY MOUTH EVERY EVENING    diazePAM (VALIUM) 5 mg, Oral, On Call Procedure    estradioL (ESTRACE) 1 mg, Oral, Daily, Can cause blood clots with covid, recommend NOT taking this medication until covid symptoms completely gone and discussing with PCP    eszopiclone (LUNESTA) 3 mg, Oral, Nightly    fluticasone propionate (FLONASE) 50 mcg, Each Nostril, Daily    galcanezumab-gnlm (EMGALITY PEN) 120 mg/mL PnIj Inject 1 pen (120 mg total) into the skin every 28 days.    ibuprofen (ADVIL,MOTRIN) 800 mg, Oral,  Every 6 hours PRN    LIDOcaine (LIDODERM) 5 % 1 patch, Transdermal, Daily, Remove & Discard patch within 12 hours or as directed by MD    losartan (COZAAR) 100 MG tablet TAKE 1 TABLET(100 MG) BY MOUTH EVERY DAY    mupirocin (BACTROBAN) 2 % ointment Topical (Top), 3 times daily    ondansetron (ZOFRAN-ODT) 4 mg, Oral, Every 6 hours PRN    pantoprazole (PROTONIX) 40 MG tablet TAKE 1 TABLET(40 MG) BY MOUTH EVERY DAY    traMADoL (ULTRAM) 50 mg tablet TAKE 1 TABLET BY MOUTH EVERY 12 HOURS AS NEEDED FOR PAIN    triamcinolone acetonide 0.1% (KENALOG) 0.1 % cream Topical (Top), 2 times daily PRN    triamcinolone acetonide 0.1% (KENALOG) 0.1 % ointment Topical (Top), 2 times daily    ubrogepant (UBRELVY) 50 mg tablet Take 1 tablet by mouth at onset of migraine. May repeat in 2 hours if needed. Max 2 tablets per day.    vitamin D (VITAMIN D3) 1,000 Units, Oral, Daily    zinc sulfate (ZINCATE) 220 mg, Oral, Daily      Assessment & Plan     Essential hypertension  Bp well controlled   Continue amlodipine and losartan at present dosing   Low Na diet   Continue to increase activity     Coronary artery disease  Nonobs disease 2019   No angina   Cont ASA statin     Dyslipidemia  Continue statin medication           Follow up in about 1 year (around 6/26/2024).     Naomi Peters, PA-C [facility]   Office: [phone]

## 2023-07-18 ENCOUNTER — OFFICE VISIT (OUTPATIENT)
Dept: PAIN MEDICINE | Facility: CLINIC | Age: 63
End: 2023-07-18
Payer: COMMERCIAL

## 2023-07-18 VITALS
HEART RATE: 80 BPM | SYSTOLIC BLOOD PRESSURE: 128 MMHG | HEIGHT: 64 IN | BODY MASS INDEX: 31.5 KG/M2 | DIASTOLIC BLOOD PRESSURE: 60 MMHG | WEIGHT: 184.5 LBS

## 2023-07-18 DIAGNOSIS — M54.16 LUMBAR RADICULOPATHY: Primary | ICD-10-CM

## 2023-07-18 DIAGNOSIS — M50.30 DDD (DEGENERATIVE DISC DISEASE), CERVICAL: ICD-10-CM

## 2023-07-18 DIAGNOSIS — M51.36 DDD (DEGENERATIVE DISC DISEASE), LUMBAR: ICD-10-CM

## 2023-07-18 DIAGNOSIS — M48.061 SPINAL STENOSIS OF LUMBAR REGION WITHOUT NEUROGENIC CLAUDICATION: ICD-10-CM

## 2023-07-18 PROCEDURE — 3008F PR BODY MASS INDEX (BMI) DOCUMENTED: ICD-10-PCS | Mod: CPTII,S$GLB,, | Performed by: PHYSICIAN ASSISTANT

## 2023-07-18 PROCEDURE — 99999 PR PBB SHADOW E&M-EST. PATIENT-LVL V: ICD-10-PCS | Mod: PBBFAC,,, | Performed by: PHYSICIAN ASSISTANT

## 2023-07-18 PROCEDURE — 3074F SYST BP LT 130 MM HG: CPT | Mod: CPTII,S$GLB,, | Performed by: PHYSICIAN ASSISTANT

## 2023-07-18 PROCEDURE — 1160F RVW MEDS BY RX/DR IN RCRD: CPT | Mod: CPTII,S$GLB,, | Performed by: PHYSICIAN ASSISTANT

## 2023-07-18 PROCEDURE — 3008F BODY MASS INDEX DOCD: CPT | Mod: CPTII,S$GLB,, | Performed by: PHYSICIAN ASSISTANT

## 2023-07-18 PROCEDURE — 1159F PR MEDICATION LIST DOCUMENTED IN MEDICAL RECORD: ICD-10-PCS | Mod: CPTII,S$GLB,, | Performed by: PHYSICIAN ASSISTANT

## 2023-07-18 PROCEDURE — 99999 PR PBB SHADOW E&M-EST. PATIENT-LVL V: CPT | Mod: PBBFAC,,, | Performed by: PHYSICIAN ASSISTANT

## 2023-07-18 PROCEDURE — 1160F PR REVIEW ALL MEDS BY PRESCRIBER/CLIN PHARMACIST DOCUMENTED: ICD-10-PCS | Mod: CPTII,S$GLB,, | Performed by: PHYSICIAN ASSISTANT

## 2023-07-18 PROCEDURE — 3044F HG A1C LEVEL LT 7.0%: CPT | Mod: CPTII,S$GLB,, | Performed by: PHYSICIAN ASSISTANT

## 2023-07-18 PROCEDURE — 4010F PR ACE/ARB THEARPY RXD/TAKEN: ICD-10-PCS | Mod: CPTII,S$GLB,, | Performed by: PHYSICIAN ASSISTANT

## 2023-07-18 PROCEDURE — 3078F PR MOST RECENT DIASTOLIC BLOOD PRESSURE < 80 MM HG: ICD-10-PCS | Mod: CPTII,S$GLB,, | Performed by: PHYSICIAN ASSISTANT

## 2023-07-18 PROCEDURE — 3074F PR MOST RECENT SYSTOLIC BLOOD PRESSURE < 130 MM HG: ICD-10-PCS | Mod: CPTII,S$GLB,, | Performed by: PHYSICIAN ASSISTANT

## 2023-07-18 PROCEDURE — 4010F ACE/ARB THERAPY RXD/TAKEN: CPT | Mod: CPTII,S$GLB,, | Performed by: PHYSICIAN ASSISTANT

## 2023-07-18 PROCEDURE — 3078F DIAST BP <80 MM HG: CPT | Mod: CPTII,S$GLB,, | Performed by: PHYSICIAN ASSISTANT

## 2023-07-18 PROCEDURE — 3044F PR MOST RECENT HEMOGLOBIN A1C LEVEL <7.0%: ICD-10-PCS | Mod: CPTII,S$GLB,, | Performed by: PHYSICIAN ASSISTANT

## 2023-07-18 PROCEDURE — 1159F MED LIST DOCD IN RCRD: CPT | Mod: CPTII,S$GLB,, | Performed by: PHYSICIAN ASSISTANT

## 2023-07-18 PROCEDURE — 99214 PR OFFICE/OUTPT VISIT, EST, LEVL IV, 30-39 MIN: ICD-10-PCS | Mod: S$GLB,,, | Performed by: PHYSICIAN ASSISTANT

## 2023-07-18 PROCEDURE — 99214 OFFICE O/P EST MOD 30 MIN: CPT | Mod: S$GLB,,, | Performed by: PHYSICIAN ASSISTANT

## 2023-07-18 RX ORDER — PREGABALIN 50 MG/1
50 CAPSULE ORAL NIGHTLY
Qty: 30 CAPSULE | Refills: 2 | Status: SHIPPED | OUTPATIENT
Start: 2023-07-18 | End: 2023-09-26 | Stop reason: SDUPTHER

## 2023-07-18 RX ORDER — TRAMADOL HYDROCHLORIDE 50 MG/1
TABLET ORAL
Qty: 60 TABLET | Refills: 2 | Status: SHIPPED | OUTPATIENT
Start: 2023-08-11 | End: 2023-09-26 | Stop reason: SDUPTHER

## 2023-07-18 NOTE — TELEPHONE ENCOUNTER
Pt seen in clinic. Please send Rx. I have reviewed the [place] Board [employer] website and there are no abberancies.

## 2023-07-24 ENCOUNTER — TELEPHONE (OUTPATIENT)
Dept: PAIN MEDICINE | Facility: CLINIC | Age: 63
End: 2023-07-24
Payer: COMMERCIAL

## 2023-07-24 DIAGNOSIS — M54.16 LUMBAR RADICULOPATHY: Primary | ICD-10-CM

## 2023-07-24 RX ORDER — ALPRAZOLAM 0.5 MG/1
1 TABLET, ORALLY DISINTEGRATING ORAL ONCE AS NEEDED
Status: CANCELLED | OUTPATIENT
Start: 2023-07-24 | End: 2034-12-20

## 2023-07-24 NOTE — TELEPHONE ENCOUNTER
Please let the patient know I reviewed her case with Dr. Abdalla and we looked at her MRI.  She is not a candidate to aspirate her facet joint cyst but we can try a different injection.  Please schedule her for a left L4/5 and L5/S1 transforaminal epidural steroid injection.    Physician - Dr Abdalla    Type of Procedure/Injection - Lumbar Transforaminal Epidural  L4/5 and L5/S1           Laterality - Left      Type of Sedation - Local      Need to hold medication - Yes      Aspirin for 7 days and NSAIDs for 2 days      Clearance needed - Yes      Follow up - 4 week

## 2023-07-24 NOTE — TELEPHONE ENCOUNTER
Patient is scheduled for an YARON with Dr. Abdalla on 8/21. Patient will need to hold ASA 7 days prior to procedure. Requesting clearance to hold.

## 2023-07-24 NOTE — PROGRESS NOTES
CHIEF COMPLAINT/REASON FOR VISIT: low back pain, neck pain    History of present illness: The patient is a 62 year old woman with a history of migraines, carpal tunnel syndrome and low back pain since her early 20s.  She is s/p L5/S1 IL YARON with 2 weeks of relief, now 0%.  She reports left buttock pain radiating to the left posterior thigh. Pain is constant. She denies numbness, reports perhaps some weakness.     Pain intervention history:She is status post TFESI bilaterally to L3 on 12/6/2011 with no relief. Past history of status post L4/5 YARON on 5/25/11 with about 90% relief that lasted 3 weeks. She is status post 2 bilateral L3 transforaminal injections with 3 weeks relief each time.  She is status post L4/5 interlaminar epidural steroid injection on 1/8/14 with 90% relief.  She is status post L5/S1 interlaminar epidural steroid injection on 6/9/14 with moderate relief only on the right low back and right leg lasting 2 weeks.  She is status post bilateral L3/4 and L4/5 facet joint injections on 4/13/15 with initially 100% relief of her back pain and 80% relief of her left lateral hip and lateral thigh pain, now reporting at least 50% relief of both.  She is status post bilateral L2, 3 and 4 medial branch radiofrequency ablation on 5/20/15 with 60% relief.   She is status post bilateral L2, 3 and 4 medial branch radiofrequency ablation on 6/20/16 with 50-70% relief.  She is status post L5/S1 interlaminar epidural steroid injection on 3/10/17 with 50% relief.  She is status post bilateral L2, 3, 4 medial branch radiofrequency ablation on 7/17/17 with about 75% relief.  She is status post bilateral L2, 3, 4 medial branch radiofrequency ablation on 07/17/2018 with 100% relief of her leg pain and 70-75% relief of her back pain.  She is status post C7-T1 cervical interlaminar epidural steroid injection on 09/25/2018 with 80% relief.   She is status post L5/S1 interlaminar epidural steroid injection on 12/27/2018  "with 60% relief.  She is status post bilateral L2, 3 and 4 medial branch radiofrequency ablation on 07/29/2019 with 40% relief.  She is status post L5/S1 interlaminar epidural steroid injection on 09/04/2019 with almost 100% relief of her right leg pain but 0% relief for back pain. She is status post C7-T1 interlaminar epidural steroid injection on 10/24/2019 with 70% relief.  She is s/p L5/S1 IL YARON with 2 weeks of relief, now 0%.      Spine surgeries:    Antineuropathics: failed gabapentin, failed Cymbalta due to side effects  NSAIDs:  Ibuprofen  Physical therapy:  Antidepressants:  Muscle relaxers:  Opioids:  Tramadol 50 mg twice daily as needed  Antiplatelets/Anticoagulants:  ASA 81 mg    ROS: She reports back pain only.  Balance of review of systems is negative.    Medical, surgical, family and social history reviewed elsewhere in record.     Medications/Allergies: See med card      Vitals:    07/18/23 1318   BP: 128/60   Pulse: 80   Weight: 83.7 kg (184 lb 8.4 oz)   Height: 5' 4" (1.626 m)   PainSc:   7   PainLoc: Back        PHYSICAL EXAM:  Gen: A and O x3, pleasant, well-groomed  HEENT: PERRLA  CVS: Regular rate and rhythm  Resp:  No obvious shortness of breath, no increased work of breathing  Musculoskeletal: No antalgic gait.     Neuro:  Upper extremities: 5/5 strength bilaterally   Lower extremities: 5/5 strength bilaterally  Reflexes: Brachioradialis 2+, Bicep 2+, Tricep 2+.   Patellar 3+, Achilles 2+.  Sensory: Intact and symmetrical to light touch and pinprick in C2-T1 dermatomes bilaterally.Intact and symmetrical to light touch and pinprick in L2-S1 dermatomes bilaterally.      Lumbar spine:  Lumbar spine: ROM is moderately limited with flexion and extension with increased bilateral low back pain during extension and oblique extension.  Supine straight leg raise is negative bilaterally.  Internal and external rotation of the hip causes no increased pain in either side.  Myofascial exam:  Mild " tenderness to palpation to the right greater than left lumbar paraspinous muscles.      IMAGING:   MRI L-SPINE 5/10/11   IMPRESSION: AT L3-4, THERE IS CIRCUMFERENTIAL DISC PROTRUSION FOCALLY MORE PROMINENT TO THE LEFT AS WELL AS A SMALL DISC HERNIATION PROTRUDING INFERIORLY BEHIND L4 TO THE LEFT OF MIDLINE. THIS PRODUCES SPINAL CANAL AND BILATERAL NEURAL FORAMINAL STENOSIS, LEFT GREATER THAN RIGHT. AT L4-5, THERE IS CIRCUMFERENTIAL DISC PROTRUSION FOCALLY MORE PROMINENT TO THE RIGHT WITH MILD SPINAL CANAL AND RIGHT NEURAL FORAMINAL STENOSIS. AT L1-2 AND L2-3 ALTHOUGH THERE ARE DISC PROTRUSIONS IDENTIFIED, SIGNIFICANT STENOSIS IS NOT SEEN.     MRI lumbar spine 7/10/14  L1-2:There is chronic relatively advanced disc degeneration with disc space narrowing, disc dehydration, disc narrowing, endplate osteophytes, and degenerative vertebral endplate marrow change. There is a diffuse 2-mm posterior disc bulge with a superimposed4-mm right posterior disc extrusion causing mild right foraminal stenosis. There is no impingement of the right L1 nerve root and there has been no change.  L2-3: There is severe and chronic disc degeneration with severe disc space narrowing, disc dehydration, degenerative vertebral endplate marrow change, and vertebral endplate osteophytes. There is a diffuse 2-mm posterior disc bulge with a superimposed 4-mm right posterior disc extrusion causing mild right foraminal stenosis. There is no impingement of the right L2 nerve root and there has been no significant change.  L3-4: There is severe disc degeneration which has progressed since the prior study. There is severe disc space narrowing, disc dehydration, degenerative vertebral endplate marrow change and endplate osteophytes. There is also mild posterior subluxation of L3over a distance of 4 mm. There is a broad-based 5-mm posterior disc extrusion. There is degenerative facet arthrosis with ligamentum flavum thickening. The combination of facet  arthrosis and disc extrusion results in relatively severe spinal canal stenosis with compression of the thecal sac to an AP diameter of 5 mm, moderate right foraminal stenosis, and severe left foraminal stenosis. The spinal stenosis has also progressed since the prior study.  L4-5:There is a diffuse posterior disc bulge with a superimposed 3-mm left posterior paracentral disc protrusion causing left paracentral thecal sac compression. There is moderate left and mild right foraminal stenosis and there has been no significant change. There is mild degenerative facet arthrosis with ligamentum flavum thickening and small joint effusions.  L5-S1:There is no significant compromise of the spinal canal or foramina and there has been no change.    X-ray cervical spine 6/8/15  There is loss of normal cervical lordosis which may be positional or related muscular strain. Intervertebral disk height loss is noted at the C5-C6 C6-C7 levels and to a lesser degree C4-C5 and C7-T1 levels. Vertebral body alignment appears otherwise adequate. Vertebral body heights appear well-preserved. The atlas and odontoid appear in good relationship to each other. Osseous neuroforaminal narrowing is noted at the C3-C4 C4-C5 C5-C6 levels on the left and at the C4-C5 C5-C6 and C6-C7 levels on the right     07/10/2018 MRI cervical spine [facility] report  C2-3 broad-based signal asymmetry at the left subarticular and foraminal zone reflecting implant spondylosis and disc bulge complex, mild to moderate left asymmetric foraminal narrowing, ectasia of the left vertebral artery, contralateral right asymmetric facet hypertrophy, right foramen widely narrowed, disc partially desiccated without collapse  C3-4 moderate to severe left greater than right foraminal narrowing secondary to uncinate joint hypertrophic signal alteration, disc partially desiccated  C4-5 endplate spondylosis moderate diffuse disc bulge noted, broad-based right paracentral disc  herniation, asymmetric flattening of the ventral cord surface at the right paracentral zone, high-grade if lateral right foraminal narrowing, AP diameter of canal 9.9 mm, contralaterally, high grade left foraminal narrowing secondary to facet greater than uncinate joint hypertrophic signal alteration, disc desiccated and mildly narrowed  C5-6 disc space narrowing evident with generalized in Nigel spondylosis and concentric inter pose disc bulge complex, high-grade bilateral foraminal narrowing, flattening of the cord surface, AP diameter 7.9 mm, symmetric facet arthrosis, disc diffusely desiccated and narrowed  C6-7 moderate endplate spondylosis and concentric disc bulge complex, high-grade bilateral foraminal compromise, flattening of the anterior cord surface, AP diameter 8.8 mm, facet arthrosis symmetric, disc desiccated and narrowed  C7-T1 less than 2 mm depth disc bulge    11/06/2018 MRI lumbar spine  T12/L1: There is no evidence of disc protrusion, canal or foraminal stenosis.  L1/L2: Right posterolateral disc protrusion is evident and there is mild distortion of the right anterolateral canal.  Degenerative facet changes are noted bilaterally.  The left foramen is intact.  L2/L3: There is annular disc bulging with a superimposed right posterolateral disc extrusion.  This is slightly more pronounced on the previous examination and there is mild effacement of the anterior thecal sac and moderate narrowing the right foramen.  L3/L4: There is annular disc bulge and osteophyte formation with a superimposed small left posterolateral disc extrusion.  There is moderate narrowing the central canal and left foramen.  This is little changed relative the previous examination.  Degenerative facet changes are noted.  L5/S1: There is a small central disc extrusion superimposed upon annular disc bulging.  This is slightly less pronounced than was seen previously.  Degenerative facet changes are noted.  L5/S1: Moderate  degenerative facet changes are noted and there is mild effacement of the anterior thecal sac.  There is ligamentous hypertrophy particularly to the left in the posterior canal and there is left greater than right foraminal narrowing.  This is mildly worsened relative prior exam.      ASSESSMENT:  The patient is a 62 year old woman with a history of migraines, carpal tunnel syndrome and low back pain since her early 20s who returns in follow up.   1. Lumbar radiculopathy        2. DDD (degenerative disc disease), lumbar        3. Spinal stenosis of lumbar region without neurogenic claudication        4. DDD (degenerative disc disease), cervical              Plan:  She did not have relief with IL YARON, reviewed with Dr Abdalla. Facet joint cyst not in a position to aspirate.  We will schedule for left L4/5 and L5/S1 TF YARON. If no relief we will have her see Neurosurgery.  Dr Abdalla provided Lyrica 50 mg nightly.  Memory Loss with higher doses. Tramadol 50 mg BID PRN provided as well. I have reviewed the [place] Board of Pharmacy website and there are no abberancies.   Follow up 4 weeks post procedure or sooner as needed.

## 2023-08-10 RX ORDER — IBUPROFEN 800 MG/1
800 TABLET ORAL EVERY 6 HOURS PRN
Qty: 30 TABLET | Refills: 0 | Status: SHIPPED | OUTPATIENT
Start: 2023-08-10 | End: 2023-11-13

## 2023-08-15 DIAGNOSIS — J30.89 ALLERGIC RHINITIS DUE TO OTHER ALLERGIC TRIGGER, UNSPECIFIED SEASONALITY: ICD-10-CM

## 2023-08-15 NOTE — TELEPHONE ENCOUNTER
No care due was identified.  [facility] Embedded Care Due Messages. Reference number: [id number].   8/15/2023 12:43:01 PM CDT

## 2023-08-15 NOTE — TELEPHONE ENCOUNTER
Refill Routing Note     Refill Routing Note   Medication(s) are not appropriate for processing by [facility] for the following reason(s):      New or recently adjusted medication  Responsible provider unclear    [facility] action(s):  Defer Care Due:  None identified     Medication Therapy Plan: The provider responsible for managing the medication isnt PCP      Appointments  past 12m or future 3m with PCP    Date Provider   Last Visit   1/25/2023 CARRIE Espinosa MD   Next Visit   Visit date not found CARRIE Espinosa MD   ED visits in past 90 days: 0        Note composed:1:49 PM 08/15/2023

## 2023-08-16 RX ORDER — FLUTICASONE PROPIONATE 50 MCG
1 SPRAY, SUSPENSION (ML) NASAL DAILY
Qty: 16 G | Refills: 3 | Status: SHIPPED | OUTPATIENT
Start: 2023-08-16

## 2023-08-21 ENCOUNTER — HOSPITAL ENCOUNTER (OUTPATIENT)
Dept: RADIOLOGY | Facility: HOSPITAL | Age: 63
Discharge: HOME | End: 2023-08-21
Attending: ANESTHESIOLOGY | Admitting: ANESTHESIOLOGY
Payer: COMMERCIAL

## 2023-08-21 ENCOUNTER — HOSPITAL ENCOUNTER (OUTPATIENT)
Facility: HOSPITAL | Age: 63
Discharge: HOME | End: 2023-08-21
Attending: ANESTHESIOLOGY | Admitting: ANESTHESIOLOGY
Payer: COMMERCIAL

## 2023-08-21 VITALS
DIASTOLIC BLOOD PRESSURE: 68 MMHG | OXYGEN SATURATION: 99 % | BODY MASS INDEX: 31.41 KG/M2 | RESPIRATION RATE: 16 BRPM | TEMPERATURE: 98 F | HEIGHT: 64 IN | SYSTOLIC BLOOD PRESSURE: 138 MMHG | WEIGHT: 184 LBS | HEART RATE: 72 BPM

## 2023-08-21 DIAGNOSIS — M54.50 LOWER BACK PAIN: ICD-10-CM

## 2023-08-21 DIAGNOSIS — M54.16 LUMBAR RADICULOPATHY: ICD-10-CM

## 2023-08-21 PROCEDURE — 64483 NJX AA&/STRD TFRM EPI L/S 1: CPT | Mod: PO,LT | Performed by: ANESTHESIOLOGY

## 2023-08-21 PROCEDURE — 25500020 PHARM REV CODE 255: Mod: PO | Performed by: ANESTHESIOLOGY

## 2023-08-21 PROCEDURE — 25000003 PHARM REV CODE 250: Mod: PO | Performed by: ANESTHESIOLOGY

## 2023-08-21 PROCEDURE — 76000 FLUOROSCOPY <1 HR PHYS/QHP: CPT | Mod: TC,PO

## 2023-08-21 PROCEDURE — 64483 PR EPIDURAL INJ, ANES/STEROID, TRANSFORAMINAL, LUMB/SACR, SNGL LEVL: ICD-10-PCS | Mod: LT,,, | Performed by: ANESTHESIOLOGY

## 2023-08-21 PROCEDURE — 64484 NJX AA&/STRD TFRM EPI L/S EA: CPT | Mod: PO,LT | Performed by: ANESTHESIOLOGY

## 2023-08-21 PROCEDURE — 63600175 PHARM REV CODE 636 W HCPCS: Mod: PO | Performed by: ANESTHESIOLOGY

## 2023-08-21 PROCEDURE — 64483 NJX AA&/STRD TFRM EPI L/S 1: CPT | Mod: LT,,, | Performed by: ANESTHESIOLOGY

## 2023-08-21 PROCEDURE — 64484 NJX AA&/STRD TFRM EPI L/S EA: CPT | Mod: LT,,, | Performed by: ANESTHESIOLOGY

## 2023-08-21 PROCEDURE — 64484 PRA INJECT ANES/STEROID FORAMEN LUMBAR/SACRAL W IMG GUIDE ,EA ADD LEVEL: ICD-10-PCS | Mod: LT,,, | Performed by: ANESTHESIOLOGY

## 2023-08-21 RX ORDER — METHYLPREDNISOLONE ACETATE 80 MG/ML
INJECTION, SUSPENSION INTRA-ARTICULAR; INTRALESIONAL; INTRAMUSCULAR; SOFT TISSUE
Status: DISCONTINUED | OUTPATIENT
Start: 2023-08-21 | End: 2023-08-21 | Stop reason: HOSPADM

## 2023-08-21 RX ORDER — SODIUM CHLORIDE, SODIUM LACTATE, POTASSIUM CHLORIDE, CALCIUM CHLORIDE 600; 310; 30; 20 MG/100ML; MG/100ML; MG/100ML; MG/100ML
INJECTION, SOLUTION INTRAVENOUS CONTINUOUS
Status: DISCONTINUED | OUTPATIENT
Start: 2023-08-21 | End: 2023-08-21 | Stop reason: HOSPADM

## 2023-08-21 RX ORDER — MIDAZOLAM HYDROCHLORIDE 2 MG/2ML
INJECTION, SOLUTION INTRAMUSCULAR; INTRAVENOUS
Status: DISCONTINUED | OUTPATIENT
Start: 2023-08-21 | End: 2023-08-21 | Stop reason: HOSPADM

## 2023-08-21 RX ORDER — BUPIVACAINE HYDROCHLORIDE 2.5 MG/ML
INJECTION, SOLUTION EPIDURAL; INFILTRATION; INTRACAUDAL
Status: DISCONTINUED | OUTPATIENT
Start: 2023-08-21 | End: 2023-08-21 | Stop reason: HOSPADM

## 2023-08-21 RX ORDER — ALPRAZOLAM 0.5 MG/1
1 TABLET, ORALLY DISINTEGRATING ORAL ONCE AS NEEDED
Status: DISCONTINUED | OUTPATIENT
Start: 2023-08-21 | End: 2023-08-21

## 2023-08-21 RX ORDER — LIDOCAINE HYDROCHLORIDE 10 MG/ML
INJECTION, SOLUTION EPIDURAL; INFILTRATION; INTRACAUDAL; PERINEURAL
Status: DISCONTINUED | OUTPATIENT
Start: 2023-08-21 | End: 2023-08-21 | Stop reason: HOSPADM

## 2023-08-21 RX ADMIN — SODIUM CHLORIDE, POTASSIUM CHLORIDE, SODIUM LACTATE AND CALCIUM CHLORIDE: 600; 310; 30; 20 INJECTION, SOLUTION INTRAVENOUS at 01:08

## 2023-08-21 NOTE — DISCHARGE SUMMARY
[facility] - Surgery  Discharge Note  Short Stay    Procedure(s) (LRB):  Injection,steroid,epidural,transforaminal approach L4/5 L5/S1 Left (Left)      OUTCOME: Patient tolerated treatment/procedure well without complication and is now ready for discharge.    DISPOSITION: Home or Self Care    FINAL DIAGNOSIS:  Lumbar radiculopathy    FOLLOWUP: In clinic    DISCHARGE INSTRUCTIONS:    Discharge Procedure Orders   Diet Adult Regular     No dressing needed     Notify your health care provider if you experience any of the following:  temperature >100.4     Activity as tolerated

## 2023-08-21 NOTE — OP NOTE
PROCEDURE DATE: 8/21/2023    PROCEDURE: Left L4/5 and L5/S1 transforaminal epidural steroid injection under fluoroscopy    DIAGNOSIS: Lumbar  Radiculopathy    Post op diagnosis: Same    PHYSICIAN: Oswald Abdalla MD    MEDICATIONS INJECTED:  Methylprednisolone 40mg (1ml) and 1ml 0.25% bupivicaine at each nerve root.     LOCAL ANESTHETIC INJECTED:  Lidocaine 1%. 4 ml per site.    SEDATION MEDICATIONS: 4mg versed    ESTIMATED BLOOD LOSS:  none    COMPLICATIONS:  none    TECHNIQUE:   A time-out was taken to identify patient and procedure side prior to starting the procedure. The patient was placed in a prone position, prepped and draped in the usual sterile fashion using ChloraPrep and sterile towels.  The area to be injected was determined under fluoroscopic guidance in AP and oblique view.  Local anesthetic was given by raising a wheal and going down to the hub of a 25-gauge 1.5 inch needle.  In oblique view, a 3.5 inch 22-gauge bent-tip spinal needle was introduced towards 6 oclock position of the pedicle of each above named nerve root level.  The needle was walked medially then hinged into the neural foramen and position was confirmed in AP and lateral views.  Omnipaque contrast dye was injected to confirm appropriate placement and that there was no vascular uptake.  After negative aspiration for blood or CSF, the medication was then injected. This was performed at the left L4/5 and L5/S1 level(s). The patient tolerated the procedure well.    The patient was monitored after the procedure.  Patient was given post procedure and discharge instructions to follow at home. The patient was discharged in a stable condition.

## 2023-08-21 NOTE — H&P
CC: Back pain    HPI: The patient is a 63yo woman with a history of lumbar radiculopathy here for a left L4/5 and L5/S1 TFESI. There are no major changes in history and physical from 7/18/23.    Past Medical History:   Diagnosis Date    Allergy     Arthritis of spine 2011    Chronic low back pain     Class 2 obesity with body mass index (BMI) of 39.0 to 39.9 in adult 01/11/2019    Coronary artery disease     mild    DDD (degenerative disc disease), lumbar     Diverticulitis     Diverticulosis     DJD (degenerative joint disease)     Encounter for blood transfusion     Factor V Leiden     GERD (gastroesophageal reflux disease)     Hiatal hernia     Migraines     PONV (postoperative nausea and vomiting)     geta    Schatzki's ring     Urticaria        Past Surgical History:   Procedure Laterality Date    APPENDECTOMY  1981    CARPAL TUNNEL RELEASE  2011    right    COLONOSCOPY  2014    Dr. Palomares; reduntant colon, otherwise normal findings repeat in 8-10 years for surveillance    Epidural Steroid Injection      Pain Management    EPIDURAL STEROID INJECTION INTO CERVICAL SPINE N/A 9/25/2018    Procedure: Injection-steroid-epidural-cervical;  Surgeon: Oswald Abdalla MD;  Location: [facility] OR;  Service: Pain Management;  Laterality: N/A;    EPIDURAL STEROID INJECTION INTO CERVICAL SPINE N/A 10/24/2019    Procedure: Injection-steroid-epidural-cervical;  Surgeon: Oswald Abdalla MD;  Location: [facility] OR;  Service: Pain Management;  Laterality: N/A;    EPIDURAL STEROID INJECTION INTO CERVICAL SPINE N/A 2/22/2023    Procedure: Injection-steroid-epidural-cervical C7-T1;  Surgeon: Oswald Abdalla MD;  Location: [facility] OR;  Service: Pain Management;  Laterality: N/A;    EPIDURAL STEROID INJECTION INTO LUMBAR SPINE N/A 12/27/2018    Procedure: Injection-steroid-epidural-lumbar L5/S1;  Surgeon: Oswald Abdalla MD;  Location: [facility] OR;  Service: Pain Management;  Laterality: N/A;    EPIDURAL STEROID INJECTION INTO LUMBAR  SPINE N/A 9/4/2019    Procedure: Injection-steroid-epidural-lumbar;  Surgeon: Oswald Abdalla MD;  Location: [facility] OR;  Service: Pain Management;  Laterality: N/A;  L5/S1 to right    EPIDURAL STEROID INJECTION INTO LUMBAR SPINE N/A 6/16/2023    Procedure: Injection-steroid-epidural-lumbar L5/S1;  Surgeon: Oswald Abdalla MD;  Location: [facility] OR;  Service: Pain Management;  Laterality: N/A;    ESOPHAGOGASTRODUODENOSCOPY  05/17/2016    Dr. Arreguin; small hiatal hernia; gastritis    ESOPHAGOGASTRODUODENOSCOPY N/A 6/22/2020    Dr. Arreguin; mild Schatzki ring- dilated; small hiatal hernia; bx unremarkable    Facet injection      Pain Management    HYSTERECTOMY      ovaries removed    KIDNEY SURGERY Right 1967    to correct urinary reflux into kidney    LEFT HEART CATHETERIZATION Left 5/22/2019    Procedure: Left heart cath;  Surgeon: Casa Perdue MD;  Location: [facility] CATH;  Service: Cardiology;  Laterality: Left;    OVARIAN CYST REMOVAL Right 1981    RADIOFREQUENCY ABLATION OF LUMBAR MEDIAL BRANCH NERVE AT SINGLE LEVEL Bilateral 7/17/2018    Procedure: RADIOFREQUENCY ABLATION, NERVE, MEDIAL BRANCH, LUMBAR, L2,3,4;  Surgeon: Oswald Abdalla MD;  Location: [facility] OR;  Service: Pain Management;  Laterality: Bilateral;    RADIOFREQUENCY ABLATION OF LUMBAR MEDIAL BRANCH NERVE AT SINGLE LEVEL Bilateral 7/29/2019    Procedure: Radiofrequency Ablation, Nerve, Spinal, Lumbar, Medial Branch, L2,3,4;  Surgeon: Oswald Abdalla MD;  Location: [facility] OR;  Service: Pain Management;  Laterality: Bilateral;    SHOULDER SURGERY  2010    rotator cuff, right       Family History   Problem Relation Age of Onset    Heart attack Mother     Heart disease Mother     COPD Mother     Hypertension Mother     Hepatitis Sister     COPD Father     No Known Problems Daughter     Breast cancer Maternal Grandmother     Allergic rhinitis Neg Hx     Angioedema Neg Hx     Asthma Neg Hx     Atopy Neg Hx     Eczema Neg Hx      Immunodeficiency Neg Hx     Urticaria Neg Hx     Colon cancer Neg Hx     Colon polyps Neg Hx        Social History     Socioeconomic History    Marital status:     Number of children: 1   Occupational History     Employer: [employer]   Tobacco Use    Smoking status: Former     Current packs/day: 0.00     Average packs/day: 1 pack/day for 5.0 years (5.0 ttl pk-yrs)     Types: Cigarettes     Start date: 1992     Quit date: 1997     Years since quittin.6     Passive exposure: Never    Smokeless tobacco: Never   Substance and Sexual Activity    Alcohol use: Yes     Comment: 3x per year    Drug use: No    Sexual activity: Yes     Partners: Male   Social History Narrative    ** Merged History Encounter **          Social Determinants of Health     Financial Resource Strain: Low Risk  (2023)    Overall Financial Resource Strain (CARDIA)     Difficulty of Paying Living Expenses: Not hard at all   Food Insecurity: No Food Insecurity (2023)    Hunger Vital Sign     Worried About Running Out of Food in the Last Year: Never true     Ran Out of Food in the Last Year: Never true   Transportation Needs: No Transportation Needs (2023)    PRAPARE - Transportation     Lack of Transportation (Medical): No     Lack of Transportation (Non-Medical): No   Physical Activity: Insufficiently Active (2023)    Exercise Vital Sign     Days of Exercise per Week: 2 days     Minutes of Exercise per Session: 30 min   Stress: No Stress Concern Present (2023)    Citizen of [place] of Occupational Health - Occupational Stress Questionnaire     Feeling of Stress : Not at all   Social Connections: Unknown (2023)    Social Connection and Isolation Panel [NHANES]     Frequency of Communication with Friends and Family: Twice a week     Frequency of Social Gatherings with Friends and Family: Once a week     Active Member of Clubs or Organizations: No     Attends Club or Organization Meetings: Never  "    Marital Status:    Housing Stability: Low Risk  (6/26/2023)    Housing Stability Vital Sign     Unable to Pay for Housing in the Last Year: No     Number of Places Lived in the Last Year: 1     Unstable Housing in the Last Year: No       Current Facility-Administered Medications   Medication Dose Route Frequency Provider Last Rate Last Admin    lactated ringers infusion   Intravenous Continuous Oswald Abdalla MD           Review of patient's allergies indicates:   Allergen Reactions    Hydrocodone Hives and Nausea Only           Sulfa (sulfonamide antibiotics) Hives and Rash           Doxycycline Rash     Itchy rash and some scattered itchy lesions    Oxycodone Itching and Nausea And Vomiting       Vitals:    08/17/23 1056 08/21/23 1315   BP:  122/63   Pulse:  77   Resp:  16   Temp:  98.2 °F (36.8 °C)   SpO2:  97%   Weight: 83.5 kg (184 lb)    Height: 5' 4" (1.626 m)        ASA 2, Mallampati 2    REVIEW OF SYSTEMS:     GENERAL: No weight loss, malaise or fevers.  HEENT:  No recent changes in vision or hearing  NECK: Negative for lumps, no difficulty with swallowing.  RESPIRATORY: Negative for cough, wheezing or shortness of breath, patient denies any recent URI.  CARDIOVASCULAR: Negative for chest pain, leg swelling or palpitations.  GI: Negative for abdominal discomfort, blood in stools or black stools or change in bowel habits.  MUSCULOSKELETAL: See HPI.  SKIN: Negative for lesions, rash, and itching.  PSYCH: No suicidal or homicidal ideations, no current mood disturbances.  HEMATOLOGY/LYMPHOLOGY: Negative for prolonged bleeding, bruising easily or swollen nodes. Patient is not currently taking any anti-coagulants  ENDO: No history of diabetes or thyroid dysfunction  NEURO: No history of syncope, paralysis, seizures or tremors.All other reviewed and negative other than HPI.    Physical exam:  Gen: A and O x3, pleasant, well-groomed  Skin: No rashes or obvious lesions  HEENT: PERRLA, no obvious " deformities on ears or in canals. No thyroid masses, trachea midline, no palpable lymph nodes in neck, axilla.  CVS: Regular rate and rhythm, normal S1 and S2, no murmurs.  Resp: Clear to auscultation bilaterally.  Abdomen: Soft, NT/ND, normal bowel sounds present.  Musculoskeletal/Neuro: Moving all extremities    Assessment:  Lumbar radiculopathy  -     Case Request Operating Room: Injection,steroid,epidural,transforaminal approach L4/5 L5/S1 Left  -     Place in Outpatient; Standing  -     Vital signs; Standing  -     Verify informed consent; Standing  -     Notify physician ; Standing  -     Notify physician ; Standing  -     Notify physician (specify); Standing  -     Diet NPO; Standing  -     Discontinue: alprazolam ODT dissolvable tablet 1 mg    Other orders  -     IP VTE HIGH RISK PATIENT; Standing  -     Insert peripheral IV; Standing  -     lactated ringers infusion

## 2023-09-04 ENCOUNTER — OFFICE VISIT (OUTPATIENT)
Dept: URGENT CARE | Facility: CLINIC | Age: 63
End: 2023-09-04
Payer: COMMERCIAL

## 2023-09-04 VITALS
WEIGHT: 185 LBS | RESPIRATION RATE: 14 BRPM | BODY MASS INDEX: 31.58 KG/M2 | TEMPERATURE: 98 F | HEIGHT: 64 IN | DIASTOLIC BLOOD PRESSURE: 81 MMHG | HEART RATE: 87 BPM | OXYGEN SATURATION: 97 % | SYSTOLIC BLOOD PRESSURE: 123 MMHG

## 2023-09-04 DIAGNOSIS — R35.0 URINARY FREQUENCY: Primary | ICD-10-CM

## 2023-09-04 DIAGNOSIS — R05.9 COUGH, UNSPECIFIED TYPE: ICD-10-CM

## 2023-09-04 LAB
BILIRUB UR QL STRIP: NEGATIVE
GLUCOSE UR QL STRIP: NEGATIVE
KETONES UR QL STRIP: NEGATIVE
LEUKOCYTE ESTERASE UR QL STRIP: POSITIVE
PH, POC UA: 5 (ref 5–8)
POC BLOOD, URINE: POSITIVE
POC NITRATES, URINE: NEGATIVE
PROT UR QL STRIP: POSITIVE
SP GR UR STRIP: 1.02 (ref 1–1.03)
UROBILINOGEN UR STRIP-ACNC: NORMAL (ref 0.1–1.1)

## 2023-09-04 PROCEDURE — 81003 URINALYSIS AUTO W/O SCOPE: CPT | Mod: QW,S$GLB,, | Performed by: PHYSICIAN ASSISTANT

## 2023-09-04 PROCEDURE — 99213 PR OFFICE/OUTPT VISIT, EST, LEVL III, 20-29 MIN: ICD-10-PCS | Mod: S$GLB,,, | Performed by: PHYSICIAN ASSISTANT

## 2023-09-04 PROCEDURE — 87077 CULTURE AEROBIC IDENTIFY: CPT | Performed by: PHYSICIAN ASSISTANT

## 2023-09-04 PROCEDURE — 81003 POCT URINALYSIS, DIPSTICK, AUTOMATED, W/O SCOPE: ICD-10-PCS | Mod: QW,S$GLB,, | Performed by: PHYSICIAN ASSISTANT

## 2023-09-04 PROCEDURE — 87086 URINE CULTURE/COLONY COUNT: CPT | Performed by: PHYSICIAN ASSISTANT

## 2023-09-04 PROCEDURE — 99213 OFFICE O/P EST LOW 20 MIN: CPT | Mod: S$GLB,,, | Performed by: PHYSICIAN ASSISTANT

## 2023-09-04 PROCEDURE — 87186 SC STD MICRODIL/AGAR DIL: CPT | Performed by: PHYSICIAN ASSISTANT

## 2023-09-04 PROCEDURE — 87088 URINE BACTERIA CULTURE: CPT | Performed by: PHYSICIAN ASSISTANT

## 2023-09-04 RX ORDER — BENZONATATE 100 MG/1
200 CAPSULE ORAL 3 TIMES DAILY PRN
Qty: 30 CAPSULE | Refills: 1 | Status: SHIPPED | OUTPATIENT
Start: 2023-09-04 | End: 2023-09-14

## 2023-09-04 RX ORDER — NITROFURANTOIN 25; 75 MG/1; MG/1
100 CAPSULE ORAL 2 TIMES DAILY
Qty: 10 CAPSULE | Refills: 0 | Status: SHIPPED | OUTPATIENT
Start: 2023-09-04 | End: 2023-09-09

## 2023-09-04 NOTE — PROGRESS NOTES
"Subjective:      Patient ID: Elizabeth Giordano is a 62 y.o. female.    Vitals:  height is 5' 4" (1.626 m) and weight is 83.9 kg (185 lb). Her temperature is 98.1 °F (36.7 °C). Her blood pressure is 123/81 and her pulse is 87. Her respiration is 14 and oxygen saturation is 97%.     Chief Complaint: Urinary Tract Infection (Entered by patient)    Urinary Tract Infection   This is a new problem. The current episode started yesterday. The problem occurs every urination. The problem has been unchanged. The quality of the pain is described as burning. The pain is at a severity of 5/10. The pain is mild. There has been no fever. There is A history of pyelonephritis. Associated symptoms include frequency and urgency. Pertinent negatives include no behavior changes, chills, discharge, flank pain, hematuria, hesitancy, nausea, possible pregnancy, sweats, vomiting, weight loss, bubble bath use, constipation, rash or withholding. She has tried NSAIDs (AZOs) for the symptoms. The treatment provided no relief.       Constitution: Negative for chills, sweating, fatigue and fever.   HENT:  Negative for ear pain, drooling, congestion, sore throat, trouble swallowing and voice change.    Neck: Negative for neck pain, neck stiffness, painful lymph nodes and neck swelling.   Cardiovascular:  Negative for chest pain, leg swelling, palpitations, sob on exertion and passing out.   Eyes:  Negative for eye pain, eye redness, photophobia, double vision, blurred vision and eyelid swelling.   Respiratory:  Positive for cough. Negative for chest tightness, sputum production, bloody sputum, shortness of breath, stridor and wheezing.    Gastrointestinal:  Negative for abdominal pain, abdominal bloating, nausea, vomiting, constipation, diarrhea and heartburn.   Genitourinary:  Positive for dysuria, frequency and urgency. Negative for flank pain, hematuria, vaginal pain, vaginal discharge, vaginal bleeding, vaginal odor, painful " intercourse, genital sore and pelvic pain.   Musculoskeletal:  Negative for joint pain, joint swelling, abnormal ROM of joint, back pain, muscle cramps and muscle ache.   Skin:  Negative for rash and hives.   Allergic/Immunologic: Negative for seasonal allergies, food allergies, hives, itching and sneezing.   Neurological:  Negative for dizziness, light-headedness, passing out, facial drooping, speech difficulty, loss of balance, headaches, altered mental status, loss of consciousness and seizures.   Hematologic/Lymphatic: Negative for swollen lymph nodes.   Psychiatric/Behavioral:  Negative for altered mental status and nervous/anxious. The patient is not nervous/anxious.       Objective:     Physical Exam   Constitutional: She is oriented to person, place, and time. She appears well-developed. She is cooperative.  Non-toxic appearance. She does not appear ill. No distress.   HENT:   Head: Normocephalic and atraumatic.   Ears:   Right Ear: External ear normal.   Left Ear: External ear normal.   Nose: Nose normal. No nasal deformity. No epistaxis.   Mouth/Throat: Uvula is midline, oropharynx is clear and moist and mucous membranes are normal.   Eyes: Conjunctivae and lids are normal. No scleral icterus.   Neck: Trachea normal and phonation normal. Neck supple. No edema present. No erythema present. No neck rigidity present.   Cardiovascular: Normal rate, regular rhythm, normal heart sounds and normal pulses.   Pulmonary/Chest: Effort normal and breath sounds normal. No accessory muscle usage or stridor. No respiratory distress. She has no decreased breath sounds. She has no wheezes. She has no rhonchi. She has no rales.   Abdominal: Normal appearance and bowel sounds are normal. Soft. There is no abdominal tenderness. There is no rebound, no guarding, no left CVA tenderness and no right CVA tenderness.   Musculoskeletal: Normal range of motion.         General: No deformity. Normal range of motion.   Neurological:  She is alert and oriented to person, place, and time. She exhibits normal muscle tone. Coordination normal.   Skin: Skin is warm, dry, intact, not diaphoretic, not pale and no rash. Capillary refill takes less than 2 seconds.   Psychiatric: Her speech is normal and behavior is normal. Judgment and thought content normal.   Nursing note and vitals reviewed.      Results for orders placed or performed in visit on 09/04/23   POCT Urinalysis, Dipstick, Automated, W/O Scope   Result Value Ref Range    POC Blood, Urine Positive (A) Negative    POC Bilirubin, Urine Negative Negative    POC Urobilinogen, Urine Normal 0.1 - 1.1    POC Ketones, Urine Negative Negative    POC Protein, Urine Positive (A) Negative    POC Nitrates, Urine Negative Negative    POC Glucose, Urine Negative Negative    pH, UA 5.0 5 - 8    POC Specific Gravity, Urine 1.020 1.003 - 1.029    POC Leukocytes, Urine Positive (A) Negative     *Note: Due to a large number of results and/or encounters for the requested time period, some results have not been displayed. A complete set of results can be found in Results Review.     Assessment:     1. Urinary frequency    2. Cough, unspecified type        Plan:   Discussed UA results with patient. Will call with Urine Culture results. Discussed DDX and treatment options with patient - will go ahead and treat empirically with antibiotics due to patient's symptoms at this time. Advised close follow-up with PCP and/or OBGYN and/or Urology for further evaluation as needed. ER precautions given to patient as well. Patient aware, verbalized understanding and agreed with plan of care.    Urinary frequency  -     POCT Urinalysis, Dipstick, Automated, W/O Scope  -     Urine culture    Cough, unspecified type  -     benzonatate (TESSALON PERLES) 100 MG capsule; Take 2 capsules (200 mg total) by mouth 3 (three) times daily as needed for Cough.  Dispense: 30 capsule; Refill: 1    Other orders  -     nitrofurantoin,  macrocrystal-monohydrate, (MACROBID) 100 MG capsule; Take 1 capsule (100 mg total) by mouth 2 (two) times daily. for 5 days  Dispense: 10 capsule; Refill: 0    There are no Patient Instructions on file for this visit.

## 2023-09-06 LAB — BACTERIA UR CULT: ABNORMAL

## 2023-09-26 ENCOUNTER — OFFICE VISIT (OUTPATIENT)
Dept: PAIN MEDICINE | Facility: CLINIC | Age: 63
End: 2023-09-26
Payer: COMMERCIAL

## 2023-09-26 VITALS
HEIGHT: 64 IN | HEART RATE: 84 BPM | SYSTOLIC BLOOD PRESSURE: 144 MMHG | DIASTOLIC BLOOD PRESSURE: 65 MMHG | WEIGHT: 186.63 LBS | RESPIRATION RATE: 18 BRPM | BODY MASS INDEX: 31.86 KG/M2

## 2023-09-26 DIAGNOSIS — M51.36 DDD (DEGENERATIVE DISC DISEASE), LUMBAR: ICD-10-CM

## 2023-09-26 DIAGNOSIS — M54.12 CERVICAL RADICULOPATHY: ICD-10-CM

## 2023-09-26 DIAGNOSIS — M50.30 DDD (DEGENERATIVE DISC DISEASE), CERVICAL: ICD-10-CM

## 2023-09-26 DIAGNOSIS — M48.061 SPINAL STENOSIS OF LUMBAR REGION WITHOUT NEUROGENIC CLAUDICATION: ICD-10-CM

## 2023-09-26 DIAGNOSIS — M54.16 LUMBAR RADICULOPATHY: Primary | ICD-10-CM

## 2023-09-26 PROCEDURE — 3008F PR BODY MASS INDEX (BMI) DOCUMENTED: ICD-10-PCS | Mod: CPTII,S$GLB,, | Performed by: PHYSICIAN ASSISTANT

## 2023-09-26 PROCEDURE — 3008F BODY MASS INDEX DOCD: CPT | Mod: CPTII,S$GLB,, | Performed by: PHYSICIAN ASSISTANT

## 2023-09-26 PROCEDURE — 99999 PR PBB SHADOW E&M-EST. PATIENT-LVL V: ICD-10-PCS | Mod: PBBFAC,,, | Performed by: PHYSICIAN ASSISTANT

## 2023-09-26 PROCEDURE — 99999 PR PBB SHADOW E&M-EST. PATIENT-LVL V: CPT | Mod: PBBFAC,,, | Performed by: PHYSICIAN ASSISTANT

## 2023-09-26 PROCEDURE — 3078F DIAST BP <80 MM HG: CPT | Mod: CPTII,S$GLB,, | Performed by: PHYSICIAN ASSISTANT

## 2023-09-26 PROCEDURE — 3044F HG A1C LEVEL LT 7.0%: CPT | Mod: CPTII,S$GLB,, | Performed by: PHYSICIAN ASSISTANT

## 2023-09-26 PROCEDURE — 3044F PR MOST RECENT HEMOGLOBIN A1C LEVEL <7.0%: ICD-10-PCS | Mod: CPTII,S$GLB,, | Performed by: PHYSICIAN ASSISTANT

## 2023-09-26 PROCEDURE — 3077F SYST BP >= 140 MM HG: CPT | Mod: CPTII,S$GLB,, | Performed by: PHYSICIAN ASSISTANT

## 2023-09-26 PROCEDURE — 3077F PR MOST RECENT SYSTOLIC BLOOD PRESSURE >= 140 MM HG: ICD-10-PCS | Mod: CPTII,S$GLB,, | Performed by: PHYSICIAN ASSISTANT

## 2023-09-26 PROCEDURE — 4010F PR ACE/ARB THEARPY RXD/TAKEN: ICD-10-PCS | Mod: CPTII,S$GLB,, | Performed by: PHYSICIAN ASSISTANT

## 2023-09-26 PROCEDURE — 4010F ACE/ARB THERAPY RXD/TAKEN: CPT | Mod: CPTII,S$GLB,, | Performed by: PHYSICIAN ASSISTANT

## 2023-09-26 PROCEDURE — 99214 OFFICE O/P EST MOD 30 MIN: CPT | Mod: S$GLB,,, | Performed by: PHYSICIAN ASSISTANT

## 2023-09-26 PROCEDURE — 99214 PR OFFICE/OUTPT VISIT, EST, LEVL IV, 30-39 MIN: ICD-10-PCS | Mod: S$GLB,,, | Performed by: PHYSICIAN ASSISTANT

## 2023-09-26 PROCEDURE — 3078F PR MOST RECENT DIASTOLIC BLOOD PRESSURE < 80 MM HG: ICD-10-PCS | Mod: CPTII,S$GLB,, | Performed by: PHYSICIAN ASSISTANT

## 2023-09-26 RX ORDER — PREGABALIN 50 MG/1
50 CAPSULE ORAL 3 TIMES DAILY
Qty: 90 CAPSULE | Refills: 2 | Status: SHIPPED | OUTPATIENT
Start: 2023-09-26 | End: 2023-11-27 | Stop reason: SDUPTHER

## 2023-09-26 RX ORDER — TRAMADOL HYDROCHLORIDE 50 MG/1
TABLET ORAL
Qty: 60 TABLET | Refills: 2 | Status: SHIPPED | OUTPATIENT
Start: 2023-09-30 | End: 2023-11-27 | Stop reason: SDUPTHER

## 2023-09-26 NOTE — TELEPHONE ENCOUNTER
Pt seen in clinic, please send Rx. I have reviewed the [place] [employer] of [employer] website and there are no abberancies.

## 2023-09-26 NOTE — PROGRESS NOTES
CHIEF COMPLAINT/REASON FOR VISIT: low back pain, neck pain    History of present illness: The patient is a 62 year old woman with a history of migraines, carpal tunnel syndrome and low back pain since her early 20s.  She is post left L4/5 S1 transforaminal epidural steroid injection on 08/21/2023 with almost 100% relief.  She does have some pain in the left lateral thigh but this is very mild.  She no longer has back pain, left lateral hip pain or left knee pain.  She does report right neck pain radiating to the right shoulder and right upper arm, also associated with numbness.  She reports intermittent weakness in her right arm.  She denies bladder or bowel incontinence.    Pain intervention history:She is status post TFESI bilaterally to L3 on 12/6/2011 with no relief. Past history of status post L4/5 YARON on 5/25/11 with about 90% relief that lasted 3 weeks. She is status post 2 bilateral L3 transforaminal injections with 3 weeks relief each time.  She is status post L4/5 interlaminar epidural steroid injection on 1/8/14 with 90% relief.  She is status post L5/S1 interlaminar epidural steroid injection on 6/9/14 with moderate relief only on the right low back and right leg lasting 2 weeks.  She is status post bilateral L3/4 and L4/5 facet joint injections on 4/13/15 with initially 100% relief of her back pain and 80% relief of her left lateral hip and lateral thigh pain, now reporting at least 50% relief of both.  She is status post bilateral L2, 3 and 4 medial branch radiofrequency ablation on 5/20/15 with 60% relief.   She is status post bilateral L2, 3 and 4 medial branch radiofrequency ablation on 6/20/16 with 50-70% relief.  She is status post L5/S1 interlaminar epidural steroid injection on 3/10/17 with 50% relief.  She is status post bilateral L2, 3, 4 medial branch radiofrequency ablation on 7/17/17 with about 75% relief.  She is status post bilateral L2, 3, 4 medial branch radiofrequency ablation on  "07/17/2018 with 100% relief of her leg pain and 70-75% relief of her back pain.  She is status post C7-T1 cervical interlaminar epidural steroid injection on 09/25/2018 with 80% relief.   She is status post L5/S1 interlaminar epidural steroid injection on 12/27/2018 with 60% relief.  She is status post bilateral L2, 3 and 4 medial branch radiofrequency ablation on 07/29/2019 with 40% relief.  She is status post L5/S1 interlaminar epidural steroid injection on 09/04/2019 with almost 100% relief of her right leg pain but 0% relief for back pain. She is status post C7-T1 interlaminar epidural steroid injection on 10/24/2019 with 70% relief.  She is s/p L5/S1 IL YARON with 2 weeks of relief, now 0%.   She is post left L4/5 S1 transforaminal epidural steroid injection on 08/21/2023 with almost 100% relief.       Spine surgeries:    Antineuropathics: failed gabapentin, failed Cymbalta due to side effects  NSAIDs:  Ibuprofen  Physical therapy:  Antidepressants:  Muscle relaxers:  Opioids:  Tramadol 50 mg twice daily as needed  Antiplatelets/Anticoagulants:  ASA 81 mg    ROS: She reports back pain only.  Balance of review of systems is negative.    Medical, surgical, family and social history reviewed elsewhere in record.     Medications/Allergies: See med card      Vitals:    09/26/23 0739   BP: (!) 144/65   Pulse: 84   Resp: 18   Weight: 84.6 kg (186 lb 9.9 oz)   Height: 5' 4" (1.626 m)   PainSc:   5   PainLoc: Leg        PHYSICAL EXAM:  Gen: A and O x3, pleasant, well-groomed  HEENT: PERRLA  CVS: Regular rate and rhythm  Resp:  No obvious shortness of breath, no increased work of breathing  Musculoskeletal: No antalgic gait.     Neuro:  Upper extremities: 5/5 strength bilaterally   Lower extremities: 5/5 strength bilaterally  Reflexes: Brachioradialis 2+, Bicep 2+, Tricep 2+.   Patellar 3+, Achilles 2+.  Sensory: Intact and symmetrical to light touch and pinprick in C2-T1 dermatomes bilaterally.Intact and symmetrical to " light touch and pinprick in L2-S1 dermatomes bilaterally.    Cervical spine:   Range of motion is mildly reduced with flexion, extension lateral rotation with increased right neck pain during right lateral rotation and extension.    Myofascial exam: Mild tenderness to palpation to the right cervical paraspinous muscles.    Lumbar spine:  Lumbar spine: ROM is moderately limited with flexion and extension with increased bilateral low back pain during extension and oblique extension.  Supine straight leg raise is negative bilaterally.  Internal and external rotation of the hip causes no increased pain in either side.  Myofascial exam:  Mild tenderness to palpation to the right greater than left lumbar paraspinous muscles.      IMAGING:   MRI L-SPINE 5/10/11   IMPRESSION: AT L3-4, THERE IS CIRCUMFERENTIAL DISC PROTRUSION FOCALLY MORE PROMINENT TO THE LEFT AS WELL AS A SMALL DISC HERNIATION PROTRUDING INFERIORLY BEHIND L4 TO THE LEFT OF MIDLINE. THIS PRODUCES SPINAL CANAL AND BILATERAL NEURAL FORAMINAL STENOSIS, LEFT GREATER THAN RIGHT. AT L4-5, THERE IS CIRCUMFERENTIAL DISC PROTRUSION FOCALLY MORE PROMINENT TO THE RIGHT WITH MILD SPINAL CANAL AND RIGHT NEURAL FORAMINAL STENOSIS. AT L1-2 AND L2-3 ALTHOUGH THERE ARE DISC PROTRUSIONS IDENTIFIED, SIGNIFICANT STENOSIS IS NOT SEEN.     MRI lumbar spine 7/10/14  L1-2:There is chronic relatively advanced disc degeneration with disc space narrowing, disc dehydration, disc narrowing, endplate osteophytes, and degenerative vertebral endplate marrow change. There is a diffuse 2-mm posterior disc bulge with a superimposed4-mm right posterior disc extrusion causing mild right foraminal stenosis. There is no impingement of the right L1 nerve root and there has been no change.  L2-3: There is severe and chronic disc degeneration with severe disc space narrowing, disc dehydration, degenerative vertebral endplate marrow change, and vertebral endplate osteophytes. There is a diffuse 2-mm  posterior disc bulge with a superimposed 4-mm right posterior disc extrusion causing mild right foraminal stenosis. There is no impingement of the right L2 nerve root and there has been no significant change.  L3-4: There is severe disc degeneration which has progressed since the prior study. There is severe disc space narrowing, disc dehydration, degenerative vertebral endplate marrow change and endplate osteophytes. There is also mild posterior subluxation of L3over a distance of 4 mm. There is a broad-based 5-mm posterior disc extrusion. There is degenerative facet arthrosis with ligamentum flavum thickening. The combination of facet arthrosis and disc extrusion results in relatively severe spinal canal stenosis with compression of the thecal sac to an AP diameter of 5 mm, moderate right foraminal stenosis, and severe left foraminal stenosis. The spinal stenosis has also progressed since the prior study.  L4-5:There is a diffuse posterior disc bulge with a superimposed 3-mm left posterior paracentral disc protrusion causing left paracentral thecal sac compression. There is moderate left and mild right foraminal stenosis and there has been no significant change. There is mild degenerative facet arthrosis with ligamentum flavum thickening and small joint effusions.  L5-S1:There is no significant compromise of the spinal canal or foramina and there has been no change.    X-ray cervical spine 6/8/15  There is loss of normal cervical lordosis which may be positional or related muscular strain. Intervertebral disk height loss is noted at the C5-C6 C6-C7 levels and to a lesser degree C4-C5 and C7-T1 levels. Vertebral body alignment appears otherwise adequate. Vertebral body heights appear well-preserved. The atlas and odontoid appear in good relationship to each other. Osseous neuroforaminal narrowing is noted at the C3-C4 C4-C5 C5-C6 levels on the left and at the C4-C5 C5-C6 and C6-C7 levels on the right     07/10/2018  MRI cervical spine [place] [facility] report  C2-3 broad-based signal asymmetry at the left subarticular and foraminal zone reflecting implant spondylosis and disc bulge complex, mild to moderate left asymmetric foraminal narrowing, ectasia of the left vertebral artery, contralateral right asymmetric facet hypertrophy, right foramen widely narrowed, disc partially desiccated without collapse  C3-4 moderate to severe left greater than right foraminal narrowing secondary to uncinate joint hypertrophic signal alteration, disc partially desiccated  C4-5 endplate spondylosis moderate diffuse disc bulge noted, broad-based right paracentral disc herniation, asymmetric flattening of the ventral cord surface at the right paracentral zone, high-grade if lateral right foraminal narrowing, AP diameter of canal 9.9 mm, contralaterally, high grade left foraminal narrowing secondary to facet greater than uncinate joint hypertrophic signal alteration, disc desiccated and mildly narrowed  C5-6 disc space narrowing evident with generalized in Nigel spondylosis and concentric inter pose disc bulge complex, high-grade bilateral foraminal narrowing, flattening of the cord surface, AP diameter 7.9 mm, symmetric facet arthrosis, disc diffusely desiccated and narrowed  C6-7 moderate endplate spondylosis and concentric disc bulge complex, high-grade bilateral foraminal compromise, flattening of the anterior cord surface, AP diameter 8.8 mm, facet arthrosis symmetric, disc desiccated and narrowed  C7-T1 less than 2 mm depth disc bulge    11/06/2018 MRI lumbar spine  T12/L1: There is no evidence of disc protrusion, canal or foraminal stenosis.  L1/L2: Right posterolateral disc protrusion is evident and there is mild distortion of the right anterolateral canal.  Degenerative facet changes are noted bilaterally.  The left foramen is intact.  L2/L3: There is annular disc bulging with a superimposed right posterolateral disc extrusion.  This is  slightly more pronounced on the previous examination and there is mild effacement of the anterior thecal sac and moderate narrowing the right foramen.  L3/L4: There is annular disc bulge and osteophyte formation with a superimposed small left posterolateral disc extrusion.  There is moderate narrowing the central canal and left foramen.  This is little changed relative the previous examination.  Degenerative facet changes are noted.  L5/S1: There is a small central disc extrusion superimposed upon annular disc bulging.  This is slightly less pronounced than was seen previously.  Degenerative facet changes are noted.  L5/S1: Moderate degenerative facet changes are noted and there is mild effacement of the anterior thecal sac.  There is ligamentous hypertrophy particularly to the left in the posterior canal and there is left greater than right foraminal narrowing.  This is mildly worsened relative prior exam.      ASSESSMENT:  The patient is a 62 year old woman with a history of migraines, carpal tunnel syndrome and low back pain since her early 20s who returns in follow up.   1. Lumbar radiculopathy        2. DDD (degenerative disc disease), lumbar        3. Spinal stenosis of lumbar region without neurogenic claudication        4. DDD (degenerative disc disease), cervical        5. Cervical radiculopathy              Plan:  1. The patient had almost complete relief following the left L4/5 and L5/S1 transforaminal epidural steroid injection.  We discussed the amount of steroids she has had this year but if her pain becomes severe again we can repeat this for her before the end of the year.  We discussed that if she is not having lasting relief with injections I will have her see Neurosurgery but currently she is doing very well in terms of her low back and leg pain.  2. She has some worsening neck pain and right upper extremity pain and numbness.  We will avoid repeating an epidural steroid injection for now due to  the amount of steroid she has had this year.  3. She will continue her home exercise program.  4. Dr. Abdalla provided a prescription for tramadol 50 mg twice daily as needed and we will have her slowly increase her Lyrica to 50 mg twice a day and perhaps 3 times a day if she is able to tolerate.  If she has any side effects she will reduce back to nightly.  I have reviewed the [place] Board of Pharmacy website and there are no abberancies.    5. Follow-up in 3 months or sooner as needed.

## 2023-10-12 ENCOUNTER — OFFICE VISIT (OUTPATIENT)
Dept: DERMATOLOGY | Facility: CLINIC | Age: 63
End: 2023-10-12
Payer: COMMERCIAL

## 2023-10-12 DIAGNOSIS — L64.9 ANDROGENIC ALOPECIA: Primary | ICD-10-CM

## 2023-10-12 PROCEDURE — 3044F HG A1C LEVEL LT 7.0%: CPT | Mod: CPTII,95,, | Performed by: DERMATOLOGY

## 2023-10-12 PROCEDURE — 4010F ACE/ARB THERAPY RXD/TAKEN: CPT | Mod: CPTII,95,, | Performed by: DERMATOLOGY

## 2023-10-12 PROCEDURE — 99204 OFFICE O/P NEW MOD 45 MIN: CPT | Mod: 95,,, | Performed by: DERMATOLOGY

## 2023-10-12 PROCEDURE — 3044F PR MOST RECENT HEMOGLOBIN A1C LEVEL <7.0%: ICD-10-PCS | Mod: CPTII,95,, | Performed by: DERMATOLOGY

## 2023-10-12 PROCEDURE — 1160F PR REVIEW ALL MEDS BY PRESCRIBER/CLIN PHARMACIST DOCUMENTED: ICD-10-PCS | Mod: CPTII,95,, | Performed by: DERMATOLOGY

## 2023-10-12 PROCEDURE — 4010F PR ACE/ARB THEARPY RXD/TAKEN: ICD-10-PCS | Mod: CPTII,95,, | Performed by: DERMATOLOGY

## 2023-10-12 PROCEDURE — 1160F RVW MEDS BY RX/DR IN RCRD: CPT | Mod: CPTII,95,, | Performed by: DERMATOLOGY

## 2023-10-12 PROCEDURE — 1159F PR MEDICATION LIST DOCUMENTED IN MEDICAL RECORD: ICD-10-PCS | Mod: CPTII,95,, | Performed by: DERMATOLOGY

## 2023-10-12 PROCEDURE — 99204 PR OFFICE/OUTPT VISIT, NEW, LEVL IV, 45-59 MIN: ICD-10-PCS | Mod: 95,,, | Performed by: DERMATOLOGY

## 2023-10-12 PROCEDURE — 1159F MED LIST DOCD IN RCRD: CPT | Mod: CPTII,95,, | Performed by: DERMATOLOGY

## 2023-10-12 RX ORDER — FINASTERIDE 5 MG/1
5 TABLET, FILM COATED ORAL DAILY
Qty: 30 TABLET | Refills: 11 | Status: SHIPPED | OUTPATIENT
Start: 2023-10-12 | End: 2024-10-11

## 2023-10-13 NOTE — PATIENT INSTRUCTIONS
Counseled the patient that hair loss is a long-term problem the 1st goal will be maintenance of hair present the 2nd goal will be hair growth  Start Men's otc Rogaine or minoxidil  once daily to scalp 5% solution or foam  Use vaseline around hairline to prevent excessive hair growth  Encourage natural (chemical and heat-free) hair styles and limited styling products.  Counseling done on chronic nature of hair loss and that at least a 6 month trial must be done before stopping minoxidil  Pictures taken of hair loss today will compare through visit hx

## 2023-10-13 NOTE — PROGRESS NOTES
Patient Information  Name: Elizabeth Giordano  : 1960  MRN: [id number]     Referring Physician:  Dr. Zuñiga ref. provider found   Primary Care Physician:  CARRIE Raza MD   Date of Visit: 10/12/2023      Subjective:       Elizabeth Giordano is a 62 y.o. female who presents for No chief complaint on file.      Hair loss x years, more shedding and diffuse thinning, tried minoxidil in the past without relief        Patient was last seen in Dermatology: Visit date not found.    Prior notes by myself reviewed.   Clinical documentation obtained by nursing staff reviewed.    Review of Systems   Constitutional:  Negative for weight loss, weight gain, fatigue and malaise.   Genitourinary:  Negative for frequency.   Skin:  Positive for activity-related sunscreen use. Negative for itching, rash and daily sunscreen use.        Objective:    Physical Exam   Constitutional: She appears well-developed and well-nourished. No distress.   Neurological: She is alert and oriented to person, place, and time. She is not disoriented.   Psychiatric: She has a normal mood and affect.   Skin:   Areas Examined (abnormalities noted in diagram):   Scalp / Hair Palpated and Inspected  Head / Face Inspection Performed              Diagram Legend     Erythematous scaling macule/papule c/w actinic keratosis       Vascular papule c/w angioma      Pigmented verrucoid papule/plaque c/w seborrheic keratosis      Yellow umbilicated papule c/w sebaceous hyperplasia      Irregularly shaped tan macule c/w lentigo     1-2 mm smooth white papules consistent with Milia      Movable subcutaneous cyst with punctum c/w epidermal inclusion cyst      Subcutaneous movable cyst c/w pilar cyst      Firm pink to brown papule c/w dermatofibroma      Pedunculated fleshy papule(s) c/w skin tag(s)      Evenly pigmented macule c/w junctional nevus     Mildly variegated pigmented, slightly irregular-bordered macule c/w mildly atypical nevus       Flesh colored to evenly pigmented papule c/w intradermal nevus       Pink pearly papule/plaque c/w basal cell carcinoma      Erythematous hyperkeratotic cursted plaque c/w SCC      Surgical scar with no sign of skin cancer recurrence      Open and closed comedones      Inflammatory papules and pustules      Verrucoid papule consistent consistent with wart     Erythematous eczematous patches and plaques     Dystrophic onycholytic nail with subungual debris c/w onychomycosis     Umbilicated papule    Erythematous-base heme-crusted tan verrucoid plaque consistent with inflamed seborrheic keratosis     Erythematous Silvery Scaling Plaque c/w Psoriasis     See annotation              [] Data reviewed    [] Prior external notes reviewed    [] Independent review of test    [] Management discussed with another provider    [] Independent historian    Assessment / Plan:        Androgenic alopecia  -     finasteride (PROSCAR) 5 mg tablet; Take 1 tablet (5 mg total) by mouth once daily.  Dispense: 30 tablet; Refill: 11    Discussed benefits and risk of Propecia including but not limited sexual dysfunction (approx 1%). There is also a not-well understood syndrome that is very rare: Post-finasteride syndrome that can result in irreversible sexual dysfunction including low libido.     Counseled the patient that hair loss is a long-term problem the 1st goal will be maintenance of hair present the 2nd goal will be hair growth  Start Men's otc Rogaine or minoxidil  once daily to scalp 5% solution or foam  Use vaseline around hairline to prevent excessive hair growth  Encourage natural (chemical and heat-free) hair styles and limited styling products.  Counseling done on chronic nature of hair loss and that at least a 6 month trial must be done before stopping minoxidil  Pictures taken of hair loss today will compare through visit hx            The patient location is: [place]  The chief complaint leading to consultation is: hair  loss    Visit type: audiovisual    Face to Face time with patient: 6 minutes  15 minutes of total time spent on the encounter, which includes face to face time and non-face to face time preparing to see the patient (eg, review of tests), Obtaining and/or reviewing separately obtained history, Documenting clinical information in the electronic or other health record, Independently interpreting results (not separately reported) and communicating results to the patient/family/caregiver, or Care coordination (not separately reported).         Each patient to whom he or she provides medical services by telemedicine is:  (1) informed of the relationship between the physician and patient and the respective role of any other health care provider with respect to management of the patient; and (2) notified that he or she may decline to receive medical services by telemedicine and may withdraw from such care at any time.          LOS NUMBER AND COMPLEXITY OF PROBLEMS    COMPLEXITY OF DATA RISK TOTAL TIME (m)   11815  [id number] [] 1 self-limited or minor problem [] Minimal to none [] No treatment recommended or patient to monitor. Reassurance.  15-29  10-19   19268  39324 Low  [] 2 or more self limited or minor problems  [] 1 stable chronic illness  [] 1 acute, uncomplicated illness or injury Limited (2)  [] Prior external notes from each unique source  [] Review result of each unique test  [] Order each unique test  OR [] Independent historian Low  []  OTC medications   []  Discussed/Decision for minor skin surgery (no risk factors) 30-44  20-29   61562  92223 Moderate  []  1 or more chronic unstable illness (not at goal or progression or exacerbation) or SE of treatment  []  2 or more stable chronic illnesses  []  1 acute illness with systemic symptoms  []  1 acute complicated injury  []  1 undiagnosed new problem with uncertain prognosis Moderate (1/3 below)  []  3 or more data items        *Now includes independent  historian  []  Independent interpretation of a test  []  Discuss management/test with another provider Moderate  []  Prescription drug mgmt  []  Discussed/Decision for Minor surgery with risk factors  []  Mgmt limited by social determinates 45-59  30-39   81062  96188 High  []  1 or more chronic illness with severe exacerbation, progression or SE of treatment  []  1 acute or chronic illness/injury that poses a threat to life or bodily function Extensive (2/3 below)  []  3 or more data items        *Now includes independent historian.  []  Independent interpretation of a test  []  Discuss management/test with another provider High  []  Major surgery with risk discussed  []  Drug therapy requiring intensive monitoring for toxicity  []  Hospitalization  []  Decision for DNR 60-74  40-54

## 2023-10-20 ENCOUNTER — OFFICE VISIT (OUTPATIENT)
Dept: OPTOMETRY | Facility: CLINIC | Age: 63
End: 2023-10-20
Payer: COMMERCIAL

## 2023-10-20 DIAGNOSIS — H02.052 TRICHIASIS OF RIGHT LOWER EYELID: Primary | ICD-10-CM

## 2023-10-20 DIAGNOSIS — H25.041 POSTERIOR SUBCAPSULAR AGE-RELATED CATARACT OF RIGHT EYE: ICD-10-CM

## 2023-10-20 DIAGNOSIS — H52.203 MYOPIA WITH ASTIGMATISM AND PRESBYOPIA, BILATERAL: ICD-10-CM

## 2023-10-20 DIAGNOSIS — H52.13 MYOPIA WITH ASTIGMATISM AND PRESBYOPIA, BILATERAL: ICD-10-CM

## 2023-10-20 DIAGNOSIS — H10.13 ALLERGIC CONJUNCTIVITIS, BILATERAL: ICD-10-CM

## 2023-10-20 DIAGNOSIS — H25.13 AGE-RELATED NUCLEAR CATARACT, BILATERAL: ICD-10-CM

## 2023-10-20 DIAGNOSIS — H04.201 RIGHT EPIPHORA: ICD-10-CM

## 2023-10-20 DIAGNOSIS — H52.4 MYOPIA WITH ASTIGMATISM AND PRESBYOPIA, BILATERAL: ICD-10-CM

## 2023-10-20 PROCEDURE — 92015 PR REFRACTION: ICD-10-PCS | Mod: S$GLB,,,

## 2023-10-20 PROCEDURE — 3044F HG A1C LEVEL LT 7.0%: CPT | Mod: CPTII,S$GLB,,

## 2023-10-20 PROCEDURE — 1159F PR MEDICATION LIST DOCUMENTED IN MEDICAL RECORD: ICD-10-PCS | Mod: CPTII,S$GLB,,

## 2023-10-20 PROCEDURE — 4010F ACE/ARB THERAPY RXD/TAKEN: CPT | Mod: CPTII,S$GLB,,

## 2023-10-20 PROCEDURE — 3044F PR MOST RECENT HEMOGLOBIN A1C LEVEL <7.0%: ICD-10-PCS | Mod: CPTII,S$GLB,,

## 2023-10-20 PROCEDURE — 92004 PR EYE EXAM, NEW PATIENT,COMPREHESV: ICD-10-PCS | Mod: 25,S$GLB,,

## 2023-10-20 PROCEDURE — 1159F MED LIST DOCD IN RCRD: CPT | Mod: CPTII,S$GLB,,

## 2023-10-20 PROCEDURE — 99999 PR PBB SHADOW E&M-EST. PATIENT-LVL IV: ICD-10-PCS | Mod: PBBFAC,,,

## 2023-10-20 PROCEDURE — 67820 REVISE EYELASHES: CPT | Mod: E4,S$GLB,,

## 2023-10-20 PROCEDURE — 4010F PR ACE/ARB THEARPY RXD/TAKEN: ICD-10-PCS | Mod: CPTII,S$GLB,,

## 2023-10-20 PROCEDURE — 99999 PR PBB SHADOW E&M-EST. PATIENT-LVL IV: CPT | Mod: PBBFAC,,,

## 2023-10-20 PROCEDURE — 92004 COMPRE OPH EXAM NEW PT 1/>: CPT | Mod: 25,S$GLB,,

## 2023-10-20 PROCEDURE — 67820 PR REVISE EYELASHES,FORCEPS: ICD-10-PCS | Mod: E4,S$GLB,,

## 2023-10-20 PROCEDURE — 92015 DETERMINE REFRACTIVE STATE: CPT | Mod: S$GLB,,,

## 2023-10-20 NOTE — PROGRESS NOTES
"HPI    Referral - Pt here for office visit BLAYNE 10/2021 (@ [facility])    Pt complains of blurred, irritation, eye lid feels irritation and watering   in OD, symptoms started 1 year ago. Pt notices floaters but denies   flashes. Pt denies using drops. HTN controlled w aid  Last edited by Heydi Conrad on 10/20/2023  9:30 AM.            Assessment /Plan     For exam results, see Encounter Report.    Trichiasis of right lower eyelid    Posterior subcapsular age-related cataract of right eye  -     Ambulatory referral/consult to Ophthalmology; Future; Expected date: 11/07/2023    Age-related nuclear cataract, bilateral    Allergic conjunctivitis, bilateral    Right epiphora    Myopia with astigmatism and presbyopia, bilateral      Trichiasis of right lower lid x 3. Ed pt on findings and successfully removed at the slit lamp using topical proparacaine and forceps. Pt tolerated well and got relief. Pt reports history of similar episode x 6 months ago. Ed pt to RTC if symptoms worsen or fail to resolve.     2-3. 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 Ed pt on findings and that PSC likely contributing to appearance of "film" over vision. Pt reports longstanding history of Flonase, possible steroidal etiology. BCVA 20/30. Discussed option for surgical consult vs new spec rx and monitoring. Pt elected to monitor. Ed pt to RTC if symptoms worsen; otherwise, recheck in 1 year.  -Pt messaged 10/31/23 stating that she would like cataract consult. Referral placed.     4-5. Longstanding history of epiphora OD. Ed pt on findings and discussed likelihood of both trichiasis and allergic conjunctivitis contributing to symptoms. Advised pt begin use of allergy drops QD-BID. Ed pt to RTC if symptoms worsen or fail to resolve. May benefit from dilation and irrigation.    6. Myopic shift OD secondary to PSC. Discussed spectacle options with pt and released final spec rx. Ed pt on change in rx OD>>OS and adaptation.    RTC: 1 year for comprehensive " exam or sooner prn

## 2023-10-22 DIAGNOSIS — G43.719 INTRACTABLE CHRONIC MIGRAINE WITHOUT AURA AND WITHOUT STATUS MIGRAINOSUS: ICD-10-CM

## 2023-10-23 RX ORDER — GALCANEZUMAB 120 MG/ML
120 INJECTION, SOLUTION SUBCUTANEOUS
Qty: 1 ML | Refills: 1 | Status: SHIPPED | OUTPATIENT
Start: 2023-10-23 | End: 2023-12-17 | Stop reason: SDUPTHER

## 2023-10-31 ENCOUNTER — PATIENT MESSAGE (OUTPATIENT)
Dept: OPTOMETRY | Facility: CLINIC | Age: 63
End: 2023-10-31
Payer: COMMERCIAL

## 2023-11-08 ENCOUNTER — TELEPHONE (OUTPATIENT)
Dept: OPHTHALMOLOGY | Facility: CLINIC | Age: 63
End: 2023-11-08
Payer: COMMERCIAL

## 2023-11-08 ENCOUNTER — PATIENT MESSAGE (OUTPATIENT)
Dept: PAIN MEDICINE | Facility: CLINIC | Age: 63
End: 2023-11-08
Payer: COMMERCIAL

## 2023-11-08 DIAGNOSIS — M54.16 LUMBAR RADICULOPATHY: Primary | ICD-10-CM

## 2023-11-08 DIAGNOSIS — M48.061 SPINAL STENOSIS OF LUMBAR REGION WITHOUT NEUROGENIC CLAUDICATION: ICD-10-CM

## 2023-11-13 ENCOUNTER — OFFICE VISIT (OUTPATIENT)
Dept: FAMILY MEDICINE | Facility: CLINIC | Age: 63
End: 2023-11-13
Payer: COMMERCIAL

## 2023-11-13 VITALS
BODY MASS INDEX: 31.99 KG/M2 | HEIGHT: 64 IN | WEIGHT: 187.38 LBS | SYSTOLIC BLOOD PRESSURE: 118 MMHG | OXYGEN SATURATION: 96 % | DIASTOLIC BLOOD PRESSURE: 62 MMHG | HEART RATE: 76 BPM

## 2023-11-13 DIAGNOSIS — Z12.11 COLON CANCER SCREENING: Primary | ICD-10-CM

## 2023-11-13 DIAGNOSIS — I25.10 CORONARY ARTERY DISEASE, UNSPECIFIED VESSEL OR LESION TYPE, UNSPECIFIED WHETHER ANGINA PRESENT, UNSPECIFIED WHETHER NATIVE OR TRANSPLANTED HEART: ICD-10-CM

## 2023-11-13 DIAGNOSIS — F51.01 PRIMARY INSOMNIA: ICD-10-CM

## 2023-11-13 DIAGNOSIS — Z23 NEED FOR VACCINATION: ICD-10-CM

## 2023-11-13 DIAGNOSIS — Z00.00 WELLNESS EXAMINATION: Chronic | ICD-10-CM

## 2023-11-13 DIAGNOSIS — I10 ESSENTIAL HYPERTENSION: ICD-10-CM

## 2023-11-13 PROCEDURE — 3074F SYST BP LT 130 MM HG: CPT | Mod: CPTII,S$GLB,, | Performed by: FAMILY MEDICINE

## 2023-11-13 PROCEDURE — 90677 PCV20 VACCINE IM: CPT | Mod: S$GLB,,, | Performed by: FAMILY MEDICINE

## 2023-11-13 PROCEDURE — 99999 PR PBB SHADOW E&M-EST. PATIENT-LVL V: CPT | Mod: PBBFAC,,, | Performed by: FAMILY MEDICINE

## 2023-11-13 PROCEDURE — 3078F DIAST BP <80 MM HG: CPT | Mod: CPTII,S$GLB,, | Performed by: FAMILY MEDICINE

## 2023-11-13 PROCEDURE — 4010F ACE/ARB THERAPY RXD/TAKEN: CPT | Mod: CPTII,S$GLB,, | Performed by: FAMILY MEDICINE

## 2023-11-13 PROCEDURE — 3044F PR MOST RECENT HEMOGLOBIN A1C LEVEL <7.0%: ICD-10-PCS | Mod: CPTII,S$GLB,, | Performed by: FAMILY MEDICINE

## 2023-11-13 PROCEDURE — 3008F PR BODY MASS INDEX (BMI) DOCUMENTED: ICD-10-PCS | Mod: CPTII,S$GLB,, | Performed by: FAMILY MEDICINE

## 2023-11-13 PROCEDURE — 99396 PREV VISIT EST AGE 40-64: CPT | Mod: 25,S$GLB,, | Performed by: FAMILY MEDICINE

## 2023-11-13 PROCEDURE — 99999 PR PBB SHADOW E&M-EST. PATIENT-LVL V: ICD-10-PCS | Mod: PBBFAC,,, | Performed by: FAMILY MEDICINE

## 2023-11-13 PROCEDURE — 99396 PR PREVENTIVE VISIT,EST,40-64: ICD-10-PCS | Mod: 25,S$GLB,, | Performed by: FAMILY MEDICINE

## 2023-11-13 PROCEDURE — 3074F PR MOST RECENT SYSTOLIC BLOOD PRESSURE < 130 MM HG: ICD-10-PCS | Mod: CPTII,S$GLB,, | Performed by: FAMILY MEDICINE

## 2023-11-13 PROCEDURE — 90471 IMMUNIZATION ADMIN: CPT | Mod: S$GLB,,, | Performed by: FAMILY MEDICINE

## 2023-11-13 PROCEDURE — 3008F BODY MASS INDEX DOCD: CPT | Mod: CPTII,S$GLB,, | Performed by: FAMILY MEDICINE

## 2023-11-13 PROCEDURE — 3078F PR MOST RECENT DIASTOLIC BLOOD PRESSURE < 80 MM HG: ICD-10-PCS | Mod: CPTII,S$GLB,, | Performed by: FAMILY MEDICINE

## 2023-11-13 PROCEDURE — 90677 PNEUMOCOCCAL CONJUGATE VACCINE 20-VALENT: ICD-10-PCS | Mod: S$GLB,,, | Performed by: FAMILY MEDICINE

## 2023-11-13 PROCEDURE — 90471 PNEUMOCOCCAL CONJUGATE VACCINE 20-VALENT: ICD-10-PCS | Mod: S$GLB,,, | Performed by: FAMILY MEDICINE

## 2023-11-13 PROCEDURE — 4010F PR ACE/ARB THEARPY RXD/TAKEN: ICD-10-PCS | Mod: CPTII,S$GLB,, | Performed by: FAMILY MEDICINE

## 2023-11-13 PROCEDURE — 3044F HG A1C LEVEL LT 7.0%: CPT | Mod: CPTII,S$GLB,, | Performed by: FAMILY MEDICINE

## 2023-11-13 RX ORDER — ATORVASTATIN CALCIUM 10 MG/1
10 TABLET, FILM COATED ORAL DAILY
Qty: 90 TABLET | Refills: 2 | Status: SHIPPED | OUTPATIENT
Start: 2023-11-13

## 2023-11-13 RX ORDER — AMLODIPINE BESYLATE 5 MG/1
5 TABLET ORAL DAILY
Qty: 90 TABLET | Refills: 2 | Status: SHIPPED | OUTPATIENT
Start: 2023-11-13

## 2023-11-13 RX ORDER — LOSARTAN POTASSIUM 100 MG/1
100 TABLET ORAL DAILY
Qty: 90 TABLET | Refills: 3 | Status: SHIPPED | OUTPATIENT
Start: 2023-11-13

## 2023-11-13 RX ORDER — ESZOPICLONE 3 MG/1
3 TABLET, FILM COATED ORAL NIGHTLY
Qty: 90 TABLET | Refills: 1 | Status: SHIPPED | OUTPATIENT
Start: 2023-11-13

## 2023-11-13 NOTE — PROGRESS NOTES
Subjective:       Patient ID: Elizabeth Giordano is a 63 y.o. female.    Chief Complaint: Annual Exam    Pt is known to me.  The pt presents for annual wellness exam.  The pt recently was started on Proscar and Rogaine for hair loss.  She is tolerating it well.  The pt is seeing Dr. Abdalla for chronic back pain.  Her migraines are under good control.   Discussed health maintenance with pt and will schedule appropriate studies and/or immunizations.        Review of Systems   Constitutional:  Negative for appetite change, fatigue, fever and unexpected weight change.   HENT:  Negative for congestion, hearing loss and trouble swallowing.    Eyes:  Negative for pain, redness and visual disturbance.   Respiratory:  Negative for cough, chest tightness and shortness of breath.    Cardiovascular:  Negative for chest pain, palpitations and leg swelling.   Gastrointestinal:  Negative for abdominal pain, blood in stool, constipation, diarrhea and nausea.   Genitourinary:  Negative for difficulty urinating, dysuria and flank pain.   Musculoskeletal:  Negative for arthralgias, back pain and gait problem.   Neurological:  Negative for numbness and headaches.   Psychiatric/Behavioral:  Negative for behavioral problems and confusion.        Objective:      Physical Exam  Vitals and nursing note reviewed.   Constitutional:       Appearance: She is well-developed. She is obese. She is not ill-appearing.   HENT:      Head: Normocephalic.   Eyes:      Conjunctiva/sclera: Conjunctivae normal.      Pupils: Pupils are equal, round, and reactive to light.   Neck:      Thyroid: No thyromegaly.      Vascular: No carotid bruit.   Cardiovascular:      Rate and Rhythm: Normal rate and regular rhythm.      Pulses: Normal pulses.      Heart sounds: Normal heart sounds.   Pulmonary:      Effort: Pulmonary effort is normal.      Breath sounds: Normal breath sounds.   Abdominal:      General: Bowel sounds are normal.      Palpations: Abdomen  is soft.      Tenderness: There is no abdominal tenderness.   Musculoskeletal:         General: No tenderness or deformity. Normal range of motion.      Cervical back: Normal range of motion and neck supple.      Right lower leg: No edema.      Left lower leg: No edema.   Lymphadenopathy:      Cervical: No cervical adenopathy.   Skin:     General: Skin is warm and dry.   Neurological:      Mental Status: She is alert and oriented to person, place, and time.      Cranial Nerves: No cranial nerve deficit.      Motor: No abnormal muscle tone.      Coordination: Coordination normal.      Deep Tendon Reflexes: Reflexes normal.   Psychiatric:         Behavior: Behavior normal.         Assessment:       1. Colon cancer screening    2. Need for vaccination    3. Essential hypertension    4. Primary insomnia    5. Coronary artery disease, unspecified vessel or lesion type, unspecified whether angina present, unspecified whether native or transplanted heart    6. Wellness examination        Plan:       Elizabeth was seen today for annual exam.    Diagnoses and all orders for this visit:    Colon cancer screening  -     Fecal Immunochemical Test (iFOBT); Future  -     Mammo Digital Screening Bilat w/ Avelino; Future    Need for vaccination  -     (In Office Administered) Pneumococcal Conjugate Vaccine (20 Valent) (IM) (Preferred)    Essential hypertension  -     losartan (COZAAR) 100 MG tablet; Take 1 tablet (100 mg total) by mouth once daily.  -     amLODIPine (NORVASC) 5 MG tablet; Take 1 tablet (5 mg total) by mouth once daily.    Primary insomnia  -     eszopiclone (LUNESTA) 3 mg Tab; Take 1 tablet (3 mg total) by mouth every evening.    Coronary artery disease, unspecified vessel or lesion type, unspecified whether angina present, unspecified whether native or transplanted heart  -     atorvastatin (LIPITOR) 10 MG tablet; Take 1 tablet (10 mg total) by mouth once daily.    Wellness examination  -     CBC Auto Differential;  Future  -     Comprehensive Metabolic Panel; Future  -     Hemoglobin A1C; Future  -     Lipid Panel; Future  -     TSH; Future      During this visit, I reviewed the pt's history, medications, allergies, and problem list.

## 2023-11-27 ENCOUNTER — TELEPHONE (OUTPATIENT)
Dept: PAIN MEDICINE | Facility: CLINIC | Age: 63
End: 2023-11-27

## 2023-11-27 ENCOUNTER — OFFICE VISIT (OUTPATIENT)
Dept: PAIN MEDICINE | Facility: CLINIC | Age: 63
End: 2023-11-27
Payer: COMMERCIAL

## 2023-11-27 ENCOUNTER — LAB VISIT (OUTPATIENT)
Dept: LAB | Facility: HOSPITAL | Age: 63
End: 2023-11-27
Attending: FAMILY MEDICINE
Payer: COMMERCIAL

## 2023-11-27 VITALS
HEART RATE: 73 BPM | DIASTOLIC BLOOD PRESSURE: 62 MMHG | WEIGHT: 185.06 LBS | SYSTOLIC BLOOD PRESSURE: 125 MMHG | BODY MASS INDEX: 31.59 KG/M2 | HEIGHT: 64 IN

## 2023-11-27 DIAGNOSIS — M51.36 DDD (DEGENERATIVE DISC DISEASE), LUMBAR: ICD-10-CM

## 2023-11-27 DIAGNOSIS — M48.061 SPINAL STENOSIS OF LUMBAR REGION WITHOUT NEUROGENIC CLAUDICATION: ICD-10-CM

## 2023-11-27 DIAGNOSIS — M54.16 LUMBAR RADICULOPATHY: Primary | ICD-10-CM

## 2023-11-27 DIAGNOSIS — Z00.00 WELLNESS EXAMINATION: Chronic | ICD-10-CM

## 2023-11-27 LAB
ALBUMIN SERPL BCP-MCNC: 4 G/DL (ref 3.5–5.2)
ALP SERPL-CCNC: 45 U/L (ref 55–135)
ALT SERPL W/O P-5'-P-CCNC: 14 U/L (ref 10–44)
ANION GAP SERPL CALC-SCNC: 8 MMOL/L (ref 8–16)
AST SERPL-CCNC: 21 U/L (ref 10–40)
BASOPHILS # BLD AUTO: 0.04 K/UL (ref 0–0.2)
BASOPHILS NFR BLD: 0.8 % (ref 0–1.9)
BILIRUB SERPL-MCNC: 0.6 MG/DL (ref 0.1–1)
BUN SERPL-MCNC: 14 MG/DL (ref 8–23)
CALCIUM SERPL-MCNC: 9.2 MG/DL (ref 8.7–10.5)
CHLORIDE SERPL-SCNC: 107 MMOL/L (ref 95–110)
CHOLEST SERPL-MCNC: 156 MG/DL (ref 120–199)
CHOLEST/HDLC SERPL: 2.4 {RATIO} (ref 2–5)
CO2 SERPL-SCNC: 27 MMOL/L (ref 23–29)
CREAT SERPL-MCNC: 1 MG/DL (ref 0.5–1.4)
DIFFERENTIAL METHOD: ABNORMAL
EOSINOPHIL # BLD AUTO: 0.1 K/UL (ref 0–0.5)
EOSINOPHIL NFR BLD: 2 % (ref 0–8)
ERYTHROCYTE [DISTWIDTH] IN BLOOD BY AUTOMATED COUNT: 11.7 % (ref 11.5–14.5)
EST. GFR  (NO RACE VARIABLE): >60 ML/MIN/1.73 M^2
ESTIMATED AVG GLUCOSE: 103 MG/DL (ref 68–131)
GLUCOSE SERPL-MCNC: 98 MG/DL (ref 70–110)
HBA1C MFR BLD: 5.2 % (ref 4–5.6)
HCT VFR BLD AUTO: 36.5 % (ref 37–48.5)
HDLC SERPL-MCNC: 64 MG/DL (ref 40–75)
HDLC SERPL: 41 % (ref 20–50)
HGB BLD-MCNC: 12.3 G/DL (ref 12–16)
IMM GRANULOCYTES # BLD AUTO: 0.01 K/UL (ref 0–0.04)
IMM GRANULOCYTES NFR BLD AUTO: 0.2 % (ref 0–0.5)
LDLC SERPL CALC-MCNC: 77.6 MG/DL (ref 63–159)
LYMPHOCYTES # BLD AUTO: 1.6 K/UL (ref 1–4.8)
LYMPHOCYTES NFR BLD: 32.7 % (ref 18–48)
MCH RBC QN AUTO: 33 PG (ref 27–31)
MCHC RBC AUTO-ENTMCNC: 33.7 G/DL (ref 32–36)
MCV RBC AUTO: 98 FL (ref 82–98)
MONOCYTES # BLD AUTO: 0.4 K/UL (ref 0.3–1)
MONOCYTES NFR BLD: 8.8 % (ref 4–15)
NEUTROPHILS # BLD AUTO: 2.8 K/UL (ref 1.8–7.7)
NEUTROPHILS NFR BLD: 55.5 % (ref 38–73)
NONHDLC SERPL-MCNC: 92 MG/DL
NRBC BLD-RTO: 0 /100 WBC
PLATELET # BLD AUTO: 197 K/UL (ref 150–450)
PMV BLD AUTO: 11.3 FL (ref 9.2–12.9)
POTASSIUM SERPL-SCNC: 3.8 MMOL/L (ref 3.5–5.1)
PROT SERPL-MCNC: 6.7 G/DL (ref 6–8.4)
RBC # BLD AUTO: 3.73 M/UL (ref 4–5.4)
SODIUM SERPL-SCNC: 142 MMOL/L (ref 136–145)
TRIGL SERPL-MCNC: 72 MG/DL (ref 30–150)
TSH SERPL DL<=0.005 MIU/L-ACNC: 0.56 UIU/ML (ref 0.4–4)
WBC # BLD AUTO: 5.02 K/UL (ref 3.9–12.7)

## 2023-11-27 PROCEDURE — 36415 COLL VENOUS BLD VENIPUNCTURE: CPT | Mod: PO | Performed by: FAMILY MEDICINE

## 2023-11-27 PROCEDURE — 3044F HG A1C LEVEL LT 7.0%: CPT | Mod: CPTII,S$GLB,, | Performed by: PHYSICIAN ASSISTANT

## 2023-11-27 PROCEDURE — 80053 COMPREHEN METABOLIC PANEL: CPT | Performed by: FAMILY MEDICINE

## 2023-11-27 PROCEDURE — 80061 LIPID PANEL: CPT | Performed by: FAMILY MEDICINE

## 2023-11-27 PROCEDURE — 3078F DIAST BP <80 MM HG: CPT | Mod: CPTII,S$GLB,, | Performed by: PHYSICIAN ASSISTANT

## 2023-11-27 PROCEDURE — 3074F SYST BP LT 130 MM HG: CPT | Mod: CPTII,S$GLB,, | Performed by: PHYSICIAN ASSISTANT

## 2023-11-27 PROCEDURE — 3074F PR MOST RECENT SYSTOLIC BLOOD PRESSURE < 130 MM HG: ICD-10-PCS | Mod: CPTII,S$GLB,, | Performed by: PHYSICIAN ASSISTANT

## 2023-11-27 PROCEDURE — 3008F PR BODY MASS INDEX (BMI) DOCUMENTED: ICD-10-PCS | Mod: CPTII,S$GLB,, | Performed by: PHYSICIAN ASSISTANT

## 2023-11-27 PROCEDURE — 1160F RVW MEDS BY RX/DR IN RCRD: CPT | Mod: CPTII,S$GLB,, | Performed by: PHYSICIAN ASSISTANT

## 2023-11-27 PROCEDURE — 1159F PR MEDICATION LIST DOCUMENTED IN MEDICAL RECORD: ICD-10-PCS | Mod: CPTII,S$GLB,, | Performed by: PHYSICIAN ASSISTANT

## 2023-11-27 PROCEDURE — 3044F PR MOST RECENT HEMOGLOBIN A1C LEVEL <7.0%: ICD-10-PCS | Mod: CPTII,S$GLB,, | Performed by: PHYSICIAN ASSISTANT

## 2023-11-27 PROCEDURE — 84443 ASSAY THYROID STIM HORMONE: CPT | Performed by: FAMILY MEDICINE

## 2023-11-27 PROCEDURE — 1160F PR REVIEW ALL MEDS BY PRESCRIBER/CLIN PHARMACIST DOCUMENTED: ICD-10-PCS | Mod: CPTII,S$GLB,, | Performed by: PHYSICIAN ASSISTANT

## 2023-11-27 PROCEDURE — 83036 HEMOGLOBIN GLYCOSYLATED A1C: CPT | Performed by: FAMILY MEDICINE

## 2023-11-27 PROCEDURE — 4010F PR ACE/ARB THEARPY RXD/TAKEN: ICD-10-PCS | Mod: CPTII,S$GLB,, | Performed by: PHYSICIAN ASSISTANT

## 2023-11-27 PROCEDURE — 1159F MED LIST DOCD IN RCRD: CPT | Mod: CPTII,S$GLB,, | Performed by: PHYSICIAN ASSISTANT

## 2023-11-27 PROCEDURE — 85025 COMPLETE CBC W/AUTO DIFF WBC: CPT | Performed by: FAMILY MEDICINE

## 2023-11-27 PROCEDURE — 99214 PR OFFICE/OUTPT VISIT, EST, LEVL IV, 30-39 MIN: ICD-10-PCS | Mod: S$GLB,,, | Performed by: PHYSICIAN ASSISTANT

## 2023-11-27 PROCEDURE — 3008F BODY MASS INDEX DOCD: CPT | Mod: CPTII,S$GLB,, | Performed by: PHYSICIAN ASSISTANT

## 2023-11-27 PROCEDURE — 3078F PR MOST RECENT DIASTOLIC BLOOD PRESSURE < 80 MM HG: ICD-10-PCS | Mod: CPTII,S$GLB,, | Performed by: PHYSICIAN ASSISTANT

## 2023-11-27 PROCEDURE — 99999 PR PBB SHADOW E&M-EST. PATIENT-LVL IV: CPT | Mod: PBBFAC,,, | Performed by: PHYSICIAN ASSISTANT

## 2023-11-27 PROCEDURE — 99214 OFFICE O/P EST MOD 30 MIN: CPT | Mod: S$GLB,,, | Performed by: PHYSICIAN ASSISTANT

## 2023-11-27 PROCEDURE — 4010F ACE/ARB THERAPY RXD/TAKEN: CPT | Mod: CPTII,S$GLB,, | Performed by: PHYSICIAN ASSISTANT

## 2023-11-27 PROCEDURE — 99999 PR PBB SHADOW E&M-EST. PATIENT-LVL IV: ICD-10-PCS | Mod: PBBFAC,,, | Performed by: PHYSICIAN ASSISTANT

## 2023-11-27 RX ORDER — TRAMADOL HYDROCHLORIDE 50 MG/1
TABLET ORAL
Qty: 60 TABLET | Refills: 2 | Status: SHIPPED | OUTPATIENT
Start: 2023-11-27 | End: 2024-03-25 | Stop reason: SDUPTHER

## 2023-11-27 RX ORDER — PREGABALIN 50 MG/1
50 CAPSULE ORAL 3 TIMES DAILY
Qty: 90 CAPSULE | Refills: 2 | Status: SHIPPED | OUTPATIENT
Start: 2023-12-24 | End: 2024-03-25 | Stop reason: SDUPTHER

## 2023-11-27 NOTE — TELEPHONE ENCOUNTER
Physician - Dr Abdalla    Type of Procedure/Injection - Lumbar Transforaminal Epidural  L4/5 and L5/S1           Laterality - Right      Anxiolysis- Local      Need to hold medication - Yes      Aspirin for 7 days and NSAIDs for 2 days        Clearance needed - Yes      Follow up - 4 week

## 2023-11-27 NOTE — TELEPHONE ENCOUNTER
Patient seen in clinic.  Please send prescriptions to her pharmacy.  I have reviewed the [place] [employer] website and there are no abberancies.

## 2023-11-27 NOTE — PROGRESS NOTES
CHIEF COMPLAINT/REASON FOR VISIT: low back pain, neck pain    History of present illness: The patient is a 63 year old woman with a history of migraines, carpal tunnel syndrome and low back pain since her early 20s.  She returns in follow up today with worsening low back and right leg pain.  She describes pain through the buttock, posterior thigh, right lateral calf and intermittent numbness in her foot.  She reports intermittent weakness in her right leg as well.  By noon her pain is severe especially at work where she is on her feet or sitting for extended periods of time.     Pain intervention history:She is status post TFESI bilaterally to L3 on 12/6/2011 with no relief. Past history of status post L4/5 YARON on 5/25/11 with about 90% relief that lasted 3 weeks. She is status post 2 bilateral L3 transforaminal injections with 3 weeks relief each time.  She is status post L4/5 interlaminar epidural steroid injection on 1/8/14 with 90% relief.  She is status post L5/S1 interlaminar epidural steroid injection on 6/9/14 with moderate relief only on the right low back and right leg lasting 2 weeks.  She is status post bilateral L3/4 and L4/5 facet joint injections on 4/13/15 with initially 100% relief of her back pain and 80% relief of her left lateral hip and lateral thigh pain, now reporting at least 50% relief of both.  She is status post bilateral L2, 3 and 4 medial branch radiofrequency ablation on 5/20/15 with 60% relief.   She is status post bilateral L2, 3 and 4 medial branch radiofrequency ablation on 6/20/16 with 50-70% relief.  She is status post L5/S1 interlaminar epidural steroid injection on 3/10/17 with 50% relief.  She is status post bilateral L2, 3, 4 medial branch radiofrequency ablation on 7/17/17 with about 75% relief.  She is status post bilateral L2, 3, 4 medial branch radiofrequency ablation on 07/17/2018 with 100% relief of her leg pain and 70-75% relief of her back pain.  She is status post  "C7-T1 cervical interlaminar epidural steroid injection on 09/25/2018 with 80% relief.   She is status post L5/S1 interlaminar epidural steroid injection on 12/27/2018 with 60% relief.  She is status post bilateral L2, 3 and 4 medial branch radiofrequency ablation on 07/29/2019 with 40% relief.  She is status post L5/S1 interlaminar epidural steroid injection on 09/04/2019 with almost 100% relief of her right leg pain but 0% relief for back pain. She is status post C7-T1 interlaminar epidural steroid injection on 10/24/2019 with 70% relief.  She is s/p L5/S1 IL YARON with 2 weeks of relief, now 0%.   She is post left L4/5 S1 transforaminal epidural steroid injection on 08/21/2023 with almost 100% relief.       Spine surgeries:    Antineuropathics: failed gabapentin, failed Cymbalta due to side effects  NSAIDs:  Ibuprofen  Physical therapy:  Antidepressants:  Muscle relaxers:  Opioids:  Tramadol 50 mg twice daily as needed  Antiplatelets/Anticoagulants:  ASA 81 mg    ROS: She reports back pain only.  Balance of review of systems is negative.    Medical, surgical, family and social history reviewed elsewhere in record.     Medications/Allergies: See med card      Vitals:    11/27/23 1117   BP: 125/62   Pulse: 73   Weight: 83.9 kg (185 lb 0.5 oz)   Height: 5' 4" (1.626 m)   PainSc:   7   PainLoc: Back        PHYSICAL EXAM:  Gen: A and O x3, pleasant, well-groomed  HEENT: PERRLA  CVS: Regular rate and rhythm  Resp:  No obvious shortness of breath, no increased work of breathing  Musculoskeletal: No antalgic gait.     Neuro:  Upper extremities: 5/5 strength bilaterally   Lower extremities: 5/5 strength bilaterally  Reflexes: Brachioradialis 2+, Bicep 2+, Tricep 2+.   Patellar 3+, Achilles 2+.  Sensory: Intact and symmetrical to light touch and pinprick in C2-T1 dermatomes bilaterally.Intact and symmetrical to light touch and pinprick in L2-S1 dermatomes bilaterally.    Cervical spine:   Range of motion is mildly reduced " with flexion, extension lateral rotation with increased right neck pain during right lateral rotation and extension.    Myofascial exam: Mild tenderness to palpation to the right cervical paraspinous muscles.    Lumbar spine:  Lumbar spine: ROM is moderately limited with flexion and extension with increased right low back pain during extension and oblique extension.  Supine straight leg raise is negative bilaterally.  Internal and external rotation of the hip causes no increased pain in either side.  Myofascial exam:  Moderate tenderness to palpation to the right greater than left lumbar paraspinous muscles.      IMAGING:   MRI L-SPINE 5/10/11   IMPRESSION: AT L3-4, THERE IS CIRCUMFERENTIAL DISC PROTRUSION FOCALLY MORE PROMINENT TO THE LEFT AS WELL AS A SMALL DISC HERNIATION PROTRUDING INFERIORLY BEHIND L4 TO THE LEFT OF MIDLINE. THIS PRODUCES SPINAL CANAL AND BILATERAL NEURAL FORAMINAL STENOSIS, LEFT GREATER THAN RIGHT. AT L4-5, THERE IS CIRCUMFERENTIAL DISC PROTRUSION FOCALLY MORE PROMINENT TO THE RIGHT WITH MILD SPINAL CANAL AND RIGHT NEURAL FORAMINAL STENOSIS. AT L1-2 AND L2-3 ALTHOUGH THERE ARE DISC PROTRUSIONS IDENTIFIED, SIGNIFICANT STENOSIS IS NOT SEEN.     MRI lumbar spine 7/10/14  L1-2:There is chronic relatively advanced disc degeneration with disc space narrowing, disc dehydration, disc narrowing, endplate osteophytes, and degenerative vertebral endplate marrow change. There is a diffuse 2-mm posterior disc bulge with a superimposed4-mm right posterior disc extrusion causing mild right foraminal stenosis. There is no impingement of the right L1 nerve root and there has been no change.  L2-3: There is severe and chronic disc degeneration with severe disc space narrowing, disc dehydration, degenerative vertebral endplate marrow change, and vertebral endplate osteophytes. There is a diffuse 2-mm posterior disc bulge with a superimposed 4-mm right posterior disc extrusion causing mild right foraminal  stenosis. There is no impingement of the right L2 nerve root and there has been no significant change.  L3-4: There is severe disc degeneration which has progressed since the prior study. There is severe disc space narrowing, disc dehydration, degenerative vertebral endplate marrow change and endplate osteophytes. There is also mild posterior subluxation of L3over a distance of 4 mm. There is a broad-based 5-mm posterior disc extrusion. There is degenerative facet arthrosis with ligamentum flavum thickening. The combination of facet arthrosis and disc extrusion results in relatively severe spinal canal stenosis with compression of the thecal sac to an AP diameter of 5 mm, moderate right foraminal stenosis, and severe left foraminal stenosis. The spinal stenosis has also progressed since the prior study.  L4-5:There is a diffuse posterior disc bulge with a superimposed 3-mm left posterior paracentral disc protrusion causing left paracentral thecal sac compression. There is moderate left and mild right foraminal stenosis and there has been no significant change. There is mild degenerative facet arthrosis with ligamentum flavum thickening and small joint effusions.  L5-S1:There is no significant compromise of the spinal canal or foramina and there has been no change.    X-ray cervical spine 6/8/15  There is loss of normal cervical lordosis which may be positional or related muscular strain. Intervertebral disk height loss is noted at the C5-C6 C6-C7 levels and to a lesser degree C4-C5 and C7-T1 levels. Vertebral body alignment appears otherwise adequate. Vertebral body heights appear well-preserved. The atlas and odontoid appear in good relationship to each other. Osseous neuroforaminal narrowing is noted at the C3-C4 C4-C5 C5-C6 levels on the left and at the C4-C5 C5-C6 and C6-C7 levels on the right     07/10/2018 MRI cervical spine [facility] report  C2-3 broad-based signal asymmetry at the left subarticular and  foraminal zone reflecting implant spondylosis and disc bulge complex, mild to moderate left asymmetric foraminal narrowing, ectasia of the left vertebral artery, contralateral right asymmetric facet hypertrophy, right foramen widely narrowed, disc partially desiccated without collapse  C3-4 moderate to severe left greater than right foraminal narrowing secondary to uncinate joint hypertrophic signal alteration, disc partially desiccated  C4-5 endplate spondylosis moderate diffuse disc bulge noted, broad-based right paracentral disc herniation, asymmetric flattening of the ventral cord surface at the right paracentral zone, high-grade if lateral right foraminal narrowing, AP diameter of canal 9.9 mm, contralaterally, high grade left foraminal narrowing secondary to facet greater than uncinate joint hypertrophic signal alteration, disc desiccated and mildly narrowed  C5-6 disc space narrowing evident with generalized in Nigel spondylosis and concentric inter pose disc bulge complex, high-grade bilateral foraminal narrowing, flattening of the cord surface, AP diameter 7.9 mm, symmetric facet arthrosis, disc diffusely desiccated and narrowed  C6-7 moderate endplate spondylosis and concentric disc bulge complex, high-grade bilateral foraminal compromise, flattening of the anterior cord surface, AP diameter 8.8 mm, facet arthrosis symmetric, disc desiccated and narrowed  C7-T1 less than 2 mm depth disc bulge    11/06/2018 MRI lumbar spine  T12/L1: There is no evidence of disc protrusion, canal or foraminal stenosis.  L1/L2: Right posterolateral disc protrusion is evident and there is mild distortion of the right anterolateral canal.  Degenerative facet changes are noted bilaterally.  The left foramen is intact.  L2/L3: There is annular disc bulging with a superimposed right posterolateral disc extrusion.  This is slightly more pronounced on the previous examination and there is mild effacement of the anterior thecal sac  and moderate narrowing the right foramen.  L3/L4: There is annular disc bulge and osteophyte formation with a superimposed small left posterolateral disc extrusion.  There is moderate narrowing the central canal and left foramen.  This is little changed relative the previous examination.  Degenerative facet changes are noted.  L5/S1: There is a small central disc extrusion superimposed upon annular disc bulging.  This is slightly less pronounced than was seen previously.  Degenerative facet changes are noted.  L5/S1: Moderate degenerative facet changes are noted and there is mild effacement of the anterior thecal sac.  There is ligamentous hypertrophy particularly to the left in the posterior canal and there is left greater than right foraminal narrowing.  This is mildly worsened relative prior exam.      ASSESSMENT:  The patient is a 63 year old woman with a history of migraines, carpal tunnel syndrome and low back pain since her early 20s who returns in follow up.   1. Lumbar radiculopathy  Ambulatory referral/consult to Physical/Occupational Therapy      2. Spinal stenosis of lumbar region without neurogenic claudication  Ambulatory referral/consult to Physical/Occupational Therapy      3. DDD (degenerative disc disease), lumbar              Plan:  I will set the patient up for right L4/5 and L5/S1 transforaminal YARON.  We discussed the total amount of steroids she had this year and if she is not having lasting relief she will see neurosurgery.   I completed orders for PT at [facility].  Dr. Abdalla provided a prescription for tramadol 50 mg twice daily as needed and Lyrica 50 mg twice a day. She cannot tolerate three times a day.  I have reviewed the [place] Board of Pharmacy website and there are no abberancies.    Follow up in 4 weeks post procedure or sooner as needed.

## 2023-11-28 ENCOUNTER — TELEPHONE (OUTPATIENT)
Dept: FAMILY MEDICINE | Facility: CLINIC | Age: 63
End: 2023-11-28
Payer: COMMERCIAL

## 2023-11-28 DIAGNOSIS — M54.16 LUMBAR RADICULOPATHY: Primary | ICD-10-CM

## 2023-11-28 RX ORDER — SODIUM CHLORIDE, SODIUM LACTATE, POTASSIUM CHLORIDE, CALCIUM CHLORIDE 600; 310; 30; 20 MG/100ML; MG/100ML; MG/100ML; MG/100ML
INJECTION, SOLUTION INTRAVENOUS CONTINUOUS
Status: CANCELLED | OUTPATIENT
Start: 2023-11-28

## 2023-11-28 NOTE — TELEPHONE ENCOUNTER
Spoke with pt scheduled lumbar TF YARON on 1/4/24 with Dr. Abdalla. Cardio clearance sent to Dr. Ale Espinosa to hold Aspirin 7 days prior procedure. 4 week Follow up scheduled.

## 2023-11-29 NOTE — TELEPHONE ENCOUNTER
----- Message from Meena Willson RN sent at 11/28/2023  3:35 PM CST -----  Good afternoon,  Need cardio clearance to hold asa 7 days prior procedure TF epidural steroid injection with Dr. Abdalla on 1/4/24    
Per Dr Espinosa: hold ASA 7 days prior to procedure  
Please advise if patient needs to be seen for clearance for procedure with Dr Abdalla on 1/4/24.  Patient needs to stop ASA 7 days prior to procedure.  Please advise.  
Eye

## 2023-12-06 DIAGNOSIS — K21.9 GASTROESOPHAGEAL REFLUX DISEASE, UNSPECIFIED WHETHER ESOPHAGITIS PRESENT: ICD-10-CM

## 2023-12-06 DIAGNOSIS — K21.9 GASTROESOPHAGEAL REFLUX DISEASE: ICD-10-CM

## 2023-12-06 RX ORDER — PANTOPRAZOLE SODIUM 40 MG/1
TABLET, DELAYED RELEASE ORAL
Qty: 90 TABLET | Refills: 3 | Status: SHIPPED | OUTPATIENT
Start: 2023-12-06

## 2023-12-06 RX ORDER — CIMETIDINE 800 MG
TABLET ORAL
Qty: 90 TABLET | Refills: 3 | Status: SHIPPED | OUTPATIENT
Start: 2023-12-06

## 2023-12-06 NOTE — TELEPHONE ENCOUNTER
No care due was identified.  Health [facility] Embedded Care Due Messages. Reference number: [id number].   12/06/2023 11:13:14 AM CST

## 2023-12-06 NOTE — TELEPHONE ENCOUNTER
No care due was identified.  Health [facility] Embedded Care Due Messages. Reference number: [id number].   12/06/2023 8:10:27 AM CST

## 2023-12-06 NOTE — TELEPHONE ENCOUNTER
Refill Routing Note   Medication(s) are not appropriate for processing by [facility] for the following reason(s):        Drug-drug interaction    [facility] action(s):  Defer        Medication Therapy Plan: cimetidine, pantoprazole      Appointments  past 12m or future 3m with PCP    Date Provider   Last Visit   11/13/2023 CARRIE Espinosa MD   Next Visit   Visit date not found CARRIE Espinosa MD   ED visits in past 90 days: 0        Note composed:9:48 AM 12/06/2023

## 2023-12-06 NOTE — TELEPHONE ENCOUNTER
Refill Decision Note   Elizabeth Giordano  is requesting a refill authorization.  Brief Assessment and Rationale for Refill:  Approve     Medication Therapy Plan:       Medication Reconciliation Completed: No   Comments:     No Care Gaps recommended.     Note composed:2:14 PM 12/06/2023

## 2023-12-17 DIAGNOSIS — G43.719 INTRACTABLE CHRONIC MIGRAINE WITHOUT AURA AND WITHOUT STATUS MIGRAINOSUS: ICD-10-CM

## 2023-12-18 RX ORDER — GALCANEZUMAB 120 MG/ML
120 INJECTION, SOLUTION SUBCUTANEOUS
Qty: 1 ML | Refills: 1 | Status: SHIPPED | OUTPATIENT
Start: 2023-12-18 | End: 2024-02-11 | Stop reason: SDUPTHER

## 2023-12-20 DIAGNOSIS — B02.29 POST HERPETIC NEURALGIA: ICD-10-CM

## 2023-12-21 RX ORDER — LIDOCAINE 50 MG/G
PATCH TOPICAL
Qty: 30 PATCH | Refills: 5 | Status: SHIPPED | OUTPATIENT
Start: 2023-12-21

## 2023-12-22 ENCOUNTER — PATIENT MESSAGE (OUTPATIENT)
Dept: ADMINISTRATIVE | Facility: OTHER | Age: 63
End: 2023-12-22
Payer: COMMERCIAL

## 2023-12-29 ENCOUNTER — TELEPHONE (OUTPATIENT)
Dept: SURGERY | Facility: HOSPITAL | Age: 63
End: 2023-12-29
Payer: COMMERCIAL

## 2023-12-29 NOTE — TELEPHONE ENCOUNTER
Pt scheduled 1/4/24 for YARON with Dr. Abdalla-pt states she needs to cancel due to change in insurance.please contact pt.

## 2024-01-11 ENCOUNTER — PATIENT MESSAGE (OUTPATIENT)
Dept: ADMINISTRATIVE | Facility: OTHER | Age: 64
End: 2024-01-11
Payer: COMMERCIAL

## 2024-02-02 RX ORDER — IBUPROFEN 800 MG/1
800 TABLET ORAL EVERY 6 HOURS PRN
Qty: 30 TABLET | Refills: 0 | Status: SHIPPED | OUTPATIENT
Start: 2024-02-02 | End: 2024-05-09

## 2024-02-09 ENCOUNTER — OFFICE VISIT (OUTPATIENT)
Dept: OPTOMETRY | Facility: CLINIC | Age: 64
End: 2024-02-09
Payer: COMMERCIAL

## 2024-02-09 DIAGNOSIS — H02.052 TRICHIASIS OF RIGHT LOWER EYELID: Primary | ICD-10-CM

## 2024-02-09 PROCEDURE — 1159F MED LIST DOCD IN RCRD: CPT | Mod: CPTII,S$GLB,,

## 2024-02-09 PROCEDURE — 99213 OFFICE O/P EST LOW 20 MIN: CPT | Mod: 25,S$GLB,,

## 2024-02-09 PROCEDURE — 99999 PR PBB SHADOW E&M-EST. PATIENT-LVL III: CPT | Mod: PBBFAC,,,

## 2024-02-09 PROCEDURE — 4010F ACE/ARB THERAPY RXD/TAKEN: CPT | Mod: CPTII,S$GLB,,

## 2024-02-09 PROCEDURE — 67820 REVISE EYELASHES: CPT | Mod: RT,S$GLB,,

## 2024-02-09 NOTE — PROGRESS NOTES
HPI    Pt here for F/U visit of trichiasis of OD.     Pt states that VA stable since last visit. Eye feels irritated. No gtt.  Last edited by Gold Palacios, OD on 2/9/2024  1:21 PM.            Assessment /Plan     For exam results, see Encounter Report.    Trichiasis of right lower eyelid      Trichiasis of the right upper and lower eyelid with corresponding vertical staining from cilia inferiorly. Successfully removed 3-4 lashes at the slit lamp using topical proparacaine and forceps. Pt tolerated well and got relief. Advised OTC artificial tears [id number] for additional relief of irritation. Ed pt to RTC if symptoms worsen or fail to fully resolve.      RTC: as scheduled for cataract eval, sooner prn.

## 2024-02-11 DIAGNOSIS — G43.719 INTRACTABLE CHRONIC MIGRAINE WITHOUT AURA AND WITHOUT STATUS MIGRAINOSUS: ICD-10-CM

## 2024-02-12 RX ORDER — GALCANEZUMAB 120 MG/ML
120 INJECTION, SOLUTION SUBCUTANEOUS
Qty: 1 ML | Refills: 1 | Status: SHIPPED | OUTPATIENT
Start: 2024-02-12 | End: 2024-02-27 | Stop reason: SDUPTHER

## 2024-02-27 ENCOUNTER — OFFICE VISIT (OUTPATIENT)
Dept: NEUROLOGY | Facility: CLINIC | Age: 64
End: 2024-02-27
Payer: COMMERCIAL

## 2024-02-27 VITALS
TEMPERATURE: 97 F | DIASTOLIC BLOOD PRESSURE: 70 MMHG | HEIGHT: 64 IN | HEART RATE: 76 BPM | WEIGHT: 180.75 LBS | SYSTOLIC BLOOD PRESSURE: 114 MMHG | BODY MASS INDEX: 30.86 KG/M2 | RESPIRATION RATE: 17 BRPM

## 2024-02-27 DIAGNOSIS — G43.719 INTRACTABLE CHRONIC MIGRAINE WITHOUT AURA AND WITHOUT STATUS MIGRAINOSUS: ICD-10-CM

## 2024-02-27 PROCEDURE — 4010F ACE/ARB THERAPY RXD/TAKEN: CPT | Mod: CPTII,S$GLB,, | Performed by: NURSE PRACTITIONER

## 2024-02-27 PROCEDURE — 3078F DIAST BP <80 MM HG: CPT | Mod: CPTII,S$GLB,, | Performed by: NURSE PRACTITIONER

## 2024-02-27 PROCEDURE — 1159F MED LIST DOCD IN RCRD: CPT | Mod: CPTII,S$GLB,, | Performed by: NURSE PRACTITIONER

## 2024-02-27 PROCEDURE — 99213 OFFICE O/P EST LOW 20 MIN: CPT | Mod: S$GLB,,, | Performed by: NURSE PRACTITIONER

## 2024-02-27 PROCEDURE — 1160F RVW MEDS BY RX/DR IN RCRD: CPT | Mod: CPTII,S$GLB,, | Performed by: NURSE PRACTITIONER

## 2024-02-27 PROCEDURE — 3074F SYST BP LT 130 MM HG: CPT | Mod: CPTII,S$GLB,, | Performed by: NURSE PRACTITIONER

## 2024-02-27 PROCEDURE — 99999 PR PBB SHADOW E&M-EST. PATIENT-LVL V: CPT | Mod: PBBFAC,,, | Performed by: NURSE PRACTITIONER

## 2024-02-27 PROCEDURE — 3008F BODY MASS INDEX DOCD: CPT | Mod: CPTII,S$GLB,, | Performed by: NURSE PRACTITIONER

## 2024-02-27 RX ORDER — GALCANEZUMAB 120 MG/ML
120 INJECTION, SOLUTION SUBCUTANEOUS
Qty: 1 ML | Refills: 11 | Status: SHIPPED | OUTPATIENT
Start: 2024-02-27

## 2024-02-27 NOTE — PROGRESS NOTES
Date of service: 2/27/2024  Referring provider: No ref. provider found    Subjective:      Chief complaint: Headache         Patient ID: Elizabeth Giordano is a 63 y.o. lady with carpal tunnel syndrome, low back pain secondary to lumbar spondylosis and DDD, lumbar radiculopathy followed by Dr. Abdalla, factor V leiden mutation, GERD and migraines here for follow up of migraines.     History of Present Illness    INTERVAL HISTORY 2/27/24    Last visit was a little over a year ago and at that time she was doing well.    Today she reports she is doing well. She has tried to wean Emgality however she can only go 5 weeks before migraines return. Current pain 1 with range 0-9. She has less than two headache days per week. She takes Ubrelvy as needed. Otherwise information below is reviewed and verified with no changes made     INTERVAL HISTORY 11/10/22  The patient location is: home   The chief complaint leading to consultation is: follow up  Visit type: audiovisual  Face to Face time with patient: 10  15 minutes of total time spent on the encounter, which includes face to face time and non-face to face time preparing to see the patient (eg, review of tests), Obtaining and/or reviewing separately obtained history, Documenting clinical information in the electronic or other health record, Independently interpreting results (not separately reported) and communicating results to the patient/family/caregiver, or Care coordination (not separately reported).   Each patient to whom he or she provides medical services by telemedicine is:  (1) informed of the relationship between the physician and patient and the respective role of any other health care provider with respect to management of the patient; and (2) notified that he or she may decline to receive medical services by telemedicine and may withdraw from such care at any time.    Notes:     Last office visit was 13 months ago. At that time she was doing very well  "on Emgality.    Today she reports she is "still doing good". She did try to "wean" off Emgality. She would delay dose to 30 days and migraines would return. When taken every 28 days, she gets maybe two headaches per month. They are frontal. Current pain 0 with range 0-3. She treats early with Ubrelvy which is very effective. Otherwise information below is reviewed and verified with no changes made    INTERVAL HISTORY 9/16/21  The patient location is: home  The chief complaint leading to consultation is: follow up  Visit type: audiovisual  Face to Face time with patient: 10  20 minutes of total time spent on the encounter, which includes face to face time and non-face to face time preparing to see the patient (eg, review of tests), Obtaining and/or reviewing separately obtained history, Documenting clinical information in the electronic or other health record, Independently interpreting results (not separately reported) and communicating results to the patient/family/caregiver, or Care coordination (not separately reported).   Each patient to whom he or she provides medical services by telemedicine is:  (1) informed of the relationship between the physician and patient and the respective role of any other health care provider with respect to management of the patient; and (2) notified that he or she may decline to receive medical services by telemedicine and may withdraw from such care at any time.    Notes:     Last visit was over a year ago and at that time she was doing well and we were still weaning off Botox. We added Ubrelvy for migraine abortive.     Today she reports she is still doing very well. She reports "maybe three" headache days per month and all are mild. She take Ubrelvy three times per month or less. She cannot recall the last time she had a full escalation to migraine. She has been on Emgality for a year as insurance would not cover Botox.    INTERVAL HISTORY 2/11/2020    At last office visit, " "patient was doing very well on Botox and interested in weaning off. Last Botox session was almost 15 weeks ago. Abortive medications worked well. She now has new insurance and Botox no longer possible due to pharmacy/distribution issues.     Today she reports she is about the same. When headaches present, they are in the front right, behind eye. Current pain 0 with range 0-10. She takes excedrin or amerge three times per month. Three migraines per month.     INTERVAL HISTORY 12/2/19    Patient presents for 6-week follow up of her 7th Botox session. She previously reported 90% relief of headaches, no wean off effect, and was interested in weaning off of Botox. She reports she is overall much better. Only one small migraine and it was "knocked out" with medications (amerge). Headaches remain right side of head and right eye. current pain 0 with range 0-10.     I/NTERVAL HISTORY - 7/17/18     She is status post first botox session 5/24/18. Today she reports she is a little better. She had no migraines for the first 1.5 weeks and now has returned to 3 headache days per week. Her current headaches are right side of head and eye. Her current pain is 0 with a range of 0 to 10. She remains on Effexor and naratriptan. The effexor still has not been helpful.     INTERVAL HISTORY - 5/8/18     At last visit we covered topiramate, which was working to reduce headaches by 50%, to zonisamide due to significant memory problems.The memory problems DID improve. The zonisamide caused itchiness so 6 weeks ago we converted to Effexor. She is now having MORE headaches - 3-4 per week. The as needed medications are working well.     INTERVAL HISTORY - 1/30/18     Last visit 3 months ago we planned on continuing topiramate titration and sumatriptan for acute.    Today she reports the current regimen seems to be working - her regular baseline headache went down from 12-15 per month to about half that, and had one long flare since last seen - " used naratriptan once came back, used sumatriptan once, came back . Her current pain is 0, with a range of 0 to 10. The pain is unchanged in location.    She has been having significant memory problems since being on topiramate.     INTERVAL HISTORY - 10/25/17     Last seen 2 months ago at which point I raised topiramate and started naratriptan bridges for long migraines. Today she reports she is about the same. However, last week she had a good week and had no headaches.The headaches have the same characteristics are before. Her current pain score is 5. Her range is 0-10. She can not predict when she will have severe migraines so naratriptan bridge was taken at terminal end (day 4 of flare), not at the start, and did not have the intended effect. She has tingling in all toes (topamax) but worse on right and gets injections in her foot for this.    HEADACHE HISTORY - 8/9/17   Age at onset and course over time: migraines began in her 30s, she reports going through menopause at age 40 (unclear why) and she thinks this may have started it. One day just became more sensitive to sensory stimuli especially smells. Had severe migraines at least one time per week. Started Celexa for this purpose which helped her sensitivity to smells. Overall thinks her headache number has stayed the same over the years but pain has become more severe and she is yet again more sensitive to smells, thinks medications aren't working as well.   Location: right side behind eye (baseline headache and migraine)   Quality: throbbing   Severity: current 2/10, at worst 10/10   Duration: exacerbations last 5-6 days   Frequency: baseline headache about 12-15 days per month; every other month will have a flare lasting 5-6 days   Alleviated by: sleep, ice, medication   Exacerbated by: light, noise, smells, coughing   Associated with: photo/noise, phobia, osmophobia. No aura. + right eye tearing. No ptosis, no redness. No eye irritation. No ear fullness.    Sleep habits:  Caffeine intake: 1     She is on daily HRT for menopause.     Current acute treatment - migraines treated 4 times per month    -- tramadol 50mg BID  -- zofran rare use  Ubrelvy - effective     Current prevention:  Emgality - started August 2020  Lyrica   (losarta)  (amlodipine)    Previously tried/failed acute treatment:  -- hydrocodone: allergy  -- oxycodone: allergy   -- zomig   -- relpax   -- naproxen  -- tramadol   -- fiorinal with codeine   -- Goody's   -- Excedrin   -- tylenol  -- ASA  - tizanidine 2mg nightly - for migraines, daily   -- sumatriptan 100mg at onset of headache, gets recurrent headache next day, (zomig works better but not covered by her insurance)  -- amerge - works OK but sumatriptan is cheaper   -- meloxicam 15mg for  Arthritis in right thumb daily    Previously tried/failed preventative treatment:  -- amitriptyline (for back pain, but migraines were no better)  -- gabapentin - for back pain, didn't help   -- lyrica - memory problems   -- topiramate 100mg BID Tingling in toes - helping with migraines but having significant memory problems   -- zonisamide - itchy   (celexa 40mg)   -- Effexor XR 150mg in the morning - started 2/27/18 - headaches are worse  -- Botox 7 sessions -- worked very well, migraines in near remission   (gabapentin)    Hx multiple lumbar ESIs and LMB RFAs for low back pain/radiculopathy       Review of patient's allergies indicates:   Allergen Reactions    Hydrocodone Hives and Nausea Only           Sulfa (sulfonamide antibiotics) Hives and Rash           Oxycodone Itching and Nausea And Vomiting     Current Outpatient Medications   Medication Sig Dispense Refill    amLODIPine (NORVASC) 5 MG tablet Take 1 tablet (5 mg total) by mouth once daily. 90 tablet 2    ascorbic acid, vitamin C, (VITAMIN C) 500 MG tablet Take 1 tablet (500 mg total) by mouth 2 (two) times daily.      aspirin (ECOTRIN) 81 MG EC tablet Take 81 mg by mouth once daily.       atorvastatin (LIPITOR) 10 MG tablet Take 1 tablet (10 mg total) by mouth once daily. 90 tablet 2    azelastine (ASTELIN) 137 mcg (0.1 %) nasal spray 1 spray 2 (two) times daily.      cimetidine (TAGAMET) 800 MG tablet TAKE 1 TABLET BY MOUTH EVERY EVENING 90 tablet 3    estradioL (ESTRACE) 1 MG tablet Take 1 tablet (1 mg total) by mouth once daily. Can cause blood clots with covid, recommend NOT taking this medication until covid symptoms completely gone and discussing with PCP  3    eszopiclone (LUNESTA) 3 mg Tab Take 1 tablet (3 mg total) by mouth every evening. 90 tablet 1    finasteride (PROSCAR) 5 mg tablet Take 1 tablet (5 mg total) by mouth once daily. 30 tablet 11    fluticasone propionate (FLONASE) 50 mcg/actuation nasal spray 1 spray (50 mcg total) by Each Nostril route Daily. 16 g 3    ibuprofen (ADVIL,MOTRIN) 800 MG tablet TAKE 1 TABLET(800 MG) BY MOUTH EVERY 6 HOURS AS NEEDED FOR PAIN 30 tablet 0    LIDOcaine (LIDODERM) 5 % PLACE 1 PATCH ON SKIN DAILY AND REMOVE WITHIN 12 HOURS 30 patch 5    losartan (COZAAR) 100 MG tablet Take 1 tablet (100 mg total) by mouth once daily. 90 tablet 3    mupirocin (BACTROBAN) 2 % ointment Apply topically 3 (three) times daily. 2 g 0    ondansetron (ZOFRAN-ODT) 4 MG TbDL Take 1 tablet (4 mg total) by mouth every 6 (six) hours as needed (nausea). 20 tablet 1    pantoprazole (PROTONIX) 40 MG tablet TAKE 1 TABLET(40 MG) BY MOUTH EVERY DAY 90 tablet 3    pregabalin (LYRICA) 50 MG capsule Take 1 capsule (50 mg total) by mouth 3 (three) times daily. 90 capsule 2    traMADoL (ULTRAM) 50 mg tablet TAKE 1 TABLET BY MOUTH EVERY 12 HOURS AS NEEDED FOR PAIN 60 tablet 2    ubrogepant (UBRELVY) 50 mg tablet Take 1 tablet by mouth at onset of migraine. May repeat in 2 hours if needed. Max 2 tablets per day. 10 tablet 11    vitamin D (VITAMIN D3) 1000 units Tab Take 1 tablet (1,000 Units total) by mouth once daily.      zinc sulfate (ZINCATE) 220 (50) mg capsule Take 1 capsule (220 mg  total) by mouth once daily.      galcanezumab-gnlm (EMGALITY PEN) 120 mg/mL PnIj Inject 1 pen (120 mg total) into the skin every 28 days. 1 mL 11     Current Facility-Administered Medications   Medication Dose Route Frequency Provider Last Rate Last Admin    LIDOcaine (PF) 10 mg/ml (1%) injection 10 mg  1 mL Intradermal Once Oswadl Abdalla MD           Past Medical History  Past Medical History:   Diagnosis Date    Allergy     Arthritis of spine 2011    Chronic low back pain     Class 2 obesity with body mass index (BMI) of 39.0 to 39.9 in adult 01/11/2019    Coronary artery disease     mild    DDD (degenerative disc disease), lumbar     Diverticulitis     Diverticulosis     DJD (degenerative joint disease)     Encounter for blood transfusion     Factor V Leiden     GERD (gastroesophageal reflux disease)     Hiatal hernia     Hypertension     Migraines     PONV (postoperative nausea and vomiting)     geta    Schatzki's ring     Urticaria        Past Surgical History  Past Surgical History:   Procedure Laterality Date    APPENDECTOMY  1981    CARPAL TUNNEL RELEASE  2011    right    COLONOSCOPY  2014    Dr. Palomares; reduntant colon, otherwise normal findings repeat in 8-10 years for surveillance    Epidural Steroid Injection      Pain Management    EPIDURAL STEROID INJECTION INTO CERVICAL SPINE N/A 09/25/2018    Procedure: Injection-steroid-epidural-cervical;  Surgeon: Oswald Abdalla MD;  Location: [facility] OR;  Service: Pain Management;  Laterality: N/A;    EPIDURAL STEROID INJECTION INTO CERVICAL SPINE N/A 10/24/2019    Procedure: Injection-steroid-epidural-cervical;  Surgeon: Oswald Abdalla MD;  Location: [facility] OR;  Service: Pain Management;  Laterality: N/A;    EPIDURAL STEROID INJECTION INTO CERVICAL SPINE N/A 02/22/2023    Procedure: Injection-steroid-epidural-cervical C7-T1;  Surgeon: Oswald Abdalla MD;  Location: [facility] OR;  Service: Pain Management;  Laterality: N/A;    EPIDURAL STEROID INJECTION  INTO LUMBAR SPINE N/A 12/27/2018    Procedure: Injection-steroid-epidural-lumbar L5/S1;  Surgeon: Oswald Abdalla MD;  Location: [facility] OR;  Service: Pain Management;  Laterality: N/A;    EPIDURAL STEROID INJECTION INTO LUMBAR SPINE N/A 09/04/2019    Procedure: Injection-steroid-epidural-lumbar;  Surgeon: Oswald Abdalla MD;  Location: [facility] OR;  Service: Pain Management;  Laterality: N/A;  L5/S1 to right    EPIDURAL STEROID INJECTION INTO LUMBAR SPINE N/A 06/16/2023    Procedure: Injection-steroid-epidural-lumbar L5/S1;  Surgeon: Oswald Abdalla MD;  Location: [facility] OR;  Service: Pain Management;  Laterality: N/A;    ESOPHAGOGASTRODUODENOSCOPY  05/17/2016    Dr. Arreguin; small hiatal hernia; gastritis    ESOPHAGOGASTRODUODENOSCOPY N/A 06/22/2020    Dr. Arreguin; mild Schatzki ring- dilated; small hiatal hernia; bx unremarkable    Facet injection      Pain Management    HYSTERECTOMY      ovaries removed    KIDNEY SURGERY Right 1967    to correct urinary reflux into kidney    LEFT HEART CATHETERIZATION Left 05/22/2019    Procedure: Left heart cath;  Surgeon: Casa Perdue MD;  Location: [facility] CATH;  Service: Cardiology;  Laterality: Left;    OVARIAN CYST REMOVAL Right 1981    RADIOFREQUENCY ABLATION OF LUMBAR MEDIAL BRANCH NERVE AT SINGLE LEVEL Bilateral 07/17/2018    Procedure: RADIOFREQUENCY ABLATION, NERVE, MEDIAL BRANCH, LUMBAR, L2,3,4;  Surgeon: Oswald Abdalla MD;  Location: [facility] OR;  Service: Pain Management;  Laterality: Bilateral;    RADIOFREQUENCY ABLATION OF LUMBAR MEDIAL BRANCH NERVE AT SINGLE LEVEL Bilateral 07/29/2019    Procedure: Radiofrequency Ablation, Nerve, Spinal, Lumbar, Medial Branch, L2,3,4;  Surgeon: Oswald Abdalla MD;  Location: [facility] OR;  Service: Pain Management;  Laterality: Bilateral;    SHOULDER SURGERY  2010    rotator cuff, right    TRANSFORAMINAL EPIDURAL INJECTION OF STEROID Left 08/21/2023    Procedure: Injection,steroid,epidural,transforaminal approach  L4/5 L5/S1 Left;  Surgeon: Oswald Abdalla MD;  Location: [facility] OR;  Service: Pain Management;  Laterality: Left;       Family History  Family History   Problem Relation Age of Onset    Cataracts Mother     Heart attack Mother     Heart disease Mother     COPD Mother     Hypertension Mother     COPD Father     Hepatitis Sister     Breast cancer Maternal Grandmother     No Known Problems Daughter     Allergic rhinitis Neg Hx     Angioedema Neg Hx     Asthma Neg Hx     Atopy Neg Hx     Eczema Neg Hx     Immunodeficiency Neg Hx     Urticaria Neg Hx     Colon cancer Neg Hx     Colon polyps Neg Hx     Glaucoma Neg Hx     Macular degeneration Neg Hx     Retinal detachment Neg Hx        Social History  Social History     Socioeconomic History    Marital status:     Number of children: 1   Occupational History     Employer: [employer]   Tobacco Use    Smoking status: Former     Current packs/day: 0.00     Average packs/day: 1 pack/day for 5.0 years (5.0 ttl pk-yrs)     Types: Cigarettes     Start date: 1992     Quit date: 1997     Years since quittin.1     Passive exposure: Never    Smokeless tobacco: Never   Substance and Sexual Activity    Alcohol use: Yes     Comment: 3x per year    Drug use: No    Sexual activity: Yes     Partners: Male   Social History Narrative    ** Merged History Encounter **          Social Determinants of Health     Financial Resource Strain: Low Risk  (2024)    Overall Financial Resource Strain (CARDIA)     Difficulty of Paying Living Expenses: Not hard at all   Food Insecurity: No Food Insecurity (2024)    Hunger Vital Sign     Worried About Running Out of Food in the Last Year: Never true     Ran Out of Food in the Last Year: Never true   Transportation Needs: No Transportation Needs (2024)    PRAPARE - Transportation     Lack of Transportation (Medical): No     Lack of Transportation (Non-Medical): No   Physical Activity: Insufficiently Active  (1/24/2024)    Exercise Vital Sign     Days of Exercise per Week: 3 days     Minutes of Exercise per Session: 30 min   Stress: No Stress Concern Present (1/24/2024)    [employer] [place] [employer] - Occupational Stress Questionnaire     Feeling of Stress : Not at all   Social Connections: Unknown (1/24/2024)    Social Connection and Isolation Panel [NHANES]     Frequency of Communication with Friends and Family: More than three times a week     Frequency of Social Gatherings with Friends and Family: Once a week     Active Member of Clubs or Organizations: No     Attends Club or Organization Meetings: Never     Marital Status:    Housing Stability: Low Risk  (1/24/2024)    Housing Stability Vital Sign     Unable to Pay for Housing in the Last Year: No     Number of Places Lived in the Last Year: 1     Unstable Housing in the Last Year: No         Objective:        Vitals:    02/27/24 1441   BP: 114/70   Pulse: 76   Resp: 17   Temp: 97.3 °F (36.3 °C)     Body mass index is 31.03 kg/m².    2/27/24  Constitutional:   She appears well-developed and well-nourished. She is well groomed     Neurological Exam:  General: well-developed, well-nourished, no distress  Mental status: Awake and alert  Speech language: No dysarthria or aphasia on conversation  Cranial nerves: Face symmetric  Motor: Moves all extremities well  Coordination: No ataxia. No tremor.    Data Review:     No results found for this or any previous visit.    Lab Results   Component Value Date     11/27/2023    K 3.8 11/27/2023    MG 2.2 02/06/2021     11/27/2023    CO2 27 11/27/2023    BUN 14 11/27/2023    CREATININE 1.0 11/27/2023    GLU 98 11/27/2023    HGBA1C 5.2 11/27/2023    AST 21 11/27/2023    ALT 14 11/27/2023    ALBUMIN 4.0 11/27/2023    PROT 6.7 11/27/2023    BILITOT 0.6 11/27/2023    CHOL 156 11/27/2023    HDL 64 11/27/2023    LDLCALC 77.6 11/27/2023    TRIG 72 11/27/2023       Lab Results   Component Value Date     WBC 5.02 11/27/2023    HGB 12.3 11/27/2023    HCT 36.5 (L) 11/27/2023    MCV 98 11/27/2023     11/27/2023       Lab Results   Component Value Date    TSH 0.555 11/27/2023       Assessment & Plan:       Problem List Items Addressed This Visit          Neuro    Intractable chronic migraine without aura    Overview     Migraines of many years, triggered by menopause. Normal neuro exam. Excellent response to full dose topiramate but not tolerating due to memory impairment. Converted over to zonisamide to improve tolerance but developed rash to this. Therefore, converted citalpram to Effexor but as of 6 weeks on full dose headaches are only worse. Will give Effexor another 4 week trial and prepare to begin Botox.     The patient has chronic migraines (G43.719) and suffers from headaches more than 15 days a month lasting more than 4 hours a day with no relief of symptoms despite trying multiple medications including but not limited to anti-epileptics (topiramate, gabapentin, lyrica, zonisamide), and antidepressants (amitriptyline, venflaxine). Botox treatment was approved for chronic migraines in October 2010. The patient will be an ideal candidate for Botox. We are planning for 3 treatments 3 months apart and aiming for at least 50% improvement in the symptoms. If we see no improvement after 3 treatments, we will discontinue the injections.    She has been on Emgality since 2020 with excellent results. She tried to wean off Emgality but migraines immediately returned. She treats early with Ubrelvy which is very effective.          Current Assessment & Plan     Continue Emgality and Ubrelvy         Relevant Medications    galcanezumab-gnlm (EMGALITY PEN) 120 mg/mL PnIj                 WORKUP:  -- none     PREVENTION (use daily regardless of headache):  -- continue Emgality. If still doing this well in 6 months, we can consider weaning    ACUTE TREATMENT:  -- continue Ubrelvy       Follow up in about 1 year (around  2/27/2025).      Linda Melgar, NP

## 2024-03-04 ENCOUNTER — HOSPITAL ENCOUNTER (OUTPATIENT)
Dept: RADIOLOGY | Facility: HOSPITAL | Age: 64
Discharge: HOME | End: 2024-03-04
Attending: ANESTHESIOLOGY | Admitting: ANESTHESIOLOGY
Payer: COMMERCIAL

## 2024-03-04 ENCOUNTER — HOSPITAL ENCOUNTER (OUTPATIENT)
Facility: HOSPITAL | Age: 64
Discharge: HOME | End: 2024-03-04
Attending: ANESTHESIOLOGY | Admitting: ANESTHESIOLOGY
Payer: COMMERCIAL

## 2024-03-04 VITALS
RESPIRATION RATE: 18 BRPM | WEIGHT: 175 LBS | BODY MASS INDEX: 29.88 KG/M2 | DIASTOLIC BLOOD PRESSURE: 68 MMHG | HEIGHT: 64 IN | HEART RATE: 60 BPM | TEMPERATURE: 98 F | SYSTOLIC BLOOD PRESSURE: 107 MMHG | OXYGEN SATURATION: 97 %

## 2024-03-04 DIAGNOSIS — M54.16 LUMBAR RADICULOPATHY: ICD-10-CM

## 2024-03-04 DIAGNOSIS — M54.50 LOWER BACK PAIN: ICD-10-CM

## 2024-03-04 PROCEDURE — 64484 NJX AA&/STRD TFRM EPI L/S EA: CPT | Mod: PO,RT | Performed by: ANESTHESIOLOGY

## 2024-03-04 PROCEDURE — 63600175 PHARM REV CODE 636 W HCPCS: Mod: PO | Performed by: ANESTHESIOLOGY

## 2024-03-04 PROCEDURE — 64484 NJX AA&/STRD TFRM EPI L/S EA: CPT | Mod: RT,,, | Performed by: ANESTHESIOLOGY

## 2024-03-04 PROCEDURE — 64483 NJX AA&/STRD TFRM EPI L/S 1: CPT | Mod: PO,RT | Performed by: ANESTHESIOLOGY

## 2024-03-04 PROCEDURE — 76000 FLUOROSCOPY <1 HR PHYS/QHP: CPT | Mod: TC,PO

## 2024-03-04 PROCEDURE — 64483 NJX AA&/STRD TFRM EPI L/S 1: CPT | Mod: RT,,, | Performed by: ANESTHESIOLOGY

## 2024-03-04 RX ORDER — SODIUM CHLORIDE, SODIUM LACTATE, POTASSIUM CHLORIDE, CALCIUM CHLORIDE 600; 310; 30; 20 MG/100ML; MG/100ML; MG/100ML; MG/100ML
INJECTION, SOLUTION INTRAVENOUS CONTINUOUS
Status: DISCONTINUED | OUTPATIENT
Start: 2024-03-04 | End: 2024-03-04 | Stop reason: HOSPADM

## 2024-03-04 RX ORDER — MIDAZOLAM HYDROCHLORIDE 2 MG/2ML
INJECTION, SOLUTION INTRAMUSCULAR; INTRAVENOUS
Status: DISCONTINUED | OUTPATIENT
Start: 2024-03-04 | End: 2024-03-04 | Stop reason: HOSPADM

## 2024-03-04 RX ADMIN — SODIUM CHLORIDE, POTASSIUM CHLORIDE, SODIUM LACTATE AND CALCIUM CHLORIDE: 600; 310; 30; 20 INJECTION, SOLUTION INTRAVENOUS at 03:03

## 2024-03-04 NOTE — DISCHARGE INSTRUCTIONS

## 2024-03-04 NOTE — PLAN OF CARE
Dr. Abdalla made aware that patient states she cannot tolerate Xanax and is requesting IV RN sedation. MD hernandez for patient to have RN sedation. Discontinue Xanax at this time. Orders carried out.

## 2024-03-04 NOTE — H&P
CC: Back pain    HPI: The patient is a 62yo woman with a history of lumbar radiculopathy here for right L4/5 and L5/S1 TFESI. There are no major changes in history and physical from 11/27/23.    Past Medical History:   Diagnosis Date    Allergy     Arthritis of spine 2011    Chronic low back pain     Class 2 obesity with body mass index (BMI) of 39.0 to 39.9 in adult 01/11/2019    Coronary artery disease     mild    DDD (degenerative disc disease), lumbar     Diverticulitis     Diverticulosis     DJD (degenerative joint disease)     Encounter for blood transfusion     Factor V Leiden     GERD (gastroesophageal reflux disease)     Hiatal hernia     Hypertension     Migraines     PONV (postoperative nausea and vomiting)     geta    Schatzki's ring     Urticaria        Past Surgical History:   Procedure Laterality Date    APPENDECTOMY  1981    CARPAL TUNNEL RELEASE  2011    right    COLONOSCOPY  2014    Dr. Palomares; reduntant colon, otherwise normal findings repeat in 8-10 years for surveillance    Epidural Steroid Injection      Pain Management    EPIDURAL STEROID INJECTION INTO CERVICAL SPINE N/A 09/25/2018    Procedure: Injection-steroid-epidural-cervical;  Surgeon: Osawld Abdalla MD;  Location: [facility] OR;  Service: Pain Management;  Laterality: N/A;    EPIDURAL STEROID INJECTION INTO CERVICAL SPINE N/A 10/24/2019    Procedure: Injection-steroid-epidural-cervical;  Surgeon: Oswald Abdalla MD;  Location: [facility] OR;  Service: Pain Management;  Laterality: N/A;    EPIDURAL STEROID INJECTION INTO CERVICAL SPINE N/A 02/22/2023    Procedure: Injection-steroid-epidural-cervical C7-T1;  Surgeon: Oswald Abdalla MD;  Location: [facility] OR;  Service: Pain Management;  Laterality: N/A;    EPIDURAL STEROID INJECTION INTO LUMBAR SPINE N/A 12/27/2018    Procedure: Injection-steroid-epidural-lumbar L5/S1;  Surgeon: Oswald Abdalla MD;  Location: [facility] OR;  Service: Pain Management;  Laterality: N/A;    EPIDURAL STEROID  INJECTION INTO LUMBAR SPINE N/A 09/04/2019    Procedure: Injection-steroid-epidural-lumbar;  Surgeon: Oswald Abdalla MD;  Location: [facility] OR;  Service: Pain Management;  Laterality: N/A;  L5/S1 to right    EPIDURAL STEROID INJECTION INTO LUMBAR SPINE N/A 06/16/2023    Procedure: Injection-steroid-epidural-lumbar L5/S1;  Surgeon: Oswald Abdalla MD;  Location: [facility] OR;  Service: Pain Management;  Laterality: N/A;    ESOPHAGOGASTRODUODENOSCOPY  05/17/2016    Dr. Arreguin; small hiatal hernia; gastritis    ESOPHAGOGASTRODUODENOSCOPY N/A 06/22/2020    Dr. Arreguin; mild Schatzki ring- dilated; small hiatal hernia; bx unremarkable    Facet injection      Pain Management    HYSTERECTOMY      ovaries removed    KIDNEY SURGERY Right 1967    to correct urinary reflux into kidney    LEFT HEART CATHETERIZATION Left 05/22/2019    Procedure: Left heart cath;  Surgeon: Casa Perdue MD;  Location: [facility] CATH;  Service: Cardiology;  Laterality: Left;    OVARIAN CYST REMOVAL Right 1981    RADIOFREQUENCY ABLATION OF LUMBAR MEDIAL BRANCH NERVE AT SINGLE LEVEL Bilateral 07/17/2018    Procedure: RADIOFREQUENCY ABLATION, NERVE, MEDIAL BRANCH, LUMBAR, L2,3,4;  Surgeon: Oswald Abdalla MD;  Location: [facility] OR;  Service: Pain Management;  Laterality: Bilateral;    RADIOFREQUENCY ABLATION OF LUMBAR MEDIAL BRANCH NERVE AT SINGLE LEVEL Bilateral 07/29/2019    Procedure: Radiofrequency Ablation, Nerve, Spinal, Lumbar, Medial Branch, L2,3,4;  Surgeon: Oswald Abdalla MD;  Location: [facility] OR;  Service: Pain Management;  Laterality: Bilateral;    SHOULDER SURGERY  2010    rotator cuff, right    TRANSFORAMINAL EPIDURAL INJECTION OF STEROID Left 08/21/2023    Procedure: Injection,steroid,epidural,transforaminal approach L4/5 L5/S1 Left;  Surgeon: Oswald Abdalla MD;  Location: [facility] OR;  Service: Pain Management;  Laterality: Left;       Family History   Problem Relation Age of Onset    Cataracts Mother     Heart attack  Mother     Heart disease Mother     COPD Mother     Hypertension Mother     COPD Father     Hepatitis Sister     Breast cancer Maternal Grandmother     No Known Problems Daughter     Allergic rhinitis Neg Hx     Angioedema Neg Hx     Asthma Neg Hx     Atopy Neg Hx     Eczema Neg Hx     Immunodeficiency Neg Hx     Urticaria Neg Hx     Colon cancer Neg Hx     Colon polyps Neg Hx     Glaucoma Neg Hx     Macular degeneration Neg Hx     Retinal detachment Neg Hx        Social History     Socioeconomic History    Marital status:     Number of children: 1   Occupational History     Employer: [employer]   Tobacco Use    Smoking status: Former     Current packs/day: 0.00     Average packs/day: 1 pack/day for 5.0 years (5.0 ttl pk-yrs)     Types: Cigarettes     Start date: 1992     Quit date: 1997     Years since quittin.1     Passive exposure: Never    Smokeless tobacco: Never   Substance and Sexual Activity    Alcohol use: Yes     Comment: 3x per year    Drug use: No    Sexual activity: Yes     Partners: Male   Social History Narrative    ** Merged History Encounter **          Social Determinants of Health     Financial Resource Strain: Low Risk  (2024)    Overall Financial Resource Strain (CARDIA)     Difficulty of Paying Living Expenses: Not hard at all   Food Insecurity: No Food Insecurity (2024)    Hunger Vital Sign     Worried About Running Out of Food in the Last Year: Never true     Ran Out of Food in the Last Year: Never true   Transportation Needs: No Transportation Needs (2024)    PRAPARE - Transportation     Lack of Transportation (Medical): No     Lack of Transportation (Non-Medical): No   Physical Activity: Insufficiently Active (2024)    Exercise Vital Sign     Days of Exercise per Week: 3 days     Minutes of Exercise per Session: 30 min   Stress: No Stress Concern Present (2024)    [employer] [place] [employer] - Occupational Stress  "Questionnaire     Feeling of Stress : Not at all   Social Connections: Unknown (1/24/2024)    Social Connection and Isolation Panel [NHANES]     Frequency of Communication with Friends and Family: More than three times a week     Frequency of Social Gatherings with Friends and Family: Once a week     Active Member of Clubs or Organizations: No     Attends Club or Organization Meetings: Never     Marital Status:    Housing Stability: Low Risk  (1/24/2024)    Housing Stability Vital Sign     Unable to Pay for Housing in the Last Year: No     Number of Places Lived in the Last Year: 1     Unstable Housing in the Last Year: No       Current Facility-Administered Medications   Medication Dose Route Frequency Provider Last Rate Last Admin    lactated ringers infusion   Intravenous Continuous Oswald Abdalla MD           Review of patient's allergies indicates:   Allergen Reactions    Hydrocodone Hives and Nausea Only           Sulfa (sulfonamide antibiotics) Hives and Rash           Doxycycline Rash     Itchy rash and some scattered itchy lesions    Oxycodone Itching and Nausea And Vomiting       Vitals:    02/28/24 1135 03/04/24 1516   BP:  121/60   Pulse:  79   Resp:  17   Temp:  98.2 °F (36.8 °C)   TempSrc:  Skin   SpO2:  97%   Weight: 79.4 kg (175 lb) 79.4 kg (175 lb)   Height: 5' 4" (1.626 m) 5' 4" (1.626 m)       ASA 2, Mallampati 2    REVIEW OF SYSTEMS:     GENERAL: No weight loss, malaise or fevers.  HEENT:  No recent changes in vision or hearing  NECK: Negative for lumps, no difficulty with swallowing.  RESPIRATORY: Negative for cough, wheezing or shortness of breath, patient denies any recent URI.  CARDIOVASCULAR: Negative for chest pain, leg swelling or palpitations.  GI: Negative for abdominal discomfort, blood in stools or black stools or change in bowel habits.  MUSCULOSKELETAL: See HPI.  SKIN: Negative for lesions, rash, and itching.  PSYCH: No suicidal or homicidal ideations, no current mood " disturbances.  HEMATOLOGY/LYMPHOLOGY: Negative for prolonged bleeding, bruising easily or swollen nodes. Patient is not currently taking any anti-coagulants  ENDO: No history of diabetes or thyroid dysfunction  NEURO: No history of syncope, paralysis, seizures or tremors.All other reviewed and negative other than HPI.    Physical exam:  Gen: A and O x3, pleasant, well-groomed  Skin: No rashes or obvious lesions  HEENT: PERRLA, no obvious deformities on ears or in canals. No thyroid masses, trachea midline, no palpable lymph nodes in neck, axilla.  CVS: Regular rate and rhythm, normal S1 and S2, no murmurs.  Resp: Clear to auscultation bilaterally.  Abdomen: Soft, NT/ND, normal bowel sounds present.  Musculoskeletal/Neuro: Moving all extremities    Assessment:  Lumbar radiculopathy  -     Case Request Operating Room: Injection,steroid,epidural,transforaminal approach right L4/5 and L5/S1  -     Place in Outpatient; Standing  -     Vital signs; Standing  -     Place 18-22 gauage peripheral IV ; Standing  -     Verify informed consent; Standing  -     Notify physician ; Standing  -     Notify physician ; Standing  -     Notify physician (specify); Standing  -     Diet NPO; Standing  -     lactated ringers infusion    Other orders  -     IP VTE HIGH RISK PATIENT; Standing

## 2024-03-04 NOTE — DISCHARGE SUMMARY
[facility] - Surgery  Discharge Note  Short Stay    Procedure(s) (LRB):  Injection,steroid,epidural,transforaminal approach right L4/5 and L5/S1 (Right)      OUTCOME: Patient tolerated treatment/procedure well without complication and is now ready for discharge.    DISPOSITION: Home or Self Care    FINAL DIAGNOSIS:  Lumbar radiculopathy    FOLLOWUP: In clinic    DISCHARGE INSTRUCTIONS:    Discharge Procedure Orders   Diet Adult Regular     No dressing needed     Notify your health care provider if you experience any of the following:  temperature >100.4     Activity as tolerated

## 2024-03-04 NOTE — PLAN OF CARE
Plan of care discussed with patient. Procedure education provided. All questions and concerns answered. Patient verbalizes understanding.     Aspirin held x6 days; Dr. Abdalla made aware and okay to proceed.

## 2024-03-04 NOTE — OP NOTE
PROCEDURE DATE: 3/4/2024    PROCEDURE: Right L4/5 and L5/S1 transforaminal epidural steroid injection under fluoroscopy    DIAGNOSIS: Lumbar  Radiculopathy    Post op diagnosis: Same    PHYSICIAN: Oswald Abdalla MD    MEDICATIONS INJECTED:  Methylprednisolone 40mg (1ml) and 1ml 0.25% bupivicaine at each nerve root.     LOCAL ANESTHETIC INJECTED:  Lidocaine 1%. 4 ml per site.    SEDATION MEDICATIONS: 4mg versed    ESTIMATED BLOOD LOSS:  none    COMPLICATIONS:  none    TECHNIQUE:   A time-out was taken to identify patient and procedure side prior to starting the procedure. The patient was placed in a prone position, prepped and draped in the usual sterile fashion using ChloraPrep and sterile towels.  The area to be injected was determined under fluoroscopic guidance in AP and oblique view.  Local anesthetic was given by raising a wheal and going down to the hub of a 25-gauge 1.5 inch needle.  In oblique view, a 5 inch 22-gauge bent-tip spinal needle was introduced towards 6 oclock position of the pedicle of each above named nerve root level.  The needle was walked medially then hinged into the neural foramen and position was confirmed in AP and lateral views.  Omnipaque contrast dye was injected to confirm appropriate placement and that there was no vascular uptake.  After negative aspiration for blood or CSF, the medication was then injected. This was performed at the right L4/5 and L5/S1 level(s). The patient tolerated the procedure well.    The patient was monitored after the procedure.  Patient was given post procedure and discharge instructions to follow at home. The patient was discharged in a stable condition.

## 2024-03-25 ENCOUNTER — OFFICE VISIT (OUTPATIENT)
Dept: PAIN MEDICINE | Facility: CLINIC | Age: 64
End: 2024-03-25
Payer: COMMERCIAL

## 2024-03-25 VITALS
SYSTOLIC BLOOD PRESSURE: 134 MMHG | BODY MASS INDEX: 30.41 KG/M2 | HEIGHT: 64 IN | HEART RATE: 82 BPM | DIASTOLIC BLOOD PRESSURE: 82 MMHG | WEIGHT: 178.13 LBS

## 2024-03-25 DIAGNOSIS — M48.061 SPINAL STENOSIS OF LUMBAR REGION WITHOUT NEUROGENIC CLAUDICATION: ICD-10-CM

## 2024-03-25 DIAGNOSIS — M51.36 DDD (DEGENERATIVE DISC DISEASE), LUMBAR: ICD-10-CM

## 2024-03-25 DIAGNOSIS — M54.16 LUMBAR RADICULOPATHY: Primary | ICD-10-CM

## 2024-03-25 PROCEDURE — 3079F DIAST BP 80-89 MM HG: CPT | Mod: CPTII,S$GLB,, | Performed by: PHYSICIAN ASSISTANT

## 2024-03-25 PROCEDURE — 3008F BODY MASS INDEX DOCD: CPT | Mod: CPTII,S$GLB,, | Performed by: PHYSICIAN ASSISTANT

## 2024-03-25 PROCEDURE — 99999 PR PBB SHADOW E&M-EST. PATIENT-LVL IV: CPT | Mod: PBBFAC,,, | Performed by: PHYSICIAN ASSISTANT

## 2024-03-25 PROCEDURE — 99214 OFFICE O/P EST MOD 30 MIN: CPT | Mod: S$GLB,,, | Performed by: PHYSICIAN ASSISTANT

## 2024-03-25 PROCEDURE — 4010F ACE/ARB THERAPY RXD/TAKEN: CPT | Mod: CPTII,S$GLB,, | Performed by: PHYSICIAN ASSISTANT

## 2024-03-25 PROCEDURE — 1159F MED LIST DOCD IN RCRD: CPT | Mod: CPTII,S$GLB,, | Performed by: PHYSICIAN ASSISTANT

## 2024-03-25 PROCEDURE — 1160F RVW MEDS BY RX/DR IN RCRD: CPT | Mod: CPTII,S$GLB,, | Performed by: PHYSICIAN ASSISTANT

## 2024-03-25 PROCEDURE — 3075F SYST BP GE 130 - 139MM HG: CPT | Mod: CPTII,S$GLB,, | Performed by: PHYSICIAN ASSISTANT

## 2024-03-25 RX ORDER — PREGABALIN 50 MG/1
50 CAPSULE ORAL 3 TIMES DAILY
Qty: 90 CAPSULE | Refills: 2 | Status: SHIPPED | OUTPATIENT
Start: 2024-04-14

## 2024-03-25 RX ORDER — TRAMADOL HYDROCHLORIDE 50 MG/1
TABLET ORAL
Qty: 60 TABLET | Refills: 2 | Status: SHIPPED | OUTPATIENT
Start: 2024-04-10

## 2024-03-25 NOTE — PROGRESS NOTES
CHIEF COMPLAINT/REASON FOR VISIT: low back pain, neck pain    History of present illness: The patient is a 63 year old woman with a history of migraines, carpal tunnel syndrome and low back pain since her early 20s.  She is status post right L4/5 and L5/S1 transforaminal epidural steroid injection on 03/04/2024 with 60% relief.  She is pleased with these results.  She does continue to have low back and right leg pain that is worse with being on her feet for too long.  Otherwise her symptoms are tolerable especially with taking Lyrica.  She also continues to take tramadol. She denies weakness or numbness.  She does report intermittent tingling in the right lower leg and foot.  This is intense when she twists a certain way.    Pain intervention history:She is status post TFESI bilaterally to L3 on 12/6/2011 with no relief. Past history of status post L4/5 YARON on 5/25/11 with about 90% relief that lasted 3 weeks. She is status post 2 bilateral L3 transforaminal injections with 3 weeks relief each time.  She is status post L4/5 interlaminar epidural steroid injection on 1/8/14 with 90% relief.  She is status post L5/S1 interlaminar epidural steroid injection on 6/9/14 with moderate relief only on the right low back and right leg lasting 2 weeks.  She is status post bilateral L3/4 and L4/5 facet joint injections on 4/13/15 with initially 100% relief of her back pain and 80% relief of her left lateral hip and lateral thigh pain, now reporting at least 50% relief of both.  She is status post bilateral L2, 3 and 4 medial branch radiofrequency ablation on 5/20/15 with 60% relief.   She is status post bilateral L2, 3 and 4 medial branch radiofrequency ablation on 6/20/16 with 50-70% relief.  She is status post L5/S1 interlaminar epidural steroid injection on 3/10/17 with 50% relief.  She is status post bilateral L2, 3, 4 medial branch radiofrequency ablation on 7/17/17 with about 75% relief.  She is status post bilateral  "L2, 3, 4 medial branch radiofrequency ablation on 07/17/2018 with 100% relief of her leg pain and 70-75% relief of her back pain.  She is status post C7-T1 cervical interlaminar epidural steroid injection on 09/25/2018 with 80% relief.   She is status post L5/S1 interlaminar epidural steroid injection on 12/27/2018 with 60% relief.  She is status post bilateral L2, 3 and 4 medial branch radiofrequency ablation on 07/29/2019 with 40% relief.  She is status post L5/S1 interlaminar epidural steroid injection on 09/04/2019 with almost 100% relief of her right leg pain but 0% relief for back pain. She is status post C7-T1 interlaminar epidural steroid injection on 10/24/2019 with 70% relief.  She is s/p L5/S1 IL YARON with 2 weeks of relief, now 0%.   She is post left L4/5 S1 transforaminal epidural steroid injection on 08/21/2023 with almost 100% relief.   She is status post right L4/5 and L5/S1 transforaminal epidural steroid injection on 03/04/2024 with 60% relief.       Spine surgeries:    Antineuropathics: failed gabapentin, failed Cymbalta due to side effects.  Takes Lyrica twice daily, can not tolerate 3 times a day.  NSAIDs:  Ibuprofen  Physical therapy:  Antidepressants:  Muscle relaxers:  Opioids:  Tramadol 50 mg twice daily as needed  Antiplatelets/Anticoagulants:  ASA 81 mg    ROS: She reports back pain only.  Balance of review of systems is negative.    Medical, surgical, family and social history reviewed elsewhere in record.     Medications/Allergies: See med card      Vitals:    03/25/24 1550   BP: 134/82   Pulse: 82   Weight: 80.8 kg (178 lb 2.1 oz)   Height: 5' 4" (1.626 m)   PainSc:   3   PainLoc: Back        PHYSICAL EXAM:  Gen: A and O x3, pleasant, well-groomed  HEENT: PERRLA  CVS: Regular rate and rhythm  Resp:  No obvious shortness of breath, no increased work of breathing  Musculoskeletal: No antalgic gait.     Neuro:  Upper extremities: 5/5 strength bilaterally   Lower extremities: 5/5 strength " bilaterally  Reflexes: Brachioradialis 2+, Bicep 2+, Tricep 2+.   Patellar 3+, Achilles 2+.  Sensory: Intact and symmetrical to light touch and pinprick in C2-T1 dermatomes bilaterally.Intact and symmetrical to light touch and pinprick in L2-S1 dermatomes bilaterally.    Cervical spine:   Range of motion is mildly reduced with flexion, extension lateral rotation with increased right neck pain during right lateral rotation and extension.    Myofascial exam: Mild tenderness to palpation to the right cervical paraspinous muscles.    Lumbar spine:  Lumbar spine: ROM is moderately limited with flexion and extension with increased right low back pain during extension and oblique extension.  Supine straight leg raise is negative bilaterally.  Internal and external rotation of the hip causes no increased pain in either side.  Myofascial exam:  Moderate tenderness to palpation to the right greater than left lumbar paraspinous muscles.      IMAGING:   MRI L-SPINE 5/10/11   IMPRESSION: AT L3-4, THERE IS CIRCUMFERENTIAL DISC PROTRUSION FOCALLY MORE PROMINENT TO THE LEFT AS WELL AS A SMALL DISC HERNIATION PROTRUDING INFERIORLY BEHIND L4 TO THE LEFT OF MIDLINE. THIS PRODUCES SPINAL CANAL AND BILATERAL NEURAL FORAMINAL STENOSIS, LEFT GREATER THAN RIGHT. AT L4-5, THERE IS CIRCUMFERENTIAL DISC PROTRUSION FOCALLY MORE PROMINENT TO THE RIGHT WITH MILD SPINAL CANAL AND RIGHT NEURAL FORAMINAL STENOSIS. AT L1-2 AND L2-3 ALTHOUGH THERE ARE DISC PROTRUSIONS IDENTIFIED, SIGNIFICANT STENOSIS IS NOT SEEN.     MRI lumbar spine 7/10/14  L1-2:There is chronic relatively advanced disc degeneration with disc space narrowing, disc dehydration, disc narrowing, endplate osteophytes, and degenerative vertebral endplate marrow change. There is a diffuse 2-mm posterior disc bulge with a superimposed4-mm right posterior disc extrusion causing mild right foraminal stenosis. There is no impingement of the right L1 nerve root and there has been no  change.  L2-3: There is severe and chronic disc degeneration with severe disc space narrowing, disc dehydration, degenerative vertebral endplate marrow change, and vertebral endplate osteophytes. There is a diffuse 2-mm posterior disc bulge with a superimposed 4-mm right posterior disc extrusion causing mild right foraminal stenosis. There is no impingement of the right L2 nerve root and there has been no significant change.  L3-4: There is severe disc degeneration which has progressed since the prior study. There is severe disc space narrowing, disc dehydration, degenerative vertebral endplate marrow change and endplate osteophytes. There is also mild posterior subluxation of L3over a distance of 4 mm. There is a broad-based 5-mm posterior disc extrusion. There is degenerative facet arthrosis with ligamentum flavum thickening. The combination of facet arthrosis and disc extrusion results in relatively severe spinal canal stenosis with compression of the thecal sac to an AP diameter of 5 mm, moderate right foraminal stenosis, and severe left foraminal stenosis. The spinal stenosis has also progressed since the prior study.  L4-5:There is a diffuse posterior disc bulge with a superimposed 3-mm left posterior paracentral disc protrusion causing left paracentral thecal sac compression. There is moderate left and mild right foraminal stenosis and there has been no significant change. There is mild degenerative facet arthrosis with ligamentum flavum thickening and small joint effusions.  L5-S1:There is no significant compromise of the spinal canal or foramina and there has been no change.    X-ray cervical spine 6/8/15  There is loss of normal cervical lordosis which may be positional or related muscular strain. Intervertebral disk height loss is noted at the C5-C6 C6-C7 levels and to a lesser degree C4-C5 and C7-T1 levels. Vertebral body alignment appears otherwise adequate. Vertebral body heights appear well-preserved.  The atlas and odontoid appear in good relationship to each other. Osseous neuroforaminal narrowing is noted at the C3-C4 C4-C5 C5-C6 levels on the left and at the C4-C5 C5-C6 and C6-C7 levels on the right     07/10/2018 MRI cervical spine [place] [facility] report  C2-3 broad-based signal asymmetry at the left subarticular and foraminal zone reflecting implant spondylosis and disc bulge complex, mild to moderate left asymmetric foraminal narrowing, ectasia of the left vertebral artery, contralateral right asymmetric facet hypertrophy, right foramen widely narrowed, disc partially desiccated without collapse  C3-4 moderate to severe left greater than right foraminal narrowing secondary to uncinate joint hypertrophic signal alteration, disc partially desiccated  C4-5 endplate spondylosis moderate diffuse disc bulge noted, broad-based right paracentral disc herniation, asymmetric flattening of the ventral cord surface at the right paracentral zone, high-grade if lateral right foraminal narrowing, AP diameter of canal 9.9 mm, contralaterally, high grade left foraminal narrowing secondary to facet greater than uncinate joint hypertrophic signal alteration, disc desiccated and mildly narrowed  C5-6 disc space narrowing evident with generalized in Nigel spondylosis and concentric inter pose disc bulge complex, high-grade bilateral foraminal narrowing, flattening of the cord surface, AP diameter 7.9 mm, symmetric facet arthrosis, disc diffusely desiccated and narrowed  C6-7 moderate endplate spondylosis and concentric disc bulge complex, high-grade bilateral foraminal compromise, flattening of the anterior cord surface, AP diameter 8.8 mm, facet arthrosis symmetric, disc desiccated and narrowed  C7-T1 less than 2 mm depth disc bulge    11/06/2018 MRI lumbar spine  T12/L1: There is no evidence of disc protrusion, canal or foraminal stenosis.  L1/L2: Right posterolateral disc protrusion is evident and there is mild distortion of  the right anterolateral canal.  Degenerative facet changes are noted bilaterally.  The left foramen is intact.  L2/L3: There is annular disc bulging with a superimposed right posterolateral disc extrusion.  This is slightly more pronounced on the previous examination and there is mild effacement of the anterior thecal sac and moderate narrowing the right foramen.  L3/L4: There is annular disc bulge and osteophyte formation with a superimposed small left posterolateral disc extrusion.  There is moderate narrowing the central canal and left foramen.  This is little changed relative the previous examination.  Degenerative facet changes are noted.  L5/S1: There is a small central disc extrusion superimposed upon annular disc bulging.  This is slightly less pronounced than was seen previously.  Degenerative facet changes are noted.  L5/S1: Moderate degenerative facet changes are noted and there is mild effacement of the anterior thecal sac.  There is ligamentous hypertrophy particularly to the left in the posterior canal and there is left greater than right foraminal narrowing.  This is mildly worsened relative prior exam.      ASSESSMENT:  The patient is a 63 year old woman with a history of migraines, carpal tunnel syndrome and low back pain since her early 20s who returns in follow up.   1. Lumbar radiculopathy        2. Spinal stenosis of lumbar region without neurogenic claudication        3. DDD (degenerative disc disease), lumbar              Plan:  1. The patient did well following the right L4/5 and L5/S1 transforaminal epidural steroid injection.  She can contact us to repeat this in the future if necessary.    2. Dr. Abdalla has refilled tramadol 50 mg twice a day and Lyrica 50 mg twice a day. I have reviewed the [place] Board [employer] Pharmacy website and there are no abberancies.    3. Follow-up in 3 months or sooner as needed.

## 2024-04-23 ENCOUNTER — OFFICE VISIT (OUTPATIENT)
Dept: OPHTHALMOLOGY | Facility: CLINIC | Age: 64
End: 2024-04-23
Payer: COMMERCIAL

## 2024-04-23 DIAGNOSIS — H25.041 POSTERIOR SUBCAPSULAR AGE-RELATED CATARACT OF RIGHT EYE: ICD-10-CM

## 2024-04-23 DIAGNOSIS — H25.9 SENILE CATARACT OF LEFT EYE, UNSPECIFIED AGE-RELATED CATARACT TYPE: Primary | ICD-10-CM

## 2024-04-23 DIAGNOSIS — H25.11 NUCLEAR SCLEROTIC CATARACT OF RIGHT EYE: Primary | ICD-10-CM

## 2024-04-23 PROCEDURE — 4010F ACE/ARB THERAPY RXD/TAKEN: CPT | Mod: CPTII,S$GLB,, | Performed by: OPHTHALMOLOGY

## 2024-04-23 PROCEDURE — 99205 OFFICE O/P NEW HI 60 MIN: CPT | Mod: S$GLB,,, | Performed by: OPHTHALMOLOGY

## 2024-04-23 PROCEDURE — 1159F MED LIST DOCD IN RCRD: CPT | Mod: CPTII,S$GLB,, | Performed by: OPHTHALMOLOGY

## 2024-04-23 PROCEDURE — 99999 PR PBB SHADOW E&M-EST. PATIENT-LVL III: CPT | Mod: PBBFAC,,, | Performed by: OPHTHALMOLOGY

## 2024-04-23 RX ORDER — PREDNISOLONE/MOXIFLOX/BROMFEN 1 %-0.5 %
1 SUSPENSION, DROPS(FINAL DOSAGE FORM)(ML) OPHTHALMIC (EYE) 3 TIMES DAILY
Qty: 5 ML | Refills: 3 | Status: SHIPPED | OUTPATIENT
Start: 2024-04-23

## 2024-04-23 NOTE — PROGRESS NOTES
HPI    Dr. Palacios    Trichiasis RLL  Cataracts OU  Allergic conjunctivitis OU    Patient here for cataract evaluation. Patient states decrease in vision   since her last exam. Also has glare problems at night when driving. Didn't   update glasses rx after last exam.  Patient denies diplopia, headaches, flashes/floaters, and pain.      Last edited by Ninoska Brown MA on 4/23/2024  1:54 PM.            Assessment /Plan     For exam results, see Encounter Report.    Senile cataract of left eye, unspecified age-related cataract type    Posterior subcapsular age-related cataract of right eye  -     Ambulatory referral/consult to Ophthalmology      Visually significant nuclear sclerotic cataract   - Interfering with activities of daily living.  Pt desires cataract surgery for [facility] rehabilitation.   - R/B/A discussed and pt agrees to proceed with surgery.   - IOL options discussed according to patient's goals and concomitant ocular pathology; and pt content with monofocal lens.    - Target: near.    Myopic shift OD    Diboo 20.5 OD = near    (Diboo OS) - early, = near

## 2024-05-09 RX ORDER — IBUPROFEN 800 MG/1
800 TABLET ORAL EVERY 6 HOURS PRN
Qty: 30 TABLET | Refills: 0 | Status: SHIPPED | OUTPATIENT
Start: 2024-05-09

## 2024-05-20 ENCOUNTER — OFFICE VISIT (OUTPATIENT)
Dept: FAMILY MEDICINE | Facility: CLINIC | Age: 64
End: 2024-05-20
Payer: COMMERCIAL

## 2024-05-20 DIAGNOSIS — F51.01 PRIMARY INSOMNIA: ICD-10-CM

## 2024-05-20 PROCEDURE — 1159F MED LIST DOCD IN RCRD: CPT | Mod: CPTII,95,, | Performed by: FAMILY MEDICINE

## 2024-05-20 PROCEDURE — 1160F RVW MEDS BY RX/DR IN RCRD: CPT | Mod: CPTII,95,, | Performed by: FAMILY MEDICINE

## 2024-05-20 PROCEDURE — 4010F ACE/ARB THERAPY RXD/TAKEN: CPT | Mod: CPTII,95,, | Performed by: FAMILY MEDICINE

## 2024-05-20 PROCEDURE — 99213 OFFICE O/P EST LOW 20 MIN: CPT | Mod: 95,,, | Performed by: FAMILY MEDICINE

## 2024-05-20 RX ORDER — ESZOPICLONE 3 MG/1
3 TABLET, FILM COATED ORAL NIGHTLY
Qty: 90 TABLET | Refills: 1 | Status: SHIPPED | OUTPATIENT
Start: 2024-05-20

## 2024-05-20 NOTE — PROGRESS NOTES
Subjective:       Patient ID: Elizabeth Giordano is a 63 y.o. female.    Chief Complaint: No chief complaint on file.    The patient location is: [place], [place]  The chief complaint leading to consultation is: sleep med    Visit type: audiovisual    Face to Face time with patient: 7 minutes  8 minutes of total time spent on the encounter, which includes face to face time and non-face to face time preparing to see the patient (eg, review of tests), Obtaining and/or reviewing separately obtained history, Documenting clinical information in the electronic or other health record, Independently interpreting results (not separately reported) and communicating results to the patient/family/caregiver, or Care coordination (not separately reported).         Each patient to whom he or she provides medical services by telemedicine is:  (1) informed of the relationship between the physician and patient and the respective role of any other health care provider with respect to management of the patient; and (2) notified that he or she may decline to receive medical services by telemedicine and may withdraw from such care at any time.    Notes:   Pt is known to me.   The pt continues to require Lunesta for restful sleep.  She has no ill effects nor residual daytime drowsiness.  The pt is compliant with regulations per  website.             Review of Systems   Constitutional:  Negative for activity change and unexpected weight change.   HENT:  Negative for hearing loss, rhinorrhea and trouble swallowing.    Eyes:  Negative for discharge and visual disturbance.   Respiratory:  Negative for chest tightness and wheezing.    Cardiovascular:  Negative for chest pain and palpitations.   Gastrointestinal:  Negative for blood in stool, constipation, diarrhea and vomiting.   Endocrine: Negative for polydipsia and polyuria.   Genitourinary:  Negative for difficulty urinating, dysuria, hematuria and menstrual problem.    Musculoskeletal:  Negative for arthralgias, joint swelling and neck pain.   Neurological:  Negative for weakness and headaches.   Psychiatric/Behavioral:  Negative for confusion and dysphoric mood.        Objective:      Physical Exam  Vitals and nursing note reviewed.   Constitutional:       Appearance: Normal appearance. She is not ill-appearing.   Pulmonary:      Effort: Pulmonary effort is normal.   Neurological:      Mental Status: She is oriented to person, place, and time.   Psychiatric:         Mood and Affect: Mood normal.         Behavior: Behavior normal.         Thought Content: Thought content normal.         Assessment:       1. Primary insomnia        Plan:       Diagnoses and all orders for this visit:    Primary insomnia  -     eszopiclone (LUNESTA) 3 mg Tab; Take 1 tablet (3 mg total) by mouth every evening.      During this visit, I reviewed the pt's history, medications, allergies, and problem list.

## 2024-05-23 ENCOUNTER — PATIENT MESSAGE (OUTPATIENT)
Dept: OPHTHALMOLOGY | Facility: CLINIC | Age: 64
End: 2024-05-23
Payer: COMMERCIAL

## 2024-05-28 ENCOUNTER — TELEPHONE (OUTPATIENT)
Dept: OPHTHALMOLOGY | Facility: CLINIC | Age: 64
End: 2024-05-28
Payer: COMMERCIAL

## 2024-05-28 ENCOUNTER — HOSPITAL ENCOUNTER (OUTPATIENT)
Dept: RADIOLOGY | Facility: HOSPITAL | Age: 64
Discharge: HOME | End: 2024-05-28
Attending: FAMILY MEDICINE
Payer: COMMERCIAL

## 2024-05-28 DIAGNOSIS — Z12.11 COLON CANCER SCREENING: ICD-10-CM

## 2024-05-28 PROCEDURE — 77063 BREAST TOMOSYNTHESIS BI: CPT | Mod: TC,PO

## 2024-05-28 PROCEDURE — 77067 SCR MAMMO BI INCL CAD: CPT | Mod: 26,,, | Performed by: RADIOLOGY

## 2024-05-28 PROCEDURE — 77063 BREAST TOMOSYNTHESIS BI: CPT | Mod: 26,,, | Performed by: RADIOLOGY

## 2024-05-28 NOTE — TELEPHONE ENCOUNTER
Spoke with pt provided arrival time of 10:00 for sx on 5/29/24 with Dr. Dalton @ [facility]. Pt has eyedrops and packet.

## 2024-05-28 NOTE — TELEPHONE ENCOUNTER
called to inquire about pt call. Pt wanted to know arrival time for sx tomorrow. Informed pt someone should be calling her with arrival time today.    ----- Message from Hollis Meyer sent at 5/28/2024 11:14 AM CDT -----  Type: General Call Back         Name of Caller:pt  Would the patient rather a call back or a response via [facility]? call  Best Call Back Number:[phone]   Additional Information: Pt would like a call regarding her pre op for procedure on tomorrow 05/29. Please call pt with further info and assistance. Thank you.

## 2024-05-28 NOTE — PRE-PROCEDURE INSTRUCTIONS
Unable to reach pt via phone.  Left voicemail with arrival time also informing pt of need for responsible  accompaniment and instructing pt to follow pre-procedure instructions provided via [facility] portal.  The following message was sent to pt's portal.      Dear Elizabeth Narayan you will find basic pre-procedure instructions in preparation for your procedure on 5/29/24 with Dr. Dalton     You should have received your arrival time already from Dr's office.     - Nothing to eat or drink after midnight the night before your procedure until after your procedure, except AM meds with small sips of water.     - HOLD all oral Diabetic medications night before and morning of procedure  - HOLD all Insulin morning of procedure  - HOLD all Fluid pills morning of procedure  - HOLD all non-insulin shots until after surgery (Ozempic, Mounjaro, Trulicity, Victoza, Byetta, Wegovy and Adlyxin) (7 days prior)  - HOLD all vitamins, minerals and herbal supplements morning of procedure   - TAKE all B/P meds, EXCEPT those that contain a fluid pill (ex. Lasix, Hydroclorothiazide/HCTZ, Spirnolactone)  - USE inhalers as needed and bring AM of surgery  - USE EYE DROPS as directed  -TAKE blood thinner meds AM of surgery unless otherwise instructed by your provider to not take     - Shower and wash face with dial soap for 3 mins PM prior and AM of surgery  - No powder, lotions, creams, oils, gels, ointments, makeup,  or jewelry    - Wear comfortable clothing (button up shirt)     (Patient is required to have a responsible ride to transport home, ride may not leave while patient is in surgery)     -- [facility], 2nd floor Surgery Center, located   @ [address] [place] [address]  2nd Floor Registration        If you have any questions or concerns please feel free to contact your surgeon's office.           Please reply to this message as receipt of delivery.     Cat[place], LPN [facility]  Pre-Admit - Anesthesia Dept

## 2024-05-28 NOTE — H&P
History    Chief complaint:  Painless progressive vision loss    Present Ilness/Diagnosis: Nuclear sclerotic Cataract    Past Medical History:  has a past medical history of Allergy, Arthritis of spine (2011), Cataract, Chronic low back pain, Class 2 obesity with body mass index (BMI) of 39.0 to 39.9 in adult (01/11/2019), Coronary artery disease, DDD (degenerative disc disease), lumbar, Diverticulitis, Diverticulosis, DJD (degenerative joint disease), Encounter for blood transfusion, Factor V Leiden, GERD (gastroesophageal reflux disease), Hiatal hernia, Hypertension, Migraines, PONV (postoperative nausea and vomiting), Schatzki's ring, and Urticaria.    Family History/Social History: refer to chart    Allergies:   Review of patient's allergies indicates:   Allergen Reactions    Hydrocodone Hives and Nausea Only           Sulfa (sulfonamide antibiotics) Hives and Rash           Doxycycline Rash     Itchy rash and some scattered itchy lesions    Oxycodone Itching and Nausea And Vomiting       Current Medications: No current facility-administered medications for this encounter.    Current Outpatient Medications:     amLODIPine (NORVASC) 5 MG tablet, Take 1 tablet (5 mg total) by mouth once daily., Disp: 90 tablet, Rfl: 2    ascorbic acid, vitamin C, (VITAMIN C) 500 MG tablet, Take 1 tablet (500 mg total) by mouth 2 (two) times daily., Disp: , Rfl:     aspirin (ECOTRIN) 81 MG EC tablet, Take 81 mg by mouth once daily., Disp: , Rfl:     atorvastatin (LIPITOR) 10 MG tablet, Take 1 tablet (10 mg total) by mouth once daily., Disp: 90 tablet, Rfl: 2    azelastine (ASTELIN) 137 mcg (0.1 %) nasal spray, 1 spray 2 (two) times daily., Disp: , Rfl:     cimetidine (TAGAMET) 800 MG tablet, TAKE 1 TABLET BY MOUTH EVERY EVENING, Disp: 90 tablet, Rfl: 3    estradioL (ESTRACE) 1 MG tablet, Take 1 tablet (1 mg total) by mouth once daily. Can cause blood clots with covid, recommend NOT taking this medication until covid symptoms  completely gone and discussing with PCP, Disp: , Rfl: 3    eszopiclone (LUNESTA) 3 mg Tab, Take 1 tablet (3 mg total) by mouth every evening., Disp: 90 tablet, Rfl: 1    finasteride (PROSCAR) 5 mg tablet, Take 1 tablet (5 mg total) by mouth once daily., Disp: 30 tablet, Rfl: 11    fluticasone propionate (FLONASE) 50 mcg/actuation nasal spray, 1 spray (50 mcg total) by Each Nostril route Daily., Disp: 16 g, Rfl: 3    galcanezumab-gnlm (EMGALITY PEN) 120 mg/mL PnIj, Inject 1 pen (120 mg total) into the skin every 28 days., Disp: 1 mL, Rfl: 11    ibuprofen (ADVIL,MOTRIN) 800 MG tablet, TAKE 1 TABLET(800 MG) BY MOUTH EVERY 6 HOURS AS NEEDED FOR PAIN, Disp: 30 tablet, Rfl: 0    LIDOcaine (LIDODERM) 5 %, PLACE 1 PATCH ON SKIN DAILY AND REMOVE WITHIN 12 HOURS, Disp: 30 patch, Rfl: 5    losartan (COZAAR) 100 MG tablet, Take 1 tablet (100 mg total) by mouth once daily., Disp: 90 tablet, Rfl: 3    ondansetron (ZOFRAN-ODT) 4 MG TbDL, Take 1 tablet (4 mg total) by mouth every 6 (six) hours as needed (nausea)., Disp: 20 tablet, Rfl: 1    pantoprazole (PROTONIX) 40 MG tablet, TAKE 1 TABLET(40 MG) BY MOUTH EVERY DAY, Disp: 90 tablet, Rfl: 3    prednisoLONE-moxiflox-bromfen 1-0.5-0.075 % DrpS, Apply 1 drop to eye 3 (three) times daily., Disp: 5 mL, Rfl: 3    pregabalin (LYRICA) 50 MG capsule, Take 1 capsule (50 mg total) by mouth 3 (three) times daily., Disp: 90 capsule, Rfl: 2    traMADoL (ULTRAM) 50 mg tablet, TAKE 1 TABLET BY MOUTH EVERY 12 HOURS AS NEEDED FOR PAIN, Disp: 60 tablet, Rfl: 2    ubrogepant (UBRELVY) 50 mg tablet, Take 1 tablet by mouth at onset of migraine. May repeat in 2 hours if needed. Max 2 tablets per day., Disp: 10 tablet, Rfl: 11    vitamin D (VITAMIN D3) 1000 units Tab, Take 1 tablet (1,000 Units total) by mouth once daily., Disp: , Rfl:     zinc sulfate (ZINCATE) 220 (50) mg capsule, Take 1 capsule (220 mg total) by mouth once daily., Disp: , Rfl:     ASA Score: II     Mallampati Score: II     Plan for  Sedation: Moderate Sedation     Patient or Family History of Anesthesia problems : No    Physical Exam    BP: Vital signs stable  General: No apparent distress  HEENT: nuclear sclerotic cataract  Lungs: adequate respirations  Heart: + pulses  Abdomen: soft  Rectal/pelvic: deferred    Impression: Visually significant Cataract.    See previous clinic notes for surgical indications.    Plan: Phacoemulsification with implantation of Intraocular lens

## 2024-05-29 ENCOUNTER — HOSPITAL ENCOUNTER (OUTPATIENT)
Facility: HOSPITAL | Age: 64
Discharge: HOME | End: 2024-05-29
Attending: OPHTHALMOLOGY | Admitting: OPHTHALMOLOGY
Payer: COMMERCIAL

## 2024-05-29 VITALS
SYSTOLIC BLOOD PRESSURE: 109 MMHG | HEART RATE: 72 BPM | OXYGEN SATURATION: 100 % | DIASTOLIC BLOOD PRESSURE: 57 MMHG | TEMPERATURE: 98 F | HEIGHT: 64 IN | RESPIRATION RATE: 18 BRPM | WEIGHT: 178 LBS | BODY MASS INDEX: 30.39 KG/M2

## 2024-05-29 DIAGNOSIS — H25.11 NUCLEAR SCLEROTIC CATARACT OF RIGHT EYE: Primary | ICD-10-CM

## 2024-05-29 DIAGNOSIS — H25.10 AGE-RELATED NUCLEAR CATARACT: ICD-10-CM

## 2024-05-29 PROCEDURE — 94760 N-INVAS EAR/PLS OXIMETRY 1: CPT

## 2024-05-29 PROCEDURE — 36000707: Performed by: OPHTHALMOLOGY

## 2024-05-29 PROCEDURE — 66984 XCAPSL CTRC RMVL W/O ECP: CPT | Mod: RT,,, | Performed by: OPHTHALMOLOGY

## 2024-05-29 PROCEDURE — 63600175 PHARM REV CODE 636 W HCPCS: Performed by: OPHTHALMOLOGY

## 2024-05-29 PROCEDURE — 36000706: Performed by: OPHTHALMOLOGY

## 2024-05-29 PROCEDURE — 99152 MOD SED SAME PHYS/QHP 5/>YRS: CPT | Mod: ,,, | Performed by: OPHTHALMOLOGY

## 2024-05-29 PROCEDURE — 25000003 PHARM REV CODE 250: Performed by: OPHTHALMOLOGY

## 2024-05-29 PROCEDURE — 27201423 OPTIME MED/SURG SUP & DEVICES STERILE SUPPLY: Performed by: OPHTHALMOLOGY

## 2024-05-29 PROCEDURE — 99152 MOD SED SAME PHYS/QHP 5/>YRS: CPT | Performed by: OPHTHALMOLOGY

## 2024-05-29 PROCEDURE — 99900035 HC TECH TIME PER 15 MIN (STAT)

## 2024-05-29 PROCEDURE — V2632 POST CHMBR INTRAOCULAR LENS: HCPCS | Performed by: OPHTHALMOLOGY

## 2024-05-29 PROCEDURE — 71000015 HC POSTOP RECOV 1ST HR: Performed by: OPHTHALMOLOGY

## 2024-05-29 DEVICE — LENS EYHANCE +20.0D: Type: IMPLANTABLE DEVICE | Site: EYE | Status: FUNCTIONAL

## 2024-05-29 RX ORDER — TROPICAMIDE 10 MG/ML
1 SOLUTION/ DROPS OPHTHALMIC
Status: COMPLETED | OUTPATIENT
Start: 2024-05-29 | End: 2024-05-29

## 2024-05-29 RX ORDER — PHENYLEPHRINE HYDROCHLORIDE 100 MG/ML
1 SOLUTION/ DROPS OPHTHALMIC
Status: DISCONTINUED | OUTPATIENT
Start: 2024-05-29 | End: 2024-05-29 | Stop reason: HOSPADM

## 2024-05-29 RX ORDER — FENTANYL CITRATE 50 UG/ML
25 INJECTION, SOLUTION INTRAMUSCULAR; INTRAVENOUS EVERY 5 MIN PRN
Status: DISCONTINUED | OUTPATIENT
Start: 2024-05-29 | End: 2024-05-29 | Stop reason: HOSPADM

## 2024-05-29 RX ORDER — MOXIFLOXACIN 5 MG/ML
1 SOLUTION/ DROPS OPHTHALMIC
Status: COMPLETED | OUTPATIENT
Start: 2024-05-29 | End: 2024-05-29

## 2024-05-29 RX ORDER — LIDOCAINE HYDROCHLORIDE 10 MG/ML
INJECTION, SOLUTION EPIDURAL; INFILTRATION; INTRACAUDAL; PERINEURAL
Status: DISCONTINUED | OUTPATIENT
Start: 2024-05-29 | End: 2024-05-29 | Stop reason: HOSPADM

## 2024-05-29 RX ORDER — PROPARACAINE HYDROCHLORIDE 5 MG/ML
SOLUTION/ DROPS OPHTHALMIC
Status: DISCONTINUED | OUTPATIENT
Start: 2024-05-29 | End: 2024-05-29 | Stop reason: HOSPADM

## 2024-05-29 RX ORDER — PREDNISOLONE ACETATE 10 MG/ML
SUSPENSION/ DROPS OPHTHALMIC
Status: DISCONTINUED | OUTPATIENT
Start: 2024-05-29 | End: 2024-05-29 | Stop reason: HOSPADM

## 2024-05-29 RX ORDER — PHENYLEPHRINE HYDROCHLORIDE 25 MG/ML
1 SOLUTION/ DROPS OPHTHALMIC
Status: COMPLETED | OUTPATIENT
Start: 2024-05-29 | End: 2024-05-29

## 2024-05-29 RX ORDER — LIDOCAINE HYDROCHLORIDE 40 MG/ML
INJECTION, SOLUTION RETROBULBAR
Status: DISCONTINUED | OUTPATIENT
Start: 2024-05-29 | End: 2024-05-29 | Stop reason: HOSPADM

## 2024-05-29 RX ORDER — MIDAZOLAM HYDROCHLORIDE 1 MG/ML
2 INJECTION, SOLUTION INTRAMUSCULAR; INTRAVENOUS
Status: DISCONTINUED | OUTPATIENT
Start: 2024-05-29 | End: 2024-05-29 | Stop reason: HOSPADM

## 2024-05-29 RX ORDER — PROPARACAINE HYDROCHLORIDE 5 MG/ML
1 SOLUTION/ DROPS OPHTHALMIC
Status: DISCONTINUED | OUTPATIENT
Start: 2024-05-29 | End: 2024-05-29 | Stop reason: HOSPADM

## 2024-05-29 RX ORDER — TETRACAINE HYDROCHLORIDE 5 MG/ML
1 SOLUTION OPHTHALMIC
Status: COMPLETED | OUTPATIENT
Start: 2024-05-29 | End: 2024-05-29

## 2024-05-29 RX ORDER — MOXIFLOXACIN 5 MG/ML
1 SOLUTION/ DROPS OPHTHALMIC
Status: DISCONTINUED | OUTPATIENT
Start: 2024-05-29 | End: 2024-05-29 | Stop reason: HOSPADM

## 2024-05-29 RX ORDER — ACETAMINOPHEN 325 MG/1
650 TABLET ORAL EVERY 4 HOURS PRN
Status: DISCONTINUED | OUTPATIENT
Start: 2024-05-29 | End: 2024-05-29 | Stop reason: HOSPADM

## 2024-05-29 RX ORDER — MOXIFLOXACIN 5 MG/ML
SOLUTION/ DROPS OPHTHALMIC
Status: DISCONTINUED | OUTPATIENT
Start: 2024-05-29 | End: 2024-05-29 | Stop reason: HOSPADM

## 2024-05-29 RX ADMIN — PHENYLEPHRINE HYDROCHLORIDE 1 DROP: 25 SOLUTION/ DROPS OPHTHALMIC at 11:05

## 2024-05-29 RX ADMIN — MIDAZOLAM HYDROCHLORIDE 2 MG: 1 INJECTION, SOLUTION INTRAMUSCULAR; INTRAVENOUS at 12:05

## 2024-05-29 RX ADMIN — MOXIFLOXACIN OPHTHALMIC 1 DROP: 5 SOLUTION/ DROPS OPHTHALMIC at 11:05

## 2024-05-29 RX ADMIN — TROPICAMIDE 1 DROP: 10 SOLUTION/ DROPS OPHTHALMIC at 11:05

## 2024-05-29 RX ADMIN — TETRACAINE HYDROCHLORIDE 1 DROP: 5 SOLUTION OPHTHALMIC at 11:05

## 2024-05-29 NOTE — DISCHARGE INSTRUCTIONS
Dr. Dalton     Cataract Post-Operative Instructions       Day of surgery:     -Resume drops THREE times daily into the operative eye.     -Do not rub your eye     -Wear protective sunglasses during the day.     -Resume moderate activity.     -Bathe/shower/wash face normally     -Do not apply makeup around the operative eye for 1 week.     -You should expect:     Blurry vision and halos for 24-48 hours     Dilated pupil for 24-48 hours     Scratchy feeling in the eye for 1-2 days     Curved shadow in your peripheral vision for 2-3 weeks     Occasional flickering of lights for up to 1 week     -If you experience severe pain or nausea, call Dr. Dalton or the on-call doctor at [phone].       Plan to see Dr. Dalton at:     [facility] [address]     1st [place] [address]    **Most patients can drive the next morning.  If you do not feel comfortable driving, please arrange for transportation. **

## 2024-05-29 NOTE — DISCHARGE SUMMARY
[facility]  Discharge Note  Short Stay    Procedure(s) (LRB):  EXTRACTION, CATARACT, WITH IOL INSERTION (Right)      BRIEF DISCHARGE NOTE:    Date of discharge: 05/29/2024    Reason for hospitalization -  Cataract surgery     Final Diagnosis - Visually significant Cataract    Procedures and treatment provided - Status post phacoemulsification with placement of intraocular lens     Diet - Advance to regular as tolerated    Activity - as tolerated    Disposition at the end of the case - Good.    Discharge: to home    The patient tolerated the procedure well and knows to follow up with me tomorrow morning in the eye clinic, sooner if needed.    Patient and family instructions (as appropriate) - Given to patient on discharge    Adelia Dalton MD

## 2024-05-29 NOTE — OP NOTE
DATE OF PROCEDURE: 05/29/2024    SURGEON: KARISHMA RIVERA MD    PREOPERATIVE DIAGNOSIS:  Senile nuclear sclerotic cataract right eye.     POSTOPERATIVE DIAGNOSIS: Senile nuclear sclerotic cataract right eye.     PROCEDURE PERFORMED:  Phacoemulsification with placement of intraocular lens, right eye.    IMPLANT:  DIBOO  20.0    Anesthesia: Moderate sedation with topical Lidocaine. Patient ID confirmed and re-evaluated the patient and anesthesia plan confirming it is suitable for the patient's condition and procedure.     COMPLICATIONS: none    ESTIMATED BLOOD LOSS: <1cc    SPECIMENS: none    INDICATIONS FOR PROCEDURE:   The patient has a history of painless progressive vision loss.  The patient has described difficulties with activities of daily living, which specifically include driving, which is secondary to cataract formation and progression. After we had a thorough discussion about risks, benefits, and alternatives to cataract surgery, the patient agreed to proceed with phacoemulsification and implantation of a lens in the right eye.  These risks include, but are not limited to, hemorrhage, pain, infection, need for additional surgery, need for glasses or contacts, loss of vision, or even loss of the eye.    PROCEDURE IN DETAIL:  The patient was met in the preop holding area.  Consent was confirmed to be signed.  The operative site was marked.  The patient was brought into the operating room by the anesthesia team and placed under monitored anesthesia care.  The right eye was prepped and draped in a sterile ophthalmic fashion.  A Manohar speculum was placed into the right eye.   A paracentesis site was made and 1% preservative-free lidocaine was injected into the anterior chamber.  Viscoelastic  material was injected into the anterior chamber.  A keratome blade was used to make a clear corneal incision.  A cystotome was used to initiate the continuous curvilinear capsulorrhexis which was completed with Utrata  forceps.  BSS on a white cannula was used to perform hydrodissection.  The phacoemulsification tip was introduced into the eye and the nucleus was removed in a standard divide-and-conquer fashion.  Remaining cortical material was removed from the eye using irrigation-aspiration.  The capsular bag was filled with viscoelastic material and the intraocular lens was injected and positioned into place. Remaining viscoelastic material was removed from the eye using irrigation and aspiration.  The corneal wounds were hydrated.  The eye was filled to physiologic pressure. The wounds were found to be watertight. Drops of Vigamox and prednisilone were placed into the eye.  The eye was washed, dried, and shielded.  The patient tolerated the procedure well and knows to follow up with me tomorrow morning, sooner if needed.      Under my direct supervision, intravenous moderate sedation was administered during the course of this procedure, with continuous monitoring of hemodynamic parameters.  Monitoring of the patient's vital signs and respiratory status was provided by trained nursing staff during the entire course of the procedure and under my supervision and recorded in the patient's medical record.  The total time for sedation was 10 minutes.

## 2024-05-30 ENCOUNTER — OFFICE VISIT (OUTPATIENT)
Dept: OPHTHALMOLOGY | Facility: CLINIC | Age: 64
End: 2024-05-30
Payer: COMMERCIAL

## 2024-05-30 DIAGNOSIS — H25.12 NUCLEAR SCLEROSIS OF LEFT EYE: ICD-10-CM

## 2024-05-30 DIAGNOSIS — Z96.1 STATUS POST CATARACT EXTRACTION AND INSERTION OF INTRAOCULAR LENS, RIGHT: Primary | ICD-10-CM

## 2024-05-30 DIAGNOSIS — Z98.41 STATUS POST CATARACT EXTRACTION AND INSERTION OF INTRAOCULAR LENS, RIGHT: Primary | ICD-10-CM

## 2024-05-30 PROCEDURE — 1159F MED LIST DOCD IN RCRD: CPT | Mod: CPTII,S$GLB,, | Performed by: OPHTHALMOLOGY

## 2024-05-30 PROCEDURE — 4010F ACE/ARB THERAPY RXD/TAKEN: CPT | Mod: CPTII,S$GLB,, | Performed by: OPHTHALMOLOGY

## 2024-05-30 PROCEDURE — 99024 POSTOP FOLLOW-UP VISIT: CPT | Mod: S$GLB,,, | Performed by: OPHTHALMOLOGY

## 2024-05-30 PROCEDURE — 99999 PR PBB SHADOW E&M-EST. PATIENT-LVL III: CPT | Mod: PBBFAC,,, | Performed by: OPHTHALMOLOGY

## 2024-05-30 NOTE — PROGRESS NOTES
HPI     Post-op Evaluation     Additional comments: 1 day phaco OD           Comments    Dr. Palacios     S/p phaco OD 5/29/24  Trichiasis RLL   Cataracts OS   Allergic conjunctivitis OU     PMB TID OD    Patient here for 1 day phaco OD.   Pt. States vision is doing great.  Pt. Denies pain or discomfort.            Last edited by Kera Razo on 5/30/2024  1:28 PM.            Assessment /Plan     For exam results, see Encounter Report.    Status post cataract extraction and insertion of intraocular lens, right    Nuclear sclerosis of left eye                       Slit Lamp Exam  L/L - normal  C/s - quiet  Cornea - clear  A/C - 1+ cell  Lens - PCIOL    POD #1 s/p phaco/IOL  - doing well  - continue the following drops:    vigamox or ocuflox TID x 1 wk then stop  Pred forte TID x  4 wks  Ketorolac TID until runs out    Versus:    Combination drop - 1 drop TID x total of 1 month    Appropriate precautions and post op medications reviewed.  Patient instructed to call or come in if symptoms of redness, decreased vision, or pain are experienced.    -f/up 1-2 wks, sooner PRN. Or 4 wks with optom for mrx if needed.

## 2024-06-04 ENCOUNTER — PATIENT MESSAGE (OUTPATIENT)
Dept: OPTOMETRY | Facility: CLINIC | Age: 64
End: 2024-06-04
Payer: COMMERCIAL

## 2024-06-06 ENCOUNTER — OFFICE VISIT (OUTPATIENT)
Dept: OPTOMETRY | Facility: CLINIC | Age: 64
End: 2024-06-06
Payer: COMMERCIAL

## 2024-06-06 DIAGNOSIS — Z98.41 STATUS POST CATARACT EXTRACTION AND INSERTION OF INTRAOCULAR LENS, RIGHT: Primary | ICD-10-CM

## 2024-06-06 DIAGNOSIS — Z96.1 STATUS POST CATARACT EXTRACTION AND INSERTION OF INTRAOCULAR LENS, RIGHT: Primary | ICD-10-CM

## 2024-06-06 PROCEDURE — 99999 PR PBB SHADOW E&M-EST. PATIENT-LVL III: CPT | Mod: PBBFAC,,,

## 2024-06-06 PROCEDURE — 99024 POSTOP FOLLOW-UP VISIT: CPT | Mod: S$GLB,,,

## 2024-06-06 PROCEDURE — 4010F ACE/ARB THERAPY RXD/TAKEN: CPT | Mod: CPTII,S$GLB,,

## 2024-06-06 PROCEDURE — 1159F MED LIST DOCD IN RCRD: CPT | Mod: CPTII,S$GLB,,

## 2024-06-06 NOTE — PROGRESS NOTES
"HPI    Pt here for 1 wk PO s/p cat sx OD    Pt sts VA is "weird," but happy with vision. Has occasional sharp pain   lasting a second or so. Pt using combo gtt TID OD.   Last edited by Gold Palacios, OD on 6/6/2024 12:44 PM.            Assessment /Plan     For exam results, see Encounter Report.    Status post cataract extraction and insertion of intraocular lens, right      Pt presented for one week post-op s/p CE w/IOL OD 05/29/24. Pt doing well and reports good compliance with combo drop TID OD. PCIOL centered and clear. Trace edema at incision site with trace cells in anterior chamber. Good IOP.  Ed pt on all findings and emphasized the importance of continued good compliance with post-op drops TID. Advised OTC artificial tears in-between to help with intermittent sharp pain. Ed pt to RTC asap if any changes in vision or other issues arise. Otherwise, follow-up as scheduled for one month post-op.    RTC: as scheduled for one month post-op, sooner prn.                 "

## 2024-06-14 ENCOUNTER — PATIENT MESSAGE (OUTPATIENT)
Dept: OPHTHALMOLOGY | Facility: CLINIC | Age: 64
End: 2024-06-14
Payer: COMMERCIAL

## 2024-06-18 DIAGNOSIS — M25.562 LEFT KNEE PAIN, UNSPECIFIED CHRONICITY: Primary | ICD-10-CM

## 2024-06-19 ENCOUNTER — HOSPITAL ENCOUNTER (OUTPATIENT)
Dept: RADIOLOGY | Facility: HOSPITAL | Age: 64
Discharge: HOME | End: 2024-06-19
Attending: ORTHOPAEDIC SURGERY
Payer: COMMERCIAL

## 2024-06-19 ENCOUNTER — OFFICE VISIT (OUTPATIENT)
Dept: ORTHOPEDICS | Facility: CLINIC | Age: 64
End: 2024-06-19
Payer: COMMERCIAL

## 2024-06-19 VITALS — BODY MASS INDEX: 30.38 KG/M2 | RESPIRATION RATE: 18 BRPM | HEIGHT: 64 IN | WEIGHT: 177.94 LBS

## 2024-06-19 DIAGNOSIS — M17.12 PRIMARY OSTEOARTHRITIS OF LEFT KNEE: Primary | ICD-10-CM

## 2024-06-19 DIAGNOSIS — M17.12 PRIMARY OSTEOARTHRITIS OF LEFT KNEE: ICD-10-CM

## 2024-06-19 DIAGNOSIS — M25.562 LEFT KNEE PAIN, UNSPECIFIED CHRONICITY: ICD-10-CM

## 2024-06-19 DIAGNOSIS — M25.562 LEFT KNEE PAIN, UNSPECIFIED CHRONICITY: Primary | ICD-10-CM

## 2024-06-19 PROCEDURE — 20610 DRAIN/INJ JOINT/BURSA W/O US: CPT | Mod: LT,S$GLB,, | Performed by: ORTHOPAEDIC SURGERY

## 2024-06-19 PROCEDURE — 99214 OFFICE O/P EST MOD 30 MIN: CPT | Mod: 25,S$GLB,, | Performed by: ORTHOPAEDIC SURGERY

## 2024-06-19 PROCEDURE — 1159F MED LIST DOCD IN RCRD: CPT | Mod: CPTII,S$GLB,, | Performed by: ORTHOPAEDIC SURGERY

## 2024-06-19 PROCEDURE — 99999 PR PBB SHADOW E&M-EST. PATIENT-LVL IV: CPT | Mod: PBBFAC,,, | Performed by: ORTHOPAEDIC SURGERY

## 2024-06-19 PROCEDURE — 73564 X-RAY EXAM KNEE 4 OR MORE: CPT | Mod: 26,LT,, | Performed by: RADIOLOGY

## 2024-06-19 PROCEDURE — 1160F RVW MEDS BY RX/DR IN RCRD: CPT | Mod: CPTII,S$GLB,, | Performed by: ORTHOPAEDIC SURGERY

## 2024-06-19 PROCEDURE — 4010F ACE/ARB THERAPY RXD/TAKEN: CPT | Mod: CPTII,S$GLB,, | Performed by: ORTHOPAEDIC SURGERY

## 2024-06-19 PROCEDURE — 3008F BODY MASS INDEX DOCD: CPT | Mod: CPTII,S$GLB,, | Performed by: ORTHOPAEDIC SURGERY

## 2024-06-19 PROCEDURE — 73564 X-RAY EXAM KNEE 4 OR MORE: CPT | Mod: TC,PO,LT

## 2024-06-19 PROCEDURE — 73562 X-RAY EXAM OF KNEE 3: CPT | Mod: 26,59,RT, | Performed by: RADIOLOGY

## 2024-06-19 RX ADMIN — TRIAMCINOLONE ACETONIDE 40 MG: 40 INJECTION, SUSPENSION INTRA-ARTICULAR; INTRAMUSCULAR at 03:06

## 2024-06-20 RX ORDER — TRIAMCINOLONE ACETONIDE 40 MG/ML
40 INJECTION, SUSPENSION INTRA-ARTICULAR; INTRAMUSCULAR
Status: DISCONTINUED | OUTPATIENT
Start: 2024-06-19 | End: 2024-06-20 | Stop reason: HOSPADM

## 2024-06-20 NOTE — PROCEDURES
Large Joint Aspiration/Injection: L knee    Date/Time: 6/19/2024 3:45 PM    Performed by: Vaughn Ortiz MD  Authorized by: Vaughn Ortiz MD    Consent Done?:  Yes (Verbal)  Indications:  Pain  Site marked: the procedure site was marked    Timeout: prior to procedure the correct patient, procedure, and site was verified    Local anesthetic:  Lidocaine 1% without epinephrine and bupivacaine 0.25% without epinephrine  Anesthetic total (ml):  6      Details:  Needle Size:  20 G  Approach:  Anterolateral  Location:  Knee  Site:  L knee  Medications:  40 mg triamcinolone acetonide 40 mg/mL  Patient tolerance:  Patient tolerated the procedure well with no immediate complications

## 2024-06-20 NOTE — PROGRESS NOTES
Past Medical History:   Diagnosis Date    Allergy     Arthritis of spine 2011    Cataract     Chronic low back pain     Class 2 obesity with body mass index (BMI) of 39.0 to 39.9 in adult 01/11/2019    Coronary artery disease     mild    DDD (degenerative disc disease), lumbar     Diverticulitis     Diverticulosis     DJD (degenerative joint disease)     Encounter for blood transfusion     Factor V Leiden     GERD (gastroesophageal reflux disease)     Hiatal hernia     Hypertension     Migraines     PONV (postoperative nausea and vomiting)     geta    Schatzki's ring     Urticaria        Past Surgical History:   Procedure Laterality Date    APPENDECTOMY  1981    CARPAL TUNNEL RELEASE  2011    right    CATARACT EXTRACTION W/  INTRAOCULAR LENS IMPLANT Right 5/29/2024    Procedure: EXTRACTION, CATARACT, WITH IOL INSERTION;  Surgeon: Adelia Dalton MD;  Location: [facility] OR;  Service: Ophthalmology;  Laterality: Right;    COLONOSCOPY  2014    Dr. Palomares; reduntant colon, otherwise normal findings repeat in 8-10 years for surveillance    Epidural Steroid Injection      Pain Management    EPIDURAL STEROID INJECTION INTO CERVICAL SPINE N/A 09/25/2018    Procedure: Injection-steroid-epidural-cervical;  Surgeon: Oswald Abdalla MD;  Location: [facility] OR;  Service: Pain Management;  Laterality: N/A;    EPIDURAL STEROID INJECTION INTO CERVICAL SPINE N/A 10/24/2019    Procedure: Injection-steroid-epidural-cervical;  Surgeon: Oswald Abdalla MD;  Location: [facility] OR;  Service: Pain Management;  Laterality: N/A;    EPIDURAL STEROID INJECTION INTO CERVICAL SPINE N/A 02/22/2023    Procedure: Injection-steroid-epidural-cervical C7-T1;  Surgeon: Oswald Abdalla MD;  Location: [facility] OR;  Service: Pain Management;  Laterality: N/A;    EPIDURAL STEROID INJECTION INTO LUMBAR SPINE N/A 12/27/2018    Procedure: Injection-steroid-epidural-lumbar L5/S1;  Surgeon: Oswald Abdalla MD;  Location: [facility] OR;  Service: Pain Management;   Laterality: N/A;    EPIDURAL STEROID INJECTION INTO LUMBAR SPINE N/A 09/04/2019    Procedure: Injection-steroid-epidural-lumbar;  Surgeon: Oswald Abdalla MD;  Location: [facility] OR;  Service: Pain Management;  Laterality: N/A;  L5/S1 to right    EPIDURAL STEROID INJECTION INTO LUMBAR SPINE N/A 06/16/2023    Procedure: Injection-steroid-epidural-lumbar L5/S1;  Surgeon: Oswald Abdalla MD;  Location: [facility] OR;  Service: Pain Management;  Laterality: N/A;    ESOPHAGOGASTRODUODENOSCOPY  05/17/2016    Dr. Arreguin; small hiatal hernia; gastritis    ESOPHAGOGASTRODUODENOSCOPY N/A 06/22/2020    Dr. Arreguin; mild Schatzki ring- dilated; small hiatal hernia; bx unremarkable    Facet injection      Pain Management    HYSTERECTOMY      ovaries removed    KIDNEY SURGERY Right 1967    to correct urinary reflux into kidney    LEFT HEART CATHETERIZATION Left 05/22/2019    Procedure: Left heart cath;  Surgeon: Casa Perdue MD;  Location: [facility] CATH;  Service: Cardiology;  Laterality: Left;    OVARIAN CYST REMOVAL Right 1981    RADIOFREQUENCY ABLATION OF LUMBAR MEDIAL BRANCH NERVE AT SINGLE LEVEL Bilateral 07/17/2018    Procedure: RADIOFREQUENCY ABLATION, NERVE, MEDIAL BRANCH, LUMBAR, L2,3,4;  Surgeon: Oswald Abdalla MD;  Location: [facility] OR;  Service: Pain Management;  Laterality: Bilateral;    RADIOFREQUENCY ABLATION OF LUMBAR MEDIAL BRANCH NERVE AT SINGLE LEVEL Bilateral 07/29/2019    Procedure: Radiofrequency Ablation, Nerve, Spinal, Lumbar, Medial Branch, L2,3,4;  Surgeon: Oswald Abdalla MD;  Location: [facility] OR;  Service: Pain Management;  Laterality: Bilateral;    SHOULDER SURGERY  2010    rotator cuff, right    TRANSFORAMINAL EPIDURAL INJECTION OF STEROID Left 08/21/2023    Procedure: Injection,steroid,epidural,transforaminal approach L4/5 L5/S1 Left;  Surgeon: Oswald Abdalla MD;  Location: [facility] OR;  Service: Pain Management;  Laterality: Left;    TRANSFORAMINAL EPIDURAL INJECTION OF STEROID Right  3/4/2024    Procedure: Injection,steroid,epidural,transforaminal approach right L4/5 and L5/S1;  Surgeon: Oswald Abdalla MD;  Location: [facility] OR;  Service: Pain Management;  Laterality: Right;  right L4/5 and L5/S1       Current Outpatient Medications   Medication Sig    amLODIPine (NORVASC) 5 MG tablet Take 1 tablet (5 mg total) by mouth once daily.    ascorbic acid, vitamin C, (VITAMIN C) 500 MG tablet Take 1 tablet (500 mg total) by mouth 2 (two) times daily.    aspirin (ECOTRIN) 81 MG EC tablet Take 81 mg by mouth once daily.    atorvastatin (LIPITOR) 10 MG tablet Take 1 tablet (10 mg total) by mouth once daily.    azelastine (ASTELIN) 137 mcg (0.1 %) nasal spray 1 spray 2 (two) times daily.    cimetidine (TAGAMET) 800 MG tablet TAKE 1 TABLET BY MOUTH EVERY EVENING    estradioL (ESTRACE) 1 MG tablet Take 1 tablet (1 mg total) by mouth once daily. Can cause blood clots with covid, recommend NOT taking this medication until covid symptoms completely gone and discussing with PCP    eszopiclone (LUNESTA) 3 mg Tab Take 1 tablet (3 mg total) by mouth every evening.    fluticasone propionate (FLONASE) 50 mcg/actuation nasal spray 1 spray (50 mcg total) by Each Nostril route Daily.    galcanezumab-gnlm (EMGALITY PEN) 120 mg/mL PnIj Inject 1 pen (120 mg total) into the skin every 28 days.    ibuprofen (ADVIL,MOTRIN) 800 MG tablet TAKE 1 TABLET(800 MG) BY MOUTH EVERY 6 HOURS AS NEEDED FOR PAIN    LIDOcaine (LIDODERM) 5 % PLACE 1 PATCH ON SKIN DAILY AND REMOVE WITHIN 12 HOURS    losartan (COZAAR) 100 MG tablet Take 1 tablet (100 mg total) by mouth once daily.    ondansetron (ZOFRAN-ODT) 4 MG TbDL Take 1 tablet (4 mg total) by mouth every 6 (six) hours as needed (nausea).    pantoprazole (PROTONIX) 40 MG tablet TAKE 1 TABLET(40 MG) BY MOUTH EVERY DAY    prednisoLONE-moxiflox-bromfen 1-0.5-0.075 % DrpS Apply 1 drop to eye 3 (three) times daily.    pregabalin (LYRICA) 50 MG capsule Take 1 capsule (50 mg total) by mouth 3  (three) times daily.    traMADoL (ULTRAM) 50 mg tablet TAKE 1 TABLET BY MOUTH EVERY 12 HOURS AS NEEDED FOR PAIN    ubrogepant (UBRELVY) 50 mg tablet Take 1 tablet by mouth at onset of migraine. May repeat in 2 hours if needed. Max 2 tablets per day.    vitamin D (VITAMIN D3) 1000 units Tab Take 1 tablet (1,000 Units total) by mouth once daily.    zinc sulfate (ZINCATE) 220 (50) mg capsule Take 1 capsule (220 mg total) by mouth once daily.     No current facility-administered medications for this visit.       Review of patient's allergies indicates:   Allergen Reactions    Hydrocodone Hives and Nausea Only           Sulfa (sulfonamide antibiotics) Hives and Rash           Doxycycline Rash     Itchy rash and some scattered itchy lesions    Oxycodone Itching and Nausea And Vomiting       Family History   Problem Relation Name Age of Onset    Cataracts Mother      Heart attack Mother      Heart disease Mother      COPD Mother      Hypertension Mother      COPD Father      Hepatitis Sister      Breast cancer Maternal Grandmother      No Known Problems Daughter      Allergic rhinitis Neg Hx      Angioedema Neg Hx      Asthma Neg Hx      Atopy Neg Hx      Eczema Neg Hx      Immunodeficiency Neg Hx      Urticaria Neg Hx      Colon cancer Neg Hx      Colon polyps Neg Hx      Glaucoma Neg Hx      Macular degeneration Neg Hx      Retinal detachment Neg Hx         Social History     Socioeconomic History    Marital status:     Number of children: 1   Occupational History     Employer: [employer]   Tobacco Use    Smoking status: Former     Current packs/day: 0.00     Average packs/day: 1 pack/day for 5.0 years (5.0 ttl pk-yrs)     Types: Cigarettes     Start date: 1992     Quit date: 1997     Years since quittin.4     Passive exposure: Never    Smokeless tobacco: Never   Substance and Sexual Activity    Alcohol use: Yes     Comment: 3x per year    Drug use: No    Sexual activity: Yes     Partners:  Male   Social History Narrative    ** Merged History Encounter **          Social Determinants of Health     Financial Resource Strain: Low Risk  (1/24/2024)    Overall Financial Resource Strain (CARDIA)     Difficulty of Paying Living Expenses: Not hard at all   Food Insecurity: No Food Insecurity (1/24/2024)    Hunger Vital Sign     Worried About Running Out of Food in the Last Year: Never true     Ran Out of Food in the Last Year: Never true   Transportation Needs: No Transportation Needs (1/24/2024)    PRAPARE - Transportation     Lack of Transportation (Medical): No     Lack of Transportation (Non-Medical): No   Physical Activity: Insufficiently Active (1/24/2024)    Exercise Vital Sign     Days of Exercise per Week: 3 days     Minutes of Exercise per Session: 30 min   Stress: No Stress Concern Present (1/24/2024)    [employer] [place] [employer] - Occupational Stress Questionnaire     Feeling of Stress : Not at all   Housing Stability: Low Risk  (1/24/2024)    Housing Stability Vital Sign     Unable to Pay for Housing in the Last Year: No     Number of Places Lived in the Last Year: 1     Unstable Housing in the Last Year: No       Chief Complaint:   Chief Complaint   Patient presents with    Left Knee - Pain       History of present illness:  63-year-old female with a history of left knee arthritis and previous treatment including a Euflexxa series back in 2019 presents with worsening left knee pain again since March.  No injury or trauma.  Pain along the medial aspect of her knee.  Pain with prolonged walking or standing.  Pain started about a month ago again.  No new injury.  Pain is up to an 8/10.    Answers submitted by the patient for this visit:  Orthopedics Questionnaire (Submitted on 5/2/2023)  unexpected weight change: No  appetite change : No  sleep disturbance: No  IMMUNOCOMPROMISED: No  nervous/ anxious: No  dysphoric mood: No  rash: No  visual disturbance: No  eye redness: No  eye  pain: No  ear pain: No  tinnitus: No  hearing loss: No  sinus pressure : No  nosebleeds: No  enviro allergies: No  food allergies: No  cough: No  shortness of breath: No  sweating: No  dysuria: No  frequency: No  difficulty urinating: No  hematuria: No  painful intercourse: No  chest pain: No  palpitations: No  nausea: No  vomiting: No  diarrhea: No  blood in stool: No  constipation: No  headaches: No  dizziness: No  numbness: No  seizures: No  joint swelling: No  myalgia: No  weakness: No  back pain: No   (Submitted on 5/2/2023)  Chief Complaint: Hip pain  Pain Chronicity: new  History of trauma: Yes  Onset: 1 to 4 weeks ago  Frequency: constantly  Progression since onset: gradually worsening  injury location: at home  pain- numeric: 8/10  pain location: left hip, left knee  pain quality: burning  Radiating Pain: No  Aggravating factors: bearing weight, sitting  fever: No  inability to bear weight: No  itching: No  joint locking: No  limited range of motion: No  stiffness: No  tingling: No  Treatments tried: cold, OTC ointments, OTC pain meds  physical therapy: ineffective  Improvement on treatment: mild      Physical Examination:    Vital Signs:    Vitals:    06/19/24 1516   Resp: 18         Body mass index is 30.54 kg/m².    This a well-developed, well nourished patient in no acute distress.  They are alert and oriented and cooperative to examination.  Pt. walks without an antalgic gait.      Examination of the patient's left hip shows full range of motion with flexion to 160°, extension to 0, external rotation to 50°, internal rotation of 15°, abduction of 50°, adduction of 15°. Skin has no rashes or bruising. Patient has negative Stinchfield exam. Patient has negative straight leg raise.Negative internal impingement test. Negative BRENT test. Negative Rohan's test. Patient has no pain with hip range of motion.  Moderately tender to palpation over the greater trochanteric bursa. Patient is 5 out of 5 motor  strength, palpable distal pulses, and intact light touch sensation.     Examination of the left knee shows no rashes or erythema. There are no masses ecchymosis or effusion. Patient has full range of motion from 0-130°. Patient is nontender to palpation over lateral joint line and moderately tender to palpation over the medial joint line. Patient has a - Lachman exam, - anterior drawer exam, and - posterior drawer exam. - Apley exam. Knee is stable to varus and valgus stress. 5 out of 5 motor strength. Palpable distal pulses. Intact light touch sensation. Negative Patellofemoral crepitus        X-rays:  X-rays left knee are ordered and reviewed which show some mild to moderate medial narrowing bilaterally.  Patient has a small ossicle located near the medial epicondyle on the right knee from previous MCL injury possibly.  X-rays of the left hip are  reviewed which shows well-maintained joint space.  Possibly some mild dysplasia and femoral acetabular impingement.     Assessment::   Left knee arthritis    Plan:  We talked about treatment options.  I injected her left knee with cortisone today.  We will follow this up with a repeat Synvisc-One injection since it lasted for over a year.  Follow up in 2 weeks for Visco.    The patient has tried a self guided exercise program that has included walking without significant improvement. Minimal relief with tylenol or OTC Nsaids. Reports less than 8 weeks relief with IA steroid injection. Kellgren Danis scale of 3. They are not receiving another HA injectable at this time. I will precert for gel injection.       All previous pertinent notes including ER visits, physical therapy visits, other orthopedic visits as well as other care for the same musculoskeletal problem were reviewed.  All pertinent lab values and previous imaging was reviewed pertinent to the current visit.    This note was created using M Modal voice recognition software that occasionally misinterpreted  phrases or words.    Consult note is delivered via Epic messaging service.

## 2024-06-25 ENCOUNTER — OFFICE VISIT (OUTPATIENT)
Dept: PAIN MEDICINE | Facility: CLINIC | Age: 64
End: 2024-06-25
Payer: COMMERCIAL

## 2024-06-25 VITALS
HEART RATE: 87 BPM | HEIGHT: 64 IN | BODY MASS INDEX: 31.18 KG/M2 | DIASTOLIC BLOOD PRESSURE: 56 MMHG | SYSTOLIC BLOOD PRESSURE: 130 MMHG | WEIGHT: 182.63 LBS

## 2024-06-25 DIAGNOSIS — M51.36 DDD (DEGENERATIVE DISC DISEASE), LUMBAR: ICD-10-CM

## 2024-06-25 DIAGNOSIS — M48.061 SPINAL STENOSIS OF LUMBAR REGION WITHOUT NEUROGENIC CLAUDICATION: ICD-10-CM

## 2024-06-25 DIAGNOSIS — M50.30 DDD (DEGENERATIVE DISC DISEASE), CERVICAL: ICD-10-CM

## 2024-06-25 DIAGNOSIS — M54.16 LUMBAR RADICULOPATHY: Primary | ICD-10-CM

## 2024-06-25 PROCEDURE — 4010F ACE/ARB THERAPY RXD/TAKEN: CPT | Mod: CPTII,S$GLB,, | Performed by: PHYSICIAN ASSISTANT

## 2024-06-25 PROCEDURE — 99214 OFFICE O/P EST MOD 30 MIN: CPT | Mod: S$GLB,,, | Performed by: PHYSICIAN ASSISTANT

## 2024-06-25 PROCEDURE — 3008F BODY MASS INDEX DOCD: CPT | Mod: CPTII,S$GLB,, | Performed by: PHYSICIAN ASSISTANT

## 2024-06-25 PROCEDURE — 1160F RVW MEDS BY RX/DR IN RCRD: CPT | Mod: CPTII,S$GLB,, | Performed by: PHYSICIAN ASSISTANT

## 2024-06-25 PROCEDURE — 3078F DIAST BP <80 MM HG: CPT | Mod: CPTII,S$GLB,, | Performed by: PHYSICIAN ASSISTANT

## 2024-06-25 PROCEDURE — 3075F SYST BP GE 130 - 139MM HG: CPT | Mod: CPTII,S$GLB,, | Performed by: PHYSICIAN ASSISTANT

## 2024-06-25 PROCEDURE — 1159F MED LIST DOCD IN RCRD: CPT | Mod: CPTII,S$GLB,, | Performed by: PHYSICIAN ASSISTANT

## 2024-06-25 PROCEDURE — 99999 PR PBB SHADOW E&M-EST. PATIENT-LVL IV: CPT | Mod: PBBFAC,,, | Performed by: PHYSICIAN ASSISTANT

## 2024-06-26 RX ORDER — TRAMADOL HYDROCHLORIDE 50 MG/1
TABLET ORAL
Qty: 60 TABLET | Refills: 2 | Status: SHIPPED | OUTPATIENT
Start: 2024-07-10

## 2024-06-26 RX ORDER — PREGABALIN 50 MG/1
50 CAPSULE ORAL 3 TIMES DAILY
Qty: 90 CAPSULE | Refills: 2 | Status: SHIPPED | OUTPATIENT
Start: 2024-07-10

## 2024-06-27 NOTE — PROGRESS NOTES
CHIEF COMPLAINT/REASON FOR VISIT: low back pain, neck pain    History of present illness: The patient is a 63 year old woman with a history of migraines, carpal tunnel syndrome and low back pain since her early 20s.  She returns in follow-up today with low back pain.  Since her last visit she has not had any major pain.  She does have some bad days but even this has been tolerable since her last injection earlier this year.  Her pain is worse at work with prolonged standing but usually tolerable with her medication.  She denies any new weakness or new numbness.    Pain intervention history:She is status post TFESI bilaterally to L3 on 12/6/2011 with no relief. Past history of status post L4/5 YARON on 5/25/11 with about 90% relief that lasted 3 weeks. She is status post 2 bilateral L3 transforaminal injections with 3 weeks relief each time.  She is status post L4/5 interlaminar epidural steroid injection on 1/8/14 with 90% relief.  She is status post L5/S1 interlaminar epidural steroid injection on 6/9/14 with moderate relief only on the right low back and right leg lasting 2 weeks.  She is status post bilateral L3/4 and L4/5 facet joint injections on 4/13/15 with initially 100% relief of her back pain and 80% relief of her left lateral hip and lateral thigh pain, now reporting at least 50% relief of both.  She is status post bilateral L2, 3 and 4 medial branch radiofrequency ablation on 5/20/15 with 60% relief.   She is status post bilateral L2, 3 and 4 medial branch radiofrequency ablation on 6/20/16 with 50-70% relief.  She is status post L5/S1 interlaminar epidural steroid injection on 3/10/17 with 50% relief.  She is status post bilateral L2, 3, 4 medial branch radiofrequency ablation on 7/17/17 with about 75% relief.  She is status post bilateral L2, 3, 4 medial branch radiofrequency ablation on 07/17/2018 with 100% relief of her leg pain and 70-75% relief of her back pain.  She is status post C7-T1 cervical  "interlaminar epidural steroid injection on 09/25/2018 with 80% relief.   She is status post L5/S1 interlaminar epidural steroid injection on 12/27/2018 with 60% relief.  She is status post bilateral L2, 3 and 4 medial branch radiofrequency ablation on 07/29/2019 with 40% relief.  She is status post L5/S1 interlaminar epidural steroid injection on 09/04/2019 with almost 100% relief of her right leg pain but 0% relief for back pain. She is status post C7-T1 interlaminar epidural steroid injection on 10/24/2019 with 70% relief.  She is s/p L5/S1 IL YARON with 2 weeks of relief, now 0%.   She is post left L4/5 S1 transforaminal epidural steroid injection on 08/21/2023 with almost 100% relief.   She is status post right L4/5 and L5/S1 transforaminal epidural steroid injection on 03/04/2024 with 60% relief.       Spine surgeries:    Antineuropathics: failed gabapentin, failed Cymbalta due to side effects.  Takes Lyrica twice daily, can not tolerate 3 times a day.  NSAIDs:  Ibuprofen  Physical therapy:  Antidepressants:  Muscle relaxers:  Opioids:  Tramadol 50 mg twice daily as needed  Antiplatelets/Anticoagulants:  ASA 81 mg    ROS: She reports back pain only.  Balance of review of systems is negative.    Medical, surgical, family and social history reviewed elsewhere in record.     Medications/Allergies: See med card      Vitals:    06/25/24 1451   BP: (!) 130/56   Pulse: 87   Weight: 82.9 kg (182 lb 10.4 oz)   Height: 5' 4" (1.626 m)   PainSc:   4   PainLoc: Hip        PHYSICAL EXAM:  Gen: A and O x3, pleasant, well-groomed  HEENT: PERRLA  CVS: Regular rate and rhythm  Resp:  No obvious shortness of breath, no increased work of breathing  Musculoskeletal: No antalgic gait.     Neuro:  Upper extremities: 5/5 strength bilaterally   Lower extremities: 5/5 strength bilaterally  Reflexes: Brachioradialis 2+, Bicep 2+, Tricep 2+.   Patellar 3+, Achilles 2+.  Sensory: Intact and symmetrical to light touch and pinprick in C2-T1 " dermatomes bilaterally.Intact and symmetrical to light touch and pinprick in L2-S1 dermatomes bilaterally.    Cervical spine:   Range of motion is mildly reduced with flexion, extension lateral rotation with increased right neck pain during right lateral rotation and extension.    Myofascial exam: Mild tenderness to palpation to the right cervical paraspinous muscles.    Lumbar spine:  Lumbar spine: ROM is moderately limited with flexion and extension with increased right low back pain during extension and oblique extension.  Supine straight leg raise is negative bilaterally.  Internal and external rotation of the hip causes no increased pain in either side.  Myofascial exam:  Moderate tenderness to palpation to the right greater than left lumbar paraspinous muscles.      IMAGING:   MRI L-SPINE 5/10/11   IMPRESSION: AT L3-4, THERE IS CIRCUMFERENTIAL DISC PROTRUSION FOCALLY MORE PROMINENT TO THE LEFT AS WELL AS A SMALL DISC HERNIATION PROTRUDING INFERIORLY BEHIND L4 TO THE LEFT OF MIDLINE. THIS PRODUCES SPINAL CANAL AND BILATERAL NEURAL FORAMINAL STENOSIS, LEFT GREATER THAN RIGHT. AT L4-5, THERE IS CIRCUMFERENTIAL DISC PROTRUSION FOCALLY MORE PROMINENT TO THE RIGHT WITH MILD SPINAL CANAL AND RIGHT NEURAL FORAMINAL STENOSIS. AT L1-2 AND L2-3 ALTHOUGH THERE ARE DISC PROTRUSIONS IDENTIFIED, SIGNIFICANT STENOSIS IS NOT SEEN.     MRI lumbar spine 7/10/14  L1-2:There is chronic relatively advanced disc degeneration with disc space narrowing, disc dehydration, disc narrowing, endplate osteophytes, and degenerative vertebral endplate marrow change. There is a diffuse 2-mm posterior disc bulge with a superimposed4-mm right posterior disc extrusion causing mild right foraminal stenosis. There is no impingement of the right L1 nerve root and there has been no change.  L2-3: There is severe and chronic disc degeneration with severe disc space narrowing, disc dehydration, degenerative vertebral endplate marrow change, and  vertebral endplate osteophytes. There is a diffuse 2-mm posterior disc bulge with a superimposed 4-mm right posterior disc extrusion causing mild right foraminal stenosis. There is no impingement of the right L2 nerve root and there has been no significant change.  L3-4: There is severe disc degeneration which has progressed since the prior study. There is severe disc space narrowing, disc dehydration, degenerative vertebral endplate marrow change and endplate osteophytes. There is also mild posterior subluxation of L3over a distance of 4 mm. There is a broad-based 5-mm posterior disc extrusion. There is degenerative facet arthrosis with ligamentum flavum thickening. The combination of facet arthrosis and disc extrusion results in relatively severe spinal canal stenosis with compression of the thecal sac to an AP diameter of 5 mm, moderate right foraminal stenosis, and severe left foraminal stenosis. The spinal stenosis has also progressed since the prior study.  L4-5:There is a diffuse posterior disc bulge with a superimposed 3-mm left posterior paracentral disc protrusion causing left paracentral thecal sac compression. There is moderate left and mild right foraminal stenosis and there has been no significant change. There is mild degenerative facet arthrosis with ligamentum flavum thickening and small joint effusions.  L5-S1:There is no significant compromise of the spinal canal or foramina and there has been no change.    X-ray cervical spine 6/8/15  There is loss of normal cervical lordosis which may be positional or related muscular strain. Intervertebral disk height loss is noted at the C5-C6 C6-C7 levels and to a lesser degree C4-C5 and C7-T1 levels. Vertebral body alignment appears otherwise adequate. Vertebral body heights appear well-preserved. The atlas and odontoid appear in good relationship to each other. Osseous neuroforaminal narrowing is noted at the C3-C4 C4-C5 C5-C6 levels on the left and at the  C4-C5 C5-C6 and C6-C7 levels on the right     07/10/2018 MRI cervical spine [facility] report  C2-3 broad-based signal asymmetry at the left subarticular and foraminal zone reflecting implant spondylosis and disc bulge complex, mild to moderate left asymmetric foraminal narrowing, ectasia of the left vertebral artery, contralateral right asymmetric facet hypertrophy, right foramen widely narrowed, disc partially desiccated without collapse  C3-4 moderate to severe left greater than right foraminal narrowing secondary to uncinate joint hypertrophic signal alteration, disc partially desiccated  C4-5 endplate spondylosis moderate diffuse disc bulge noted, broad-based right paracentral disc herniation, asymmetric flattening of the ventral cord surface at the right paracentral zone, high-grade if lateral right foraminal narrowing, AP diameter of canal 9.9 mm, contralaterally, high grade left foraminal narrowing secondary to facet greater than uncinate joint hypertrophic signal alteration, disc desiccated and mildly narrowed  C5-6 disc space narrowing evident with generalized in Nigel spondylosis and concentric inter pose disc bulge complex, high-grade bilateral foraminal narrowing, flattening of the cord surface, AP diameter 7.9 mm, symmetric facet arthrosis, disc diffusely desiccated and narrowed  C6-7 moderate endplate spondylosis and concentric disc bulge complex, high-grade bilateral foraminal compromise, flattening of the anterior cord surface, AP diameter 8.8 mm, facet arthrosis symmetric, disc desiccated and narrowed  C7-T1 less than 2 mm depth disc bulge    11/06/2018 MRI lumbar spine  T12/L1: There is no evidence of disc protrusion, canal or foraminal stenosis.  L1/L2: Right posterolateral disc protrusion is evident and there is mild distortion of the right anterolateral canal.  Degenerative facet changes are noted bilaterally.  The left foramen is intact.  L2/L3: There is annular disc bulging with a  superimposed right posterolateral disc extrusion.  This is slightly more pronounced on the previous examination and there is mild effacement of the anterior thecal sac and moderate narrowing the right foramen.  L3/L4: There is annular disc bulge and osteophyte formation with a superimposed small left posterolateral disc extrusion.  There is moderate narrowing the central canal and left foramen.  This is little changed relative the previous examination.  Degenerative facet changes are noted.  L5/S1: There is a small central disc extrusion superimposed upon annular disc bulging.  This is slightly less pronounced than was seen previously.  Degenerative facet changes are noted.  L5/S1: Moderate degenerative facet changes are noted and there is mild effacement of the anterior thecal sac.  There is ligamentous hypertrophy particularly to the left in the posterior canal and there is left greater than right foraminal narrowing.  This is mildly worsened relative prior exam.      ASSESSMENT:  The patient is a 63 year old woman with a history of migraines, carpal tunnel syndrome and low back pain since her early 20s who returns in follow up.   1. Lumbar radiculopathy        2. Spinal stenosis of lumbar region without neurogenic claudication        3. DDD (degenerative disc disease), lumbar        4. DDD (degenerative disc disease), cervical              Plan:  1. If her radicular pain returns she can contact us for a right or left L4/5 and L5/S1 transforaminal epidural steroid injection.  2. Dr. Abdalla has refilled tramadol 50 mg twice a day and Lyrica 50 mg twice a day. I have reviewed the [place] [employer] website and there are no abberancies.    3. Follow-up in 3 months or sooner as needed.

## 2024-07-02 ENCOUNTER — OFFICE VISIT (OUTPATIENT)
Dept: OPTOMETRY | Facility: CLINIC | Age: 64
End: 2024-07-02
Payer: COMMERCIAL

## 2024-07-02 DIAGNOSIS — Z96.1 STATUS POST CATARACT EXTRACTION AND INSERTION OF INTRAOCULAR LENS, RIGHT: Primary | ICD-10-CM

## 2024-07-02 DIAGNOSIS — H25.12 NUCLEAR SCLEROSIS OF LEFT EYE: Primary | ICD-10-CM

## 2024-07-02 DIAGNOSIS — H52.4 MYOPIA WITH ASTIGMATISM AND PRESBYOPIA, BILATERAL: ICD-10-CM

## 2024-07-02 DIAGNOSIS — H25.12 AGE-RELATED NUCLEAR CATARACT OF LEFT EYE: ICD-10-CM

## 2024-07-02 DIAGNOSIS — H52.13 MYOPIA WITH ASTIGMATISM AND PRESBYOPIA, BILATERAL: ICD-10-CM

## 2024-07-02 DIAGNOSIS — Z98.41 STATUS POST CATARACT EXTRACTION AND INSERTION OF INTRAOCULAR LENS, RIGHT: Primary | ICD-10-CM

## 2024-07-02 DIAGNOSIS — H52.203 MYOPIA WITH ASTIGMATISM AND PRESBYOPIA, BILATERAL: ICD-10-CM

## 2024-07-02 PROCEDURE — 99024 POSTOP FOLLOW-UP VISIT: CPT | Mod: S$GLB,,,

## 2024-07-02 PROCEDURE — 99999 PR PBB SHADOW E&M-EST. PATIENT-LVL III: CPT | Mod: PBBFAC,,,

## 2024-07-02 PROCEDURE — 1159F MED LIST DOCD IN RCRD: CPT | Mod: CPTII,S$GLB,,

## 2024-07-02 PROCEDURE — 4010F ACE/ARB THERAPY RXD/TAKEN: CPT | Mod: CPTII,S$GLB,,

## 2024-07-02 RX ORDER — PREDNISOLONE/MOXIFLOX/BROMFEN 1 %-0.5 %
1 SUSPENSION, DROPS(FINAL DOSAGE FORM)(ML) OPHTHALMIC (EYE) 3 TIMES DAILY
Qty: 5 ML | Refills: 3 | Status: SHIPPED | OUTPATIENT
Start: 2024-07-02

## 2024-07-02 NOTE — Clinical Note
Hi! I saw this pt for her one month post-op OD. She would like to proceed with getting OS scheduled. I believe measurements were already done (cataract is early but pt can notice difference in clarity since having OD done). Thanks in advance!  Dr. Palacios

## 2024-07-02 NOTE — PROGRESS NOTES
HPI    Pt here for 1 month PO OD.     Pt still using PO gtt TID, used this am. Pt sts [facility] OD stable. Pt denies   pain. Pt is wanting and willing to have sx on OS.   Last edited by Susan Guerrero on 7/2/2024  9:16 AM.            Assessment /Plan     For exam results, see Encounter Report.    Status post cataract extraction and insertion of intraocular lens, right    Age-related nuclear cataract of left eye    Myopia with astigmatism and presbyopia, bilateral      Pt presented for one month post-op s/p CE w/IOL OD 05/29/24. Pt doing well and is satisfied with vision. PCIOL centered with mild PCO, not visually significant. No anterior or posterior segment inflammation, good IOP. Advised pt that it's ok to discontinue post-op drops. Pt happy with vision (current specs still working well). Ed pt on all findings and to RTC if any sudden changes in vision, pain, or other issues arise.  Mild nuclear sclerosis OS. Pt feels OS is now not as clear as OD and would like to have the surgery. Message sent to Dr. Dalton's staff to get pt scheduled.  Pt seeing well with current spec rx (target for IOL was near). No new refraction today. Can recheck if OS surgery done.    RTC: to ophthalmology for OS CE w/IOL

## 2024-07-03 ENCOUNTER — OFFICE VISIT (OUTPATIENT)
Dept: ORTHOPEDICS | Facility: CLINIC | Age: 64
End: 2024-07-03
Payer: COMMERCIAL

## 2024-07-03 VITALS — HEIGHT: 64 IN | BODY MASS INDEX: 31.2 KG/M2 | WEIGHT: 182.75 LBS

## 2024-07-03 DIAGNOSIS — M17.12 PRIMARY OSTEOARTHRITIS OF LEFT KNEE: Primary | ICD-10-CM

## 2024-07-03 PROCEDURE — 99999 PR PBB SHADOW E&M-EST. PATIENT-LVL III: CPT | Mod: PBBFAC,,, | Performed by: ORTHOPAEDIC SURGERY

## 2024-07-03 NOTE — PROCEDURES
Large Joint Aspiration/Injection: L knee    Date/Time: 7/3/2024 3:15 PM    Performed by: Vaughn Ortiz MD  Authorized by: Vaughn Ortiz MD    Consent Done?:  Yes (Verbal)  Indications:  Pain and arthritis  Site marked: the procedure site was marked    Timeout: prior to procedure the correct patient, procedure, and site was verified      Details:  Needle Size:  18 G  Approach:  Anterolateral  Location:  Knee  Site:  L knee  Medications:  48 mg hylan g-f 20 48 mg/6 mL  Patient tolerance:  Patient tolerated the procedure well with no immediate complications

## 2024-07-03 NOTE — PROGRESS NOTES
Past Medical History:   Diagnosis Date    Allergy     Arthritis of spine 2011    Cataract     Chronic low back pain     Class 2 obesity with body mass index (BMI) of 39.0 to 39.9 in adult 01/11/2019    Coronary artery disease     mild    DDD (degenerative disc disease), lumbar     Diverticulitis     Diverticulosis     DJD (degenerative joint disease)     Encounter for blood transfusion     Factor V Leiden     GERD (gastroesophageal reflux disease)     Hiatal hernia     Hypertension     Migraines     PONV (postoperative nausea and vomiting)     geta    Schatzki's ring     Urticaria        Past Surgical History:   Procedure Laterality Date    APPENDECTOMY  1981    CARPAL TUNNEL RELEASE  2011    right    CATARACT EXTRACTION W/  INTRAOCULAR LENS IMPLANT Right 5/29/2024    Procedure: EXTRACTION, CATARACT, WITH IOL INSERTION;  Surgeon: Adelia Dalton MD;  [facility];  Service: Ophthalmology;  Laterality: Right;    COLONOSCOPY  2014    Dr. Palomares; reduntant colon, otherwise normal findings repeat in 8-10 years for surveillance    Epidural Steroid Injection      Pain Management    EPIDURAL STEROID INJECTION INTO CERVICAL SPINE N/A 09/25/2018    Procedure: Injection-steroid-epidural-cervical;  Surgeon: Oswald Abdalla MD;  Location: [facility] OR;  Service: Pain Management;  Laterality: N/A;    EPIDURAL STEROID INJECTION INTO CERVICAL SPINE N/A 10/24/2019    Procedure: Injection-steroid-epidural-cervical;  Surgeon: Oswald Abdalla MD;  Location: [facility] OR;  Service: Pain Management;  Laterality: N/A;    EPIDURAL STEROID INJECTION INTO CERVICAL SPINE N/A 02/22/2023    Procedure: Injection-steroid-epidural-cervical C7-T1;  Surgeon: Oswald Abdalla MD;  Location: [facility] OR;  Service: Pain Management;  Laterality: N/A;    EPIDURAL STEROID INJECTION INTO LUMBAR SPINE N/A 12/27/2018    Procedure: Injection-steroid-epidural-lumbar L5/S1;  Surgeon: Oswald Abdalla MD;  Location: [facility] OR;  Service: Pain Management;   Laterality: N/A;    EPIDURAL STEROID INJECTION INTO LUMBAR SPINE N/A 09/04/2019    Procedure: Injection-steroid-epidural-lumbar;  Surgeon: Oswald Abdalla MD;  Location: [facility] OR;  Service: Pain Management;  Laterality: N/A;  L5/S1 to right    EPIDURAL STEROID INJECTION INTO LUMBAR SPINE N/A 06/16/2023    Procedure: Injection-steroid-epidural-lumbar L5/S1;  Surgeon: Oswald Abdalla MD;  Location: [facility] OR;  Service: Pain Management;  Laterality: N/A;    ESOPHAGOGASTRODUODENOSCOPY  05/17/2016    Dr. Arreguin; small hiatal hernia; gastritis    ESOPHAGOGASTRODUODENOSCOPY N/A 06/22/2020    Dr. Arreguin; mild Schatzki ring- dilated; small hiatal hernia; bx unremarkable    Facet injection      Pain Management    HYSTERECTOMY      ovaries removed    KIDNEY SURGERY Right 1967    to correct urinary reflux into kidney    LEFT HEART CATHETERIZATION Left 05/22/2019    Procedure: Left heart cath;  Surgeon: Casa Perdue MD;  Location: [facility] CATH;  Service: Cardiology;  Laterality: Left;    OVARIAN CYST REMOVAL Right 1981    RADIOFREQUENCY ABLATION OF LUMBAR MEDIAL BRANCH NERVE AT SINGLE LEVEL Bilateral 07/17/2018    Procedure: RADIOFREQUENCY ABLATION, NERVE, MEDIAL BRANCH, LUMBAR, L2,3,4;  Surgeon: Oswald Abdalla MD;  Location: [facility] OR;  Service: Pain Management;  Laterality: Bilateral;    RADIOFREQUENCY ABLATION OF LUMBAR MEDIAL BRANCH NERVE AT SINGLE LEVEL Bilateral 07/29/2019    Procedure: Radiofrequency Ablation, Nerve, Spinal, Lumbar, Medial Branch, L2,3,4;  Surgeon: Oswald Abdalla MD;  Location: [facility] OR;  Service: Pain Management;  Laterality: Bilateral;    SHOULDER SURGERY  2010    rotator cuff, right    TRANSFORAMINAL EPIDURAL INJECTION OF STEROID Left 08/21/2023    Procedure: Injection,steroid,epidural,transforaminal approach L4/5 L5/S1 Left;  Surgeon: Oswald Abdalla MD;  Location: [facility] OR;  Service: Pain Management;  Laterality: Left;    TRANSFORAMINAL EPIDURAL INJECTION OF STEROID Right  3/4/2024    Procedure: Injection,steroid,epidural,transforaminal approach right L4/5 and L5/S1;  Surgeon: Oswald Abdalla MD;  Location: [facility] OR;  Service: Pain Management;  Laterality: Right;  right L4/5 and L5/S1       Current Outpatient Medications   Medication Sig    amLODIPine (NORVASC) 5 MG tablet Take 1 tablet (5 mg total) by mouth once daily.    ascorbic acid, vitamin C, (VITAMIN C) 500 MG tablet Take 1 tablet (500 mg total) by mouth 2 (two) times daily.    aspirin (ECOTRIN) 81 MG EC tablet Take 81 mg by mouth once daily.    atorvastatin (LIPITOR) 10 MG tablet Take 1 tablet (10 mg total) by mouth once daily.    azelastine (ASTELIN) 137 mcg (0.1 %) nasal spray 1 spray 2 (two) times daily.    cimetidine (TAGAMET) 800 MG tablet TAKE 1 TABLET BY MOUTH EVERY EVENING    estradioL (ESTRACE) 1 MG tablet Take 1 tablet (1 mg total) by mouth once daily. Can cause blood clots with covid, recommend NOT taking this medication until covid symptoms completely gone and discussing with PCP    eszopiclone (LUNESTA) 3 mg Tab Take 1 tablet (3 mg total) by mouth every evening.    fluticasone propionate (FLONASE) 50 mcg/actuation nasal spray 1 spray (50 mcg total) by Each Nostril route Daily.    galcanezumab-gnlm (EMGALITY PEN) 120 mg/mL PnIj Inject 1 pen (120 mg total) into the skin every 28 days.    ibuprofen (ADVIL,MOTRIN) 800 MG tablet TAKE 1 TABLET(800 MG) BY MOUTH EVERY 6 HOURS AS NEEDED FOR PAIN    LIDOcaine (LIDODERM) 5 % PLACE 1 PATCH ON SKIN DAILY AND REMOVE WITHIN 12 HOURS    losartan (COZAAR) 100 MG tablet Take 1 tablet (100 mg total) by mouth once daily.    ondansetron (ZOFRAN-ODT) 4 MG TbDL Take 1 tablet (4 mg total) by mouth every 6 (six) hours as needed (nausea).    pantoprazole (PROTONIX) 40 MG tablet TAKE 1 TABLET(40 MG) BY MOUTH EVERY DAY    prednisoLONE-moxiflox-bromfen 1-0.5-0.075 % DrpS Apply 1 drop to eye 3 (three) times daily.    prednisoLONE-moxiflox-bromfen 1-0.5-0.075 % DrpS Apply 1 drop to eye 3  (three) times daily.    [START ON 7/10/2024] pregabalin (LYRICA) 50 MG capsule Take 1 capsule (50 mg total) by mouth 3 (three) times daily.    [START ON 7/10/2024] traMADoL (ULTRAM) 50 mg tablet TAKE 1 TABLET BY MOUTH EVERY 12 HOURS AS NEEDED FOR PAIN    ubrogepant (UBRELVY) 50 mg tablet Take 1 tablet by mouth at onset of migraine. May repeat in 2 hours if needed. Max 2 tablets per day.    vitamin D (VITAMIN D3) 1000 units Tab Take 1 tablet (1,000 Units total) by mouth once daily.    zinc sulfate (ZINCATE) 220 (50) mg capsule Take 1 capsule (220 mg total) by mouth once daily.     No current facility-administered medications for this visit.       Review of patient's allergies indicates:   Allergen Reactions    Hydrocodone Hives and Nausea Only           Sulfa (sulfonamide antibiotics) Hives and Rash           Doxycycline Rash     Itchy rash and some scattered itchy lesions    Oxycodone Itching and Nausea And Vomiting       Family History   Problem Relation Name Age of Onset    Cataracts Mother      Heart attack Mother      Heart disease Mother      COPD Mother      Hypertension Mother      COPD Father      Hepatitis Sister      Breast cancer Maternal Grandmother      No Known Problems Daughter      Allergic rhinitis Neg Hx      Angioedema Neg Hx      Asthma Neg Hx      Atopy Neg Hx      Eczema Neg Hx      Immunodeficiency Neg Hx      Urticaria Neg Hx      Colon cancer Neg Hx      Colon polyps Neg Hx      Glaucoma Neg Hx      Macular degeneration Neg Hx      Retinal detachment Neg Hx         Social History     Socioeconomic History    Marital status:     Number of children: 1   Occupational History     Employer: "[employer]   Tobacco Use    Smoking status: Former     Current packs/day: 0.00     Average packs/day: 1 pack/day for 5.0 years (5.0 ttl pk-yrs)     Types: Cigarettes     Start date: 1992     Quit date: 1997     Years since quittin.5     Passive exposure: Never    Smokeless  tobacco: Never   Substance and Sexual Activity    Alcohol use: Yes     Comment: 3x per year    Drug use: No    Sexual activity: Yes     Partners: Male   Social History Narrative    ** Merged History Encounter **          Social Determinants of Health     Financial Resource Strain: Low Risk  (1/24/2024)    Overall Financial Resource Strain (CARDIA)     Difficulty of Paying Living Expenses: Not hard at all   Food Insecurity: No Food Insecurity (1/24/2024)    Hunger Vital Sign     Worried About Running Out of Food in the Last Year: Never true     Ran Out of Food in the Last Year: Never true   Transportation Needs: No Transportation Needs (1/24/2024)    PRAPARE - Transportation     Lack of Transportation (Medical): No     Lack of Transportation (Non-Medical): No   Physical Activity: Insufficiently Active (1/24/2024)    Exercise Vital Sign     Days of Exercise per Week: 3 days     Minutes of Exercise per Session: 30 min   Stress: No Stress Concern Present (1/24/2024)    [employer] [place] [employer] - Occupational Stress Questionnaire     Feeling of Stress : Not at all   Housing Stability: Low Risk  (1/24/2024)    Housing Stability Vital Sign     Unable to Pay for Housing in the Last Year: No     Number of Places Lived in the Last Year: 1     Unstable Housing in the Last Year: No       Chief Complaint:   Chief Complaint   Patient presents with    Injections     L synvisc one       History of present illness:  63-year-old female with a history of left knee arthritis and previous treatment including a Euflexxa series back in 2019 presents with worsening left knee pain again since March.  No injury or trauma.  Pain along the medial aspect of her knee.  Pain with prolonged walking or standing.  Pain started about a month ago again.  No new injury.  Pain is up to an 8/10.    Answers submitted by the patient for this visit:  Orthopedics Questionnaire (Submitted on 5/2/2023)  unexpected weight change: No  appetite  change : No  sleep disturbance: No  IMMUNOCOMPROMISED: No  nervous/ anxious: No  dysphoric mood: No  rash: No  visual disturbance: No  eye redness: No  eye pain: No  ear pain: No  tinnitus: No  hearing loss: No  sinus pressure : No  nosebleeds: No  enviro allergies: No  food allergies: No  cough: No  shortness of breath: No  sweating: No  dysuria: No  frequency: No  difficulty urinating: No  hematuria: No  painful intercourse: No  chest pain: No  palpitations: No  nausea: No  vomiting: No  diarrhea: No  blood in stool: No  constipation: No  headaches: No  dizziness: No  numbness: No  seizures: No  joint swelling: No  myalgia: No  weakness: No  back pain: No   (Submitted on 5/2/2023)  Chief Complaint: Hip pain  Pain Chronicity: new  History of trauma: Yes  Onset: 1 to 4 weeks ago  Frequency: constantly  Progression since onset: gradually worsening  injury location: at home  pain- numeric: 8/10  pain location: left hip, left knee  pain quality: burning  Radiating Pain: No  Aggravating factors: bearing weight, sitting  fever: No  inability to bear weight: No  itching: No  joint locking: No  limited range of motion: No  stiffness: No  tingling: No  Treatments tried: cold, OTC ointments, OTC pain meds  physical therapy: ineffective  Improvement on treatment: mild      Physical Examination:    Vital Signs:    There were no vitals filed for this visit.        Body mass index is 31.37 kg/m².    This a well-developed, well nourished patient in no acute distress.  They are alert and oriented and cooperative to examination.  Pt. walks without an antalgic gait.      Examination of the patient's left hip shows full range of motion with flexion to 160°, extension to 0, external rotation to 50°, internal rotation of 15°, abduction of 50°, adduction of 15°. Skin has no rashes or bruising. Patient has negative Stinchfield exam. Patient has negative straight leg raise.Negative internal impingement test. Negative BRENT test. Negative  Rohan's test. Patient has no pain with hip range of motion.  Moderately tender to palpation over the greater trochanteric bursa. Patient is 5 out of 5 motor strength, palpable distal pulses, and intact light touch sensation.     Examination of the left knee shows no rashes or erythema. There are no masses ecchymosis or effusion. Patient has full range of motion from 0-130°. Patient is nontender to palpation over lateral joint line and moderately tender to palpation over the medial joint line. Patient has a - Lachman exam, - anterior drawer exam, and - posterior drawer exam. - Apley exam. Knee is stable to varus and valgus stress. 5 out of 5 motor strength. Palpable distal pulses. Intact light touch sensation. Negative Patellofemoral crepitus        X-rays:  X-rays left knee are  reviewed which show some mild to moderate medial narrowing bilaterally.  Patient has a small ossicle located near the medial epicondyle on the right knee from previous MCL injury possibly.  X-rays of the left hip are  reviewed which shows well-maintained joint space.  Possibly some mild dysplasia and femoral acetabular impingement.     Assessment::   Left knee arthritis    Plan:  We talked about treatment options.  I injected her left knee with Synvisc-One today.  Follow up as needed    The patient has tried a self guided exercise program that has included walking without significant improvement. Minimal relief with tylenol or OTC Nsaids. Reports less than 8 weeks relief with IA steroid injection. Kellgren Danis scale of 3. They are not receiving another HA injectable at this time. I will precert for gel injection.       All previous pertinent notes including ER visits, physical therapy visits, other orthopedic visits as well as other care for the same musculoskeletal problem were reviewed.  All pertinent lab values and previous imaging was reviewed pertinent to the current visit.    This note was created using Net[employer] voice recognition  software that occasionally misinterpreted phrases or words.    Consult note is delivered via Epic messaging service.

## 2024-08-02 ENCOUNTER — TELEPHONE (OUTPATIENT)
Dept: OPHTHALMOLOGY | Facility: CLINIC | Age: 64
End: 2024-08-02
Payer: COMMERCIAL

## 2024-08-02 DIAGNOSIS — I10 ESSENTIAL HYPERTENSION: ICD-10-CM

## 2024-08-02 NOTE — TELEPHONE ENCOUNTER
No care due was identified.  [facility] Embedded Care Due Messages. Reference number: [id number].   8/02/2024 4:41:01 PM CDT

## 2024-08-02 NOTE — H&P
History    Chief complaint:  Painless progressive vision loss    Present Ilness/Diagnosis: Nuclear sclerotic Cataract    Past Medical History:  has a past medical history of Allergy, Arthritis of spine (2011), Cataract, Chronic low back pain, Class 2 obesity with body mass index (BMI) of 39.0 to 39.9 in adult (01/11/2019), Coronary artery disease, DDD (degenerative disc disease), lumbar, Diverticulitis, Diverticulosis, DJD (degenerative joint disease), Encounter for blood transfusion, Factor V Leiden, GERD (gastroesophageal reflux disease), Hiatal hernia, Hypertension, Migraines, PONV (postoperative nausea and vomiting), Schatzki's ring, and Urticaria.    Family History/Social History: refer to chart    Allergies:   Review of patient's allergies indicates:   Allergen Reactions    Hydrocodone Hives and Nausea Only           Sulfa (sulfonamide antibiotics) Hives and Rash           Doxycycline Rash     Itchy rash and some scattered itchy lesions    Oxycodone Itching and Nausea And Vomiting       Current Medications: No current facility-administered medications for this encounter.    Current Outpatient Medications:     amLODIPine (NORVASC) 5 MG tablet, Take 1 tablet (5 mg total) by mouth once daily., Disp: 90 tablet, Rfl: 2    ascorbic acid, vitamin C, (VITAMIN C) 500 MG tablet, Take 1 tablet (500 mg total) by mouth 2 (two) times daily., Disp: , Rfl:     aspirin (ECOTRIN) 81 MG EC tablet, Take 81 mg by mouth once daily., Disp: , Rfl:     atorvastatin (LIPITOR) 10 MG tablet, Take 1 tablet (10 mg total) by mouth once daily., Disp: 90 tablet, Rfl: 2    azelastine (ASTELIN) 137 mcg (0.1 %) nasal spray, 1 spray 2 (two) times daily., Disp: , Rfl:     cimetidine (TAGAMET) 800 MG tablet, TAKE 1 TABLET BY MOUTH EVERY EVENING, Disp: 90 tablet, Rfl: 3    estradioL (ESTRACE) 1 MG tablet, Take 1 tablet (1 mg total) by mouth once daily. Can cause blood clots with covid, recommend NOT taking this medication until covid symptoms  completely gone and discussing with PCP, Disp: , Rfl: 3    eszopiclone (LUNESTA) 3 mg Tab, Take 1 tablet (3 mg total) by mouth every evening., Disp: 90 tablet, Rfl: 1    fluticasone propionate (FLONASE) 50 mcg/actuation nasal spray, 1 spray (50 mcg total) by Each Nostril route Daily., Disp: 16 g, Rfl: 3    galcanezumab-gnlm (EMGALITY PEN) 120 mg/mL PnIj, Inject 1 pen (120 mg total) into the skin every 28 days., Disp: 1 mL, Rfl: 11    ibuprofen (ADVIL,MOTRIN) 800 MG tablet, TAKE 1 TABLET(800 MG) BY MOUTH EVERY 6 HOURS AS NEEDED FOR PAIN, Disp: 30 tablet, Rfl: 0    LIDOcaine (LIDODERM) 5 %, PLACE 1 PATCH ON SKIN DAILY AND REMOVE WITHIN 12 HOURS, Disp: 30 patch, Rfl: 5    losartan (COZAAR) 100 MG tablet, Take 1 tablet (100 mg total) by mouth once daily., Disp: 90 tablet, Rfl: 3    ondansetron (ZOFRAN-ODT) 4 MG TbDL, Take 1 tablet (4 mg total) by mouth every 6 (six) hours as needed (nausea)., Disp: 20 tablet, Rfl: 1    pantoprazole (PROTONIX) 40 MG tablet, TAKE 1 TABLET(40 MG) BY MOUTH EVERY DAY, Disp: 90 tablet, Rfl: 3    prednisoLONE-moxiflox-bromfen 1-0.5-0.075 % DrpS, Apply 1 drop to eye 3 (three) times daily., Disp: 5 mL, Rfl: 3    prednisoLONE-moxiflox-bromfen 1-0.5-0.075 % DrpS, Apply 1 drop to eye 3 (three) times daily., Disp: 5 mL, Rfl: 3    pregabalin (LYRICA) 50 MG capsule, Take 1 capsule (50 mg total) by mouth 3 (three) times daily., Disp: 90 capsule, Rfl: 2    traMADoL (ULTRAM) 50 mg tablet, TAKE 1 TABLET BY MOUTH EVERY 12 HOURS AS NEEDED FOR PAIN, Disp: 60 tablet, Rfl: 2    ubrogepant (UBRELVY) 50 mg tablet, Take 1 tablet by mouth at onset of migraine. May repeat in 2 hours if needed. Max 2 tablets per day., Disp: 10 tablet, Rfl: 11    vitamin D (VITAMIN D3) 1000 units Tab, Take 1 tablet (1,000 Units total) by mouth once daily., Disp: , Rfl:     zinc sulfate (ZINCATE) 220 (50) mg capsule, Take 1 capsule (220 mg total) by mouth once daily., Disp: , Rfl:     ASA Score: II     Mallampati Score: II     Plan  for Sedation: Moderate Sedation     Patient or Family History of Anesthesia problems : No    Physical Exam    BP: Vital signs stable  General: No apparent distress  HEENT: nuclear sclerotic cataract  Lungs: adequate respirations  Heart: + pulses  Abdomen: soft  Rectal/pelvic: deferred    Impression: Visually significant Cataract.    See previous clinic notes for surgical indications.    Plan: Phacoemulsification with implantation of Intraocular lens

## 2024-08-02 NOTE — TELEPHONE ENCOUNTER
Spoke with pt provided arrival time of 10:30 for sx on 8/7/24 with Dr. Dalton @ [facility]. Pt has eyedrops and packet.

## 2024-08-03 DIAGNOSIS — I25.10 CORONARY ARTERY DISEASE, UNSPECIFIED VESSEL OR LESION TYPE, UNSPECIFIED WHETHER ANGINA PRESENT, UNSPECIFIED WHETHER NATIVE OR TRANSPLANTED HEART: ICD-10-CM

## 2024-08-03 RX ORDER — AMLODIPINE BESYLATE 5 MG/1
5 TABLET ORAL
Qty: 90 TABLET | Refills: 3 | Status: SHIPPED | OUTPATIENT
Start: 2024-08-03

## 2024-08-03 NOTE — TELEPHONE ENCOUNTER
Refill Decision Note   Elizabeth Pasquale  is requesting a refill authorization.  Brief Assessment and Rationale for Refill:  Approve     Medication Therapy Plan:         Comments:     Note composed:1:48 PM 08/03/2024

## 2024-08-04 RX ORDER — ATORVASTATIN CALCIUM 10 MG/1
10 TABLET, FILM COATED ORAL
Qty: 90 TABLET | Refills: 1 | Status: SHIPPED | OUTPATIENT
Start: 2024-08-04

## 2024-08-06 RX ORDER — IBUPROFEN 800 MG/1
800 TABLET ORAL EVERY 6 HOURS PRN
Qty: 30 TABLET | Refills: 0 | Status: SHIPPED | OUTPATIENT
Start: 2024-08-06

## 2024-08-06 NOTE — PRE-PROCEDURE INSTRUCTIONS
Patient reviewed on 8/6/2024.  Okay to proceed at [facility]. The following pre-procedure instructions and arrival time have been sent to patient portal for review.  Patient replied to portal message.      Deabilly Johnson     Below you will find basic pre-procedure instructions in preparation for your procedure on 8/7/24 with Dr. Dalton     You should have received your arrival time already from Dr's office.     - Nothing to eat or drink after midnight the night before your procedure until after your procedure, except AM meds with small sips of water.     - HOLD all oral Diabetic medications night before and morning of procedure  - HOLD all Insulin morning of procedure  - HOLD all Fluid pills morning of procedure  - HOLD all non-insulin shots until after surgery (Ozempic, Mounjaro, Trulicity, Victoza, Byetta, Wegovy and Adlyxin) (7 days prior)  - HOLD all vitamins, minerals and herbal supplements morning of procedure   - TAKE all B/P meds, EXCEPT those that contain a fluid pill (ex. Lasix, Hydroclorothiazide/HCTZ, Spirnolactone)  - USE inhalers as needed and bring AM of surgery  - USE EYE DROPS as directed  -TAKE blood thinner meds AM of surgery unless otherwise instructed by your provider to not take     - Shower and wash face with antibacterial soap (ex. Dial) for 3 mins PM prior and AM of surgery  - No powder, lotions, creams, oils, gels, ointments, makeup,  or jewelry    - Wear comfortable clothing (button up shirt)     (Patient is required to have a responsible ride to transport home, ride may not leave while patient is in surgery)     -- [facility], 2nd floor Surgery Center, located   @ [address] [place] [address] Floor Registration        If you have any questions or concerns please feel free to contact your surgeon's office.           Please reply to this message as receipt of delivery.     JACQUELINE Zambrano  [facility]  Pre-Admit - Anesthesia Dept

## 2024-08-07 ENCOUNTER — HOSPITAL ENCOUNTER (OUTPATIENT)
Facility: HOSPITAL | Age: 64
Discharge: HOME | End: 2024-08-07
Attending: OPHTHALMOLOGY | Admitting: OPHTHALMOLOGY
Payer: COMMERCIAL

## 2024-08-07 VITALS
TEMPERATURE: 98 F | BODY MASS INDEX: 30.73 KG/M2 | SYSTOLIC BLOOD PRESSURE: 115 MMHG | RESPIRATION RATE: 17 BRPM | WEIGHT: 180 LBS | DIASTOLIC BLOOD PRESSURE: 62 MMHG | HEIGHT: 64 IN | OXYGEN SATURATION: 99 % | HEART RATE: 65 BPM

## 2024-08-07 DIAGNOSIS — H25.12 AGE-RELATED NUCLEAR CATARACT, LEFT: ICD-10-CM

## 2024-08-07 DIAGNOSIS — H25.12 NUCLEAR SCLEROTIC CATARACT OF LEFT EYE: Primary | ICD-10-CM

## 2024-08-07 PROCEDURE — 94761 N-INVAS EAR/PLS OXIMETRY MLT: CPT

## 2024-08-07 PROCEDURE — V2632 POST CHMBR INTRAOCULAR LENS: HCPCS | Performed by: OPHTHALMOLOGY

## 2024-08-07 PROCEDURE — 99152 MOD SED SAME PHYS/QHP 5/>YRS: CPT | Mod: ,,, | Performed by: OPHTHALMOLOGY

## 2024-08-07 PROCEDURE — 36000706: Performed by: OPHTHALMOLOGY

## 2024-08-07 PROCEDURE — 66984 XCAPSL CTRC RMVL W/O ECP: CPT | Mod: LT,,, | Performed by: OPHTHALMOLOGY

## 2024-08-07 PROCEDURE — 27201423 OPTIME MED/SURG SUP & DEVICES STERILE SUPPLY: Performed by: OPHTHALMOLOGY

## 2024-08-07 PROCEDURE — 36000707: Performed by: OPHTHALMOLOGY

## 2024-08-07 PROCEDURE — 99900035 HC TECH TIME PER 15 MIN (STAT)

## 2024-08-07 PROCEDURE — 63600175 PHARM REV CODE 636 W HCPCS: Performed by: OPHTHALMOLOGY

## 2024-08-07 PROCEDURE — 71000015 HC POSTOP RECOV 1ST HR: Performed by: OPHTHALMOLOGY

## 2024-08-07 PROCEDURE — 25000003 PHARM REV CODE 250: Performed by: OPHTHALMOLOGY

## 2024-08-07 DEVICE — LENS EYHANCE +19.5D: Type: IMPLANTABLE DEVICE | Site: EYE | Status: FUNCTIONAL

## 2024-08-07 RX ORDER — PROPARACAINE HYDROCHLORIDE 5 MG/ML
SOLUTION/ DROPS OPHTHALMIC
Status: DISCONTINUED | OUTPATIENT
Start: 2024-08-07 | End: 2024-08-07 | Stop reason: HOSPADM

## 2024-08-07 RX ORDER — MOXIFLOXACIN 5 MG/ML
SOLUTION/ DROPS OPHTHALMIC
Status: DISCONTINUED | OUTPATIENT
Start: 2024-08-07 | End: 2024-08-07 | Stop reason: HOSPADM

## 2024-08-07 RX ORDER — MIDAZOLAM HYDROCHLORIDE 1 MG/ML
2 INJECTION, SOLUTION INTRAMUSCULAR; INTRAVENOUS
Status: DISCONTINUED | OUTPATIENT
Start: 2024-08-07 | End: 2024-08-07 | Stop reason: HOSPADM

## 2024-08-07 RX ORDER — CYCLOP/TROP/PROPA/PHEN/KET/WAT 1-1-0.1%
1 DROPS (EA) OPHTHALMIC (EYE) EVERY 5 MIN PRN
Status: COMPLETED | OUTPATIENT
Start: 2024-08-07 | End: 2024-08-07

## 2024-08-07 RX ORDER — PHENYLEPHRINE HYDROCHLORIDE 25 MG/ML
1 SOLUTION/ DROPS OPHTHALMIC
Status: DISCONTINUED | OUTPATIENT
Start: 2024-08-07 | End: 2024-08-07

## 2024-08-07 RX ORDER — MOXIFLOXACIN 5 MG/ML
1 SOLUTION/ DROPS OPHTHALMIC
Status: COMPLETED | OUTPATIENT
Start: 2024-08-07 | End: 2024-08-07

## 2024-08-07 RX ORDER — FENTANYL CITRATE 50 UG/ML
25 INJECTION, SOLUTION INTRAMUSCULAR; INTRAVENOUS EVERY 5 MIN PRN
Status: DISCONTINUED | OUTPATIENT
Start: 2024-08-07 | End: 2024-08-07 | Stop reason: HOSPADM

## 2024-08-07 RX ORDER — ACETAMINOPHEN 325 MG/1
650 TABLET ORAL EVERY 4 HOURS PRN
Status: DISCONTINUED | OUTPATIENT
Start: 2024-08-07 | End: 2024-08-07 | Stop reason: HOSPADM

## 2024-08-07 RX ORDER — PREDNISOLONE ACETATE 10 MG/ML
SUSPENSION/ DROPS OPHTHALMIC
Status: DISCONTINUED | OUTPATIENT
Start: 2024-08-07 | End: 2024-08-07 | Stop reason: HOSPADM

## 2024-08-07 RX ORDER — PHENYLEPHRINE HYDROCHLORIDE 100 MG/ML
1 SOLUTION/ DROPS OPHTHALMIC
Status: DISCONTINUED | OUTPATIENT
Start: 2024-08-07 | End: 2024-08-07 | Stop reason: HOSPADM

## 2024-08-07 RX ORDER — TROPICAMIDE 10 MG/ML
1 SOLUTION/ DROPS OPHTHALMIC
Status: DISCONTINUED | OUTPATIENT
Start: 2024-08-07 | End: 2024-08-07

## 2024-08-07 RX ORDER — LIDOCAINE HYDROCHLORIDE 10 MG/ML
INJECTION, SOLUTION EPIDURAL; INFILTRATION; INTRACAUDAL; PERINEURAL
Status: DISCONTINUED | OUTPATIENT
Start: 2024-08-07 | End: 2024-08-07 | Stop reason: HOSPADM

## 2024-08-07 RX ORDER — LIDOCAINE HYDROCHLORIDE 40 MG/ML
INJECTION, SOLUTION RETROBULBAR
Status: DISCONTINUED | OUTPATIENT
Start: 2024-08-07 | End: 2024-08-07 | Stop reason: HOSPADM

## 2024-08-07 RX ORDER — TETRACAINE HYDROCHLORIDE 5 MG/ML
1 SOLUTION OPHTHALMIC EVERY 5 MIN PRN
Status: DISCONTINUED | OUTPATIENT
Start: 2024-08-07 | End: 2024-08-07 | Stop reason: HOSPADM

## 2024-08-07 RX ORDER — PROPARACAINE HYDROCHLORIDE 5 MG/ML
1 SOLUTION/ DROPS OPHTHALMIC
Status: DISCONTINUED | OUTPATIENT
Start: 2024-08-07 | End: 2024-08-07 | Stop reason: HOSPADM

## 2024-08-07 RX ADMIN — MOXIFLOXACIN OPHTHALMIC 1 DROP: 5 SOLUTION/ DROPS OPHTHALMIC at 11:08

## 2024-08-07 RX ADMIN — Medication 1 DROP: at 11:08

## 2024-08-07 RX ADMIN — MOXIFLOXACIN 1 DROP: 5 SOLUTION/ DROPS OPHTHALMIC at 01:08

## 2024-08-07 RX ADMIN — MIDAZOLAM HYDROCHLORIDE 2 MG: 1 INJECTION, SOLUTION INTRAMUSCULAR; INTRAVENOUS at 12:08

## 2024-08-07 NOTE — DISCHARGE INSTRUCTIONS
Dr. Dalton     Cataract Post-Operative Instructions       Day of surgery:     -Resume drops THREE times daily into the operative eye.     -Do not rub your eye     -Wear protective sunglasses during the day.     -Resume moderate activity.     -Bathe/shower/wash face normally     -Do not apply makeup around the operative eye for 1 week.     -You should expect:     Blurry vision and halos for 24-48 hours     Dilated pupil for 24-48 hours     Scratchy feeling in the eye for 1-2 days     Curved shadow in your peripheral vision for 2-3 weeks     Occasional flickering of lights for up to 1 week     -If you experience severe pain or nausea, call Dr. Dalton or the on-call doctor at [phone].       Plan to see Dr. Dalton at:     [facility] [address]    [address] [place]  [address]    **Most patients can drive the next morning.  If you do not feel comfortable driving, please arrange for transportation. **

## 2024-08-07 NOTE — OP NOTE
DATE OF PROCEDURE: 08/07/2024    SURGEON: KARISHMA RIVERA MD    PREOPERATIVE DIAGNOSIS:  Senile nuclear sclerotic cataract left eye.     POSTOPERATIVE DIAGNOSIS: Senile nuclear sclerotic cataract left eye.     PROCEDURE PERFORMED:  Phacoemulsification with placement of intraocular lens, left eye.    IMPLANT:  DIB00 19.5    Anesthesia: Moderate sedation with topical Lidocaine. Patient ID confirmed and re-evaluated the patient and anesthesia plan confirming it is suitable for the patient's condition and procedure.     COMPLICATIONS: none    ESTIMATED BLOOD LOSS: <1cc    SPECIMENS: none    INDICATIONS FOR PROCEDURE:   The patient has a history of painless progressive vision loss.  The patient has described difficulties with activities of daily living, which specifically include driving, which is secondary to cataract formation and progression. After we had a thorough discussion about risks, benefits, and alternatives to cataract surgery, the patient agreed to proceed with phacoemulsification and implantation of a lens in the left eye.  These risks include, but are not limited to, hemorrhage, pain, infection, need for additional surgery, need for glasses or contacts, loss of vision, or even loss of the eye.    PROCEDURE IN DETAIL:  The patient was met in the preop holding area.  Consent was confirmed to be signed.  The operative site was marked.  The patient was brought into the operating room by the anesthesia team and placed under monitored anesthesia care.  The left eye was prepped and draped in a sterile ophthalmic fashion.  A Manohar speculum was placed into the left eye.   A paracentesis site was made and 1% preservative-free lidocaine was injected into the anterior chamber.  Viscoelastic  material was injected into the anterior chamber.  A keratome blade was used to make a clear corneal incision.  A cystotome was used to initiate the continuous curvilinear capsulorrhexis which was completed with Utrata forceps.   BSS on a white cannula was used to perform hydrodissection.  The phacoemulsification tip was introduced into the eye and the nucleus was removed in a standard divide-and-conquer fashion.  Remaining cortical material was removed from the eye using irrigation-aspiration.  The capsular bag was filled with viscoelastic material and the intraocular lens was injected and positioned into place. Remaining viscoelastic material was removed from the eye using irrigation and aspiration.  The corneal wounds were hydrated.  The eye was filled to physiologic pressure. The wounds were found to be watertight. Drops of Vigamox and prednisilone were placed into the eye.  The eye was washed, dried, and shielded.  The patient tolerated the procedure well and knows to follow up with me tomorrow morning, sooner if needed.      Under my direct supervision, intravenous moderate sedation was administered during the course of this procedure, with continuous monitoring of hemodynamic parameters.  Monitoring of the patient's vital signs and respiratory status was provided by trained nursing staff during the entire course of the procedure and under my supervision and recorded in the patient's medical record.  The total time for sedation was 12 minutes.

## 2024-08-07 NOTE — DISCHARGE SUMMARY
[facility]  Discharge Note  Short Stay    Procedure(s) (LRB):  EXTRACTION, CATARACT, WITH IOL INSERTION (Left)    BRIEF DISCHARGE NOTE:    Date of discharge: 08/07/2024    Reason for hospitalization -  Cataract surgery     Final Diagnosis - Visually significant Cataract    Procedures and treatment provided - Status post phacoemulsification with placement of intraocular lens     Diet - Advance to regular as tolerated    Activity - as tolerated    Disposition at the end of the case - Good.    Discharge: to home    The patient tolerated the procedure well and knows to follow up with me tomorrow in the eye clinic, sooner if needed.    Patient and family instructions (as appropriate) - Given to patient on discharge    Adelia Dalton MD

## 2024-08-08 ENCOUNTER — OFFICE VISIT (OUTPATIENT)
Dept: CARDIOLOGY | Facility: CLINIC | Age: 64
End: 2024-08-08
Payer: COMMERCIAL

## 2024-08-08 ENCOUNTER — OFFICE VISIT (OUTPATIENT)
Dept: OPTOMETRY | Facility: CLINIC | Age: 64
End: 2024-08-08
Payer: COMMERCIAL

## 2024-08-08 ENCOUNTER — LAB VISIT (OUTPATIENT)
Dept: LAB | Facility: HOSPITAL | Age: 64
End: 2024-08-08
Payer: COMMERCIAL

## 2024-08-08 ENCOUNTER — TELEPHONE (OUTPATIENT)
Dept: OPTOMETRY | Facility: CLINIC | Age: 64
End: 2024-08-08
Payer: COMMERCIAL

## 2024-08-08 VITALS
WEIGHT: 181.88 LBS | HEART RATE: 78 BPM | DIASTOLIC BLOOD PRESSURE: 70 MMHG | SYSTOLIC BLOOD PRESSURE: 112 MMHG | HEIGHT: 64 IN | RESPIRATION RATE: 18 BRPM | BODY MASS INDEX: 31.05 KG/M2

## 2024-08-08 DIAGNOSIS — R94.39 ABNORMAL STRESS TEST: ICD-10-CM

## 2024-08-08 DIAGNOSIS — Z96.1 STATUS POST CATARACT EXTRACTION AND INSERTION OF INTRAOCULAR LENS OF LEFT EYE: Primary | ICD-10-CM

## 2024-08-08 DIAGNOSIS — R00.2 PALPITATIONS: Primary | ICD-10-CM

## 2024-08-08 DIAGNOSIS — I10 ESSENTIAL HYPERTENSION: ICD-10-CM

## 2024-08-08 DIAGNOSIS — R00.2 PALPITATIONS: ICD-10-CM

## 2024-08-08 DIAGNOSIS — I25.10 CORONARY ARTERY DISEASE INVOLVING NATIVE CORONARY ARTERY OF NATIVE HEART WITHOUT ANGINA PECTORIS: ICD-10-CM

## 2024-08-08 DIAGNOSIS — Z98.42 STATUS POST CATARACT EXTRACTION AND INSERTION OF INTRAOCULAR LENS OF LEFT EYE: Primary | ICD-10-CM

## 2024-08-08 DIAGNOSIS — E78.5 DYSLIPIDEMIA: ICD-10-CM

## 2024-08-08 LAB
ALBUMIN SERPL BCP-MCNC: 3.9 G/DL (ref 3.5–5.2)
ALP SERPL-CCNC: 44 U/L (ref 55–135)
ALT SERPL W/O P-5'-P-CCNC: 12 U/L (ref 10–44)
ANION GAP SERPL CALC-SCNC: 7 MMOL/L (ref 8–16)
AST SERPL-CCNC: 18 U/L (ref 10–40)
BILIRUB SERPL-MCNC: 0.4 MG/DL (ref 0.1–1)
BUN SERPL-MCNC: 18 MG/DL (ref 8–23)
CALCIUM SERPL-MCNC: 9.2 MG/DL (ref 8.7–10.5)
CHLORIDE SERPL-SCNC: 107 MMOL/L (ref 95–110)
CO2 SERPL-SCNC: 26 MMOL/L (ref 23–29)
CREAT SERPL-MCNC: 1.1 MG/DL (ref 0.5–1.4)
EST. GFR  (NO RACE VARIABLE): 56.8 ML/MIN/1.73 M^2
GLUCOSE SERPL-MCNC: 84 MG/DL (ref 70–110)
MAGNESIUM SERPL-MCNC: 1.8 MG/DL (ref 1.6–2.6)
POTASSIUM SERPL-SCNC: 4 MMOL/L (ref 3.5–5.1)
PROT SERPL-MCNC: 6.5 G/DL (ref 6–8.4)
SODIUM SERPL-SCNC: 140 MMOL/L (ref 136–145)
T4 FREE SERPL-MCNC: 0.97 NG/DL (ref 0.71–1.51)
TSH SERPL DL<=0.005 MIU/L-ACNC: 0.38 UIU/ML (ref 0.4–4)

## 2024-08-08 PROCEDURE — 4010F ACE/ARB THERAPY RXD/TAKEN: CPT | Mod: CPTII,S$GLB,,

## 2024-08-08 PROCEDURE — 3074F SYST BP LT 130 MM HG: CPT | Mod: CPTII,S$GLB,,

## 2024-08-08 PROCEDURE — 99214 OFFICE O/P EST MOD 30 MIN: CPT | Mod: S$GLB,,,

## 2024-08-08 PROCEDURE — 99999 PR PBB SHADOW E&M-EST. PATIENT-LVL IV: CPT | Mod: PBBFAC,,,

## 2024-08-08 PROCEDURE — 80053 COMPREHEN METABOLIC PANEL: CPT

## 2024-08-08 PROCEDURE — 84439 ASSAY OF FREE THYROXINE: CPT

## 2024-08-08 PROCEDURE — 99024 POSTOP FOLLOW-UP VISIT: CPT | Mod: S$GLB,,, | Performed by: OPTOMETRIST

## 2024-08-08 PROCEDURE — 1159F MED LIST DOCD IN RCRD: CPT | Mod: CPTII,S$GLB,, | Performed by: OPTOMETRIST

## 2024-08-08 PROCEDURE — 84443 ASSAY THYROID STIM HORMONE: CPT

## 2024-08-08 PROCEDURE — 36415 COLL VENOUS BLD VENIPUNCTURE: CPT | Mod: PO

## 2024-08-08 PROCEDURE — 1159F MED LIST DOCD IN RCRD: CPT | Mod: CPTII,S$GLB,,

## 2024-08-08 PROCEDURE — 83735 ASSAY OF MAGNESIUM: CPT

## 2024-08-08 PROCEDURE — 3008F BODY MASS INDEX DOCD: CPT | Mod: CPTII,S$GLB,,

## 2024-08-08 PROCEDURE — 99999 PR PBB SHADOW E&M-EST. PATIENT-LVL III: CPT | Mod: PBBFAC,,, | Performed by: OPTOMETRIST

## 2024-08-08 PROCEDURE — 4010F ACE/ARB THERAPY RXD/TAKEN: CPT | Mod: CPTII,S$GLB,, | Performed by: OPTOMETRIST

## 2024-08-08 PROCEDURE — 3078F DIAST BP <80 MM HG: CPT | Mod: CPTII,S$GLB,,

## 2024-08-13 ENCOUNTER — HOSPITAL ENCOUNTER (OUTPATIENT)
Dept: CARDIOLOGY | Facility: HOSPITAL | Age: 64
Discharge: HOME | End: 2024-08-13
Payer: COMMERCIAL

## 2024-08-13 DIAGNOSIS — R00.2 PALPITATIONS: ICD-10-CM

## 2024-08-13 PROCEDURE — 93242 EXT ECG>48HR<7D RECORDING: CPT | Mod: PO

## 2024-08-13 PROCEDURE — 93243 EXT ECG>48HR<7D SCAN A/R: CPT | Mod: PO

## 2024-08-21 ENCOUNTER — PATIENT MESSAGE (OUTPATIENT)
Dept: CARDIOLOGY | Facility: CLINIC | Age: 64
End: 2024-08-21
Payer: COMMERCIAL

## 2024-08-21 NOTE — PROGRESS NOTES
"HISTORY OF PRESENT ILLNESS:  Pt. is a 56 y.o. female presents for monitoring of her use of celexa for depression, lumbar DJD, migraines, Gerd, has Factor V Leiden. Is not on coumadin.  She is UTD with mammogram, Tetanus done 8/1/13, Colonoscopy done 1/6/14.  She has had a hysterectomy.  She states she began with a rash one week ago  2 large lesions to her right leg.  Her granddaughter has impetigo.  HAs been trying to treat for ringworm, with no improvement.  She just saw Dr. Cortes recently:    "PREVENTION (use daily regardless of headache):  -- raiseing topiramate to 100mg twice a day, continue this for 6 week trial, if not helping by itself, next step will be to change celexa to another antidepressant like Effexor      ACUTE TREATMENT:  -- at onset of big flare, start naratriptan 1 tab twice a day for 5 days. If flare only lasts 3 days, take only 3 days.  -- NO IMITREX IN SAME DAY AS NARATRIPTAN. Must be 24 hours apart.         Return in about 2 months (around 10/9/2017)"     Neva Cortes MD     Lab Results   Component Value Date    WBC 6.69 04/02/2017    HGB 14.8 04/02/2017    HCT 42.3 04/02/2017     04/02/2017    CHOL 211 (H) 03/10/2017    TRIG 115 03/10/2017    HDL 51 03/10/2017    ALT 36 04/02/2017    AST 26 04/02/2017     04/02/2017    K 3.9 04/02/2017     04/02/2017    CREATININE 1.26 04/02/2017    BUN 22 (H) 04/02/2017    CO2 25 04/02/2017    TSH 0.567 03/10/2017     Lab Results   Component Value Date    LDLCALC 137.0 03/10/2017             ROS:  GENERAL: No fever, chills, fatigability or weight loss.  SKIN: No rashes, itching or changes in color or texture of skin.  HEAD: No headaches or recent head trauma.  EARS: Denies ear pain, discharge or vertigo.  NOSE: No loss of smell, no epistaxis or postnasal drip.  MOUTH & THROAT: No hoarseness or change in voice. No excessive gum bleeding.  NODES: Denies swollen glands.  CHEST: Denies MEDELLIN, cyanosis, wheezing, cough and sputum " Rotator Cuff Tears    This information does not include all information related to rotator cuff tears. For more information please visit the [employer] Academy [employer] website using the following link: Rotator Cuff Tears    A rotator cuff tear is a common cause of shoulder pain and disability among adults. Each year, almost 2 million people in the [place] visit their doctors because of rotator cuff tears.A torn rotator cuff may weaken your shoulder. This means that many daily activities, like combing your hair or getting dressed, may become painful and difficult to do.    Anatomy  Your shoulder is made up of three bones: the upper arm bone (humerus), the shoulder blade (scapula), and the collarbone (clavicle). The shoulder is a ball-and-socket joint: The ball, or head, of the upper arm bone fits into a shallow socket in the shoulder blade.  Your arm is kept in your shoulder socket by the rotator cuff. The rotator cuff is a group of four muscles that come together as tendons to form a covering around the head of the humerus. The rotator cuff attaches the humerus to the shoulder blade and helps to lift and rotate your arm. There is a lubricating sac called a bursa between the rotator cuff and the bone on top of the shoulder (acromion). The bursa allows the rotator cuff tendons to glide freely when you move your arm. When the rotator cuff tendons are injured or damaged, this bursa can also become inflamed and painful.            Description  When one or more of the rotator cuff tendons is torn, the tendon becomes partially or completely detached from the head of the humerus. Most tears occur in the supraspinatus tendon, but other parts of the rotator cuff may also be involved. In many cases, torn tendons begin by fraying. As the damage progresses, the tendon can completely tear, sometimes with lifting a heavy object.    There are different types of tears.    Partial tear: This type of tear does not  completely detach the tendon from the bone. It is called partial because the tear goes only partially through the thickness of the tendon. The tendon is still attached to the bone, but it is thinned.  Full-thickness tear: With this type of tear, there is detachment of part of the tendon from the bone. When only a small part of the tendon is detached from the bone, it is referred to as a full-thickness incomplete tear. When a tendon is completely detached from the bone, it is referred to as a full-thickness complete tear. With a full-thickness complete tear, there is basically a hole in the tendon.        Cause  There are two main causes of rotator cuff tears: injury and wear (degeneration).    Acute Tear  If you fall down on your outstretched arm or lift something too heavy with a jerking motion, you can tear your rotator cuff. This type of tear can occur with other injuries, such as a broken collarbone, a dislocated shoulder, or a wrist fracture.    Degenerative (Wear-Related) Tear  Most tears are the result of a wearing down of the tendon that occurs slowly over time. This degeneration naturally occurs as we age and in most cases is relatively painless. Rotator cuff tears are more common in the dominant arm -- the arm you prefer to use for most tasks. If you have a degenerative tear in one shoulder, there is a greater likelihood of a rotator cuff tear in the opposite shoulder -- even if you have no pain in that shoulder. Several factors contribute to degenerative, or chronic, rotator cuff tears.    Repetitive stress: Repeating the same shoulder motions again and again can stress your rotator cuff muscles and tendons. Baseball, tennis, rowing, and weightlifting are examples of activities that can put you at risk for overuse tears. Many jobs and routine chores can cause overuse tears, as well.  Lack of blood supply: As we get older, the blood supply in our rotator cuff tendons lessens. Without a good blood supply,  production.  CARDIOVASCULAR: Denies chest pain, PND, orthopnea or reduced exercise tolerance.  ABDOMEN: Appetite fine. No weight loss. Denies constipation, diarrhea, abdominal pain, hematemesis or blood in stool.  URINARY: No flank pain, dysuria or hematuria.  PERIPHERAL VASCULAR: No claudication or cyanosis. No edema.  MUSCULOSKELETAL: No joint stiffness or swelling. Denies back pain.  NEUROLOGIC: Denies numbness    PE:   Vitals:   Vitals:    08/17/17 1627   Pulse: 90   Resp: 18     GENERAL: no acute distress, A&Ox3, comfortable.  Female with BMI of 37   HEENT: tympanic membranes clear, nasal mucosa pink, no pharyngeal erythema or exudate  NECK: supple, no cervical lymphadenopathy, no thyromegaly; no supraclavicular nodes;   CHEST:  clear to auscultation bilaterally, no crackles or wheeze; no increased work of breathing;  CARDIOVASCULAR: regular rate and rhythm, no rubs, murmurs or gallops.  ABDOMEN: normal bowel sounds, soft non-tender, non-distended; no palpable organomegaly;   EXT: no clubbing, cyanosis or edema.     ASSESSMENT/PLAN:    Lumbar DJD: in zanaflex;    Depression: will await decision on continuing celexa;    GERD: on pantoprazole; will continue;    Migraines: under care of Dr. Cortes;    Rash/skin lesion: possible strept lesion;     Health Maintenance: She is UTD with mammogram, Tetanus done 8/1/13, Colonoscopy done 1/6/14.  She has had a hysterectomy;      Call if condition changes or worsens.     the body's natural ability to repair tendon damage is impaired. This can ultimately lead to a tendon tear.    Risk Factors  Because most rotator cuff tears are largely caused by the normal wear and tear that goes along with aging, people over 40 are at greater risk. People who do repetitive lifting or overhead activities are also at risk for rotator cuff tears. Athletes are especially vulnerable to overuse tears, particularly tennis players and baseball pitchers. Painters, , and others who do overhead work also have a greater chance for tears. Although overuse tears caused by sports activity or overhead work also occur in younger people, most tears in young adults are caused by a traumatic injury, like a fall.    Symptoms  The most common symptoms of a rotator cuff tear include:  Pain at rest and at night, particularly if lying on the affected shoulder  Pain when lifting and lowering your arm or with specific movements  Weakness when lifting or rotating your arm  Crepitus, or a crackling sensation, when moving your shoulder in certain positions    Tears that happen suddenly, such as from a fall, usually cause intense pain. There may be a snapping sensation and immediate weakness in your upper arm.    Tears that develop slowly due to overuse may also cause pain and arm weakness. You may have pain in the shoulder when you lift your arm, or pain that moves down your arm.    At first, the pain may be mild and present only when lifting your arm over your head, such as reaching into a cupboard. Over-the-counter medication, such as aspirin, ibuprofen, or naproxen, may relieve the pain.  Over time, the pain may become more noticeable at rest and no longer goes away with medications. You may have pain when you lie on the painful side at night. The pain and weakness in the shoulder may make routine activities, such as combing your hair or reaching behind your back, more difficult.  It should be noted that some  rotator cuff tears are not painful. These tears, however, may still result in arm weakness and other symptoms.    Imaging Tests  Other tests which may help your doctor confirm your diagnosis include:  X-rays: The first imaging tests performed are usually X-rays. Because X-rays do not show the soft tissues of your shoulder like the rotator cuff, plain X-rays of a shoulder with rotator cuff pain are usually normal and may show a small bone spur, which is a normal finding. The reason X-rays are done is to make sure you don't have other reasons for your shoulder pain, such as arthritis.  Magnetic resonance imaging (MRI) or ultrasound (US): An MRI can better show soft tissues, like the rotator cuff tendons, than an X-ray. It can show the rotator cuff tear, as well as where the tear is located within the tendon and the size of the tear. An MRI can also give your doctor a better idea of how old or new a tear is because it can show the quality of the rotator cuff muscles.    Treatment  If you have a rotator cuff tear and keep using it despite increasing pain, you may cause further damage. RESEARCH HAS SHOWN THAT ROTATOR CUFF TEARS GET LARGER OVER TIME NO MATTER WHAT.    The goal of any treatment is to reduce pain and restore function. There are several treatment options for a rotator cuff tear, and the best option is different for every person. In planning your treatment, your doctor will consider:  Your age  Your activity level  Your general health  The type of tear you have    There is no evidence of better results from surgery performed near the time of injury versus later on. For this reason, many doctors first recommend management of rotator cuff tears with physical therapy and other nonsurgical treatments.    Nonsurgical Treatment  In about 80 to 85% of patients, nonsurgical treatment relieves pain and improves function in the shoulder. Nonsurgical treatment options may include:    Rest: Your doctor may suggest rest  and limiting overhead activities.   Activity modification: Avoid any activities that cause shoulder pain.  Nonsteroidal anti-inflammatory drugs (NSAIDs): Anti-inflammatory drugs like ibuprofen, aspirin, and naproxen can reduce pain and swelling.  Steroid injection: If rest, medications, and physical therapy do not relieve your pain, an injection of a local anesthetic combined with cortisone may be helpful. Cortisone is a very effective anti-inflammatory medicine; however, it is not effective for all patients. And if it is effective for you, there is no way to know how long the effects will last; it may be weeks, months, years, or possibly the rest of your life. On average, injections can be expected to provide pain relief in about two-thirds of patients for a period of at least 3 months.  Strengthening exercises and physical therapy: Specific exercises will restore movement and strengthen your shoulder. Your exercise program will include stretches to improve flexibility and range of motion. Strengthening the muscles that support your shoulder can relieve pain and prevent further injury.  Examples of shoulder exercises can be found at the following link: Rotator Cuff and Shoulder Rehabilitation Exercises    Opoid/Narcotic pain medication is typically not provided when treating rotator cuff tears, especially if you will be undergoing surgery as taking these medications can reduce their effectiveness in relieving pain following surgery.     The chief advantage of nonsurgical treatment is that it avoids the major risks of surgery, such as:  Infection  Permanent stiffness  Anesthesia complications  Sometimes lengthy recovery time    The disadvantages of nonsurgical treatment are:  The size of the tear WILL increase over time.  You may need to limit activities.    Surgical Treatment  Your doctor may recommend surgery if your pain does not improve with nonsurgical methods. Continued pain is the main indication for surgery.  However, your doctor may also suggest surgery if you are very active and/or use your arms for overhead work or sports.     Other signs that surgery may be a good option for you include:  Your symptoms have lasted 6 to 12 months  You have a large tear (more than 3 cm) and the quality of the surrounding tissue is good  You have significant weakness and loss of function in your shoulder  Your tear was caused by a recent, acute injury    Surgery to repair a torn rotator cuff most often involves re-attaching the tendon to the head of humerus (upper arm bone).  Most surgical repairs can be done on an outpatient basis and do not require you to stay overnight in the hospital.     You may have other shoulder problems in addition to a rotator cuff tear, such as:  Biceps tendon tears (biceps tenotomy versus tenodesis)  Osteoarthritis  Bone spurs (subacromial decompression)  Other soft tissue tears    During the operation, your surgeon may be able to take care of these problems, as well.     The three techniques most commonly used for rotator cuff repair are:  Open repair  Arthroscopic repair (most commonly used today)  Mini-open repair      All-Arthroscopic Repair  During arthroscopy, your surgeon inserts a small camera, called an arthroscope, into your shoulder joint. The camera displays a live video feed on a monitor, and your surgeon uses these images to guide miniature surgical instruments. Because the arthroscope and surgical instruments are small and thin, your surgeon can use very small incisions (portals), rather than the larger incision needed for standard, open surgery. All-arthroscopic repair is usually an outpatient procedure and is the least invasive method to repair a torn rotator cuff.          More extensive information on surgical treatment can be found by visiting the American Academy of Orthopa[employer]geons website using the following link: Rotator Cuff Tear Surgical Treament      Links  AAOS Clinical  Practice Guideline on the Management of Rotator Cuff Injuries  Rotator Cuff Tears  Management of Rotator Cuff Injuries  Rotator Cuff and Shoulder Rehabilitation Exercises  Rotator Cuff Tear Surgical Treament              STRETCHING EXERCISES                    STRENGTHENING EXERCISES                    If you have any difficulties reading this information, you may visit the online version using the following link: Rotator Cuff and Shoulder Conditioning Program (https://[email or web address].aaos.org/globalassets/pdfs/2022-rotator-cuff-and-shoulder-conditioning-program.pdf)

## 2024-08-23 NOTE — DISCHARGE SUMMARY
Addended by: DWAYNE ARMENTA on: 8/23/2024 01:10 PM     Modules accepted: Orders     [facility]  Discharge Note  Short Stay    Admit Date: 7/17/2018    Discharge Date: 7/17/2018    Attending Physician: Oswald Abdalla MD     Discharge Provider: Oswald Abdalla    Diagnoses:  Active Hospital Problems    Diagnosis  POA    *Lumbar spondylosis [M47.816]  Yes      Resolved Hospital Problems    Diagnosis Date Resolved POA   No resolved problems to display.       Discharged Condition: good    Final Diagnoses: Lumbar spondylosis [M47.816]    Disposition: Home or Self Care    Hospital Course: no complications, uneventful    Outcome of Hospitalization, Treatment, Procedure, or Surgery:  Patient was admitted for outpatient procedure. The patient underwent procedure without complications and are discharged home    Follow up/Patient Instructions:  Follow up as scheduled in Pain Management clinic in 3-4 weeks/Patient has received instructions and follow up date and time    Medications:  Continue previous medications      Discharge Procedure Orders  Call MD for:  temperature >100.4     Call MD for:  severe uncontrolled pain     Call MD for:  redness, tenderness, or signs of infection (pain, swelling, redness, odor or green/yellow discharge around incision site)     Call MD for:  severe persistent headache     No dressing needed           Discharge Procedure Orders (must include Diet, Follow-up, Activity):    Discharge Procedure Orders (must include Diet, Follow-up, Activity)  Call MD for:  temperature >100.4     Call MD for:  severe uncontrolled pain     Call MD for:  redness, tenderness, or signs of infection (pain, swelling, redness, odor or green/yellow discharge around incision site)     Call MD for:  severe persistent headache     No dressing needed

## 2024-09-10 ENCOUNTER — OFFICE VISIT (OUTPATIENT)
Dept: OPTOMETRY | Facility: CLINIC | Age: 64
End: 2024-09-10
Payer: COMMERCIAL

## 2024-09-10 DIAGNOSIS — H52.13 MYOPIA WITH ASTIGMATISM AND PRESBYOPIA, BILATERAL: ICD-10-CM

## 2024-09-10 DIAGNOSIS — H26.493 BILATERAL POSTERIOR CAPSULAR OPACIFICATION: ICD-10-CM

## 2024-09-10 DIAGNOSIS — Z96.1 STATUS POST CATARACT EXTRACTION AND INSERTION OF INTRAOCULAR LENS, LEFT: Primary | ICD-10-CM

## 2024-09-10 DIAGNOSIS — H52.4 MYOPIA WITH ASTIGMATISM AND PRESBYOPIA, BILATERAL: ICD-10-CM

## 2024-09-10 DIAGNOSIS — Z98.42 STATUS POST CATARACT EXTRACTION AND INSERTION OF INTRAOCULAR LENS, LEFT: Primary | ICD-10-CM

## 2024-09-10 DIAGNOSIS — H52.203 MYOPIA WITH ASTIGMATISM AND PRESBYOPIA, BILATERAL: ICD-10-CM

## 2024-09-10 PROCEDURE — 1159F MED LIST DOCD IN RCRD: CPT | Mod: CPTII,S$GLB,,

## 2024-09-10 PROCEDURE — 99024 POSTOP FOLLOW-UP VISIT: CPT | Mod: S$GLB,,,

## 2024-09-10 PROCEDURE — 99999 PR PBB SHADOW E&M-EST. PATIENT-LVL II: CPT | Mod: PBBFAC,,,

## 2024-09-10 PROCEDURE — 4010F ACE/ARB THERAPY RXD/TAKEN: CPT | Mod: CPTII,S$GLB,,

## 2024-09-10 RX ORDER — FINASTERIDE 5 MG/1
TABLET, FILM COATED ORAL
COMMUNITY
Start: 2024-07-17

## 2024-09-10 NOTE — Clinical Note
Hi! I saw this pt today for her one month post-op and she's doing well but has early PCO with symptoms. I know Dr. KING likes to schedule 3 months after surgery for YAG, so giving y'all a heads up that this pt would like a consult for YAG. Thanks!  Dr. Palacios

## 2024-09-10 NOTE — PROGRESS NOTES
HPI    Pt here for 1 month PO OS s/p cat sx.     Pt used combo gtt this am, last day on gtt. Pt denies any pain or   problems/ Pt sts [facility] stable.   Last edited by Susan Guerrero on 9/10/2024  2:01 PM.            Assessment /Plan     For exam results, see Encounter Report.    Status post cataract extraction and insertion of intraocular lens, left    Bilateral posterior capsular opacification    Myopia with astigmatism and presbyopia, bilateral      Pt presented for one month post-op s/p CE w/IOL OS: 08/07/24. Pt doing well and completed post-op drops as instructed. No anterior or posterior segment inflammation, good IOP. PCIOL centered with mild PCO. Ed pt on all findings and emphasized the importance of monitoring yearly for changes, sooner prn.   Posterior capsular opacification present OS>OD; pt symptomatic with glare. Message sent to Dr. Dalton to have pt return for YAG in 3 months.   Discussed spectacle options with pt and released final spec rx. Ed pt on change in rx and adaptation.    RTC: 1 year for comprehensive exam or sooner prn

## 2024-09-24 ENCOUNTER — OFFICE VISIT (OUTPATIENT)
Dept: FAMILY MEDICINE | Facility: CLINIC | Age: 64
End: 2024-09-24
Payer: COMMERCIAL

## 2024-09-24 DIAGNOSIS — R05.8 ALLERGIC COUGH: ICD-10-CM

## 2024-09-24 DIAGNOSIS — Z56.6 STRESSFUL JOB: Primary | ICD-10-CM

## 2024-09-24 DIAGNOSIS — D68.9 COAGULATION DISORDER: ICD-10-CM

## 2024-09-24 PROCEDURE — 1160F RVW MEDS BY RX/DR IN RCRD: CPT | Mod: CPTII,95,, | Performed by: FAMILY MEDICINE

## 2024-09-24 PROCEDURE — 99213 OFFICE O/P EST LOW 20 MIN: CPT | Mod: 95,,, | Performed by: FAMILY MEDICINE

## 2024-09-24 PROCEDURE — 1159F MED LIST DOCD IN RCRD: CPT | Mod: CPTII,95,, | Performed by: FAMILY MEDICINE

## 2024-09-24 PROCEDURE — 4010F ACE/ARB THERAPY RXD/TAKEN: CPT | Mod: CPTII,95,, | Performed by: FAMILY MEDICINE

## 2024-09-24 RX ORDER — AZELASTINE 1 MG/ML
2 SPRAY, METERED NASAL 2 TIMES DAILY
Qty: 30 ML | Refills: 2 | Status: SHIPPED | OUTPATIENT
Start: 2024-09-24

## 2024-09-24 RX ORDER — BENZONATATE 100 MG/1
100 CAPSULE ORAL 3 TIMES DAILY PRN
Qty: 45 CAPSULE | Refills: 1 | Status: SHIPPED | OUTPATIENT
Start: 2024-09-24

## 2024-09-24 RX ORDER — ESCITALOPRAM OXALATE 10 MG/1
10 TABLET ORAL DAILY
Qty: 30 TABLET | Refills: 2 | Status: SHIPPED | OUTPATIENT
Start: 2024-09-24 | End: 2024-12-23

## 2024-09-24 SDOH — SOCIAL DETERMINANTS OF HEALTH (SDOH): OTHER PHYSICAL AND MENTAL STRAIN RELATED TO WORK: Z56.6

## 2024-09-24 NOTE — PROGRESS NOTES
Subjective:       Patient ID: Elizabeth Giordano is a 63 y.o. female.    Chief Complaint: No chief complaint on file.    The patient location is: [place], [place]  The chief complaint leading to consultation is: job stress    Visit type: audiovisual    Face to Face time with patient: 9 minutes  11 minutes of total time spent on the encounter, which includes face to face time and non-face to face time preparing to see the patient (eg, review of tests), Obtaining and/or reviewing separately obtained history, Documenting clinical information in the electronic or other health record, Independently interpreting results (not separately reported) and communicating results to the patient/family/caregiver, or Care coordination (not separately reported).         Each patient to whom he or she provides medical services by telemedicine is:  (1) informed of the relationship between the physician and patient and the respective role of any other health care provider with respect to management of the patient; and (2) notified that he or she may decline to receive medical services by telemedicine and may withdraw from such care at any time.    Notes:   Pt is known to me.  The pt is very stressed at work.  She has one more year to work before she retires.  She has anxiety while at work.  She is fine at home and on the weekends.  She cannot tolerate Xanax--it puts her to sleep.  She denies depression.  She has Factor V leiden mutation--she has no abnormal bleeding nor clots.  She is having allergic rhinitis with a dry cough.      Review of Systems   Constitutional:  Negative for activity change and unexpected weight change.   HENT:  Negative for hearing loss, rhinorrhea and trouble swallowing.    Eyes:  Negative for discharge and visual disturbance.   Respiratory:  Positive for chest tightness. Negative for wheezing.    Cardiovascular:  Negative for chest pain and palpitations.   Gastrointestinal:  Negative for blood in stool,  constipation, diarrhea and vomiting.   Endocrine: Negative for polydipsia and polyuria.   Genitourinary:  Negative for difficulty urinating, dysuria, hematuria and menstrual problem.   Musculoskeletal:  Positive for arthralgias and neck pain. Negative for joint swelling.   Neurological:  Negative for weakness and headaches.   Psychiatric/Behavioral:  Negative for confusion and dysphoric mood.        Objective:      Physical Exam  Constitutional:       General: She is not in acute distress.     Appearance: Normal appearance. She is not ill-appearing.   Pulmonary:      Effort: Pulmonary effort is normal.   Neurological:      Mental Status: She is oriented to person, place, and time.   Psychiatric:         Behavior: Behavior normal.         Assessment:       1. Stressful job    2. Coagulation disorder    3. Allergic cough        Plan:       Diagnoses and all orders for this visit:    Stressful job  Comments:  the pt is to contact me in 3-4 weeks to report symptoms  Orders:  -     EScitalopram oxalate (LEXAPRO) 10 MG tablet; Take 1 tablet (10 mg total) by mouth once daily.    Coagulation disorder  Comments:  Continue ASA    Allergic cough  -     azelastine (ASTELIN) 137 mcg (0.1 %) nasal spray; 2 sprays (274 mcg total) by Nasal route 2 (two) times daily.  -     benzonatate (TESSALON) 100 MG capsule; Take 1 capsule (100 mg total) by mouth 3 (three) times daily as needed for Cough.      During this visit, I reviewed the pt's history, medications, allergies, and problem list.

## 2024-09-26 ENCOUNTER — PATIENT MESSAGE (OUTPATIENT)
Dept: FAMILY MEDICINE | Facility: CLINIC | Age: 64
End: 2024-09-26
Payer: COMMERCIAL

## 2024-09-26 DIAGNOSIS — F41.9 ANXIETY: Primary | ICD-10-CM

## 2024-09-27 ENCOUNTER — OFFICE VISIT (OUTPATIENT)
Dept: PAIN MEDICINE | Facility: CLINIC | Age: 64
End: 2024-09-27
Payer: COMMERCIAL

## 2024-09-27 ENCOUNTER — TELEPHONE (OUTPATIENT)
Dept: PAIN MEDICINE | Facility: CLINIC | Age: 64
End: 2024-09-27
Payer: COMMERCIAL

## 2024-09-27 VITALS
SYSTOLIC BLOOD PRESSURE: 123 MMHG | HEART RATE: 72 BPM | BODY MASS INDEX: 31.2 KG/M2 | HEIGHT: 64 IN | DIASTOLIC BLOOD PRESSURE: 60 MMHG | WEIGHT: 182.75 LBS

## 2024-09-27 DIAGNOSIS — M54.12 CERVICAL RADICULOPATHY: Primary | ICD-10-CM

## 2024-09-27 DIAGNOSIS — M50.30 DDD (DEGENERATIVE DISC DISEASE), CERVICAL: ICD-10-CM

## 2024-09-27 DIAGNOSIS — M48.061 SPINAL STENOSIS OF LUMBAR REGION WITHOUT NEUROGENIC CLAUDICATION: ICD-10-CM

## 2024-09-27 DIAGNOSIS — M54.16 LUMBAR RADICULOPATHY, CHRONIC: ICD-10-CM

## 2024-09-27 DIAGNOSIS — M51.36 DDD (DEGENERATIVE DISC DISEASE), LUMBAR: ICD-10-CM

## 2024-09-27 PROCEDURE — 1160F RVW MEDS BY RX/DR IN RCRD: CPT | Mod: CPTII,S$GLB,, | Performed by: PHYSICIAN ASSISTANT

## 2024-09-27 PROCEDURE — 3074F SYST BP LT 130 MM HG: CPT | Mod: CPTII,S$GLB,, | Performed by: PHYSICIAN ASSISTANT

## 2024-09-27 PROCEDURE — 99214 OFFICE O/P EST MOD 30 MIN: CPT | Mod: S$GLB,,, | Performed by: PHYSICIAN ASSISTANT

## 2024-09-27 PROCEDURE — 4010F ACE/ARB THERAPY RXD/TAKEN: CPT | Mod: CPTII,S$GLB,, | Performed by: PHYSICIAN ASSISTANT

## 2024-09-27 PROCEDURE — 99999 PR PBB SHADOW E&M-EST. PATIENT-LVL V: CPT | Mod: PBBFAC,,, | Performed by: PHYSICIAN ASSISTANT

## 2024-09-27 PROCEDURE — 3078F DIAST BP <80 MM HG: CPT | Mod: CPTII,S$GLB,, | Performed by: PHYSICIAN ASSISTANT

## 2024-09-27 PROCEDURE — 3008F BODY MASS INDEX DOCD: CPT | Mod: CPTII,S$GLB,, | Performed by: PHYSICIAN ASSISTANT

## 2024-09-27 PROCEDURE — 1159F MED LIST DOCD IN RCRD: CPT | Mod: CPTII,S$GLB,, | Performed by: PHYSICIAN ASSISTANT

## 2024-09-27 RX ORDER — TRAMADOL HYDROCHLORIDE 50 MG/1
TABLET ORAL
Qty: 60 TABLET | Refills: 2 | Status: SHIPPED | OUTPATIENT
Start: 2024-10-12

## 2024-09-27 RX ORDER — PREGABALIN 50 MG/1
50 CAPSULE ORAL 3 TIMES DAILY
Qty: 90 CAPSULE | Refills: 2 | Status: SHIPPED | OUTPATIENT
Start: 2024-10-03

## 2024-09-27 NOTE — TELEPHONE ENCOUNTER
Physician - Dr Abdalla    Type of Procedure/Injection - Cervical Epidural  C7/T1           Laterality - NA      Anxiolysis- RNIV      Need to hold medication - Yes      Aspirin for 7 days and NSAIDs for 2 days      Clearance needed - Yes Dr Espinosa      Follow up - 4 week

## 2024-09-30 ENCOUNTER — TELEPHONE (OUTPATIENT)
Dept: FAMILY MEDICINE | Facility: CLINIC | Age: 64
End: 2024-09-30

## 2024-09-30 DIAGNOSIS — B02.29 POST HERPETIC NEURALGIA: ICD-10-CM

## 2024-09-30 RX ORDER — LIDOCAINE 50 MG/G
PATCH TOPICAL
Qty: 30 PATCH | Refills: 5 | Status: SHIPPED | OUTPATIENT
Start: 2024-09-30

## 2024-09-30 NOTE — TELEPHONE ENCOUNTER
----- Message from MIKI Robledo sent at 9/30/2024  9:59 AM CDT -----  Good morning,  Need a clearance for pt to hold ASA x 7 days prior procedure, cervical epidural steroid injection with Dr. Abdalla on 11/30/24. Thank you.

## 2024-09-30 NOTE — TELEPHONE ENCOUNTER
Spoke with pt, scheduled procedure with Dr. Abdalla. Reviewed pre op instructions. Send message to Dr. Espinosa to hold ASA x 7 days prior procedure. Scheduled follow up.

## 2024-10-01 DIAGNOSIS — M54.12 CERVICAL RADICULOPATHY: Primary | ICD-10-CM

## 2024-10-01 RX ORDER — SODIUM CHLORIDE, SODIUM LACTATE, POTASSIUM CHLORIDE, CALCIUM CHLORIDE 600; 310; 30; 20 MG/100ML; MG/100ML; MG/100ML; MG/100ML
INJECTION, SOLUTION INTRAVENOUS CONTINUOUS
OUTPATIENT
Start: 2024-10-01

## 2024-10-01 NOTE — PROGRESS NOTES
CHIEF COMPLAINT/REASON FOR VISIT: low back pain, neck pain    History of present illness: The patient is a 63 year old woman with a history of migraines, carpal tunnel syndrome and low back pain since her early 20s.  She returns in follow-up today with worsening neck pain.  She describes bilateral neck pain radiating down her right arm into her thumb.  This has been worse at work and with prolonged standing.  She describes intermittent numbness and weakness in her arm as well.  She does not have severe low back pain at this time but has been experiencing pain down the right lateral thigh and into her lower leg and calf.  She would like to repeat a cervical epidural steroid injection and potentially a lumbar in the future.  She continues to take medication with some relief.    Pain intervention history:She is status post TFESI bilaterally to L3 on 12/6/2011 with no relief. Past history of status post L4/5 YARON on 5/25/11 with about 90% relief that lasted 3 weeks. She is status post 2 bilateral L3 transforaminal injections with 3 weeks relief each time.  She is status post L4/5 interlaminar epidural steroid injection on 1/8/14 with 90% relief.  She is status post L5/S1 interlaminar epidural steroid injection on 6/9/14 with moderate relief only on the right low back and right leg lasting 2 weeks.  She is status post bilateral L3/4 and L4/5 facet joint injections on 4/13/15 with initially 100% relief of her back pain and 80% relief of her left lateral hip and lateral thigh pain, now reporting at least 50% relief of both.  She is status post bilateral L2, 3 and 4 medial branch radiofrequency ablation on 5/20/15 with 60% relief.   She is status post bilateral L2, 3 and 4 medial branch radiofrequency ablation on 6/20/16 with 50-70% relief.  She is status post L5/S1 interlaminar epidural steroid injection on 3/10/17 with 50% relief.  She is status post bilateral L2, 3, 4 medial branch radiofrequency ablation on 7/17/17  "with about 75% relief.  She is status post bilateral L2, 3, 4 medial branch radiofrequency ablation on 07/17/2018 with 100% relief of her leg pain and 70-75% relief of her back pain.  She is status post C7-T1 cervical interlaminar epidural steroid injection on 09/25/2018 with 80% relief.   She is status post L5/S1 interlaminar epidural steroid injection on 12/27/2018 with 60% relief.  She is status post bilateral L2, 3 and 4 medial branch radiofrequency ablation on 07/29/2019 with 40% relief.  She is status post L5/S1 interlaminar epidural steroid injection on 09/04/2019 with almost 100% relief of her right leg pain but 0% relief for back pain. She is status post C7-T1 interlaminar epidural steroid injection on 10/24/2019 with 70% relief.  She is s/p L5/S1 IL YARON with 2 weeks of relief, now 0%.   She is post left L4/5 S1 transforaminal epidural steroid injection on 08/21/2023 with almost 100% relief.   She is status post right L4/5 and L5/S1 transforaminal epidural steroid injection on 03/04/2024 with 60% relief.       Spine surgeries:    Antineuropathics: failed gabapentin, failed Cymbalta due to side effects.  Takes Lyrica twice daily, can not tolerate 3 times a day.  NSAIDs:  Ibuprofen  Physical therapy:  Antidepressants:  Muscle relaxers:  Opioids:  Tramadol 50 mg twice daily as needed  Antiplatelets/Anticoagulants:  ASA 81 mg    ROS: She reports back pain only.  Balance of review of systems is negative.    Medical, surgical, family and social history reviewed elsewhere in record.     Medications/Allergies: See med card      Vitals:    09/27/24 1444   BP: 123/60   Pulse: 72   Weight: 82.9 kg (182 lb 12.2 oz)   Height: 5' 4" (1.626 m)   PainSc:   7   PainLoc: Back        PHYSICAL EXAM:  Gen: A and O x3, pleasant, well-groomed  HEENT: PERRLA  CVS: Regular rate and rhythm  Resp:  No obvious shortness of breath, no increased work of breathing  Musculoskeletal: No antalgic gait.     Neuro:  Upper extremities: 5/5 " strength bilaterally   Lower extremities: 5/5 strength bilaterally  Reflexes: Brachioradialis 2+, Bicep 2+, Tricep 2+.   Patellar 3+, Achilles 2+.  Sensory: Intact and symmetrical to light touch and pinprick in C2-T1 dermatomes bilaterally.Intact and symmetrical to light touch and pinprick in L2-S1 dermatomes bilaterally.    Cervical spine:   Range of motion is mildly reduced with flexion, extension lateral rotation with increased right neck pain during right lateral rotation and extension.    Myofascial exam: Mild tenderness to palpation to the right cervical paraspinous muscles.    Lumbar spine:  Lumbar spine: ROM is moderately limited with flexion and extension with increased right low back pain during extension and oblique extension.  Supine straight leg raise is negative bilaterally.  Internal and external rotation of the hip causes no increased pain in either side.  Myofascial exam:  Moderate tenderness to palpation to the right greater than left lumbar paraspinous muscles.      IMAGING:   MRI L-SPINE 5/10/11   IMPRESSION: AT L3-4, THERE IS CIRCUMFERENTIAL DISC PROTRUSION FOCALLY MORE PROMINENT TO THE LEFT AS WELL AS A SMALL DISC HERNIATION PROTRUDING INFERIORLY BEHIND L4 TO THE LEFT OF MIDLINE. THIS PRODUCES SPINAL CANAL AND BILATERAL NEURAL FORAMINAL STENOSIS, LEFT GREATER THAN RIGHT. AT L4-5, THERE IS CIRCUMFERENTIAL DISC PROTRUSION FOCALLY MORE PROMINENT TO THE RIGHT WITH MILD SPINAL CANAL AND RIGHT NEURAL FORAMINAL STENOSIS. AT L1-2 AND L2-3 ALTHOUGH THERE ARE DISC PROTRUSIONS IDENTIFIED, SIGNIFICANT STENOSIS IS NOT SEEN.     MRI lumbar spine 7/10/14  L1-2:There is chronic relatively advanced disc degeneration with disc space narrowing, disc dehydration, disc narrowing, endplate osteophytes, and degenerative vertebral endplate marrow change. There is a diffuse 2-mm posterior disc bulge with a superimposed4-mm right posterior disc extrusion causing mild right foraminal stenosis. There is no impingement of  the right L1 nerve root and there has been no change.  L2-3: There is severe and chronic disc degeneration with severe disc space narrowing, disc dehydration, degenerative vertebral endplate marrow change, and vertebral endplate osteophytes. There is a diffuse 2-mm posterior disc bulge with a superimposed 4-mm right posterior disc extrusion causing mild right foraminal stenosis. There is no impingement of the right L2 nerve root and there has been no significant change.  L3-4: There is severe disc degeneration which has progressed since the prior study. There is severe disc space narrowing, disc dehydration, degenerative vertebral endplate marrow change and endplate osteophytes. There is also mild posterior subluxation of L3over a distance of 4 mm. There is a broad-based 5-mm posterior disc extrusion. There is degenerative facet arthrosis with ligamentum flavum thickening. The combination of facet arthrosis and disc extrusion results in relatively severe spinal canal stenosis with compression of the thecal sac to an AP diameter of 5 mm, moderate right foraminal stenosis, and severe left foraminal stenosis. The spinal stenosis has also progressed since the prior study.  L4-5:There is a diffuse posterior disc bulge with a superimposed 3-mm left posterior paracentral disc protrusion causing left paracentral thecal sac compression. There is moderate left and mild right foraminal stenosis and there has been no significant change. There is mild degenerative facet arthrosis with ligamentum flavum thickening and small joint effusions.  L5-S1:There is no significant compromise of the spinal canal or foramina and there has been no change.    X-ray cervical spine 6/8/15  There is loss of normal cervical lordosis which may be positional or related muscular strain. Intervertebral disk height loss is noted at the C5-C6 C6-C7 levels and to a lesser degree C4-C5 and C7-T1 levels. Vertebral body alignment appears otherwise adequate.  Vertebral body heights appear well-preserved. The atlas and odontoid appear in good relationship to each other. Osseous neuroforaminal narrowing is noted at the C3-C4 C4-C5 C5-C6 levels on the left and at the C4-C5 C5-C6 and C6-C7 levels on the right     07/10/2018 MRI cervical spine [facility] report  C2-3 broad-based signal asymmetry at the left subarticular and foraminal zone reflecting implant spondylosis and disc bulge complex, mild to moderate left asymmetric foraminal narrowing, ectasia of the left vertebral artery, contralateral right asymmetric facet hypertrophy, right foramen widely narrowed, disc partially desiccated without collapse  C3-4 moderate to severe left greater than right foraminal narrowing secondary to uncinate joint hypertrophic signal alteration, disc partially desiccated  C4-5 endplate spondylosis moderate diffuse disc bulge noted, broad-based right paracentral disc herniation, asymmetric flattening of the ventral cord surface at the right paracentral zone, high-grade if lateral right foraminal narrowing, AP diameter of canal 9.9 mm, contralaterally, high grade left foraminal narrowing secondary to facet greater than uncinate joint hypertrophic signal alteration, disc desiccated and mildly narrowed  C5-6 disc space narrowing evident with generalized in Nigel spondylosis and concentric inter pose disc bulge complex, high-grade bilateral foraminal narrowing, flattening of the cord surface, AP diameter 7.9 mm, symmetric facet arthrosis, disc diffusely desiccated and narrowed  C6-7 moderate endplate spondylosis and concentric disc bulge complex, high-grade bilateral foraminal compromise, flattening of the anterior cord surface, AP diameter 8.8 mm, facet arthrosis symmetric, disc desiccated and narrowed  C7-T1 less than 2 mm depth disc bulge    11/06/2018 MRI lumbar spine  T12/L1: There is no evidence of disc protrusion, canal or foraminal stenosis.  L1/L2: Right posterolateral disc  protrusion is evident and there is mild distortion of the right anterolateral canal.  Degenerative facet changes are noted bilaterally.  The left foramen is intact.  L2/L3: There is annular disc bulging with a superimposed right posterolateral disc extrusion.  This is slightly more pronounced on the previous examination and there is mild effacement of the anterior thecal sac and moderate narrowing the right foramen.  L3/L4: There is annular disc bulge and osteophyte formation with a superimposed small left posterolateral disc extrusion.  There is moderate narrowing the central canal and left foramen.  This is little changed relative the previous examination.  Degenerative facet changes are noted.  L5/S1: There is a small central disc extrusion superimposed upon annular disc bulging.  This is slightly less pronounced than was seen previously.  Degenerative facet changes are noted.  L5/S1: Moderate degenerative facet changes are noted and there is mild effacement of the anterior thecal sac.  There is ligamentous hypertrophy particularly to the left in the posterior canal and there is left greater than right foraminal narrowing.  This is mildly worsened relative prior exam.      ASSESSMENT:  The patient is a 63 year old woman with a history of migraines, carpal tunnel syndrome and low back pain since her early 20s who returns in follow up.   1. Cervical radiculopathy        2. DDD (degenerative disc disease), cervical        3. Lumbar radiculopathy, chronic        4. Spinal stenosis of lumbar region without neurogenic claudication              Plan:  1. For her worsening neck and right arm pain I will schedule her for a C7-T1 interlaminar epidural steroid injection.  We discussed scheduling right L4/5 and L5/S1 transforaminal epidural steroid injection in the future if needed.  2. Dr. Abdalla has refilled tramadol 50 mg twice a day and Lyrica 50 mg twice a day. I have reviewed the [place] Board of Pharm[employer] website  and there are no abberancies.    3. Follow-up in four weeks postprocedure sooner as needed.

## 2024-10-03 RX ORDER — ALPRAZOLAM 0.25 MG/1
0.25 TABLET ORAL 3 TIMES DAILY PRN
Qty: 60 TABLET | Refills: 1 | Status: SHIPPED | OUTPATIENT
Start: 2024-10-03
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
## 2024-10-28 ENCOUNTER — TELEPHONE (OUTPATIENT)
Dept: PAIN MEDICINE | Facility: CLINIC | Age: 64
End: 2024-10-28
Payer: COMMERCIAL

## 2024-10-30 ENCOUNTER — HOSPITAL ENCOUNTER (OUTPATIENT)
Facility: HOSPITAL | Age: 64
Discharge: HOME | End: 2024-10-30
Attending: ANESTHESIOLOGY | Admitting: ANESTHESIOLOGY
Payer: COMMERCIAL

## 2024-10-30 ENCOUNTER — HOSPITAL ENCOUNTER (OUTPATIENT)
Dept: RADIOLOGY | Facility: HOSPITAL | Age: 64
Discharge: HOME | End: 2024-10-30
Attending: ANESTHESIOLOGY | Admitting: ANESTHESIOLOGY
Payer: COMMERCIAL

## 2024-10-30 VITALS
RESPIRATION RATE: 16 BRPM | OXYGEN SATURATION: 99 % | DIASTOLIC BLOOD PRESSURE: 69 MMHG | HEIGHT: 64 IN | SYSTOLIC BLOOD PRESSURE: 128 MMHG | TEMPERATURE: 98 F | WEIGHT: 176 LBS | HEART RATE: 60 BPM | BODY MASS INDEX: 30.05 KG/M2

## 2024-10-30 DIAGNOSIS — M54.2 NECK PAIN: ICD-10-CM

## 2024-10-30 DIAGNOSIS — M54.12 CERVICAL RADICULOPATHY: ICD-10-CM

## 2024-10-30 PROCEDURE — 25000003 PHARM REV CODE 250: Mod: PO | Performed by: ANESTHESIOLOGY

## 2024-10-30 PROCEDURE — 63600175 PHARM REV CODE 636 W HCPCS: Mod: PO | Performed by: ANESTHESIOLOGY

## 2024-10-30 PROCEDURE — 62321 NJX INTERLAMINAR CRV/THRC: CPT | Mod: ,,, | Performed by: ANESTHESIOLOGY

## 2024-10-30 PROCEDURE — A4216 STERILE WATER/SALINE, 10 ML: HCPCS | Mod: PO | Performed by: ANESTHESIOLOGY

## 2024-10-30 PROCEDURE — 62321 NJX INTERLAMINAR CRV/THRC: CPT | Mod: PO | Performed by: ANESTHESIOLOGY

## 2024-10-30 PROCEDURE — 25500020 PHARM REV CODE 255: Mod: PO | Performed by: ANESTHESIOLOGY

## 2024-10-30 RX ORDER — SODIUM CHLORIDE, SODIUM LACTATE, POTASSIUM CHLORIDE, CALCIUM CHLORIDE 600; 310; 30; 20 MG/100ML; MG/100ML; MG/100ML; MG/100ML
INJECTION, SOLUTION INTRAVENOUS CONTINUOUS
Status: DISCONTINUED | OUTPATIENT
Start: 2024-10-30 | End: 2024-10-30 | Stop reason: HOSPADM

## 2024-10-30 RX ORDER — METHYLPREDNISOLONE ACETATE 80 MG/ML
INJECTION, SUSPENSION INTRA-ARTICULAR; INTRALESIONAL; INTRAMUSCULAR; SOFT TISSUE
Status: DISCONTINUED | OUTPATIENT
Start: 2024-10-30 | End: 2024-10-30 | Stop reason: HOSPADM

## 2024-10-30 RX ORDER — SODIUM CHLORIDE 9 MG/ML
INJECTION, SOLUTION INTRAMUSCULAR; INTRAVENOUS; SUBCUTANEOUS
Status: DISCONTINUED | OUTPATIENT
Start: 2024-10-30 | End: 2024-10-30 | Stop reason: HOSPADM

## 2024-10-30 RX ORDER — MIDAZOLAM HYDROCHLORIDE 1 MG/ML
INJECTION INTRAMUSCULAR; INTRAVENOUS
Status: DISCONTINUED | OUTPATIENT
Start: 2024-10-30 | End: 2024-10-30 | Stop reason: HOSPADM

## 2024-10-30 RX ORDER — LIDOCAINE HYDROCHLORIDE 10 MG/ML
INJECTION, SOLUTION EPIDURAL; INFILTRATION; INTRACAUDAL; PERINEURAL
Status: DISCONTINUED | OUTPATIENT
Start: 2024-10-30 | End: 2024-10-30 | Stop reason: HOSPADM

## 2024-10-30 NOTE — DISCHARGE SUMMARY
[place] - Surgery  Discharge Note  Short Stay    Procedure(s) (LRB):  Injection-steroid-epidural-cervical C7/T1 (N/A)      OUTCOME: Patient tolerated treatment/procedure well without complication and is now ready for discharge.    DISPOSITION: Home or Self Care    FINAL DIAGNOSIS:  Cervical radiculopathy    FOLLOWUP: In clinic    DISCHARGE INSTRUCTIONS:    Discharge Procedure Orders   Diet Adult Regular     No dressing needed     Notify your health care provider if you experience any of the following:  temperature >100.4     Activity as tolerated

## 2024-10-30 NOTE — OP NOTE
PROCEDURE DATE: 10/30/2024    Procedure: C7-T1 cervical interlaminar epidural steroid injection under utilizing fluoroscopy.    Diagnosis: Cervical Radiculopathy    POSTOP DIAGNOSIS: SAME    Physician: Oswald Abdalla MD    Medications injected:  Methylprednisone 80mg followed by a slow injection of 4 mL sterile, preservative-free normal saline.    Local anesthetic used: Lidocaine 1%, 4 ml.    Sedation Medications: 2mg versed    Complications:  none    Estimated blood loss: none    Technique:  A time-out was taken to identify patient and procedure prior to starting the procedure.  With the patient laying in a prone position with the neck in a mid-flexed forward position, the area was prepped and draped in the usual sterile fashion using ChloraPrep and a fenestrated drape.  The area was determined under AP fluoroscopic guidance.  Local anesthetic was given using a 25-gauge 1.5 inch needle by raising a wheal and then infiltrating ventrally.  A 3.5 inch 20-gauge Touhy needle was introduced under fluoroscopic guidance to meet the lamina of C7.  The needle was then hinged under the lamina then advanced using loss of resistance technique.  Once the tip of the needle was in the desired position, the contrast dye Omnipaque was injected to determine placement and no uptake.  The steroid was then injected slowly followed by a slow injection of 4 mL of the sterile preservative-free normal saline.  The patient tolerated the procedure well.    The patient was monitored after the procedure and was given post-procedure and discharge instructions to follow at home. The patient was discharged in a stable condition.

## 2024-10-30 NOTE — H&P
CC: Neck pain    HPI: The patient is a 65yo woman with a history of cervical radiculopathy here for cervical YARON. There are no major changes in history and physical from 9/27/24.    Past Medical History:   Diagnosis Date    Allergy     Arthritis of spine 2011    Cataract     Chronic low back pain     Class 2 obesity with body mass index (BMI) of 39.0 to 39.9 in adult 01/11/2019    Coronary artery disease     mild    DDD (degenerative disc disease), lumbar     Diverticulitis     Diverticulosis     DJD (degenerative joint disease)     Encounter for blood transfusion     Factor V Leiden     GERD (gastroesophageal reflux disease)     Hiatal hernia     Hypertension     Migraines     PONV (postoperative nausea and vomiting)     geta    Schatzki's ring     Urticaria        Past Surgical History:   Procedure Laterality Date    APPENDECTOMY  1981    CARPAL TUNNEL RELEASE  2011    right    CATARACT EXTRACTION W/  INTRAOCULAR LENS IMPLANT Right 5/29/2024    Procedure: EXTRACTION, CATARACT, WITH IOL INSERTION;  Surgeon: Adelia Dalton MD;  Location: [facility] OR;  Service: Ophthalmology;  Laterality: Right;    CATARACT EXTRACTION W/  INTRAOCULAR LENS IMPLANT Left 8/7/2024    Procedure: EXTRACTION, CATARACT, WITH IOL INSERTION;  Surgeon: Adelia Dalton MD;  Location: [facility] OR;  Service: Ophthalmology;  Laterality: Left;    COLONOSCOPY  2014    Dr. Palomares; reduntant colon, otherwise normal findings repeat in 8-10 years for surveillance    Epidural Steroid Injection      Pain Management    EPIDURAL STEROID INJECTION INTO CERVICAL SPINE N/A 09/25/2018    Procedure: Injection-steroid-epidural-cervical;  Surgeon: Oswald Abdalla MD;  Location: [facility] OR;  Service: Pain Management;  Laterality: N/A;    EPIDURAL STEROID INJECTION INTO CERVICAL SPINE N/A 10/24/2019    Procedure: Injection-steroid-epidural-cervical;  Surgeon: Oswald Abdalla MD;  Location: [facility] OR;  Service: Pain Management;  Laterality: N/A;    EPIDURAL STEROID  INJECTION INTO CERVICAL SPINE N/A 02/22/2023    Procedure: Injection-steroid-epidural-cervical C7-T1;  Surgeon: Oswald Abdalla MD;  Location: [facility] OR;  Service: Pain Management;  Laterality: N/A;    EPIDURAL STEROID INJECTION INTO LUMBAR SPINE N/A 12/27/2018    Procedure: Injection-steroid-epidural-lumbar L5/S1;  Surgeon: Oswald Abdalla MD;  Location: [facility] OR;  Service: Pain Management;  Laterality: N/A;    EPIDURAL STEROID INJECTION INTO LUMBAR SPINE N/A 09/04/2019    Procedure: Injection-steroid-epidural-lumbar;  Surgeon: Oswald Abdalla MD;  Location: [facility] OR;  Service: Pain Management;  Laterality: N/A;  L5/S1 to right    EPIDURAL STEROID INJECTION INTO LUMBAR SPINE N/A 06/16/2023    Procedure: Injection-steroid-epidural-lumbar L5/S1;  Surgeon: Oswald Abdalla MD;  Location: [facility] OR;  Service: Pain Management;  Laterality: N/A;    ESOPHAGOGASTRODUODENOSCOPY  05/17/2016    Dr. Arreguin; small hiatal hernia; gastritis    ESOPHAGOGASTRODUODENOSCOPY N/A 06/22/2020    Dr. Arreguin; mild Schatzki ring- dilated; small hiatal hernia; bx unremarkable    Facet injection      Pain Management    HYSTERECTOMY      ovaries removed    KIDNEY SURGERY Right 1967    to correct urinary reflux into kidney    LEFT HEART CATHETERIZATION Left 05/22/2019    Procedure: Left heart cath;  Surgeon: Casa Perdue MD;  [facility] CATH;  Service: Cardiology;  Laterality: Left;    OVARIAN CYST REMOVAL Right 1981    RADIOFREQUENCY ABLATION OF LUMBAR MEDIAL BRANCH NERVE AT SINGLE LEVEL Bilateral 07/17/2018    Procedure: RADIOFREQUENCY ABLATION, NERVE, MEDIAL BRANCH, LUMBAR, L2,3,4;  Surgeon: Oswald Abdalla MD;  Location: [facility] OR;  Service: Pain Management;  Laterality: Bilateral;    RADIOFREQUENCY ABLATION OF LUMBAR MEDIAL BRANCH NERVE AT SINGLE LEVEL Bilateral 07/29/2019    Procedure: Radiofrequency Ablation, Nerve, Spinal, Lumbar, Medial Branch, L2,3,4;  Surgeon: Oswald Abdalla MD;  Location: [facility] OR;   Service: Pain Management;  Laterality: Bilateral;    SHOULDER SURGERY      rotator cuff, right    TRANSFORAMINAL EPIDURAL INJECTION OF STEROID Left 2023    Procedure: Injection,steroid,epidural,transforaminal approach L4/5 L5/S1 Left;  Surgeon: Oswald Abdalla MD;  Location: [facility] OR;  Service: Pain Management;  Laterality: Left;    TRANSFORAMINAL EPIDURAL INJECTION OF STEROID Right 3/4/2024    Procedure: Injection,steroid,epidural,transforaminal approach right L4/5 and L5/S1;  Surgeon: Oswald Abdalla MD;  Location: [facility] OR;  Service: Pain Management;  Laterality: Right;  right L4/5 and L5/S1       Family History   Problem Relation Name Age of Onset    Cataracts Mother      Heart attack Mother      Heart disease Mother      COPD Mother      Hypertension Mother      COPD Father      Hepatitis Sister      Breast cancer Maternal Grandmother      No Known Problems Daughter      Allergic rhinitis Neg Hx      Angioedema Neg Hx      Asthma Neg Hx      Atopy Neg Hx      Eczema Neg Hx      Immunodeficiency Neg Hx      Urticaria Neg Hx      Colon cancer Neg Hx      Colon polyps Neg Hx      Glaucoma Neg Hx      Macular degeneration Neg Hx      Retinal detachment Neg Hx         Social History     Socioeconomic History    Marital status:     Number of children: 1   Occupational History     Employer: [employer]   Tobacco Use    Smoking status: Former     Current packs/day: 0.00     Average packs/day: 1 pack/day for 5.0 years (5.0 ttl pk-yrs)     Types: Cigarettes     Start date: 1992     Quit date: 1997     Years since quittin.8     Passive exposure: Never    Smokeless tobacco: Never   Substance and Sexual Activity    Alcohol use: Yes     Comment: 3x per year    Drug use: No    Sexual activity: Yes     Partners: Male   Social History Narrative    ** Merged History Encounter **          Social Drivers of Health     Financial Resource Strain: Low Risk  (2024)    Overall  "Financial Resource Strain (CARDIA)     Difficulty of Paying Living Expenses: Not hard at all   Food Insecurity: No Food Insecurity (1/24/2024)    Hunger Vital Sign     Worried About Running Out of Food in the Last Year: Never true     Ran Out of Food in the Last Year: Never true   Transportation Needs: No Transportation Needs (1/24/2024)    PRAPARE - Transportation     Lack of Transportation (Medical): No     Lack of Transportation (Non-Medical): No   Physical Activity: Insufficiently Active (1/24/2024)    Exercise Vital Sign     Days of Exercise per Week: 3 days     Minutes of Exercise per Session: 30 min   Stress: No Stress Concern Present (1/24/2024)    [employer] [place] [employer] - Occupational Stress Questionnaire     Feeling of Stress : Not at all   Housing Stability: Low Risk  (1/24/2024)    Housing Stability Vital Sign     Unable to Pay for Housing in the Last Year: No     Number of Places Lived in the Last Year: 1     Unstable Housing in the Last Year: No       No current facility-administered medications for this encounter.       Review of patient's allergies indicates:   Allergen Reactions    Hydrocodone Hives and Nausea Only           Sulfa (sulfonamide antibiotics) Hives and Rash           Doxycycline Rash     Itchy rash and some scattered itchy lesions    Oxycodone Itching and Nausea And Vomiting       Vitals:    10/25/24 1048 10/30/24 1332 10/30/24 1345   BP:   (!) 118/59   Pulse:   65   Resp:   15   Temp:  98.2 °F (36.8 °C)    SpO2:   98%   Weight: 82.6 kg (182 lb)  79.8 kg (176 lb)   Height: 5' 4" (1.626 m)  5' 4" (1.626 m)       ASA 2, Mallampati 2    REVIEW OF SYSTEMS:     GENERAL: No weight loss, malaise or fevers.  HEENT:  No recent changes in vision or hearing  NECK: Negative for lumps, no difficulty with swallowing.  RESPIRATORY: Negative for cough, wheezing or shortness of breath, patient denies any recent URI.  CARDIOVASCULAR: Negative for chest pain, leg swelling or " palpitations.  GI: Negative for abdominal discomfort, blood in stools or black stools or change in bowel habits.  MUSCULOSKELETAL: See HPI.  SKIN: Negative for lesions, rash, and itching.  PSYCH: No suicidal or homicidal ideations, no current mood disturbances.  HEMATOLOGY/LYMPHOLOGY: Negative for prolonged bleeding, bruising easily or swollen nodes. Patient is not currently taking any anti-coagulants  ENDO: No history of diabetes or thyroid dysfunction  NEURO: No history of syncope, paralysis, seizures or tremors.All other reviewed and negative other than HPI.    Physical exam:  Gen: A and O x3, pleasant, well-groomed  Skin: No rashes or obvious lesions  HEENT: PERRLA, no obvious deformities on ears or in canals. No thyroid masses, trachea midline, no palpable lymph nodes in neck, axilla.  CVS: Regular rate and rhythm, normal S1 and S2, no murmurs.  Resp: Clear to auscultation bilaterally.  Abdomen: Soft, NT/ND, normal bowel sounds present.  Musculoskeletal/Neuro: Moving all extremities    Assessment:  Cervical radiculopathy  -     Place in Outpatient; Standing  -     Vital signs; Standing  -     Place 18-22 [facility] peripheral IV ; Standing  -     Verify informed consent; Standing  -     Notify physician ; Standing  -     Notify physician ; Standing  -     Notify physician (specify); Standing  -     Diet NPO; Standing  -     lactated ringers infusion    Other orders  -     IP VTE HIGH RISK PATIENT; Standing

## 2024-11-06 RX ORDER — IBUPROFEN 800 MG/1
800 TABLET ORAL EVERY 6 HOURS PRN
Qty: 30 TABLET | Refills: 0 | Status: SHIPPED | OUTPATIENT
Start: 2024-11-06

## 2024-11-16 DIAGNOSIS — F51.01 PRIMARY INSOMNIA: ICD-10-CM

## 2024-11-16 NOTE — TELEPHONE ENCOUNTER
Care Due:                  Date            Visit Type   Department     Provider  --------------------------------------------------------------------------------                                ESTABLISHED                              PATIENT -    [facility]  Last Visit: 09-      VIRTUAL      MEDICINE       CARRIE SKAGGS                               -                              PRIMARY      [facility]  Next Visit: 11-      CARE ([facility])   MEDICINE       CARRIE SKAGGS                                                            Last  Test          Frequency    Reason                     Performed    Due Date  --------------------------------------------------------------------------------    Lipid Panel.  12 months..  atorvastatin.............  11- 11-    [facility] Embedded Care Due Messages. Reference number: [id number].   11/16/2024 2:42:05 PM CST   Patient position prone with left breast through breast coil. Patient prepped and draped per unit standard.    Safety straps applied:N/A

## 2024-11-19 RX ORDER — ESZOPICLONE 3 MG/1
3 TABLET, FILM COATED ORAL NIGHTLY
Qty: 90 TABLET | Refills: 0 | Status: SHIPPED | OUTPATIENT
Start: 2024-11-19

## 2024-11-21 ENCOUNTER — OFFICE VISIT (OUTPATIENT)
Dept: FAMILY MEDICINE | Facility: CLINIC | Age: 64
End: 2024-11-21
Payer: COMMERCIAL

## 2024-11-21 VITALS
WEIGHT: 180.31 LBS | HEIGHT: 64 IN | DIASTOLIC BLOOD PRESSURE: 68 MMHG | HEART RATE: 81 BPM | OXYGEN SATURATION: 97 % | SYSTOLIC BLOOD PRESSURE: 108 MMHG | BODY MASS INDEX: 30.78 KG/M2

## 2024-11-21 DIAGNOSIS — Z23 NEED FOR INFLUENZA VACCINATION: ICD-10-CM

## 2024-11-21 DIAGNOSIS — Z00.00 WELLNESS EXAMINATION: Primary | ICD-10-CM

## 2024-11-21 PROCEDURE — 99999 PR PBB SHADOW E&M-EST. PATIENT-LVL V: CPT | Mod: PBBFAC,,, | Performed by: FAMILY MEDICINE

## 2024-11-21 NOTE — PROGRESS NOTES
Subjective:       Patient ID: Elizabeth Giordano is a 64 y.o. female.    Chief Complaint: Annual Exam    Pt is known to me.  The pt presents for annual wellness exam.  The pt is doing well in general with no acute complaints.  Discussed health maintenance with pt and will schedule appropriate studies and/or immunizations.            Review of Systems   Constitutional:  Negative for activity change and unexpected weight change.   HENT:  Negative for hearing loss, rhinorrhea and trouble swallowing.    Eyes:  Negative for discharge and visual disturbance.   Respiratory:  Negative for chest tightness and wheezing.    Cardiovascular:  Negative for chest pain and palpitations.   Gastrointestinal:  Negative for blood in stool, constipation, diarrhea and vomiting.   Endocrine: Negative for polydipsia and polyuria.   Genitourinary:  Negative for difficulty urinating, dysuria, hematuria and menstrual problem.   Musculoskeletal:  Positive for arthralgias and joint swelling. Negative for neck pain.   Neurological:  Negative for weakness and headaches.   Psychiatric/Behavioral:  Negative for confusion and dysphoric mood.        Objective:      Physical Exam  Vitals and nursing note reviewed.   Constitutional:       Appearance: She is well-developed.   HENT:      Head: Normocephalic.   Eyes:      Conjunctiva/sclera: Conjunctivae normal.      Pupils: Pupils are equal, round, and reactive to light.   Neck:      Thyroid: No thyromegaly.      Vascular: No carotid bruit.   Cardiovascular:      Rate and Rhythm: Normal rate and regular rhythm.      Heart sounds: Normal heart sounds.   Pulmonary:      Effort: Pulmonary effort is normal.      Breath sounds: Normal breath sounds.   Abdominal:      General: Bowel sounds are normal.      Palpations: Abdomen is soft.      Tenderness: There is no abdominal tenderness.   Musculoskeletal:         General: No tenderness or deformity. Normal range of motion.      Cervical back: Normal range  of motion and neck supple.      Right lower leg: No edema.      Left lower leg: No edema.   Lymphadenopathy:      Cervical: No cervical adenopathy.   Skin:     General: Skin is warm and dry.   Neurological:      Mental Status: She is alert and oriented to person, place, and time.      Cranial Nerves: No cranial nerve deficit.      Motor: No abnormal muscle tone.      Coordination: Coordination normal.      Deep Tendon Reflexes: Reflexes normal.   Psychiatric:         Behavior: Behavior normal.         Assessment:       1. Wellness examination        Plan:       Elizabeth was seen today for annual exam.    Diagnoses and all orders for this visit:    Wellness examination  -     Fecal Immunochemical Test (iFOBT); Future  -     CBC Auto Differential; Future  -     Comprehensive Metabolic Panel; Future  -     Hemoglobin A1C; Future  -     Lipid Panel; Future  -     TSH; Future      During this visit, I reviewed the pt's history, medications, allergies, and problem list.

## 2024-11-25 ENCOUNTER — OFFICE VISIT (OUTPATIENT)
Dept: PAIN MEDICINE | Facility: CLINIC | Age: 64
End: 2024-11-25
Payer: COMMERCIAL

## 2024-11-25 ENCOUNTER — LAB VISIT (OUTPATIENT)
Dept: LAB | Facility: HOSPITAL | Age: 64
End: 2024-11-25
Attending: FAMILY MEDICINE
Payer: COMMERCIAL

## 2024-11-25 VITALS
WEIGHT: 178.56 LBS | DIASTOLIC BLOOD PRESSURE: 57 MMHG | SYSTOLIC BLOOD PRESSURE: 124 MMHG | HEART RATE: 74 BPM | BODY MASS INDEX: 30.48 KG/M2 | HEIGHT: 64 IN

## 2024-11-25 DIAGNOSIS — Z00.00 WELLNESS EXAMINATION: ICD-10-CM

## 2024-11-25 DIAGNOSIS — M51.369 DDD (DEGENERATIVE DISC DISEASE), LUMBAR: ICD-10-CM

## 2024-11-25 DIAGNOSIS — M48.061 SPINAL STENOSIS OF LUMBAR REGION WITHOUT NEUROGENIC CLAUDICATION: ICD-10-CM

## 2024-11-25 DIAGNOSIS — M54.12 CERVICAL RADICULOPATHY: Primary | ICD-10-CM

## 2024-11-25 DIAGNOSIS — M54.16 LUMBAR RADICULOPATHY: ICD-10-CM

## 2024-11-25 LAB
ALBUMIN SERPL BCP-MCNC: 3.9 G/DL (ref 3.5–5.2)
ALP SERPL-CCNC: 40 U/L (ref 40–150)
ALT SERPL W/O P-5'-P-CCNC: 13 U/L (ref 10–44)
ANION GAP SERPL CALC-SCNC: 11 MMOL/L (ref 8–16)
AST SERPL-CCNC: 22 U/L (ref 10–40)
BASOPHILS # BLD AUTO: 0.02 K/UL (ref 0–0.2)
BASOPHILS NFR BLD: 0.5 % (ref 0–1.9)
BILIRUB SERPL-MCNC: 0.5 MG/DL (ref 0.1–1)
BUN SERPL-MCNC: 14 MG/DL (ref 8–23)
CALCIUM SERPL-MCNC: 9.3 MG/DL (ref 8.7–10.5)
CHLORIDE SERPL-SCNC: 107 MMOL/L (ref 95–110)
CHOLEST SERPL-MCNC: 149 MG/DL (ref 120–199)
CHOLEST/HDLC SERPL: 2.2 {RATIO} (ref 2–5)
CO2 SERPL-SCNC: 23 MMOL/L (ref 23–29)
CREAT SERPL-MCNC: 1.1 MG/DL (ref 0.5–1.4)
DIFFERENTIAL METHOD BLD: ABNORMAL
EOSINOPHIL # BLD AUTO: 0.1 K/UL (ref 0–0.5)
EOSINOPHIL NFR BLD: 3.1 % (ref 0–8)
ERYTHROCYTE [DISTWIDTH] IN BLOOD BY AUTOMATED COUNT: 11.6 % (ref 11.5–14.5)
EST. GFR  (NO RACE VARIABLE): 56.5 ML/MIN/1.73 M^2
ESTIMATED AVG GLUCOSE: 105 MG/DL (ref 68–131)
GLUCOSE SERPL-MCNC: 88 MG/DL (ref 70–110)
HBA1C MFR BLD: 5.3 % (ref 4–5.6)
HCT VFR BLD AUTO: 35.2 % (ref 37–48.5)
HDLC SERPL-MCNC: 67 MG/DL (ref 40–75)
HDLC SERPL: 45 % (ref 20–50)
HGB BLD-MCNC: 12.1 G/DL (ref 12–16)
IMM GRANULOCYTES # BLD AUTO: 0 K/UL (ref 0–0.04)
IMM GRANULOCYTES NFR BLD AUTO: 0 % (ref 0–0.5)
LDLC SERPL CALC-MCNC: 69.8 MG/DL (ref 63–159)
LYMPHOCYTES # BLD AUTO: 1.6 K/UL (ref 1–4.8)
LYMPHOCYTES NFR BLD: 41.4 % (ref 18–48)
MCH RBC QN AUTO: 33 PG (ref 27–31)
MCHC RBC AUTO-ENTMCNC: 34.4 G/DL (ref 32–36)
MCV RBC AUTO: 96 FL (ref 82–98)
MONOCYTES # BLD AUTO: 0.4 K/UL (ref 0.3–1)
MONOCYTES NFR BLD: 11 % (ref 4–15)
NEUTROPHILS # BLD AUTO: 1.7 K/UL (ref 1.8–7.7)
NEUTROPHILS NFR BLD: 44 % (ref 38–73)
NONHDLC SERPL-MCNC: 82 MG/DL
NRBC BLD-RTO: 0 /100 WBC
PLATELET # BLD AUTO: 153 K/UL (ref 150–450)
PMV BLD AUTO: 10.9 FL (ref 9.2–12.9)
POTASSIUM SERPL-SCNC: 4.1 MMOL/L (ref 3.5–5.1)
PROT SERPL-MCNC: 6.4 G/DL (ref 6–8.4)
RBC # BLD AUTO: 3.67 M/UL (ref 4–5.4)
SODIUM SERPL-SCNC: 141 MMOL/L (ref 136–145)
TRIGL SERPL-MCNC: 61 MG/DL (ref 30–150)
TSH SERPL DL<=0.005 MIU/L-ACNC: 0.68 UIU/ML (ref 0.4–4)
WBC # BLD AUTO: 3.82 K/UL (ref 3.9–12.7)

## 2024-11-25 PROCEDURE — 1160F RVW MEDS BY RX/DR IN RCRD: CPT | Mod: CPTII,S$GLB,, | Performed by: PHYSICIAN ASSISTANT

## 2024-11-25 PROCEDURE — 80061 LIPID PANEL: CPT | Performed by: FAMILY MEDICINE

## 2024-11-25 PROCEDURE — 3078F DIAST BP <80 MM HG: CPT | Mod: CPTII,S$GLB,, | Performed by: PHYSICIAN ASSISTANT

## 2024-11-25 PROCEDURE — 99214 OFFICE O/P EST MOD 30 MIN: CPT | Mod: S$GLB,,, | Performed by: PHYSICIAN ASSISTANT

## 2024-11-25 PROCEDURE — 83036 HEMOGLOBIN GLYCOSYLATED A1C: CPT | Performed by: FAMILY MEDICINE

## 2024-11-25 PROCEDURE — 3008F BODY MASS INDEX DOCD: CPT | Mod: CPTII,S$GLB,, | Performed by: PHYSICIAN ASSISTANT

## 2024-11-25 PROCEDURE — 85025 COMPLETE CBC W/AUTO DIFF WBC: CPT | Performed by: FAMILY MEDICINE

## 2024-11-25 PROCEDURE — 36415 COLL VENOUS BLD VENIPUNCTURE: CPT | Mod: PO | Performed by: FAMILY MEDICINE

## 2024-11-25 PROCEDURE — 80053 COMPREHEN METABOLIC PANEL: CPT | Performed by: FAMILY MEDICINE

## 2024-11-25 PROCEDURE — 4010F ACE/ARB THERAPY RXD/TAKEN: CPT | Mod: CPTII,S$GLB,, | Performed by: PHYSICIAN ASSISTANT

## 2024-11-25 PROCEDURE — 3044F HG A1C LEVEL LT 7.0%: CPT | Mod: CPTII,S$GLB,, | Performed by: PHYSICIAN ASSISTANT

## 2024-11-25 PROCEDURE — 3074F SYST BP LT 130 MM HG: CPT | Mod: CPTII,S$GLB,, | Performed by: PHYSICIAN ASSISTANT

## 2024-11-25 PROCEDURE — 99999 PR PBB SHADOW E&M-EST. PATIENT-LVL V: CPT | Mod: PBBFAC,,, | Performed by: PHYSICIAN ASSISTANT

## 2024-11-25 PROCEDURE — 84443 ASSAY THYROID STIM HORMONE: CPT | Performed by: FAMILY MEDICINE

## 2024-11-25 PROCEDURE — 1159F MED LIST DOCD IN RCRD: CPT | Mod: CPTII,S$GLB,, | Performed by: PHYSICIAN ASSISTANT

## 2024-11-25 RX ORDER — TRAMADOL HYDROCHLORIDE 50 MG/1
TABLET ORAL
Qty: 60 TABLET | Refills: 2 | Status: SHIPPED | OUTPATIENT
Start: 2024-12-14

## 2024-11-25 RX ORDER — PREGABALIN 50 MG/1
50 CAPSULE ORAL 3 TIMES DAILY
Qty: 90 CAPSULE | Refills: 2 | Status: SHIPPED | OUTPATIENT
Start: 2024-11-25

## 2024-12-05 ENCOUNTER — PATIENT MESSAGE (OUTPATIENT)
Dept: FAMILY MEDICINE | Facility: CLINIC | Age: 64
End: 2024-12-05
Payer: COMMERCIAL

## 2024-12-05 DIAGNOSIS — D64.9 ANEMIA, UNSPECIFIED TYPE: Primary | ICD-10-CM

## 2024-12-09 NOTE — PROGRESS NOTES
CHIEF COMPLAINT/REASON FOR VISIT: low back pain, neck pain    History of present illness: The patient is a 64 year old woman with a history of migraines, carpal tunnel syndrome and low back pain since her early 20s.  She he is status post C7-T1 interlaminar epidural steroid injection on 10/30/2024 with 85-90% relief.  She is pleased with these results.  She also reports starting turmeric again and her leg pain is much better.  Overall her symptoms are tolerable following the injection and with her medication.  She denies any new weakness or new numbness.    Pain intervention history:She is status post TFESI bilaterally to L3 on 12/6/2011 with no relief. Past history of status post L4/5 YARON on 5/25/11 with about 90% relief that lasted 3 weeks. She is status post 2 bilateral L3 transforaminal injections with 3 weeks relief each time.  She is status post L4/5 interlaminar epidural steroid injection on 1/8/14 with 90% relief.  She is status post L5/S1 interlaminar epidural steroid injection on 6/9/14 with moderate relief only on the right low back and right leg lasting 2 weeks.  She is status post bilateral L3/4 and L4/5 facet joint injections on 4/13/15 with initially 100% relief of her back pain and 80% relief of her left lateral hip and lateral thigh pain, now reporting at least 50% relief of both.  She is status post bilateral L2, 3 and 4 medial branch radiofrequency ablation on 5/20/15 with 60% relief.   She is status post bilateral L2, 3 and 4 medial branch radiofrequency ablation on 6/20/16 with 50-70% relief.  She is status post L5/S1 interlaminar epidural steroid injection on 3/10/17 with 50% relief.  She is status post bilateral L2, 3, 4 medial branch radiofrequency ablation on 7/17/17 with about 75% relief.  She is status post bilateral L2, 3, 4 medial branch radiofrequency ablation on 07/17/2018 with 100% relief of her leg pain and 70-75% relief of her back pain.  She is status post C7-T1 cervical  "interlaminar epidural steroid injection on 09/25/2018 with 80% relief.   She is status post L5/S1 interlaminar epidural steroid injection on 12/27/2018 with 60% relief.  She is status post bilateral L2, 3 and 4 medial branch radiofrequency ablation on 07/29/2019 with 40% relief.  She is status post L5/S1 interlaminar epidural steroid injection on 09/04/2019 with almost 100% relief of her right leg pain but 0% relief for back pain. She is status post C7-T1 interlaminar epidural steroid injection on 10/24/2019 with 70% relief.  She is s/p L5/S1 IL YARON with 2 weeks of relief, now 0%.   She is post left L4/5 S1 transforaminal epidural steroid injection on 08/21/2023 with almost 100% relief.   She is status post right L4/5 and L5/S1 transforaminal epidural steroid injection on 03/04/2024 with 60% relief.   She he is status post C7-T1 interlaminar epidural steroid injection on 10/30/2024 with 85-90% relief.       Spine surgeries:    Antineuropathics: failed gabapentin, failed Cymbalta due to side effects.  Takes Lyrica twice daily, can not tolerate 3 times a day.  NSAIDs:  Ibuprofen  Physical therapy:  Antidepressants:  Muscle relaxers:  Opioids:  Tramadol 50 mg twice daily as needed  Antiplatelets/Anticoagulants:  ASA 81 mg    ROS: She reports back pain only.  Balance of review of systems is negative.    Medical, surgical, family and social history reviewed elsewhere in record.     Medications/Allergies: See med card      Vitals:    11/25/24 1445   BP: (!) 124/57   Pulse: 74   Weight: 81 kg (178 lb 9.2 oz)   Height: 5' 4" (1.626 m)   PainSc:   3        PHYSICAL EXAM:  Gen: A and O x3, pleasant, well-groomed  HEENT: PERRLA  CVS: Regular rate and rhythm  Resp:  No obvious shortness of breath, no increased work of breathing  Musculoskeletal: No antalgic gait.     Neuro:  Upper extremities: 5/5 strength bilaterally   Lower extremities: 5/5 strength bilaterally  Reflexes: Brachioradialis 2+, Bicep 2+, Tricep 2+.   Patellar " 3+, Achilles 2+.  Sensory: Intact and symmetrical to light touch and pinprick in C2-T1 dermatomes bilaterally.Intact and symmetrical to light touch and pinprick in L2-S1 dermatomes bilaterally.    Cervical spine:   Range of motion is mildly reduced with flexion, extension lateral rotation with increased right neck pain during right lateral rotation and extension.    Myofascial exam: Mild tenderness to palpation to the right cervical paraspinous muscles.    Lumbar spine:  Lumbar spine: ROM is moderately limited with flexion and extension with increased right low back pain during extension and oblique extension.  Supine straight leg raise is negative bilaterally.  Internal and external rotation of the hip causes no increased pain in either side.  Myofascial exam:  Moderate tenderness to palpation to the right greater than left lumbar paraspinous muscles.      IMAGING:   MRI L-SPINE 5/10/11   IMPRESSION: AT L3-4, THERE IS CIRCUMFERENTIAL DISC PROTRUSION FOCALLY MORE PROMINENT TO THE LEFT AS WELL AS A SMALL DISC HERNIATION PROTRUDING INFERIORLY BEHIND L4 TO THE LEFT OF MIDLINE. THIS PRODUCES SPINAL CANAL AND BILATERAL NEURAL FORAMINAL STENOSIS, LEFT GREATER THAN RIGHT. AT L4-5, THERE IS CIRCUMFERENTIAL DISC PROTRUSION FOCALLY MORE PROMINENT TO THE RIGHT WITH MILD SPINAL CANAL AND RIGHT NEURAL FORAMINAL STENOSIS. AT L1-2 AND L2-3 ALTHOUGH THERE ARE DISC PROTRUSIONS IDENTIFIED, SIGNIFICANT STENOSIS IS NOT SEEN.     MRI lumbar spine 7/10/14  L1-2:There is chronic relatively advanced disc degeneration with disc space narrowing, disc dehydration, disc narrowing, endplate osteophytes, and degenerative vertebral endplate marrow change. There is a diffuse 2-mm posterior disc bulge with a superimposed4-mm right posterior disc extrusion causing mild right foraminal stenosis. There is no impingement of the right L1 nerve root and there has been no change.  L2-3: There is severe and chronic disc degeneration with severe disc space  narrowing, disc dehydration, degenerative vertebral endplate marrow change, and vertebral endplate osteophytes. There is a diffuse 2-mm posterior disc bulge with a superimposed 4-mm right posterior disc extrusion causing mild right foraminal stenosis. There is no impingement of the right L2 nerve root and there has been no significant change.  L3-4: There is severe disc degeneration which has progressed since the prior study. There is severe disc space narrowing, disc dehydration, degenerative vertebral endplate marrow change and endplate osteophytes. There is also mild posterior subluxation of L3over a distance of 4 mm. There is a broad-based 5-mm posterior disc extrusion. There is degenerative facet arthrosis with ligamentum flavum thickening. The combination of facet arthrosis and disc extrusion results in relatively severe spinal canal stenosis with compression of the thecal sac to an AP diameter of 5 mm, moderate right foraminal stenosis, and severe left foraminal stenosis. The spinal stenosis has also progressed since the prior study.  L4-5:There is a diffuse posterior disc bulge with a superimposed 3-mm left posterior paracentral disc protrusion causing left paracentral thecal sac compression. There is moderate left and mild right foraminal stenosis and there has been no significant change. There is mild degenerative facet arthrosis with ligamentum flavum thickening and small joint effusions.  L5-S1:There is no significant compromise of the spinal canal or foramina and there has been no change.    X-ray cervical spine 6/8/15  There is loss of normal cervical lordosis which may be positional or related muscular strain. Intervertebral disk height loss is noted at the C5-C6 C6-C7 levels and to a lesser degree C4-C5 and C7-T1 levels. Vertebral body alignment appears otherwise adequate. Vertebral body heights appear well-preserved. The atlas and odontoid appear in good relationship to each other. Osseous  neuroforaminal narrowing is noted at the C3-C4 C4-C5 C5-C6 levels on the left and at the C4-C5 C5-C6 and C6-C7 levels on the right     07/10/2018 MRI cervical spine [facility] report  C2-3 broad-based signal asymmetry at the left subarticular and foraminal zone reflecting implant spondylosis and disc bulge complex, mild to moderate left asymmetric foraminal narrowing, ectasia of the left vertebral artery, contralateral right asymmetric facet hypertrophy, right foramen widely narrowed, disc partially desiccated without collapse  C3-4 moderate to severe left greater than right foraminal narrowing secondary to uncinate joint hypertrophic signal alteration, disc partially desiccated  C4-5 endplate spondylosis moderate diffuse disc bulge noted, broad-based right paracentral disc herniation, asymmetric flattening of the ventral cord surface at the right paracentral zone, high-grade if lateral right foraminal narrowing, AP diameter of canal 9.9 mm, contralaterally, high grade left foraminal narrowing secondary to facet greater than uncinate joint hypertrophic signal alteration, disc desiccated and mildly narrowed  C5-6 disc space narrowing evident with generalized in Nigel spondylosis and concentric inter pose disc bulge complex, high-grade bilateral foraminal narrowing, flattening of the cord surface, AP diameter 7.9 mm, symmetric facet arthrosis, disc diffusely desiccated and narrowed  C6-7 moderate endplate spondylosis and concentric disc bulge complex, high-grade bilateral foraminal compromise, flattening of the anterior cord surface, AP diameter 8.8 mm, facet arthrosis symmetric, disc desiccated and narrowed  C7-T1 less than 2 mm depth disc bulge    11/06/2018 MRI lumbar spine  T12/L1: There is no evidence of disc protrusion, canal or foraminal stenosis.  L1/L2: Right posterolateral disc protrusion is evident and there is mild distortion of the right anterolateral canal.  Degenerative facet changes are noted  bilaterally.  The left foramen is intact.  L2/L3: There is annular disc bulging with a superimposed right posterolateral disc extrusion.  This is slightly more pronounced on the previous examination and there is mild effacement of the anterior thecal sac and moderate narrowing the right foramen.  L3/L4: There is annular disc bulge and osteophyte formation with a superimposed small left posterolateral disc extrusion.  There is moderate narrowing the central canal and left foramen.  This is little changed relative the previous examination.  Degenerative facet changes are noted.  L5/S1: There is a small central disc extrusion superimposed upon annular disc bulging.  This is slightly less pronounced than was seen previously.  Degenerative facet changes are noted.  L5/S1: Moderate degenerative facet changes are noted and there is mild effacement of the anterior thecal sac.  There is ligamentous hypertrophy particularly to the left in the posterior canal and there is left greater than right foraminal narrowing.  This is mildly worsened relative prior exam.      ASSESSMENT:  The patient is a 64 year old woman with a history of migraines, carpal tunnel syndrome and low back pain since her early 20s who returns in follow up.   1. Cervical radiculopathy        2. Lumbar radiculopathy        3. Spinal stenosis of lumbar region without neurogenic claudication              Plan:  1. She did well following the C7-T1 interlaminar epidural steroid injection.  She can contact us to repeat this in the future if necessary.  If her right leg pain returns we can schedule a right L4/5 and L5/S1 transforaminal epidural steroid injection.  2. Dr. Abdalla has refilled tramadol 50 mg twice a day and Lyrica 50 mg twice a day. I have reviewed the [place] Board of Pharmacy website and there are no abberancies.    3. Follow-up in three months or sooner as needed.

## 2024-12-23 ENCOUNTER — LAB VISIT (OUTPATIENT)
Dept: LAB | Facility: HOSPITAL | Age: 64
End: 2024-12-23
Attending: FAMILY MEDICINE
Payer: COMMERCIAL

## 2024-12-23 DIAGNOSIS — D64.9 ANEMIA, UNSPECIFIED TYPE: ICD-10-CM

## 2024-12-23 LAB
FERRITIN SERPL-MCNC: 26 NG/ML (ref 20–300)
IRON SERPL-MCNC: 126 UG/DL (ref 30–160)
SATURATED IRON: 35 % (ref 20–50)
TOTAL IRON BINDING CAPACITY: 358 UG/DL (ref 250–450)
TRANSFERRIN SERPL-MCNC: 242 MG/DL (ref 200–375)

## 2024-12-23 PROCEDURE — 83540 ASSAY OF IRON: CPT | Performed by: FAMILY MEDICINE

## 2024-12-23 PROCEDURE — 36415 COLL VENOUS BLD VENIPUNCTURE: CPT | Mod: PO | Performed by: FAMILY MEDICINE

## 2024-12-23 PROCEDURE — 82728 ASSAY OF FERRITIN: CPT | Performed by: FAMILY MEDICINE

## 2024-12-31 DIAGNOSIS — I10 ESSENTIAL HYPERTENSION: ICD-10-CM

## 2024-12-31 RX ORDER — LOSARTAN POTASSIUM 100 MG/1
100 TABLET ORAL
Qty: 90 TABLET | Refills: 3 | Status: SHIPPED | OUTPATIENT
Start: 2024-12-31

## 2024-12-31 NOTE — TELEPHONE ENCOUNTER
No care due was identified.  [facility] Embedded Care Due Messages. Reference number: [id number].   12/31/2024 1:40:29 PM CST

## 2025-01-01 NOTE — TELEPHONE ENCOUNTER
Refill Decision Note   Elizabeth Giordano  is requesting a refill authorization.  Brief Assessment and Rationale for Refill:  Approve     Medication Therapy Plan:         Comments:     Note composed:7:23 PM 12/31/2024             Appointments     Last Visit   11/21/2024 CARRIE Espinosa MD   Next Visit   Visit date not found CARRIE Espinosa MD

## 2025-01-07 DIAGNOSIS — Z56.6 STRESSFUL JOB: ICD-10-CM

## 2025-01-07 RX ORDER — ESCITALOPRAM OXALATE 10 MG/1
10 TABLET ORAL
Qty: 90 TABLET | Refills: 3 | Status: SHIPPED | OUTPATIENT
Start: 2025-01-07

## 2025-01-07 SDOH — SOCIAL DETERMINANTS OF HEALTH (SDOH): OTHER PHYSICAL AND MENTAL STRAIN RELATED TO WORK: Z56.6

## 2025-01-08 NOTE — TELEPHONE ENCOUNTER
Refill Decision Note   Elizabeth Giordano  is requesting a refill authorization.  Brief Assessment and Rationale for Refill:  Approve     Medication Therapy Plan:         Pharmacist review requested: Yes   Extended chart review required: Yes   Comments:     Note composed:10:09 PM 01/07/2025

## 2025-01-08 NOTE — TELEPHONE ENCOUNTER
No care due was identified.  [facility] Embedded Care Due Messages. Reference number: [id number].   1/07/2025 9:17:35 PM CST

## 2025-01-08 NOTE — TELEPHONE ENCOUNTER
Refill Routing Note   Medication(s) are not appropriate for processing by [facility] for the following reason(s):        Drug-disease interaction    [facility] action(s):  Defer      Medication Therapy Plan: Drug-Disease: EScitalopram oxalate and Hypokalemia    Pharmacist review requested: Yes     Appointments  past 12m or future 3m with PCP    Date Provider   Last Visit   11/21/2024 CARRIE Espinosa MD   Next Visit   Visit date not found CARRIE Espinosa MD   ED visits in past 90 days: 0        Note composed:9:46 PM 01/07/2025

## 2025-01-10 ENCOUNTER — OFFICE VISIT (OUTPATIENT)
Dept: OPTOMETRY | Facility: CLINIC | Age: 65
End: 2025-01-10
Payer: COMMERCIAL

## 2025-01-10 DIAGNOSIS — H02.052 TRICHIASIS OF BOTH LOWER EYELIDS: ICD-10-CM

## 2025-01-10 DIAGNOSIS — H02.054 TRICHIASIS OF BOTH UPPER EYELIDS: Primary | ICD-10-CM

## 2025-01-10 DIAGNOSIS — H02.055 TRICHIASIS OF BOTH LOWER EYELIDS: ICD-10-CM

## 2025-01-10 DIAGNOSIS — H02.051 TRICHIASIS OF BOTH UPPER EYELIDS: Primary | ICD-10-CM

## 2025-01-10 PROCEDURE — 99999 PR PBB SHADOW E&M-EST. PATIENT-LVL IV: CPT | Mod: PBBFAC,,,

## 2025-01-10 RX ORDER — NYSTATIN 100000 U/G
CREAM TOPICAL
COMMUNITY
Start: 2024-12-23 | End: 2025-12-23

## 2025-01-10 NOTE — PROGRESS NOTES
HPI    Possible trichiasis OU    Pt started noticing irritation x 1 week. Pt denies gtt.   Last edited by Gold Palacios, OD on 1/10/2025  1:57 PM.            Assessment /Plan     For exam results, see Encounter Report.    Trichiasis of both upper eyelids    Trichiasis of both lower eyelids      1-2. Trichiasis of lashes of the upper and lower eyelids OU. Pt reports recurrent problems over the last year. Epilated several cilia from the upper and lower eyelids of both eyes at slit lamp using topical Fluress and cilia forceps. Pt tolerated well. No corneal staining OD, OS. Advised OTC artificial tears BID-QID OU for increased comfort. Ed pt to RTC if signs/symptoms recur.    RTC: as scheduled for annual, sooner prn.

## 2025-01-27 ENCOUNTER — OFFICE VISIT (OUTPATIENT)
Dept: FAMILY MEDICINE | Facility: CLINIC | Age: 65
End: 2025-01-27
Payer: COMMERCIAL

## 2025-01-27 DIAGNOSIS — R05.8 ALLERGIC COUGH: ICD-10-CM

## 2025-01-27 DIAGNOSIS — F51.01 PRIMARY INSOMNIA: ICD-10-CM

## 2025-01-27 DIAGNOSIS — J06.9 URI WITH COUGH AND CONGESTION: Primary | ICD-10-CM

## 2025-01-27 PROCEDURE — 1159F MED LIST DOCD IN RCRD: CPT | Mod: CPTII,95,, | Performed by: FAMILY MEDICINE

## 2025-01-27 PROCEDURE — 4010F ACE/ARB THERAPY RXD/TAKEN: CPT | Mod: CPTII,95,, | Performed by: FAMILY MEDICINE

## 2025-01-27 PROCEDURE — 1160F RVW MEDS BY RX/DR IN RCRD: CPT | Mod: CPTII,95,, | Performed by: FAMILY MEDICINE

## 2025-01-27 PROCEDURE — 98005 SYNCH AUDIO-VIDEO EST LOW 20: CPT | Mod: 95,,, | Performed by: FAMILY MEDICINE

## 2025-01-27 RX ORDER — IPRATROPIUM BROMIDE 42 UG/1
2 SPRAY, METERED NASAL 3 TIMES DAILY
Qty: 15 ML | Refills: 2 | Status: SHIPPED | OUTPATIENT
Start: 2025-01-27

## 2025-01-27 RX ORDER — BENZONATATE 100 MG/1
100 CAPSULE ORAL 3 TIMES DAILY PRN
Qty: 45 CAPSULE | Refills: 1 | Status: SHIPPED | OUTPATIENT
Start: 2025-01-27

## 2025-01-27 RX ORDER — ESZOPICLONE 3 MG/1
3 TABLET, FILM COATED ORAL NIGHTLY
Qty: 90 TABLET | Refills: 0 | Status: SHIPPED | OUTPATIENT
Start: 2025-01-27

## 2025-01-27 NOTE — PROGRESS NOTES
Subjective:       Patient ID: Elizabeth Giordano is a 64 y.o. female.    Chief Complaint: No chief complaint on file.    The patient location is: [place], [place]  The chief complaint leading to consultation is: cough, med follow up    Visit type: audiovisual    Face to Face time with patient: 5 minutes  7 minutes of total time spent on the encounter, which includes face to face time and non-face to face time preparing to see the patient (eg, review of tests), Obtaining and/or reviewing separately obtained history, Documenting clinical information in the electronic or other health record, Independently interpreting results (not separately reported) and communicating results to the patient/family/caregiver, or Care coordination (not separately reported).         Each patient to whom he or she provides medical services by telemedicine is:  (1) informed of the relationship between the physician and patient and the respective role of any other health care provider with respect to management of the patient; and (2) notified that he or she may decline to receive medical services by telemedicine and may withdraw from such care at any time.    Notes:   Pt is known to me.  The pt continues to require Lunesta for restful sleep.  She has no ill effects nor residual daytime drowsiness.  The pt is compliant with regulations per  website.  The pt is having a lot of cough.  She has sinus congestion and post nasal drainage.  She has no runny nose.  She has had no fever nor SOB.          Cough  This is a chronic problem. The current episode started more than 1 month ago. The problem has been waxing and waning. The problem occurs every few minutes. The cough is Non-productive. Associated symptoms include nasal congestion and postnasal drip. Pertinent negatives include no chest pain, chills, ear congestion, ear pain, fever, headaches, heartburn, hemoptysis, myalgias, rash, rhinorrhea, sore throat, shortness of breath,  sweats, weight loss or wheezing. Nothing aggravates the symptoms. Risk factors for lung disease include animal exposure. She has tried prescription cough suppressant and steroid inhaler for the symptoms. The treatment provided moderate relief. Her past medical history is significant for environmental allergies. There is no history of asthma, bronchiectasis, bronchitis, COPD, emphysema or pneumonia.     Review of Systems   Constitutional:  Negative for chills, fever and weight loss.   HENT:  Positive for postnasal drip. Negative for ear pain, rhinorrhea and sore throat.    Respiratory:  Positive for cough. Negative for hemoptysis, shortness of breath and wheezing.    Cardiovascular:  Negative for chest pain.   Gastrointestinal:  Negative for heartburn.   Musculoskeletal:  Negative for myalgias.   Skin:  Negative for rash.   Allergic/Immunologic: Positive for environmental allergies.   Neurological:  Negative for headaches.       Objective:      Physical Exam  Vitals and nursing note reviewed.   Constitutional:       General: She is not in acute distress.     Appearance: Normal appearance.   Pulmonary:      Effort: Pulmonary effort is normal.      Comments: cough during visit  Neurological:      Mental Status: She is oriented to person, place, and time.   Psychiatric:         Behavior: Behavior normal.         Assessment:       1. URI with cough and congestion    2. Primary insomnia    3. Allergic cough        Plan:       Diagnoses and all orders for this visit:    URI with cough and congestion  -     ipratropium (ATROVENT) 42 mcg (0.06 %) nasal spray; 2 sprays by Each Nostril route 3 (three) times daily.    Primary insomnia  -     eszopiclone (LUNESTA) 3 mg Tab; Take 1 tablet (3 mg total) by mouth every evening.    Allergic cough  -     benzonatate (TESSALON) 100 MG capsule; Take 1 capsule (100 mg total) by mouth 3 (three) times daily as needed for Cough.      During this visit, I reviewed the pt's history,  medications, allergies, and problem list.

## 2025-01-28 ENCOUNTER — TELEPHONE (OUTPATIENT)
Dept: PAIN MEDICINE | Facility: CLINIC | Age: 65
End: 2025-01-28
Payer: COMMERCIAL

## 2025-01-28 ENCOUNTER — OFFICE VISIT (OUTPATIENT)
Dept: OPHTHALMOLOGY | Facility: CLINIC | Age: 65
End: 2025-01-28
Payer: COMMERCIAL

## 2025-01-28 DIAGNOSIS — H53.30 BINOCULAR VISUAL DISTURBANCE: Primary | ICD-10-CM

## 2025-01-28 PROCEDURE — 1159F MED LIST DOCD IN RCRD: CPT | Mod: CPTII,S$GLB,, | Performed by: OPHTHALMOLOGY

## 2025-01-28 PROCEDURE — 4010F ACE/ARB THERAPY RXD/TAKEN: CPT | Mod: CPTII,S$GLB,, | Performed by: OPHTHALMOLOGY

## 2025-01-28 PROCEDURE — 99213 OFFICE O/P EST LOW 20 MIN: CPT | Mod: S$GLB,,, | Performed by: OPHTHALMOLOGY

## 2025-01-28 PROCEDURE — 99999 PR PBB SHADOW E&M-EST. PATIENT-LVL III: CPT | Mod: PBBFAC,,, | Performed by: OPHTHALMOLOGY

## 2025-01-28 RX ORDER — BRIMONIDINE TARTRATE 2 MG/ML
1 SOLUTION/ DROPS OPHTHALMIC
Qty: 10 ML | Refills: 3 | Status: SHIPPED | OUTPATIENT
Start: 2025-01-28 | End: 2026-01-28

## 2025-01-28 NOTE — PROGRESS NOTES
HPI    Dr. Palacios     S/p phaco OD 5/29/24   S/p phaco OS 8/7/24  Trichiasis RLL   Allergic conjunctivitis OU     Patient here for blurry vision. Patient states seeing halos around lights   OU.   Last edited by Ninoska Brown MA on 1/28/2025  2:36 PM.            Assessment /Plan     For exam results, see Encounter Report.    Binocular visual disturbance    Other orders  -     brimonidine 0.2% (ALPHAGAN) 0.2 % Drop; Place 1 drop into both eyes as needed (night time driving.).  Dispense: 10 mL; Refill: 3      S/p phaco OD 5/29/24   S/p phaco OS 8/7/24    Va excellent, doing well overall, however having issues with positive dysphotopsias ever since sx.  Pt gradually adapting but still having issues aniya when driving at night.    No VS PCO - no need for yag cap today    Trial alphagan PRN evening driving

## 2025-02-07 ENCOUNTER — OFFICE VISIT (OUTPATIENT)
Dept: CARDIOLOGY | Facility: CLINIC | Age: 65
End: 2025-02-07
Payer: COMMERCIAL

## 2025-02-07 VITALS
WEIGHT: 189.63 LBS | SYSTOLIC BLOOD PRESSURE: 105 MMHG | DIASTOLIC BLOOD PRESSURE: 61 MMHG | HEIGHT: 64 IN | HEART RATE: 70 BPM | BODY MASS INDEX: 32.37 KG/M2

## 2025-02-07 DIAGNOSIS — E78.5 DYSLIPIDEMIA: Primary | ICD-10-CM

## 2025-02-07 DIAGNOSIS — I10 ESSENTIAL HYPERTENSION: ICD-10-CM

## 2025-02-07 DIAGNOSIS — I25.10 CORONARY ARTERY DISEASE INVOLVING NATIVE CORONARY ARTERY OF NATIVE HEART WITHOUT ANGINA PECTORIS: ICD-10-CM

## 2025-02-07 DIAGNOSIS — Z13.6 ENCOUNTER FOR SCREENING FOR CARDIOVASCULAR DISORDERS: ICD-10-CM

## 2025-02-07 PROCEDURE — 99214 OFFICE O/P EST MOD 30 MIN: CPT | Mod: S$GLB,,,

## 2025-02-07 PROCEDURE — 3074F SYST BP LT 130 MM HG: CPT | Mod: CPTII,S$GLB,,

## 2025-02-07 PROCEDURE — 1159F MED LIST DOCD IN RCRD: CPT | Mod: CPTII,S$GLB,,

## 2025-02-07 PROCEDURE — 3078F DIAST BP <80 MM HG: CPT | Mod: CPTII,S$GLB,,

## 2025-02-07 PROCEDURE — 99999 PR PBB SHADOW E&M-EST. PATIENT-LVL IV: CPT | Mod: PBBFAC,,,

## 2025-02-07 PROCEDURE — 3008F BODY MASS INDEX DOCD: CPT | Mod: CPTII,S$GLB,,

## 2025-02-07 PROCEDURE — 4010F ACE/ARB THERAPY RXD/TAKEN: CPT | Mod: CPTII,S$GLB,,

## 2025-02-07 NOTE — PROGRESS NOTES
Subjective:    Patient ID:  Elizabeth Giordano is a 64 y.o. female patient here for evaluation Follow-up    History of Present Illness:     Mrs. Johnson is a 62 year old F who follows with Dr. Perdue here today for a six month follow up. She has been doing well and has no complaints. Staying active and BP well controlled.       Most Recent Echocardiogram Results  Results for orders placed during the hospital encounter of 07/15/21    Echo    Interpretation Summary  · The left ventricle is normal in size with normal systolic function.  · The estimated ejection fraction is 60%.  · Normal left ventricular diastolic function.  · Normal right ventricular size with normal right ventricular systolic function.  · Normal central venous pressure (3 mmHg).  · The estimated PA systolic pressure is 25 mmHg.      Most Recent Nuclear Stress Test Results  No results found for this or any previous visit.      Most Recent Cardiac PET Stress Test Results  No results found for this or any previous visit.      Most Recent Cardiovascular Angiogram results  No results found for this or any previous visit.      Other Most Recent Cardiology Results  Results for orders placed during the hospital encounter of 08/13/24    Holter monitor - 72 hour    Interpretation Summary    The predominant rhythm is sinus.    Uneventful Holter monitor.  No significant arrhythmias.    Symptoms of palpitations and shortness of breath related to normal sinus rhythm with no ectopy      REVIEW OF SYSTEMS: As noted in HPI   CARDIOVASCULAR: No recent chest pain, palpitations, arm/neck/jaw pain, or edema.  RESPIRATORY: No recent fever, cough, SOB.  : No blood in the urine  GI: No reflux, nausea, vomiting, or blood in stool.   MUSCULOSKELETAL: No falls.   NEURO: No headaches, syncope, or dizziness.  EYES: No sudden changes in vision.     Past Medical History:   Diagnosis Date    Allergy     Arthritis of spine 2011    Cataract     Chronic low back pain      Class 2 obesity with body mass index (BMI) of 39.0 to 39.9 in adult 01/11/2019    Coronary artery disease     mild    DDD (degenerative disc disease), lumbar     Diverticulitis     Diverticulosis     DJD (degenerative joint disease)     Encounter for blood transfusion     Factor V Leiden     GERD (gastroesophageal reflux disease)     Hiatal hernia     Hypertension     Migraines     PONV (postoperative nausea and vomiting)     geta    Schatzki's ring     Urticaria      Past Surgical History:   Procedure Laterality Date    APPENDECTOMY  1981    CARPAL TUNNEL RELEASE  2011    right    CATARACT EXTRACTION W/  INTRAOCULAR LENS IMPLANT Right 5/29/2024    Procedure: EXTRACTION, CATARACT, WITH IOL INSERTION;  Surgeon: Adelia Dalton MD;  Location: [facility] OR;  Service: Ophthalmology;  Laterality: Right;    CATARACT EXTRACTION W/  INTRAOCULAR LENS IMPLANT Left 8/7/2024    Procedure: EXTRACTION, CATARACT, WITH IOL INSERTION;  Surgeon: Adelia Dalton MD;  Location: [facility] OR;  Service: Ophthalmology;  Laterality: Left;    COLONOSCOPY  2014    Dr. Palomares; reduntant colon, otherwise normal findings repeat in 8-10 years for surveillance    Epidural Steroid Injection      Pain Management    EPIDURAL STEROID INJECTION INTO CERVICAL SPINE N/A 09/25/2018    Procedure: Injection-steroid-epidural-cervical;  Surgeon: Oswald Abdalla MD;  Location: [facility] OR;  Service: Pain Management;  Laterality: N/A;    EPIDURAL STEROID INJECTION INTO CERVICAL SPINE N/A 10/24/2019    Procedure: Injection-steroid-epidural-cervical;  Surgeon: Oswald Abdalla MD;  Location: [facility] OR;  Service: Pain Management;  Laterality: N/A;    EPIDURAL STEROID INJECTION INTO CERVICAL SPINE N/A 02/22/2023    Procedure: Injection-steroid-epidural-cervical C7-T1;  Surgeon: Oswald Abdalla MD;  Location: [facility] OR;  Service: Pain Management;  Laterality: N/A;    EPIDURAL STEROID INJECTION INTO CERVICAL SPINE N/A 10/30/2024    Procedure:  Injection-steroid-epidural-cervical C7/T1;  Surgeon: Oswald Abdalla MD;  Location: [facility] OR;  Service: Pain Management;  Laterality: N/A;  C7/T1    EPIDURAL STEROID INJECTION INTO LUMBAR SPINE N/A 12/27/2018    Procedure: Injection-steroid-epidural-lumbar L5/S1;  Surgeon: Oswald Abdalla MD;  Location: [facility] OR;  Service: Pain Management;  Laterality: N/A;    EPIDURAL STEROID INJECTION INTO LUMBAR SPINE N/A 09/04/2019    Procedure: Injection-steroid-epidural-lumbar;  Surgeon: Oswald Abdalla MD;  Location: [facility] OR;  Service: Pain Management;  Laterality: N/A;  L5/S1 to right    EPIDURAL STEROID INJECTION INTO LUMBAR SPINE N/A 06/16/2023    Procedure: Injection-steroid-epidural-lumbar L5/S1;  Surgeon: Oswald Abdalla MD;  Location: [facility] OR;  Service: Pain Management;  Laterality: N/A;    ESOPHAGOGASTRODUODENOSCOPY  05/17/2016    Dr. Arreguin; small hiatal hernia; gastritis    ESOPHAGOGASTRODUODENOSCOPY N/A 06/22/2020    Dr. Arreguin; mild Schatzki ring- dilated; small hiatal hernia; bx unremarkable    Facet injection      Pain Management    HYSTERECTOMY      ovaries removed    KIDNEY SURGERY Right 1967    to correct urinary reflux into kidney    LEFT HEART CATHETERIZATION Left 05/22/2019    Procedure: Left heart cath;  Surgeon: Casa Perdue MD;  Location: [facility] CATH;  Service: Cardiology;  Laterality: Left;    OVARIAN CYST REMOVAL Right 1981    RADIOFREQUENCY ABLATION OF LUMBAR MEDIAL BRANCH NERVE AT SINGLE LEVEL Bilateral 07/17/2018    Procedure: RADIOFREQUENCY ABLATION, NERVE, MEDIAL BRANCH, LUMBAR, L2,3,4;  Surgeon: Oswald Abdalla MD;  Location: [facility] OR;  Service: Pain Management;  Laterality: Bilateral;    RADIOFREQUENCY ABLATION OF LUMBAR MEDIAL BRANCH NERVE AT SINGLE LEVEL Bilateral 07/29/2019    Procedure: Radiofrequency Ablation, Nerve, Spinal, Lumbar, Medial Branch, L2,3,4;  Surgeon: Oswald Abdalla MD;  Location: [facility] OR;  Service: Pain Management;  Laterality: Bilateral;     SHOULDER SURGERY  2010    rotator cuff, right    TRANSFORAMINAL EPIDURAL INJECTION OF STEROID Left 2023    Procedure: Injection,steroid,epidural,transforaminal approach L4/5 L5/S1 Left;  Surgeon: Oswald Abdalla MD;  Location: [facility] OR;  Service: Pain Management;  Laterality: Left;    TRANSFORAMINAL EPIDURAL INJECTION OF STEROID Right 3/4/2024    Procedure: Injection,steroid,epidural,transforaminal approach right L4/5 and L5/S1;  Surgeon: Oswald Abdalla MD;  Location: [facility] OR;  Service: Pain Management;  Laterality: Right;  right L4/5 and L5/S1     Social History     Tobacco Use    Smoking status: Former     Current packs/day: 0.00     Average packs/day: 1 pack/day for 5.0 years (5.0 ttl pk-yrs)     Types: Cigarettes     Start date: 1992     Quit date: 1997     Years since quittin.1     Passive exposure: Never    Smokeless tobacco: Never   Substance Use Topics    Alcohol use: Yes     Comment: 3x per year    Drug use: No         Objective      Vitals:    25 1423   BP: 105/61   Pulse: 70       LAST EKG  Results for orders placed or performed during the hospital encounter of 21   EKG 12-lead    Collection Time: 21  4:58 AM    Narrative    Test Reason : R06.02,    Vent. Rate : 111 BPM     Atrial Rate : 111 BPM     P-R Int : 150 ms          QRS Dur : 080 ms      QT Int : 330 ms       P-R-T Axes : 067 -27 057 degrees     QTc Int : 448 ms    Sinus tachycardia  Possible Left atrial enlargement  Borderline Abnormal ECG  When compared with ECG of 14-MAR-2019 14:14,  Previous ECG has undetermined rhythm, needs review  The axis Shifted left  Confirmed by Aubrey RUDD, Bill () on 2021 8:48:30 AM    Referred By: [email or web address]   SELF           Confirmed By:Bill Burgos MD     LIPIDS - LAST 2   Lab Results   Component Value Date    CHOL 149 2024    CHOL 156 2023    HDL 67 2024    HDL 64 2023    LDLCALC 69.8 2024    LDLCALC 77.6 2023    TRIG 61  "11/25/2024    TRIG 72 11/27/2023    CHOLHDL 45.0 11/25/2024    CHOLHDL 41.0 11/27/2023     CARDIAC PROFILE - LAST 2  Lab Results   Component Value Date    CPK 43 (L) 02/02/2021     (H) 02/02/2021    TROPONINI <0.012 02/02/2021      CBC - LAST 2  Lab Results   Component Value Date    WBC 3.82 (L) 11/25/2024    WBC 5.02 11/27/2023    HGB 12.1 11/25/2024    HGB 12.3 11/27/2023    HCT 35.2 (L) 11/25/2024    HCT 36.5 (L) 11/27/2023     11/25/2024     11/27/2023     No results found for: "LABPT", "INR", "APTT"  CHEMISTRY - LAST 2  Lab Results   Component Value Date     11/25/2024     08/08/2024    K 4.1 11/25/2024    K 4.0 08/08/2024    CO2 23 11/25/2024    CO2 26 08/08/2024    BUN 14 11/25/2024    BUN 18 08/08/2024    CREATININE 1.1 11/25/2024    CREATININE 1.1 08/08/2024    GLU 88 11/25/2024    GLU 84 08/08/2024    CALCIUM 9.3 11/25/2024    CALCIUM 9.2 08/08/2024    MG 1.8 08/08/2024    MG 2.2 02/06/2021    ALBUMIN 3.9 11/25/2024    ALBUMIN 3.9 08/08/2024    ALT 13 11/25/2024    ALT 12 08/08/2024    AST 22 11/25/2024    AST 18 08/08/2024      ENDOCRINE - LAST 2  Lab Results   Component Value Date    HGBA1C 5.3 11/25/2024    HGBA1C 5.2 11/27/2023    TSH 0.680 11/25/2024    TSH 0.384 (L) 08/08/2024        PHYSICAL EXAM  CONSTITUTIONAL: Well built, well nourished in no apparent distress  NECK: no carotid bruit, no JVD  LUNGS: CTA  CHEST WALL: no tenderness  HEART: regular rate and rhythm, S1, S2 normal, no murmur, click, rub or gallop   ABDOMEN: soft, non-tender; bowel sounds normal; no masses,  no organomegaly  EXTREMITIES: Extremities normal, no edema, no calf tenderness noted  NEURO: AAO X 3    I HAVE REVIEWED :    The vital signs, most recent cardiac testing, and most recent pertinent non-cardiology provider notes.    Current Outpatient Medications   Medication Instructions    ALPRAZolam (XANAX) 0.25 mg, Oral, 3 times daily PRN    amLODIPine (NORVASC) 5 mg, Oral    ascorbic acid " (vitamin C) (VITAMIN C) 500 mg, Oral, 2 times daily    aspirin (ECOTRIN) 81 mg, Daily    atorvastatin (LIPITOR) 10 mg, Oral    azelastine (ASTELIN) 274 mcg, Nasal, 2 times daily    benzonatate (TESSALON) 100 mg, Oral, 3 times daily PRN    brimonidine 0.2% (ALPHAGAN) 0.2 % Drop 1 drop, Both Eyes, As needed (PRN)    cimetidine (TAGAMET) 800 MG tablet TAKE 1 TABLET BY MOUTH EVERY EVENING    EScitalopram oxalate (LEXAPRO) 10 mg, Oral    estradioL (ESTRACE) 1 mg, Oral, Daily, Can cause blood clots with covid, recommend NOT taking this medication until covid symptoms completely gone and discussing with PCP    eszopiclone (LUNESTA) 3 mg, Oral, Nightly    fluticasone propionate (FLONASE) 50 mcg, Each Nostril, Daily    galcanezumab-gnlm (EMGALITY PEN) 120 mg/mL PnIj Inject 1 pen (120 mg total) into the skin every 28 days.    ibuprofen (ADVIL,MOTRIN) 800 mg, Oral, Every 6 hours PRN    ipratropium (ATROVENT) 42 mcg (0.06 %) nasal spray 2 sprays, Each Nostril, 3 times daily    LIDOcaine (LIDODERM) 5 % PLACE 1 PATCH ONTO THE AFFECTED AREA OF SKIN DAILY AND REMOVE WITHIN 12 HOURS    losartan (COZAAR) 100 mg, Oral    nystatin (MYCOSTATIN) cream Applied t.i.d.    ondansetron (ZOFRAN-ODT) 4 mg, Oral, Every 6 hours PRN    pantoprazole (PROTONIX) 40 MG tablet TAKE 1 TABLET(40 MG) BY MOUTH EVERY DAY    pregabalin (LYRICA) 50 mg, Oral, 3 times daily    traMADoL (ULTRAM) 50 mg tablet TAKE 1 TABLET BY MOUTH EVERY 12 HOURS AS NEEDED FOR PAIN    ubrogepant (UBRELVY) 50 mg tablet Take 1 tablet by mouth at onset of migraine. May repeat in 2 hours if needed. Max 2 tablets per day.    vitamin D (VITAMIN D3) 1,000 Units, Oral, Daily    zinc sulfate (ZINCATE) 220 mg, Oral, Daily        Assessment & Plan   1. Dyslipidemia (Primary)  LDL well controlled   Continue lipitor 10 mg daily     2. Essential hypertension  BP well controlled   Continue amlopdine 5 mg daily   Continue losartan 100 mg daily     3. Coronary artery disease involving native  coronary artery of native heart without angina pectoris  Negative nuclear stress in recent past   Strong family history of women in their 60s   Calcium score once nearing the age of 65            1 year    Naomi Peters, PA-C [facility]   Office: [phone]

## 2025-02-07 NOTE — PROGRESS NOTES
Subjective:    Patient ID:  Elizabeth Giordano is a 64 y.o. female patient here for evaluation Follow-up    History of Present Illness:               Most Recent Echocardiogram Results  Results for orders placed during the hospital encounter of 07/15/21    Echo    Interpretation Summary  · The left ventricle is normal in size with normal systolic function.  · The estimated ejection fraction is 60%.  · Normal left ventricular diastolic function.  · Normal right ventricular size with normal right ventricular systolic function.  · Normal central venous pressure (3 mmHg).  · The estimated PA systolic pressure is 25 mmHg.      Most Recent Nuclear Stress Test Results  No results found for this or any previous visit.      Most Recent Cardiac PET Stress Test Results  No results found for this or any previous visit.      Most Recent Cardiovascular Angiogram results  No results found for this or any previous visit.      Other Most Recent Cardiology Results  Results for orders placed during the hospital encounter of 08/13/24    Holter monitor - 72 hour    Interpretation Summary    The predominant rhythm is sinus.    Uneventful Holter monitor.  No significant arrhythmias.    Symptoms of palpitations and shortness of breath related to normal sinus rhythm with no ectopy      REVIEW OF SYSTEMS: As noted in HPI   CARDIOVASCULAR: No recent chest pain, palpitations, arm/neck/jaw pain, or edema.  RESPIRATORY: No recent fever, cough, SOB.  : No blood in the urine  GI: No reflux, nausea, vomiting, or blood in stool.   MUSCULOSKELETAL: No falls.   NEURO: No headaches, syncope, or dizziness.  EYES: No sudden changes in vision.     Past Medical History:   Diagnosis Date    Allergy     Arthritis of spine 2011    Cataract     Chronic low back pain     Class 2 obesity with body mass index (BMI) of 39.0 to 39.9 in adult 01/11/2019    Coronary artery disease     mild    DDD (degenerative disc disease), lumbar     Diverticulitis      Diverticulosis     DJD (degenerative joint disease)     Encounter for blood transfusion     Factor V Leiden     GERD (gastroesophageal reflux disease)     Hiatal hernia     Hypertension     Migraines     PONV (postoperative nausea and vomiting)     geta    Schatzki's ring     Urticaria      Past Surgical History:   Procedure Laterality Date    APPENDECTOMY  1981    CARPAL TUNNEL RELEASE  2011    right    CATARACT EXTRACTION W/  INTRAOCULAR LENS IMPLANT Right 5/29/2024    Procedure: EXTRACTION, CATARACT, WITH IOL INSERTION;  Surgeon: Adelia Dalton MD;  Location: [facility] OR;  Service: Ophthalmology;  Laterality: Right;    CATARACT EXTRACTION W/  INTRAOCULAR LENS IMPLANT Left 8/7/2024    Procedure: EXTRACTION, CATARACT, WITH IOL INSERTION;  Surgeon: Adelia Dalton MD;  Location: [facility] OR;  Service: Ophthalmology;  Laterality: Left;    COLONOSCOPY  2014    Dr. Palomares; reduntant colon, otherwise normal findings repeat in 8-10 years for surveillance    Epidural Steroid Injection      Pain Management    EPIDURAL STEROID INJECTION INTO CERVICAL SPINE N/A 09/25/2018    Procedure: Injection-steroid-epidural-cervical;  Surgeon: Oswald Abdalla MD;  Location: [facility] OR;  Service: Pain Management;  Laterality: N/A;    EPIDURAL STEROID INJECTION INTO CERVICAL SPINE N/A 10/24/2019    Procedure: Injection-steroid-epidural-cervical;  Surgeon: Oswald Abdalla MD;  Location: [facility] OR;  Service: Pain Management;  Laterality: N/A;    EPIDURAL STEROID INJECTION INTO CERVICAL SPINE N/A 02/22/2023    Procedure: Injection-steroid-epidural-cervical C7-T1;  Surgeon: Oswald Abdalla MD;  Location: [facility] OR;  Service: Pain Management;  Laterality: N/A;    EPIDURAL STEROID INJECTION INTO CERVICAL SPINE N/A 10/30/2024    Procedure: Injection-steroid-epidural-cervical C7/T1;  Surgeon: Oswald Abdalla MD;  Location: [facility] OR;  Service: Pain Management;  Laterality: N/A;  C7/T1    EPIDURAL STEROID INJECTION INTO LUMBAR SPINE N/A  12/27/2018    Procedure: Injection-steroid-epidural-lumbar L5/S1;  Surgeon: Oswald Abdalla MD;  Location: [facility] OR;  Service: Pain Management;  Laterality: N/A;    EPIDURAL STEROID INJECTION INTO LUMBAR SPINE N/A 09/04/2019    Procedure: Injection-steroid-epidural-lumbar;  Surgeon: Oswald Abdalla MD;  Location: [facility] OR;  Service: Pain Management;  Laterality: N/A;  L5/S1 to right    EPIDURAL STEROID INJECTION INTO LUMBAR SPINE N/A 06/16/2023    Procedure: Injection-steroid-epidural-lumbar L5/S1;  Surgeon: Oswald Abdalla MD;  Location: [facility] OR;  Service: Pain Management;  Laterality: N/A;    ESOPHAGOGASTRODUODENOSCOPY  05/17/2016    Dr. Arreguin; small hiatal hernia; gastritis    ESOPHAGOGASTRODUODENOSCOPY N/A 06/22/2020    Dr. Arreguin; mild Schatzki ring- dilated; small hiatal hernia; bx unremarkable    Facet injection      Pain Management    HYSTERECTOMY      ovaries removed    KIDNEY SURGERY Right 1967    to correct urinary reflux into kidney    LEFT HEART CATHETERIZATION Left 05/22/2019    Procedure: Left heart cath;  Surgeon: Casa Perdue MD;  Location: [facility] CATH;  Service: Cardiology;  Laterality: Left;    OVARIAN CYST REMOVAL Right 1981    RADIOFREQUENCY ABLATION OF LUMBAR MEDIAL BRANCH NERVE AT SINGLE LEVEL Bilateral 07/17/2018    Procedure: RADIOFREQUENCY ABLATION, NERVE, MEDIAL BRANCH, LUMBAR, L2,3,4;  Surgeon: Oswald Abdalla MD;  Location: [facility] OR;  Service: Pain Management;  Laterality: Bilateral;    RADIOFREQUENCY ABLATION OF LUMBAR MEDIAL BRANCH NERVE AT SINGLE LEVEL Bilateral 07/29/2019    Procedure: Radiofrequency Ablation, Nerve, Spinal, Lumbar, Medial Branch, L2,3,4;  Surgeon: Oswadl Abdalla MD;  Location: [facility] OR;  Service: Pain Management;  Laterality: Bilateral;    SHOULDER SURGERY  2010    rotator cuff, right    TRANSFORAMINAL EPIDURAL INJECTION OF STEROID Left 08/21/2023    Procedure: Injection,steroid,epidural,transforaminal approach L4/5 L5/S1 Left;   Surgeon: Oswald Abdalla MD;  Location: [facility] OR;  Service: Pain Management;  Laterality: Left;    TRANSFORAMINAL EPIDURAL INJECTION OF STEROID Right 3/4/2024    Procedure: Injection,steroid,epidural,transforaminal approach right L4/5 and L5/S1;  Surgeon: Oswald Abdalla MD;  Location: [facility] OR;  Service: Pain Management;  Laterality: Right;  right L4/5 and L5/S1     Social History     Tobacco Use    Smoking status: Former     Current packs/day: 0.00     Average packs/day: 1 pack/day for 5.0 years (5.0 ttl pk-yrs)     Types: Cigarettes     Start date: 1992     Quit date: 1997     Years since quittin.1     Passive exposure: Never    Smokeless tobacco: Never   Substance Use Topics    Alcohol use: Yes     Comment: 3x per year    Drug use: No         Objective      Vitals:    25 1423   BP: 105/61   Pulse: 70       LAST EKG  Results for orders placed or performed during the hospital encounter of 21   EKG 12-lead    Collection Time: 21  4:58 AM    Narrative    Test Reason : R06.02,    Vent. Rate : 111 BPM     Atrial Rate : 111 BPM     P-R Int : 150 ms          QRS Dur : 080 ms      QT Int : 330 ms       P-R-T Axes : 067 -27 057 degrees     QTc Int : 448 ms    Sinus tachycardia  Possible Left atrial enlargement  Borderline Abnormal ECG  When compared with ECG of 14-MAR-2019 14:14,  Previous ECG has undetermined rhythm, needs review  The axis Shifted left  Confirmed by Aubrey RUDD, Bill () on 2021 8:48:30 AM    Referred By: [email or web address]   SELF           Confirmed By:Bill Burgos MD     LIPIDS - LAST 2   Lab Results   Component Value Date    CHOL 149 2024    CHOL 156 2023    HDL 67 2024    HDL 64 2023    LDLCALC 69.8 2024    LDLCALC 77.6 2023    TRIG 61 2024    TRIG 72 2023    CHOLHDL 45.0 2024    CHOLHDL 41.0 2023     CARDIAC PROFILE - LAST 2  Lab Results   Component Value Date    CPK 43 (L) 2021     (H)  "02/02/2021    TROPONINI <0.012 02/02/2021      CBC - LAST 2  Lab Results   Component Value Date    WBC 3.82 (L) 11/25/2024    WBC 5.02 11/27/2023    HGB 12.1 11/25/2024    HGB 12.3 11/27/2023    HCT 35.2 (L) 11/25/2024    HCT 36.5 (L) 11/27/2023     11/25/2024     11/27/2023     No results found for: "LABPT", "INR", "APTT"  CHEMISTRY - LAST 2  Lab Results   Component Value Date     11/25/2024     08/08/2024    K 4.1 11/25/2024    K 4.0 08/08/2024    CO2 23 11/25/2024    CO2 26 08/08/2024    BUN 14 11/25/2024    BUN 18 08/08/2024    CREATININE 1.1 11/25/2024    CREATININE 1.1 08/08/2024    GLU 88 11/25/2024    GLU 84 08/08/2024    CALCIUM 9.3 11/25/2024    CALCIUM 9.2 08/08/2024    MG 1.8 08/08/2024    MG 2.2 02/06/2021    ALBUMIN 3.9 11/25/2024    ALBUMIN 3.9 08/08/2024    ALT 13 11/25/2024    ALT 12 08/08/2024    AST 22 11/25/2024    AST 18 08/08/2024      ENDOCRINE - LAST 2  Lab Results   Component Value Date    HGBA1C 5.3 11/25/2024    HGBA1C 5.2 11/27/2023    TSH 0.680 11/25/2024    TSH 0.384 (L) 08/08/2024        PHYSICAL EXAM  CONSTITUTIONAL: Well built, well nourished in no apparent distress  NECK: no carotid bruit, no JVD  LUNGS: CTA  CHEST WALL: no tenderness  HEART: regular rate and rhythm, S1, S2 normal, no murmur, click, rub or gallop   ABDOMEN: soft, non-tender; bowel sounds normal; no masses,  no organomegaly  EXTREMITIES: Extremities normal, no edema, no calf tenderness noted  NEURO: AAO X 3    I HAVE REVIEWED :    The vital signs, most recent cardiac testing, and most recent pertinent non-cardiology provider notes.    Current Outpatient Medications   Medication Instructions    ALPRAZolam (XANAX) 0.25 mg, Oral, 3 times daily PRN    amLODIPine (NORVASC) 5 mg, Oral    ascorbic acid (vitamin C) (VITAMIN C) 500 mg, Oral, 2 times daily    aspirin (ECOTRIN) 81 mg, Daily    atorvastatin (LIPITOR) 10 mg, Oral    azelastine (ASTELIN) 274 mcg, Nasal, 2 times daily    benzonatate " (TESSALON) 100 mg, Oral, 3 times daily PRN    brimonidine 0.2% (ALPHAGAN) 0.2 % Drop 1 drop, Both Eyes, As needed (PRN)    cimetidine (TAGAMET) 800 MG tablet TAKE 1 TABLET BY MOUTH EVERY EVENING    EScitalopram oxalate (LEXAPRO) 10 mg, Oral    estradioL (ESTRACE) 1 mg, Oral, Daily, Can cause blood clots with covid, recommend NOT taking this medication until covid symptoms completely gone and discussing with PCP    eszopiclone (LUNESTA) 3 mg, Oral, Nightly    fluticasone propionate (FLONASE) 50 mcg, Each Nostril, Daily    galcanezumab-gnlm (EMGALITY PEN) 120 mg/mL PnIj Inject 1 pen (120 mg total) into the skin every 28 days.    ibuprofen (ADVIL,MOTRIN) 800 mg, Oral, Every 6 hours PRN    ipratropium (ATROVENT) 42 mcg (0.06 %) nasal spray 2 sprays, Each Nostril, 3 times daily    LIDOcaine (LIDODERM) 5 % PLACE 1 PATCH ONTO THE AFFECTED AREA OF SKIN DAILY AND REMOVE WITHIN 12 HOURS    losartan (COZAAR) 100 mg, Oral    nystatin (MYCOSTATIN) cream Applied t.i.d.    ondansetron (ZOFRAN-ODT) 4 mg, Oral, Every 6 hours PRN    pantoprazole (PROTONIX) 40 MG tablet TAKE 1 TABLET(40 MG) BY MOUTH EVERY DAY    pregabalin (LYRICA) 50 mg, Oral, 3 times daily    traMADoL (ULTRAM) 50 mg tablet TAKE 1 TABLET BY MOUTH EVERY 12 HOURS AS NEEDED FOR PAIN    ubrogepant (UBRELVY) 50 mg tablet Take 1 tablet by mouth at onset of migraine. May repeat in 2 hours if needed. Max 2 tablets per day.    vitamin D (VITAMIN D3) 1,000 Units, Oral, Daily    zinc sulfate (ZINCATE) 220 mg, Oral, Daily        Assessment & Plan   1. Dyslipidemia (Primary)  ***    2. Essential hypertension  ***    3. Coronary artery disease involving native coronary artery of native heart without angina pectoris  ***           No follow-ups on file.     [facility]   Office: [phone]

## 2025-02-10 DIAGNOSIS — I25.10 CORONARY ARTERY DISEASE, UNSPECIFIED VESSEL OR LESION TYPE, UNSPECIFIED WHETHER ANGINA PRESENT, UNSPECIFIED WHETHER NATIVE OR TRANSPLANTED HEART: ICD-10-CM

## 2025-02-10 NOTE — TELEPHONE ENCOUNTER
No care due was identified.  [facility] Embedded Care Due Messages. Reference number: [id number].   2/10/2025 2:15:37 PM CST

## 2025-02-11 RX ORDER — ATORVASTATIN CALCIUM 10 MG/1
10 TABLET, FILM COATED ORAL
Qty: 90 TABLET | Refills: 3 | Status: SHIPPED | OUTPATIENT
Start: 2025-02-11

## 2025-02-12 NOTE — TELEPHONE ENCOUNTER
Refill Decision Note   Elizabeth Pasquale  is requesting a refill authorization.  Brief Assessment and Rationale for Refill:  Approve     Medication Therapy Plan:         Comments:     Note composed:6:37 PM 02/11/2025

## 2025-02-18 DIAGNOSIS — K21.9 GASTROESOPHAGEAL REFLUX DISEASE: ICD-10-CM

## 2025-02-18 RX ORDER — PANTOPRAZOLE SODIUM 40 MG/1
40 TABLET, DELAYED RELEASE ORAL
Qty: 90 TABLET | Refills: 3 | Status: SHIPPED | OUTPATIENT
Start: 2025-02-18

## 2025-02-25 ENCOUNTER — OFFICE VISIT (OUTPATIENT)
Dept: PAIN MEDICINE | Facility: CLINIC | Age: 65
End: 2025-02-25
Payer: COMMERCIAL

## 2025-02-25 VITALS
BODY MASS INDEX: 31.92 KG/M2 | WEIGHT: 185.94 LBS | DIASTOLIC BLOOD PRESSURE: 58 MMHG | SYSTOLIC BLOOD PRESSURE: 122 MMHG | HEART RATE: 80 BPM

## 2025-02-25 DIAGNOSIS — Z79.891 OPIOID CONTRACT EXISTS: Primary | ICD-10-CM

## 2025-02-25 DIAGNOSIS — M54.12 CERVICAL RADICULOPATHY: ICD-10-CM

## 2025-02-25 DIAGNOSIS — M50.30 DDD (DEGENERATIVE DISC DISEASE), CERVICAL: ICD-10-CM

## 2025-02-25 DIAGNOSIS — M54.16 LUMBAR RADICULOPATHY, CHRONIC: ICD-10-CM

## 2025-02-25 DIAGNOSIS — M51.362 DEGENERATION OF INTERVERTEBRAL DISC OF LUMBAR REGION WITH DISCOGENIC BACK PAIN AND LOWER EXTREMITY PAIN: ICD-10-CM

## 2025-02-25 DIAGNOSIS — M54.16 LUMBAR RADICULOPATHY: ICD-10-CM

## 2025-02-25 DIAGNOSIS — M51.369 DDD (DEGENERATIVE DISC DISEASE), LUMBAR: ICD-10-CM

## 2025-02-25 DIAGNOSIS — M48.061 SPINAL STENOSIS OF LUMBAR REGION WITHOUT NEUROGENIC CLAUDICATION: ICD-10-CM

## 2025-02-25 PROCEDURE — 1159F MED LIST DOCD IN RCRD: CPT | Mod: CPTII,S$GLB,,

## 2025-02-25 PROCEDURE — 99999 PR PBB SHADOW E&M-EST. PATIENT-LVL IV: CPT | Mod: PBBFAC,,,

## 2025-02-25 PROCEDURE — 4010F ACE/ARB THERAPY RXD/TAKEN: CPT | Mod: CPTII,S$GLB,,

## 2025-02-25 PROCEDURE — 3078F DIAST BP <80 MM HG: CPT | Mod: CPTII,S$GLB,,

## 2025-02-25 PROCEDURE — 3074F SYST BP LT 130 MM HG: CPT | Mod: CPTII,S$GLB,,

## 2025-02-25 PROCEDURE — 99214 OFFICE O/P EST MOD 30 MIN: CPT | Mod: S$GLB,,,

## 2025-02-25 PROCEDURE — 3008F BODY MASS INDEX DOCD: CPT | Mod: CPTII,S$GLB,,

## 2025-02-25 RX ORDER — TRAMADOL HYDROCHLORIDE 50 MG/1
TABLET ORAL
Qty: 60 TABLET | Refills: 2 | Status: SHIPPED | OUTPATIENT
Start: 2025-03-16

## 2025-02-25 RX ORDER — PREGABALIN 50 MG/1
50 CAPSULE ORAL 3 TIMES DAILY
Qty: 90 CAPSULE | Refills: 2 | Status: SHIPPED | OUTPATIENT
Start: 2025-03-16

## 2025-02-25 NOTE — PROGRESS NOTES
CHIEF COMPLAINT/REASON FOR VISIT: low back pain, neck pain    History of present illness: The patient is a 64 year old woman with a history of migraines, carpal tunnel syndrome and low back pain since her early 20s.  She returns for follow-up with continued neck pain and lower back pain.  Pain is worsened with physical activities such as standing and walking.  When pain is severe it starts in her right lower back with radiation down right leg just below her right knee.  A pain is improved with rest and medications.  She denies any new numbness, weakness or any new changes to her bowel bladder function.  She continues to take Lyrica and tramadol 50 mg twice daily with added benefit and no ill side effects or adverse events.        Pain intervention history:She is status post TFESI bilaterally to L3 on 12/6/2011 with no relief. Past history of status post L4/5 YARON on 5/25/11 with about 90% relief that lasted 3 weeks. She is status post 2 bilateral L3 transforaminal injections with 3 weeks relief each time.  She is status post L4/5 interlaminar epidural steroid injection on 1/8/14 with 90% relief.  She is status post L5/S1 interlaminar epidural steroid injection on 6/9/14 with moderate relief only on the right low back and right leg lasting 2 weeks.  She is status post bilateral L3/4 and L4/5 facet joint injections on 4/13/15 with initially 100% relief of her back pain and 80% relief of her left lateral hip and lateral thigh pain, now reporting at least 50% relief of both.  She is status post bilateral L2, 3 and 4 medial branch radiofrequency ablation on 5/20/15 with 60% relief.   She is status post bilateral L2, 3 and 4 medial branch radiofrequency ablation on 6/20/16 with 50-70% relief.  She is status post L5/S1 interlaminar epidural steroid injection on 3/10/17 with 50% relief.  She is status post bilateral L2, 3, 4 medial branch radiofrequency ablation on 7/17/17 with about 75% relief.  She is status post  bilateral L2, 3, 4 medial branch radiofrequency ablation on 07/17/2018 with 100% relief of her leg pain and 70-75% relief of her back pain.  She is status post C7-T1 cervical interlaminar epidural steroid injection on 09/25/2018 with 80% relief.   She is status post L5/S1 interlaminar epidural steroid injection on 12/27/2018 with 60% relief.  She is status post bilateral L2, 3 and 4 medial branch radiofrequency ablation on 07/29/2019 with 40% relief.  She is status post L5/S1 interlaminar epidural steroid injection on 09/04/2019 with almost 100% relief of her right leg pain but 0% relief for back pain. She is status post C7-T1 interlaminar epidural steroid injection on 10/24/2019 with 70% relief.  She is s/p L5/S1 IL YARON with 2 weeks of relief, now 0%.   She is post left L4/5 S1 transforaminal epidural steroid injection on 08/21/2023 with almost 100% relief.   She is status post right L4/5 and L5/S1 transforaminal epidural steroid injection on 03/04/2024 with 60% relief.   She he is status post C7-T1 interlaminar epidural steroid injection on 10/30/2024 with 85-90% relief.       Spine surgeries:    Antineuropathics: failed gabapentin, failed Cymbalta due to side effects.  Takes Lyrica twice daily, can not tolerate 3 times a day.  NSAIDs:  Ibuprofen  Physical therapy:  Antidepressants:  Muscle relaxers:  Opioids:  Tramadol 50 mg twice daily as needed  Antiplatelets/Anticoagulants:  ASA 81 mg    ROS: She reports back pain only.  Balance of review of systems is negative.    Medical, surgical, family and social history reviewed elsewhere in record.     Medications/Allergies: See med card      Vitals:    02/25/25 0850   BP: (!) 122/58   Pulse: 80   Weight: 84.4 kg (185 lb 15.3 oz)   PainSc:   4   PainLoc: Back        PHYSICAL EXAM:  Gen: A and O x3, pleasant, well-groomed  HEENT: PERRLA  CVS: Regular rate and rhythm  Resp:  No obvious shortness of breath, no increased work of breathing  Musculoskeletal: No antalgic gait.      Neuro:  Upper extremities: 5/5 strength bilaterally   Lower extremities: 5/5 strength bilaterally  Reflexes: Brachioradialis 2+, Bicep 2+, Tricep 2+.   Patellar 3+, Achilles 2+.  Sensory: Intact and symmetrical to light touch and pinprick in C2-T1 dermatomes bilaterally.Intact and symmetrical to light touch and pinprick in L2-S1 dermatomes bilaterally.    Cervical spine:   Range of motion is mildly reduced with flexion, extension lateral rotation with increased right neck pain during right lateral rotation and extension.    Myofascial exam: Mild tenderness to palpation to the right cervical paraspinous muscles.    Lumbar spine:  Lumbar spine: ROM is moderately limited with flexion and extension with increased right low back pain during extension and oblique extension.  Supine straight leg raise is negative bilaterally.  Internal and external rotation of the hip causes no increased pain in either side.  Myofascial exam:  Moderate tenderness to palpation to the right greater than left lumbar paraspinous muscles.      IMAGING:   MRI L-SPINE 5/10/11   IMPRESSION: AT L3-4, THERE IS CIRCUMFERENTIAL DISC PROTRUSION FOCALLY MORE PROMINENT TO THE LEFT AS WELL AS A SMALL DISC HERNIATION PROTRUDING INFERIORLY BEHIND L4 TO THE LEFT OF MIDLINE. THIS PRODUCES SPINAL CANAL AND BILATERAL NEURAL FORAMINAL STENOSIS, LEFT GREATER THAN RIGHT. AT L4-5, THERE IS CIRCUMFERENTIAL DISC PROTRUSION FOCALLY MORE PROMINENT TO THE RIGHT WITH MILD SPINAL CANAL AND RIGHT NEURAL FORAMINAL STENOSIS. AT L1-2 AND L2-3 ALTHOUGH THERE ARE DISC PROTRUSIONS IDENTIFIED, SIGNIFICANT STENOSIS IS NOT SEEN.     MRI lumbar spine 7/10/14  L1-2:There is chronic relatively advanced disc degeneration with disc space narrowing, disc dehydration, disc narrowing, endplate osteophytes, and degenerative vertebral endplate marrow change. There is a diffuse 2-mm posterior disc bulge with a superimposed4-mm right posterior disc extrusion causing mild right foraminal  stenosis. There is no impingement of the right L1 nerve root and there has been no change.  L2-3: There is severe and chronic disc degeneration with severe disc space narrowing, disc dehydration, degenerative vertebral endplate marrow change, and vertebral endplate osteophytes. There is a diffuse 2-mm posterior disc bulge with a superimposed 4-mm right posterior disc extrusion causing mild right foraminal stenosis. There is no impingement of the right L2 nerve root and there has been no significant change.  L3-4: There is severe disc degeneration which has progressed since the prior study. There is severe disc space narrowing, disc dehydration, degenerative vertebral endplate marrow change and endplate osteophytes. There is also mild posterior subluxation of L3over a distance of 4 mm. There is a broad-based 5-mm posterior disc extrusion. There is degenerative facet arthrosis with ligamentum flavum thickening. The combination of facet arthrosis and disc extrusion results in relatively severe spinal canal stenosis with compression of the thecal sac to an AP diameter of 5 mm, moderate right foraminal stenosis, and severe left foraminal stenosis. The spinal stenosis has also progressed since the prior study.  L4-5:There is a diffuse posterior disc bulge with a superimposed 3-mm left posterior paracentral disc protrusion causing left paracentral thecal sac compression. There is moderate left and mild right foraminal stenosis and there has been no significant change. There is mild degenerative facet arthrosis with ligamentum flavum thickening and small joint effusions.  L5-S1:There is no significant compromise of the spinal canal or foramina and there has been no change.    X-ray cervical spine 6/8/15  There is loss of normal cervical lordosis which may be positional or related muscular strain. Intervertebral disk height loss is noted at the C5-C6 C6-C7 levels and to a lesser degree C4-C5 and C7-T1 levels. Vertebral body  alignment appears otherwise adequate. Vertebral body heights appear well-preserved. The atlas and odontoid appear in good relationship to each other. Osseous neuroforaminal narrowing is noted at the C3-C4 C4-C5 C5-C6 levels on the left and at the C4-C5 C5-C6 and C6-C7 levels on the right     07/10/2018 MRI cervical spine [facility] report  C2-3 broad-based signal asymmetry at the left subarticular and foraminal zone reflecting implant spondylosis and disc bulge complex, mild to moderate left asymmetric foraminal narrowing, ectasia of the left vertebral artery, contralateral right asymmetric facet hypertrophy, right foramen widely narrowed, disc partially desiccated without collapse  C3-4 moderate to severe left greater than right foraminal narrowing secondary to uncinate joint hypertrophic signal alteration, disc partially desiccated  C4-5 endplate spondylosis moderate diffuse disc bulge noted, broad-based right paracentral disc herniation, asymmetric flattening of the ventral cord surface at the right paracentral zone, high-grade if lateral right foraminal narrowing, AP diameter of canal 9.9 mm, contralaterally, high grade left foraminal narrowing secondary to facet greater than uncinate joint hypertrophic signal alteration, disc desiccated and mildly narrowed  C5-6 disc space narrowing evident with generalized in Nigel spondylosis and concentric inter pose disc bulge complex, high-grade bilateral foraminal narrowing, flattening of the cord surface, AP diameter 7.9 mm, symmetric facet arthrosis, disc diffusely desiccated and narrowed  C6-7 moderate endplate spondylosis and concentric disc bulge complex, high-grade bilateral foraminal compromise, flattening of the anterior cord surface, AP diameter 8.8 mm, facet arthrosis symmetric, disc desiccated and narrowed  C7-T1 less than 2 mm depth disc bulge    11/06/2018 MRI lumbar spine  T12/L1: There is no evidence of disc protrusion, canal or foraminal  stenosis.  L1/L2: Right posterolateral disc protrusion is evident and there is mild distortion of the right anterolateral canal.  Degenerative facet changes are noted bilaterally.  The left foramen is intact.  L2/L3: There is annular disc bulging with a superimposed right posterolateral disc extrusion.  This is slightly more pronounced on the previous examination and there is mild effacement of the anterior thecal sac and moderate narrowing the right foramen.  L3/L4: There is annular disc bulge and osteophyte formation with a superimposed small left posterolateral disc extrusion.  There is moderate narrowing the central canal and left foramen.  This is little changed relative the previous examination.  Degenerative facet changes are noted.  L5/S1: There is a small central disc extrusion superimposed upon annular disc bulging.  This is slightly less pronounced than was seen previously.  Degenerative facet changes are noted.  L5/S1: Moderate degenerative facet changes are noted and there is mild effacement of the anterior thecal sac.  There is ligamentous hypertrophy particularly to the left in the posterior canal and there is left greater than right foraminal narrowing.  This is mildly worsened relative prior exam.      ASSESSMENT:  The patient is a 64 year old woman with a history of migraines, carpal tunnel syndrome and low back pain since her early 20s who returns in follow up.   1. Opioid contract exists        2. Degeneration of intervertebral disc of lumbar region with discogenic back pain and lower extremity pain        3. Cervical radiculopathy        4. Lumbar radiculopathy        5. Spinal stenosis of lumbar region without neurogenic claudication        6. DDD (degenerative disc disease), cervical        7. Lumbar radiculopathy, chronic                Plan:    Pain is waxing and waning however ultimately manageable at this time.  We will continue to maximize conservative management with rest and  medications.  2. Dr. Abdalla has refilled tramadol 50 mg twice a day and Lyrica 50 mg twice a day. I have reviewed the [place] Board [employer] website and there are no abberancies.    3. Follow-up in three months or sooner as needed.  Can always repeat lumbar epidural in the future if indicated.

## 2025-03-17 ENCOUNTER — TELEPHONE (OUTPATIENT)
Dept: PHARMACY | Facility: CLINIC | Age: 65
End: 2025-03-17
Payer: COMMERCIAL

## 2025-03-17 NOTE — TELEPHONE ENCOUNTER
[facility]/Population Health Chart Review & Patient Outreach Details For Medication Adherence Project    Reason for Outreach Encounter: 3rd Party payor non-compliance report ([employer], [employer], [employer], etc)  2.  Patient Outreach Method: Reviewed patient chart   3.   Medication in question:    Hyperlipidemia Medications              atorvastatin (LIPITOR) 10 MG tablet TAKE 1 TABLET(10 MG) BY MOUTH EVERY DAY                  atorvastatin  last filled  2/12/25 for 90 day supply      4.  Reviewed and or Updates Made To: Patient Chart  5. Outreach Outcomes and/or actions taken: Patient filled medication and is on track to be adherent  Additional Notes:

## 2025-03-24 ENCOUNTER — OFFICE VISIT (OUTPATIENT)
Dept: NEUROLOGY | Facility: CLINIC | Age: 65
End: 2025-03-24
Payer: COMMERCIAL

## 2025-03-24 DIAGNOSIS — G43.719 INTRACTABLE CHRONIC MIGRAINE WITHOUT AURA AND WITHOUT STATUS MIGRAINOSUS: ICD-10-CM

## 2025-03-24 PROCEDURE — 1159F MED LIST DOCD IN RCRD: CPT | Mod: CPTII,95,, | Performed by: NURSE PRACTITIONER

## 2025-03-24 PROCEDURE — 1160F RVW MEDS BY RX/DR IN RCRD: CPT | Mod: CPTII,95,, | Performed by: NURSE PRACTITIONER

## 2025-03-24 PROCEDURE — 98005 SYNCH AUDIO-VIDEO EST LOW 20: CPT | Mod: 95,,, | Performed by: NURSE PRACTITIONER

## 2025-03-24 PROCEDURE — 4010F ACE/ARB THERAPY RXD/TAKEN: CPT | Mod: CPTII,95,, | Performed by: NURSE PRACTITIONER

## 2025-03-24 RX ORDER — GALCANEZUMAB 120 MG/ML
120 INJECTION, SOLUTION SUBCUTANEOUS
Qty: 1 ML | Refills: 11 | Status: SHIPPED | OUTPATIENT
Start: 2025-03-24

## 2025-03-24 NOTE — PATIENT INSTRUCTIONS
WORKUP:  -- none     PREVENTION (use daily regardless of headache):  -- continue Emgality - return to monthly    ACUTE TREATMENT:  -- continue Ubrelvy

## 2025-04-24 RX ORDER — IBUPROFEN 800 MG/1
800 TABLET ORAL EVERY 6 HOURS PRN
Qty: 30 TABLET | Refills: 0 | Status: SHIPPED | OUTPATIENT
Start: 2025-04-24

## 2025-04-25 DIAGNOSIS — I10 ESSENTIAL HYPERTENSION: ICD-10-CM

## 2025-04-25 RX ORDER — AMLODIPINE BESYLATE 5 MG/1
5 TABLET ORAL
Qty: 90 TABLET | Refills: 3 | Status: SHIPPED | OUTPATIENT
Start: 2025-04-25

## 2025-04-25 NOTE — TELEPHONE ENCOUNTER
No care due was identified.  [facility] Embedded Care Due Messages. Reference number: [id number].   4/25/2025 8:16:34 AM CDT

## 2025-04-25 NOTE — TELEPHONE ENCOUNTER
Elizabeth Giordano  is requesting a refill authorization.  Brief Assessment and Rationale for Refill:  Approve     Medication Therapy Plan:         Comments:     Note composed:10:40 AM 04/25/2025

## 2025-05-17 DIAGNOSIS — F51.01 PRIMARY INSOMNIA: ICD-10-CM

## 2025-05-17 NOTE — TELEPHONE ENCOUNTER
No care due was identified.  [facility] Embedded Care Due Messages. Reference number: [id number].   5/17/2025 3:33:25 PM CDT

## 2025-05-19 ENCOUNTER — HOSPITAL ENCOUNTER (OUTPATIENT)
Dept: RADIOLOGY | Facility: HOSPITAL | Age: 65
Discharge: HOME | End: 2025-05-19
Attending: FAMILY MEDICINE
Payer: COMMERCIAL

## 2025-05-19 ENCOUNTER — OFFICE VISIT (OUTPATIENT)
Dept: FAMILY MEDICINE | Facility: CLINIC | Age: 65
End: 2025-05-19
Payer: COMMERCIAL

## 2025-05-19 VITALS
SYSTOLIC BLOOD PRESSURE: 132 MMHG | DIASTOLIC BLOOD PRESSURE: 66 MMHG | WEIGHT: 185.19 LBS | HEIGHT: 64 IN | HEART RATE: 92 BPM | BODY MASS INDEX: 31.62 KG/M2 | OXYGEN SATURATION: 96 %

## 2025-05-19 DIAGNOSIS — F51.01 PRIMARY INSOMNIA: ICD-10-CM

## 2025-05-19 DIAGNOSIS — Z12.31 ENCOUNTER FOR SCREENING MAMMOGRAM FOR MALIGNANT NEOPLASM OF BREAST: ICD-10-CM

## 2025-05-19 DIAGNOSIS — Z12.11 COLON CANCER SCREENING: ICD-10-CM

## 2025-05-19 DIAGNOSIS — K59.09 CHRONIC CONSTIPATION: ICD-10-CM

## 2025-05-19 DIAGNOSIS — N18.31 CHRONIC KIDNEY DISEASE, STAGE 3A: ICD-10-CM

## 2025-05-19 DIAGNOSIS — K59.09 CHRONIC CONSTIPATION: Primary | ICD-10-CM

## 2025-05-19 PROCEDURE — 3078F DIAST BP <80 MM HG: CPT | Mod: CPTII,S$GLB,, | Performed by: FAMILY MEDICINE

## 2025-05-19 PROCEDURE — 74018 RADEX ABDOMEN 1 VIEW: CPT | Mod: TC,FY,PO

## 2025-05-19 PROCEDURE — 4010F ACE/ARB THERAPY RXD/TAKEN: CPT | Mod: CPTII,S$GLB,, | Performed by: FAMILY MEDICINE

## 2025-05-19 PROCEDURE — 1160F RVW MEDS BY RX/DR IN RCRD: CPT | Mod: CPTII,S$GLB,, | Performed by: FAMILY MEDICINE

## 2025-05-19 PROCEDURE — 1159F MED LIST DOCD IN RCRD: CPT | Mod: CPTII,S$GLB,, | Performed by: FAMILY MEDICINE

## 2025-05-19 PROCEDURE — 3075F SYST BP GE 130 - 139MM HG: CPT | Mod: CPTII,S$GLB,, | Performed by: FAMILY MEDICINE

## 2025-05-19 PROCEDURE — 74018 RADEX ABDOMEN 1 VIEW: CPT | Mod: 26,,, | Performed by: RADIOLOGY

## 2025-05-19 PROCEDURE — 99999 PR PBB SHADOW E&M-EST. PATIENT-LVL V: CPT | Mod: PBBFAC,,, | Performed by: FAMILY MEDICINE

## 2025-05-19 PROCEDURE — 99214 OFFICE O/P EST MOD 30 MIN: CPT | Mod: S$GLB,,, | Performed by: FAMILY MEDICINE

## 2025-05-19 PROCEDURE — G2211 COMPLEX E/M VISIT ADD ON: HCPCS | Mod: S$GLB,,, | Performed by: FAMILY MEDICINE

## 2025-05-19 PROCEDURE — 3008F BODY MASS INDEX DOCD: CPT | Mod: CPTII,S$GLB,, | Performed by: FAMILY MEDICINE

## 2025-05-19 RX ORDER — ESZOPICLONE 3 MG/1
3 TABLET, FILM COATED ORAL NIGHTLY
Qty: 90 TABLET | Refills: 0 | OUTPATIENT
Start: 2025-05-19

## 2025-05-19 RX ORDER — ESZOPICLONE 3 MG/1
3 TABLET, FILM COATED ORAL NIGHTLY
Qty: 90 TABLET | Refills: 1 | Status: SHIPPED | OUTPATIENT
Start: 2025-05-19

## 2025-05-19 NOTE — PROGRESS NOTES
"Subjective:       Patient ID: Elizabeth Giordano is a 64 y.o. female.    Chief Complaint: Medication Refill    Pt is known to me.  The pt continues to require Lunesta  for restful sleep.  She has no ill effects nor residual daytime drowsiness.   The pt is compliant with regulations per [facility] website.  The pt reports a many (since her 20s) problem with constipation.  She will have a BM only once a week.  Nothing she has tried worked.  She had a neg colonoscopy in 2014 with no family hx of colon ca.  She has no visible blood in her stool.  She has abd when the stool "gets backed up."  She passes gas daily.  She has no nausea nor vomiting.  Coffee does not help her have a BM.  She reports that she stays well hydrated.  The pt has had normal thyroid function.  She is on tramadol but the constipation has not changed/worsened on it.  Discussed health maintenance with pt and will schedule appropriate studies and/or immunizations.          Medication Refill      Review of Systems    Objective:      Physical Exam  Vitals and nursing note reviewed.   Constitutional:       Appearance: She is well-developed.   HENT:      Head: Normocephalic.   Eyes:      Conjunctiva/sclera: Conjunctivae normal.      Pupils: Pupils are equal, round, and reactive to light.   Neck:      Thyroid: No thyromegaly.   Cardiovascular:      Rate and Rhythm: Normal rate and regular rhythm.      Heart sounds: Normal heart sounds.   Pulmonary:      Effort: Pulmonary effort is normal.      Breath sounds: Normal breath sounds.   Abdominal:      General: Bowel sounds are normal.      Palpations: Abdomen is soft. There is no mass.      Tenderness: There is no abdominal tenderness. There is no guarding.   Musculoskeletal:         General: No tenderness or deformity. Normal range of motion.      Cervical back: Normal range of motion and neck supple.      Right lower leg: No edema.      Left lower leg: No edema.   Lymphadenopathy:      Cervical: No cervical " adenopathy.   Skin:     General: Skin is warm and dry.   Neurological:      Mental Status: She is alert and oriented to person, place, and time.      Cranial Nerves: No cranial nerve deficit.      Motor: No abnormal muscle tone.      Coordination: Coordination normal.      Deep Tendon Reflexes: Reflexes normal.   Psychiatric:         Behavior: Behavior normal.         Assessment:       1. Chronic constipation    2. Colon cancer screening    3. Encounter for screening mammogram for malignant neoplasm of breast    4. Chronic kidney disease, stage 3a    5. Primary insomnia        Plan:       During this visit, I reviewed the pt's history, medications, allergies, and problem list.    Visit today included increased complexity associated with the care of the episodic problem colon cancer screen addressed and managing the longitudinal care of the patient due to the serious and/or complex managed problem(s) chronic constipation, CKD, insomnia.

## 2025-05-22 ENCOUNTER — PATIENT MESSAGE (OUTPATIENT)
Dept: OBSTETRICS AND GYNECOLOGY | Facility: CLINIC | Age: 65
End: 2025-05-22
Payer: COMMERCIAL

## 2025-05-23 ENCOUNTER — TELEPHONE (OUTPATIENT)
Dept: PHARMACY | Facility: CLINIC | Age: 65
End: 2025-05-23
Payer: COMMERCIAL

## 2025-05-23 NOTE — TELEPHONE ENCOUNTER
[facility]/Population Health Chart Review & Patient Outreach Details For Medication Adherence Project    Reason for Outreach Encounter: 3rd Party payor non-compliance report ([employer], [employer], C, etc)  2.  Patient Outreach Method: Reviewed patient chart   3.   Medication in question:    Hyperlipidemia Medications              atorvastatin (LIPITOR) 10 MG tablet TAKE 1 TABLET(10 MG) BY MOUTH EVERY DAY                  LF 90d s 5/18/25    4.  Reviewed and or Updates Made To: Patient Chart  5. Outreach Outcomes and/or actions taken: Patient filled medication and is on track to be adherent  Additional Notes:

## 2025-05-26 ENCOUNTER — OFFICE VISIT (OUTPATIENT)
Dept: PAIN MEDICINE | Facility: CLINIC | Age: 65
End: 2025-05-26
Payer: COMMERCIAL

## 2025-05-26 VITALS
WEIGHT: 184.31 LBS | BODY MASS INDEX: 31.64 KG/M2 | HEART RATE: 71 BPM | SYSTOLIC BLOOD PRESSURE: 116 MMHG | DIASTOLIC BLOOD PRESSURE: 60 MMHG

## 2025-05-26 DIAGNOSIS — M51.369 DDD (DEGENERATIVE DISC DISEASE), LUMBAR: ICD-10-CM

## 2025-05-26 DIAGNOSIS — M54.16 LUMBAR RADICULOPATHY, CHRONIC: Primary | ICD-10-CM

## 2025-05-26 DIAGNOSIS — Z79.891 OPIOID CONTRACT EXISTS: ICD-10-CM

## 2025-05-26 DIAGNOSIS — M48.061 SPINAL STENOSIS OF LUMBAR REGION WITHOUT NEUROGENIC CLAUDICATION: ICD-10-CM

## 2025-05-26 PROCEDURE — 1159F MED LIST DOCD IN RCRD: CPT | Mod: CPTII,S$GLB,, | Performed by: PHYSICIAN ASSISTANT

## 2025-05-26 PROCEDURE — 99214 OFFICE O/P EST MOD 30 MIN: CPT | Mod: S$GLB,,, | Performed by: PHYSICIAN ASSISTANT

## 2025-05-26 PROCEDURE — 3008F BODY MASS INDEX DOCD: CPT | Mod: CPTII,S$GLB,, | Performed by: PHYSICIAN ASSISTANT

## 2025-05-26 PROCEDURE — 99999 PR PBB SHADOW E&M-EST. PATIENT-LVL V: CPT | Mod: PBBFAC,,, | Performed by: PHYSICIAN ASSISTANT

## 2025-05-26 PROCEDURE — 3074F SYST BP LT 130 MM HG: CPT | Mod: CPTII,S$GLB,, | Performed by: PHYSICIAN ASSISTANT

## 2025-05-26 PROCEDURE — 1160F RVW MEDS BY RX/DR IN RCRD: CPT | Mod: CPTII,S$GLB,, | Performed by: PHYSICIAN ASSISTANT

## 2025-05-26 PROCEDURE — 4010F ACE/ARB THERAPY RXD/TAKEN: CPT | Mod: CPTII,S$GLB,, | Performed by: PHYSICIAN ASSISTANT

## 2025-05-26 PROCEDURE — 3078F DIAST BP <80 MM HG: CPT | Mod: CPTII,S$GLB,, | Performed by: PHYSICIAN ASSISTANT

## 2025-05-26 RX ORDER — TRAMADOL HYDROCHLORIDE 50 MG/1
TABLET, FILM COATED ORAL
Qty: 60 TABLET | Refills: 2 | Status: SHIPPED | OUTPATIENT
Start: 2025-06-16

## 2025-05-26 RX ORDER — DIAZEPAM 10 MG/1
10 TABLET ORAL
Qty: 1 TABLET | Refills: 0 | Status: SHIPPED | OUTPATIENT
Start: 2025-05-26 | End: 2025-06-25

## 2025-05-26 RX ORDER — PREGABALIN 50 MG/1
50 CAPSULE ORAL 3 TIMES DAILY
Qty: 90 CAPSULE | Refills: 2 | Status: SHIPPED | OUTPATIENT
Start: 2025-06-16

## 2025-05-26 NOTE — PROGRESS NOTES
CHIEF COMPLAINT/REASON FOR VISIT: low back pain, neck pain    History of present illness: The patient is a 64 year old woman with a history of migraines, carpal tunnel syndrome and low back pain since her early 20s.    History of Present Illness    Patient presents for follow-up of chronic back pain with a new pain in the right upper back area, attributed to sleeping wrong. The left side of her leg is completely numb, which started a few months ago, marking a change from usual right-sided symptoms. The numbness does not extend past the knee. The onset of left leg symptoms was gradual. She reports that symptoms are usually on the right side, but now they are on the left. Pain varies in intensity, with low back pain remaining about the same as usual.    Symptoms worsen when tired. Sometimes when walking around the office, sudden pain occurs. However, symptoms are less noticeable when working. Her last MRI of the low back was a few years ago.    She denies weakness in the affected leg, new bladder or bowel changes, any history of new medical diagnoses.      Pain intervention history:She is status post TFESI bilaterally to L3 on 12/6/2011 with no relief. Past history of status post L4/5 YARON on 5/25/11 with about 90% relief that lasted 3 weeks. She is status post 2 bilateral L3 transforaminal injections with 3 weeks relief each time.  She is status post L4/5 interlaminar epidural steroid injection on 1/8/14 with 90% relief.  She is status post L5/S1 interlaminar epidural steroid injection on 6/9/14 with moderate relief only on the right low back and right leg lasting 2 weeks.  She is status post bilateral L3/4 and L4/5 facet joint injections on 4/13/15 with initially 100% relief of her back pain and 80% relief of her left lateral hip and lateral thigh pain, now reporting at least 50% relief of both.  She is status post bilateral L2, 3 and 4 medial branch radiofrequency ablation on 5/20/15 with 60% relief.   She is  status post bilateral L2, 3 and 4 medial branch radiofrequency ablation on 6/20/16 with 50-70% relief.  She is status post L5/S1 interlaminar epidural steroid injection on 3/10/17 with 50% relief.  She is status post bilateral L2, 3, 4 medial branch radiofrequency ablation on 7/17/17 with about 75% relief.  She is status post bilateral L2, 3, 4 medial branch radiofrequency ablation on 07/17/2018 with 100% relief of her leg pain and 70-75% relief of her back pain.  She is status post C7-T1 cervical interlaminar epidural steroid injection on 09/25/2018 with 80% relief.   She is status post L5/S1 interlaminar epidural steroid injection on 12/27/2018 with 60% relief.  She is status post bilateral L2, 3 and 4 medial branch radiofrequency ablation on 07/29/2019 with 40% relief.  She is status post L5/S1 interlaminar epidural steroid injection on 09/04/2019 with almost 100% relief of her right leg pain but 0% relief for back pain. She is status post C7-T1 interlaminar epidural steroid injection on 10/24/2019 with 70% relief.  She is s/p L5/S1 IL YARON with 2 weeks of relief, now 0%.   She is post left L4/5 S1 transforaminal epidural steroid injection on 08/21/2023 with almost 100% relief.   She is status post right L4/5 and L5/S1 transforaminal epidural steroid injection on 03/04/2024 with 60% relief.   She he is status post C7-T1 interlaminar epidural steroid injection on 10/30/2024 with 85-90% relief.       Spine surgeries:    Antineuropathics: failed gabapentin, failed Cymbalta due to side effects.  Takes Lyrica twice daily, can not tolerate 3 times a day.  NSAIDs:  Ibuprofen  Physical therapy:  Antidepressants:  Muscle relaxers:  Opioids:  Tramadol 50 mg twice daily as needed  Antiplatelets/Anticoagulants:  ASA 81 mg    ROS: She reports back pain only.  Balance of review of systems is negative.    Medical, surgical, family and social history reviewed elsewhere in record.     Medications/Allergies: See med  card      Vitals:    05/26/25 0938   BP: 116/60   Pulse: 71   Weight: 83.6 kg (184 lb 4.9 oz)   PainSc:   7   PainLoc: Back        PHYSICAL EXAM:  Gen: A and O x3, pleasant, well-groomed  HEENT: PERRLA  CVS: Regular rate and rhythm  Resp:  No obvious shortness of breath, no increased work of breathing  Musculoskeletal: No antalgic gait.     Neuro:  Upper extremities: 5/5 strength bilaterally   Lower extremities: 5/5 strength bilaterally  Reflexes: Brachioradialis 2+, Bicep 2+, Tricep 2+.   Patellar 3+, Achilles 2+.  Sensory: Intact and symmetrical to light touch and pinprick in C2-T1 dermatomes bilaterally.Intact and symmetrical to light touch and pinprick in L2-S1 dermatomes bilaterally.    Cervical spine:   Range of motion is mildly reduced with flexion, extension lateral rotation with increased right neck pain during right lateral rotation and extension.    Myofascial exam: Mild tenderness to palpation to the right cervical paraspinous muscles.    Lumbar spine:  Lumbar spine: ROM is moderately limited with flexion and extension with increased right low back pain during extension and oblique extension.  Supine straight leg raise is negative bilaterally.  Internal and external rotation of the hip causes no increased pain in either side.  Myofascial exam:  Moderate tenderness to palpation to the right greater than left lumbar paraspinous muscles.      IMAGING:   MRI L-SPINE 5/10/11   IMPRESSION: AT L3-4, THERE IS CIRCUMFERENTIAL DISC PROTRUSION FOCALLY MORE PROMINENT TO THE LEFT AS WELL AS A SMALL DISC HERNIATION PROTRUDING INFERIORLY BEHIND L4 TO THE LEFT OF MIDLINE. THIS PRODUCES SPINAL CANAL AND BILATERAL NEURAL FORAMINAL STENOSIS, LEFT GREATER THAN RIGHT. AT L4-5, THERE IS CIRCUMFERENTIAL DISC PROTRUSION FOCALLY MORE PROMINENT TO THE RIGHT WITH MILD SPINAL CANAL AND RIGHT NEURAL FORAMINAL STENOSIS. AT L1-2 AND L2-3 ALTHOUGH THERE ARE DISC PROTRUSIONS IDENTIFIED, SIGNIFICANT STENOSIS IS NOT SEEN.     MRI lumbar  spine 7/10/14  L1-2:There is chronic relatively advanced disc degeneration with disc space narrowing, disc dehydration, disc narrowing, endplate osteophytes, and degenerative vertebral endplate marrow change. There is a diffuse 2-mm posterior disc bulge with a superimposed4-mm right posterior disc extrusion causing mild right foraminal stenosis. There is no impingement of the right L1 nerve root and there has been no change.  L2-3: There is severe and chronic disc degeneration with severe disc space narrowing, disc dehydration, degenerative vertebral endplate marrow change, and vertebral endplate osteophytes. There is a diffuse 2-mm posterior disc bulge with a superimposed 4-mm right posterior disc extrusion causing mild right foraminal stenosis. There is no impingement of the right L2 nerve root and there has been no significant change.  L3-4: There is severe disc degeneration which has progressed since the prior study. There is severe disc space narrowing, disc dehydration, degenerative vertebral endplate marrow change and endplate osteophytes. There is also mild posterior subluxation of L3over a distance of 4 mm. There is a broad-based 5-mm posterior disc extrusion. There is degenerative facet arthrosis with ligamentum flavum thickening. The combination of facet arthrosis and disc extrusion results in relatively severe spinal canal stenosis with compression of the thecal sac to an AP diameter of 5 mm, moderate right foraminal stenosis, and severe left foraminal stenosis. The spinal stenosis has also progressed since the prior study.  L4-5:There is a diffuse posterior disc bulge with a superimposed 3-mm left posterior paracentral disc protrusion causing left paracentral thecal sac compression. There is moderate left and mild right foraminal stenosis and there has been no significant change. There is mild degenerative facet arthrosis with ligamentum flavum thickening and small joint effusions.  L5-S1:There is no  significant compromise of the spinal canal or foramina and there has been no change.    X-ray cervical spine 6/8/15  There is loss of normal cervical lordosis which may be positional or related muscular strain. Intervertebral disk height loss is noted at the C5-C6 C6-C7 levels and to a lesser degree C4-C5 and C7-T1 levels. Vertebral body alignment appears otherwise adequate. Vertebral body heights appear well-preserved. The atlas and odontoid appear in good relationship to each other. Osseous neuroforaminal narrowing is noted at the C3-C4 C4-C5 C5-C6 levels on the left and at the C4-C5 C5-C6 and C6-C7 levels on the right     07/10/2018 MRI cervical spine [facility] report  C2-3 broad-based signal asymmetry at the left subarticular and foraminal zone reflecting implant spondylosis and disc bulge complex, mild to moderate left asymmetric foraminal narrowing, ectasia of the left vertebral artery, contralateral right asymmetric facet hypertrophy, right foramen widely narrowed, disc partially desiccated without collapse  C3-4 moderate to severe left greater than right foraminal narrowing secondary to uncinate joint hypertrophic signal alteration, disc partially desiccated  C4-5 endplate spondylosis moderate diffuse disc bulge noted, broad-based right paracentral disc herniation, asymmetric flattening of the ventral cord surface at the right paracentral zone, high-grade if lateral right foraminal narrowing, AP diameter of canal 9.9 mm, contralaterally, high grade left foraminal narrowing secondary to facet greater than uncinate joint hypertrophic signal alteration, disc desiccated and mildly narrowed  C5-6 disc space narrowing evident with generalized in Nigel spondylosis and concentric inter pose disc bulge complex, high-grade bilateral foraminal narrowing, flattening of the cord surface, AP diameter 7.9 mm, symmetric facet arthrosis, disc diffusely desiccated and narrowed  C6-7 moderate endplate spondylosis and  concentric disc bulge complex, high-grade bilateral foraminal compromise, flattening of the anterior cord surface, AP diameter 8.8 mm, facet arthrosis symmetric, disc desiccated and narrowed  C7-T1 less than 2 mm depth disc bulge    11/06/2018 MRI lumbar spine  T12/L1: There is no evidence of disc protrusion, canal or foraminal stenosis.  L1/L2: Right posterolateral disc protrusion is evident and there is mild distortion of the right anterolateral canal.  Degenerative facet changes are noted bilaterally.  The left foramen is intact.  L2/L3: There is annular disc bulging with a superimposed right posterolateral disc extrusion.  This is slightly more pronounced on the previous examination and there is mild effacement of the anterior thecal sac and moderate narrowing the right foramen.  L3/L4: There is annular disc bulge and osteophyte formation with a superimposed small left posterolateral disc extrusion.  There is moderate narrowing the central canal and left foramen.  This is little changed relative the previous examination.  Degenerative facet changes are noted.  L5/S1: There is a small central disc extrusion superimposed upon annular disc bulging.  This is slightly less pronounced than was seen previously.  Degenerative facet changes are noted.  L5/S1: Moderate degenerative facet changes are noted and there is mild effacement of the anterior thecal sac.  There is ligamentous hypertrophy particularly to the left in the posterior canal and there is left greater than right foraminal narrowing.  This is mildly worsened relative prior exam.      ASSESSMENT:  The patient is a 64 year old woman with a history of migraines, carpal tunnel syndrome and low back pain since her early 20s who returns in follow up.   1. Lumbar radiculopathy, chronic  MRI Lumbar Spine Without Contrast      2. Spinal stenosis of lumbar region without neurogenic claudication        3. Opioid contract exists                Plan:  1. Since she is  experiencing new numbness in her left leg along with her usual numbness in the right I am going to update her lumbar spine MRI for further evaluation.  I will contact her with the results.  2. Dr. Abdalla has refilled tramadol 50 mg twice a day and Lyrica 50 mg twice a day. I have reviewed the [place] Board of Pharmacy website and there are no abberancies.    3. Follow-up in three months or sooner as needed.      This note was generated with the assistance of ambient listening technology. Verbal consent was obtained by the patient and accompanying visitor(s) for the recording of patient appointment to facilitate this note. I attest to having reviewed and edited the generated note for accuracy, though some syntax or spelling errors may persist. Please contact the author of this note for any clarification.

## 2025-06-11 ENCOUNTER — OFFICE VISIT (OUTPATIENT)
Dept: URGENT CARE | Facility: CLINIC | Age: 65
End: 2025-06-11
Payer: COMMERCIAL

## 2025-06-11 ENCOUNTER — HOSPITAL ENCOUNTER (OUTPATIENT)
Dept: RADIOLOGY | Facility: HOSPITAL | Age: 65
Discharge: HOME | End: 2025-06-11
Attending: PHYSICIAN ASSISTANT
Payer: COMMERCIAL

## 2025-06-11 VITALS
HEART RATE: 72 BPM | RESPIRATION RATE: 18 BRPM | DIASTOLIC BLOOD PRESSURE: 67 MMHG | TEMPERATURE: 98 F | OXYGEN SATURATION: 98 % | BODY MASS INDEX: 31.41 KG/M2 | WEIGHT: 184 LBS | SYSTOLIC BLOOD PRESSURE: 101 MMHG | HEIGHT: 64 IN

## 2025-06-11 DIAGNOSIS — J06.9 URI WITH COUGH AND CONGESTION: ICD-10-CM

## 2025-06-11 DIAGNOSIS — M54.16 LUMBAR RADICULOPATHY, CHRONIC: ICD-10-CM

## 2025-06-11 DIAGNOSIS — L30.9 DERMATITIS: Primary | ICD-10-CM

## 2025-06-11 DIAGNOSIS — L29.9 PRURITUS: ICD-10-CM

## 2025-06-11 PROCEDURE — 72148 MRI LUMBAR SPINE W/O DYE: CPT | Mod: 26,,, | Performed by: RADIOLOGY

## 2025-06-11 PROCEDURE — 72148 MRI LUMBAR SPINE W/O DYE: CPT | Mod: TC,PO

## 2025-06-11 PROCEDURE — 99214 OFFICE O/P EST MOD 30 MIN: CPT | Mod: S$GLB,,, | Performed by: NURSE PRACTITIONER

## 2025-06-11 RX ORDER — PREDNISONE 20 MG/1
20 TABLET ORAL DAILY
Qty: 4 TABLET | Refills: 0 | Status: SHIPPED | OUTPATIENT
Start: 2025-06-11 | End: 2025-06-15

## 2025-06-11 NOTE — PROGRESS NOTES
"Subjective:      Patient ID: Elizabeth Giordano is a 64 y.o. female.    Vitals:  height is 5' 4" (1.626 m) and weight is 83.5 kg (184 lb). Her oral temperature is 98 °F (36.7 °C). Her blood pressure is 101/67 and her pulse is 72. Her respiration is 18 and oxygen saturation is 98%.     Chief Complaint: Rash    Pt states noticed rash on both legs Saturday. Applied topical triamcinolone ointment with some improvement.    Rash  This is a new problem. The current episode started in the past 7 days. The affected locations include the right lower leg and left lower leg. The rash is characterized by redness, itchiness and draining. The treatment provided no relief.       Skin:  Positive for rash.      Objective:     Physical Exam   Skin: Skin is rash.            Assessment:     1. Dermatitis    2. Pruritus        Plan:       Concerns of poison ivy dermatitis.  Patient given short stent of oral steroids.  Also advised on the use of antihistamines over-the-counter at a dosage that is safe due to history of chronic kidney disease.  Reviewed patient's medical history extensively.  Advised to continue topical steroid.  Will follow up with primary care doctor.     Dermatitis  -     predniSONE (DELTASONE) 20 MG tablet; Take 1 tablet (20 mg total) by mouth once daily. for 4 days  Dispense: 4 tablet; Refill: 0    Pruritus  -     predniSONE (DELTASONE) 20 MG tablet; Take 1 tablet (20 mg total) by mouth once daily. for 4 days  Dispense: 4 tablet; Refill: 0      Patient Instructions   Start steroids.  Continue topical steroid as needed.  You may also use Sarna lotion as needed for itching.  Xyzal as needed for itching at night.  You must understand that you've received an Urgent Care treatment only and that you may be released before all your medical problems are known or treated. You, the patient, will arrange for follow up care as instructed.  Follow up with your PCP or specialty clinic as directed in the next 1-2 weeks if not " improved or as needed.  You can call [phone] to schedule an appointment with the appropriate provider.  If your condition worsens we recommend that you receive another evaluation at the emergency room immediately or contact your primary medical clinics after hours call service to discuss your concerns.  Please return here or go to the Emergency Department for any concerns or worsening of condition.       You must understand that you have received treatment at an Urgent Care facility only, and that you may be  released before all of your medical problems are known or treated. Urgent Care facilities are not equipped to  handle life threatening emergencies. It is recommended that you seek care at an Emergency Department for  further evaluation of worsening or concerning symptoms, or possibly life threatening conditions as  discussed.

## 2025-06-11 NOTE — PATIENT INSTRUCTIONS
Start steroids.  Continue topical steroid as needed.  You may also use Sarna lotion as needed for itching.  Xyzal as needed for itching at night.  You must understand that you've received an Urgent Care treatment only and that you may be released before all your medical problems are known or treated. You, the patient, will arrange for follow up care as instructed.  Follow up with your PCP or specialty clinic as directed in the next 1-2 weeks if not improved or as needed.  You can call [phone] to schedule an appointment with the appropriate provider.  If your condition worsens we recommend that you receive another evaluation at the emergency room immediately or contact your primary medical clinics after hours call service to discuss your concerns.  Please return here or go to the Emergency Department for any concerns or worsening of condition.

## 2025-06-11 NOTE — TELEPHONE ENCOUNTER
No care due was identified.  [facility] Embedded Care Due Messages. Reference number: [id number].   6/11/2025 2:11:48 PM CDT

## 2025-06-12 RX ORDER — IPRATROPIUM BROMIDE 42 UG/1
2 SPRAY, METERED NASAL 3 TIMES DAILY
Qty: 45 ML | Refills: 3 | Status: SHIPPED | OUTPATIENT
Start: 2025-06-12

## 2025-06-12 NOTE — TELEPHONE ENCOUNTER
Refill Decision Note   Elizabeth Pasquale  is requesting a refill authorization.  Brief Assessment and Rationale for Refill:  Approve     Medication Therapy Plan:         Comments:     Note composed:7:59 AM 06/12/2025

## 2025-06-16 ENCOUNTER — OFFICE VISIT (OUTPATIENT)
Dept: FAMILY MEDICINE | Facility: CLINIC | Age: 65
End: 2025-06-16
Payer: COMMERCIAL

## 2025-06-16 DIAGNOSIS — L24.89 IRRITANT CONTACT DERMATITIS DUE TO OTHER AGENTS: Primary | ICD-10-CM

## 2025-06-16 PROCEDURE — 1159F MED LIST DOCD IN RCRD: CPT | Mod: CPTII,95,, | Performed by: FAMILY MEDICINE

## 2025-06-16 PROCEDURE — 98004 SYNCH AUDIO-VIDEO EST SF 10: CPT | Mod: 95,,, | Performed by: FAMILY MEDICINE

## 2025-06-16 PROCEDURE — 4010F ACE/ARB THERAPY RXD/TAKEN: CPT | Mod: CPTII,95,, | Performed by: FAMILY MEDICINE

## 2025-06-16 PROCEDURE — 1160F RVW MEDS BY RX/DR IN RCRD: CPT | Mod: CPTII,95,, | Performed by: FAMILY MEDICINE

## 2025-06-16 RX ORDER — TRIAMCINOLONE ACETONIDE 5 MG/G
CREAM TOPICAL 3 TIMES DAILY
Qty: 30 G | Refills: 1 | Status: SHIPPED | OUTPATIENT
Start: 2025-06-16

## 2025-06-16 RX ORDER — PREDNISONE 20 MG/1
20 TABLET ORAL DAILY
Qty: 4 TABLET | Refills: 0 | Status: SHIPPED | OUTPATIENT
Start: 2025-06-16 | End: 2025-06-20

## 2025-06-16 NOTE — PROGRESS NOTES
Subjective:       Patient ID: Elizabeth Giordano is a 64 y.o. female.    Chief Complaint: No chief complaint on file.          Each patient to whom he or she provides medical services by telemedicine is:  (1) informed of the relationship between the physician and patient and the respective role of any other health care provider with respect to management of the patient; and (2) notified that he or she may decline to receive medical services by telemedicine and may withdraw from such care at any time.    Notes:  The patient location is: [place], [place]  The chief complaint leading to consultation is: rash    Visit type: audiovisual    Face to Face time with patient: 10 minutes  13 minutes of total time spent on the encounter, which includes face to face time and non-face to face time preparing to see the patient (eg, review of tests), Obtaining and/or reviewing separately obtained history, Documenting clinical information in the electronic or other health record, Independently interpreting results (not separately reported) and communicating results to the patient/family/caregiver, or Care coordination (not separately reported).         Each patient to whom he or she provides medical services by telemedicine is:  (1) informed of the relationship between the physician and patient and the respective role of any other health care provider with respect to management of the patient; and (2) notified that he or she may decline to receive medical services by telemedicine and may withdraw from such care at any time.    Notes:             Rash  This is a new problem. The current episode started in the past 7 days. The problem has been gradually worsening since onset. The affected locations include the left upper leg, left leg, right arm, right upper leg and right leg. The rash is characterized by dryness, redness, swelling and itchiness. It is a new medication and unknown if there was an exposure to a precipitant. She  was exposed to a new medication and unknown. Pertinent negatives include no anorexia, congestion, cough, diarrhea, eye pain, facial edema, fatigue, fever, joint pain, nail changes, rhinorrhea, shortness of breath, sore throat or vomiting. Past treatments include antibiotic cream, antihistamine, topical steroids and oral steroids. The treatment provided no relief. Her past medical history is significant for varicella. There is no history of allergies, asthma or eczema.     Review of Systems   Constitutional:  Negative for fatigue and fever.   HENT:  Negative for congestion, rhinorrhea and sore throat.    Eyes:  Negative for pain.   Respiratory:  Negative for cough and shortness of breath.    Gastrointestinal:  Negative for anorexia, diarrhea and vomiting.   Musculoskeletal:  Negative for joint pain.   Skin:  Positive for rash. Negative for nail changes.       Objective:      Physical Exam    Assessment:       1. Irritant contact dermatitis due to other agents        Plan:       Diagnoses and all orders for this visit:    Irritant contact dermatitis due to other agents  -     predniSONE (DELTASONE) 20 MG tablet; Take 1 tablet (20 mg total) by mouth once daily. for 4 days  -     triamcinolone acetonide 0.5% (KENALOG) 0.5 % Crea; Apply topically 3 (three) times daily.      During this visit, I reviewed the pt's history, medications, allergies, and problem list.

## 2025-06-18 ENCOUNTER — TELEPHONE (OUTPATIENT)
Dept: PAIN MEDICINE | Facility: CLINIC | Age: 65
End: 2025-06-18
Payer: COMMERCIAL

## 2025-06-18 NOTE — TELEPHONE ENCOUNTER
Please let the patient know I reviewed her new lumbar spine MRI and she does have foraminal stenosis on the left at L3/4, L4/5 and L5/S1 which can explain her new left leg numbness.  Depending on how much this is bothering her we can consider another epidural steroid injection.

## 2025-06-19 NOTE — TELEPHONE ENCOUNTER
If she does not want to wait for Dr. Abdalla, please offer with 1 of the other physicians.    Physician - Dr Abdalla      Type of Procedure/Injection - Lumbar Transforaminal Epidural  L5/S1           Laterality - Bilateral      Priority - Normal      Anxiolysis - Local      Fasting - NPO after midnight      Need to hold medication - Yes      Aspirin or aspirin containing products for 6 days and Diclofenac or Ketorolac or Ibuprofen for 1 day      Clearance needed - Yes      Follow up - 4 week

## 2025-06-25 ENCOUNTER — OFFICE VISIT (OUTPATIENT)
Dept: ORTHOPEDICS | Facility: CLINIC | Age: 65
End: 2025-06-25
Payer: COMMERCIAL

## 2025-06-25 DIAGNOSIS — M17.12 PRIMARY OSTEOARTHRITIS OF LEFT KNEE: ICD-10-CM

## 2025-06-25 DIAGNOSIS — M17.11 ARTHRITIS OF RIGHT KNEE: Primary | ICD-10-CM

## 2025-06-25 DIAGNOSIS — M17.11 PRIMARY OSTEOARTHRITIS OF RIGHT KNEE: Primary | ICD-10-CM

## 2025-06-25 DIAGNOSIS — M25.562 LEFT KNEE PAIN, UNSPECIFIED CHRONICITY: ICD-10-CM

## 2025-06-25 PROCEDURE — 1159F MED LIST DOCD IN RCRD: CPT | Mod: CPTII,S$GLB,, | Performed by: ORTHOPAEDIC SURGERY

## 2025-06-25 PROCEDURE — 1160F RVW MEDS BY RX/DR IN RCRD: CPT | Mod: CPTII,S$GLB,, | Performed by: ORTHOPAEDIC SURGERY

## 2025-06-25 PROCEDURE — 99999 PR PBB SHADOW E&M-EST. PATIENT-LVL II: CPT | Mod: PBBFAC,,, | Performed by: ORTHOPAEDIC SURGERY

## 2025-06-25 PROCEDURE — 99214 OFFICE O/P EST MOD 30 MIN: CPT | Mod: 25,S$GLB,, | Performed by: ORTHOPAEDIC SURGERY

## 2025-06-25 PROCEDURE — 20610 DRAIN/INJ JOINT/BURSA W/O US: CPT | Mod: RT,S$GLB,, | Performed by: ORTHOPAEDIC SURGERY

## 2025-06-25 PROCEDURE — 4010F ACE/ARB THERAPY RXD/TAKEN: CPT | Mod: CPTII,S$GLB,, | Performed by: ORTHOPAEDIC SURGERY

## 2025-06-25 RX ORDER — TRIAMCINOLONE ACETONIDE 40 MG/ML
40 INJECTION, SUSPENSION INTRA-ARTICULAR; INTRAMUSCULAR
Status: DISCONTINUED | OUTPATIENT
Start: 2025-06-25 | End: 2025-06-25 | Stop reason: HOSPADM

## 2025-06-25 RX ADMIN — TRIAMCINOLONE ACETONIDE 40 MG: 40 INJECTION, SUSPENSION INTRA-ARTICULAR; INTRAMUSCULAR at 09:06

## 2025-06-25 NOTE — PROCEDURES
Large Joint Aspiration/Injection: R knee    Date/Time: 6/25/2025 9:15 AM    Performed by: Vaughn Ortiz MD  Authorized by: Vaughn Ortiz MD    Consent Done?:  Yes (Verbal)  Indications:  Pain  Site marked: the procedure site was marked    Timeout: prior to procedure the correct patient, procedure, and site was verified    Local anesthetic: Ropivicaine.  Anesthetic total (ml):  3      Details:  Needle Size:  20 G  Approach:  Anterolateral  Location:  Knee  Site:  R knee  Medications:  40 mg triamcinolone acetonide 40 mg/mL  Patient tolerance:  Patient tolerated the procedure well with no immediate complications

## 2025-06-25 NOTE — PROGRESS NOTES
Past Medical History:   Diagnosis Date    Allergy     Arthritis of spine 2011    Cataract     Chronic low back pain     Class 2 obesity with body mass index (BMI) of 39.0 to 39.9 in adult 01/11/2019    Coronary artery disease     mild    DDD (degenerative disc disease), lumbar     Diverticulitis     Diverticulosis     DJD (degenerative joint disease)     Encounter for blood transfusion     Factor V Leiden     GERD (gastroesophageal reflux disease)     Hiatal hernia     Hypertension     Migraines     PONV (postoperative nausea and vomiting)     geta    Schatzki's ring     Urticaria        Past Surgical History:   Procedure Laterality Date    APPENDECTOMY  1981    CARPAL TUNNEL RELEASE  2011    right    CATARACT EXTRACTION W/  INTRAOCULAR LENS IMPLANT Right 5/29/2024    Procedure: EXTRACTION, CATARACT, WITH IOL INSERTION;  Surgeon: Adelia Dalton MD;  Location: [facility] OR;  Service: Ophthalmology;  Laterality: Right;    CATARACT EXTRACTION W/  INTRAOCULAR LENS IMPLANT Left 8/7/2024    Procedure: EXTRACTION, CATARACT, WITH IOL INSERTION;  Surgeon: Adelia Dalton MD;  Location: [facility] OR;  Service: Ophthalmology;  Laterality: Left;    COLONOSCOPY  2014    Dr. Palomares; reduntant colon, otherwise normal findings repeat in 8-10 years for surveillance    Epidural Steroid Injection      Pain Management    EPIDURAL STEROID INJECTION INTO CERVICAL SPINE N/A 09/25/2018    Procedure: Injection-steroid-epidural-cervical;  Surgeon: Oswald Abdalla MD;  Location: [facility] OR;  Service: Pain Management;  Laterality: N/A;    EPIDURAL STEROID INJECTION INTO CERVICAL SPINE N/A 10/24/2019    Procedure: Injection-steroid-epidural-cervical;  Surgeon: Oswald Abdalla MD;  Location: [facility] OR;  Service: Pain Management;  Laterality: N/A;    EPIDURAL STEROID INJECTION INTO CERVICAL SPINE N/A 02/22/2023    Procedure: Injection-steroid-epidural-cervical C7-T1;  Surgeon: Oswald Abdalla MD;  Location: [facility] OR;  Service: Pain Management;   Laterality: N/A;    EPIDURAL STEROID INJECTION INTO CERVICAL SPINE N/A 10/30/2024    Procedure: Injection-steroid-epidural-cervical C7/T1;  Surgeon: Oswald Abdalla MD;  Location: [facility] OR;  Service: Pain Management;  Laterality: N/A;  C7/T1    EPIDURAL STEROID INJECTION INTO LUMBAR SPINE N/A 12/27/2018    Procedure: Injection-steroid-epidural-lumbar L5/S1;  Surgeon: Oswald Abdalla MD;  Location: [facility] OR;  Service: Pain Management;  Laterality: N/A;    EPIDURAL STEROID INJECTION INTO LUMBAR SPINE N/A 09/04/2019    Procedure: Injection-steroid-epidural-lumbar;  Surgeon: Oswald Abdalla MD;  Location: [facility] OR;  Service: Pain Management;  Laterality: N/A;  L5/S1 to right    EPIDURAL STEROID INJECTION INTO LUMBAR SPINE N/A 06/16/2023    Procedure: Injection-steroid-epidural-lumbar L5/S1;  Surgeon: Oswald Abdalla MD;  Location: [facility] OR;  Service: Pain Management;  Laterality: N/A;    ESOPHAGOGASTRODUODENOSCOPY  05/17/2016    Dr. Arreguin; small hiatal hernia; gastritis    ESOPHAGOGASTRODUODENOSCOPY N/A 06/22/2020    Dr. Arreguin; mild Schatzki ring- dilated; small hiatal hernia; bx unremarkable    Facet injection      Pain Management    HYSTERECTOMY      ovaries removed    KIDNEY SURGERY Right 1967    to correct urinary reflux into kidney    LEFT HEART CATHETERIZATION Left 05/22/2019    Procedure: Left heart cath;  Surgeon: Casa Perdue MD;  Location: [facility] CATH;  Service: Cardiology;  Laterality: Left;    OVARIAN CYST REMOVAL Right 1981    RADIOFREQUENCY ABLATION OF LUMBAR MEDIAL BRANCH NERVE AT SINGLE LEVEL Bilateral 07/17/2018    Procedure: RADIOFREQUENCY ABLATION, NERVE, MEDIAL BRANCH, LUMBAR, L2,3,4;  Surgeon: Oswald Abdalla MD;  Location: [facility] OR;  Service: Pain Management;  Laterality: Bilateral;    RADIOFREQUENCY ABLATION OF LUMBAR MEDIAL BRANCH NERVE AT SINGLE LEVEL Bilateral 07/29/2019    Procedure: Radiofrequency Ablation, Nerve, Spinal, Lumbar, Medial Branch, L2,3,4;  Surgeon:  Oswald Abdalla MD;  Location: [facility] OR;  Service: Pain Management;  Laterality: Bilateral;    SHOULDER SURGERY  2010    rotator cuff, right    TRANSFORAMINAL EPIDURAL INJECTION OF STEROID Left 08/21/2023    Procedure: Injection,steroid,epidural,transforaminal approach L4/5 L5/S1 Left;  Surgeon: Oswald Abdalla MD;  Location: [facility] OR;  Service: Pain Management;  Laterality: Left;    TRANSFORAMINAL EPIDURAL INJECTION OF STEROID Right 3/4/2024    Procedure: Injection,steroid,epidural,transforaminal approach right L4/5 and L5/S1;  Surgeon: Oswald Abdalla MD;  Location: [facility] OR;  Service: Pain Management;  Laterality: Right;  right L4/5 and L5/S1       Current Outpatient Medications   Medication Sig    ALPRAZolam (XANAX) 0.25 MG tablet Take 1 tablet (0.25 mg total) by mouth 3 (three) times daily as needed for Anxiety.    amLODIPine (NORVASC) 5 MG tablet TAKE 1 TABLET(5 MG) BY MOUTH EVERY DAY    ascorbic acid, vitamin C, (VITAMIN C) 500 MG tablet Take 1 tablet (500 mg total) by mouth 2 (two) times daily.    aspirin (ECOTRIN) 81 MG EC tablet Take 81 mg by mouth once daily.    atorvastatin (LIPITOR) 10 MG tablet TAKE 1 TABLET(10 MG) BY MOUTH EVERY DAY    azelastine (ASTELIN) 137 mcg (0.1 %) nasal spray 2 sprays (274 mcg total) by Nasal route 2 (two) times daily.    benzonatate (TESSALON) 100 MG capsule Take 1 capsule (100 mg total) by mouth 3 (three) times daily as needed for Cough.    diazePAM (VALIUM) 10 MG Tab Take 1 tablet (10 mg total) by mouth On call Procedure.    EScitalopram oxalate (LEXAPRO) 10 MG tablet TAKE 1 TABLET(10 MG) BY MOUTH DAILY    estradioL (ESTRACE) 1 MG tablet Take 1 tablet (1 mg total) by mouth once daily. Can cause blood clots with covid, recommend NOT taking this medication until covid symptoms completely gone and discussing with PCP    eszopiclone (LUNESTA) 3 mg Tab Take 1 tablet (3 mg total) by mouth every evening.    fluticasone propionate (FLONASE) 50 mcg/actuation nasal spray 1  spray (50 mcg total) by Each Nostril route Daily.    galcanezumab-gnlm (EMGALITY PEN) 120 mg/mL PnIj Inject 1 pen (120 mg total) into the skin every 28 days.    ibuprofen (ADVIL,MOTRIN) 800 MG tablet TAKE 1 TABLET(800 MG) BY MOUTH EVERY 6 HOURS AS NEEDED FOR PAIN    ipratropium (ATROVENT) 42 mcg (0.06 %) nasal spray USE 2 SPRAYS IN EACH NOSTRIL THREE TIMES DAILY    LIDOcaine (LIDODERM) 5 % PLACE 1 PATCH ONTO THE AFFECTED AREA OF SKIN DAILY AND REMOVE WITHIN 12 HOURS    linaCLOtide (LINZESS) 145 mcg Cap capsule Take 1 capsule (145 mcg total) by mouth before breakfast.    losartan (COZAAR) 100 MG tablet TAKE 1 TABLET(100 MG) BY MOUTH EVERY DAY    nystatin (MYCOSTATIN) cream Applied t.i.d.    ondansetron (ZOFRAN-ODT) 4 MG TbDL Take 1 tablet (4 mg total) by mouth every 6 (six) hours as needed (nausea).    pantoprazole (PROTONIX) 40 MG tablet TAKE 1 TABLET(40 MG) BY MOUTH EVERY DAY    pregabalin (LYRICA) 50 MG capsule Take 1 capsule (50 mg total) by mouth 3 (three) times daily.    traMADoL (ULTRAM) 50 mg tablet TAKE 1 TABLET BY MOUTH EVERY 12 HOURS AS NEEDED FOR PAIN    triamcinolone acetonide 0.5% (KENALOG) 0.5 % Crea Apply topically 3 (three) times daily.    ubrogepant (UBRELVY) 50 mg tablet Take 1 tablet by mouth at onset of migraine. May repeat in 2 hours if needed. Max 2 tablets per day.    vitamin D (VITAMIN D3) 1000 units Tab Take 1 tablet (1,000 Units total) by mouth once daily.    zinc sulfate (ZINCATE) 220 (50) mg capsule Take 1 capsule (220 mg total) by mouth once daily.     No current facility-administered medications for this visit.       Review of patient's allergies indicates:   Allergen Reactions    Hydrocodone Hives and Nausea Only           Sulfa (sulfonamide antibiotics) Hives and Rash           Doxycycline Rash     Itchy rash and some scattered itchy lesions    Oxycodone Itching and Nausea And Vomiting       Family History   Problem Relation Name Age of Onset    Cataracts Mother      Heart attack  Mother      Heart disease Mother      COPD Mother      Hypertension Mother      COPD Father      Hepatitis Sister      Breast cancer Maternal Grandmother      No Known Problems Daughter      Allergic rhinitis Neg Hx      Angioedema Neg Hx      Asthma Neg Hx      Atopy Neg Hx      Eczema Neg Hx      Immunodeficiency Neg Hx      Urticaria Neg Hx      Colon cancer Neg Hx      Colon polyps Neg Hx      Glaucoma Neg Hx      Macular degeneration Neg Hx      Retinal detachment Neg Hx         Social History     Socioeconomic History    Marital status:     Number of children: 1   Occupational History     Employer: [employer]   Tobacco Use    Smoking status: Former     Current packs/day: 0.00     Average packs/day: 1 pack/day for 5.0 years (5.0 ttl pk-yrs)     Types: Cigarettes     Start date: 1992     Quit date: 1997     Years since quittin.4     Passive exposure: Never    Smokeless tobacco: Never   Substance and Sexual Activity    Alcohol use: Yes     Comment: 3x per year    Drug use: No    Sexual activity: Yes     Partners: Male   Social History Narrative    ** Merged History Encounter **          Social Drivers of Health     Financial Resource Strain: Low Risk  (2024)    Overall Financial Resource Strain (CARDIA)     Difficulty of Paying Living Expenses: Not hard at all   Food Insecurity: No Food Insecurity (2024)    Hunger Vital Sign     Worried About Running Out of Food in the Last Year: Never true     Ran Out of Food in the Last Year: Never true   Transportation Needs: No Transportation Needs (2024)    PRAPARE - Transportation     Lack of Transportation (Medical): No     Lack of Transportation (Non-Medical): No   Physical Activity: Sufficiently Active (2024)    Exercise Vital Sign     Days of Exercise per Week: 5 days     Minutes of Exercise per Session: 60 min   Stress: No Stress Concern Present (2024)    [place] [employer] - Occupational  Stress Questionnaire     Feeling of Stress : Only a little   Housing Stability: Low Risk  (1/24/2024)    Housing Stability Vital Sign     Unable to Pay for Housing in the Last Year: No     Number of Places Lived in the Last Year: 1     Unstable Housing in the Last Year: No       Chief Complaint:   No chief complaint on file.      History of present illness:  64-year-old female with a history of knee arthritis and previous treatment including a Euflexxa series, Synvisc 1 and cortisone injections who presents with worsening left knee pain.  No injury or trauma.  Pain along the medial aspect of her knee.  Pain with prolonged walking or standing.   Pain is up to an 8/10.      Answers submitted by the patient for this visit:  Orthopedics Questionnaire (Submitted on 5/2/2023)  unexpected weight change: No  appetite change : No  sleep disturbance: No  IMMUNOCOMPROMISED: No  nervous/ anxious: No  dysphoric mood: No  rash: No  visual disturbance: No  eye redness: No  eye pain: No  ear pain: No  tinnitus: No  hearing loss: No  sinus pressure : No  nosebleeds: No  enviro allergies: No  food allergies: No  cough: No  shortness of breath: No  sweating: No  dysuria: No  frequency: No  difficulty urinating: No  hematuria: No  painful intercourse: No  chest pain: No  palpitations: No  nausea: No  vomiting: No  diarrhea: No  blood in stool: No  constipation: No  headaches: No  dizziness: No  numbness: No  seizures: No  joint swelling: No  myalgia: No  weakness: No  back pain: No   (Submitted on 5/2/2023)  Chief Complaint: Hip pain  Pain Chronicity: new  History of trauma: Yes  Onset: 1 to 4 weeks ago  Frequency: constantly  Progression since onset: gradually worsening  injury location: at home  pain- numeric: 8/10  pain location: left hip, left knee  pain quality: burning  Radiating Pain: No  Aggravating factors: bearing weight, sitting  fever: No  inability to bear weight: No  itching: No  joint locking: No  limited range of  motion: No  stiffness: No  tingling: No  Treatments tried: cold, OTC ointments, OTC pain meds  physical therapy: ineffective  Improvement on treatment: mild      Physical Examination:    Vital Signs:    There were no vitals filed for this visit.        There is no height or weight on file to calculate BMI.    This a well-developed, well nourished patient in no acute distress.  They are alert and oriented and cooperative to examination.  Pt. walks without an antalgic gait.      Examination of the right knee shows no rashes or erythema. There are no masses ecchymosis or effusion. Patient has full range of motion from 0-130°. Patient is nontender to palpation over lateral joint line and moderately tender to palpation over the medial joint line. Patient has a - Lachman exam, - anterior drawer exam, and - posterior drawer exam. - Apley exam. Knee is stable to varus and valgus stress. 5 out of 5 motor strength. Palpable distal pulses. Intact light touch sensation. Negative Patellofemoral crepitus      X-rays:  X-rays left knee are reviewed which show some mild to moderate medial narrowing bilaterally.  Patient has a small ossicle located near the medial epicondyle on the right knee from previous MCL injury possibly.  X-rays of the left hip are reviewed which shows well-maintained joint space.  Possibly some mild dysplasia and femoral acetabular impingement.     Assessment::   Left knee arthritis    Plan:  We talked about treatment options.  Patient elected for just another cortisone today for her right knee followed by Synvisc-One in a couple weeks.  Follow up in 2 weeks once approval is obtained    The patient has tried a self guided exercise program that has included walking without significant improvement. Minimal relief with tylenol or OTC Nsaids. Reports less than 8 weeks relief with IA steroid injection. Kellgren Danis scale of 3. They are not receiving another HA injectable at this time. I will precert for gel  injection.       All previous pertinent notes including ER visits, physical therapy visits, other orthopedic visits as well as other care for the same musculoskeletal problem were reviewed.  All pertinent lab values and previous imaging was reviewed pertinent to the current visit.    This note was created using [employer] voice recognition software that occasionally misinterpreted phrases or words.    Consult note is delivered via Epic messaging service.

## 2025-06-26 ENCOUNTER — TELEPHONE (OUTPATIENT)
Dept: PAIN MEDICINE | Facility: CLINIC | Age: 65
End: 2025-06-26
Payer: COMMERCIAL

## 2025-06-26 DIAGNOSIS — M54.16 LUMBAR RADICULOPATHY: Primary | ICD-10-CM

## 2025-06-26 RX ORDER — ALPRAZOLAM 1 MG/1
1 TABLET, ORALLY DISINTEGRATING ORAL ONCE AS NEEDED
OUTPATIENT
Start: 2025-06-26 | End: 2036-11-22

## 2025-06-26 RX ORDER — IBUPROFEN 800 MG/1
800 TABLET, FILM COATED ORAL EVERY 6 HOURS PRN
Qty: 30 TABLET | Refills: 0 | Status: SHIPPED | OUTPATIENT
Start: 2025-06-26

## 2025-06-26 NOTE — TELEPHONE ENCOUNTER
Left patient voicemail and let them know we received cardiac clearance for them to hold their anticoagulants

## 2025-06-26 NOTE — TELEPHONE ENCOUNTER
Patient is scheduled for a transforaminal lumbar YARON with Dr. Abdalla on 7/25. Is she cleared to hold ASA for 6 days prior to her procedure?

## 2025-07-11 DIAGNOSIS — M17.11 PRIMARY OSTEOARTHRITIS OF RIGHT KNEE: Primary | ICD-10-CM

## 2025-07-11 DIAGNOSIS — M17.12 PRIMARY OSTEOARTHRITIS OF LEFT KNEE: ICD-10-CM

## 2025-07-11 DIAGNOSIS — K59.09 CHRONIC CONSTIPATION: ICD-10-CM

## 2025-07-11 NOTE — TELEPHONE ENCOUNTER
No care due was identified.  [facility] Embedded Care Due Messages. Reference number: [id number].   7/11/2025 1:02:32 PM CDT

## 2025-07-11 NOTE — PROGRESS NOTES
Pre Procedure  Pt here for mammography guided wireless RFID localization left  breast prior to left  breast partial mastectomy on Monday, 7/14/25 with Dr. Viveros.      Prior to the start of the exam, the procedure was again explained to pt by Dr. Schmidt with RN present. Risks including bleeding, infection and possible need for wire localization discussed. Pt was given opportunity to ask questions and states that they were answered to her satisfaction.  Consent for RFID localization obtained. Medications and allergies reviewed by radiologist and RN prior to procedure.     Procedure  A time out was performed prior to the start of the procedure at 09:25 . Time out was conducted in accordance with hospital policy and included site marking.   Emotional support provided to pt throughout procedure.  Bleeding controlled.  2 view mammogram performed to verify RFID location.  Site reassessed post mammogram - no change. Site covered with steri-strips, 4x4s and tegaderm.    Post Procedure Discharge Instructions   AVS D/C instructions provided both verbal and written including verbal education on RFID site infection prevention. Pt tolerated procedure well.  Denies any pain at this time. My contact numbers were provided to the patient.  Pt denies any further questions at this time. Pt discharged from dept. with all belongings.      Localizer signal # [id number] verified prior to and after placement.      Study title: A Phase 1/2/3. Placebo Controlled, Randomized, Observer-Blind, Dose-Finding Study to Describe the Safety, Tolerability, Immunogenicity and Potential Efficacy of SARS-COV-2 RNA Vaccine Candidates Against COVID-19 in Health Adults   IRB #: 2020.198  Sponsor: [employer]   Sponsor's Protocol: [id number]  Site Number: [id number]  Subject ID: [id number]    Visit Assessments; 9/9/2020    Screening of inclusion/exclusion criteria evaluation for [id number] has been reviewed by Arabella Stearns. At this time, Elizabeth Giordano meets all inclusion and does not meet any one of the exclusion criteria.   Screening procedures completed during this visit include the following:   Demographic data (including gender, age, ethnicity, date of birth);    Height and weight obtained;   Vital Signs;  o B/P: 137/80  o Temperature: 97.9 F  o Pulse: 87   Reviewed and confirmed medical history in the past 6 months;   Review and confirmed of concomitant medications in the past 6 months;   Contraceptive discussion with Elizabeth Giordano. Patient expressed full understanding of adequate contraceptive measures and will contact site immediately if any changes are made in contraceptives;   Was UPT completed? No  o If no, UPT was not performed because subject is not WOCBP    Physical exam deemed NOT necessary per P.I./Sub-I discretion. I, Dr Saleh reviewed eligibility and agree with assessments. Subject will be randomized.

## 2025-07-12 NOTE — TELEPHONE ENCOUNTER
Refill Routing Note   Medication(s) are not appropriate for processing by [facility] for the following reason(s):        Outside of protocol    [facility] action(s):  Route             Appointments  past 12m or future 3m with PCP    Date Provider   Last Visit   6/16/2025 CARRIE Espinosa MD   Next Visit   Visit date not found CARRIE Espinosa MD   ED visits in past 90 days: 0        Note composed:7:47 PM 07/11/2025

## 2025-07-14 RX ORDER — LINACLOTIDE 145 UG/1
145 CAPSULE, GELATIN COATED ORAL
Qty: 30 CAPSULE | Refills: 1 | Status: SHIPPED | OUTPATIENT
Start: 2025-07-14

## 2025-07-23 ENCOUNTER — OFFICE VISIT (OUTPATIENT)
Dept: ORTHOPEDICS | Facility: CLINIC | Age: 65
End: 2025-07-23
Payer: COMMERCIAL

## 2025-07-23 VITALS — RESPIRATION RATE: 16 BRPM

## 2025-07-23 DIAGNOSIS — M17.11 PRIMARY OSTEOARTHRITIS OF RIGHT KNEE: Primary | ICD-10-CM

## 2025-07-23 PROCEDURE — 99999 PR PBB SHADOW E&M-EST. PATIENT-LVL II: CPT | Mod: PBBFAC,,, | Performed by: ORTHOPAEDIC SURGERY

## 2025-07-23 NOTE — PROCEDURES
Large Joint Aspiration/Injection: R knee joint    Date/Time: 7/23/2025 10:00 AM    Performed by: Vaughn Ortiz MD  Authorized by: Vaughn Ortiz MD    Consent Done?:  Yes (Verbal)  Indications:  Pain  Site marked: the procedure site was marked    Timeout: prior to procedure the correct patient, procedure, and site was verified      Details:  Needle Size:  18 G  Approach:  Anterolateral  Location:  Knee  Site:  R knee joint  Medications:  48 mg hylan g-f 20 48 mg/6 mL  Patient tolerance:  Patient tolerated the procedure well with no immediate complications

## 2025-07-23 NOTE — PROGRESS NOTES
Past Medical History:   Diagnosis Date    Allergy     Arthritis of spine 2011    Cataract     Chronic low back pain     Class 2 obesity with body mass index (BMI) of 39.0 to 39.9 in adult 01/11/2019    Coronary artery disease     mild    DDD (degenerative disc disease), lumbar     Diverticulitis     Diverticulosis     DJD (degenerative joint disease)     Encounter for blood transfusion     Factor V Leiden     GERD (gastroesophageal reflux disease)     Hiatal hernia     Hypertension     Migraines     PONV (postoperative nausea and vomiting)     geta    Schatzki's ring     Urticaria        Past Surgical History:   Procedure Laterality Date    APPENDECTOMY  1981    CARPAL TUNNEL RELEASE  2011    right    CATARACT EXTRACTION W/  INTRAOCULAR LENS IMPLANT Right 5/29/2024    Procedure: EXTRACTION, CATARACT, WITH IOL INSERTION;  Surgeon: Adelia Dalton MD;  Location: [facility] OR;  Service: Ophthalmology;  Laterality: Right;    CATARACT EXTRACTION W/  INTRAOCULAR LENS IMPLANT Left 8/7/2024    Procedure: EXTRACTION, CATARACT, WITH IOL INSERTION;  Surgeon: Adelia Dalton MD;  Location: [facility] OR;  Service: Ophthalmology;  Laterality: Left;    COLONOSCOPY  2014    Dr. Palomares; reduntant colon, otherwise normal findings repeat in 8-10 years for surveillance    Epidural Steroid Injection      Pain Management    EPIDURAL STEROID INJECTION INTO CERVICAL SPINE N/A 09/25/2018    Procedure: Injection-steroid-epidural-cervical;  Surgeon: Oswald Abdalla MD;  Location: [facility] OR;  Service: Pain Management;  Laterality: N/A;    EPIDURAL STEROID INJECTION INTO CERVICAL SPINE N/A 10/24/2019    Procedure: Injection-steroid-epidural-cervical;  Surgeon: Oswald Abdalla MD;  Location: [facility] OR;  Service: Pain Management;  Laterality: N/A;    EPIDURAL STEROID INJECTION INTO CERVICAL SPINE N/A 02/22/2023    Procedure: Injection-steroid-epidural-cervical C7-T1;  Surgeon: Oswald Abdalla MD;  Location: [facility] OR;  Service: Pain Management;   Laterality: N/A;    EPIDURAL STEROID INJECTION INTO CERVICAL SPINE N/A 10/30/2024    Procedure: Injection-steroid-epidural-cervical C7/T1;  Surgeon: Oswald Abdalla MD;  Location: [facility] OR;  Service: Pain Management;  Laterality: N/A;  C7/T1    EPIDURAL STEROID INJECTION INTO LUMBAR SPINE N/A 12/27/2018    Procedure: Injection-steroid-epidural-lumbar L5/S1;  Surgeon: Oswald Abdalla MD;  Location: [facility] OR;  Service: Pain Management;  Laterality: N/A;    EPIDURAL STEROID INJECTION INTO LUMBAR SPINE N/A 09/04/2019    Procedure: Injection-steroid-epidural-lumbar;  Surgeon: Oswald Abdalla MD;  Location: [facility] OR;  Service: Pain Management;  Laterality: N/A;  L5/S1 to right    EPIDURAL STEROID INJECTION INTO LUMBAR SPINE N/A 06/16/2023    Procedure: Injection-steroid-epidural-lumbar L5/S1;  Surgeon: Oswald Abdalla MD;  Location: [facility] OR;  Service: Pain Management;  Laterality: N/A;    ESOPHAGOGASTRODUODENOSCOPY  05/17/2016    Dr. Arreguin; small hiatal hernia; gastritis    ESOPHAGOGASTRODUODENOSCOPY N/A 06/22/2020    Dr. Arreguin; mild Schatzki ring- dilated; small hiatal hernia; bx unremarkable    Facet injection      Pain Management    HYSTERECTOMY      ovaries removed    KIDNEY SURGERY Right 1967    to correct urinary reflux into kidney    LEFT HEART CATHETERIZATION Left 05/22/2019    Procedure: Left heart cath;  Surgeon: Casa Perdue MD;  Location: [facility] CATH;  Service: Cardiology;  Laterality: Left;    OVARIAN CYST REMOVAL Right 1981    RADIOFREQUENCY ABLATION OF LUMBAR MEDIAL BRANCH NERVE AT SINGLE LEVEL Bilateral 07/17/2018    Procedure: RADIOFREQUENCY ABLATION, NERVE, MEDIAL BRANCH, LUMBAR, L2,3,4;  Surgeon: Oswald Abdalla MD;  Location: [facility] OR;  Service: Pain Management;  Laterality: Bilateral;    RADIOFREQUENCY ABLATION OF LUMBAR MEDIAL BRANCH NERVE AT SINGLE LEVEL Bilateral 07/29/2019    Procedure: Radiofrequency Ablation, Nerve, Spinal, Lumbar, Medial Branch, L2,3,4;  Surgeon:  Oswald Abdalla MD;  Location: [facility] OR;  Service: Pain Management;  Laterality: Bilateral;    SHOULDER SURGERY  2010    rotator cuff, right    TRANSFORAMINAL EPIDURAL INJECTION OF STEROID Left 08/21/2023    Procedure: Injection,steroid,epidural,transforaminal approach L4/5 L5/S1 Left;  Surgeon: Oswald Abdalla MD;  Location: [facility] OR;  Service: Pain Management;  Laterality: Left;    TRANSFORAMINAL EPIDURAL INJECTION OF STEROID Right 3/4/2024    Procedure: Injection,steroid,epidural,transforaminal approach right L4/5 and L5/S1;  Surgeon: Oswald Abdalla MD;  Location: [facility] OR;  Service: Pain Management;  Laterality: Right;  right L4/5 and L5/S1       Current Outpatient Medications   Medication Sig    ALPRAZolam (XANAX) 0.25 MG tablet Take 1 tablet (0.25 mg total) by mouth 3 (three) times daily as needed for Anxiety.    amLODIPine (NORVASC) 5 MG tablet TAKE 1 TABLET(5 MG) BY MOUTH EVERY DAY    ascorbic acid, vitamin C, (VITAMIN C) 500 MG tablet Take 1 tablet (500 mg total) by mouth 2 (two) times daily.    aspirin (ECOTRIN) 81 MG EC tablet Take 81 mg by mouth once daily.    atorvastatin (LIPITOR) 10 MG tablet TAKE 1 TABLET(10 MG) BY MOUTH EVERY DAY    azelastine (ASTELIN) 137 mcg (0.1 %) nasal spray 2 sprays (274 mcg total) by Nasal route 2 (two) times daily.    benzonatate (TESSALON) 100 MG capsule Take 1 capsule (100 mg total) by mouth 3 (three) times daily as needed for Cough.    diazePAM (VALIUM) 10 MG Tab Take 1 tablet (10 mg total) by mouth On call Procedure.    EScitalopram oxalate (LEXAPRO) 10 MG tablet TAKE 1 TABLET(10 MG) BY MOUTH DAILY    estradioL (ESTRACE) 1 MG tablet Take 1 tablet (1 mg total) by mouth once daily. Can cause blood clots with covid, recommend NOT taking this medication until covid symptoms completely gone and discussing with PCP    eszopiclone (LUNESTA) 3 mg Tab Take 1 tablet (3 mg total) by mouth every evening.    fluticasone propionate (FLONASE) 50 mcg/actuation nasal spray 1  spray (50 mcg total) by Each Nostril route Daily.    galcanezumab-gnlm (EMGALITY PEN) 120 mg/mL PnIj Inject 1 pen (120 mg total) into the skin every 28 days.    ibuprofen (ADVIL,MOTRIN) 800 MG tablet TAKE 1 TABLET(800 MG) BY MOUTH EVERY 6 HOURS AS NEEDED FOR PAIN    ipratropium (ATROVENT) 42 mcg (0.06 %) nasal spray USE 2 SPRAYS IN EACH NOSTRIL THREE TIMES DAILY    LIDOcaine (LIDODERM) 5 % PLACE 1 PATCH ONTO THE AFFECTED AREA OF SKIN DAILY AND REMOVE WITHIN 12 HOURS    LINZESS 145 mcg Cap capsule TAKE 1 CAPSULE(145 MCG) BY MOUTH BEFORE BREAKFAST    losartan (COZAAR) 100 MG tablet TAKE 1 TABLET(100 MG) BY MOUTH EVERY DAY    nystatin (MYCOSTATIN) cream Applied t.i.d.    ondansetron (ZOFRAN-ODT) 4 MG TbDL Take 1 tablet (4 mg total) by mouth every 6 (six) hours as needed (nausea).    pantoprazole (PROTONIX) 40 MG tablet TAKE 1 TABLET(40 MG) BY MOUTH EVERY DAY    pregabalin (LYRICA) 50 MG capsule Take 1 capsule (50 mg total) by mouth 3 (three) times daily.    traMADoL (ULTRAM) 50 mg tablet TAKE 1 TABLET BY MOUTH EVERY 12 HOURS AS NEEDED FOR PAIN    triamcinolone acetonide 0.5% (KENALOG) 0.5 % Crea Apply topically 3 (three) times daily.    ubrogepant (UBRELVY) 50 mg tablet Take 1 tablet by mouth at onset of migraine. May repeat in 2 hours if needed. Max 2 tablets per day.    vitamin D (VITAMIN D3) 1000 units Tab Take 1 tablet (1,000 Units total) by mouth once daily.    zinc sulfate (ZINCATE) 220 (50) mg capsule Take 1 capsule (220 mg total) by mouth once daily.     No current facility-administered medications for this visit.       Review of patient's allergies indicates:   Allergen Reactions    Hydrocodone Hives and Nausea Only           Sulfa (sulfonamide antibiotics) Hives and Rash           Doxycycline Rash     Itchy rash and some scattered itchy lesions    Oxycodone Itching and Nausea And Vomiting       Family History   Problem Relation Name Age of Onset    Cataracts Mother      Heart attack Mother      Heart disease  Mother      COPD Mother      Hypertension Mother      COPD Father      Hepatitis Sister      Breast cancer Maternal Grandmother      No Known Problems Daughter      Allergic rhinitis Neg Hx      Angioedema Neg Hx      Asthma Neg Hx      Atopy Neg Hx      Eczema Neg Hx      Immunodeficiency Neg Hx      Urticaria Neg Hx      Colon cancer Neg Hx      Colon polyps Neg Hx      Glaucoma Neg Hx      Macular degeneration Neg Hx      Retinal detachment Neg Hx         Social History     Socioeconomic History    Marital status:     Number of children: 1   Occupational History     Employer: [employer]   Tobacco Use    Smoking status: Former     Current packs/day: 0.00     Average packs/day: 1 pack/day for 5.0 years (5.0 ttl pk-yrs)     Types: Cigarettes     Start date: 1992     Quit date: 1997     Years since quittin.5     Passive exposure: Never    Smokeless tobacco: Never   Substance and Sexual Activity    Alcohol use: Yes     Comment: 3x per year    Drug use: No    Sexual activity: Yes     Partners: Male   Social History Narrative    ** Merged History Encounter **          Social Drivers of Health     Financial Resource Strain: Low Risk  (2024)    Overall Financial Resource Strain (CARDIA)     Difficulty of Paying Living Expenses: Not hard at all   Food Insecurity: No Food Insecurity (2024)    Hunger Vital Sign     Worried About Running Out of Food in the Last Year: Never true     Ran Out of Food in the Last Year: Never true   Transportation Needs: No Transportation Needs (2024)    PRAPARE - Transportation     Lack of Transportation (Medical): No     Lack of Transportation (Non-Medical): No   Physical Activity: Sufficiently Active (2024)    Exercise Vital Sign     Days of Exercise per Week: 5 days     Minutes of Exercise per Session: 60 min   Stress: No Stress Concern Present (2024)    [place] [employer] - Occupational Stress Questionnaire      Feeling of Stress : Only a little   Housing Stability: Low Risk  (1/24/2024)    Housing Stability Vital Sign     Unable to Pay for Housing in the Last Year: No     Number of Places Lived in the Last Year: 1     Unstable Housing in the Last Year: No       Chief Complaint:   Chief Complaint   Patient presents with    Right Knee - Pain       History of present illness:  64-year-old female with a history of knee arthritis and previous treatment including a Euflexxa series, Synvisc 1 and cortisone injections who presents with worsening left knee pain.  No injury or trauma.  Pain along the medial aspect of her knee.  Pain with prolonged walking or standing.   Pain is up to an 8/10.      Answers submitted by the patient for this visit:  Orthopedics Questionnaire (Submitted on 5/2/2023)  unexpected weight change: No  appetite change : No  sleep disturbance: No  IMMUNOCOMPROMISED: No  nervous/ anxious: No  dysphoric mood: No  rash: No  visual disturbance: No  eye redness: No  eye pain: No  ear pain: No  tinnitus: No  hearing loss: No  sinus pressure : No  nosebleeds: No  enviro allergies: No  food allergies: No  cough: No  shortness of breath: No  sweating: No  dysuria: No  frequency: No  difficulty urinating: No  hematuria: No  painful intercourse: No  chest pain: No  palpitations: No  nausea: No  vomiting: No  diarrhea: No  blood in stool: No  constipation: No  headaches: No  dizziness: No  numbness: No  seizures: No  joint swelling: No  myalgia: No  weakness: No  back pain: No   (Submitted on 5/2/2023)  Chief Complaint: Hip pain  Pain Chronicity: new  History of trauma: Yes  Onset: 1 to 4 weeks ago  Frequency: constantly  Progression since onset: gradually worsening  injury location: at home  pain- numeric: 8/10  pain location: left hip, left knee  pain quality: burning  Radiating Pain: No  Aggravating factors: bearing weight, sitting  fever: No  inability to bear weight: No  itching: No  joint locking: No  limited range  of motion: No  stiffness: No  tingling: No  Treatments tried: cold, OTC ointments, OTC pain meds  physical therapy: ineffective  Improvement on treatment: mild      Physical Examination:    Vital Signs:    Vitals:    07/23/25 0926   Resp: 16           There is no height or weight on file to calculate BMI.    This a well-developed, well nourished patient in no acute distress.  They are alert and oriented and cooperative to examination.  Pt. walks without an antalgic gait.      Examination of the right knee shows no rashes or erythema. There are no masses ecchymosis or effusion. Patient has full range of motion from 0-130°. Patient is nontender to palpation over lateral joint line and moderately tender to palpation over the medial joint line. Patient has a - Lachman exam, - anterior drawer exam, and - posterior drawer exam. - Apley exam. Knee is stable to varus and valgus stress. 5 out of 5 motor strength. Palpable distal pulses. Intact light touch sensation. Negative Patellofemoral crepitus      X-rays:  X-rays left knee are reviewed which show some mild to moderate medial narrowing bilaterally.  Patient has a small ossicle located near the medial epicondyle on the right knee from previous MCL injury possibly.  X-rays of the left hip are reviewed which shows well-maintained joint space.  Possibly some mild dysplasia and femoral acetabular impingement.     Assessment::   Left knee arthritis    Plan:  We talked about treatment options.  I injected her right knee with Synvisc-One today.  Follow up as needed.    The patient has tried a self guided exercise program that has included walking without significant improvement. Minimal relief with tylenol or OTC Nsaids. Reports less than 8 weeks relief with IA steroid injection. Kellgren Danis scale of 3. They are not receiving another HA injectable at this time. I will precert for gel injection.       All previous pertinent notes including ER visits, physical therapy  visits, other orthopedic visits as well as other care for the same musculoskeletal problem were reviewed.  All pertinent lab values and previous imaging was reviewed pertinent to the current visit.    This note was created using [employer] voice recognition software that occasionally misinterpreted phrases or words.    Consult note is delivered via Epic messaging service.

## 2025-07-25 ENCOUNTER — HOSPITAL ENCOUNTER (OUTPATIENT)
Dept: RADIOLOGY | Facility: HOSPITAL | Age: 65
Discharge: HOME | End: 2025-07-25
Attending: ANESTHESIOLOGY | Admitting: ANESTHESIOLOGY
Payer: COMMERCIAL

## 2025-07-25 ENCOUNTER — HOSPITAL ENCOUNTER (OUTPATIENT)
Facility: HOSPITAL | Age: 65
Discharge: HOME | End: 2025-07-25
Attending: ANESTHESIOLOGY | Admitting: ANESTHESIOLOGY
Payer: COMMERCIAL

## 2025-07-25 VITALS
TEMPERATURE: 97 F | HEIGHT: 64 IN | HEART RATE: 68 BPM | BODY MASS INDEX: 31.41 KG/M2 | RESPIRATION RATE: 15 BRPM | SYSTOLIC BLOOD PRESSURE: 157 MMHG | OXYGEN SATURATION: 98 % | WEIGHT: 184 LBS | DIASTOLIC BLOOD PRESSURE: 90 MMHG

## 2025-07-25 DIAGNOSIS — M54.16 LUMBAR RADICULOPATHY: ICD-10-CM

## 2025-07-25 DIAGNOSIS — M54.50 LOWER BACK PAIN: ICD-10-CM

## 2025-07-25 PROCEDURE — 25500020 PHARM REV CODE 255: Mod: PO | Performed by: ANESTHESIOLOGY

## 2025-07-25 PROCEDURE — 25000003 PHARM REV CODE 250: Mod: PO | Performed by: ANESTHESIOLOGY

## 2025-07-25 PROCEDURE — 64483 NJX AA&/STRD TFRM EPI L/S 1: CPT | Mod: 50,,, | Performed by: ANESTHESIOLOGY

## 2025-07-25 PROCEDURE — 63600175 PHARM REV CODE 636 W HCPCS: Mod: JZ,TB,PO | Performed by: ANESTHESIOLOGY

## 2025-07-25 PROCEDURE — 64483 NJX AA&/STRD TFRM EPI L/S 1: CPT | Mod: 50,PO | Performed by: ANESTHESIOLOGY

## 2025-07-25 RX ORDER — METHYLPREDNISOLONE ACETATE 80 MG/ML
INJECTION, SUSPENSION INTRA-ARTICULAR; INTRALESIONAL; INTRAMUSCULAR; SOFT TISSUE
Status: DISCONTINUED | OUTPATIENT
Start: 2025-07-25 | End: 2025-07-25 | Stop reason: HOSPADM

## 2025-07-25 RX ORDER — LIDOCAINE HYDROCHLORIDE 10 MG/ML
INJECTION, SOLUTION EPIDURAL; INFILTRATION; INTRACAUDAL; PERINEURAL
Status: DISCONTINUED | OUTPATIENT
Start: 2025-07-25 | End: 2025-07-25 | Stop reason: HOSPADM

## 2025-07-25 RX ORDER — ALPRAZOLAM 0.5 MG/1
1 TABLET, ORALLY DISINTEGRATING ORAL ONCE AS NEEDED
Status: COMPLETED | OUTPATIENT
Start: 2025-07-25 | End: 2025-07-25

## 2025-07-25 RX ADMIN — ALPRAZOLAM 1 MG: 0.5 TABLET, ORALLY DISINTEGRATING ORAL at 01:07

## 2025-07-25 NOTE — OP NOTE
PROCEDURE DATE: 7/25/2025    PROCEDURE: Bilateral L5/S1 transforaminal epidural steroid injection under fluoroscopy    DIAGNOSIS: Lumbar  Radiculopathy    Post op diagnosis: Same    PHYSICIAN: Oswald Abdalla MD    MEDICATIONS INJECTED:  Methylprednisolone 40mg (1ml) and 1ml 0.25% bupivicaine at each nerve root.     LOCAL ANESTHETIC INJECTED:  Lidocaine 1%. 4 ml per site.    SEDATION MEDICATIONS: none    ESTIMATED BLOOD LOSS:  none    COMPLICATIONS:  none    TECHNIQUE:   A time-out was taken to identify patient and procedure side prior to starting the procedure. The patient was placed in a prone position, prepped and draped in the usual sterile fashion using ChloraPrep and sterile towels.  The area to be injected was determined under fluoroscopic guidance in AP and oblique view.  Local anesthetic was given by raising a wheal and going down to the hub of a 25-gauge 1.5 inch needle.  In oblique view, a 5 inch 22-gauge bent-tip spinal needle was introduced towards 6 oclock position of the pedicle of each above named nerve root level.  The needle was walked medially then hinged into the neural foramen and position was confirmed in AP and lateral views.  Omnipaque contrast dye was injected to confirm appropriate placement and that there was no vascular uptake.  After negative aspiration for blood or CSF, the medication was then injected. This was performed at the bilateral L5/S1 level(s). The patient tolerated the procedure well.    The patient was monitored after the procedure.  Patient was given post procedure and discharge instructions to follow at home. The patient was discharged in a stable condition.

## 2025-07-25 NOTE — DISCHARGE SUMMARY
[facility] - Surgery  Discharge Note  Short Stay    Procedure(s) (LRB):  INJECTION, SPINE, LUMBOSACRAL, TRANSFORAMINAL APPROACH L5/S1 (Bilateral)      OUTCOME: Patient tolerated treatment/procedure well without complication and is now ready for discharge.    DISPOSITION: Home or Self Care    FINAL DIAGNOSIS:  Lumbar radiculopathy    FOLLOWUP: In clinic    DISCHARGE INSTRUCTIONS:    Discharge Procedure Orders   Diet Adult Regular     No dressing needed     Notify your health care provider if you experience any of the following:  temperature >100.4     Activity as tolerated

## 2025-07-25 NOTE — H&P
CC: Back pain    HPI: The patient is a 63yo woman with a history of lumbar radiculopathy here for bilateral L5/S1 TFESI. There are no major changes in history and physical from 5/26/25.    Past Medical History:   Diagnosis Date    Allergy     Arthritis of spine 2011    Cataract     Chronic low back pain     Class 2 obesity with body mass index (BMI) of 39.0 to 39.9 in adult 01/11/2019    Coronary artery disease     mild    DDD (degenerative disc disease), lumbar     Diverticulitis     Diverticulosis     DJD (degenerative joint disease)     Encounter for blood transfusion     Factor V Leiden     GERD (gastroesophageal reflux disease)     Hiatal hernia     Hypertension     Migraines     PONV (postoperative nausea and vomiting)     geta    Schatzki's ring     Urticaria        Past Surgical History:   Procedure Laterality Date    APPENDECTOMY  1981    CARPAL TUNNEL RELEASE  2011    right    CATARACT EXTRACTION W/  INTRAOCULAR LENS IMPLANT Right 5/29/2024    Procedure: EXTRACTION, CATARACT, WITH IOL INSERTION;  Surgeon: Adelia Dalton MD;  Location: [facility] OR;  Service: Ophthalmology;  Laterality: Right;    CATARACT EXTRACTION W/  INTRAOCULAR LENS IMPLANT Left 8/7/2024    Procedure: EXTRACTION, CATARACT, WITH IOL INSERTION;  Surgeon: Adelia Dalton MD;  Location: [facility] OR;  Service: Ophthalmology;  Laterality: Left;    COLONOSCOPY  2014    Dr. Palomares; reduntant colon, otherwise normal findings repeat in 8-10 years for surveillance    Epidural Steroid Injection      Pain Management    EPIDURAL STEROID INJECTION INTO CERVICAL SPINE N/A 09/25/2018    Procedure: Injection-steroid-epidural-cervical;  Surgeon: Oswald Abdalla MD;  Location: [facility] OR;  Service: Pain Management;  Laterality: N/A;    EPIDURAL STEROID INJECTION INTO CERVICAL SPINE N/A 10/24/2019    Procedure: Injection-steroid-epidural-cervical;  Surgeon: Oswald Abdalla MD;  Location: [facility] OR;  Service: Pain Management;  Laterality: N/A;    EPIDURAL  STEROID INJECTION INTO CERVICAL SPINE N/A 02/22/2023    Procedure: Injection-steroid-epidural-cervical C7-T1;  Surgeon: Oswald Abdalla MD;  Location: [facility] OR;  Service: Pain Management;  Laterality: N/A;    EPIDURAL STEROID INJECTION INTO CERVICAL SPINE N/A 10/30/2024    Procedure: Injection-steroid-epidural-cervical C7/T1;  Surgeon: Oswald Abdalla MD;  Location: [facility] OR;  Service: Pain Management;  Laterality: N/A;  C7/T1    EPIDURAL STEROID INJECTION INTO LUMBAR SPINE N/A 12/27/2018    Procedure: Injection-steroid-epidural-lumbar L5/S1;  Surgeon: Oswald Abdalla MD;  Location: [facility] OR;  Service: Pain Management;  Laterality: N/A;    EPIDURAL STEROID INJECTION INTO LUMBAR SPINE N/A 09/04/2019    Procedure: Injection-steroid-epidural-lumbar;  Surgeon: Oswald Abdalla MD;  Location: [facility] OR;  Service: Pain Management;  Laterality: N/A;  L5/S1 to right    EPIDURAL STEROID INJECTION INTO LUMBAR SPINE N/A 06/16/2023    Procedure: Injection-steroid-epidural-lumbar L5/S1;  Surgeon: Oswald Abdalla MD;  Location: [facility] OR;  Service: Pain Management;  Laterality: N/A;    ESOPHAGOGASTRODUODENOSCOPY  05/17/2016    Dr. Arreguin; small hiatal hernia; gastritis    ESOPHAGOGASTRODUODENOSCOPY N/A 06/22/2020    Dr. Arreguin; mild Schatzki ring- dilated; small hiatal hernia; bx unremarkable    Facet injection      Pain Management    HYSTERECTOMY      ovaries removed    KIDNEY SURGERY Right 1967    to correct urinary reflux into kidney    LEFT HEART CATHETERIZATION Left 05/22/2019    Procedure: Left heart cath;  Surgeon: Casa Perdue MD;  Location: [facility] CATH;  Service: Cardiology;  Laterality: Left;    OVARIAN CYST REMOVAL Right 1981    RADIOFREQUENCY ABLATION OF LUMBAR MEDIAL BRANCH NERVE AT SINGLE LEVEL Bilateral 07/17/2018    Procedure: RADIOFREQUENCY ABLATION, NERVE, MEDIAL BRANCH, LUMBAR, L2,3,4;  Surgeon: Oswald Abdalla MD;  Location: [facility] OR;  Service: Pain Management;  Laterality:  Bilateral;    RADIOFREQUENCY ABLATION OF LUMBAR MEDIAL BRANCH NERVE AT SINGLE LEVEL Bilateral 2019    Procedure: Radiofrequency Ablation, Nerve, Spinal, Lumbar, Medial Branch, L2,3,4;  Surgeon: Oswald Abdalla MD;  Location: [facility] OR;  Service: Pain Management;  Laterality: Bilateral;    SHOULDER SURGERY  2010    rotator cuff, right    TRANSFORAMINAL EPIDURAL INJECTION OF STEROID Left 2023    Procedure: Injection,steroid,epidural,transforaminal approach L4/5 L5/S1 Left;  Surgeon: Oswald Abdalla MD;  Location: [facility] OR;  Service: Pain Management;  Laterality: Left;    TRANSFORAMINAL EPIDURAL INJECTION OF STEROID Right 3/4/2024    Procedure: Injection,steroid,epidural,transforaminal approach right L4/5 and L5/S1;  Surgeon: Oswald Abdalla MD;  Location: [facility] OR;  Service: Pain Management;  Laterality: Right;  right L4/5 and L5/S1       Family History   Problem Relation Name Age of Onset    Cataracts Mother      Heart attack Mother      Heart disease Mother      COPD Mother      Hypertension Mother      COPD Father      Hepatitis Sister      Breast cancer Maternal Grandmother      No Known Problems Daughter      Allergic rhinitis Neg Hx      Angioedema Neg Hx      Asthma Neg Hx      Atopy Neg Hx      Eczema Neg Hx      Immunodeficiency Neg Hx      Urticaria Neg Hx      Colon cancer Neg Hx      Colon polyps Neg Hx      Glaucoma Neg Hx      Macular degeneration Neg Hx      Retinal detachment Neg Hx         Social History     Socioeconomic History    Marital status:     Number of children: 1   Occupational History     Employer: [employer]   Tobacco Use    Smoking status: Former     Current packs/day: 0.00     Average packs/day: 1 pack/day for 5.0 years (5.0 ttl pk-yrs)     Types: Cigarettes     Start date: 1992     Quit date: 1997     Years since quittin.5     Passive exposure: Never    Smokeless tobacco: Never   Substance and Sexual Activity    Alcohol use: Yes      "Comment: 3x per year    Drug use: No    Sexual activity: Yes     Partners: Male   Social History Narrative    ** Merged History Encounter **          Social Drivers of Health     Financial Resource Strain: Low Risk  (11/6/2024)    Overall Financial Resource Strain (CARDIA)     Difficulty of Paying Living Expenses: Not hard at all   Food Insecurity: No Food Insecurity (11/6/2024)    Hunger Vital Sign     Worried About Running Out of Food in the Last Year: Never true     Ran Out of Food in the Last Year: Never true   Transportation Needs: No Transportation Needs (1/24/2024)    PRAPARE - Transportation     Lack of Transportation (Medical): No     Lack of Transportation (Non-Medical): No   Physical Activity: Sufficiently Active (11/6/2024)    Exercise Vital Sign     Days of Exercise per Week: 5 days     Minutes of Exercise per Session: 60 min   Stress: No Stress Concern Present (11/6/2024)    [employer] [place] [employer] - Occupational Stress Questionnaire     Feeling of Stress : Only a little   Housing Stability: Low Risk  (1/24/2024)    Housing Stability Vital Sign     Unable to Pay for Housing in the Last Year: No     Number of Places Lived in the Last Year: 1     Unstable Housing in the Last Year: No       Current Medications[1]    Review of patient's allergies indicates:   Allergen Reactions    Hydrocodone Hives and Nausea Only           Sulfa (sulfonamide antibiotics) Hives and Rash           Doxycycline Rash     Itchy rash and some scattered itchy lesions    Oxycodone Itching and Nausea And Vomiting       Vitals:    07/18/25 1252 07/25/25 1314   BP:  (!) 153/77   Pulse:  67   Resp:  16   Temp:  97.3 °F (36.3 °C)   SpO2:  98%   Weight: 83.5 kg (184 lb) 83.5 kg (184 lb)   Height: 5' 4" (1.626 m) 5' 4" (1.626 m)       ASA 2, Mallampati 2    REVIEW OF SYSTEMS:     GENERAL: No weight loss, malaise or fevers.  HEENT:  No recent changes in vision or hearing  NECK: Negative for lumps, no difficulty with " swallowing.  RESPIRATORY: Negative for cough, wheezing or shortness of breath, patient denies any recent URI.  CARDIOVASCULAR: Negative for chest pain, leg swelling or palpitations.  GI: Negative for abdominal discomfort, blood in stools or black stools or change in bowel habits.  MUSCULOSKELETAL: See HPI.  SKIN: Negative for lesions, rash, and itching.  PSYCH: No suicidal or homicidal ideations, no current mood disturbances.  HEMATOLOGY/LYMPHOLOGY: Negative for prolonged bleeding, bruising easily or swollen nodes. Patient is not currently taking any anti-coagulants  ENDO: No history of diabetes or thyroid dysfunction  NEURO: No history of syncope, paralysis, seizures or tremors.All other reviewed and negative other than HPI.    Physical exam:  Gen: A and O x3, pleasant, well-groomed  Skin: No rashes or obvious lesions  HEENT: PERRLA, no obvious deformities on ears or in canals. No thyroid masses, trachea midline, no palpable lymph nodes in neck, axilla.  CVS: Regular rate and rhythm, normal S1 and S2, no murmurs.  Resp: Clear to auscultation bilaterally.  Abdomen: Soft, NT/ND, normal bowel sounds present.  Musculoskeletal/Neuro: Moving all extremities    Assessment:  Lumbar radiculopathy  -     Place in Outpatient; Standing  -     Vital signs; Standing  -     Verify informed consent; Standing  -     Notify physician ; Standing  -     Notify physician ; Standing  -     Notify physician (specify); Standing  -     Notify physician (specify); Standing  -     Notify physician (specify); Standing  -     Diet NPO; Standing  -     alprazolam ODT dissolvable tablet 1 mg    Other orders  -     IP VTE LOW RISK PATIENT; Standing               [1]   Current Facility-Administered Medications   Medication Dose Route Frequency Provider Last Rate Last Admin    alprazolam ODT dissolvable tablet 1 mg  1 mg Oral Once Oswald Kenyon MD

## 2025-07-25 NOTE — PLAN OF CARE
Plan of care reviewed with patient. Verbalized understanding, allowed time for questions. Safety measures maintained. Bed low, wheels locked, side rails up, call light within reach. Will continue to monitor.

## 2025-08-11 PROBLEM — R29.818 ACUTE FOCAL NEUROLOGICAL DEFICIT: Status: ACTIVE | Noted: 2025-08-11

## 2025-08-18 ENCOUNTER — CLINICAL SUPPORT (OUTPATIENT)
Dept: AUDIOLOGY | Facility: CLINIC | Age: 65
End: 2025-08-18
Payer: COMMERCIAL

## 2025-08-18 ENCOUNTER — OFFICE VISIT (OUTPATIENT)
Dept: OTOLARYNGOLOGY | Facility: CLINIC | Age: 65
End: 2025-08-18
Payer: COMMERCIAL

## 2025-08-18 VITALS
DIASTOLIC BLOOD PRESSURE: 65 MMHG | WEIGHT: 179.88 LBS | BODY MASS INDEX: 30.88 KG/M2 | SYSTOLIC BLOOD PRESSURE: 118 MMHG

## 2025-08-18 DIAGNOSIS — R42 VERTIGO: ICD-10-CM

## 2025-08-18 DIAGNOSIS — Z01.10 NORMAL HEARING TEST OF BOTH EARS: ICD-10-CM

## 2025-08-18 DIAGNOSIS — H93.13 TINNITUS, BILATERAL: Primary | ICD-10-CM

## 2025-08-18 DIAGNOSIS — R42 VERTIGO: Primary | ICD-10-CM

## 2025-08-18 PROCEDURE — 1159F MED LIST DOCD IN RCRD: CPT | Mod: CPTII,S$GLB,, | Performed by: NURSE PRACTITIONER

## 2025-08-18 PROCEDURE — 99999 PR PBB SHADOW E&M-EST. PATIENT-LVL I: CPT | Mod: PBBFAC,,, | Performed by: AUDIOLOGIST-HEARING AID FITTER

## 2025-08-18 PROCEDURE — 92567 TYMPANOMETRY: CPT | Mod: S$GLB,,, | Performed by: AUDIOLOGIST-HEARING AID FITTER

## 2025-08-18 PROCEDURE — 3074F SYST BP LT 130 MM HG: CPT | Mod: CPTII,S$GLB,, | Performed by: NURSE PRACTITIONER

## 2025-08-18 PROCEDURE — 99203 OFFICE O/P NEW LOW 30 MIN: CPT | Mod: S$GLB,,, | Performed by: NURSE PRACTITIONER

## 2025-08-18 PROCEDURE — 3078F DIAST BP <80 MM HG: CPT | Mod: CPTII,S$GLB,, | Performed by: NURSE PRACTITIONER

## 2025-08-18 PROCEDURE — 92557 COMPREHENSIVE HEARING TEST: CPT | Mod: S$GLB,,, | Performed by: AUDIOLOGIST-HEARING AID FITTER

## 2025-08-18 PROCEDURE — 99999 PR PBB SHADOW E&M-EST. PATIENT-LVL III: CPT | Mod: PBBFAC,,, | Performed by: NURSE PRACTITIONER

## 2025-08-18 PROCEDURE — 4010F ACE/ARB THERAPY RXD/TAKEN: CPT | Mod: CPTII,S$GLB,, | Performed by: NURSE PRACTITIONER

## 2025-08-18 PROCEDURE — 3008F BODY MASS INDEX DOCD: CPT | Mod: CPTII,S$GLB,, | Performed by: NURSE PRACTITIONER

## 2025-08-23 DIAGNOSIS — K59.09 CHRONIC CONSTIPATION: ICD-10-CM

## 2025-08-28 RX ORDER — LINACLOTIDE 145 UG/1
145 CAPSULE, GELATIN COATED ORAL
Qty: 90 CAPSULE | Refills: 1 | Status: SHIPPED | OUTPATIENT
Start: 2025-08-28

## 2025-09-04 ENCOUNTER — OFFICE VISIT (OUTPATIENT)
Dept: PAIN MEDICINE | Facility: CLINIC | Age: 65
End: 2025-09-04
Payer: COMMERCIAL

## 2025-09-04 VITALS
SYSTOLIC BLOOD PRESSURE: 114 MMHG | BODY MASS INDEX: 31.16 KG/M2 | HEART RATE: 77 BPM | WEIGHT: 181.56 LBS | DIASTOLIC BLOOD PRESSURE: 55 MMHG

## 2025-09-04 DIAGNOSIS — Z79.891 OPIOID CONTRACT EXISTS: ICD-10-CM

## 2025-09-04 DIAGNOSIS — M51.369 DDD (DEGENERATIVE DISC DISEASE), LUMBAR: ICD-10-CM

## 2025-09-04 DIAGNOSIS — M51.362 DEGENERATION OF INTERVERTEBRAL DISC OF LUMBAR REGION WITH DISCOGENIC BACK PAIN AND LOWER EXTREMITY PAIN: ICD-10-CM

## 2025-09-04 DIAGNOSIS — M48.061 SPINAL STENOSIS OF LUMBAR REGION WITHOUT NEUROGENIC CLAUDICATION: ICD-10-CM

## 2025-09-04 DIAGNOSIS — M54.12 CERVICAL RADICULOPATHY: ICD-10-CM

## 2025-09-04 DIAGNOSIS — M54.16 LUMBAR RADICULOPATHY: Primary | ICD-10-CM

## 2025-09-04 PROCEDURE — 99999 PR PBB SHADOW E&M-EST. PATIENT-LVL III: CPT | Mod: PBBFAC,,,

## 2025-09-04 PROCEDURE — 99214 OFFICE O/P EST MOD 30 MIN: CPT | Mod: S$GLB,,,

## 2025-09-04 PROCEDURE — 3078F DIAST BP <80 MM HG: CPT | Mod: CPTII,S$GLB,,

## 2025-09-04 PROCEDURE — 3074F SYST BP LT 130 MM HG: CPT | Mod: CPTII,S$GLB,,

## 2025-09-04 PROCEDURE — 1159F MED LIST DOCD IN RCRD: CPT | Mod: CPTII,S$GLB,,

## 2025-09-04 PROCEDURE — 3008F BODY MASS INDEX DOCD: CPT | Mod: CPTII,S$GLB,,

## 2025-09-04 PROCEDURE — 4010F ACE/ARB THERAPY RXD/TAKEN: CPT | Mod: CPTII,S$GLB,,

## 2025-09-05 RX ORDER — PREGABALIN 50 MG/1
50 CAPSULE ORAL 3 TIMES DAILY
Qty: 90 CAPSULE | Refills: 2 | Status: SHIPPED | OUTPATIENT
Start: 2025-09-05

## 2025-09-05 RX ORDER — TRAMADOL HYDROCHLORIDE 50 MG/1
50 TABLET, FILM COATED ORAL EVERY 12 HOURS PRN
Qty: 60 TABLET | Refills: 2 | Status: SHIPPED | OUTPATIENT
Start: 2025-09-20 | End: 2025-10-20

## (undated) DEVICE — TRAY NERVE BLOCK

## (undated) DEVICE — GLOVE SURGICAL LATEX SZ 7

## (undated) DEVICE — MARKER SKIN STND TIP BLUE BARR

## (undated) DEVICE — MARKER SKIN RULER STERILE

## (undated) DEVICE — SYR LUER LOCK 1CC

## (undated) DEVICE — SYR GLASS 5CC LUER LOK

## (undated) DEVICE — DUOVISC

## (undated) DEVICE — NDL TUOHY EPIDURAL 20G X 3.5

## (undated) DEVICE — NDL SPINAL SPINOCAN 22GX3.5

## (undated) DEVICE — APPLICATOR CHLORAPREP CLR 10.5

## (undated) DEVICE — CANNULA CVD 100MM X 20G

## (undated) DEVICE — SEE MEDLINE ITEM 152622

## (undated) DEVICE — SOL BETADINE 5%

## (undated) DEVICE — GLOVE SENSICARE PI MICRO 7

## (undated) DEVICE — TOWEL OR DISP STRL BLUE 4/PK

## (undated) DEVICE — PAD ELECTROSURGICAL PAT PLATE

## (undated) DEVICE — DRAPE OPHTHALMIC 48X62 FEN

## (undated) DEVICE — HANDLE SURG LIGHT NONRIGID

## (undated) DEVICE — DRESSING TRANS 2X2 TEGADERM

## (undated) DEVICE — GLOVE BIOGEL ECLIPSE SZ 6.5

## (undated) DEVICE — Device

## (undated) DEVICE — NDL 18GA X1 1/2 REG BEVEL